# Patient Record
Sex: MALE | Race: BLACK OR AFRICAN AMERICAN | NOT HISPANIC OR LATINO | Employment: OTHER | URBAN - METROPOLITAN AREA
[De-identification: names, ages, dates, MRNs, and addresses within clinical notes are randomized per-mention and may not be internally consistent; named-entity substitution may affect disease eponyms.]

---

## 2017-01-05 RX ORDER — METOPROLOL SUCCINATE 100 MG/1
TABLET, EXTENDED RELEASE ORAL
Qty: 90 TABLET | Refills: 3 | Status: SHIPPED | OUTPATIENT
Start: 2017-01-05 | End: 2017-12-20 | Stop reason: SDUPTHER

## 2017-01-05 RX ORDER — FINASTERIDE 5 MG/1
TABLET, FILM COATED ORAL
Qty: 90 TABLET | Refills: 3 | Status: SHIPPED | OUTPATIENT
Start: 2017-01-05 | End: 2017-12-20 | Stop reason: SDUPTHER

## 2017-01-30 ENCOUNTER — OFFICE VISIT (OUTPATIENT)
Dept: INTERNAL MEDICINE | Facility: CLINIC | Age: 67
End: 2017-01-30
Payer: MEDICARE

## 2017-01-30 VITALS
SYSTOLIC BLOOD PRESSURE: 144 MMHG | RESPIRATION RATE: 20 BRPM | WEIGHT: 239.31 LBS | HEIGHT: 70 IN | TEMPERATURE: 98 F | HEART RATE: 60 BPM | BODY MASS INDEX: 34.26 KG/M2 | DIASTOLIC BLOOD PRESSURE: 86 MMHG

## 2017-01-30 DIAGNOSIS — D50.9 IRON DEFICIENCY ANEMIA, UNSPECIFIED IRON DEFICIENCY ANEMIA TYPE: Primary | ICD-10-CM

## 2017-01-30 DIAGNOSIS — N40.1 BENIGN PROSTATIC HYPERPLASIA WITH LOWER URINARY TRACT SYMPTOMS, UNSPECIFIED MORPHOLOGY: ICD-10-CM

## 2017-01-30 DIAGNOSIS — N18.4 CHRONIC KIDNEY DISEASE, STAGE IV (SEVERE): ICD-10-CM

## 2017-01-30 DIAGNOSIS — E78.2 MIXED HYPERLIPIDEMIA: ICD-10-CM

## 2017-01-30 DIAGNOSIS — Z94.0 RENAL TRANSPLANT RECIPIENT: ICD-10-CM

## 2017-01-30 DIAGNOSIS — D68.59 THROMBOPHILIA: ICD-10-CM

## 2017-01-30 DIAGNOSIS — I10 ESSENTIAL HYPERTENSION, BENIGN: ICD-10-CM

## 2017-01-30 PROCEDURE — 99999 PR PBB SHADOW E&M-EST. PATIENT-LVL III: CPT | Mod: PBBFAC,,, | Performed by: INTERNAL MEDICINE

## 2017-01-30 PROCEDURE — 1160F RVW MEDS BY RX/DR IN RCRD: CPT | Mod: S$GLB,,, | Performed by: INTERNAL MEDICINE

## 2017-01-30 PROCEDURE — 3079F DIAST BP 80-89 MM HG: CPT | Mod: S$GLB,,, | Performed by: INTERNAL MEDICINE

## 2017-01-30 PROCEDURE — 99499 UNLISTED E&M SERVICE: CPT | Mod: S$GLB,,, | Performed by: INTERNAL MEDICINE

## 2017-01-30 PROCEDURE — 1159F MED LIST DOCD IN RCRD: CPT | Mod: S$GLB,,, | Performed by: INTERNAL MEDICINE

## 2017-01-30 PROCEDURE — 1126F AMNT PAIN NOTED NONE PRSNT: CPT | Mod: S$GLB,,, | Performed by: INTERNAL MEDICINE

## 2017-01-30 PROCEDURE — 3077F SYST BP >= 140 MM HG: CPT | Mod: S$GLB,,, | Performed by: INTERNAL MEDICINE

## 2017-01-30 PROCEDURE — 99214 OFFICE O/P EST MOD 30 MIN: CPT | Mod: S$GLB,,, | Performed by: INTERNAL MEDICINE

## 2017-01-30 PROCEDURE — 1157F ADVNC CARE PLAN IN RCRD: CPT | Mod: S$GLB,,, | Performed by: INTERNAL MEDICINE

## 2017-01-30 RX ORDER — TAMSULOSIN HYDROCHLORIDE 0.4 MG/1
0.4 CAPSULE ORAL DAILY
Qty: 90 CAPSULE | Refills: 3 | Status: SHIPPED | OUTPATIENT
Start: 2017-01-30 | End: 2017-12-04

## 2017-01-30 NOTE — MR AVS SNAPSHOT
[facility] - Internal Medicine  [address] [place] [address] [place], [address]  [place] [address]  Phone: [phone]  Fax: [phone]                  Jose Luis Manzo   2017 9:45 AM   Office Visit    Description:  Male : 1950   Provider:  Zelalem Rojas MD   Department:  [facility] - Internal Medicine           Reason for Visit     Follow-up           Diagnoses this Visit        Comments    Iron deficiency anemia, unspecified iron deficiency anemia type    -  Primary     Essential hypertension, benign         Mixed hyperlipidemia         Renal transplant recipient         Thrombophilia         Chronic kidney disease, stage IV (severe)         Benign prostatic hyperplasia with lower urinary tract symptoms, unspecified morphology                To Do List           Future Appointments        Provider Department Dept Phone    2017 1:00 PM HRA, [facility] 8 [facility] [phone]      Goals (5 Years of Data)     None      Follow-Up and Disposition     Return in about 6 months (around 2017).       These Medications        Disp Refills Start End    tamsulosin (FLOMAX) 0.4 mg Cp24 90 capsule 3 2017    Take 1 capsule (0.4 mg total) by mouth once daily. - Oral    Pharmacy: [facility] [address] [place], [place] [address] [facility] Ph #: [phone]         [facility] On Call     [facility] On Call Nurse Care Line -  Assistance  Registered nurses in the [facility] Center provide clinical advisement, health education, appointment booking, and other advisory services.  Call for this free service at [phone].             Medications           START taking these NEW medications        Refills    tamsulosin (FLOMAX) 0.4 mg Cp24 3    Sig: Take 1 capsule (0.4 mg total) by mouth once daily.    Class: Normal    Route: Oral           Verify that the below list of medications is an accurate representation of the medications you are currently taking.  If none reported, the list may be blank.  "If incorrect, please contact your healthcare provider. Carry this list with you in case of emergency.           Current Medications     amlodipine (NORVASC) 10 MG tablet TAKE 1 TABLET EVERY DAY    apixaban (ELIQUIS) 2.5 mg Tab Take 1 tablet (2.5 mg total) by mouth 2 (two) times daily.    atorvastatin (LIPITOR) 20 MG tablet TAKE 1 TABLET EVERY OTHER DAY    ferrous sulfate 325 mg (65 mg iron) Tab tablet Take 325 mg by mouth once daily.    finasteride (PROSCAR) 5 mg tablet TAKE 1 TABLET EVERY DAY    metoprolol succinate (TOPROL-XL) 100 MG 24 hr tablet TAKE 1 TABLET EVERY DAY    predniSONE (DELTASONE) 5 MG tablet Take 1 tablet (5 mg total) by mouth once daily.    sirolimus (RAPAMUNE) 1 MG Tab Take 3 tablets (3 mg total) by mouth once daily.    sodium bicarbonate 650 MG tablet TAKE 3 TABLETS THREE TIMES DAILY    tamsulosin (FLOMAX) 0.4 mg Cp24 Take 1 capsule (0.4 mg total) by mouth once daily.           Clinical Reference Information           Vital Signs - Last Recorded  Most recent update: 1/30/2017 10:11 AM by Carlos Chaney MA    BP Pulse Temp Resp Ht Wt    (!) 144/86 (BP Location: Left arm, Patient Position: Sitting, BP Method: Manual) 60 97.5 °F (36.4 °C) (Oral) 20 5' 10" (1.778 m) 108.6 kg (239 lb 5 oz)    BMI                34.34 kg/m2          Blood Pressure          Most Recent Value    BP  (!)  144/86      Allergies as of 1/30/2017     No Known Allergies      Immunizations Administered on Date of Encounter - 1/30/2017     None      Orders Placed During Today's Visit     Future Labs/Procedures Expected by Expires    CBC auto differential  1/30/2017 3/31/2018    Comprehensive metabolic panel  1/30/2017 3/31/2018    Lipid panel  1/30/2017 3/31/2018      "

## 2017-01-30 NOTE — PROGRESS NOTES
Subjective:       Patient ID: Jose Luis Manzo is a 66 y.o. male.    Chief Complaint: Follow-up    HPI  Checkup.  Yet to have GI w/u due to financial reasons.  demies any bleeding, melena, BRBPR.  C/O nocturia x 3-4 since starting on Prednisone. LE edema at baseline.  Review of Systems   All other systems reviewed and are negative.      Objective:      Physical Exam   Constitutional: He appears well-developed. No distress.   HENT:   Head: Normocephalic.   Eyes: No scleral icterus.   Dysconjugate gaze   Neck: Normal range of motion. No tracheal deviation present.   Cardiovascular: Normal rate, regular rhythm, normal heart sounds and intact distal pulses.    Pulmonary/Chest: Effort normal and breath sounds normal. No respiratory distress.   Abdominal: Soft. Bowel sounds are normal. He exhibits no distension and no mass. There is no tenderness.   Musculoskeletal: Normal range of motion. He exhibits edema (1+ ankles).   Neurological: He is alert.   Skin: Skin is warm and dry. No rash noted. He is not diaphoretic. No erythema.   Psychiatric: He has a normal mood and affect. His behavior is normal.   Vitals reviewed.      Assessment:       1. Iron deficiency anemia, unspecified iron deficiency anemia type    2. Essential hypertension, benign    3. Mixed hyperlipidemia    4. Renal transplant recipient    5. Thrombophilia    6. Chronic kidney disease, stage IV (severe)    7. Benign prostatic hyperplasia with lower urinary tract symptoms, unspecified morphology        Plan:       Jose Luis was seen today for follow-up.    Diagnoses and all orders for this visit:    Iron deficiency anemia, unspecified iron deficiency anemia type   Await GI w/u    Essential hypertension, benign   Observe    Mixed hyperlipidemia  -     Comprehensive metabolic panel; Future  -     Lipid panel; Future    Renal transplant recipient    Thrombophilia   Cont rx    Chronic kidney disease, stage IV (severe)  -     CBC auto differential; Future  -      Comprehensive metabolic panel; Future    Benign prostatic hyperplasia with lower urinary tract symptoms, unspecified morphology  -     tamsulosin (FLOMAX) 0.4 mg Cp24; Take 1 capsule (0.4 mg total) by mouth once daily.   Cont Proscar      Return in about 6 months (around 7/30/2017).

## 2017-03-31 DIAGNOSIS — N18.9 CHRONIC KIDNEY DISEASE, UNSPECIFIED STAGE: ICD-10-CM

## 2017-04-03 RX ORDER — ATORVASTATIN CALCIUM 20 MG/1
TABLET, FILM COATED ORAL
Qty: 45 TABLET | Refills: 1 | Status: SHIPPED | OUTPATIENT
Start: 2017-04-03 | End: 2017-04-05 | Stop reason: SDUPTHER

## 2017-04-05 RX ORDER — ATORVASTATIN CALCIUM 20 MG/1
20 TABLET, FILM COATED ORAL EVERY OTHER DAY
Qty: 45 TABLET | Refills: 1 | Status: SHIPPED | OUTPATIENT
Start: 2017-04-05 | End: 2017-10-16 | Stop reason: SDUPTHER

## 2017-04-05 NOTE — TELEPHONE ENCOUNTER
----- Message from Mounika Dejesus sent at 4/5/2017  2:27 PM CDT -----  Contact: The pt called  Need refill                Please RESEND to [facility].  They didn't get it.    atorvastatin (LIPITOR) 20 MG tablet

## 2017-05-01 ENCOUNTER — INITIAL CONSULT (OUTPATIENT)
Dept: OTOLARYNGOLOGY | Facility: CLINIC | Age: 67
End: 2017-05-01
Payer: MEDICARE

## 2017-05-01 VITALS
HEART RATE: 69 BPM | BODY MASS INDEX: 35.3 KG/M2 | TEMPERATURE: 98 F | WEIGHT: 246.06 LBS | SYSTOLIC BLOOD PRESSURE: 150 MMHG | DIASTOLIC BLOOD PRESSURE: 88 MMHG

## 2017-05-01 DIAGNOSIS — Z85.21 HISTORY OF LARYNGEAL CANCER: ICD-10-CM

## 2017-05-01 DIAGNOSIS — R49.0 HOARSENESS OF VOICE: Primary | ICD-10-CM

## 2017-05-01 PROCEDURE — 1159F MED LIST DOCD IN RCRD: CPT | Mod: S$GLB,,, | Performed by: OTOLARYNGOLOGY

## 2017-05-01 PROCEDURE — 99204 OFFICE O/P NEW MOD 45 MIN: CPT | Mod: 25,S$GLB,, | Performed by: OTOLARYNGOLOGY

## 2017-05-01 PROCEDURE — 31575 DIAGNOSTIC LARYNGOSCOPY: CPT | Mod: S$GLB,,, | Performed by: OTOLARYNGOLOGY

## 2017-05-01 PROCEDURE — 1160F RVW MEDS BY RX/DR IN RCRD: CPT | Mod: S$GLB,,, | Performed by: OTOLARYNGOLOGY

## 2017-05-01 PROCEDURE — 99999 PR PBB SHADOW E&M-EST. PATIENT-LVL III: CPT | Mod: PBBFAC,,, | Performed by: OTOLARYNGOLOGY

## 2017-05-01 PROCEDURE — 1126F AMNT PAIN NOTED NONE PRSNT: CPT | Mod: S$GLB,,, | Performed by: OTOLARYNGOLOGY

## 2017-05-01 PROCEDURE — 3079F DIAST BP 80-89 MM HG: CPT | Mod: S$GLB,,, | Performed by: OTOLARYNGOLOGY

## 2017-05-01 PROCEDURE — 3077F SYST BP >= 140 MM HG: CPT | Mod: S$GLB,,, | Performed by: OTOLARYNGOLOGY

## 2017-05-01 RX ORDER — FUROSEMIDE 40 MG/1
40 TABLET ORAL EVERY OTHER DAY
COMMUNITY
Start: 2017-03-23 | End: 2017-09-28

## 2017-05-01 NOTE — LETTER
May 1, 2017      Zelalem Rojas MD  [address] [facility]  [address]  [place] [address]           [facility] Otorhinolaryngology  [address] [place] [address], [address]  [facility] [address]  Phone: [phone]  Fax: [phone]          Patient: Jose Luis Manzo   MR Number: [id number]   YOB: 1950   Date of Visit: 5/1/2017       Dear Dr. Zelalem Rojas:    Thank you for referring Jose Luis Manzo to me for evaluation. Attached you will find relevant portions of my assessment and plan of care.    If you have questions, please do not hesitate to call me. I look forward to following Jose Luis Manzo along with you.    Sincerely,    Lizet Lam MD    Enclosure  CC:  No Recipients    If you would like to receive this communication electronically, please contact [email or web address] or [phone] to request more information on [facility] access.    For providers and/or their staff who would like to refer a patient to [facility], please contact us through our one-stop-shop provider referral line, [facility], at [phone].    If you feel you have received this communication in error or would no longer like to receive these types of communications, please e-mail [email or web address]

## 2017-05-01 NOTE — PROCEDURES
Laryngoscopy  Date/Time: 5/1/2017 8:54 AM  Performed by: CARMEN KIMBALL  Authorized by: CARMEN KIMBALL     Consent Done?:  Yes (Verbal)    Due to indication in patient's history, presentation or risk factors,  a fiber optic exam was performed.    SEPARATE PROCEDURE NOTE:    ANESTHESIA:  Topical xylocaine with earnestine-synephrine    FINDINGS:  Moderate to severe inaterarytenoid erythema and edema    PROCEDURE:  After verbal consent was obtained, the flexible scope was passed through the patient's nasal cavity without difficulty.  The nasopharynx (adenoid pad) and eustachian tube orifices were first visualized and were found to be normal, without masses or irregularity.  The posterior pharyngeal wall and base of tongue were then examined and no mass or irregular tissue was seen.  The scope was then advanced to the larynx, and the epiglottis, valleculae, and piriform sinuses were normal, without masses or mucosal irregularity.  The false vocal folds and true vocal folds were then examined and were found to have normal mobility (full abduction and adduction) and no masses or mucosal irregularity was seen.  The interartyenoid area , post cricoid region and false vocal cord region had erythema and edema consistent with post radiation changes.

## 2017-05-01 NOTE — PROGRESS NOTES
Chief Complaint   Patient presents with    Hoarse     X 1 month (completed XRT 2 years ago for throat CA)   .     HPI: 67 year old male referred to me by Dr. Rojas for evaluation of hoarseness with history of throat cancer. He has a history of laryngeal cancer treated by Dr. Kyle approximately 2 years. He was treated with XRT at [facility].  He reports that he completed radiation therapy (29 treatments) 2 years ago.  He now reports hoarseness which has been persistent since radiation. He reports dry mouth and phlegm in his throat.  He states that when he awakens in the morning he has no voice.  He feels hoarseness is worsening over the last several months.   He denies throat pain, dysphagia, otalgia.  He denies weight loss.        Past Medical History:   Diagnosis Date    BPH (benign prostatic hypertrophy)     Cancer     vocal cords    Claudication of right lower extremity 3/10/2016    Disorder of kidney and ureter     Hyperlipidemia     Hypertension     Microcytic anemia 9/16/2016    Oropharyngeal cancer     Pterygium     Thromboembolic disorder     Unspecified disorder of kidney and ureter      Social History     Social History    Marital status: Single     Spouse name: N/A    Number of children: N/A    Years of education: N/A     Occupational History    Not on file.     Social History Main Topics    Smoking status: Former Smoker     Packs/day: 1.00     Years: 20.00    Smokeless tobacco: Never Used    Alcohol use 0.0 oz/week     0 Standard drinks or equivalent per week      Comment: occasionally 1/month    Drug use: No    Sexual activity: Yes     Partners: Female     Other Topics Concern    Not on file     Social History Narrative     Past Surgical History:   Procedure Laterality Date    FRACTURE SURGERY Left 1980    Leg    KIDNEY TRANSPLANT  2007    Oropharyngeal Surgery  11/2014    Oropharyngeal Cancer    vocal cord surgery      radiation and then surgery to scrap cancer      Family History   Problem Relation Age of Onset    Stroke Mother     Diabetes Mother     Hypertension Mother     Stroke Father     No Known Problems Sister     No Known Problems Brother     No Known Problems Daughter            Review of Systems  General: negative for chills, fever or weight loss  Psychological: negative for mood changes or depression  Ophthalmic: negative for blurry vision, photophobia or eye pain  ENT: see HPI  Respiratory: no cough, shortness of breath, or wheezing  Cardiovascular: no chest pain or dyspnea on exertion  Gastrointestinal: no abdominal pain, change in bowel habits, or black/ bloody stools  Musculoskeletal: negative for gait disturbance or muscular weakness  Neurological: no syncope or seizures; no ataxia  Dermatological: negative for puritis,  rash and jaundice  Hematologic/lymphatic: no easy bruising, no new lumps or bumps      Physical Exam:    Vitals:    05/01/17 0815   BP: (!) 150/88   Pulse: 69   Temp: 97.8 °F (36.6 °C)       Constitutional: Well appearing / communicating without difficutly.  NAD.  Eyes: EOM I Bilaterally  Head/Face: Normocephalic.  Negative paranasal sinus pressure/tenderness.  Salivary glands WNL.  House Brackmann I Bilaterally.    Right Ear: Auricle normal appearance. External Auditory Canal within normal limits no lesions or masses,TM w/o masses/lesions/perforations. TM mobility noted.   Left Ear: Auricle normal appearance. External Auditory Canal within normal limits no lesions or masses,TM w/o masses/lesions/perforations. TM mobility noted.  Nose: No gross nasal septal deviation. Inferior Turbinates 2+ bilaterally. No septal perforation. No masses/lesions. External nasal skin appears normal without masses/lesions.  Oral Cavity: Gingiva/lips within normal limits.  Maxillary: Edendulous Mandibular partially edentulous. Mucus membranes dry. Floor of mouth soft, no masses palpated. Oral Tongue mobile. Hard Palate appears normal.    Oropharynx: Base  of tongue appears normal. No masses/lesions noted. Tonsillar fossa/pharyngeal wall without lesions. Posterior oropharynx WNL.  Soft palate without masses. Midline uvula.   Neck/Lymphatic: No LAD I-VI bilaterally.  No thyromegaly.  No masses noted on exam.    Mirror laryngoscopy/nasopharyngoscopy: Active gag reflex.  Unable to perform.    Neuro/Psychiatric: AOx3.  Normal mood and affect.   Cardiovascular: Normal carotid pulses bilaterally, no increasing jugular venous distention noted at cervical region bilaterally.    Respiratory: Normal respiratory effort, no stridor, no retractions noted.      FFL see separate procedure note.       Assessment:    ICD-10-CM ICD-9-CM    1. Hoarseness of voice R49.0 784.42    2. History of laryngeal cancer Z85.21 V10.21      The primary encounter diagnosis was Hoarseness of voice. A diagnosis of History of laryngeal cancer was also pertinent to this visit.      Plan:  History of laryngeal cancer with worsening hoarseness- I will obtain records from  Radiation Oncology and his ENT Dr. Kyle.  No charli evidence of recurrence on FFL today. I will obtain a PET-CT scan to evaluate for recurrence.  We will plan for DL with biopsy pending PET-CT scan results. He will follow up 1-2 weeks after PET-Ct scan is completed.        Lizet Lam MD

## 2017-05-01 NOTE — MR AVS SNAPSHOT
[facility] Otorhinolaryngology  [address], [address]  [place] [address]  Phone: [phone]  Fax: [phone]                  Jose Luis Manzo   2017 8:00 AM   Initial consult    Description:  Male : 1950   Provider:  Lizet Lam MD   Department:  [place] - Otorhinolaryngology           Reason for Visit     Hoarse           Diagnoses this Visit        Comments    Hoarseness of voice    -  Primary     History of laryngeal cancer                To Do List           Future Appointments        Provider Department Dept Phone    2017 9:45 AM [facility] PET CT1 LIMIT [address] [facility] [phone]    5/15/2017 8:00 AM Lizet Lam MD [facility] Otorhinolaryngology [phone]    2017 7:00 AM LAB, [place] [facility] [phone]    2017 9:30 AM Zelalem Rojas MD [facility] - Internal Medicine [phone]      Goals (5 Years of Data)     None      Follow-Up and Disposition     Return in about 2 weeks (around 5/15/2017).    Follow-up and Disposition History      [facility] On Call     [facility] On [facility] Nurse Care Line -  Assistance  Unless otherwise directed by your provider, please contact [facility], our nurse care line that is available for  assistance.     Registered nurses in the [facility] provide: appointment scheduling, clinical advisement, health education, and other advisory services.  Call: [phone] (toll free)               Medications                Verify that the below list of medications is an accurate representation of the medications you are currently taking.  If none reported, the list may be blank. If incorrect, please contact your healthcare provider. Carry this list with you in case of emergency.           Current Medications     amlodipine (NORVASC) 10 MG tablet TAKE 1 TABLET EVERY DAY    atorvastatin (LIPITOR) 20 MG tablet Take 1 tablet (20 mg total) by mouth every other  day.    ferrous sulfate 325 mg (65 mg iron) Tab tablet Take 325 mg by mouth once daily.    finasteride (PROSCAR) 5 mg tablet TAKE 1 TABLET EVERY DAY    furosemide (LASIX) 40 MG tablet Take 40 mg by mouth every other day.    metoprolol succinate (TOPROL-XL) 100 MG 24 hr tablet TAKE 1 TABLET EVERY DAY    sirolimus (RAPAMUNE) 1 MG Tab Take 3 tablets (3 mg total) by mouth once daily.    sodium bicarbonate 650 MG tablet TAKE 3 TABLETS THREE TIMES DAILY    tamsulosin (FLOMAX) 0.4 mg Cp24 Take 1 capsule (0.4 mg total) by mouth once daily.    apixaban (ELIQUIS) 2.5 mg Tab Take 1 tablet (2.5 mg total) by mouth 2 (two) times daily.    predniSONE (DELTASONE) 5 MG tablet Take 1 tablet (5 mg total) by mouth once daily.           Clinical Reference Information           Your Vitals Were     BP Pulse Temp Weight BMI    150/88 (BP Location: Left arm, Patient Position: Sitting) 69 97.8 °F (36.6 °C) (Oral) 111.6 kg (246 lb 0.5 oz) 35.3 kg/m2      Blood Pressure          Most Recent Value    BP  (!)  150/88      Allergies as of 5/1/2017     No Known Allergies      Immunizations Administered on Date of Encounter - 5/1/2017     None      Orders Placed During Today's Visit      Normal Orders This Visit    Laryngoscopy     Future Labs/Procedures Expected by Expires    NM Pet CT Routine  5/1/2017 5/1/2018      Language Assistance Services     ATTENTION: Language assistance services are available, free of charge. Please call [phone].      ATENCIÓN: Si habla sue, tiene a verdin disposición servicios gratuitos de asistencia lingüística. Llame al [phone].     CHÚ Ý: N?u b?n nói Ti?ng Vi?t, có các d?ch v? h? tr? ngôn ng? mi?n phí dành cho b?n. G?i s? [phone].         [facility] complies with applicable Federal civil rights laws and does not discriminate on the basis of race, color, national origin, age, disability, or sex.

## 2017-05-12 ENCOUNTER — HOSPITAL ENCOUNTER (OUTPATIENT)
Dept: RADIOLOGY | Facility: HOSPITAL | Age: 67
Discharge: HOME | End: 2017-05-12
Attending: OTOLARYNGOLOGY
Payer: MEDICARE

## 2017-05-12 DIAGNOSIS — R49.0 HOARSENESS OF VOICE: ICD-10-CM

## 2017-05-12 DIAGNOSIS — Z85.21 HISTORY OF LARYNGEAL CANCER: ICD-10-CM

## 2017-05-12 PROCEDURE — 78815 PET IMAGE W/CT SKULL-THIGH: CPT | Mod: 26,,, | Performed by: RADIOLOGY

## 2017-05-12 PROCEDURE — A9552 F18 FDG: HCPCS

## 2017-05-15 ENCOUNTER — TELEPHONE (OUTPATIENT)
Dept: OTOLARYNGOLOGY | Facility: CLINIC | Age: 67
End: 2017-05-15

## 2017-05-15 DIAGNOSIS — R91.1 LESION OF LEFT LUNG: Primary | ICD-10-CM

## 2017-05-15 NOTE — TELEPHONE ENCOUNTER
I spoke with Mr. Manzo and notified him of his PET-CT scan results. I will refer to pulmonary for an urgent appointment.  Please ensure he has follow up with me in 6 weeks.

## 2017-06-14 ENCOUNTER — OFFICE VISIT (OUTPATIENT)
Dept: OTOLARYNGOLOGY | Facility: CLINIC | Age: 67
End: 2017-06-14
Payer: MEDICARE

## 2017-06-14 VITALS
HEIGHT: 70 IN | BODY MASS INDEX: 34.64 KG/M2 | TEMPERATURE: 98 F | WEIGHT: 241.94 LBS | HEART RATE: 79 BPM | DIASTOLIC BLOOD PRESSURE: 95 MMHG | SYSTOLIC BLOOD PRESSURE: 139 MMHG

## 2017-06-14 DIAGNOSIS — R91.1 LESION OF LEFT LUNG: ICD-10-CM

## 2017-06-14 DIAGNOSIS — Z85.21 HISTORY OF LARYNGEAL CANCER: Primary | ICD-10-CM

## 2017-06-14 DIAGNOSIS — R49.0 HOARSENESS OF VOICE: ICD-10-CM

## 2017-06-14 PROCEDURE — 99213 OFFICE O/P EST LOW 20 MIN: CPT | Mod: S$GLB,,, | Performed by: OTOLARYNGOLOGY

## 2017-06-14 PROCEDURE — 1126F AMNT PAIN NOTED NONE PRSNT: CPT | Mod: S$GLB,,, | Performed by: OTOLARYNGOLOGY

## 2017-06-14 PROCEDURE — 99999 PR PBB SHADOW E&M-EST. PATIENT-LVL IV: CPT | Mod: PBBFAC,,, | Performed by: OTOLARYNGOLOGY

## 2017-06-14 PROCEDURE — 1159F MED LIST DOCD IN RCRD: CPT | Mod: S$GLB,,, | Performed by: OTOLARYNGOLOGY

## 2017-06-14 RX ORDER — FLUTICASONE PROPIONATE 50 MCG
2 SPRAY, SUSPENSION (ML) NASAL DAILY
Qty: 1 BOTTLE | Refills: 12 | Status: SHIPPED | OUTPATIENT
Start: 2017-06-14 | End: 2017-12-04

## 2017-06-14 RX ORDER — OMEPRAZOLE 40 MG/1
40 CAPSULE, DELAYED RELEASE ORAL EVERY MORNING
Qty: 30 CAPSULE | Refills: 11 | Status: SHIPPED | OUTPATIENT
Start: 2017-06-14 | End: 2018-05-24

## 2017-06-14 NOTE — PROGRESS NOTES
Chief Complaint   Patient presents with    Follow-up     hoarseness    .     HPI: 67 year old male referred to me by Dr. Rojas for evaluation of hoarseness with history of throat cancer. He has a history of laryngeal cancer treated by Dr. Kyle approximately 2 years. He was treated with XRT at [facility].  He reports that he completed radiation therapy (29 treatments) 2 years ago.  He now reports hoarseness which has been persistent since radiation. He reports dry mouth and phlegm in his throat.  He states that when he awakens in the morning he has no voice.  He feels hoarseness is worsening over the last several months.   He denies throat pain, dysphagia, otalgia.  He denies weight loss.      Interval history: He reports continued hoarseness since last visit.   He denies cough, hemoptysis, shortness of breath.  He has continued dry mouth and the feeling of phlegm in his throat.       Past Medical History:   Diagnosis Date    BPH (benign prostatic hypertrophy)     Cancer     vocal cords    Claudication of right lower extremity 3/10/2016    Disorder of kidney and ureter     Hyperlipidemia     Hypertension     Microcytic anemia 9/16/2016    Oropharyngeal cancer     Pterygium     Thromboembolic disorder     Unspecified disorder of kidney and ureter      Social History     Social History    Marital status: Single     Spouse name: N/A    Number of children: N/A    Years of education: N/A     Occupational History    Not on file.     Social History Main Topics    Smoking status: Former Smoker     Packs/day: 1.00     Years: 20.00    Smokeless tobacco: Never Used    Alcohol use 0.0 oz/week      Comment: occasionally 1/month    Drug use: No    Sexual activity: Yes     Partners: Female     Other Topics Concern    Not on file     Social History Narrative    No narrative on file     Past Surgical History:   Procedure Laterality Date    FRACTURE SURGERY Left 1980    Leg    KIDNEY TRANSPLANT   2007    Oropharyngeal Surgery  11/2014    Oropharyngeal Cancer    vocal cord surgery      radiation and then surgery to scrap cancer     Family History   Problem Relation Age of Onset    Stroke Mother     Diabetes Mother     Hypertension Mother     Stroke Father     No Known Problems Sister     No Known Problems Brother     No Known Problems Daughter            Review of Systems  General: negative for chills, fever or weight loss  Psychological: negative for mood changes or depression  Ophthalmic: negative for blurry vision, photophobia or eye pain  ENT: see HPI  Respiratory: no cough, shortness of breath, or wheezing  Cardiovascular: no chest pain or dyspnea on exertion  Gastrointestinal: no abdominal pain, change in bowel habits, or black/ bloody stools  Musculoskeletal: negative for gait disturbance or muscular weakness  Neurological: no syncope or seizures; no ataxia  Dermatological: negative for puritis,  rash and jaundice  Hematologic/lymphatic: no easy bruising, no new lumps or bumps      Physical Exam:    Vitals:    06/14/17 1111   BP: (!) 139/95   Pulse: 79   Temp:        Constitutional: Well appearing / communicating without difficutly.  NAD.  Eyes: EOM I Bilaterally  Head/Face: Normocephalic.  Negative paranasal sinus pressure/tenderness.  Salivary glands WNL.  House Brackmann I Bilaterally.    Right Ear: Auricle normal appearance. External Auditory Canal within normal limits no lesions or masses,TM w/o masses/lesions/perforations. TM mobility noted.   Left Ear: Auricle normal appearance. External Auditory Canal within normal limits no lesions or masses,TM w/o masses/lesions/perforations. TM mobility noted.  Nose: No gross nasal septal deviation. Inferior Turbinates 2+ bilaterally. No septal perforation. No masses/lesions. External nasal skin appears normal without masses/lesions.  Oral Cavity: Gingiva/lips within normal limits.  Maxillary: Edendulous Mandibular partially edentulous. Mucus  membranes dry. Floor of mouth soft, no masses palpated. Oral Tongue mobile. Hard Palate appears normal.    Oropharynx: Base of tongue appears normal. No masses/lesions noted. Tonsillar fossa/pharyngeal wall without lesions. Posterior oropharynx WNL.  Soft palate without masses. Midline uvula.   Neck/Lymphatic: No LAD I-VI bilaterally.  No thyromegaly.  No masses noted on exam.    Mirror laryngoscopy/nasopharyngoscopy: Active gag reflex.  Unable to perform.    Neuro/Psychiatric: AOx3.  Normal mood and affect.   Cardiovascular: Normal carotid pulses bilaterally, no increasing jugular venous distention noted at cervical region bilaterally.    Respiratory: Normal respiratory effort, no stridor, no retractions noted.      PET-CT scan images reviewed personally by me and reviewed in detail with the patient today. Findings: There is mildly increased FDG uptake within a left anterior pleural-based density that contains hyperdensity on CT. There are no prior studies available for comparison to assess stability of this lesion. The internal hyperdensity suggests either that this represents postsurgical change or possibly old granulomatous disease however metastatic disease cannot be excluded. If prior studies exist, comparison of the stability of this lesion would be of benefit. Otherwise continued surveillance is recommended.      Assessment:    ICD-10-CM ICD-9-CM    1. History of laryngeal cancer Z85.21 V10.21    2. Lesion of left lung R91.1 518.89    3. Hoarseness of voice R49.0 784.42      The primary encounter diagnosis was History of laryngeal cancer. Diagnoses of Lesion of left lung and Hoarseness of voice were also pertinent to this visit.      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  Current PET-Ct scan shows hypermetabolic lesion in the left upper lobe of the lung that was not present in review of his prior PET-CT imaging 10/2014.  I will refer him  to Hematology/Oncology: Dr. Masters for evaluation of the lung lesion.    Follow up for laryngeal cancer surveillance in 4 month.       Lizet Lam MD

## 2017-07-21 ENCOUNTER — TELEPHONE (OUTPATIENT)
Dept: OTOLARYNGOLOGY | Facility: CLINIC | Age: 67
End: 2017-07-21

## 2017-07-21 NOTE — TELEPHONE ENCOUNTER
----- Message from Sarajbit Hackett sent at 7/21/2017  9:46 AM CDT -----  Contact: self/[phone]  Patient is returning your call.  Please advise

## 2017-07-21 NOTE — TELEPHONE ENCOUNTER
Spoke with patient, notified I did not try to call him. Seen her had a referral to oncology. Told patient will check to see if it was someone from that office that tried calling to possibly schedule an appointment. Will return his call

## 2017-07-21 NOTE — TELEPHONE ENCOUNTER
----- Message from Nadia Mccurdy sent at 7/21/2017  2:52 PM CDT -----  Contact: [phone]  Patient called in returning your call. Please advise

## 2017-07-24 ENCOUNTER — LAB VISIT (OUTPATIENT)
Dept: LAB | Facility: HOSPITAL | Age: 67
End: 2017-07-24
Attending: INTERNAL MEDICINE
Payer: MEDICARE

## 2017-07-24 DIAGNOSIS — N18.4 CHRONIC KIDNEY DISEASE, STAGE IV (SEVERE): ICD-10-CM

## 2017-07-24 DIAGNOSIS — I82.409 DEEP VEIN THROMBOSIS (DVT): ICD-10-CM

## 2017-07-24 DIAGNOSIS — E78.2 MIXED HYPERLIPIDEMIA: ICD-10-CM

## 2017-07-24 DIAGNOSIS — D68.59 THROMBOPHILIA: ICD-10-CM

## 2017-07-24 DIAGNOSIS — R60.9 EDEMA: ICD-10-CM

## 2017-07-24 DIAGNOSIS — Z94.0 RENAL TRANSPLANT RECIPIENT: ICD-10-CM

## 2017-07-24 LAB
ALBUMIN SERPL BCP-MCNC: 3.8 G/DL
ALP SERPL-CCNC: 109 U/L
ALT SERPL W/O P-5'-P-CCNC: 44 U/L
ANION GAP SERPL CALC-SCNC: 10 MMOL/L
ANION GAP SERPL CALC-SCNC: 10 MMOL/L
AST SERPL-CCNC: 69 U/L
BASOPHILS # BLD AUTO: 0.05 K/UL
BASOPHILS # BLD AUTO: 0.05 K/UL
BASOPHILS NFR BLD: 1 %
BASOPHILS NFR BLD: 1 %
BILIRUB SERPL-MCNC: 0.5 MG/DL
BUN SERPL-MCNC: 31 MG/DL
BUN SERPL-MCNC: 31 MG/DL
CALCIUM SERPL-MCNC: 11.2 MG/DL
CALCIUM SERPL-MCNC: 11.2 MG/DL
CHLORIDE SERPL-SCNC: 112 MMOL/L
CHLORIDE SERPL-SCNC: 112 MMOL/L
CHOLEST/HDLC SERPL: 3.8 {RATIO}
CO2 SERPL-SCNC: 22 MMOL/L
CO2 SERPL-SCNC: 22 MMOL/L
CREAT SERPL-MCNC: 2.79 MG/DL
CREAT SERPL-MCNC: 2.79 MG/DL
DIFFERENTIAL METHOD: ABNORMAL
DIFFERENTIAL METHOD: ABNORMAL
EOSINOPHIL # BLD AUTO: 0.1 K/UL
EOSINOPHIL # BLD AUTO: 0.1 K/UL
EOSINOPHIL NFR BLD: 2.6 %
EOSINOPHIL NFR BLD: 2.6 %
ERYTHROCYTE [DISTWIDTH] IN BLOOD BY AUTOMATED COUNT: 17 %
ERYTHROCYTE [DISTWIDTH] IN BLOOD BY AUTOMATED COUNT: 17 %
EST. GFR  (AFRICAN AMERICAN): 25.9 ML/MIN/1.73 M^2
EST. GFR  (AFRICAN AMERICAN): 25.9 ML/MIN/1.73 M^2
EST. GFR  (NON AFRICAN AMERICAN): 22.4 ML/MIN/1.73 M^2
EST. GFR  (NON AFRICAN AMERICAN): 22.4 ML/MIN/1.73 M^2
GLUCOSE SERPL-MCNC: 94 MG/DL
GLUCOSE SERPL-MCNC: 94 MG/DL
HCT VFR BLD AUTO: 40.4 %
HCT VFR BLD AUTO: 40.4 %
HDL/CHOLESTEROL RATIO: 26.6 %
HDLC SERPL-MCNC: 169 MG/DL
HDLC SERPL-MCNC: 45 MG/DL
HGB BLD-MCNC: 12.4 G/DL
HGB BLD-MCNC: 12.4 G/DL
INR PPP: 1
LDLC SERPL CALC-MCNC: 101.2 MG/DL
LYMPHOCYTES # BLD AUTO: 1.2 K/UL
LYMPHOCYTES # BLD AUTO: 1.2 K/UL
LYMPHOCYTES NFR BLD: 23.7 %
LYMPHOCYTES NFR BLD: 23.7 %
MCH RBC QN AUTO: 24.4 PG
MCH RBC QN AUTO: 24.4 PG
MCHC RBC AUTO-ENTMCNC: 30.7 G/DL
MCHC RBC AUTO-ENTMCNC: 30.7 G/DL
MCV RBC AUTO: 80 FL
MCV RBC AUTO: 80 FL
MONOCYTES # BLD AUTO: 0.6 K/UL
MONOCYTES # BLD AUTO: 0.6 K/UL
MONOCYTES NFR BLD: 11.3 %
MONOCYTES NFR BLD: 11.3 %
NEUTROPHILS # BLD AUTO: 3 K/UL
NEUTROPHILS # BLD AUTO: 3 K/UL
NEUTROPHILS NFR BLD: 59.2 %
NEUTROPHILS NFR BLD: 59.2 %
NONHDLC SERPL-MCNC: 124 MG/DL
PLATELET # BLD AUTO: 148 K/UL
PLATELET # BLD AUTO: 148 K/UL
PMV BLD AUTO: 11.9 FL
PMV BLD AUTO: 11.9 FL
POTASSIUM SERPL-SCNC: 4.2 MMOL/L
POTASSIUM SERPL-SCNC: 4.2 MMOL/L
PROT SERPL-MCNC: 7.8 G/DL
PROTHROMBIN TIME: 11.1 SEC
RBC # BLD AUTO: 5.08 M/UL
RBC # BLD AUTO: 5.08 M/UL
SODIUM SERPL-SCNC: 144 MMOL/L
SODIUM SERPL-SCNC: 144 MMOL/L
TRIGL SERPL-MCNC: 114 MG/DL
WBC # BLD AUTO: 4.97 K/UL
WBC # BLD AUTO: 4.97 K/UL

## 2017-07-24 PROCEDURE — 85007 BL SMEAR W/DIFF WBC COUNT: CPT | Mod: PO

## 2017-07-24 PROCEDURE — 80061 LIPID PANEL: CPT

## 2017-07-24 PROCEDURE — 36415 COLL VENOUS BLD VENIPUNCTURE: CPT | Mod: PO

## 2017-07-24 PROCEDURE — 85027 COMPLETE CBC AUTOMATED: CPT | Mod: PO

## 2017-07-24 PROCEDURE — 85610 PROTHROMBIN TIME: CPT | Mod: PO

## 2017-07-24 PROCEDURE — 80053 COMPREHEN METABOLIC PANEL: CPT

## 2017-08-04 ENCOUNTER — OFFICE VISIT (OUTPATIENT)
Dept: INTERNAL MEDICINE | Facility: CLINIC | Age: 67
End: 2017-08-04
Payer: MEDICARE

## 2017-08-04 VITALS
SYSTOLIC BLOOD PRESSURE: 118 MMHG | HEIGHT: 70 IN | HEART RATE: 88 BPM | DIASTOLIC BLOOD PRESSURE: 88 MMHG | RESPIRATION RATE: 20 BRPM | TEMPERATURE: 99 F | WEIGHT: 240.63 LBS | BODY MASS INDEX: 34.45 KG/M2

## 2017-08-04 DIAGNOSIS — I10 ESSENTIAL HYPERTENSION, BENIGN: Primary | ICD-10-CM

## 2017-08-04 DIAGNOSIS — N18.4 CHRONIC KIDNEY DISEASE, STAGE IV (SEVERE): ICD-10-CM

## 2017-08-04 DIAGNOSIS — Z94.0 RENAL TRANSPLANT RECIPIENT: ICD-10-CM

## 2017-08-04 DIAGNOSIS — N40.0 BENIGN NON-NODULAR PROSTATIC HYPERPLASIA WITHOUT LOWER URINARY TRACT SYMPTOMS: ICD-10-CM

## 2017-08-04 DIAGNOSIS — E78.2 MIXED HYPERLIPIDEMIA: ICD-10-CM

## 2017-08-04 DIAGNOSIS — D68.59 THROMBOPHILIA: ICD-10-CM

## 2017-08-04 PROCEDURE — 99499 UNLISTED E&M SERVICE: CPT | Mod: S$GLB,,, | Performed by: INTERNAL MEDICINE

## 2017-08-04 PROCEDURE — 99214 OFFICE O/P EST MOD 30 MIN: CPT | Mod: S$GLB,,, | Performed by: INTERNAL MEDICINE

## 2017-08-04 PROCEDURE — 99999 PR PBB SHADOW E&M-EST. PATIENT-LVL III: CPT | Mod: PBBFAC,,, | Performed by: INTERNAL MEDICINE

## 2017-08-04 PROCEDURE — 1159F MED LIST DOCD IN RCRD: CPT | Mod: S$GLB,,, | Performed by: INTERNAL MEDICINE

## 2017-08-04 PROCEDURE — 1125F AMNT PAIN NOTED PAIN PRSNT: CPT | Mod: S$GLB,,, | Performed by: INTERNAL MEDICINE

## 2017-08-04 PROCEDURE — 3008F BODY MASS INDEX DOCD: CPT | Mod: S$GLB,,, | Performed by: INTERNAL MEDICINE

## 2017-08-04 NOTE — PROGRESS NOTES
Subjective:       Patient ID: Jose Luis Manzo is a 67 y.o. male.    Chief Complaint: Results; Follow-up; and Hoarse    HPI  Checkup.  Labs reviewed.  C/O hoarseness x 1 month, no throat pain or dysphagia.  Scheduled for a laryngoscope soon per Dr Desouza.  No CP, SOB.  Had an abnormality in chest on PET/CT scan in May.  Nocturia x 0-4.  No bleeding.  Review of Systems   All other systems reviewed and are negative.      Objective:      Physical Exam   Constitutional: He appears well-developed. No distress.   HENT:   Head: Normocephalic.   Mouth/Throat: Oropharynx is clear and moist.   Eyes: EOM are normal. No scleral icterus.   Neck: Normal range of motion. No JVD present. No tracheal deviation present. No thyromegaly present.   Cardiovascular: Normal rate, regular rhythm, normal heart sounds and intact distal pulses.    Pulmonary/Chest: Effort normal and breath sounds normal. No respiratory distress.   Abdominal: Soft. Bowel sounds are normal. He exhibits no distension. There is no tenderness.   Musculoskeletal: Normal range of motion. He exhibits edema (1+ LEs).   Lymphadenopathy:     He has no cervical adenopathy.   Neurological: He is alert.   Skin: Skin is warm and dry. No rash noted. He is not diaphoretic. No erythema.   Psychiatric: He has a normal mood and affect. His behavior is normal.   Vitals reviewed.      Assessment:       1. Essential hypertension, benign    2. Chronic kidney disease, stage IV (severe)    3. Mixed hyperlipidemia    4. Renal transplant recipient    5. Thrombophilia    6. Benign non-nodular prostatic hyperplasia without lower urinary tract symptoms        Plan:       Jose Luis was seen today for results, follow-up and hoarse.    Diagnoses and all orders for this visit:    Essential hypertension, benign   Well-cont    Chronic kidney disease, stage IV (severe)  -     Renal function panel; Future   Stable    Mixed hyperlipidemia   Well-cont    Renal transplant recipient    Thrombophilia   Cont  Eliquiis    Benign non-nodular prostatic hyperplasia without lower urinary tract symptoms  -     Prostate Specific Antigen, Diagnostic; Future      Return in about 6 months (around 2/4/2018).

## 2017-08-18 ENCOUNTER — INITIAL CONSULT (OUTPATIENT)
Dept: HEMATOLOGY/ONCOLOGY | Facility: CLINIC | Age: 67
End: 2017-08-18
Payer: MEDICARE

## 2017-08-18 VITALS
HEIGHT: 71 IN | HEART RATE: 72 BPM | OXYGEN SATURATION: 97 % | DIASTOLIC BLOOD PRESSURE: 84 MMHG | WEIGHT: 243.63 LBS | BODY MASS INDEX: 34.11 KG/M2 | SYSTOLIC BLOOD PRESSURE: 150 MMHG

## 2017-08-18 DIAGNOSIS — N18.4 CHRONIC KIDNEY DISEASE, STAGE IV (SEVERE): ICD-10-CM

## 2017-08-18 DIAGNOSIS — R91.1 NODULE OF LEFT LUNG: ICD-10-CM

## 2017-08-18 DIAGNOSIS — Z85.21 H/O LARYNGEAL CANCER: ICD-10-CM

## 2017-08-18 DIAGNOSIS — Z94.0 RENAL TRANSPLANT RECIPIENT: Primary | ICD-10-CM

## 2017-08-18 PROCEDURE — 99204 OFFICE O/P NEW MOD 45 MIN: CPT | Mod: S$GLB,,, | Performed by: INTERNAL MEDICINE

## 2017-08-18 PROCEDURE — 3008F BODY MASS INDEX DOCD: CPT | Mod: S$GLB,,, | Performed by: INTERNAL MEDICINE

## 2017-08-18 PROCEDURE — 1126F AMNT PAIN NOTED NONE PRSNT: CPT | Mod: S$GLB,,, | Performed by: INTERNAL MEDICINE

## 2017-08-18 PROCEDURE — 1159F MED LIST DOCD IN RCRD: CPT | Mod: S$GLB,,, | Performed by: INTERNAL MEDICINE

## 2017-08-18 PROCEDURE — 99999 PR PBB SHADOW E&M-EST. PATIENT-LVL III: CPT | Mod: PBBFAC,,, | Performed by: INTERNAL MEDICINE

## 2017-08-18 PROCEDURE — 99499 UNLISTED E&M SERVICE: CPT | Mod: S$GLB,,, | Performed by: INTERNAL MEDICINE

## 2017-08-18 PROCEDURE — 3077F SYST BP >= 140 MM HG: CPT | Mod: S$GLB,,, | Performed by: INTERNAL MEDICINE

## 2017-08-18 PROCEDURE — 3079F DIAST BP 80-89 MM HG: CPT | Mod: S$GLB,,, | Performed by: INTERNAL MEDICINE

## 2017-08-18 NOTE — LETTER
August 18, 2017      Lizet Lam MD  [address]  [address]  [place] [facility] [address]           [facility]ology Oncology  [address] [address]  Phone: [phone]          Patient: Jose Luis Manzo   MR Number: [id number]   YOB: 1950   Date of Visit: 8/18/2017       Dear Dr. Lizet Lam:    Thank you for referring Jose Luis Manzo to me for evaluation. Attached you will find relevant portions of my assessment and plan of care.    If you have questions, please do not hesitate to call me. I look forward to following Jose Luis Manzo along with you.    Sincerely,    Luis Masters MD    Enclosure  CC:  No Recipients    If you would like to receive this communication electronically, please contact [email or web address] or [phone] to request more information on [employer] Link access.    For providers and/or their staff who would like to refer a patient to [facility], please contact us through our one-stop-shop provider referral line, [facility], at [phone].    If you feel you have received this communication in error or would no longer like to receive these types of communications, please e-mail [email or web address]

## 2017-08-18 NOTE — PROGRESS NOTES
Subjective:       Patient ID: Jose Luis Manzo is a 67 y.o. male.    Chief Complaint: Advice Only    HPI He is 67 years old. 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  It was diagnosed in September 2014 by ENT Dr. Chandler Kyle when he was worked up for hoarseness.  The lesion was found in the left true vocal cord.  Biopsy revealed squamous cell carcinoma well differentiated.  He underwent definitive radiation therapy at [facility] under the care of .  He completed 6300 centigray by December 2014.  A PET scan done at the time was negative for metastatic disease.  He has been having some hoarseness since then.    For the past few months the hoarseness has been getting worse.  So he saw  (ENT) for evaluation.  No recurrence of malignancy was noted locally.  However he underwent a PET scan 5/12/17 that revealed a 1.8 centimeter pleural-based nodule in the left upper lobe.  SUV 3.6.  This is a new lesion.    Hence referred here for further evaluation.    Review of Systems   Constitutional: Negative for fever and unexpected weight change.   HENT: Positive for voice change. Negative for mouth sores, nosebleeds, sinus pressure and sneezing.    Respiratory: Negative for cough and shortness of breath.    Cardiovascular: Negative for chest pain and palpitations.   Gastrointestinal: Negative for abdominal pain, blood in stool and nausea.   Musculoskeletal: Positive for arthralgias. Negative for gait problem.   Hematological: Negative for adenopathy. Does not bruise/bleed easily.   Psychiatric/Behavioral: Negative for behavioral problems and confusion. The patient is nervous/anxious.          Objective:      Physical Exam   Constitutional: He is oriented to person, place, and time. He appears well-developed and well-nourished. No distress.   Eyes: Conjunctivae and lids are normal. No scleral icterus.   Neck: Normal range of motion. Neck supple. No thyromegaly present.   Cardiovascular:  Normal rate, regular rhythm and intact distal pulses.  Exam reveals no gallop.    Pulmonary/Chest: Effort normal and breath sounds normal. No respiratory distress. He has no rales.   Abdominal: Soft. Bowel sounds are normal. He exhibits no distension and no mass. There is no hepatosplenomegaly.   Lymphadenopathy:        Head (right side): No submental adenopathy present.        Head (left side): No submental adenopathy present.     He has no cervical adenopathy.        Right: No supraclavicular adenopathy present.        Left: No supraclavicular adenopathy present.   Neurological: He is alert and oriented to person, place, and time.   Skin: Skin is warm. He is not diaphoretic. No cyanosis. Nails show no clubbing.         Assessment:       1. Renal transplant recipient    2. H/O laryngeal cancer    3. Chronic kidney disease, stage IV (severe)    4. 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    Current PET scan shows a 1.8 centimeters pleural-based isolated lesion, SUV max 3.6.     Discussed differential diagnosis of this in detail.  Will schedule a dedicated CT scan of the chest for further evaluation of this pleural-based nodule and discuss his case at the multidisciplinary tumor board - on September 1st.  At the time will make a determination if this lesion can be watched or if a biopsy will need to be performed.  He is agreeable to this plan of care.

## 2017-08-21 ENCOUNTER — HOSPITAL ENCOUNTER (OUTPATIENT)
Dept: RADIOLOGY | Facility: HOSPITAL | Age: 67
Discharge: HOME | End: 2017-08-21
Attending: INTERNAL MEDICINE
Payer: MEDICARE

## 2017-08-21 DIAGNOSIS — R91.1 NODULE OF LEFT LUNG: ICD-10-CM

## 2017-08-21 PROCEDURE — 71250 CT THORAX DX C-: CPT | Mod: TC,PO

## 2017-09-01 ENCOUNTER — DOCUMENTATION ONLY (OUTPATIENT)
Dept: HEMATOLOGY/ONCOLOGY | Facility: CLINIC | Age: 67
End: 2017-09-01

## 2017-09-01 ENCOUNTER — DOCUMENTATION ONLY (OUTPATIENT)
Dept: NEUROLOGY | Facility: HOSPITAL | Age: 67
End: 2017-09-01

## 2017-09-01 DIAGNOSIS — R91.1 PULMONARY NODULE, LEFT: ICD-10-CM

## 2017-09-01 NOTE — PROGRESS NOTES
His case was discussed at the multidisciplinary tumor board earlier today.  The left upper lobe pulmonary nodule is suspicious for malignancy.  CT-guided biopsy is recommended.  I spoke to the patient regarding this.  He is agreeable.  Will schedule.  Hold Eliquis 48 hours prior to biopsy and resume the day after the biopsy.  Follow-up in approximately 2 weeks after the biopsy.

## 2017-09-01 NOTE — PROGRESS NOTES
[facility] General Tumor Board   09/01/2017    Attendees  Surgery - GERONIMO Soriano MD & Carol Schofield MD  Interventional Radiology - Alex Negrete MD  Pathology - Renate Devries MD  Medical Oncology - Luis Masters MD  Gastroenterology - Sonido Sanchez MD     Case Presentation  68 y/o M with h/o T1a SCC of the larynx diagnosed in September 2014 s/p definitive XRT at [facility]. He completed 6300 cGy by December 2014.  A PET scan done at the time was negative for metastatic disease.  He has been having some hoarseness since then, which has worsened over the last few months. He was evaluated by Dr. Desouza (ENT).  No recurrence of malignancy was noted locally.  However, he underwent a PET scan on 5/12/17 that revealed a new 1.8 cm pleural-based nodule in the left upper lobe (SUV 3.6).  Patient presented to discuss the need to biopsy this lesion or if it can be watched. Presenter: Luis Masters MD     Discussion  Reviewed imaging, pathology, and medical history. Per Dr. Negrete, PET scan dated 5/12/17 is concerning for metastatic disease in the left upper lobe. More recent CT Chest on 8/21/17 is even more concerning and shows evidence of emphysema.      Recommendations:  1. CT-guided biopsy of left lung lesion

## 2017-09-11 DIAGNOSIS — I10 ESSENTIAL HYPERTENSION, BENIGN: ICD-10-CM

## 2017-09-11 RX ORDER — AMLODIPINE BESYLATE 10 MG/1
TABLET ORAL
Qty: 90 TABLET | Refills: 3 | Status: SHIPPED | OUTPATIENT
Start: 2017-09-11 | End: 2018-06-26 | Stop reason: SDUPTHER

## 2017-09-12 ENCOUNTER — TELEPHONE (OUTPATIENT)
Dept: HEMATOLOGY/ONCOLOGY | Facility: CLINIC | Age: 67
End: 2017-09-12

## 2017-09-14 ENCOUNTER — HOSPITAL ENCOUNTER (OUTPATIENT)
Facility: HOSPITAL | Age: 67
Discharge: HOME | End: 2017-09-14
Attending: INTERNAL MEDICINE | Admitting: INTERNAL MEDICINE
Payer: MEDICARE

## 2017-09-14 VITALS
RESPIRATION RATE: 16 BRPM | SYSTOLIC BLOOD PRESSURE: 135 MMHG | WEIGHT: 238 LBS | BODY MASS INDEX: 34.07 KG/M2 | HEART RATE: 58 BPM | TEMPERATURE: 98 F | OXYGEN SATURATION: 97 % | HEIGHT: 70 IN | DIASTOLIC BLOOD PRESSURE: 60 MMHG

## 2017-09-14 DIAGNOSIS — R91.1 PULMONARY NODULE, LEFT: ICD-10-CM

## 2017-09-14 LAB
ALBUMIN SERPL BCP-MCNC: 3.5 G/DL
ALP SERPL-CCNC: 113 U/L
ALT SERPL W/O P-5'-P-CCNC: 17 U/L
ANION GAP SERPL CALC-SCNC: 6 MMOL/L
APTT BLDCRRT: 26.3 SEC
AST SERPL-CCNC: 16 U/L
BASOPHILS # BLD AUTO: 0.01 K/UL
BASOPHILS NFR BLD: 0.2 %
BILIRUB SERPL-MCNC: 0.2 MG/DL
BUN SERPL-MCNC: 32 MG/DL
CALCIUM SERPL-MCNC: 10.6 MG/DL
CHLORIDE SERPL-SCNC: 111 MMOL/L
CO2 SERPL-SCNC: 23 MMOL/L
CREAT SERPL-MCNC: 2.8 MG/DL
DIFFERENTIAL METHOD: ABNORMAL
EOSINOPHIL # BLD AUTO: 0.1 K/UL
EOSINOPHIL NFR BLD: 0.8 %
ERYTHROCYTE [DISTWIDTH] IN BLOOD BY AUTOMATED COUNT: 16.1 %
EST. GFR  (AFRICAN AMERICAN): 26 ML/MIN/1.73 M^2
EST. GFR  (NON AFRICAN AMERICAN): 22 ML/MIN/1.73 M^2
GLUCOSE SERPL-MCNC: 98 MG/DL
GRAM STN SPEC: NORMAL
GRAM STN SPEC: NORMAL
HCT VFR BLD AUTO: 40.4 %
HGB BLD-MCNC: 12.5 G/DL
INR PPP: 1
LYMPHOCYTES # BLD AUTO: 0.7 K/UL
LYMPHOCYTES NFR BLD: 11.2 %
MCH RBC QN AUTO: 24.5 PG
MCHC RBC AUTO-ENTMCNC: 30.9 G/DL
MCV RBC AUTO: 79 FL
MONOCYTES # BLD AUTO: 0.4 K/UL
MONOCYTES NFR BLD: 6.4 %
NEUTROPHILS # BLD AUTO: 5.3 K/UL
NEUTROPHILS NFR BLD: 81.4 %
PLATELET # BLD AUTO: 124 K/UL
PLATELET BLD QL SMEAR: ABNORMAL
PMV BLD AUTO: 11.2 FL
POTASSIUM SERPL-SCNC: 4.1 MMOL/L
PROT SERPL-MCNC: 7.8 G/DL
PROTHROMBIN TIME: 10.5 SEC
RBC # BLD AUTO: 5.11 M/UL
SODIUM SERPL-SCNC: 140 MMOL/L
WBC # BLD AUTO: 6.54 K/UL

## 2017-09-14 PROCEDURE — 99152 MOD SED SAME PHYS/QHP 5/>YRS: CPT

## 2017-09-14 PROCEDURE — 88333 PATH CONSLTJ SURG CYTO XM 1: CPT | Mod: 26,,, | Performed by: PATHOLOGY

## 2017-09-14 PROCEDURE — 87205 SMEAR GRAM STAIN: CPT

## 2017-09-14 PROCEDURE — 80053 COMPREHEN METABOLIC PANEL: CPT

## 2017-09-14 PROCEDURE — 85730 THROMBOPLASTIN TIME PARTIAL: CPT

## 2017-09-14 PROCEDURE — 36415 COLL VENOUS BLD VENIPUNCTURE: CPT

## 2017-09-14 PROCEDURE — 99153 MOD SED SAME PHYS/QHP EA: CPT

## 2017-09-14 PROCEDURE — 88305 TISSUE EXAM BY PATHOLOGIST: CPT | Mod: 26,,, | Performed by: PATHOLOGY

## 2017-09-14 PROCEDURE — 88305 TISSUE EXAM BY PATHOLOGIST: CPT | Performed by: PATHOLOGY

## 2017-09-14 PROCEDURE — 63600175 PHARM REV CODE 636 W HCPCS: Performed by: RADIOLOGY

## 2017-09-14 PROCEDURE — 87116 MYCOBACTERIA CULTURE: CPT

## 2017-09-14 PROCEDURE — 87102 FUNGUS ISOLATION CULTURE: CPT

## 2017-09-14 PROCEDURE — 87070 CULTURE OTHR SPECIMN AEROBIC: CPT

## 2017-09-14 PROCEDURE — 87075 CULTR BACTERIA EXCEPT BLOOD: CPT

## 2017-09-14 PROCEDURE — 85610 PROTHROMBIN TIME: CPT

## 2017-09-14 PROCEDURE — 85025 COMPLETE CBC W/AUTO DIFF WBC: CPT

## 2017-09-14 PROCEDURE — 25000003 PHARM REV CODE 250: Performed by: RADIOLOGY

## 2017-09-14 RX ORDER — SODIUM CHLORIDE 9 MG/ML
INJECTION, SOLUTION INTRAVENOUS CONTINUOUS
Status: DISCONTINUED | OUTPATIENT
Start: 2017-09-14 | End: 2017-09-14 | Stop reason: HOSPADM

## 2017-09-14 RX ORDER — MIDAZOLAM HYDROCHLORIDE 1 MG/ML
INJECTION, SOLUTION INTRAMUSCULAR; INTRAVENOUS CODE/TRAUMA/SEDATION MEDICATION
Status: COMPLETED | OUTPATIENT
Start: 2017-09-14 | End: 2017-09-14

## 2017-09-14 RX ORDER — FENTANYL CITRATE 50 UG/ML
INJECTION, SOLUTION INTRAMUSCULAR; INTRAVENOUS CODE/TRAUMA/SEDATION MEDICATION
Status: COMPLETED | OUTPATIENT
Start: 2017-09-14 | End: 2017-09-14

## 2017-09-14 RX ADMIN — FENTANYL CITRATE 50 MCG: 50 INJECTION, SOLUTION INTRAMUSCULAR; INTRAVENOUS at 10:09

## 2017-09-14 RX ADMIN — MIDAZOLAM 1 MG: 1 INJECTION INTRAMUSCULAR; INTRAVENOUS at 10:09

## 2017-09-14 RX ADMIN — SODIUM CHLORIDE: 0.9 INJECTION, SOLUTION INTRAVENOUS at 09:09

## 2017-09-14 NOTE — SEDATION DOCUMENTATION
Patient transported to post procedural area by JESSY Osei RN. No  Resp. distress noted. Patient lying on left side.

## 2017-09-14 NOTE — DISCHARGE INSTRUCTIONS
CT-Guided Lung Biopsy  CT-guided lung biopsy is a procedure to collect small tissue samples from an abnormal area in your lung. During the procedure, an imaging method called CT (computed tomography) is used to show live pictures of your lung. Then a thin needle is used to remove the tissue samples. The samples are tested in a lab for cancer and other problems.     For the biopsy, a thin needle is used to remove samples of tissue from an abnormal area in the lung.   Getting ready for your procedure  Follow any instructions from your healthcare provider.  Tell your provider about any medicines you are taking. It is important for your provider to know if you are taking any blood-thinning medicines or have a bleeding disorder.  You may need to stop taking all or some medicine before the procedure. This includes:  · All prescription medicines  · Blood-thinning medicines (anticoagulants)  · Over-the-counter medicines such as aspirin or ibuprofen  · Street drugs  · Herbs, vitamins, and other supplements  Also tell your provider if you:  · Are pregnant or think you may be pregnant  · Are breastfeeding  · Are allergic to or have intolerances to any medicines  · Have a chronic cough, new cough, or other illness  · Use oxygen therapy at home  · Smoke or drink alcohol on a regular basis  Follow any directions youre given for not eating or drinking before the procedure.  The day of your procedure  The procedure takes about 60 minutes. The entire procedure (including time to prepare and recover) takes several hours. Youll likely go home the same day.  Before the procedure begins:  · An IV (intravenous) line may be put into a vein in your hand or arm. This line supplies fluids and medicines.  · To keep you free of pain during the procedure, you may be given anesthesia. Depending on the type of anesthesia used, you may be awake, drowsy, or in a deep sleep for the procedure.  During the procedure:  · Youll lie on a CT scan  table. You may be on your back, side, or stomach. Pictures of your lung are then taken using the CT scanner. This helps your provider find the best place to position the needle in your lungs.  · A ibrahima is made on your skin where the needle will be inserted (biopsy site). The site is injected with numbing medicine.  · Using the CT pictures as a guide, the needle is passed through the numbed skin between your ribs and into your lung. Samples of tissue are then removed from the abnormal area in your lung. The samples are sent to a lab to be checked for problems.  · When the procedure is complete, the needle is removed. Pressure is applied to the biopsy site to help stop any bleeding. The site is then bandaged.  After the procedure:  · Youll be taken to a room to rest until the anesthesia wears off.  · A chest X-ray may be done. This is to make sure there was no damage to your lungs or the area where the needle was placed.  · When its time for you to go home, have an adult family member or friend ready to drive you.  Recovering at home  You may cough up a small amount of blood shortly after the procedure. Later, you may also have some soreness around the biopsy site. Once at home, follow any instructions youre given. Be sure to:  · Take all medicines as directed.  · Care for the biopsy site as instructed.  · Check for signs of infection at the biopsy site (see below).  · Do not bathe or shower until your provider says it's OK. If you wish, you may wash with a sponge or washcloth.  · Avoid heavy lifting and other strenuous activities as directed.  · If you plan any air travel, ask your provider when you can do so. You may be told not to fly for a few weeks. This is because pressure changes may affect your lungs.  When to call your provider  Call 911 or your local emergency number if you have sudden, severe difficulty breathing. This could be a life-threatening emergency.  Call your healthcare provider for less severe  symptoms that are still concerning. If you are unable to speak with your provider, go to the emergency room if you have any of the following symptoms:  · Fever of 100.4°F (38°C) or higher, or as directed by your provider  · Sudden chest pain, shortness of breath, or fainting  · Coughing up increasing amounts of blood  · Signs of infection at the biopsy site, such as increased redness or swelling, warmth, more pain, bleeding, or bad-smelling drainage   Follow-up  The provider who ordered your test will discuss the biopsy results with you during a follow-up visit. Results are usually ready within 1 to 2 weeks. If more tests or treatments are needed, your provider will discuss these with you.  Risks and possible complications  All procedures have some risk. Possible risks of this procedure include:  · An air leak in your lung (pneumothorax). This may require a stay in the hospital and treatment to re-inflate the lung.  · Bleeding into or around the lung  · Infection in the skin or lung  · Injury to other structures in the chest  · Risks of anesthesia. This will be discussed with you before the procedure.   Date Last Reviewed: 6/11/2015  © 4309-0025 [employer]. [address] [place] [address], [place] [address]. All rights reserved. This information is not intended as a substitute for professional medical care. Always follow your healthcare professional's instructions.

## 2017-09-14 NOTE — PROCEDURES
Radiology Post-Procedure Note    Pre Op Diagnosis: Left lung mass    Post Op Diagnosis: Left lung mass    Procedure: left lung biopsy    Procedure performed by: Jarvis Lacey MD    Written Informed Consent Obtained: Yes    Specimen Removed: YES 5 x 20 gauge    Estimated Blood Loss: Minimal    Findings:   Using CT guidance a 19g sheath needle was placed into a Left lung mass. 20g monopty biopsy gun used to take 5 core biopsy samples. Specimen sent to pathology.     Additional specimen sent to lab: Yes    Patient tolerated procedure well.    Jarvis Lacey MD  Department of Radiology  Pager: [phone]

## 2017-09-14 NOTE — H&P
Radiology History & Physical      SUBJECTIVE:     Chief Complaint: Lung lesion    History of Present Illness:  Jose Luis Manzo is a 67 y.o. male who presents for CT guided lung biopsy  Past Medical History:   Diagnosis Date    BPH (benign prostatic hypertrophy)     Cancer     vocal cords    Claudication of right lower extremity 3/10/2016    Disorder of kidney and ureter     Hyperlipidemia     Hypertension     Microcytic anemia 9/16/2016    Oropharyngeal cancer     Pterygium     Thromboembolic disorder     Unspecified disorder of kidney and ureter      Past Surgical History:   Procedure Laterality Date    FRACTURE SURGERY Left 1980    Leg    KIDNEY TRANSPLANT  2007    Oropharyngeal Surgery  11/2014    Oropharyngeal Cancer    vocal cord surgery      radiation and then surgery to [facility] cancer       [place] Meds:   Prior to Admission medications    Medication Sig Start Date End Date Taking? Authorizing Provider   amlodipine (NORVASC) 10 MG tablet TAKE 1 TABLET EVERY DAY 9/11/17  Yes Zelalem Rojas MD   apixaban (ELIQUIS) 2.5 mg Tab Take 1 tablet (2.5 mg total) by mouth 2 (two) times daily. 4/13/16  Yes Triston Frankel MD   atorvastatin (LIPITOR) 20 MG tablet Take 1 tablet (20 mg total) by mouth every other day. 4/5/17  Yes Zelalem Rojas MD   ferrous sulfate 325 mg (65 mg iron) Tab tablet Take 325 mg by mouth once daily.   Yes Historical Provider, MD   finasteride (PROSCAR) 5 mg tablet TAKE 1 TABLET EVERY DAY 1/5/17  Yes Zelalem Rojas MD   fluticasone (FLONASE) 50 mcg/actuation nasal spray 2 sprays by Each Nare route once daily. 6/14/17  Yes Lizet Lam MD   furosemide (LASIX) 40 MG tablet Take 40 mg by mouth every other day. 3/23/17  Yes Historical Provider, MD   metoprolol succinate (TOPROL-XL) 100 MG 24 hr tablet TAKE 1 TABLET EVERY DAY 1/5/17  Yes Zelalem Rojas MD   predniSONE (DELTASONE) 5 MG tablet Take 1 tablet (5 mg total) by mouth once daily. 7/27/16  Yes Zelalem Rojas MD   sirolimus  (RAPAMUNE) 1 MG Tab Take 3 tablets (3 mg total) by mouth once daily. 7/8/16  Yes Zelalem Rojas MD   sodium bicarbonate 650 MG tablet TAKE 3 TABLETS THREE TIMES DAILY 10/31/16  Yes Zelalem Rojas MD   tamsulosin (FLOMAX) 0.4 mg Cp24 Take 1 capsule (0.4 mg total) by mouth once daily. 1/30/17 1/30/18 Yes Zelalem Rojas MD   omeprazole (PRILOSEC) 40 MG capsule Take 1 capsule (40 mg total) by mouth every morning. 6/14/17 8/4/17  Lizet Lam MD     Anticoagulants/Antiplatelets: eliquis has been held    Allergies: Review of patient's allergies indicates:  No Known Allergies  Sedation History:  no adverse reactions        OBJECTIVE:     Vital Signs (Most Recent)  Temp: 97.7 °F (36.5 °C) (09/14/17 0907)  Pulse: (!) 52 (09/14/17 0907)  Resp: 18 (09/14/17 0907)  BP: (!) 160/89 (09/14/17 0907)  SpO2: 100 % (09/14/17 0907)    Physical Exam:  ASA: 3  Mallampati: 2    General: no acute distress  Mental Status: alert and oriented to person, place and time  HEENT: normocephalic, atraumatic  Chest: unlabored breathing  Heart: regular heart rate  Abdomen: nondistended  Extremity: moves all extremities    Laboratory  Lab Results   Component Value Date    INR 1.0 09/14/2017       Lab Results   Component Value Date    WBC 6.54 09/14/2017    HGB 12.5 (L) 09/14/2017    HCT 40.4 09/14/2017    MCV 79 (L) 09/14/2017     (L) 09/14/2017      Lab Results   Component Value Date    GLU 98 09/14/2017     09/14/2017    K 4.1 09/14/2017     (H) 09/14/2017    CO2 23 09/14/2017    BUN 32 (H) 09/14/2017    CREATININE 2.8 (H) 09/14/2017    CALCIUM 10.6 (H) 09/14/2017    ALT 17 09/14/2017    AST 16 09/14/2017    ALBUMIN 3.5 09/14/2017    BILITOT 0.2 09/14/2017       ASSESSMENT/PLAN:     Sedation Plan: Moderate  Patient will undergo CT guided lung biopsy of MIK lesion. Informed consent obtained.    Jarvis Lacey MD  Department of Radiology  Pager: [phone]

## 2017-09-14 NOTE — DISCHARGE SUMMARY
Radiology Discharge Summary      Hospital Course: No complications    Admit Date: 9/14/2017  Discharge Date: 09/14/2017     Instructions Given to Patient: Yes  Diet: Resume prior diet  Activity: activity as tolerated and no driving for today    Description of Condition on Discharge: Stable  Vital Signs (Most Recent): Temp: 97.8 °F (36.6 °C) (09/14/17 1122)  Pulse: (!) 58 (09/14/17 1447)  Resp: 16 (09/14/17 1447)  BP: 135/60 (09/14/17 1447)  SpO2: 97 % (09/14/17 1447)    Discharge Disposition: Home    Discharge Diagnosis:   Left Lung lesion Biopsy     Follow-up:   Per referring physician    Jarvis Lacey MD  Department of Radiology  Pager: [phone]

## 2017-09-15 ENCOUNTER — OFFICE VISIT (OUTPATIENT)
Dept: OTOLARYNGOLOGY | Facility: CLINIC | Age: 67
End: 2017-09-15
Payer: MEDICARE

## 2017-09-15 VITALS
HEART RATE: 57 BPM | WEIGHT: 238.88 LBS | TEMPERATURE: 97 F | HEIGHT: 70 IN | DIASTOLIC BLOOD PRESSURE: 84 MMHG | BODY MASS INDEX: 34.2 KG/M2 | SYSTOLIC BLOOD PRESSURE: 131 MMHG

## 2017-09-15 DIAGNOSIS — Z85.21 HISTORY OF LARYNGEAL CANCER: Primary | ICD-10-CM

## 2017-09-15 DIAGNOSIS — R49.0 HOARSENESS OF VOICE: ICD-10-CM

## 2017-09-15 DIAGNOSIS — R91.1 LESION OF LEFT LUNG: ICD-10-CM

## 2017-09-15 PROCEDURE — 99214 OFFICE O/P EST MOD 30 MIN: CPT | Mod: 25,S$GLB,, | Performed by: OTOLARYNGOLOGY

## 2017-09-15 PROCEDURE — 3079F DIAST BP 80-89 MM HG: CPT | Mod: S$GLB,,, | Performed by: OTOLARYNGOLOGY

## 2017-09-15 PROCEDURE — 99999 PR PBB SHADOW E&M-EST. PATIENT-LVL IV: CPT | Mod: PBBFAC,,, | Performed by: OTOLARYNGOLOGY

## 2017-09-15 PROCEDURE — 1159F MED LIST DOCD IN RCRD: CPT | Mod: S$GLB,,, | Performed by: OTOLARYNGOLOGY

## 2017-09-15 PROCEDURE — 3008F BODY MASS INDEX DOCD: CPT | Mod: S$GLB,,, | Performed by: OTOLARYNGOLOGY

## 2017-09-15 PROCEDURE — 31575 DIAGNOSTIC LARYNGOSCOPY: CPT | Mod: S$GLB,,, | Performed by: OTOLARYNGOLOGY

## 2017-09-15 PROCEDURE — 3075F SYST BP GE 130 - 139MM HG: CPT | Mod: S$GLB,,, | Performed by: OTOLARYNGOLOGY

## 2017-09-15 PROCEDURE — 1126F AMNT PAIN NOTED NONE PRSNT: CPT | Mod: S$GLB,,, | Performed by: OTOLARYNGOLOGY

## 2017-09-15 NOTE — PROGRESS NOTES
Chief Complaint   Patient presents with    Follow-up     hoarseness, pt reports sometimes he feels like something is stuck in his throat   .     HPI: 67 year old male referred to me by Dr. Rojas for evaluation of hoarseness with history of throat cancer. He has a history of laryngeal cancer treated by Dr. Kyle approximately 2 years. He was treated with XRT at [facility].  He reports that he completed radiation therapy (29 treatments) 2 years ago.  He now reports hoarseness which has been persistent since radiation. He reports dry mouth and phlegm in his throat.  He states that when he awakens in the morning he has no voice.  He feels hoarseness is worsening over the last several months.   He denies throat pain, dysphagia, otalgia.  He denies weight loss.      Interval history: He reports continued hoarseness since last visit.   He denies cough, hemoptysis, shortness of breath.  He has continued dry mouth and the feeling of phlegm in his throat. He has seen Dr. Masters for evaluation of the pulmonary nodule.  He has had a CT guided biopsy of the pulmonary nodule 2 days ago.       Past Medical History:   Diagnosis Date    BPH (benign prostatic hypertrophy)     Cancer     vocal cords    Claudication of right lower extremity 3/10/2016    Disorder of kidney and ureter     Hyperlipidemia     Hypertension     Microcytic anemia 9/16/2016    Oropharyngeal cancer     Pterygium     Thromboembolic disorder     Unspecified disorder of kidney and ureter      Social History     Social History    Marital status: Single     Spouse name: N/A    Number of children: N/A    Years of education: N/A     Occupational History    Not on file.     Social History Main Topics    Smoking status: Former Smoker     Packs/day: 1.00     Years: 20.00    Smokeless tobacco: Never Used    Alcohol use 0.0 oz/week      Comment: occasionally 1/month    Drug use: No    Sexual activity: Yes     Partners: Female     Other Topics  Concern    Not on file     Social History Narrative    No narrative on file     Past Surgical History:   Procedure Laterality Date    FRACTURE SURGERY Left 1980    Leg    KIDNEY TRANSPLANT  2007    Oropharyngeal Surgery  11/2014    Oropharyngeal Cancer    vocal cord surgery      radiation and then surgery to scrap cancer     Family History   Problem Relation Age of Onset    Stroke Mother     Diabetes Mother     Hypertension Mother     Stroke Father     No Known Problems Sister     No Known Problems Brother     No Known Problems Daughter            Review of Systems  General: negative for chills, fever or weight loss  Psychological: negative for mood changes or depression  Ophthalmic: negative for blurry vision, photophobia or eye pain  ENT: see HPI  Respiratory: no cough, shortness of breath, or wheezing  Cardiovascular: no chest pain or dyspnea on exertion  Gastrointestinal: no abdominal pain, change in bowel habits, or black/ bloody stools  Musculoskeletal: negative for gait disturbance or muscular weakness  Neurological: no syncope or seizures; no ataxia  Dermatological: negative for puritis,  rash and jaundice  Hematologic/lymphatic: no easy bruising, no new lumps or bumps      Physical Exam:    Vitals:    09/15/17 1049   BP: 131/84   Pulse: (!) 57   Temp: 97.2 °F (36.2 °C)       Constitutional: Well appearing / communicating without difficutly.  NAD.  Eyes: EOM I Bilaterally  Head/Face: Normocephalic.  Negative paranasal sinus pressure/tenderness.  Salivary glands WNL.  House Brackmann I Bilaterally.    Right Ear: Auricle normal appearance. External Auditory Canal within normal limits no lesions or masses,TM w/o masses/lesions/perforations. TM mobility noted.   Left Ear: Auricle normal appearance. External Auditory Canal within normal limits no lesions or masses,TM w/o masses/lesions/perforations. TM mobility noted.  Nose: No gross nasal septal deviation. Inferior Turbinates 2+ bilaterally. No  septal perforation. No masses/lesions. External nasal skin appears normal without masses/lesions.  Oral Cavity: Gingiva/lips within normal limits.  Maxillary: Edendulous Mandibular partially edentulous. Mucus membranes dry. Floor of mouth soft, no masses palpated. Oral Tongue mobile. Hard Palate appears normal.    Oropharynx: Base of tongue appears normal. No masses/lesions noted. Tonsillar fossa/pharyngeal wall without lesions. Posterior oropharynx WNL.  Soft palate without masses. Midline uvula.   Neck/Lymphatic: No LAD I-VI bilaterally.  No thyromegaly.  No masses noted on exam.    Mirror laryngoscopy/nasopharyngoscopy: Active gag reflex.  Unable to perform.    Neuro/Psychiatric: AOx3.  Normal mood and affect.   Cardiovascular: Normal carotid pulses bilaterally, no increasing jugular venous distention noted at cervical region bilaterally.    Respiratory: Normal respiratory effort, no stridor, no retractions noted.      PET-CT scan images reviewed personally by me and reviewed in detail with the patient today. Findings: There is mildly increased FDG uptake within a left anterior pleural-based density that contains hyperdensity on CT. There are no prior studies available for comparison to assess stability of this lesion. The internal hyperdensity suggests either that this represents postsurgical change or possibly old granulomatous disease however metastatic disease cannot be excluded. If prior studies exist, comparison of the stability of this lesion would be of benefit. Otherwise continued surveillance is recommended.      Assessment:    ICD-10-CM ICD-9-CM    1. History of laryngeal cancer Z85.21 V10.21    2. Lesion of left lung R91.1 518.89    3. Hoarseness of voice R49.0 784.42      The primary encounter diagnosis was History of laryngeal cancer. Diagnoses of Lesion of left lung and Hoarseness of voice were also pertinent to this visit.      Plan:  Review of [facility] from [facility] shows that he had a T1a SCCA of the  larynx treated with radiation therapy.  Current PET-Ct scan shows hypermetabolic lesion in the left upper lobe of the lung that was not present in review of his prior PET-CT imaging 10/2014.  He has has a CT guided biopsy of the lung lesion which is pending.  His laryngoscopy is concerning for lesion on the anterior aspect of the right TVC. Will schedule direct microsuspension laryngoscopy with biopsy. Informed consent was obtained today and he will be scheduled in the near future.     Lizet Lam MD

## 2017-09-15 NOTE — PROCEDURES
Laryngoscopy  Date/Time: 9/15/2017 10:58 AM  Performed by: CARMEN KIMBALL  Authorized by: CARMEN KIMBALL     Consent Done?:  Yes (Verbal)      Due to indication in patient's history, presentation or risk factors,  a fiber optic exam was performed.    SEPARATE PROCEDURE NOTE:    ANESTHESIA:  Topical xylocaine with earnestine-synephrine    FINDINGS: No recurrent lesions; post-radiation change.    PROCEDURE:  After verbal consent was obtained, the flexible scope was passed through the patient's nasal cavity without difficulty.  The nasopharynx (adenoid pad) and eustachian tube orifices were first visualized and were found to be normal, without masses or irregularity.  The posterior pharyngeal wall and base of tongue were then examined and no mass or irregular tissue was seen.  The scope was then advanced to the larynx, and the epiglottis, valleculae, and piriform sinuses were normal, without masses or mucosal irregularity.  The false vocal folds and true vocal folds were then examined and were found to have normal mobility (full abduction and adduction). Right true vocal cord with mucosal irregularity at the anterior 1/3 of the cord. The interartyenoid area , post cricoid region and false vocal cord region had erythema and edema consistent with post radiation changes.

## 2017-09-18 ENCOUNTER — TELEPHONE (OUTPATIENT)
Dept: OTOLARYNGOLOGY | Facility: CLINIC | Age: 67
End: 2017-09-18

## 2017-09-18 DIAGNOSIS — J38.3 LESION OF TRUE VOCAL CORD: Primary | ICD-10-CM

## 2017-09-18 LAB — BACTERIA SPEC AEROBE CULT: NO GROWTH

## 2017-09-20 ENCOUNTER — LAB VISIT (OUTPATIENT)
Dept: LAB | Facility: HOSPITAL | Age: 67
End: 2017-09-20
Attending: INTERNAL MEDICINE
Payer: MEDICARE

## 2017-09-20 DIAGNOSIS — N18.4 CHRONIC KIDNEY DISEASE, STAGE IV (SEVERE): Primary | ICD-10-CM

## 2017-09-20 LAB
ALBUMIN SERPL BCP-MCNC: 4.1 G/DL
ALP SERPL-CCNC: 102 U/L
ALT SERPL W/O P-5'-P-CCNC: 22 U/L
ANION GAP SERPL CALC-SCNC: 11 MMOL/L
AST SERPL-CCNC: 20 U/L
BASOPHILS # BLD AUTO: 0.02 K/UL
BASOPHILS NFR BLD: 0.4 %
BILIRUB SERPL-MCNC: 0.4 MG/DL
BUN SERPL-MCNC: 27 MG/DL
CALCIUM SERPL-MCNC: 10.8 MG/DL
CHLORIDE SERPL-SCNC: 112 MMOL/L
CO2 SERPL-SCNC: 21 MMOL/L
CREAT SERPL-MCNC: 2.73 MG/DL
DIFFERENTIAL METHOD: ABNORMAL
EOSINOPHIL # BLD AUTO: 0.1 K/UL
EOSINOPHIL NFR BLD: 1.3 %
ERYTHROCYTE [DISTWIDTH] IN BLOOD BY AUTOMATED COUNT: 16.6 %
EST. GFR  (AFRICAN AMERICAN): 26.6 ML/MIN/1.73 M^2
EST. GFR  (NON AFRICAN AMERICAN): 23 ML/MIN/1.73 M^2
GLUCOSE SERPL-MCNC: 98 MG/DL
HCT VFR BLD AUTO: 40.6 %
HGB BLD-MCNC: 12.7 G/DL
LYMPHOCYTES # BLD AUTO: 0.8 K/UL
LYMPHOCYTES NFR BLD: 13.9 %
MCH RBC QN AUTO: 24.6 PG
MCHC RBC AUTO-ENTMCNC: 31.3 G/DL
MCV RBC AUTO: 79 FL
MONOCYTES # BLD AUTO: 0.6 K/UL
MONOCYTES NFR BLD: 10.9 %
NEUTROPHILS # BLD AUTO: 3.9 K/UL
NEUTROPHILS NFR BLD: 72.8 %
PLATELET # BLD AUTO: 111 K/UL
PMV BLD AUTO: 10.4 FL
POTASSIUM SERPL-SCNC: 3.9 MMOL/L
PROT SERPL-MCNC: 7.8 G/DL
RBC # BLD AUTO: 5.16 M/UL
SODIUM SERPL-SCNC: 144 MMOL/L
WBC # BLD AUTO: 5.39 K/UL

## 2017-09-20 PROCEDURE — 85025 COMPLETE CBC W/AUTO DIFF WBC: CPT | Mod: PO

## 2017-09-20 PROCEDURE — 36415 COLL VENOUS BLD VENIPUNCTURE: CPT | Mod: PO

## 2017-09-20 PROCEDURE — 80053 COMPREHEN METABOLIC PANEL: CPT | Mod: PO

## 2017-09-21 ENCOUNTER — TELEPHONE (OUTPATIENT)
Dept: INTERVENTIONAL RADIOLOGY/VASCULAR | Facility: HOSPITAL | Age: 67
End: 2017-09-21

## 2017-09-21 LAB — BACTERIA SPEC ANAEROBE CULT: NORMAL

## 2017-09-28 ENCOUNTER — HOSPITAL ENCOUNTER (OUTPATIENT)
Dept: PREADMISSION TESTING | Facility: HOSPITAL | Age: 67
Discharge: HOME | End: 2017-09-28
Attending: OTOLARYNGOLOGY
Payer: MEDICARE

## 2017-09-28 ENCOUNTER — CLINICAL SUPPORT (OUTPATIENT)
Dept: LAB | Facility: HOSPITAL | Age: 67
End: 2017-09-28
Attending: OTOLARYNGOLOGY
Payer: MEDICARE

## 2017-09-28 ENCOUNTER — OFFICE VISIT (OUTPATIENT)
Dept: HEMATOLOGY/ONCOLOGY | Facility: CLINIC | Age: 67
End: 2017-09-28
Payer: MEDICARE

## 2017-09-28 ENCOUNTER — ANESTHESIA EVENT (OUTPATIENT)
Dept: SURGERY | Facility: HOSPITAL | Age: 67
End: 2017-09-28
Payer: MEDICARE

## 2017-09-28 VITALS
SYSTOLIC BLOOD PRESSURE: 132 MMHG | OXYGEN SATURATION: 97 % | WEIGHT: 239.63 LBS | HEIGHT: 70 IN | DIASTOLIC BLOOD PRESSURE: 66 MMHG | HEART RATE: 63 BPM | BODY MASS INDEX: 34.3 KG/M2

## 2017-09-28 VITALS
SYSTOLIC BLOOD PRESSURE: 118 MMHG | HEART RATE: 65 BPM | OXYGEN SATURATION: 98 % | WEIGHT: 240 LBS | RESPIRATION RATE: 16 BRPM | DIASTOLIC BLOOD PRESSURE: 84 MMHG | BODY MASS INDEX: 34.36 KG/M2 | HEIGHT: 70 IN

## 2017-09-28 DIAGNOSIS — Z01.818 PRE-OP TESTING: ICD-10-CM

## 2017-09-28 DIAGNOSIS — R91.1 PULMONARY NODULE, LEFT: Primary | ICD-10-CM

## 2017-09-28 DIAGNOSIS — Z85.21 H/O LARYNGEAL CANCER: ICD-10-CM

## 2017-09-28 DIAGNOSIS — Z94.0 RENAL TRANSPLANT RECIPIENT: ICD-10-CM

## 2017-09-28 DIAGNOSIS — Z01.818 PRE-OP TESTING: Primary | ICD-10-CM

## 2017-09-28 DIAGNOSIS — N18.4 CHRONIC KIDNEY DISEASE, STAGE IV (SEVERE): ICD-10-CM

## 2017-09-28 DIAGNOSIS — I82.403 DEEP VEIN THROMBOSIS (DVT) OF BOTH LOWER EXTREMITIES, UNSPECIFIED CHRONICITY, UNSPECIFIED VEIN: ICD-10-CM

## 2017-09-28 PROCEDURE — 1159F MED LIST DOCD IN RCRD: CPT | Mod: S$GLB,,, | Performed by: INTERNAL MEDICINE

## 2017-09-28 PROCEDURE — 3008F BODY MASS INDEX DOCD: CPT | Mod: S$GLB,,, | Performed by: INTERNAL MEDICINE

## 2017-09-28 PROCEDURE — 99499 UNLISTED E&M SERVICE: CPT | Mod: S$GLB,,, | Performed by: INTERNAL MEDICINE

## 2017-09-28 PROCEDURE — 3075F SYST BP GE 130 - 139MM HG: CPT | Mod: S$GLB,,, | Performed by: INTERNAL MEDICINE

## 2017-09-28 PROCEDURE — 93010 EKG 12-LEAD: ICD-10-PCS | Mod: ,,, | Performed by: INTERNAL MEDICINE

## 2017-09-28 PROCEDURE — 93010 ELECTROCARDIOGRAM REPORT: CPT | Mod: ,,, | Performed by: INTERNAL MEDICINE

## 2017-09-28 PROCEDURE — 99999 PR PBB SHADOW E&M-EST. PATIENT-LVL III: CPT | Mod: PBBFAC,,, | Performed by: INTERNAL MEDICINE

## 2017-09-28 PROCEDURE — 93005 ELECTROCARDIOGRAM TRACING: CPT

## 2017-09-28 PROCEDURE — 1126F AMNT PAIN NOTED NONE PRSNT: CPT | Mod: S$GLB,,, | Performed by: INTERNAL MEDICINE

## 2017-09-28 PROCEDURE — 99213 OFFICE O/P EST LOW 20 MIN: CPT | Mod: S$GLB,,, | Performed by: INTERNAL MEDICINE

## 2017-09-28 PROCEDURE — 3078F DIAST BP <80 MM HG: CPT | Mod: S$GLB,,, | Performed by: INTERNAL MEDICINE

## 2017-09-28 RX ORDER — LIDOCAINE HYDROCHLORIDE 10 MG/ML
1 INJECTION, SOLUTION EPIDURAL; INFILTRATION; INTRACAUDAL; PERINEURAL ONCE
Status: CANCELLED | OUTPATIENT
Start: 2017-09-28 | End: 2017-09-28

## 2017-09-28 RX ORDER — SODIUM CHLORIDE 9 MG/ML
INJECTION, SOLUTION INTRAVENOUS CONTINUOUS
Status: CANCELLED | OUTPATIENT
Start: 2017-09-28

## 2017-09-28 NOTE — ANESTHESIA PREPROCEDURE EVALUATION
09/28/2017  Jose Luis Manzo is a 67 y.o., male s/p KTX, hx of RLE DVT on apixaban and with squamous cell carcinoma of larynx is scheduled for direct laryngoscopy with biopsy under [facility] on 10/02/2017.    Outside (transplant) nephrology H&P with recent testing pending via fax 9/28/2017.       Past Surgical History:   Procedure Laterality Date    KIDNEY TRANSPLANT  2007    followed by [facility]plant    LARYNX SURGERY  11/2014    Oropharyngeal Cancer    TIBIAL STAPLING Left 1980            Anesthesia Evaluation    I have reviewed the Patient Summary Reports.    I have reviewed the Nursing Notes.   I have reviewed the Medications.     Review of Systems  Anesthesia Hx:  No problems with previous Anesthesia  History of prior surgery of interest to airway management or planning:  Denies Personal Hx of Anesthesia complications.   Social:  Former Smoker, No Alcohol Use    Hematology/Oncology:        Hematology Comments: Pt had been on apixaban, but states has not taken in approx 2 months due to inability to afford co-pay.   -- Hypercoagulable state - Immunodeficiency Disorder:  Current/Recent Cancer. (carcinoma of larynx) chemotherapy, radiation and surgery    EENT/Dental:   Throat Symptoms include Hoarseness    Cardiovascular:   Exercise tolerance: good Hypertension, well controlled Denies Dysrhythmias.   Denies Angina. PVD (had been followed by Dr. Frankel, but states has not seen in 1 year.) hyperlipidemia      Deep Venous Thrombosis (DVT) (hx of DVT 2016 RLE and DVT s/p right knee surgery; pt currently on apixaban), Hx of DVT, right lower extremity    Pulmonary:   Denies Shortness of breath.    Renal/:   BPH  Kidney Function/Disease S/P Kidney Transplant (on sirolimus and prednisone; has not had follow up with [facility] transplant in 1 year; will request nephrology note).   Hepatic/GI:  Hepatic/GI Normal     Neurological:  Neurology Normal    Endocrine:  Endocrine Normal        Physical Exam  General:  Obesity    Airway/Jaw/Neck:  Airway Findings: Mouth Opening: Normal Tongue: Normal  General Airway Assessment: Adult  Mallampati: II  TM Distance: Normal, at least 6 cm  Jaw/Neck Findings:     Eyes/Ears/Nose:  EYES/EARS/NOSE FINDINGS: Normal   Dental:  Dental Findings: Upper Dentures, Lower Dentures   Chest/Lungs:  Chest/Lungs Clear    Heart/Vascular:  Heart Findings: Normal Heart murmur: negative    Abdomen:  Abdomen Findings: Normal      Mental Status:  Mental Status Findings: Normal        Anesthesia Plan  Type of Anesthesia, risks & benefits discussed:  Anesthesia Type:  general  Patient's Preference:   Intra-op Monitoring Plan:   Intra-op Monitoring Plan Comments:   Post Op Pain Control Plan:   Post Op Pain Control Plan Comments:   Induction:   IV  Beta Blocker:         Informed Consent: Patient understands risks and agrees with Anesthesia plan.  Questions answered.   ASA Score: 3     Day of Surgery Review of History & Physical:        Anesthesia Plan Notes: Anesthesia consent will be obtained prior to procedure on 10/02/2017.    Outside (transplant) nephrology H&P with recent testing pending via fax 9/28/2017.       Pt on chronic immunosuppression due to KTX 2009; chronic steroid use        Ready For Surgery From Anesthesia Perspective.

## 2017-09-28 NOTE — DISCHARGE INSTRUCTIONS
Your surgery is scheduled for 10/2/17.    Please report to Outpatient Surgery Intake Office on the 2nd FLOOR at 6:00a.m.          INSTRUCTIONS IMPORTANT!!!  ¨ Do not eat or drink after 12 midnight-including water. OK to brush teeth, no   gum, candy or mints!    ¨ Take only these medicines with a small swallow of water-morning of surgery: amlodipine and prednisone          ____  No powder, lotions or creams to surgical area.  ____  Please remove all jewelry, including piercings and leave at home.  ____  No money or valuables needed. Please leave at home.  ____  Please bring any documents given by your doctor.  ____  If going home the same day, arrange for a ride home. You will not be able to             drive if Anesthesia was used.  ____  Wear loose fitting clothing. Allow for dressings, bandages.  ____  Stop Aspirin, Ibuprofen, Motrin and Aleve at least 3-5 days before surgery, unless otherwise instructed by your doctor, or the nurse.   You MAY use Tylenol/acetaminophen until day of surgery.  ____  If you take diabetic medication, do not take am of surgery unless instructed by Doctor.  ____  Call MD for temperature above 101 degrees.  ____ Stop taking any Fish Oil supplement or any Vitamins that contain Vitamin E at least 5 days prior to surgery.  ____ Do Not wear your contact lenses the day of your procedure.  You may wear your glasses.        I have read or had read and explained to me, and understand the above information.  Additional comments or instructions:  For additional questions call [phone]     Gargle with Listerine twice a day for 3 days prior to surgery, including the morning of surgery.              Anesthesia: General Anesthesia     You are watched continuously during your procedure by your anesthesia provider.     Youre due to have surgery. During surgery, youll be given medicine called anesthesia or anesthetic. This will keep you comfortable and pain-free. Your anesthesia provider will use  general anesthesia.  What is general anesthesia?  General anesthesia puts you into a state like deep sleep. It goes into the bloodstream (IV anesthetics), into the lungs (gas anesthetics), or both. You feel nothing during the procedure. You will not remember it. During the procedure, the anesthesia provider monitors you continuously. He or she checks your heart rate and rhythm, blood pressure, breathing, and blood oxygen.  · IV anesthetics. IV anesthetics are given through an IV line in your arm. Theyre often given first. This is so you are asleep before a gas anesthetic is started. Some kinds of IV anesthetics relieve pain. Others relax you. Your doctor will decide which kind is best in your case.  · Gas anesthetics. Gas anesthetics are breathed into the lungs. They are often used to keep you asleep. They can be given through a facemask or a tube placed in your larynx or trachea (breathing tube).  ¨ If you have a facemask, your anesthesia provider will most likely place it over your nose and mouth while youre still awake. Youll breathe oxygen through the mask as your IV anesthetic is started. Gas anesthetic may be added through the mask.  ¨ If you have a tube in the larynx or trachea, it will be inserted into your throat after youre asleep.  Anesthesia tools and medicines  You will likely have:  · IV anesthetics. These are put into an IV line into your bloodstream.  · Gas anesthetics. You breathe these anesthetics into your lungs, where they pass into your bloodstream.  · Pulse oximeter. This is a small clip that is attached to the end of your finger. This measures your blood oxygen level.  · Electrocardiography leads (electrodes). These are small sticky pads that are placed on your chest. They record your heart rate and rhythm.  · Blood pressure cuff. This reads your blood pressure.  Risks and possible complications  General anesthesia has some risks. These include:  · Breathing problems  · Nausea and  vomiting  · Sore throat or hoarseness (usually temporary)  · Allergic reaction to the anesthetic  · Irregular heartbeat (rare)  · Cardiac arrest (rare)   Anesthesia safety  · Follow all instructions you are given for how long not to eat or drink before your procedure.  · Be sure your doctor knows what medicines and drugs you take. This includes over-the-counter medicines, herbs, supplements, alcohol or other drugs. You will be asked when those were last taken.  · Have an adult family member or friend drive you home after the procedure.  · For the first 24 hours after your surgery:  ¨ Do not drive or use heavy equipment.  ¨ Do not make important decisions or sign legal documents. If important decisions or signing legal documents is necessary during the first 24 hours after surgery, have a trusted family member or spouse act on your behalf.  ¨ Avoid alcohol.  ¨ Have a responsible adult stay with you. He or she can watch for problems and help keep you safe.  Date Last Reviewed: 12/1/2016  © 0709-9321 [employer]. [address] [place] [address]. All rights reserved. This information is not intended as a substitute for professional medical care. Always follow your healthcare professional's instructions.      Direct Laryngoscopy  Direct laryngoscopy is a procedure to look at the vocal cords. A laryngoscope is a rigid, hollow tube with a light attached. Using this tool, your healthcare provider can look behind your tongue and down your throat to your vocal cords. A tissue sample (biopsy) can be taken for study in a lab. Or a growth can be removed. Your healthcare provider can tell you more about your procedure depending on why its being done. This sheet gives you general information about what to expect.    Getting ready for the procedure  Prepare for the surgery as you have been instructed. Be sure to tell your healthcare provider about all medicines you take. This includes over-the-counter  medicines. It also includes herbs and other supplements. You may need to stop taking some or all of them before surgery. Your healthcare provider will let you know. Also follow any directions youre given for not eating or drinking before surgery.  The day of the procedure  The procedure takes 30 to 60 minutes. You will likely go home the same day.  Before the procedure  Here is what to expect before the procedure begins:  · An IV line is put into a vein in your arm or hand. This line delivers fluids and medicines.  · You will be given medicine (anesthesia) to keep you pain free during surgery. This may be general anesthesia, which puts you in a state like deep sleep. Or you may have sedation, which makes you relaxed and drowsy. Local anesthesia may also be injected or sprayed into your throat to numb it.  During the procedure  Here is what to expect during the procedure:  · You will lie on your back on a table. The scope is put into your mouth and passed down into the throat.  · Your healthcare provider examines the larynx and surrounding areas with the scope. If needed, a biopsy is done using small tools put through the scope.  · If a growth is found, tools can be put through the scope to remove it.  After the procedure  You will be taken to a room to wake up from the anesthesia. At first, your throat may be sore and scratchy. Your voice may be hoarse, making talking difficult. And it may be hard to swallow. You will receive medicine to control pain. When you are released to go home, have an adult family member or friend ready to drive you.  Recovering at home  Once home, follow any instructions you have been given. Be sure to:  · Take pain medicine as directed.  · Drink plenty of water as soon as you can swallow normally.  · Use throat lozenges as advised by your healthcare provider to help ease throat soreness.  · Rest your voice as instructed by your healthcare provider.  When to call your healthcare  provider  After you get home, call the healthcare provider if you have any of the following:  · Chest pain or trouble breathing (call 911)  · Fever of 100.4°F (38°C) or higher, or as directed by your healthcare provider  · Problems swallowing that prevent you from eating or drinking  · Pain that does not go away even after taking pain medicine  · Severe nausea or vomiting  · Bloody vomit  · Cough that brings up blood   Follow-up  Within a few weeks, you will see your healthcare provider for a follow-up visit. During this visit, your provider will discuss the results of the procedure. Depending on what was found, you may need further evaluation and treatment.  Risks and possible complications   The risks of this procedure include:  · Bleeding  · Infection  · Gagging  · Vomiting  · Cuts in the mouth or throat  · Injury to the teeth  · Vocal cord injury  · Breathing problems  · Risks of anesthesia   Date Last Reviewed: 6/11/2015  © 4793-6093 [employer]. [address] [place], [place] [id number]. All rights reserved. This information is not intended as a substitute for professional medical care. Always follow your healthcare professional's instructions.

## 2017-09-28 NOTE — PROGRESS NOTES
Subjective:       Patient ID: Jose Luis Manzo is a 67 y.o. male.    Chief Complaint: No chief complaint on file.    HPI  He is 67 years old. 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  It was diagnosed in September 2014 by ENT Dr. Chandler Kyle when he was worked up for hoarseness.  The lesion was found in the left true vocal cord.  Biopsy revealed squamous cell carcinoma well differentiated.  He underwent definitive radiation therapy at [facility] under the care of .  He completed 6300 centigray by December 2014.  A PET scan done at the time was negative for metastatic disease.  He has been having some hoarseness since then.    For the past few months the hoarseness has been getting worse.  So he saw  (ENT) for evaluation.  No recurrence of malignancy was noted locally.  However he underwent a PET scan 5/12/17 that revealed a 1.8 centimeter pleural-based nodule in the left upper lobe.  SUV 3.6.  This is a new lesion.    He underwent CT-guided biopsy of this lesion and is here to discuss test results.    Review of Systems   Constitutional: Negative for fever and unexpected weight change.   HENT: Positive for voice change. Negative for mouth sores, nosebleeds, sinus pressure and sneezing.    Respiratory: Negative for cough and shortness of breath.    Cardiovascular: Negative for chest pain and palpitations.   Gastrointestinal: Negative for abdominal pain, blood in stool and nausea.   Musculoskeletal: Positive for arthralgias. Negative for gait problem.   Hematological: Negative for adenopathy. Does not bruise/bleed easily.   Psychiatric/Behavioral: Negative for behavioral problems and confusion. The patient is nervous/anxious.          Objective:      Physical Exam   Constitutional: He is oriented to person, place, and time. He appears well-developed and well-nourished. No distress.   Eyes: Conjunctivae and lids are normal. No scleral icterus.   Neck: Normal range of motion.  Neck supple. No thyromegaly present.   Cardiovascular: Normal rate, regular rhythm and intact distal pulses.  Exam reveals no gallop.    Pulmonary/Chest: Effort normal and breath sounds normal. No respiratory distress. He has no rales.   Abdominal: Soft. Bowel sounds are normal. He exhibits no distension and no mass. There is no hepatosplenomegaly.   Lymphadenopathy:        Head (right side): No submental adenopathy present.        Head (left side): No submental adenopathy present.     He has no cervical adenopathy.        Right: No supraclavicular adenopathy present.        Left: No supraclavicular adenopathy present.   Neurological: He is alert and oriented to person, place, and time.   Skin: Skin is warm. He is not diaphoretic. No cyanosis. Nails show no clubbing.         Assessment:       1. Pulmonary nodule, left    2. Deep vein thrombosis (DVT) of both lower extremities, unspecified chronicity, unspecified vein    3. Renal transplant recipient    4. Chronic kidney disease, stage IV (severe)    5. H/O laryngeal cancer        Plan:   In summary this is a 67-year-old male with early stage T1a squamous cell carcinoma of the larynx status post definitive radiation therapy completed December 2014.    Current PET scan shows a 1.8 centimeters pleural-based isolated lesion, SUV max 3.6.     CT biopsy was negative for malignancy.  Discussed results with him in detail.  Malignancy cannot be completely ruled out based on this negative biopsy and further evaluation is warranted.  I recommended repeating a noncontrast CT scan of the chest in approximately 2 months.

## 2017-09-28 NOTE — PRE-PROCEDURE INSTRUCTIONS
pt is arranging ride home    Allergies, medical, surgical, family and psychosocial histories reviewed with patient. Periop plan of care reviewed. Preop instructions given, including medications to take and to hold. Time allotted for questions to be addressed.  Patient verbalized understanding.

## 2017-10-02 ENCOUNTER — ANESTHESIA (OUTPATIENT)
Dept: SURGERY | Facility: HOSPITAL | Age: 67
End: 2017-10-02
Payer: MEDICARE

## 2017-10-02 ENCOUNTER — SURGERY (OUTPATIENT)
Age: 67
End: 2017-10-02

## 2017-10-02 ENCOUNTER — HOSPITAL ENCOUNTER (OUTPATIENT)
Facility: HOSPITAL | Age: 67
Discharge: HOME | End: 2017-10-02
Attending: OTOLARYNGOLOGY | Admitting: OTOLARYNGOLOGY
Payer: MEDICARE

## 2017-10-02 VITALS
WEIGHT: 242 LBS | TEMPERATURE: 98 F | OXYGEN SATURATION: 96 % | HEART RATE: 57 BPM | SYSTOLIC BLOOD PRESSURE: 145 MMHG | DIASTOLIC BLOOD PRESSURE: 80 MMHG | HEIGHT: 71 IN | BODY MASS INDEX: 33.88 KG/M2 | RESPIRATION RATE: 17 BRPM

## 2017-10-02 DIAGNOSIS — I82.409 DEEP VEIN THROMBOSIS (DVT): ICD-10-CM

## 2017-10-02 DIAGNOSIS — I10 ESSENTIAL HYPERTENSION: Primary | ICD-10-CM

## 2017-10-02 DIAGNOSIS — J38.3 LESION OF TRUE VOCAL CORD: Primary | ICD-10-CM

## 2017-10-02 DIAGNOSIS — D68.59 THROMBOPHILIA: ICD-10-CM

## 2017-10-02 DIAGNOSIS — I82.403 DEEP VEIN THROMBOSIS (DVT) OF BOTH LOWER EXTREMITIES, UNSPECIFIED CHRONICITY, UNSPECIFIED VEIN: ICD-10-CM

## 2017-10-02 PROCEDURE — 88305 TISSUE EXAM BY PATHOLOGIST: CPT | Mod: 26,,, | Performed by: PATHOLOGY

## 2017-10-02 PROCEDURE — 36000709 HC OR TIME LEV III EA ADD 15 MIN: Performed by: OTOLARYNGOLOGY

## 2017-10-02 PROCEDURE — 36000708 HC OR TIME LEV III 1ST 15 MIN: Performed by: OTOLARYNGOLOGY

## 2017-10-02 PROCEDURE — 71000015 HC POSTOP RECOV 1ST HR: Performed by: OTOLARYNGOLOGY

## 2017-10-02 PROCEDURE — 71000033 HC RECOVERY, INTIAL HOUR: Performed by: OTOLARYNGOLOGY

## 2017-10-02 PROCEDURE — 37000008 HC ANESTHESIA 1ST 15 MINUTES: Performed by: OTOLARYNGOLOGY

## 2017-10-02 PROCEDURE — 99214 OFFICE O/P EST MOD 30 MIN: CPT | Mod: ,,, | Performed by: NURSE PRACTITIONER

## 2017-10-02 PROCEDURE — 37000009 HC ANESTHESIA EA ADD 15 MINS: Performed by: OTOLARYNGOLOGY

## 2017-10-02 PROCEDURE — 88331 PATH CONSLTJ SURG 1 BLK 1SPC: CPT | Mod: 26,,, | Performed by: PATHOLOGY

## 2017-10-02 PROCEDURE — 25000003 PHARM REV CODE 250: Performed by: NURSE PRACTITIONER

## 2017-10-02 PROCEDURE — 63600175 PHARM REV CODE 636 W HCPCS: Performed by: OTOLARYNGOLOGY

## 2017-10-02 PROCEDURE — 88305 TISSUE EXAM BY PATHOLOGIST: CPT | Performed by: PATHOLOGY

## 2017-10-02 PROCEDURE — 31535 LARYNGOSCOPY W/BIOPSY: CPT | Mod: ,,, | Performed by: OTOLARYNGOLOGY

## 2017-10-02 PROCEDURE — 71000016 HC POSTOP RECOV ADDL HR: Performed by: OTOLARYNGOLOGY

## 2017-10-02 PROCEDURE — 25000003 PHARM REV CODE 250: Performed by: NURSE ANESTHETIST, CERTIFIED REGISTERED

## 2017-10-02 PROCEDURE — 63600175 PHARM REV CODE 636 W HCPCS: Performed by: NURSE ANESTHETIST, CERTIFIED REGISTERED

## 2017-10-02 RX ORDER — LIDOCAINE HYDROCHLORIDE 10 MG/ML
1 INJECTION, SOLUTION EPIDURAL; INFILTRATION; INTRACAUDAL; PERINEURAL ONCE
Status: DISCONTINUED | OUTPATIENT
Start: 2017-10-02 | End: 2017-10-02 | Stop reason: HOSPADM

## 2017-10-02 RX ORDER — DEXAMETHASONE SODIUM PHOSPHATE 4 MG/ML
INJECTION, SOLUTION INTRA-ARTICULAR; INTRALESIONAL; INTRAMUSCULAR; INTRAVENOUS; SOFT TISSUE
Status: DISCONTINUED | OUTPATIENT
Start: 2017-10-02 | End: 2017-10-02

## 2017-10-02 RX ORDER — PROPOFOL 10 MG/ML
VIAL (ML) INTRAVENOUS
Status: DISCONTINUED | OUTPATIENT
Start: 2017-10-02 | End: 2017-10-02

## 2017-10-02 RX ORDER — HYDROCODONE BITARTRATE AND ACETAMINOPHEN 5; 325 MG/1; MG/1
1 TABLET ORAL EVERY 4 HOURS PRN
Status: DISCONTINUED | OUTPATIENT
Start: 2017-10-02 | End: 2017-10-02 | Stop reason: HOSPADM

## 2017-10-02 RX ORDER — SUCCINYLCHOLINE CHLORIDE 20 MG/ML
INJECTION INTRAMUSCULAR; INTRAVENOUS
Status: DISCONTINUED | OUTPATIENT
Start: 2017-10-02 | End: 2017-10-02

## 2017-10-02 RX ORDER — EPINEPHRINE CONVENIENCE KIT 1 MG/ML(1)
KIT INTRAMUSCULAR; SUBCUTANEOUS
Status: DISCONTINUED | OUTPATIENT
Start: 2017-10-02 | End: 2017-10-02 | Stop reason: HOSPADM

## 2017-10-02 RX ORDER — HYDROCODONE BITARTRATE AND ACETAMINOPHEN 5; 325 MG/1; MG/1
1 TABLET ORAL EVERY 6 HOURS PRN
Qty: 10 TABLET | Refills: 0 | Status: SHIPPED | OUTPATIENT
Start: 2017-10-02 | End: 2017-12-04 | Stop reason: ALTCHOICE

## 2017-10-02 RX ORDER — LIDOCAINE HCL/PF 100 MG/5ML
SYRINGE (ML) INTRAVENOUS
Status: DISCONTINUED | OUTPATIENT
Start: 2017-10-02 | End: 2017-10-02

## 2017-10-02 RX ORDER — CEFAZOLIN SODIUM 2 G/50ML
2 SOLUTION INTRAVENOUS
Status: DISCONTINUED | OUTPATIENT
Start: 2017-10-02 | End: 2017-10-02 | Stop reason: HOSPADM

## 2017-10-02 RX ORDER — ONDANSETRON 8 MG/1
8 TABLET, ORALLY DISINTEGRATING ORAL ONCE
Status: DISCONTINUED | OUTPATIENT
Start: 2017-10-02 | End: 2017-10-02 | Stop reason: HOSPADM

## 2017-10-02 RX ORDER — ONDANSETRON 2 MG/ML
INJECTION INTRAMUSCULAR; INTRAVENOUS
Status: DISCONTINUED | OUTPATIENT
Start: 2017-10-02 | End: 2017-10-02

## 2017-10-02 RX ORDER — SODIUM CHLORIDE 9 MG/ML
INJECTION, SOLUTION INTRAVENOUS CONTINUOUS
Status: DISCONTINUED | OUTPATIENT
Start: 2017-10-02 | End: 2017-10-02 | Stop reason: HOSPADM

## 2017-10-02 RX ORDER — FENTANYL CITRATE 50 UG/ML
INJECTION, SOLUTION INTRAMUSCULAR; INTRAVENOUS
Status: DISCONTINUED | OUTPATIENT
Start: 2017-10-02 | End: 2017-10-02

## 2017-10-02 RX ADMIN — EPHEDRINE SULFATE 10 MG: 50 INJECTION, SOLUTION INTRAMUSCULAR; INTRAVENOUS; SUBCUTANEOUS at 11:10

## 2017-10-02 RX ADMIN — SUCCINYLCHOLINE CHLORIDE 180 MG: 20 INJECTION, SOLUTION INTRAMUSCULAR; INTRAVENOUS at 10:10

## 2017-10-02 RX ADMIN — PROPOFOL 200 MG: 10 INJECTION, EMULSION INTRAVENOUS at 10:10

## 2017-10-02 RX ADMIN — DEXAMETHASONE SODIUM PHOSPHATE 12 MG: 4 INJECTION, SOLUTION INTRAMUSCULAR; INTRAVENOUS at 10:10

## 2017-10-02 RX ADMIN — LIDOCAINE HYDROCHLORIDE 100 MG: 20 INJECTION, SOLUTION INTRAVENOUS at 10:10

## 2017-10-02 RX ADMIN — LIDOCAINE HYDROCHLORIDE 20 MG: 20 INJECTION, SOLUTION INTRAVENOUS at 11:10

## 2017-10-02 RX ADMIN — ONDANSETRON 4 MG: 2 INJECTION, SOLUTION INTRAMUSCULAR; INTRAVENOUS at 11:10

## 2017-10-02 RX ADMIN — SODIUM CHLORIDE: 0.9 INJECTION, SOLUTION INTRAVENOUS at 10:10

## 2017-10-02 RX ADMIN — EPINEPHRINE 1 MG: 1 INJECTION, SOLUTION INTRAMUSCULAR; SUBCUTANEOUS at 11:10

## 2017-10-02 RX ADMIN — FENTANYL CITRATE 100 MCG: 50 INJECTION, SOLUTION INTRAMUSCULAR; INTRAVENOUS at 10:10

## 2017-10-02 RX ADMIN — SUCCINYLCHOLINE CHLORIDE 20 MG: 20 INJECTION, SOLUTION INTRAMUSCULAR; INTRAVENOUS at 10:10

## 2017-10-02 RX ADMIN — LIDOCAINE HYDROCHLORIDE 80 MG: 20 INJECTION, SOLUTION INTRAVENOUS at 10:10

## 2017-10-02 NOTE — OP NOTE
Operative Note       Surgery Date: 10/2/2017     Surgeon: Lizet Desouza MD    Pre-op Diagnosis:  Bilateral vocal cord lesion    Post-op Diagnosis: same    Assistants:  None    Implants:  None    Anesthesia: GETA    Technical Procedures Used: Direct microsuspension laryngoscopy with biopsy      Findings:    Right TVC lesion- exophytic at mid-cord extending toward anterior commissure.  The mucosal irregularity extends to ventricle and onto the right FVC at the mid-cord region.  There was leukoplakia noted at the anterior aspect of the left TVC.     Complications: No    Estimated Blood Loss: 1ml           Specimens     Start     Ordered    10/02/17 1137  Specimen to Pathology - Surgery  Once      10/02/17 1137          Justification for the operation:     Mr. Manzo presented with worsening hoarseness after a history of SCCA of the larynx treated with XRT approximately 2 years ago at [facility].  I discussed option of continued observation versus DMSL with biopsy for further diagnosis with the risks/benifits of each option. He gave informed consent to proceed after all questions were answered to his satisfaction.     Procedure in Detail:        The patient was placed supine after induction of a general anesthetic.  The eyes were protected.  A tooth guard was placed on the upper dentition.  The laryngoscope was placed into the patients mouth.  The oropharynx, supraglottis, glottis and hypopharynx were inspected.  The telescope or microscope was used to assist in visualization.   The patient was placed into suspension.  Biopsy specimens were taken from the bilateral TVC and sent for frozen and permanent specimen.  The frozen pathology returned with squamous dysplasia and negative for charli malignancy.     Neosynephrine soaked neuro patties were placed into the throat until there was no further bleeding.  The area was inspected and found to be hemostatic.  The laryngoscope and tooth guard were removed.                Disposition: PACU - hemodynamically stable.           Condition: Good    Attestation:  I performed the procedure.

## 2017-10-02 NOTE — HPI
"68yo male with history of HTN, HLP, CKD stage IV, BPH, renal transplant, laryngeal cancer and bilateral DVTs who presents for elective ENT surgery for evaluation of hoarseness. Mr. Manzo was last seen by Dr. Frankel in May 2016 for bilateral DVTs treated with Eliquis with original diagnosis a few years ago at [facility]. Repeat LE venous ultrasound without any improvement and planned for venogram with cancellation due to elevated creatinine who was lost to follow up. He reports not taking his Eliquis for the past 4 months due to copays. He reports his swelling to his legs is worse at the end of the day and resolves overnight. He states "I know I need it but my brother lost his job and we could afford it". He reports his brother started working again recently and will be able to afford the copays next month.     Cardiology was consulted by anesthesia for clearance for ENT surgery as well as evaluation and AC recommendations due to bilateral DVTs. He denies any complaints of chest pain or SOB and reports he is fairly active. He is able to do basic household chores (ie sweeping) and walk up a flight of stairs with no complaints of chest pain or SOB.   "

## 2017-10-02 NOTE — H&P (VIEW-ONLY)
Chief Complaint   Patient presents with    Follow-up     hoarseness, pt reports sometimes he feels like something is stuck in his throat   .     HPI: 67 year old male referred to me by Dr. Rojas for evaluation of hoarseness with history of throat cancer. He has a history of laryngeal cancer treated by Dr. Kyle approximately 2 years. He was treated with XRT at [facility].  He reports that he completed radiation therapy (29 treatments) 2 years ago.  He now reports hoarseness which has been persistent since radiation. He reports dry mouth and phlegm in his throat.  He states that when he awakens in the morning he has no voice.  He feels hoarseness is worsening over the last several months.   He denies throat pain, dysphagia, otalgia.  He denies weight loss.      Interval history: He reports continued hoarseness since last visit.   He denies cough, hemoptysis, shortness of breath.  He has continued dry mouth and the feeling of phlegm in his throat. He has seen Dr. Masters for evaluation of the pulmonary nodule.  He has had a CT guided biopsy of the pulmonary nodule 2 days ago.       Past Medical History:   Diagnosis Date    BPH (benign prostatic hypertrophy)     Cancer     vocal cords    Claudication of right lower extremity 3/10/2016    Disorder of kidney and ureter     Hyperlipidemia     Hypertension     Microcytic anemia 9/16/2016    Oropharyngeal cancer     Pterygium     Thromboembolic disorder     Unspecified disorder of kidney and ureter      Social History     Social History    Marital status: Single     Spouse name: N/A    Number of children: N/A    Years of education: N/A     Occupational History    Not on file.     Social History Main Topics    Smoking status: Former Smoker     Packs/day: 1.00     Years: 20.00    Smokeless tobacco: Never Used    Alcohol use 0.0 oz/week      Comment: occasionally 1/month    Drug use: No    Sexual activity: Yes     Partners: Female     Other Topics  Concern    Not on file     Social History Narrative    No narrative on file     Past Surgical History:   Procedure Laterality Date    FRACTURE SURGERY Left 1980    Leg    KIDNEY TRANSPLANT  2007    Oropharyngeal Surgery  11/2014    Oropharyngeal Cancer    vocal cord surgery      radiation and then surgery to scrap cancer     Family History   Problem Relation Age of Onset    Stroke Mother     Diabetes Mother     Hypertension Mother     Stroke Father     No Known Problems Sister     No Known Problems Brother     No Known Problems Daughter            Review of Systems  General: negative for chills, fever or weight loss  Psychological: negative for mood changes or depression  Ophthalmic: negative for blurry vision, photophobia or eye pain  ENT: see HPI  Respiratory: no cough, shortness of breath, or wheezing  Cardiovascular: no chest pain or dyspnea on exertion  Gastrointestinal: no abdominal pain, change in bowel habits, or black/ bloody stools  Musculoskeletal: negative for gait disturbance or muscular weakness  Neurological: no syncope or seizures; no ataxia  Dermatological: negative for puritis,  rash and jaundice  Hematologic/lymphatic: no easy bruising, no new lumps or bumps      Physical Exam:    Vitals:    09/15/17 1049   BP: 131/84   Pulse: (!) 57   Temp: 97.2 °F (36.2 °C)       Constitutional: Well appearing / communicating without difficutly.  NAD.  Eyes: EOM I Bilaterally  Head/Face: Normocephalic.  Negative paranasal sinus pressure/tenderness.  Salivary glands WNL.  House Brackmann I Bilaterally.    Right Ear: Auricle normal appearance. External Auditory Canal within normal limits no lesions or masses,TM w/o masses/lesions/perforations. TM mobility noted.   Left Ear: Auricle normal appearance. External Auditory Canal within normal limits no lesions or masses,TM w/o masses/lesions/perforations. TM mobility noted.  Nose: No gross nasal septal deviation. Inferior Turbinates 2+ bilaterally. No  septal perforation. No masses/lesions. External nasal skin appears normal without masses/lesions.  Oral Cavity: Gingiva/lips within normal limits.  Maxillary: Edendulous Mandibular partially edentulous. Mucus membranes dry. Floor of mouth soft, no masses palpated. Oral Tongue mobile. Hard Palate appears normal.    Oropharynx: Base of tongue appears normal. No masses/lesions noted. Tonsillar fossa/pharyngeal wall without lesions. Posterior oropharynx WNL.  Soft palate without masses. Midline uvula.   Neck/Lymphatic: No LAD I-VI bilaterally.  No thyromegaly.  No masses noted on exam.    Mirror laryngoscopy/nasopharyngoscopy: Active gag reflex.  Unable to perform.    Neuro/Psychiatric: AOx3.  Normal mood and affect.   Cardiovascular: Normal carotid pulses bilaterally, no increasing jugular venous distention noted at cervical region bilaterally.    Respiratory: Normal respiratory effort, no stridor, no retractions noted.      PET-CT scan images reviewed personally by me and reviewed in detail with the patient today. Findings: There is mildly increased FDG uptake within a left anterior pleural-based density that contains hyperdensity on CT. There are no prior studies available for comparison to assess stability of this lesion. The internal hyperdensity suggests either that this represents postsurgical change or possibly old granulomatous disease however metastatic disease cannot be excluded. If prior studies exist, comparison of the stability of this lesion would be of benefit. Otherwise continued surveillance is recommended.      Assessment:    ICD-10-CM ICD-9-CM    1. History of laryngeal cancer Z85.21 V10.21    2. Lesion of left lung R91.1 518.89    3. Hoarseness of voice R49.0 784.42      The primary encounter diagnosis was History of laryngeal cancer. Diagnoses of Lesion of left lung and Hoarseness of voice were also pertinent to this visit.      Plan:  Review of records from [facility] shows that he had a T1a SCCA of the  larynx treated with radiation therapy.  Current PET-Ct scan shows hypermetabolic lesion in the left upper lobe of the lung that was not present in review of his prior PET-CT imaging 10/2014.  He has has a CT guided biopsy of the lung lesion which is pending.  His laryngoscopy is concerning for lesion on the anterior aspect of the right TVC. Will schedule direct microsuspension laryngoscopy with biopsy. Informed consent was obtained today and he will be scheduled in the near future.     Lizet Lam MD

## 2017-10-02 NOTE — ANESTHESIA POSTPROCEDURE EVALUATION
"Anesthesia Post Evaluation    Patient: Jose Luis Manzo    Procedure(s) Performed: Procedure(s) (LRB):  DIRECT MICROSUSPENSION LARYNGOSCOPY WITH BIOPSY (N/A)    Final Anesthesia Type: general  Patient location during evaluation: PACU  Patient participation: Yes- Able to Participate  Level of consciousness: awake and alert  Post-procedure vital signs: reviewed and stable  Pain management: adequate  Airway patency: patent  PONV status at discharge: No PONV  Anesthetic complications: no      Cardiovascular status: blood pressure returned to baseline  Respiratory status: unassisted  Hydration status: euvolemic  Follow-up not needed.        Visit Vitals  /74   Pulse (!) 54   Temp 36.5 °C (97.7 °F) (Skin)   Resp 12   Ht 5' 10.5" (1.791 m)   Wt 109.8 kg (242 lb)   SpO2 (!) 92%   BMI 34.23 kg/m²       Pain/James Score: Pain Assessment Performed: Yes (10/2/2017 12:26 PM)  Presence of Pain: denies (10/2/2017 12:26 PM)  James Score: 10 (10/2/2017 12:26 PM)      "

## 2017-10-02 NOTE — PLAN OF CARE
Report given to ANSLEY Keenan, with time allotted for questions. Notified of issues with blood thinner/blood clot history.

## 2017-10-02 NOTE — ASSESSMENT & PLAN NOTE
-history of bilateral DVTs dating back prior to 2016  -DVT involve CFV, SFV, popliteal and peroneal ranging from nonocclusive to occlusive  -last LE venous ultrasound with no significant change  -planned for venogram in 6/2016 with cancellation due to renal function and patient lost to follow up  -on Eliquis 2.5mg po BID until 4months with independent discontinuation due to financial reasons  -stressed importance and discussed risks of PE without appropriate AC treatment  -clearance for AC immediate post ENT surgery per ENT staff  -provided with one month free via Rx savings card and outpatient pharmacy with written Rx provided as well  -will follow up with Dr. Frankel in one month to discuss proceeding with venogram depending on renal function; will arrange for BMP and CBC prior to appt.

## 2017-10-02 NOTE — DISCHARGE INSTRUCTIONS
Direct Laryngoscopy  Direct laryngoscopy is a procedure to look at the vocal cords. A laryngoscope is a rigid, hollow tube with a light attached. Using this tool, your healthcare provider can look behind your tongue and down your throat to your vocal cords. A tissue sample (biopsy) can be taken for study in a lab. Or a growth can be removed. Your healthcare provider can tell you more about your procedure depending on why its being done. This sheet gives you general information about what to expect.    Getting ready for the procedure  Prepare for the surgery as you have been instructed. Be sure to tell your healthcare provider about all medicines you take. This includes over-the-counter medicines. It also includes herbs and other supplements. You may need to stop taking some or all of them before surgery. Your healthcare provider will let you know. Also follow any directions youre given for not eating or drinking before surgery.  The day of the procedure  The procedure takes 30 to 60 minutes. You will likely go home the same day.  Before the procedure  Here is what to expect before the procedure begins:  · An IV line is put into a vein in your arm or hand. This line delivers fluids and medicines.  · You will be given medicine (anesthesia) to keep you pain free during surgery. This may be general anesthesia, which puts you in a state like deep sleep. Or you may have sedation, which makes you relaxed and drowsy. Local anesthesia may also be injected or sprayed into your throat to numb it.  During the procedure  Here is what to expect during the procedure:  · You will lie on your back on a table. The scope is put into your mouth and passed down into the throat.  · Your healthcare provider examines the larynx and surrounding areas with the scope. If needed, a biopsy is done using small tools put through the scope.  · If a growth is found, tools can be put through the scope to remove it.  After the procedure  You will  be taken to a room to wake up from the anesthesia. At first, your throat may be sore and scratchy. Your voice may be hoarse, making talking difficult. And it may be hard to swallow. You will receive medicine to control pain. When you are released to go home, have an adult family member or friend ready to drive you.  Recovering at home  Once home, follow any instructions you have been given. Be sure to:  · Take pain medicine as directed.  · Drink plenty of water as soon as you can swallow normally.  · Use throat lozenges as advised by your healthcare provider to help ease throat soreness.  · Rest your voice as instructed by your healthcare provider.  When to call your healthcare provider  After you get home, call the healthcare provider if you have any of the following:  · Chest pain or trouble breathing (call 911)  · Fever of 100.4°F (38°C) or higher, or as directed by your healthcare provider  · Problems swallowing that prevent you from eating or drinking  · Pain that does not go away even after taking pain medicine  · Severe nausea or vomiting  · Bloody vomit  · Cough that brings up blood   Follow-up  Within a few weeks, you will see your healthcare provider for a follow-up visit. During this visit, your provider will discuss the results of the procedure. Depending on what was found, you may need further evaluation and treatment.  Risks and possible complications   The risks of this procedure include:  · Bleeding  · Infection  · Gagging  · Vomiting  · Cuts in the mouth or throat  · Injury to the teeth  · Vocal cord injury  · Breathing problems  · Risks of anesthesia   Date Last Reviewed: 6/11/2015  © 0555-9022 [employer]. [address] [place] [address], [place] [address]. All rights reserved. This information is not intended as a substitute for professional medical care. Always follow your healthcare professional's instructions.      Discharge Instructions: After Your Surgery  Youve just had surgery.  During surgery, you were given medicine called anesthesia to keep you relaxed and free of pain. After surgery, you may have some pain or nausea. This is common. Here are some tips for feeling better and getting well after surgery.     Stay on schedule with your medicine.   Going home  Your healthcare provider will show you how to take care of yourself when you go home. He or she will also answer your questions. Have an adult family member or friend drive you home. For the first 24 hours after your surgery:  · Do not drive or use heavy equipment.  · Do not make important decisions or sign legal papers.  · Do not drink alcohol.  · Have someone stay with you, if needed. He or she can watch for problems and help keep you safe.  Be sure to go to all follow-up visits with your healthcare provider. And rest after your surgery for as long as your healthcare provider tells you to.  Coping with pain  If you have pain after surgery, pain medicine will help you feel better. Take it as told, before pain becomes severe. Also, ask your healthcare provider or pharmacist about other ways to control pain. This might be with heat, ice, or relaxation. And follow any other instructions your surgeon or nurse gives you.  Tips for taking pain medicine  To get the best relief possible, remember these points:  · Pain medicines can upset your stomach. Taking them with a little food may help.  · Most pain relievers taken by mouth need at least 20 to 30 minutes to start to work.  · Taking medicine on a schedule can help you remember to take it. Try to time your medicine so that you can take it before starting an activity. This might be before you get dressed, go for a walk, or sit down for dinner.  · Constipation is a common side effect of pain medicines. Call your healthcare provider before taking any medicines such as laxatives or stool softeners to help ease constipation. Also ask if you should skip any foods. Drinking lots of fluids  and eating foods such as fruits and vegetables that are high in fiber can also help. Remember, do not take laxatives unless your surgeon has prescribed them.  · Drinking alcohol and taking pain medicine can cause dizziness and slow your breathing. It can even be deadly. Do not drink alcohol while taking pain medicine.  · Pain medicine can make you react more slowly to things. Do not drive or run machinery while taking pain medicine.  Your healthcare provider may tell you to take acetaminophen to help ease your pain. Ask him or her how much you are supposed to take each day. Acetaminophen or other pain relievers may interact with your prescription medicines or other over-the-counter (OTC) medicines. Some prescription medicines have acetaminophen and other ingredients. Using both prescription and OTC acetaminophen for pain can cause you to overdose. Read the labels on your OTC medicines with care. This will help you to clearly know the list of ingredients, how much to take, and any warnings. It may also help you not take too much acetaminophen. If you have questions or do not understand the information, ask your pharmacist or healthcare provider to explain it to you before you take the OTC medicine.  Managing nausea  Some people have an upset stomach after surgery. This is often because of anesthesia, pain, or pain medicine, or the stress of surgery. These tips will help you handle nausea and eat healthy foods as you get better. If you were on a special food plan before surgery, ask your healthcare provider if you should follow it while you get better. These tips may help:  · Do not push yourself to eat. Your body will tell you when to eat and how much.  · Start off with clear liquids and soup. They are easier to digest.  · Next try semi-solid foods, such as mashed potatoes, applesauce, and gelatin, as you feel ready.  · Slowly move to solid foods. Dont eat fatty, rich, or spicy foods at first.  · Do not force  yourself to have 3 large meals a day. Instead eat smaller amounts more often.  · Take pain medicines with a small amount of solid food, such as crackers or toast, to avoid nausea.     Call your surgeon if  · You still have pain an hour after taking medicine. The medicine may not be strong enough.  · You feel too sleepy, dizzy, or groggy. The medicine may be too strong.  · You have side effects like nausea, vomiting, or skin changes, such as rash, itching, or hives.       If you have obstructive sleep apnea  You were given anesthesia medicine during surgery to keep you comfortable and free of pain. After surgery, you may have more apnea spells because of this medicine and other medicines you were given. The spells may last longer than usual.   At home:  · Keep using the continuous positive airway pressure (CPAP) device when you sleep. Unless your healthcare provider tells you not to, use it when you sleep, day or night. CPAP is a common device used to treat obstructive sleep apnea.  · Talk with your provider before taking any pain medicine, muscle relaxants, or sedatives. Your provider will tell you about the possible dangers of taking these medicines.  Date Last Reviewed: 12/1/2016  © 9357-9286 [employer]. [address] [place] [address], [place] [address]. All rights reserved. This information is not intended as a substitute for professional medical care. Always follow your healthcare professional's instructions.            Acetaminophen; Hydrocodone tablets or capsules  What is this medicine?  ACETAMINOPHEN; HYDROCODONE (a set a REY shelbi fen; sterling droe KOE done) is a pain reliever. It is used to treat moderate to severe pain.  How should I use this medicine?  Take this medicine by mouth with a glass of water. Follow the directions on the prescription label. You can take it with or without food. If it upsets your stomach, take it with food. Do not take your medicine more often than directed.  A special  MedGuide will be given to you by the pharmacist with each prescription and refill. Be sure to read this information carefully each time.  Talk to your pediatrician regarding the use of this medicine in children. Special care may be needed.  What side effects may I notice from receiving this medicine?  Side effects that you should report to your doctor or health care professional as soon as possible:  · allergic reactions like skin rash, itching or hives, swelling of the face, lips, or tongue  · breathing problems  · confusion  · redness, blistering, peeling or loosening of the skin, including inside the mouth  · signs and symptoms of low blood pressure like dizziness; feeling faint or lightheaded, falls; unusually weak or tired  · trouble passing urine or change in the amount of urine  · yellowing of the eyes or skin  Side effects that usually do not require medical attention (report to your doctor or health care professional if they continue or are bothersome):  · constipation  · dry mouth  · nausea, vomiting  · tiredness  What may interact with this medicine?  This medicine may interact with the following medications:  · alcohol  · antiviral medicines for HIV or AIDS  · atropine  · antihistamines for allergy, cough and cold  · certain antibiotics like erythromycin, clarithromycin  · certain medicines for anxiety or sleep  · certain medicines for bladder problems like oxybutynin, tolterodine  · certain medicines for depression like amitriptyline, fluoxetine, sertraline  · certain medicines for fungal infections like ketoconazole and itraconazole  · certain medicines for Parkinson's disease like benztropine, trihexyphenidyl  · certain medicines for seizures like carbamazepine, phenobarbital, phenytoin, primidone  · certain medicines for stomach problems like dicyclomine, hyoscyamine  · certain medicines for travel sickness like scopolamine  · general anesthetics like halothane, isoflurane, methoxyflurane,  propofol  · ipratropium  · local anesthetics like lidocaine, pramoxine, tetracaine  · MAOIs like Carbex, Eldepryl, Marplan, Nardil, and Parnate  · medicines that relax muscles for surgery  · other medicines with acetaminophen  · other narcotic medicines for pain or cough  · phenothiazines like chlorpromazine, mesoridazine, prochlorperazine, thioridazine  · rifampin  What if I miss a dose?  If you miss a dose, take it as soon as you can. If it is almost time for your next dose, take only that dose. Do not take double or extra doses.  Where should I keep my medicine?  Keep out of the reach of children. This medicine can be abused. Keep your medicine in a safe place to protect it from theft. Do not share this medicine with anyone. Selling or giving away this medicine is dangerous and against the law.  This medicine may cause accidental overdose and death if it taken by other adults, children, or pets. Mix any unused medicine with a substance like cat litter or coffee grounds. Then throw the medicine away in a sealed container like a sealed bag or a coffee can with a lid. Do not use the medicine after the expiration date.  Store at room temperature between 15 and 30 degrees C (59 and 86 degrees F).  What should I tell my health care provider before I take this medicine?  They need to know if you have any of these conditions:  · brain tumor  · Crohn's disease, inflammatory bowel disease, or ulcerative colitis  · drug abuse or addiction  · head injury  · heart or circulation problems  · if you often drink alcohol  · kidney disease or problems going to the bathroom  · liver disease  · lung disease, asthma, or breathing problems  · an unusual or allergic reaction to acetaminophen, hydrocodone, other opioid analgesics, other medicines, foods, dyes, or preservatives  · pregnant or trying to get pregnant  · breast-feeding  What should I watch for while using this medicine?  Tell your doctor or health care professional if your  pain does not go away, if it gets worse, or if you have new or a different type of pain. You may develop tolerance to the medicine. Tolerance means that you will need a higher dose of the medicine for pain relief. Tolerance is normal and is expected if you take the medicine for a long time.  Do not suddenly stop taking your medicine because you may develop a severe reaction. Your body becomes used to the medicine. This does NOT mean you are addicted. Addiction is a behavior related to getting and using a drug for a non-medical reason. If you have pain, you have a medical reason to take pain medicine. Your doctor will tell you how much medicine to take. If your doctor wants you to stop the medicine, the dose will be slowly lowered over time to avoid any side effects.  There are different types of narcotic medicines (opiates). If you take more than one type at the same time or if you are taking another medicine that also causes drowsiness, you may have more side effects. Give your health care provider a list of all medicines you use. Your doctor will tell you how much medicine to take. Do not take more medicine than directed. Call emergency for help if you have problems breathing or unusual sleepiness.  Do not take other medicines that contain acetaminophen with this medicine. Always read labels carefully. If you have questions, ask your doctor or pharmacist.  If you take too much acetaminophen get medical help right away. Too much acetaminophen can be very dangerous and cause liver damage. Even if you do not have symptoms, it is important to get help right away.  You may get drowsy or dizzy. Do not drive, use machinery, or do anything that needs mental alertness until you know how this medicine affects you. Do not stand or sit up quickly, especially if you are an older patient. This reduces the risk of dizzy or fainting spells. Alcohol may interfere with the effect of this medicine. Avoid alcoholic drinks.  The  medicine will cause constipation. Try to have a bowel movement at least every 2 to 3 days. If you do not have a bowel movement for 3 days, call your doctor or health care professional.  Your mouth may get dry. Chewing sugarless gum or sucking hard candy, and drinking plenty of water may help. Contact your doctor if the problem does not go away or is severe.  NOTE:This sheet is a summary. It may not cover all possible information. If you have questions about this medicine, talk to your doctor, pharmacist, or health care provider. Copyright© 2017 [employer]

## 2017-10-02 NOTE — DISCHARGE SUMMARY
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 Desouza 10 days.     Medications:  Reconciled Home Medications:   Current Discharge Medication List      CONTINUE these medications which have CHANGED    Details   apixaban (ELIQUIS) 2.5 mg Tab Take 1 tablet (2.5 mg total) by mouth 2 (two) times daily.  Qty: 60 tablet, Refills: 11    Associated Diagnoses: Deep vein thrombosis (DVT)         CONTINUE these medications which have NOT CHANGED    Details   amlodipine (NORVASC) 10 MG tablet TAKE 1 TABLET EVERY DAY  Qty: 90 tablet, Refills: 3    Associated Diagnoses: Essential hypertension, benign      atorvastatin (LIPITOR) 20 MG tablet Take 1 tablet (20 mg total) by mouth every other day.  Qty: 45 tablet, Refills: 1      ferrous sulfate 325 mg (65 mg iron) Tab tablet Take 325 mg by mouth once daily.      finasteride (PROSCAR) 5 mg tablet TAKE 1 TABLET EVERY DAY  Qty: 90 tablet, Refills: 3      metoprolol succinate (TOPROL-XL) 100 MG 24 hr tablet TAKE 1 TABLET EVERY DAY  Qty: 90 tablet, Refills: 3      predniSONE (DELTASONE) 5 MG tablet Take 1 tablet (5 mg total) by mouth once daily.  Qty: 90 tablet, Refills: 3    Associated Diagnoses: Renal transplant recipient      sirolimus (RAPAMUNE) 1 MG Tab Take 3 tablets (3 mg total) by mouth once daily.  Qty: 90 tablet, Refills: 3    Associated Diagnoses: Kidney transplant recipient      sodium bicarbonate 650 MG tablet TAKE 3 TABLETS THREE TIMES DAILY  Qty: 810 tablet, Refills: 3      tamsulosin (FLOMAX) 0.4 mg Cp24 Take 1 capsule (0.4 mg total) by mouth once daily.  Qty: 90 capsule, Refills: 3    Associated Diagnoses: Benign prostatic hyperplasia with lower urinary tract symptoms,  unspecified morphology      fluticasone (FLONASE) 50 mcg/actuation nasal spray 2 sprays by Each Nare route once daily.  Qty: 1 Bottle, Refills: 12      omeprazole (PRILOSEC) 40 MG capsule Take 1 capsule (40 mg total) by mouth every morning.  Qty: 30 capsule, Refills: 11           No discharge procedures on file.

## 2017-10-02 NOTE — PLAN OF CARE
Problem: Patient Care Overview  Goal: Discharge Needs Assessment  Outcome: Outcome(s) achieved Date Met: 10/02/17  Patient and family states they will help patient pay for RX of eliquis to get filled

## 2017-10-02 NOTE — PLAN OF CARE
Patient aaox3. Vss. Denies any pain. Report to Rangle Ness RN with time for questions verbalized understanding. Dr. Eric friedman to release patient from PACU.

## 2017-10-02 NOTE — CONSULTS
"  [facility]  Cardiology  Consult Note    Patient Name: Jose Luis Manzo  MRN: [id number]  Admission Date: 10/2/2017  Hospital Length of Stay: 0 days  Code Status: No Order   Attending Provider: No att. providers found   Consulting Provider: BRANDIN Quarles, ANP  Primary Care Physician: Zelalem Rojas MD  Principal Problem:<principal problem not specified>    Patient information was obtained from patient and past medical records.     Consults  Subjective:     Chief Complaint:  Hoarseness; elective ENT surgery      HPI:   66yo male with history of HTN, HLP, CKD stage IV, BPH, renal transplant, laryngeal cancer and bilateral DVTs who presents for elective ENT surgery for evaluation of hoarseness. Mr. Manzo was last seen by Dr. Frankel in May 2016 for bilateral DVTs treated with Eliquis with original diagnosis a few years ago at [facility]. Repeat LE venous ultrasound without any improvement and planned for venogram with cancellation due to elevated creatinine who was lost to follow up. He reports not taking his Eliquis for the past 4 months due to copays. He reports his swelling to his legs is worse at the end of the day and resolves overnight. He states "I know I need it but my brother lost his job and we could afford it". He reports his brother started working again recently and will be able to afford the copays next month.     Cardiology was consulted by anesthesia for clearance for ENT surgery as well as evaluation and AC recommendations due to bilateral DVTs. He denies any complaints of chest pain or SOB and reports he is fairly active. He is able to do basic household chores (ie sweeping) and walk up a flight of stairs with no complaints of chest pain or SOB.     Past Medical History:   Diagnosis Date    BPH (benign prostatic hypertrophy)     Cancer     vocal cords    Claudication of right lower extremity 3/10/2016    Disorder of kidney and ureter     Hyperlipidemia     Hypertension     " Microcytic anemia 9/16/2016    Oropharyngeal cancer     Pterygium     Thromboembolic disorder     Unspecified disorder of kidney and ureter        Past Surgical History:   Procedure Laterality Date    KIDNEY TRANSPLANT  2007    followed by [facility]    LARYNX SURGERY  11/2014    Oropharyngeal Cancer    TIBIAL STAPLING Left 1980            Review of patient's allergies indicates:  No Known Allergies    No current facility-administered medications on file prior to encounter.      Current Outpatient Prescriptions on File Prior to Encounter   Medication Sig    amlodipine (NORVASC) 10 MG tablet TAKE 1 TABLET EVERY DAY    atorvastatin (LIPITOR) 20 MG tablet Take 1 tablet (20 mg total) by mouth every other day.    ferrous sulfate 325 mg (65 mg iron) Tab tablet Take 325 mg by mouth once daily.    finasteride (PROSCAR) 5 mg tablet TAKE 1 TABLET EVERY DAY    metoprolol succinate (TOPROL-XL) 100 MG 24 hr tablet TAKE 1 TABLET EVERY DAY    predniSONE (DELTASONE) 5 MG tablet Take 1 tablet (5 mg total) by mouth once daily.    sirolimus (RAPAMUNE) 1 MG Tab Take 3 tablets (3 mg total) by mouth once daily.    sodium bicarbonate 650 MG tablet TAKE 3 TABLETS THREE TIMES DAILY    tamsulosin (FLOMAX) 0.4 mg Cp24 Take 1 capsule (0.4 mg total) by mouth once daily.    fluticasone (FLONASE) 50 mcg/actuation nasal spray 2 sprays by Each Nare route once daily.    omeprazole (PRILOSEC) 40 MG capsule Take 1 capsule (40 mg total) by mouth every morning.    [DISCONTINUED] apixaban (ELIQUIS) 2.5 mg Tab Take 1 tablet (2.5 mg total) by mouth 2 (two) times daily.     Family History     Problem Relation (Age of Onset)    Diabetes Mother    Hypertension Mother    No Known Problems Sister, Brother, Daughter    Stroke Mother, Father        Social History Main Topics    Smoking status: Former Smoker     Packs/day: 1.00     Years: 20.00    Smokeless tobacco: Never Used    Alcohol use 0.0 oz/week      Comment: occasionally  1/month    Drug use: No    Sexual activity: Yes     Partners: Female     Review of Systems   Constitution: Negative for chills, decreased appetite, diaphoresis, fever and weakness.   HENT: Positive for hoarse voice.    Cardiovascular: Positive for leg swelling. Negative for chest pain, claudication, cyanosis, dyspnea on exertion, irregular heartbeat, near-syncope, orthopnea, palpitations, paroxysmal nocturnal dyspnea and syncope.   Respiratory: Negative for cough, hemoptysis, shortness of breath and wheezing.    Gastrointestinal: Negative for bloating, abdominal pain, constipation, diarrhea, melena, nausea and vomiting.   Neurological: Negative for dizziness.     Objective:     Vital Signs (Most Recent):  Temp: 98 °F (36.7 °C) (10/02/17 1400)  Pulse: (!) 57 (10/02/17 1400)  Resp: 17 (10/02/17 1400)  BP: (!) 145/80 (10/02/17 1400)  SpO2: 96 % (10/02/17 1400) Vital Signs (24h Range):  Temp:  [97.7 °F (36.5 °C)-98 °F (36.7 °C)] 98 °F (36.7 °C)  Pulse:  [53-68] 57  Resp:  [12-18] 17  SpO2:  [91 %-97 %] 96 %  BP: (129-175)/() 145/80     Weight: 109.8 kg (242 lb)  Body mass index is 34.23 kg/m².    SpO2: 96 %  O2 Device (Oxygen Therapy): room air      Intake/Output Summary (Last 24 hours) at 10/02/17 1619  Last data filed at 10/02/17 1400   Gross per 24 hour   Intake             1450 ml   Output                0 ml   Net             1450 ml       Lines/Drains/Airways          No matching active lines, drains, or airways          Physical Exam   Constitutional: He is oriented to person, place, and time. He appears well-developed and well-nourished. No distress.   Cardiovascular: Normal rate and regular rhythm.  Exam reveals no gallop.    No murmur heard.  Pulmonary/Chest: Effort normal and breath sounds normal. No respiratory distress. He has no wheezes.   Abdominal: Soft. Bowel sounds are normal. He exhibits no distension. There is no tenderness.   Musculoskeletal: He exhibits edema (1-2 +BLE edema present ).    Neurological: He is alert and oriented to person, place, and time.   Skin: Skin is warm and dry.       Significant Labs:     CBC and BMP reviewed from 9/20/2017 with H&H 12.7&40.6; BMP with K+ 3.9 BUN 27 creatinine 2.73 (baseline 2.7-3.0)        Assessment and Plan:     Lesion of true vocal cord    -with hoarseness  -admitted for elective ENT surgery by Dr. Desouza        DVT (deep venous thrombosis)    -history of bilateral DVTs dating back prior to 2016  -DVT involve CFV, SFV, popliteal and peroneal ranging from nonocclusive to occlusive  -last LE venous ultrasound with no significant change  -planned for venogram in 6/2016 with cancellation due to renal function and patient lost to follow up  -on Eliquis 2.5mg po BID until 4months with independent discontinuation due to financial reasons  -stressed importance and discussed risks of PE without appropriate AC treatment  -clearance for AC immediate post ENT surgery per ENT staff  -provided with one month free via Rx savings card and outpatient pharmacy with written Rx provided as well  -will follow up with Dr. Frankel in one month to discuss proceeding with venogram depending on renal function; will arrange for BMP and CBC prior to appt.               VTE Risk Mitigation         Ordered     Medium Risk of VTE  Once      10/02/17 0644     Place sequential compression device  Until discontinued      10/02/17 0644          66yo male with above stated history prescribed long term AC for bilateral DVTs with recent noncompliance. Denies any complaints of chest pain or SOB currently and able to perform equivalent of 4 mets of activity with no complaints of chest pain or SOB. May proceed with low risk surgery with minor clinical predictors with no cardiac workup needed prior. Will arrange for re initiation of oral AC upon discharge (clearance by ENT to start postoperatively) and will arrange for follow up with Dr. Frankel in one month for discussion on indication of  venogram     Laura Esteban, APRN, ANP  Cardiology   [facility]

## 2017-10-02 NOTE — PLAN OF CARE
POC board updated and reviewed with pt and pt's family. Pt and pt's family verbalize understanding. Safety precautions maintained. Call bell in reach.  Bed locked and in lowest position. Instructed pt to call for assistance. Pt verbalizes understanding.

## 2017-10-02 NOTE — PLAN OF CARE
Problem: Fall Risk (Adult)  Goal: Identify Related Risk Factors and Signs and Symptoms  Related risk factors and signs and symptoms are identified upon initiation of Human Response Clinical Practice Guideline (CPG)   Outcome: Outcome(s) achieved Date Met: 10/02/17  No falls or near falls

## 2017-10-02 NOTE — PLAN OF CARE
Problem: Patient Care Overview  Goal: Plan of Care Review  Outcome: Outcome(s) achieved Date Met: 10/02/17  No post op issues, vss, patient instructed on post op care and reinforced the importance of getting eliquis rx filled

## 2017-10-02 NOTE — TRANSFER OF CARE
"Anesthesia Transfer of Care Note    Patient: Jose Luis Manzo    Procedure(s) Performed: Procedure(s) (LRB):  DIRECT MICROSUSPENSION LARYNGOSCOPY WITH BIOPSY (N/A)    Patient location: PACU    Anesthesia Type: general    Transport from OR: Transported from OR on 6-10 L/min O2 by face mask with adequate spontaneous ventilation    Post pain: adequate analgesia    Post assessment: no apparent anesthetic complications and tolerated procedure well    Post vital signs: stable    Level of consciousness: awake    Nausea/Vomiting: no nausea/vomiting    Complications: none    Transfer of care protocol was followed      Last vitals:   Visit Vitals  /85   Pulse (!) 58   Temp 36.6 °C (97.9 °F) (Oral)   Resp 16   Ht 5' 10.5" (1.791 m)   Wt 109.8 kg (242 lb)   SpO2 97%   BMI 34.23 kg/m²     "

## 2017-10-02 NOTE — SUBJECTIVE & OBJECTIVE
Past Medical History:   Diagnosis Date    BPH (benign prostatic hypertrophy)     Cancer     vocal cords    Claudication of right lower extremity 3/10/2016    Disorder of kidney and ureter     Hyperlipidemia     Hypertension     Microcytic anemia 9/16/2016    Oropharyngeal cancer     Pterygium     Thromboembolic disorder     Unspecified disorder of kidney and ureter        Past Surgical History:   Procedure Laterality Date    KIDNEY TRANSPLANT  2007    followed by [facility]    LARYNX SURGERY  11/2014    Oropharyngeal Cancer    TIBIAL STAPLING Left 1980            Review of patient's allergies indicates:  No Known Allergies    No current facility-administered medications on file prior to encounter.      Current Outpatient Prescriptions on File Prior to Encounter   Medication Sig    amlodipine (NORVASC) 10 MG tablet TAKE 1 TABLET EVERY DAY    atorvastatin (LIPITOR) 20 MG tablet Take 1 tablet (20 mg total) by mouth every other day.    ferrous sulfate 325 mg (65 mg iron) Tab tablet Take 325 mg by mouth once daily.    finasteride (PROSCAR) 5 mg tablet TAKE 1 TABLET EVERY DAY    metoprolol succinate (TOPROL-XL) 100 MG 24 hr tablet TAKE 1 TABLET EVERY DAY    predniSONE (DELTASONE) 5 MG tablet Take 1 tablet (5 mg total) by mouth once daily.    sirolimus (RAPAMUNE) 1 MG Tab Take 3 tablets (3 mg total) by mouth once daily.    sodium bicarbonate 650 MG tablet TAKE 3 TABLETS THREE TIMES DAILY    tamsulosin (FLOMAX) 0.4 mg Cp24 Take 1 capsule (0.4 mg total) by mouth once daily.    fluticasone (FLONASE) 50 mcg/actuation nasal spray 2 sprays by Each Nare route once daily.    omeprazole (PRILOSEC) 40 MG capsule Take 1 capsule (40 mg total) by mouth every morning.    [DISCONTINUED] apixaban (ELIQUIS) 2.5 mg Tab Take 1 tablet (2.5 mg total) by mouth 2 (two) times daily.     Family History     Problem Relation (Age of Onset)    Diabetes Mother    Hypertension Mother    No Known Problems Sister,  Brother, Daughter    Stroke Mother, Father        Social History Main Topics    Smoking status: Former Smoker     Packs/day: 1.00     Years: 20.00    Smokeless tobacco: Never Used    Alcohol use 0.0 oz/week      Comment: occasionally 1/month    Drug use: No    Sexual activity: Yes     Partners: Female     Review of Systems   Constitution: Negative for chills, decreased appetite, diaphoresis, fever and weakness.   HENT: Positive for hoarse voice.    Cardiovascular: Positive for leg swelling. Negative for chest pain, claudication, cyanosis, dyspnea on exertion, irregular heartbeat, near-syncope, orthopnea, palpitations, paroxysmal nocturnal dyspnea and syncope.   Respiratory: Negative for cough, hemoptysis, shortness of breath and wheezing.    Gastrointestinal: Negative for bloating, abdominal pain, constipation, diarrhea, melena, nausea and vomiting.   Neurological: Negative for dizziness.     Objective:     Vital Signs (Most Recent):  Temp: 98 °F (36.7 °C) (10/02/17 1400)  Pulse: (!) 57 (10/02/17 1400)  Resp: 17 (10/02/17 1400)  BP: (!) 145/80 (10/02/17 1400)  SpO2: 96 % (10/02/17 1400) Vital Signs (24h Range):  Temp:  [97.7 °F (36.5 °C)-98 °F (36.7 °C)] 98 °F (36.7 °C)  Pulse:  [53-68] 57  Resp:  [12-18] 17  SpO2:  [91 %-97 %] 96 %  BP: (129-175)/() 145/80     Weight: 109.8 kg (242 lb)  Body mass index is 34.23 kg/m².    SpO2: 96 %  O2 Device (Oxygen Therapy): room air      Intake/Output Summary (Last 24 hours) at 10/02/17 1619  Last data filed at 10/02/17 1400   Gross per 24 hour   Intake             1450 ml   Output                0 ml   Net             1450 ml       Lines/Drains/Airways          No matching active lines, drains, or airways          Physical Exam   Constitutional: He is oriented to person, place, and time. He appears well-developed and well-nourished. No distress.   Cardiovascular: Normal rate and regular rhythm.  Exam reveals no gallop.    No murmur heard.  Pulmonary/Chest: Effort  normal and breath sounds normal. No respiratory distress. He has no wheezes.   Abdominal: Soft. Bowel sounds are normal. He exhibits no distension. There is no tenderness.   Musculoskeletal: He exhibits edema (1-2 +BLE edema present ).   Neurological: He is alert and oriented to person, place, and time.   Skin: Skin is warm and dry.       Significant Labs:     CBC and BMP reviewed from 9/20/2017 with H&H 12.7&40.6; BMP with K+ 3.9 BUN 27 creatinine 2.73 (baseline 2.7-3.0)

## 2017-10-02 NOTE — PLAN OF CARE
"Dr Desouza and Dr Reyes notified pt is supposed to be on elliquis for DVT in his leg that started 5 months ago. Pt states he has not been on it for at least 2 months, because he could not afford to get it refilled. I asked Dr Desouza if she still wanted me to put the SCD's on him with his history and not being on the blood thinner, She stated "yes". Dr Reyes states she will cont to talk to the patient for the anesthesia portion.   "

## 2017-10-02 NOTE — PLAN OF CARE
@ [facility]. States Dr Desouza wants Dr Frankel  to see patient. Consult placed in EPIC and I notified Dr Frankel.

## 2017-10-03 DIAGNOSIS — I82.409 ACUTE DEEP VEIN THROMBOSIS (DVT) OF LOWER EXTREMITY, UNSPECIFIED LATERALITY, UNSPECIFIED VEIN: ICD-10-CM

## 2017-10-03 NOTE — TELEPHONE ENCOUNTER
----- Message from Sarabjit Hackett sent at 10/3/2017  3:07 PM CDT -----  Contact: Self/ [phone]  Patient is requesting a refill on his medication.      Please call and advise once sent.    apixaban (ELIQUIS) 2.5 mg Tab    [facility] [address] [place] [address] [facility]

## 2017-10-05 ENCOUNTER — TELEPHONE (OUTPATIENT)
Dept: OTOLARYNGOLOGY | Facility: CLINIC | Age: 67
End: 2017-10-05

## 2017-10-05 DIAGNOSIS — I82.409 ACUTE DEEP VEIN THROMBOSIS (DVT) OF LOWER EXTREMITY, UNSPECIFIED LATERALITY, UNSPECIFIED VEIN: ICD-10-CM

## 2017-10-05 NOTE — TELEPHONE ENCOUNTER
Spoke with patient he needed to reschedule his post op to a later time in the afternoon. Told patient Dr Desouza has a surgery that afternoon if we could reschedule for another day. Appointment rescheduled for 10/12/17 at 2:00. Patient was notified

## 2017-10-05 NOTE — TELEPHONE ENCOUNTER
----- Message from Liset Domínguez sent at 10/4/2017  5:47 PM CDT -----  Contact: [phone]/self  Patient requesting to speak with you regarding medication refill. Please advise.

## 2017-10-05 NOTE — TELEPHONE ENCOUNTER
----- Message from Emily Reyes sent at 10/5/2017  8:39 AM CDT -----  No. [phone]   Patient has a post op appointment on 10/10/17 at 9:20.   He would like to come in after 1:30 on the 10th.    Please call.

## 2017-10-09 DIAGNOSIS — I82.409 ACUTE DEEP VEIN THROMBOSIS (DVT) OF LOWER EXTREMITY, UNSPECIFIED LATERALITY, UNSPECIFIED VEIN: ICD-10-CM

## 2017-10-09 NOTE — TELEPHONE ENCOUNTER
----- Message from Mounika Dejesus sent at 10/9/2017  3:46 PM CDT -----  Contact: the pt called  Need a refill on  apixaban (ELIQUIS) 2.5 mg Tab     The pt states it is hard to get in touch with Dr Patricio Fragoso

## 2017-10-12 ENCOUNTER — OFFICE VISIT (OUTPATIENT)
Dept: OTOLARYNGOLOGY | Facility: CLINIC | Age: 67
End: 2017-10-12
Payer: MEDICARE

## 2017-10-12 VITALS
DIASTOLIC BLOOD PRESSURE: 86 MMHG | HEART RATE: 64 BPM | BODY MASS INDEX: 34.05 KG/M2 | SYSTOLIC BLOOD PRESSURE: 130 MMHG | TEMPERATURE: 98 F | WEIGHT: 243.19 LBS | HEIGHT: 71 IN

## 2017-10-12 DIAGNOSIS — J38.3 LESION OF TRUE VOCAL CORD: ICD-10-CM

## 2017-10-12 DIAGNOSIS — R49.0 HOARSENESS OF VOICE: ICD-10-CM

## 2017-10-12 DIAGNOSIS — Z85.21 HISTORY OF LARYNGEAL CANCER: Primary | ICD-10-CM

## 2017-10-12 PROCEDURE — 99999 PR PBB SHADOW E&M-EST. PATIENT-LVL III: CPT | Mod: PBBFAC,,, | Performed by: OTOLARYNGOLOGY

## 2017-10-12 PROCEDURE — 99213 OFFICE O/P EST LOW 20 MIN: CPT | Mod: 25,S$GLB,, | Performed by: OTOLARYNGOLOGY

## 2017-10-12 PROCEDURE — 31575 DIAGNOSTIC LARYNGOSCOPY: CPT | Mod: S$GLB,,, | Performed by: OTOLARYNGOLOGY

## 2017-10-13 NOTE — PROGRESS NOTES
Chief Complaint   Patient presents with    Post-op Evaluation   .     HPI: 67 year old male referred to me by Dr. Rojas for evaluation of hoarseness with history of throat cancer. He has a history of laryngeal cancer treated by Dr. Kyle approximately 2 years. He was treated with XRT at [facility].  He reports that he completed radiation therapy (29 treatments) 2 years ago.  He now reports hoarseness which has been persistent since radiation. He reports dry mouth and phlegm in his throat.  He states that when he awakens in the morning he has no voice.  He feels hoarseness is worsening over the last several months.   He denies throat pain, dysphagia, otalgia.  He denies weight loss.      Interval history: Mr. Manzo is s/p DL with biopsy 10 days ago.  He states that he feels his voice is more hoarse since biopsy.  He denies hemoptysis. He denies dysphagia. He denies throat pain.     Review of Systems  General: negative for chills, fever or weight loss  Psychological: negative for mood changes or depression  Ophthalmic: negative for blurry vision, photophobia or eye pain  ENT: see HPI  Respiratory: no cough, shortness of breath, or wheezing  Cardiovascular: no chest pain or dyspnea on exertion  Gastrointestinal: no abdominal pain, change in bowel habits, or black/ bloody stools  Musculoskeletal: negative for gait disturbance or muscular weakness  Neurological: no syncope or seizures; no ataxia  Dermatological: negative for puritis,  rash and jaundice  Hematologic/lymphatic: no easy bruising, no new lumps or bumps      Physical Exam:    Vitals:    10/12/17 1325   BP: 130/86   Pulse: 64   Temp: 97.9 °F (36.6 °C)       Constitutional: Well appearing / communicating without difficutly.  NAD. Voice quality: rough, with decreased projection.  Eyes: EOM I Bilaterally  Head/Face: Normocephalic.  Negative paranasal sinus pressure/tenderness.  Salivary glands WNL.  House Brackmann I Bilaterally.    Right Ear: Auricle  normal appearance. External Auditory Canal within normal limits no lesions or masses,TM w/o masses/lesions/perforations. TM mobility noted.   Left Ear: Auricle normal appearance. External Auditory Canal within normal limits no lesions or masses,TM w/o masses/lesions/perforations. TM mobility noted.  Nose: No gross nasal septal deviation. Inferior Turbinates 2+ bilaterally. No septal perforation. No masses/lesions. External nasal skin appears normal without masses/lesions.  Oral Cavity: Gingiva/lips within normal limits.  Maxillary: Edendulous Mandibular partially edentulous. Mucus membranes dry. Floor of mouth soft, no masses palpated. Oral Tongue mobile. Hard Palate appears normal.    Oropharynx: Base of tongue appears normal. No masses/lesions noted. Tonsillar fossa/pharyngeal wall without lesions. Posterior oropharynx WNL.  Soft palate without masses. Midline uvula.   Neck/Lymphatic: No LAD I-VI bilaterally.  No thyromegaly.  No masses noted on exam.    Mirror laryngoscopy/nasopharyngoscopy: Active gag reflex.  Unable to perform.    Neuro/Psychiatric: AOx3.  Normal mood and affect.   Cardiovascular: Normal carotid pulses bilaterally, no increasing jugular venous distention noted at cervical region bilaterally.    Respiratory: Normal respiratory effort, no stridor, no retractions noted.    See separate procedure note for FFL.     PET-CT scan images reviewed personally by me and reviewed in detail with the patient today. Findings: There is mildly increased FDG uptake within a left anterior pleural-based density that contains hyperdensity on CT. There are no prior studies available for comparison to assess stability of this lesion. The internal hyperdensity suggests either that this represents postsurgical change or possibly old granulomatous disease however metastatic disease cannot be excluded. If prior studies exist, comparison of the stability of this lesion would be of benefit. Otherwise continued surveillance  is recommended.    Pathology report:   FINAL PATHOLOGIC DIAGNOSIS  Frozen Section: 1. Right true vocal cord lesion:  -Severe squamous dysplasia/CIS  Frozen section: 2. Left true vocal cord lesion:  -Severe squamous dysplasia/CIS  3. Right true vocal cord lesion:  -Microscopic focus of superficially invasive squamous cell carcinoma in background of severe dysplasia/CIS  4. Left true vocal CORD lesion:  -Severe squamous dysplasia/CIS    Assessment:    ICD-10-CM ICD-9-CM    1. History of laryngeal cancer Z85.21 V10.21    2. Lesion of true vocal cord J38.3 478.5    3. Hoarseness of voice R49.0 784.42      The primary encounter diagnosis was History of laryngeal cancer. Diagnoses of Lesion of true vocal cord and Hoarseness of voice were also pertinent to this visit.      Plan:  Exam and biopsies concerning for recurrent SCCA in setting of history of Stage 1A SCCA of the larynx treated with XRT in 10/2014. I will refer to [facility] head and neck cancer team for further evaluation of treatment recommendations.     Lizet Lam MD

## 2017-10-13 NOTE — PROCEDURES
Laryngoscopy  Date/Time: 10/13/2017 1:12 PM  Performed by: CARMEN KIMBALL  Authorized by: CARMEN KIMBALL     Consent Done?:  Yes (Verbal)      Due to indication in patient's history, presentation or risk factors,  a fiber optic exam was performed.    SEPARATE PROCEDURE NOTE:    ANESTHESIA:  Topical xylocaine with earnestine-synephrine    PROCEDURE:  After verbal consent was obtained, the flexible scope was passed through the patient's nasal cavity without difficulty.  The nasopharynx (adenoid pad) and eustachian tube orifices were first visualized and were found to be normal, without masses or irregularity.  The posterior pharyngeal wall and base of tongue were then examined and no mass or irregular tissue was seen.  The scope was then advanced to the larynx, and the epiglottis, valleculae, and piriform sinuses were normal, without masses or mucosal irregularity.  The false vocal folds and true vocal folds were then examined and were found to have normal mobility (full abduction and adduction). Right true vocal cord with mucosal irregularity at the anterior 1/3 of the cord; mucosal irregularity of bilateral cords s/p biopsy. The interartyenoid area , post cricoid region and false vocal cord region had erythema and edema consistent with post radiation changes.

## 2017-10-16 LAB — FUNGUS SPEC CULT: NORMAL

## 2017-10-16 RX ORDER — ATORVASTATIN CALCIUM 20 MG/1
20 TABLET, FILM COATED ORAL EVERY OTHER DAY
Qty: 45 TABLET | Refills: 1 | Status: SHIPPED | OUTPATIENT
Start: 2017-10-16 | End: 2018-03-16 | Stop reason: SDUPTHER

## 2017-10-19 ENCOUNTER — OFFICE VISIT (OUTPATIENT)
Dept: OTOLARYNGOLOGY | Facility: CLINIC | Age: 67
End: 2017-10-19
Payer: MEDICARE

## 2017-10-19 VITALS
TEMPERATURE: 97 F | BODY MASS INDEX: 34.71 KG/M2 | HEART RATE: 63 BPM | WEIGHT: 245.38 LBS | SYSTOLIC BLOOD PRESSURE: 149 MMHG | DIASTOLIC BLOOD PRESSURE: 91 MMHG

## 2017-10-19 DIAGNOSIS — C32.9 LARYNX CANCER: Primary | ICD-10-CM

## 2017-10-19 PROCEDURE — 31575 DIAGNOSTIC LARYNGOSCOPY: CPT | Mod: S$GLB,,, | Performed by: OTOLARYNGOLOGY

## 2017-10-19 PROCEDURE — 99499 UNLISTED E&M SERVICE: CPT | Mod: HCWC,S$GLB,, | Performed by: OTOLARYNGOLOGY

## 2017-10-19 PROCEDURE — 99999 PR PBB SHADOW E&M-EST. PATIENT-LVL III: CPT | Mod: PBBFAC,,, | Performed by: OTOLARYNGOLOGY

## 2017-10-19 PROCEDURE — 99214 OFFICE O/P EST MOD 30 MIN: CPT | Mod: 25,S$GLB,, | Performed by: OTOLARYNGOLOGY

## 2017-10-19 NOTE — ASSESSMENT & PLAN NOTE
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  His disease volume is minimal.  He could be a candidate for a supracricoid laryngectomy or, more likely, endoscopic resection. I ordered a PET/CT and a noncontrasted CT of the neck (due to his renal disease) to complete his staging workup.  I will see him after his scans.

## 2017-10-19 NOTE — LETTER
October 19, 2017      Lizet Lam MD  [address]  [address]  [place] [address]           [facility]  [address]  [facility] [address]  Phone: [phone]  Fax: [phone]          Patient: Jose Luis Manzo   MR Number: [id number]   YOB: 1950   Date of Visit: 10/19/2017       Dear Dr. Lizet Lam:    Thank you for referring Jose Luis Manzo to me for evaluation. Attached you will find relevant portions of my assessment and plan of care.    If you have questions, please do not hesitate to call me. I look forward to following Jose Luis Manzo along with you.    Sincerely,    Duncan Mcadams MD    Enclosure  CC:  No Recipients    If you would like to receive this communication electronically, please contact [email or web address] or [phone] to request more information on [employer] Link access.    For providers and/or their staff who would like to refer a patient to [facility], please contact us through our one-stop-shop provider referral line, [facility], at [phone].    If you feel you have received this communication in error or would no longer like to receive these types of communications, please e-mail [email or web address]

## 2017-10-19 NOTE — PROGRESS NOTES
Chief Complaint   Patient presents with    consult/ dx of thyroid ca     Treatment History  ~2015: XRT for SCCA larynx ([facility]).    HPI   67 y.o. male presents from Dr. Lizet Marin with the above treatment history. He was recently taken to the OR by Dr. Desouza for DL and biopsy of bilateral TVC lesions.  Path revealed a microscopic focus of invasive carcinoma of the R TVC and severe dysplasia/CIS of the B TVC.  He has no complaints today aside from hoarseness. He denies dysphagia, throat pain or SOB.  He is a former smoker but quit ~10 years ago.    Review of Systems   Constitutional: Negative for fatigue and unexpected weight change.   HENT: Per HPI.  Eyes: Negative for visual disturbance.   Respiratory: Negative for shortness of breath, hemoptysis   Cardiovascular: Negative for chest pain and palpitations.   Musculoskeletal: Negative for decreased ROM, back pain.   Skin: Negative for rash, sunburn, itching.   Neurological: Negative for dizziness and seizures.   Hematological: Negative for adenopathy. Does not bruise/bleed easily.   Endocrine: Negative for rapid weight loss/weight gain, heat/cold intolerance.     Past Medical History   Patient Active Problem List   Diagnosis    Chronic kidney disease, stage IV (severe)    Essential hypertension    Mixed hyperlipidemia    BPH (benign prostatic hyperplasia)    Claudication of right lower extremity    Eye swelling, bilateral    Diminished night vision    Edema of right lower extremity    Renal transplant recipient    Dermolipoma    Edema    DVT (deep venous thrombosis)    Iron deficiency anemia    Thrombophilia    H/O laryngeal cancer    Pulmonary nodule, left    Lesion of true vocal cord           Past Surgical History   Past Surgical History:   Procedure Laterality Date    KIDNEY TRANSPLANT  2007    follow[facility] by [facility]    LARYNX SURGERY  11/2014    Oropharyngeal Cancer    TIBIAL STAPLING Left 1980              Family History   Family  History   Problem Relation Age of Onset    Stroke Mother     Diabetes Mother     Hypertension Mother     Stroke Father     No Known Problems Sister     No Known Problems Brother     No Known Problems Daughter            Social History   .  Social History     Social History    Marital status: Single     Spouse name: N/A    Number of children: N/A    Years of education: N/A     Occupational History    Not on file.     Social History Main Topics    Smoking status: Former Smoker     Packs/day: 1.00     Years: 20.00    Smokeless tobacco: Never Used    Alcohol use 0.0 oz/week      Comment: occasionally 1/month    Drug use: No    Sexual activity: Yes     Partners: Female     Other Topics Concern    Not on file     Social History Narrative    No narrative on file         Allergies   Review of patient's allergies indicates:  No Known Allergies        Physical Exam     Vitals:    10/19/17 0908   BP: (!) 149/91   Pulse: 63   Temp: 97.3 °F (36.3 °C)         Body mass index is 34.71 kg/m².      General: AOx3, NAD   Respiratory:  Symmetric chest rise, normal effort  Right Ear: External Auditory Canal WNL,TM w/o masses/lesions/perforations.  Middle ear without evidence of effusion.   Left Ear: External Auditory Canal WNL,TM w/o masses/lesions/perforations.  Middle ear without evidence of effusion.   Nose: No gross nasal septal deviation. Inferior Turbinates WNL bilaterally. No septal perforation. No masses/lesions.   Oral Cavity:  Oral Tongue mobile, no lesions noted. Hard Palate WNL. No buccal or FOM lesions.  Oropharynx:  No masses/lesions of the posterior pharyngeal wall. Tonsillar fossa without lesions. Soft palate without masses. Midline uvula.   Neck: No scars.  No cervical lymphadenopathy, thyromegaly or thyroid nodules.  Normal range of motion.    Face: House Brackmann I bilaterally.     Flex Naso Heike Hypo Procedures #2    Procedure:  Diagnostic flexible nasopharyngoscopy, laryngoscopy and  hypopharyngoscopy:    Routine preparation with local atomizer with 1% neosynephrine/pontocaine with customary flexible endoscope.    Nasopharynx:  No lesions.   Mucosa:  No lesions.   Adenoids:  Present.  Posterior Choanae:  Patent.  Eustachian Tubes:  Patent.  Posterior pharynx:  No lesions.  Larynx/hypopharynx:   Epiglottis:  No lesions, without edema.   AE Folds:  No lesions.   Vocal cords:  Diffuse leukoplakia bilaterally.     Mobility:  Difficult to assess due to marked gag.  Based on the somewhat limited exam, both TVC appear to have full ROM.    Hypopharynx:  No lesions.   Piriform sinus:  No pooling, no lesions.   Post Cricoid:  No erythema, no edema.      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  His disease volume is minimal.  He could be a candidate for a supracricoid laryngectomy or, more likely, endoscopic resection. I ordered a PET/CT and a noncontrasted CT of the neck (due to his renal disease) to complete his staging workup.  I will see him after his scans.          Relevant Orders    NM PET CT Routine Skull to Mid Thigh    CT Soft Tissue Neck WO Contrast      Other Visit Diagnoses    None.

## 2017-11-03 ENCOUNTER — HOSPITAL ENCOUNTER (OUTPATIENT)
Dept: RADIOLOGY | Facility: HOSPITAL | Age: 67
Discharge: HOME | End: 2017-11-03
Attending: OTOLARYNGOLOGY
Payer: MEDICARE

## 2017-11-03 DIAGNOSIS — C32.9 LARYNX CANCER: ICD-10-CM

## 2017-11-03 PROCEDURE — A9552 F18 FDG: HCPCS

## 2017-11-03 PROCEDURE — 70490 CT SOFT TISSUE NECK W/O DYE: CPT | Mod: 26,,, | Performed by: RADIOLOGY

## 2017-11-03 PROCEDURE — 78815 PET IMAGE W/CT SKULL-THIGH: CPT | Mod: 26,PS,, | Performed by: NUCLEAR MEDICINE

## 2017-11-03 PROCEDURE — 70490 CT SOFT TISSUE NECK W/O DYE: CPT | Mod: TC

## 2017-11-06 ENCOUNTER — OFFICE VISIT (OUTPATIENT)
Dept: OTOLARYNGOLOGY | Facility: CLINIC | Age: 67
End: 2017-11-06
Payer: MEDICARE

## 2017-11-06 VITALS
WEIGHT: 243.38 LBS | HEART RATE: 65 BPM | SYSTOLIC BLOOD PRESSURE: 141 MMHG | BODY MASS INDEX: 34.43 KG/M2 | TEMPERATURE: 99 F | DIASTOLIC BLOOD PRESSURE: 85 MMHG

## 2017-11-06 DIAGNOSIS — C32.9 LARYNX CANCER: ICD-10-CM

## 2017-11-06 PROCEDURE — 99499 UNLISTED E&M SERVICE: CPT | Mod: S$GLB,,, | Performed by: OTOLARYNGOLOGY

## 2017-11-06 PROCEDURE — 99213 OFFICE O/P EST LOW 20 MIN: CPT | Mod: S$GLB,,, | Performed by: OTOLARYNGOLOGY

## 2017-11-06 PROCEDURE — 99999 PR PBB SHADOW E&M-EST. PATIENT-LVL III: CPT | Mod: PBBFAC,,, | Performed by: OTOLARYNGOLOGY

## 2017-11-06 NOTE — PROGRESS NOTES
Chief Complaint   Patient presents with    Post PET AND CT     Treatment History  ~2015: XRT for SCCA larynx ([facility]).    HPI   67 y.o. male presents from Dr. Lizet Marin with the above treatment history. He was recently taken to the OR by Dr. Desouza for DL and biopsy of bilateral TVC lesions.  Path revealed a microscopic focus of invasive carcinoma of the R TVC and severe dysplasia/CIS of the B TVC.  He has no complaints today aside from hoarseness. He denies dysphagia, throat pain or SOB.  He is a former smoker but quit ~10 years ago.    PET and CT with minimal disease.     Review of Systems   Constitutional: Negative for fatigue and unexpected weight change.   HENT: Per HPI.  Eyes: Negative for visual disturbance.   Respiratory: Negative for shortness of breath, hemoptysis   Cardiovascular: Negative for chest pain and palpitations.   Musculoskeletal: Negative for decreased ROM, back pain.   Skin: Negative for rash, sunburn, itching.   Neurological: Negative for dizziness and seizures.   Hematological: Negative for adenopathy. Does not bruise/bleed easily.   Endocrine: Negative for rapid weight loss/weight gain, heat/cold intolerance.     Past Medical History   Patient Active Problem List   Diagnosis    Chronic kidney disease, stage IV (severe)    Essential hypertension    Mixed hyperlipidemia    BPH (benign prostatic hyperplasia)    Claudication of right lower extremity    Eye swelling, bilateral    Diminished night vision    Edema of right lower extremity    Renal transplant recipient    Dermolipoma    Edema    DVT (deep venous thrombosis)    Iron deficiency anemia    Thrombophilia    H/O laryngeal cancer    Pulmonary nodule, left    Lesion of true vocal cord    Larynx cancer           Past Surgical History   Past Surgical History:   Procedure Laterality Date    KIDNEY TRANSPLANT  2007    followed by [facility]    LARYNX SURGERY  11/2014    Oropharyngeal Cancer    TIBIAL STAPLING  Left 1980              Family History   Family History   Problem Relation Age of Onset    Stroke Mother     Diabetes Mother     Hypertension Mother     Stroke Father     No Known Problems Sister     No Known Problems Brother     No Known Problems Daughter            Social History   .  Social History     Social History    Marital status: Single     Spouse name: N/A    Number of children: N/A    Years of education: N/A     Occupational History    Not on file.     Social History Main Topics    Smoking status: Former Smoker     Packs/day: 1.00     Years: 20.00    Smokeless tobacco: Never Used    Alcohol use 0.0 oz/week      Comment: occasionally 1/month    Drug use: No    Sexual activity: Yes     Partners: Female     Other Topics Concern    Not on file     Social History Narrative    No narrative on file         Allergies   Review of patient's allergies indicates:  No Known Allergies        Physical Exam     Vitals:    11/06/17 1342   BP: (!) 141/85   Pulse: 65   Temp: 98.5 °F (36.9 °C)         Body mass index is 34.43 kg/m².    No exam performed.  Entire visit devoted to counseling.         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  His disease volume is minimal.  Refer to Dr. Smith for eval for endoscopic resection.              This was a 20 minute follow-up encounter, with 15 minutes devoted to education and a discussion of the diagnosis and management options

## 2017-11-06 NOTE — ASSESSMENT & PLAN NOTE
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  His disease volume is minimal.  Refer to Dr. Smith for eval for endoscopic resection.

## 2017-11-15 ENCOUNTER — OFFICE VISIT (OUTPATIENT)
Dept: CARDIOLOGY | Facility: CLINIC | Age: 67
End: 2017-11-15
Payer: MEDICARE

## 2017-11-15 VITALS
HEART RATE: 64 BPM | WEIGHT: 243 LBS | DIASTOLIC BLOOD PRESSURE: 78 MMHG | HEIGHT: 71 IN | BODY MASS INDEX: 34.02 KG/M2 | SYSTOLIC BLOOD PRESSURE: 130 MMHG

## 2017-11-15 DIAGNOSIS — N18.4 CHRONIC KIDNEY DISEASE, STAGE IV (SEVERE): ICD-10-CM

## 2017-11-15 DIAGNOSIS — I82.403 DEEP VEIN THROMBOSIS (DVT) OF BOTH LOWER EXTREMITIES, UNSPECIFIED CHRONICITY, UNSPECIFIED VEIN: Primary | ICD-10-CM

## 2017-11-15 DIAGNOSIS — E78.2 MIXED HYPERLIPIDEMIA: ICD-10-CM

## 2017-11-15 DIAGNOSIS — C32.9 LARYNX CANCER: ICD-10-CM

## 2017-11-15 DIAGNOSIS — I10 ESSENTIAL HYPERTENSION: ICD-10-CM

## 2017-11-15 PROCEDURE — 99999 PR PBB SHADOW E&M-EST. PATIENT-LVL III: CPT | Mod: PBBFAC,,, | Performed by: INTERNAL MEDICINE

## 2017-11-15 PROCEDURE — 99214 OFFICE O/P EST MOD 30 MIN: CPT | Mod: S$GLB,,, | Performed by: INTERNAL MEDICINE

## 2017-11-15 PROCEDURE — 99499 UNLISTED E&M SERVICE: CPT | Mod: S$GLB,,, | Performed by: INTERNAL MEDICINE

## 2017-11-15 NOTE — PATIENT INSTRUCTIONS
Complications of Deep Vein Thrombosis  Deep vein thrombosis (DVT) is a condition involving the formation of a blood clot or thrombus in a deep vein. One may develop in a large vein deep inside the leg, arm, or other part of the body. Complications from deep vein thrombosis can be very serious. They can include pulmonary embolism (PE), chronic venous insufficiency, and post-thrombotic syndrome.   You may hear healthcare providers use the term venous thromboembolism (VTE) to describe DVT and PE. They use the term VTE because the two conditions are very closely related. And, because their prevention and treatment are closely related.         A pulmonary embolism can block a blood vessel in the lungs and must be treated right away.      Pulmonary embolism  Pulmonary embolism (PE) happens when part of the clot, called an embolus, separates from the vein. It travels to the lungs and cuts off the flow of blood. A PE may develop quickly. It is a medical emergency and may cause death.      When to seek medical advice  Call 911 or emergency help if you have symptoms of a blood clot in the lungs. Symptoms may include:  · Chest pain  · Trouble breathing or sudden shortness of breath  · Coughing (may cough up blood)  · Fainting  · Fast heartbeat  · Sweating  You may have bleeding if you take medicine to help prevent blood clots. Call 911 if you have heavy or uncontrolled bleeding.  When to call your healthcare provider  Call your healthcare provider if you have symptoms of a blood clot. The symptoms include:  · Swelling  · Pain  · Redness in your leg, arm, or other area  Call your healthcare provider if you have signs or symptoms of bleeding, like blood in the urine, bleeding with bowel movements, or bleeding from the nose, gums, a cut, or vagina.   Chronic venous insufficiency and post-thrombotic syndrome  Two other complications of deep vein thrombosis are chronic venous insufficiency and post-thrombotic syndrome.  Chronic  venous insufficiency may happen following deep vein thrombosis of a leg vein. It means that a vein no longer works as well. It is a long-term condition where blood stays in the vein instead of flowing back to the heart. Pain and swelling in the leg are common symptoms.  Post-thrombotic syndrome may also happen following deep vein thrombosis of a leg vein. It is a long-term problem with pain, swelling, and redness. Ulcers and sores can also happen if the condition is not treated early. These complications and associated symptoms may make it difficult to walk and take part in daily activities.  Date Last Reviewed: 5/1/2016  © 4641-6788 [employer]. [address] [place] [address], [place] [address]. All rights reserved. This information is not intended as a substitute for professional medical care. Always follow your healthcare professional's instructions.

## 2017-11-16 LAB
ACID FAST MOD KINY STN SPEC: NORMAL
MYCOBACTERIUM SPEC QL CULT: NORMAL

## 2017-11-16 NOTE — PROGRESS NOTES
[facility]  Department of Otorhinolaryngology and Communication Sciences    Jose Luis Manzo is a 67 y.o. male who [facility]  for consultation at the kind request of  Dr. Mcadams for further management of recurrent microinvasive glottic cancer with dysplasia. He is s/p renal transplant in 2007, on chronic steroids/immunosuppression.    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 Chandler Kyle. Definitive radiation therapy at [facility] under the care of .  He completed 6300 centigray by December 2014.  A PET scan done at the time was negative for metastatic disease.    He complains of progressive dysphonia. Onset was gradual. Duration is about 3 months. Time course is constant. Symptoms are worsening. He denies any exacerbating factors. He denies any alleviating factors. He denies any associated symptoms. He denies dysphagia, throat pain, odynophagia, otalgia, hemoptysis, hematemesis, neck mass.    He recently presented to Dr. Desouza, who brought him for DL/biopsy of bilateral TVC lesions.  Path revealed a microscopic focus of invasive carcinoma of the R TVF and severe dysplasia/CIS of the B TVF. Due to pulmonary findings on PET/CT 5/12/2017 (MIK pleural lesion), Dr. Desouza sent the patient to Dr. Masters, who arranged a CT biopsy. This was negative for malignancy. He recommended repeating chest imaging in 2 months.     Dr. Mcadams obtained an updated PET/CT 11/3/2017. Relevant findings are as below:   Asymmetric increased metabolic activity involving the left vocal cord which could represent physiologic left vocal cord activity in the setting of postradiation changes to the right vocal cord. However direct visualization is suggested if recurrence is of clinical concern.  Left upper lobe nodule abutting the pleural margin demonstrates decreased intensity in comparison to prior  examination. This could be related to granulomatous disease and inflammatory changes. Followup in 6 months is recommended.  He also obtained a non-contrast CT of the neck. I independently reviewed the imaging. I see no evidence of cartilaginous involvement or trangression.    He spoke with the patient about the possibility of supracricoid laryngectomy. However, following review of his imaging, he referred Mr. Manzo to me to discuss his candidacy for endoscopic intervention. He quit smoking about 10 years ago. He denies any difficulty swallowing, but does report some occasional difficulty with thin liquids. 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 He lives with his brother in [facility]. She has PVD/claudication. He denies any heart history of CVA.      Past Medical History  He has a past medical history of BPH (benign prostatic hypertrophy); Cancer; Claudication of right lower extremity; Disorder of kidney and ureter; Hyperlipidemia; Hypertension; Microcytic anemia; Oropharyngeal cancer; Pterygium; Thromboembolic disorder; and Unspecified disorder of kidney and ureter.    Past Surgical History  He has a past surgical history that includes Kidney transplant (2007); Tibial stapling (Left, 1980); and Larynx surgery (11/2014).    Family History  His family history includes Diabetes in his mother; Hypertension in his mother; No Known Problems in his brother, daughter, and sister; Stroke in his father and mother.    Social History  He reports that he has quit smoking. He has a 20.00 pack-year smoking history. He has never used smokeless tobacco. He reports that he drinks alcohol. He reports that he does not use drugs.    Allergies  He has No Known Allergies.    Medications  He has a current medication list which includes the following prescription(s): amlodipine, apixaban, atorvastatin, ferrous sulfate, finasteride, fluticasone, hydrocodone-acetaminophen 5-325mg, metoprolol succinate, omeprazole, prednisone,  sirolimus, sodium bicarbonate, and tamsulosin.    Review of Systems   Constitutional: Negative for fever.   HENT: Negative for sore throat.    Eyes: Negative for visual disturbance.   Respiratory: Negative for wheezing.    Cardiovascular: Negative for chest pain.   Gastrointestinal: Negative for nausea.   Musculoskeletal: Negative for arthralgias.   Skin: Negative for rash.   Neurological: Negative for tremors.   Hematological: Does not bruise/bleed easily.   Psychiatric/Behavioral: The patient is not nervous/anxious.           Objective:     VS reviewed  Physical Exam    Constitutional: comfortable, well dressed  Psychiatric: appropriate affect  Respiratory: comfortably breathing, symmetric chest rise, no stridor  Voice: severe roughness/strain; variable ventricular phonation  Cardiovascular: upper extremities non-edematous  Lymphatic: no cervical lymphadenopathy  Neurologic: alert and oriented to time, place, person, and situation; cranial nerves 3-12 grossly intact  Head: normocephalic  Eyes: conjunctivae and sclerae clear  Ears: normal pinnae, normal external auditory canals, tympanic membranes intact  Nose: mucosa pink and noncongested, no masses, no mucopurulence, no polyps  Oral cavity / oropharynx: no mucosal lesions  Neck: soft, full range of motion, laryngotracheal complex palpable with appropriate landmarks, larynx elevates on swallowing  Indirect laryngoscopy: limited due to gag    Procedure  Flexible Laryngeal Videostroboscopy (53768): Laryngeal videostroboscopy is indicated to assess laryngeal vibratory biomechanics and vocal fold oscillation, which cannot be assessed with a plain light examination. This was carried out transnasally with a distal chip videoendoscope. After verbal consent was obtained, the patient was positioned and the nose was topically decongested with 1% phenylephrine and topically anesthetized with 4% lidocaine. The endoscope was passed through the most patent nasal cavity and  positioned to image the nasopharynx, larynx, and hypopharynx in detail. The following features were examined: nasopharyngeal, laryngeal, hypopharyngeal masses; velopharyngeal strength, closure, and symmetry of motion; vocal fold range and symmetry of motion; laryngeal mucosal edema, erythema, inflammation, and hydration; salivary pooling; and gross laryngeal sensation. During phonation, the vocal folds were assessed for glottal closure; mucosal wave; vocal fold lesions; vibratory periodicity, amplitude, and phase symmetry; and vertical height match. The equipment was removed. The patient tolerated the procedure well without complication. All findings were normal except:  - diffuse post-radiation edema of the supraglottis and hypopharynx/pyriform sinuses  - bilateral true vocal fold leukoplakia along free edges, right with a higher burden of disease  - impaired pliability bilaterally  - concentric supraglottic hyperfunction      Assessment:     Jose Luis Manzo is a 67 y.o. 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 His condition is complicated by his renal transplant status and chronic immunosuppression.       Plan:        I had a discussion with the patient regarding his condition and the further workup and management options.      I made it clear to him that he has recurrent cancer and that I recommend he pursue treatment expeditiously. I spoke with him at length about endoscopic transoral laser surgery, which might entail as little as KTP laser photoablation, or as much as an extended cordectomy. The extent of surgery would be dictated by intraoperative findings. I anticipate that a series of staged surgical treatments may be necessary due to bilateral epithelial irregularities. Depending on the extent of surgery, he may experience deterioration in his voice and/or swallowing function. As such, I recommended obtaining a baseline  swallowing assessment via MBSS.    He expressed significant concern about the potential for deterioration of his voice/swallowing function and demonstrated some reluctance to proceed with surgery.  I also offered to help arrange for him to meet with Hitesh Sadler if he desires another opinion. However, he defers on this option at present. I gave him the opportunity to ask questions and I answered all of them to his satisfaction. He expressed understanding my recommendations. He wishes to think the matter over. I recommended he come back in for follow up in 1-2 weeks so that we can continue our discussions. I suggested he bring a family member with him.    I recommended continued surveillance with Dr. Masters regarding the pulmonary nodule.    All questions were answered, and the patient is in agreement with the above.     This visit was 25 minutes in duration, with over 50% of the time spent in direct face-to-face counseling and coordination of care regarding the issues outlined above.    Gamal Smith M.D.  [facility]  Department of Otorhinolaryngology and Communication Sciences

## 2017-11-17 ENCOUNTER — OFFICE VISIT (OUTPATIENT)
Dept: OTOLARYNGOLOGY | Facility: CLINIC | Age: 67
End: 2017-11-17
Payer: MEDICARE

## 2017-11-17 VITALS
BODY MASS INDEX: 34.38 KG/M2 | HEART RATE: 57 BPM | TEMPERATURE: 96 F | DIASTOLIC BLOOD PRESSURE: 93 MMHG | WEIGHT: 246.5 LBS | SYSTOLIC BLOOD PRESSURE: 153 MMHG

## 2017-11-17 DIAGNOSIS — Z85.21 H/O LARYNGEAL CANCER: ICD-10-CM

## 2017-11-17 DIAGNOSIS — R13.14 DYSPHAGIA, PHARYNGOESOPHAGEAL: ICD-10-CM

## 2017-11-17 DIAGNOSIS — R49.9 VOICE DISTURBANCE: ICD-10-CM

## 2017-11-17 DIAGNOSIS — C32.9 LARYNX CANCER: Primary | ICD-10-CM

## 2017-11-17 PROCEDURE — 99999 PR PBB SHADOW E&M-EST. PATIENT-LVL III: CPT | Mod: PBBFAC,,, | Performed by: OTOLARYNGOLOGY

## 2017-11-17 PROCEDURE — 99499 UNLISTED E&M SERVICE: CPT | Mod: S$GLB,,, | Performed by: OTOLARYNGOLOGY

## 2017-11-17 PROCEDURE — 31579 LARYNGOSCOPY TELESCOPIC: CPT | Mod: S$GLB,,, | Performed by: OTOLARYNGOLOGY

## 2017-11-17 PROCEDURE — 99214 OFFICE O/P EST MOD 30 MIN: CPT | Mod: 25,S$GLB,, | Performed by: OTOLARYNGOLOGY

## 2017-11-17 NOTE — PATIENT INSTRUCTIONS
Consider your options  Follow up within the next 1-2 weeks to discuss in more detail together with your family member

## 2017-11-20 RX ORDER — SODIUM BICARBONATE 650 MG/1
TABLET ORAL
Qty: 270 TABLET | Refills: 11 | Status: SHIPPED | OUTPATIENT
Start: 2017-11-20 | End: 2018-12-20 | Stop reason: SDUPTHER

## 2017-11-21 ENCOUNTER — TELEPHONE (OUTPATIENT)
Dept: SPEECH THERAPY | Facility: HOSPITAL | Age: 67
End: 2017-11-21

## 2017-11-30 ENCOUNTER — TELEPHONE (OUTPATIENT)
Dept: RADIOLOGY | Facility: HOSPITAL | Age: 67
End: 2017-11-30

## 2017-11-30 PROBLEM — R49.9 VOICE DISTURBANCE: Status: ACTIVE | Noted: 2017-11-30

## 2017-11-30 NOTE — PROGRESS NOTES
[facility]  Department of Otorhinolaryngology and Communication Sciences    Subjective:      Jose Luis Manzo is a 67 y.o. male who presents for follow-up. 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 His condition is complicated by his renal transplant status (performed in 2007) and chronic immunosuppression.    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 Chandler Kyle. Definitive radiation therapy at [facility] under the care of .  He completed 6300 centigray by December 2014.  A PET scan done at the time was negative for metastatic disease.     He recently presented to Dr. Desouza, who brought him for DL/biopsy of bilateral TVC lesions.  Path revealed a microscopic focus of invasive carcinoma of the R TVF and severe dysplasia/CIS of the B TVF. Due to pulmonary findings on PET/CT 5/12/2017 (IMK pleural lesion), Dr. Desouza sent the patient to Dr. Masters, who arranged a CT biopsy. This was negative for malignancy. He recommended repeating chest imaging in 2 months. [place], and he has seen the [facility] team from time to time for surveillance. However it has been a couple years since he has seen them.     He is undergoing MBSS later today. He returns to discuss laryngeal surgery in more detail.    Voice Handicap Index - 10 (VHI-10) score is 28.  Reflux symptom Index (RSI) score is 16.    The patient's medications, allergies, past medical, surgical, social and family histories were reviewed and updated as appropriate.    A detailed review of systems was obtained with pertinent positives as per the above HPI, and otherwise negative.      Objective:     VS reviewed    Constitutional: comfortable, well dressed  Psychiatric: appropriate affect  Respiratory: comfortably breathing, symmetric chest  rise, no stridor  Voice: severe roughness/strain; variable ventricular phonation  Head: normocephalic  Eyes: conjunctivae and sclerae clear      Data Reviewed    See HPI      Assessment:     Jose Luis Manzo is a 67 y.o. 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 His condition is complicated by his renal transplant status (2007) and chronic immunosuppression.     Plan:     Again, I made it clear to him that he has recurrent cancer and that I recommend he pursue treatment expeditiously.     I spoke with him at length about endoscopic transoral laser surgery, which might entail as little as KTP laser photoablation, or as much as an extended cordectomy. The extent of surgery would be dictated by intraoperative findings. I anticipate that a series of staged surgical treatments may be necessary due to bilateral epithelial irregularities. Depending on the extent of surgery, he may experience deterioration in his voice and/or swallowing function. I will F/U on the results of today's MBSS. I again offered to help arrange for him to meet with Dr. Sadler if he desires another opinion. However, he continues to defer on this option.     I discussed the risks, benefits and alternatives to surgery with the patient, as well as the expected postoperative course. Risks included but were not limited to pain; bleeding; infection; scarring; worsening of voice; recurrence; need for additional and/or more extensive procedures; oral/dental problems (pain, laceration, broken or missing teeth); jaw joint problems (pain, dislocation); and tongue problems (pain, laceration, numbness, weakness, taste disturbance). Any surgery on the larynx also carries with it the risks of airway obstruction necessitating tracheotomy. Also inherent in the procedure are the risks of general anesthesia, including but not limited to cardiovascular complications (heart attack,  arrhythmia); pulmonary (respiratory failure); neurologic (stroke); and death. I also explained that, if the laser is used, it presents the risks of vision loss and fire.    I gave him the opportunity to ask questions and I answered all of them to his satisfaction. He wishes to proceed. We will arrange this in the coming weeks. He will have preop testing triage by the anesthesiology team. I anticipate overnight hospitalization for at least 1 night post-operatively.     He knows to continue to follow up with Dr. Masters regarding the lung nodule. They plan to repeat the CT chest later this month.    All questions were answered, and the patient is in agreement with the plan.    This visit was 40 minutes in duration, with over 50% of the time spent in direct face-to-face counseling and coordination of care regarding the issues outlined above.    Gamal Smith M.D.  [facility]  Department of Otorhinolaryngology and Communication Sciences

## 2017-12-01 ENCOUNTER — OFFICE VISIT (OUTPATIENT)
Dept: OTOLARYNGOLOGY | Facility: CLINIC | Age: 67
End: 2017-12-01
Payer: MEDICARE

## 2017-12-01 ENCOUNTER — CLINICAL SUPPORT (OUTPATIENT)
Dept: SPEECH THERAPY | Facility: HOSPITAL | Age: 67
End: 2017-12-01
Attending: OTOLARYNGOLOGY
Payer: MEDICARE

## 2017-12-01 ENCOUNTER — HOSPITAL ENCOUNTER (OUTPATIENT)
Dept: RADIOLOGY | Facility: HOSPITAL | Age: 67
Discharge: HOME | End: 2017-12-01
Attending: OTOLARYNGOLOGY
Payer: MEDICARE

## 2017-12-01 VITALS
DIASTOLIC BLOOD PRESSURE: 82 MMHG | HEART RATE: 61 BPM | SYSTOLIC BLOOD PRESSURE: 133 MMHG | BODY MASS INDEX: 34.25 KG/M2 | TEMPERATURE: 97 F | WEIGHT: 245.56 LBS

## 2017-12-01 DIAGNOSIS — R13.14 DYSPHAGIA, PHARYNGOESOPHAGEAL: ICD-10-CM

## 2017-12-01 DIAGNOSIS — R49.9 VOICE DISTURBANCE: ICD-10-CM

## 2017-12-01 DIAGNOSIS — Z85.21 H/O LARYNGEAL CANCER: ICD-10-CM

## 2017-12-01 DIAGNOSIS — C32.9 LARYNX CANCER: ICD-10-CM

## 2017-12-01 DIAGNOSIS — C32.9 LARYNX CANCER: Primary | ICD-10-CM

## 2017-12-01 PROCEDURE — 74230 X-RAY XM SWLNG FUNCJ C+: CPT | Mod: TC

## 2017-12-01 PROCEDURE — G8998 SWALLOW D/C STATUS: HCPCS | Mod: GN,CH

## 2017-12-01 PROCEDURE — 74230 X-RAY XM SWLNG FUNCJ C+: CPT | Mod: 26,,, | Performed by: RADIOLOGY

## 2017-12-01 PROCEDURE — 99215 OFFICE O/P EST HI 40 MIN: CPT | Mod: S$GLB,,, | Performed by: OTOLARYNGOLOGY

## 2017-12-01 PROCEDURE — 99999 PR PBB SHADOW E&M-EST. PATIENT-LVL IV: CPT | Mod: PBBFAC,,, | Performed by: OTOLARYNGOLOGY

## 2017-12-01 PROCEDURE — G8996 SWALLOW CURRENT STATUS: HCPCS | Mod: GN,CH

## 2017-12-01 PROCEDURE — 99499 UNLISTED E&M SERVICE: CPT | Mod: S$GLB,,, | Performed by: OTOLARYNGOLOGY

## 2017-12-01 PROCEDURE — G8997 SWALLOW GOAL STATUS: HCPCS | Mod: GN,CH

## 2017-12-01 PROCEDURE — 92611 MOTION FLUOROSCOPY/SWALLOW: CPT | Mod: GN

## 2017-12-01 RX ORDER — DEXAMETHASONE SODIUM PHOSPHATE 4 MG/ML
12 INJECTION, SOLUTION INTRA-ARTICULAR; INTRALESIONAL; INTRAMUSCULAR; INTRAVENOUS; SOFT TISSUE
Status: CANCELLED | OUTPATIENT
Start: 2017-12-01

## 2017-12-01 RX ORDER — CLINDAMYCIN PHOSPHATE 150 MG/ML
900 INJECTION, SOLUTION INTRAVENOUS
Status: CANCELLED | OUTPATIENT
Start: 2017-12-01

## 2017-12-01 RX ORDER — LIDOCAINE HYDROCHLORIDE 10 MG/ML
1 INJECTION, SOLUTION EPIDURAL; INFILTRATION; INTRACAUDAL; PERINEURAL ONCE
Status: CANCELLED | OUTPATIENT
Start: 2017-12-01 | End: 2017-12-01

## 2017-12-01 NOTE — PATIENT INSTRUCTIONS
IMPRESSIONS:   Pt appears to present with  1. Mild oral dysphagia characterized by premature spilling of thin liquids into the pharynx prior to the swallow.     2. Mild pharyngeal dysphagia characterized by flash penetration of thin liquids only.     3. No cervical esophageal swallowing abnormalities were noted.           RECOMMENDATIONS/PLAN OF CARE:   1. Continue regular diet consistency with thin liquids.   2. Take medications by mouth with water.  3. Review of the swallow study results by the referring physician for further management of the patients concerns.  4. Contact [facility] at [phone] with any further questions or concerns.

## 2017-12-01 NOTE — PATIENT INSTRUCTIONS
MICROLARYNGOSCOPY    Description  Laryngoscopy is a procedure in which the surgeon closely examines the larynx (voice box). The key instrument for laryngoscopy is the laryngoscope, which is a hollow metal tube inserted into the mouth. Microlaryngoscopy entails--at minimum--a magnified examination of the larynx using a microscope and/or telescopes. This is performed in the operating room. This allows the surgeon to achieve a diagnosis and also to perform precise treatment for problems on the vocal folds (vocal cords) or other parts of the larynx. Additional interventions may be performed in conjunction with microlaryngoscopy. Common interventions include but are not limited to   Biopsy   Removal of abnormal tissue (polyps, cysts, nodules, leukoplakia)   Resection of cancer   Laser treatment of abnormal tissue (papilloma, leukoplakia)   Vocal fold injection augmentation   Injection of medication (steroid, Avastin)    Instructions: before the procedure  1. NPO after midnight: This is a medical expression for nothing to eat or drink after midnight. It is important to refrain from eating or drinking anything after midnight the night before your surgery.   2. Please refrain from taking any anti-platelet medications such as aspirin; ibuprofen (Advil, Motrin); Aleve; or Plavix (clopidogrel) for 7 days prior to the procedure.  3. If you usually take Coumadin (warfarin), you may need to stop using this a few days prior to the injection.   4. If you are any type of blood thinning (anti-platelet or anti-coagulant) medication and it is not clear what you should do, please clarify this with your surgeon ahead of time. In some cases we rely on other physicians such as cardiologists or hematologists to help with these decisions.  5. Diabetes precautions: If you are usually take oral diabetes medications (such as metformin), refrain from taking your morning dose the day of the procedure and use sliding-scale  insulin for blood sugar control.  6. On the morning of your procedure, it is OK to take your other regular morning medications with a small sip of water. It is particularly important to take any medications that treat heart problems (such as blood pressure, heart rate, heart rhythm) and lung problems  (asthma, COPD). Otherwise, please remember: nothing to eat or drink after midnight.      What to expect during the procedure  Microlaryngoscopy is performed in the operating room while you are asleep under general anesthesia. In most instances, you can expect to be under general anesthesia for approximately 1 to 1 ½ hours.  Nevertheless, the duration of the procedure varies depending on the indication for surgery, intraoperative findings, and other patient-specific/anatomic issues. Our primary concerns are your safety and comfort. Our secondary goal is to provide you with the best possible surgical treatment for your problem. Your surgery will take as long as necessary to accomplish these goals.    What to expect afterwards  The laryngoscope is inserted through the mouth and presses on the tongue. The most common postoperative issues patients encounter are related to the laryngoscope being positioned in the mouth. The extent of these issues is related to patient-specific anatomic issues, the indications for surgery, and the duration of the procedure.    Pain. We will do everything we can to make sure you are as comfortable as possible. Most patients experience very little discomfort after this surgery. Nevertheless, you should expect some soreness in the mouth and throat. Because the ear and the throat share the same sensory nerve, you may also experience some discomfort in the ear. The discomfort is usually worst for the first 48-72 hours and usually resolves within a week. You will be prescribed pain medication, but most patients are sufficiently comfortable with plain Tylenol as needed.    Laceration. Some patients  may notice a small cut in the tongue or in another part of the mouth or throat. This may result in a minor about of blood-tinged saliva for the first 24 hours. This usually heals on its own over the course of a week.    Other tongue problems. As the scope presses down on the tongue, the taste buds get compressed. In addition, sometimes the nerves to the tongue also get compressed. As a result, some patients notice a disturbance in taste, numbness of the tongue, or (even more rarely) weakness of the tongue after surgery. Although sometimes it may take several weeks to months for the problem to completely go away, the tongue problems are temporary in almost every instance.    Tooth problems. Reinforced mouth guards are placed on the teeth to protect them during the surgery and we give a great deal of care and attention to minimizing any pressure on the teeth. However, a chipped or cracked tooth, loss of a tooth, and/or other tooth irregularities are rare but well-recognized complications of laryngoscopy.    Jaw problems. You may experience some pain in the jaw joints (in front of the ears). Even less frequent would be dislocation of the joint. This usually occurs in patients who already have jaw problems.    Instructions: after the procedure  1. Please take the prescribed pain medication as directed. If you are still having discomfort after the prescription runs out, or if you would rather not take a prescription narcotic pain medicine, you may instead take plain acetaminophen (Tylenol) as directed on the packaging.  2. Voice rest. In many instances, we will recommend COMPLETE VOICE REST until your follow-up visit with your surgeon. This means COMPLETE SILENCE - NO TALKING, NO COUGHING, NO WHISPERING. Please see additional information on how to manage complete voice rest. Even if voice rest is not prescribed, for the first week, you should avoid speaking over heavy background noise or in a very loud voice.   3. Please  call our office at [phone] if  - You have a temperature above 101°F  - You develop Increasing pain not relieved by medications  - You have any other questions or concerns  4. Please go immediately to the nearest emergency room if you are experiencing  - Shortness of breath  - Difficulty breathing  - Severe bleeding

## 2017-12-01 NOTE — PROGRESS NOTES
MODIFIED BARIUM SWALLOW STUDY SPEECH PATHOLOGY REPORT    REASON FOR REFERRAL:  Jose Luis Manzo, age 67 y.o., was referred by Gamal Muhammad MD  for a Modified Barium Swallow Study to rule out aspiration, assess overall swallowing function, and establish a pre-surgery baseline of swallowing mechanism.  He is scheduled for surgery on 12/19/17.    SWALLOWING HISTORY  Patient states he does not feel he is having difficulty swallowing at this time.  Diet consistency at present is regular diet with thin liquids.    Medications are taken by mouth  4-5 at a time with water without difficulty.    MEDICAL HISTORY:  Past Medical History:   Diagnosis Date    BPH (benign prostatic hypertrophy)     Cancer     vocal cords    Claudication of right lower extremity 3/10/2016    Disorder of kidney and ureter     Hyperlipidemia     Hypertension     Microcytic anemia 9/16/2016    Oropharyngeal cancer     Pterygium     Thromboembolic disorder     Unspecified disorder of kidney and ureter         SURGICAL HISTORY:  Past Surgical History:   Procedure Laterality Date    KIDNEY TRANSPLANT  2007    follow[facility] by [facility]    LARYNX SURGERY  11/2014    Oropharyngeal Cancer    TIBIAL STAPLING Left 1980              FAMILY HISTORY:  Family History   Problem Relation Age of Onset    Stroke Mother     Diabetes Mother     Hypertension Mother     Stroke Father     No Known Problems Sister     No Known Problems Brother     No Known Problems Daughter         SOCIAL HISTORY:  Patient lives in [place], [place].     BEHAVIOR:  Jose Luis Manzo had normal affect and social interaction.  HE was able to fully cooperate during the study.  Results of today's assessment were considered indicative of his current levels of swallowing functioning.      HEARING:  Subjectively, within normal limits. Pt able to understand and follow all instructions.    ORAL PERIPHERAL:   Informal examination of the oral mechanism revealed pt is missing some  teeth, yet other structures and functioning within normal limits for swallowing and speech purposes.     Voice quality was good. No wet or gurgled quality was appreciated before, during or after the study.    TEST FINDINGS:   Patient was seen in Radiology with the Radiologist for a Modified Barium Swallow Study and was seated on a chair for a left lateral videofluoroscopic view      Consistencies assessed using radiopaque barium contrast:  thin (1/2 tsp, 1 tsp boluses via spoon and single and continuous swallows via open cup), thin puree (1/2 tsp., 1 tsp) via spoon, thick puree (1/2 tsp., 1 tsp) via spoon, solid (1/4 cracker boluses) by patient's's hand and barium tablet/capsule with water.    Phases:  Oral:  Patient was able to obtain liquid and strip utensils well with no anterior loss of material from the oral cavity.  He moved boluses through the oral cavity with appropriate transit time.  There was no pooling of liquids in the mouth.  Swallow reflex was triggered in a timely manner for all consistencies with the exception of thin liquids. On thin liquids he had premature spilling to the valleculae and occasionally to the hyphopharynx on larger boluses of thin liquids..    Pharyngeal:  Boluses moved through the pharyngeal phase with flash laryngeal penetration on thin liquids only and no aspiration and no nasal regurgitation.  Within normal limits for  prompt initiation,   appropriate soft palate elevation,   adequate laryngeal elevation,   adequate anterior hyoid excursion,  adequate epiglottic movement,   adequate laryngeal vestibular closure; decreased on thin liquids,  adequate pharyngeal stripping wave,   adequate PE segment opening,   adequate tongue base retraction,   no pharyngeal residue.    Cervical Esophageal:  Boluses entered the upper esophagus within normal limits.  No obstruction was noted.    Rosenbeck 8-point Penetration-Aspiration Scale:     Thin liquids: 2 - Material enters the airway, remains  above the vocal folds, and is ejected from the airway.   All other consistencies:  1 Material does not enter the airway.    IMPRESSIONS:   Pt appears to present with  1. Mild oral dysphagia characterized by premature spilling of thin liquids into the pharynx prior to the swallow.    2. Mild pharyngeal dysphagia characterized by flash penetration of thin liquids only.    3. No cervical esophageal swallowing abnormalities were noted.    4. As required for Medicare G-codes/severity indicators, the patient's swallowing was rated as follows:     Swallowing  Current status:  FCM:  LEVEL 7: The individual's ability to eat independently is not limited by swallow function.  Swallowing would be safe and efficient for all consistencies. Compensatory strategies are effectively used when needed.  [facility]  Projected status:  FCM:   LEVEL 7: The individual's ability to eat independently is not limited by swallow function.  Swallowing would be safe and efficient for all consistencies. Compensatory strategies are effectively used when needed.  8997 -   Discharge status:  FCM:   LEVEL 7: The individual's ability to eat independently is not limited by swallow function.  Swallowing would be safe and efficient for all consistencies. Compensatory strategies are effectively used when needed.  8998 -           RECOMMENDATIONS/PLAN OF CARE:   1. Continue regular diet consistency with thin liquids.   2. Take medications by mouth with water.  3. Review of the swallow study results by the referring physician for further management of the patients concerns.  4. Contact [facility] at [phone] with any further questions or concerns.

## 2017-12-04 ENCOUNTER — TELEPHONE (OUTPATIENT)
Dept: OTOLARYNGOLOGY | Facility: CLINIC | Age: 67
End: 2017-12-04

## 2017-12-04 ENCOUNTER — OFFICE VISIT (OUTPATIENT)
Dept: INTERNAL MEDICINE | Facility: CLINIC | Age: 67
End: 2017-12-04
Payer: MEDICARE

## 2017-12-04 VITALS
RESPIRATION RATE: 20 BRPM | BODY MASS INDEX: 34.4 KG/M2 | HEIGHT: 71 IN | SYSTOLIC BLOOD PRESSURE: 144 MMHG | WEIGHT: 245.69 LBS | DIASTOLIC BLOOD PRESSURE: 96 MMHG | HEART RATE: 60 BPM

## 2017-12-04 DIAGNOSIS — D68.59 THROMBOPHILIA: ICD-10-CM

## 2017-12-04 DIAGNOSIS — N18.4 CHRONIC KIDNEY DISEASE, STAGE IV (SEVERE): ICD-10-CM

## 2017-12-04 DIAGNOSIS — E78.2 MIXED HYPERLIPIDEMIA: ICD-10-CM

## 2017-12-04 DIAGNOSIS — I70.0 ATHEROSCLEROSIS OF AORTA: ICD-10-CM

## 2017-12-04 DIAGNOSIS — Z23 IMMUNIZATION DUE: ICD-10-CM

## 2017-12-04 DIAGNOSIS — I73.9 CLAUDICATION OF RIGHT LOWER EXTREMITY: ICD-10-CM

## 2017-12-04 DIAGNOSIS — R91.1 PULMONARY NODULE, LEFT: ICD-10-CM

## 2017-12-04 DIAGNOSIS — Z00.00 ENCOUNTER FOR PREVENTIVE HEALTH EXAMINATION: Primary | ICD-10-CM

## 2017-12-04 DIAGNOSIS — I10 ESSENTIAL HYPERTENSION: ICD-10-CM

## 2017-12-04 DIAGNOSIS — C32.9 LARYNX CANCER: ICD-10-CM

## 2017-12-04 DIAGNOSIS — I82.403 DEEP VEIN THROMBOSIS (DVT) OF BOTH LOWER EXTREMITIES, UNSPECIFIED CHRONICITY, UNSPECIFIED VEIN: ICD-10-CM

## 2017-12-04 DIAGNOSIS — D69.6 THROMBOCYTOPENIA: ICD-10-CM

## 2017-12-04 DIAGNOSIS — Z94.0 RENAL TRANSPLANT RECIPIENT: ICD-10-CM

## 2017-12-04 DIAGNOSIS — N40.1 BENIGN PROSTATIC HYPERPLASIA WITH LOWER URINARY TRACT SYMPTOMS, SYMPTOM DETAILS UNSPECIFIED: ICD-10-CM

## 2017-12-04 PROCEDURE — 90662 IIV NO PRSV INCREASED AG IM: CPT | Mod: S$GLB,,, | Performed by: FAMILY MEDICINE

## 2017-12-04 PROCEDURE — G0439 PPPS, SUBSEQ VISIT: HCPCS | Mod: S$GLB,,, | Performed by: NURSE PRACTITIONER

## 2017-12-04 PROCEDURE — 99999 PR PBB SHADOW E&M-EST. PATIENT-LVL IV: CPT | Mod: PBBFAC,,, | Performed by: NURSE PRACTITIONER

## 2017-12-04 PROCEDURE — G0008 ADMIN INFLUENZA VIRUS VAC: HCPCS | Mod: S$GLB,,, | Performed by: FAMILY MEDICINE

## 2017-12-04 PROCEDURE — 99499 UNLISTED E&M SERVICE: CPT | Mod: S$GLB,,, | Performed by: NURSE PRACTITIONER

## 2017-12-04 NOTE — PROGRESS NOTES
"Jose Luis Manzo presented for a  Medicare AWV and comprehensive Health Risk Assessment today. The following components were reviewed and updated:    · Medical history  · Family History  · Social history  · Allergies and Current Medications  · Health Risk Assessment  · Health Maintenance  · Care Team     ** See Completed Assessments for Annual Wellness Visit within the encounter summary.**       The following assessments were completed:  · Living Situation  · CAGE  · Depression Screening  · Timed Get Up and Go  · Whisper Test  · Cognitive Function Screening  · Nutrition Screening  · ADL Screening  · PAQ Screening    Vitals:    12/04/17 1103   BP: (!) 144/96   Pulse: 60   Resp: 20   Weight: 111.4 kg (245 lb 11.2 oz)   Height: 5' 11" (1.803 m)     Body mass index is 34.27 kg/m².  Physical Exam   Constitutional: He is oriented to person, place, and time. He appears well-developed and well-nourished. No distress.   HENT:   Head: Normocephalic and atraumatic.   Eyes: EOM are normal. Pupils are equal, round, and reactive to light.   Neck: Normal range of motion.   Cardiovascular: Normal rate, regular rhythm, normal heart sounds and intact distal pulses.    Pulses:       Dorsalis pedis pulses are 1+ on the right side, and 1+ on the left side.        Posterior tibial pulses are 1+ on the right side, and 1+ on the left side.   Pulmonary/Chest: Effort normal and breath sounds normal. No respiratory distress.   Musculoskeletal: Normal range of motion. He exhibits edema.        Right ankle: He exhibits swelling.        Left ankle: He exhibits swelling.   BLE has +2 pitting edema.    Neurological: He is alert and oriented to person, place, and time. Coordination normal.   Skin: Skin is warm and dry.   Psychiatric: He has a normal mood and affect. His behavior is normal. Judgment and thought content normal.   Nursing note and vitals reviewed.        Diagnoses and health risks identified today and associated " recommendations/orders:    1. Encounter for preventive health examination    2. Larynx cancer  Patient reports he was re-diagnosed approximately 2 months ago. Continue current treatment plan as previously prescribed by Otolaryngology.    3. Thrombocytopenia  Platelets 109 (L) as noted on CBC dated 11/3/2017. Patient followed by Hem/Onc (Dr. aMsters).    4. Thrombophilia  Chronic. Patient followed by Hem/ Onc.     5. Deep vein thrombosis (DVT) of both lower extremities, unspecified chronicity, unspecified vein  Chronic; stable. Continue current treatment plan as previously prescribed by Cardiology (Dr. Frankel).     6. Claudication of right lower extremity  Chronic; stable. Continue current treatment plan as previously prescribed by Cardiology.     7. Atherosclerosis of aorta  Noted on chest xray dated 9/14/2017. Patient followed by Cardiology.     8. Chronic kidney disease, stage IV (severe)  GFR 25 (L) as noted on most recent BMP dated 11/3/2017. Patient followed by Nephrology (Dr. Zarate).    9. Renal transplant recipient  Chronic; stable. Continue current treatment plan as previously prescribed by Nephrology.    10. Benign prostatic hyperplasia with lower urinary tract symptoms, symptom details unspecified  Chronic; stable. Continue current treatment plan as previously prescribed by PCP.    11. Essential hypertension  Chronic; stable. Continue current treatment plan as previously prescribed by PCP.    12. Mixed hyperlipidemia  Chronic; stable. Continue current treatment plan as previously prescribed by PCP.    13. Pulmonary nodule, left  Noted on CT chest dated 8/21/2017. Patient followed by PCP.     14. Immunization due  - Influenza - High Dose (65+) (PF) (IM)      Provided Jose Luis with a 5-10 year written screening schedule and personal prevention plan. Recommendations were developed using the USPSTF age appropriate recommendations. Education, counseling, and referrals were provided as needed. After Visit Summary  printed and given to patient which includes a list of additional screenings\tests needed.    Return for HRA visit in 1 year.    Johnathon Santoyo NP

## 2017-12-04 NOTE — TELEPHONE ENCOUNTER
----- Message from Negrita Hackett sent at 12/4/2017  9:11 AM CST -----  Contact: patient  Please call above patient needs to change surgery date waiting on your call

## 2017-12-04 NOTE — PATIENT INSTRUCTIONS
Counseling and Referral of Other Preventative  (Italic type indicates deductible and co-insurance are waived)    Patient Name: Jose Luis Manzo  Today's Date: 12/4/2017      SERVICE LIMITATIONS RECOMMENDATION    Vaccines    · Pneumococcal (once after 65)    · Influenza (annually)    · Hepatitis B (if medium/high risk)    · Prevnar 13      Hepatitis B medium/high risk factors:       - End-stage renal disease       - Hemophiliacs who received Factor VII or         IX concentrates       - Clients of institutions for the mentally             retarded       - Persons who live in the same house as          a HepB carrier       - Homosexual men       - Illicit injectable drug abusers     Pneumococcal: Done, no repeat necessary     Influenza: Scheduled - see appointments     Hepatitis B: N/A     Prevnar 13: Done, no repeat necessary    Prostate cancer screening (annually to age 75)     Prostate specific antigen (PSA) Shared decision making with Provider. Sometimes a co-pay may be required if the patient decides to have this test. The USPSTF no longer recommends prostate cancer screening routinely in medicine: every 1 year    Colorectal cancer screening (to age 75)    · Fecal occult blood test (annual)  · Flexible sigmoidoscopy (5y)  · Screening colonoscopy (10y)  · Barium enema   Last done 7/15/2010, recommend to repeat every 10  years    Diabetes self-management training (no USPSTF recommendations)  Requires referral by treating physician for patient with diabetes or renal disease. 10 hours of initial DSMT sessions of no less than 30 minutes each in a continuous 12-month period. 2 hours of follow-up DSMT in subsequent years.  N/A    Glaucoma screening (no USPSTF recommendation)  Diabetes mellitus, family history   , age 50 or over    American, age 65 or over  N/A    Medical nutrition therapy for diabetes or renal disease (no recommended schedule)  Requires referral by treating physician for patient  with diabetes or renal disease or kidney transplant within the past 3 years.  Can be provided in same year as diabetes self-management training (DSMT), and CMS recommends medical nutrition therapy take place after DSMT. Up to 3 hours for initial year and 2 hours in subsequent years.  N/A    Cardiovascular screening blood tests (every 5 years)  · Fasting lipid panel  Order as a panel if possible  Last done 7/24/2017, recommend to repeat every 1  years    Diabetes screening tests (at least every 3 years, Medicare covers annually or at 6-month intervals for prediabetic patients)  · Fasting blood sugar (FBS) or glucose tolerance test (GTT)  Patient must be diagnosed with one of the following:       - Hypertension       - Dyslipidemia       - Obesity (BMI 30kg/m2)       - Previous elevated impaired FBS or GTT       ... or any two of the following:       - Overweight (BMI 25 but <30)       - Family history of diabetes       - Age 65 or older       - History of gestational diabetes or birth of baby weighing more than 9 pounds  N/A    Abdominal aortic aneurysm screening (once)  · Sonogram   Limited to patients who meet one of the following criteria:       - Men who are 65-75 years old and have smoked more than 100 cigarette in their lifetime       - Anyone with a family history of abdominal aortic aneurysm       - Anyone recommended for screening by the USPSTF  Done, no repeat necessary    HIV screening (annually for increased risk patients)  · HIV-1 and HIV-2 by EIA, or PATRICIA, rapid antibody test or oral mucosa transudate  Patients must be at increased risk for HIV infection per USPSTF guidelines or pregnant. Tests covered annually for patient at increased risk or as requested by the patient. Pregnant patients may receive up to 3 tests during pregnancy.  Risks discussed, screening is not recommended    Smoking cessation counseling (up to 8 sessions per year)  Patients must be asymptomatic of tobacco-related conditions to  receive as a preventative service.  Non-smoker    Subsequent annual wellness visit  At least 12 months since last AWV  Return in one year     The following information is provided to all patients.  This information is to help you find resources for any of the problems found today that may be affecting your health:                Living healthy guide: www.[facility].[place]      Understanding Diabetes: www.diabetes.org      Eating healthy: www.cdc.gov/healthyweight      CDC home safety checklist: www.cdc.gov/steadi/patient.html      Agency on Aging: www.goea.[place].[facility]      Alcoholics anonymous (AA): www.aa.org      Physical Activity: www.l[place]to.nih.gov/mz9kapk      Tobacco use: www.quitwithusla.org

## 2017-12-07 ENCOUNTER — TELEPHONE (OUTPATIENT)
Dept: CARDIOLOGY | Facility: CLINIC | Age: 67
End: 2017-12-07

## 2017-12-07 ENCOUNTER — ANESTHESIA EVENT (OUTPATIENT)
Dept: SURGERY | Facility: HOSPITAL | Age: 67
End: 2017-12-07
Payer: MEDICARE

## 2017-12-07 ENCOUNTER — TELEPHONE (OUTPATIENT)
Dept: INTERNAL MEDICINE | Facility: CLINIC | Age: 67
End: 2017-12-07

## 2017-12-07 NOTE — PRE ADMISSION SCREENING
Anesthesia Assessment: Preoperative EQUATION    Planned Procedure: Procedure(s) (LRB):  Suspension microlaryngoscopy with KTP laser excision of lesion (N/A)  Requested Anesthesia Type:General  Surgeon: Gamal Smith MD  Service: ENT  Known or anticipated Date of Surgery:12/21/2017    Surgeon notes: reviewed    Electronic QUestionnaire Assessment completed via nurse interview with patient.        No aq        Triage considerations:     The patient has no apparent active cardiac condition (No unstable coronary Syndrome such as severe unstable angina or recent [<1 month] myocardial infarction, decompensated CHF, severe valvular   disease or significant arrhythmia)    Previous anesthesia records:GETA  Airway (Primary) Present Prior to Hospital Arrival?: Yes; Placement Date: 10/02/17; Placement Time: 1055; Method of Intubation: Glidescope; Inserted by: CRNA; Airway Device: Endotracheal Tube; Mask Ventilation: Easy - oral; Intubated: Postinduction; Blade: Other (see comments); Airway Device Size: 7.5; Style: Cuffed; Cuff Inflation: Minimal occlusive pressure; Inflation Amount: 8; Placement Verified By: Auscultation, Capnometry, Colorimetric EtCO2 device, ETT Condensation; Grade: Grade III; Complicating Factors: Large/Floppy epiglottis, Obesity, Short neck; Intubation Findings: Positive EtCO2, Bilateral breath sounds, Atraumatic/Condition of teeth unchanged;  Depth of Insertion: 23; Securment: Lips; Complications: None; Breath Sounds: Equal Bilateral; Insertion Attempts: 1; Removal Date: 10/02/17;  Removal Time: 1154 10/02/17 1055 by Lindsey Vegas CRNA       Last PCP note: 6-12 months ago , within 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    Pulmonary nodule, left 09/01/2017    H/O laryngeal cancer 08/18/2017    Iron  deficiency anemia 10/25/2016    Thrombophilia 10/25/2016    Edema 04/13/2016    DVT (deep venous thrombosis) 04/13/2016       [facility] ([facility])     R leg iliofemoral DVT with edema      Ultrasound 6/21/2016:              R CFV DVT              L CFV/femoral/POP non-occlusive chronic DVT        Findings are concerning bilateral iliac and caval stenosis    Dermolipoma 04/08/2016    Claudication of right lower extremity 03/10/2016    Eye swelling, bilateral 03/10/2016    Diminished night vision 03/10/2016    Edema of right lower extremity 03/10/2016    Renal transplant recipient 03/10/2016       [facility]    Chronic kidney disease, stage IV (severe) 03/13/2015    Essential hypertension 03/13/2015    Mixed hyperlipidemia 03/13/2015    BPH (benign prostatic hyperplasia) 03/13/2015           Tests already available:  Available tests,  within 3 months , within [facility] .            Instructions given. (See in Nurse's note)    Optimization:   Medical Opinion Indicated       Sub-specialist consult indicated:   eloquis instructions          Plan:     Pre-anesthesia  visit       Visit focus: previous surgery in oct.     Consultation:Patient's PCP for a statement of optimization       Navigation:  Consults scheduled.             Results will be tracked by Preop Clinic.

## 2017-12-07 NOTE — TELEPHONE ENCOUNTER
He can proceed with non cardiac surgery with minimal risk      Hold Eliquis 3 days before the case and restart post procedure      Thanks      ZN

## 2017-12-07 NOTE — TELEPHONE ENCOUNTER
----- Message from Negrita Cardona RN sent at 12/7/2017  1:54 PM CST -----  PT IS SCHEDULED FOR SUSPENSION MICROLARYNGOSCOPY WITH ktp LASER EXCISION OF LESION BY DR. FOWLER  12/21/17.  Requesting medical clearance and Eliquis instructions please.      MANN Cardona RN BC  Pre-op anesthesia

## 2017-12-07 NOTE — PRE-PROCEDURE INSTRUCTIONS
MD Negrita Hahn RN             Given his comorbidities I'd like to see him back prior to clearing him for anaesthesia.  However, when he does go to surgery, he should stop his Eliquiis 2 days prior.  Dr Rojas'    Previous Messages      ----- Message -----   From: Negrita Cardona RN   Sent: 12/7/2017   1:54 PM   To: Triston Frankel MD, Zelalem Rojas MD, *     PT IS SCHEDULED FOR SUSPENSION MICROLARYNGOSCOPY WITH ktp LASER EXCISION OF LESION BY DR. FOWLER  12/21/17.   Requesting medical clearance and Eliquis instructions please.       MANN Cardona RN BC   Pre-op anesthesia

## 2017-12-07 NOTE — ANESTHESIA PREPROCEDURE EVALUATION
Anesthesia Assessment: Preoperative EQUATION     Planned Procedure: Procedure(s) (LRB):  Suspension microlaryngoscopy with KTP laser excision of lesion (N/A)  Requested Anesthesia Type:General  Surgeon: Gamal Smith MD  Service: ENT  Known or anticipated Date of Surgery:12/21/2017     Surgeon notes: reviewed     Electronic QUestionnaire Assessment completed via nurse interview with patient.         No aq           Triage considerations:      The patient has no apparent active cardiac condition (No unstable coronary Syndrome such as severe unstable angina or recent [<1 month] myocardial infarction, decompensated CHF, severe valvular   disease or significant arrhythmia)     Previous anesthesia records:GETA  Airway (Primary) Present Prior to Hospital Arrival?: Yes; Placement Date: 10/02/17; Placement Time: 1055; Method of Intubation: Glidescope; Inserted by: CRNA; Airway Device: Endotracheal Tube; Mask Ventilation: Easy - oral; Intubated: Postinduction; Blade: Other (see comments); Airway Device Size: 7.5; Style: Cuffed; Cuff Inflation: Minimal occlusive pressure; Inflation Amount: 8; Placement Verified By: Auscultation, Capnometry, Colorimetric EtCO2 device, ETT Condensation; Grade: Grade III; Complicating Factors: Large/Floppy epiglottis, Obesity, Short neck; Intubation Findings: Positive EtCO2, Bilateral breath sounds, Atraumatic/Condition of teeth unchanged;  Depth of Insertion: 23; Securment: Lips; Complications: None; Breath Sounds: Equal Bilateral; Insertion Attempts: 1; Removal Date: 10/02/17;  Removal Time: 1154 10/02/17 1055 by Lindsey Vegas CRNA         Last PCP note: 6-12 months ago , within 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    Pulmonary nodule, left 09/01/2017    H/O laryngeal cancer  08/18/2017    Iron deficiency anemia 10/25/2016    Thrombophilia 10/25/2016    Edema 04/13/2016    DVT (deep venous thrombosis) 04/13/2016       Previous DVT treated at [facility] (unknown leg)     R leg iliofemoral DVT with edema      Ultrasound 6/21/2016:              R CFV DVT              L CFV/femoral/POP non-occlusive chronic DVT        Findings are concerning bilateral iliac and caval stenosis    Dermolipoma 04/08/2016    Claudication of right lower extremity 03/10/2016    Eye swelling, bilateral 03/10/2016    Diminished night vision 03/10/2016    Edema of right lower extremity 03/10/2016    Renal transplant recipient 03/10/2016       2007 [facility]    Chronic kidney disease, stage IV (severe) 03/13/2015    Essential hypertension 03/13/2015    Mixed hyperlipidemia 03/13/2015    BPH (benign prostatic hyperplasia) 03/13/2015            Tests already available:  Available tests,  within 3 months , within [facility] .                            Instructions given. (See in Nurse's note)     Optimization:   Medical Opinion Indicated                            Sub-specialist consult indicated:   eloquis instructions                                         Plan:     Pre-anesthesia  visit                                        Visit focus: previous surgery in oct.                           Consultation:Patient's PCP for a statement of optimization                             Navigation:  Consults scheduled.                        Results will be tracked by Preop Clinic.                                                                                                               12/07/2017  Jose Luis Manzo is a 67 y.o., male.  Pre-operative evaluation for Suspension microlaryngoscopy with KTP laser excision of lesion (N/A)    Chief Complaint: recurrent laryngeal CA    PMH:  ESRD s/p renal tx  s/p XRT for SCCA larynx ([facility]) in [id number]. .  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    CMP: No results for input(s): NA, K, CL, CO2, BUN, CREATININE, GLU, MG, PHOS, CALCIUM, ALBUMIN, PROT, ALKPHOS, ALT, AST, BILITOT in the last 720 hours.    INR:  No results for input(s): PT, INR, PROTIME, APTT in the last 720 hours.      Diagnostic Studies:      EKG:  Vent. Rate : 056 BPM     Atrial Rate : 056 BPM     P-R Int : 168 ms          QRS Dur : 110 ms      QT Int : 422 ms       P-R-T Axes : 075 -08 074 degrees     QTc Int : 407 ms    Sinus bradycardia with occasional Premature ventricular complexes  Incomplete left bundle branch block  Abnormal ECG  When compared with ECG of 21-JUN-2016 08:19,  Premature ventricular complexes are now Present      Confirmed by KAILEY PHILLIPS, MI ([id number]) on 9/28/2017 11:57:50 PM    2D Echo:  Anesthesia Evaluation    I have reviewed the Patient Summary Reports.     I have reviewed the Nursing Notes.   I have reviewed the Medications.     Review of Systems  Anesthesia Hx:  No problems with previous Anesthesia History of prior surgery of interest to airway management or planning: Previous anesthesia: General 10/2/17 micro laryngoscopy with general anesthesia.    Social:  Former Smoker, No Alcohol Use    Hematology/Oncology:        Hematology Comments: Iron deficiency anemia  Hx thrombophilia  Hx dvt  Pt on eloquis Current/Recent Cancer. Oncology Comments: Laryngeal cancer    EENT/Dental:   Hx atul eye swelling  Voice disturbance   Cardiovascular:  Cardiovascular Normal Hypertension, well controlled  hyperlipidemia Hx athersclerosis of aorta   Pulmonary:  Pulmonary Normal Left pulmonary nodule   Renal/:   " Chronic Renal Disease BPH Hx kidney transplant   ckd 4   Hepatic/GI:   Denies GERD.    Musculoskeletal:  Musculoskeletal Normal    Neurological:  Neurology Normal    Endocrine:  Endocrine Normal    Dermatological:  Skin Normal    Psych:  Psychiatric Normal           Physical Exam  General:  Obesity    Airway/Jaw/Neck:  Airway Findings: Mouth Opening: Normal Tongue: Normal  General Airway Assessment: Good  Mallampati: II  Improves to II with phonation.  TM Distance: Normal, at least 6 cm  Jaw/Neck Findings:  Neck ROM: Extension Decreased, Mild      Dental:  Dental Findings: Upper Dentures, Periodontal disease, Severe    Chest/Lungs:  Chest/Lungs Findings: Clear to auscultation, Normal Respiratory Rate     Heart/Vascular:  Heart Findings: Rate: Normal  Rhythm: Regular Rhythm  Sounds: Normal        Mental Status:  Mental Status Findings:  Cooperative, Alert and Oriented         Anesthesia Plan  Type of Anesthesia, risks & benefits discussed:  Anesthesia Type:  general  Patient's Preference:   Intra-op Monitoring Plan:   Intra-op Monitoring Plan Comments:   Post Op Pain Control Plan:   Post Op Pain Control Plan Comments:   Induction:   IV  Beta Blocker:  Patient is not currently on a Beta-Blocker (No further documentation required).       Informed Consent: Patient understands risks and agrees with Anesthesia plan.  Questions answered. Anesthesia consent signed with patient.  ASA Score: 3     Day of Surgery Review of History & Physical:    H&P update referred to the surgeon.     Anesthesia Plan Notes: Laser tube        Ready For Surgery From Anesthesia Perspective.

## 2017-12-20 ENCOUNTER — TELEPHONE (OUTPATIENT)
Dept: OTOLARYNGOLOGY | Facility: CLINIC | Age: 67
End: 2017-12-20

## 2017-12-20 RX ORDER — FINASTERIDE 5 MG/1
TABLET, FILM COATED ORAL
Qty: 90 TABLET | Refills: 3 | Status: SHIPPED | OUTPATIENT
Start: 2017-12-20 | End: 2018-10-08 | Stop reason: SDUPTHER

## 2017-12-20 RX ORDER — METOPROLOL SUCCINATE 100 MG/1
TABLET, EXTENDED RELEASE ORAL
Qty: 90 TABLET | Refills: 3 | Status: SHIPPED | OUTPATIENT
Start: 2017-12-20 | End: 2018-10-08 | Stop reason: SDUPTHER

## 2017-12-21 ENCOUNTER — ANESTHESIA (OUTPATIENT)
Dept: SURGERY | Facility: HOSPITAL | Age: 67
End: 2017-12-21
Payer: MEDICARE

## 2017-12-21 ENCOUNTER — HOSPITAL ENCOUNTER (OUTPATIENT)
Facility: HOSPITAL | Age: 67
Discharge: HOME | End: 2017-12-22
Attending: OTOLARYNGOLOGY | Admitting: OTOLARYNGOLOGY
Payer: MEDICARE

## 2017-12-21 DIAGNOSIS — Z85.21 H/O LARYNGEAL CANCER: ICD-10-CM

## 2017-12-21 DIAGNOSIS — R49.9 VOICE DISTURBANCE: ICD-10-CM

## 2017-12-21 DIAGNOSIS — C32.9 LARYNX CANCER: Primary | ICD-10-CM

## 2017-12-21 DIAGNOSIS — R13.14 DYSPHAGIA, PHARYNGOESOPHAGEAL: ICD-10-CM

## 2017-12-21 LAB
ANION GAP SERPL CALC-SCNC: 8 MMOL/L
BUN SERPL-MCNC: 26 MG/DL
CALCIUM SERPL-MCNC: 10.3 MG/DL
CHLORIDE SERPL-SCNC: 111 MMOL/L
CO2 SERPL-SCNC: 23 MMOL/L
CREAT SERPL-MCNC: 3.2 MG/DL
EST. GFR  (AFRICAN AMERICAN): 22 ML/MIN/1.73 M^2
EST. GFR  (NON AFRICAN AMERICAN): 19 ML/MIN/1.73 M^2
GLUCOSE SERPL-MCNC: 130 MG/DL
POTASSIUM SERPL-SCNC: 4.2 MMOL/L
SODIUM SERPL-SCNC: 142 MMOL/L

## 2017-12-21 PROCEDURE — S0077 INJECTION, CLINDAMYCIN PHOSP: HCPCS | Performed by: OTOLARYNGOLOGY

## 2017-12-21 PROCEDURE — 25000003 PHARM REV CODE 250: Performed by: NURSE ANESTHETIST, CERTIFIED REGISTERED

## 2017-12-21 PROCEDURE — D9220A PRA ANESTHESIA: Mod: CRNA,,, | Performed by: NURSE ANESTHETIST, CERTIFIED REGISTERED

## 2017-12-21 PROCEDURE — D9220A PRA ANESTHESIA: Mod: ANES,,, | Performed by: ANESTHESIOLOGY

## 2017-12-21 PROCEDURE — 25000003 PHARM REV CODE 250: Performed by: OTOLARYNGOLOGY

## 2017-12-21 PROCEDURE — 88305 TISSUE EXAM BY PATHOLOGIST: CPT

## 2017-12-21 PROCEDURE — 36000708 HC OR TIME LEV III 1ST 15 MIN: Performed by: OTOLARYNGOLOGY

## 2017-12-21 PROCEDURE — 71000033 HC RECOVERY, INTIAL HOUR: Performed by: OTOLARYNGOLOGY

## 2017-12-21 PROCEDURE — 63600175 PHARM REV CODE 636 W HCPCS: Performed by: NURSE ANESTHETIST, CERTIFIED REGISTERED

## 2017-12-21 PROCEDURE — 88331 PATH CONSLTJ SURG 1 BLK 1SPC: CPT

## 2017-12-21 PROCEDURE — 37000009 HC ANESTHESIA EA ADD 15 MINS: Performed by: OTOLARYNGOLOGY

## 2017-12-21 PROCEDURE — 88331 PATH CONSLTJ SURG 1 BLK 1SPC: CPT | Mod: 26,,,

## 2017-12-21 PROCEDURE — S0077 INJECTION, CLINDAMYCIN PHOSP: HCPCS | Performed by: NURSE ANESTHETIST, CERTIFIED REGISTERED

## 2017-12-21 PROCEDURE — 80048 BASIC METABOLIC PNL TOTAL CA: CPT

## 2017-12-21 PROCEDURE — 36415 COLL VENOUS BLD VENIPUNCTURE: CPT

## 2017-12-21 PROCEDURE — 27201423 OPTIME MED/SURG SUP & DEVICES STERILE SUPPLY: Performed by: OTOLARYNGOLOGY

## 2017-12-21 PROCEDURE — 71000039 HC RECOVERY, EACH ADD'L HOUR: Performed by: OTOLARYNGOLOGY

## 2017-12-21 PROCEDURE — 31536 LARYNGOSCOPY W/BX & OP SCOPE: CPT | Mod: 59,,, | Performed by: OTOLARYNGOLOGY

## 2017-12-21 PROCEDURE — 63600175 PHARM REV CODE 636 W HCPCS: Performed by: OTOLARYNGOLOGY

## 2017-12-21 PROCEDURE — 88305 TISSUE EXAM BY PATHOLOGIST: CPT | Mod: 26,,,

## 2017-12-21 PROCEDURE — 37000008 HC ANESTHESIA 1ST 15 MINUTES: Performed by: OTOLARYNGOLOGY

## 2017-12-21 PROCEDURE — 31541 LARYNSCOP W/TUMR EXC + SCOPE: CPT | Mod: ,,, | Performed by: OTOLARYNGOLOGY

## 2017-12-21 PROCEDURE — 36000709 HC OR TIME LEV III EA ADD 15 MIN: Performed by: OTOLARYNGOLOGY

## 2017-12-21 RX ORDER — FINASTERIDE 5 MG/1
5 TABLET, FILM COATED ORAL DAILY
Status: DISCONTINUED | OUTPATIENT
Start: 2017-12-22 | End: 2017-12-22 | Stop reason: HOSPADM

## 2017-12-21 RX ORDER — FENTANYL CITRATE 50 UG/ML
INJECTION, SOLUTION INTRAMUSCULAR; INTRAVENOUS
Status: DISCONTINUED | OUTPATIENT
Start: 2017-12-21 | End: 2017-12-21

## 2017-12-21 RX ORDER — AMLODIPINE BESYLATE 10 MG/1
10 TABLET ORAL DAILY
Status: DISCONTINUED | OUTPATIENT
Start: 2017-12-22 | End: 2017-12-22 | Stop reason: HOSPADM

## 2017-12-21 RX ORDER — PANTOPRAZOLE SODIUM 40 MG/1
40 TABLET, DELAYED RELEASE ORAL DAILY
Status: DISCONTINUED | OUTPATIENT
Start: 2017-12-22 | End: 2017-12-22 | Stop reason: HOSPADM

## 2017-12-21 RX ORDER — SODIUM CHLORIDE 9 MG/ML
INJECTION, SOLUTION INTRAVENOUS CONTINUOUS PRN
Status: DISCONTINUED | OUTPATIENT
Start: 2017-12-21 | End: 2017-12-21

## 2017-12-21 RX ORDER — CLINDAMYCIN PHOSPHATE 900 MG/50ML
INJECTION, SOLUTION INTRAVENOUS
Status: DISCONTINUED | OUTPATIENT
Start: 2017-12-21 | End: 2017-12-21

## 2017-12-21 RX ORDER — SIROLIMUS 1 MG/1
3 TABLET, FILM COATED ORAL DAILY
Status: DISCONTINUED | OUTPATIENT
Start: 2017-12-22 | End: 2017-12-22 | Stop reason: HOSPADM

## 2017-12-21 RX ORDER — CLINDAMYCIN PHOSPHATE 900 MG/50ML
900 INJECTION, SOLUTION INTRAVENOUS
Status: COMPLETED | OUTPATIENT
Start: 2017-12-21 | End: 2017-12-22

## 2017-12-21 RX ORDER — DEXAMETHASONE SODIUM PHOSPHATE 4 MG/ML
INJECTION, SOLUTION INTRA-ARTICULAR; INTRALESIONAL; INTRAMUSCULAR; INTRAVENOUS; SOFT TISSUE
Status: DISCONTINUED | OUTPATIENT
Start: 2017-12-21 | End: 2017-12-21

## 2017-12-21 RX ORDER — MIDAZOLAM HYDROCHLORIDE 1 MG/ML
INJECTION, SOLUTION INTRAMUSCULAR; INTRAVENOUS
Status: DISCONTINUED | OUTPATIENT
Start: 2017-12-21 | End: 2017-12-21

## 2017-12-21 RX ORDER — LIDOCAINE HCL/PF 100 MG/5ML
SYRINGE (ML) INTRAVENOUS
Status: DISCONTINUED | OUTPATIENT
Start: 2017-12-21 | End: 2017-12-21

## 2017-12-21 RX ORDER — EPINEPHRINE 1 MG/ML
INJECTION, SOLUTION INTRACARDIAC; INTRAMUSCULAR; INTRAVENOUS; SUBCUTANEOUS
Status: DISCONTINUED | OUTPATIENT
Start: 2017-12-21 | End: 2017-12-21 | Stop reason: HOSPADM

## 2017-12-21 RX ORDER — ONDANSETRON 2 MG/ML
4 INJECTION INTRAMUSCULAR; INTRAVENOUS EVERY 8 HOURS PRN
Status: DISCONTINUED | OUTPATIENT
Start: 2017-12-21 | End: 2017-12-22 | Stop reason: HOSPADM

## 2017-12-21 RX ORDER — ROCURONIUM BROMIDE 10 MG/ML
INJECTION, SOLUTION INTRAVENOUS
Status: DISCONTINUED | OUTPATIENT
Start: 2017-12-21 | End: 2017-12-21

## 2017-12-21 RX ORDER — PROPOFOL 10 MG/ML
VIAL (ML) INTRAVENOUS
Status: DISCONTINUED | OUTPATIENT
Start: 2017-12-21 | End: 2017-12-21

## 2017-12-21 RX ORDER — GLYCOPYRROLATE 0.2 MG/ML
INJECTION INTRAMUSCULAR; INTRAVENOUS
Status: DISCONTINUED | OUTPATIENT
Start: 2017-12-21 | End: 2017-12-21

## 2017-12-21 RX ORDER — KETAMINE HYDROCHLORIDE 100 MG/ML
INJECTION, SOLUTION INTRAMUSCULAR; INTRAVENOUS
Status: DISCONTINUED | OUTPATIENT
Start: 2017-12-21 | End: 2017-12-21

## 2017-12-21 RX ORDER — NEOSTIGMINE METHYLSULFATE 1 MG/ML
INJECTION, SOLUTION INTRAVENOUS
Status: DISCONTINUED | OUTPATIENT
Start: 2017-12-21 | End: 2017-12-21

## 2017-12-21 RX ORDER — DEXAMETHASONE SODIUM PHOSPHATE 4 MG/ML
8 INJECTION, SOLUTION INTRA-ARTICULAR; INTRALESIONAL; INTRAMUSCULAR; INTRAVENOUS; SOFT TISSUE EVERY 8 HOURS
Status: DISCONTINUED | OUTPATIENT
Start: 2017-12-21 | End: 2017-12-22 | Stop reason: HOSPADM

## 2017-12-21 RX ORDER — CLINDAMYCIN HYDROCHLORIDE 300 MG/1
300 CAPSULE ORAL 3 TIMES DAILY
Qty: 21 CAPSULE | Refills: 0 | Status: SHIPPED | OUTPATIENT
Start: 2017-12-21 | End: 2017-12-28

## 2017-12-21 RX ORDER — LIDOCAINE HYDROCHLORIDE 10 MG/ML
1 INJECTION, SOLUTION EPIDURAL; INFILTRATION; INTRACAUDAL; PERINEURAL ONCE
Status: COMPLETED | OUTPATIENT
Start: 2017-12-21 | End: 2017-12-21

## 2017-12-21 RX ORDER — MORPHINE SULFATE 2 MG/ML
2 INJECTION, SOLUTION INTRAMUSCULAR; INTRAVENOUS
Status: DISCONTINUED | OUTPATIENT
Start: 2017-12-21 | End: 2017-12-22 | Stop reason: HOSPADM

## 2017-12-21 RX ORDER — ATORVASTATIN CALCIUM 20 MG/1
20 TABLET, FILM COATED ORAL EVERY OTHER DAY
Status: DISCONTINUED | OUTPATIENT
Start: 2017-12-22 | End: 2017-12-22 | Stop reason: HOSPADM

## 2017-12-21 RX ORDER — FERROUS SULFATE 325(65) MG
325 TABLET, DELAYED RELEASE (ENTERIC COATED) ORAL DAILY
Status: DISCONTINUED | OUTPATIENT
Start: 2017-12-22 | End: 2017-12-22 | Stop reason: HOSPADM

## 2017-12-21 RX ORDER — METOPROLOL SUCCINATE 50 MG/1
100 TABLET, EXTENDED RELEASE ORAL DAILY
Status: DISCONTINUED | OUTPATIENT
Start: 2017-12-22 | End: 2017-12-22 | Stop reason: HOSPADM

## 2017-12-21 RX ORDER — HYDROCODONE BITARTRATE AND ACETAMINOPHEN 5; 325 MG/1; MG/1
1 TABLET ORAL EVERY 6 HOURS PRN
Qty: 30 TABLET | Refills: 0 | Status: SHIPPED | OUTPATIENT
Start: 2017-12-21 | End: 2018-04-23

## 2017-12-21 RX ORDER — LIDOCAINE HYDROCHLORIDE 40 MG/ML
INJECTION, SOLUTION RETROBULBAR
Status: DISCONTINUED | OUTPATIENT
Start: 2017-12-21 | End: 2017-12-21 | Stop reason: HOSPADM

## 2017-12-21 RX ORDER — CLINDAMYCIN PHOSPHATE 900 MG/50ML
900 INJECTION, SOLUTION INTRAVENOUS
Status: DISCONTINUED | OUTPATIENT
Start: 2017-12-21 | End: 2017-12-21 | Stop reason: HOSPADM

## 2017-12-21 RX ORDER — SUCCINYLCHOLINE CHLORIDE 20 MG/ML
INJECTION INTRAMUSCULAR; INTRAVENOUS
Status: DISCONTINUED | OUTPATIENT
Start: 2017-12-21 | End: 2017-12-21

## 2017-12-21 RX ORDER — ONDANSETRON 2 MG/ML
INJECTION INTRAMUSCULAR; INTRAVENOUS
Status: DISCONTINUED | OUTPATIENT
Start: 2017-12-21 | End: 2017-12-21

## 2017-12-21 RX ORDER — SODIUM CHLORIDE 9 MG/ML
INJECTION, SOLUTION INTRAVENOUS CONTINUOUS
Status: DISCONTINUED | OUTPATIENT
Start: 2017-12-21 | End: 2017-12-22 | Stop reason: HOSPADM

## 2017-12-21 RX ORDER — HYDROCODONE BITARTRATE AND ACETAMINOPHEN 5; 325 MG/1; MG/1
1 TABLET ORAL EVERY 4 HOURS PRN
Status: DISCONTINUED | OUTPATIENT
Start: 2017-12-21 | End: 2017-12-22 | Stop reason: HOSPADM

## 2017-12-21 RX ADMIN — EPHEDRINE SULFATE 10 MG: 50 INJECTION, SOLUTION INTRAMUSCULAR; INTRAVENOUS; SUBCUTANEOUS at 02:12

## 2017-12-21 RX ADMIN — LIDOCAINE HYDROCHLORIDE 0.1 MG: 10 INJECTION, SOLUTION EPIDURAL; INFILTRATION; INTRACAUDAL; PERINEURAL at 01:12

## 2017-12-21 RX ADMIN — ROCURONIUM BROMIDE 15 MG: 10 INJECTION, SOLUTION INTRAVENOUS at 01:12

## 2017-12-21 RX ADMIN — ONDANSETRON 4 MG: 2 INJECTION INTRAMUSCULAR; INTRAVENOUS at 03:12

## 2017-12-21 RX ADMIN — KETAMINE HYDROCHLORIDE 30 MG: 100 INJECTION, SOLUTION, CONCENTRATE INTRAMUSCULAR; INTRAVENOUS at 02:12

## 2017-12-21 RX ADMIN — CLINDAMYCIN PHOSPHATE 900 MG: 18 INJECTION, SOLUTION INTRAVENOUS at 01:12

## 2017-12-21 RX ADMIN — DEXAMETHASONE SODIUM PHOSPHATE 8 MG: 4 INJECTION, SOLUTION INTRAMUSCULAR; INTRAVENOUS at 08:12

## 2017-12-21 RX ADMIN — MIDAZOLAM HYDROCHLORIDE 2 MG: 1 INJECTION, SOLUTION INTRAMUSCULAR; INTRAVENOUS at 01:12

## 2017-12-21 RX ADMIN — HYDROCODONE BITARTRATE AND ACETAMINOPHEN 1 TABLET: 5; 325 TABLET ORAL at 10:12

## 2017-12-21 RX ADMIN — DEXAMETHASONE SODIUM PHOSPHATE 12 MG: 4 INJECTION, SOLUTION INTRAMUSCULAR; INTRAVENOUS at 02:12

## 2017-12-21 RX ADMIN — PROPOFOL 150 MG: 10 INJECTION, EMULSION INTRAVENOUS at 01:12

## 2017-12-21 RX ADMIN — NEOSTIGMINE METHYLSULFATE 5 MG: 1 INJECTION INTRAVENOUS at 03:12

## 2017-12-21 RX ADMIN — APIXABAN 2.5 MG: 2.5 TABLET, FILM COATED ORAL at 09:12

## 2017-12-21 RX ADMIN — SODIUM CHLORIDE: 0.9 INJECTION, SOLUTION INTRAVENOUS at 01:12

## 2017-12-21 RX ADMIN — GLYCOPYRROLATE 0.2 MG: 0.2 INJECTION, SOLUTION INTRAMUSCULAR; INTRAVENOUS at 01:12

## 2017-12-21 RX ADMIN — SUCCINYLCHOLINE CHLORIDE 140 MG: 20 INJECTION, SOLUTION INTRAMUSCULAR; INTRAVENOUS at 01:12

## 2017-12-21 RX ADMIN — FENTANYL CITRATE 50 MCG: 50 INJECTION, SOLUTION INTRAMUSCULAR; INTRAVENOUS at 01:12

## 2017-12-21 RX ADMIN — PROPOFOL 50 MG: 10 INJECTION, EMULSION INTRAVENOUS at 01:12

## 2017-12-21 RX ADMIN — SODIUM CHLORIDE, SODIUM GLUCONATE, SODIUM ACETATE, POTASSIUM CHLORIDE, MAGNESIUM CHLORIDE, SODIUM PHOSPHATE, DIBASIC, AND POTASSIUM PHOSPHATE: .53; .5; .37; .037; .03; .012; .00082 INJECTION, SOLUTION INTRAVENOUS at 02:12

## 2017-12-21 RX ADMIN — ROCURONIUM BROMIDE 5 MG: 10 INJECTION, SOLUTION INTRAVENOUS at 01:12

## 2017-12-21 RX ADMIN — FENTANYL CITRATE 50 MCG: 50 INJECTION, SOLUTION INTRAMUSCULAR; INTRAVENOUS at 03:12

## 2017-12-21 RX ADMIN — CLINDAMYCIN IN 5 PERCENT DEXTROSE 900 MG: 18 INJECTION, SOLUTION INTRAVENOUS at 06:12

## 2017-12-21 RX ADMIN — GLYCOPYRROLATE 0.8 MG: 0.2 INJECTION, SOLUTION INTRAMUSCULAR; INTRAVENOUS at 03:12

## 2017-12-21 RX ADMIN — ROCURONIUM BROMIDE 10 MG: 10 INJECTION, SOLUTION INTRAVENOUS at 02:12

## 2017-12-21 RX ADMIN — LIDOCAINE HYDROCHLORIDE 50 MG: 20 INJECTION, SOLUTION INTRAVENOUS at 01:12

## 2017-12-21 NOTE — PLAN OF CARE
Problem: Patient Care Overview  Goal: Plan of Care Review  Outcome: Ongoing (interventions implemented as appropriate)  Vital signs stable. Afebrile. Alert, oriented and following commands. Denies pain/nausea. Labs drawn and sent.

## 2017-12-21 NOTE — ANESTHESIA POSTPROCEDURE EVALUATION
"Anesthesia Post Evaluation    Patient: Jose Luis Manzo    Procedure(s) Performed: Procedure(s) (LRB):  Suspension microlaryngoscopy with KTP laser excision of lesion (N/A)    Final Anesthesia Type: general  Patient location during evaluation: PACU  Patient participation: Yes- Able to Participate  Level of consciousness: awake and alert and responds to stimulation  Post-procedure vital signs: reviewed and stable  Pain management: adequate  Airway patency: patent  PONV status at discharge: No PONV  Anesthetic complications: no      Cardiovascular status: blood pressure returned to baseline  Respiratory status: unassisted, spontaneous ventilation and room air  Hydration status: euvolemic  Follow-up not needed.        Visit Vitals  BP (!) 142/72   Pulse 63   Temp 35.8 °C (96.4 °F) (Axillary)   Resp 13   Ht 5' 10" (1.778 m)   Wt 108.9 kg (240 lb)   SpO2 100%   BMI 34.44 kg/m²       Pain/James Score: Pain Assessment Performed: Yes (12/21/2017  4:20 PM)  Presence of Pain: denies (12/21/2017  4:20 PM)  Pain Rating Prior to Med Admin: 0 (12/21/2017  1:05 PM)  Pain Rating Post Med Admin: 0 (12/21/2017  1:05 PM)  James Score: 9 (12/21/2017  4:20 PM)      "

## 2017-12-21 NOTE — NURSING TRANSFER
Nursing Transfer Note      12/21/2017     Transfer to OBS 2    Transfer via Stretcher    Transfer with Portable tele, pulse Ox, shoes and belonging bag    Transported by PCT    Medicines sent: n/a    Chart send with patient: yes    Notified: brother    Patient reassessed at: 12/21/17 @ 2470

## 2017-12-21 NOTE — TRANSFER OF CARE
"Anesthesia Transfer of Care Note    Patient: Jose Luis Manzo    Procedure(s) Performed: Procedure(s) (LRB):  Suspension microlaryngoscopy with KTP laser excision of lesion (N/A)    Patient location: PACU    Anesthesia Type: general    Transport from OR: Transported from OR on 2-3 L/min O2 by NC with adequate spontaneous ventilation    Post pain: adequate analgesia    Post assessment: no apparent anesthetic complications    Post vital signs: stable    Level of consciousness: awake    Nausea/Vomiting: no nausea/vomiting    Complications: none    Transfer of care protocol was followed      Last vitals:   Visit Vitals  BP (!) 161/88 (BP Location: Right arm, Patient Position: Lying)   Pulse 63   Temp 35.8 °C (96.4 °F) (Axillary)   Resp 11   Ht 5' 10" (1.778 m)   Wt 108.9 kg (240 lb)   SpO2 95%   BMI 34.44 kg/m²     "

## 2017-12-21 NOTE — H&P (VIEW-ONLY)
[facility]  Department of Otorhinolaryngology and Communication Sciences    Subjective:      Jose Luis Manzo is a 67 y.o. male who presents for follow-up. 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 His condition is complicated by his renal transplant status (performed in 2007) and chronic immunosuppression.    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 Chandler Kyle. Definitive radiation therapy at [facility] under the care of .  He completed 6300 centigray by December 2014.  A PET scan done at the time was negative for metastatic disease.     He recently presented to Dr. Desouza, who brought him for DL/biopsy of bilateral TVC lesions.  Path revealed a microscopic focus of invasive carcinoma of the R TVF and severe dysplasia/CIS of the B TVF. Due to pulmonary findings on PET/CT 5/12/2017 (MIK pleural lesion), Dr. Desouza sent the patient to Dr. Masters, who arranged a CT biopsy. This was negative for malignancy. He recommended repeating chest imaging in 2 months. [place], and he has seen the [facility] team from time to time for surveillance. However it has been a couple years since he has seen them.     He is undergoing MBSS later today. He returns to discuss laryngeal surgery in more detail.    Voice Handicap Index - 10 (VHI-10) score is 28.  Reflux symptom Index (RSI) score is 16.    The patient's medications, allergies, past medical, surgical, social and family histories were reviewed and updated as appropriate.    A detailed review of systems was obtained with pertinent positives as per the above HPI, and otherwise negative.      Objective:     VS reviewed    Constitutional: comfortable, well dressed  Psychiatric: appropriate affect  Respiratory: comfortably breathing, symmetric chest  rise, no stridor  Voice: severe roughness/strain; variable ventricular phonation  Head: normocephalic  Eyes: conjunctivae and sclerae clear      Data Reviewed    See HPI      Assessment:     Jose Luis Manzo is a 67 y.o. 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 His condition is complicated by his renal transplant status (2007) and chronic immunosuppression.     Plan:     Again, I made it clear to him that he has recurrent cancer and that I recommend he pursue treatment expeditiously.     I spoke with him at length about endoscopic transoral laser surgery, which might entail as little as KTP laser photoablation, or as much as an extended cordectomy. The extent of surgery would be dictated by intraoperative findings. I anticipate that a series of staged surgical treatments may be necessary due to bilateral epithelial irregularities. Depending on the extent of surgery, he may experience deterioration in his voice and/or swallowing function. I will F/U on the results of today's MBSS. I again offered to help arrange for him to meet with Dr. Sadler if he desires another opinion. However, he continues to defer on this option.     I discussed the risks, benefits and alternatives to surgery with the patient, as well as the expected postoperative course. Risks included but were not limited to pain; bleeding; infection; scarring; worsening of voice; recurrence; need for additional and/or more extensive procedures; oral/dental problems (pain, laceration, broken or missing teeth); jaw joint problems (pain, dislocation); and tongue problems (pain, laceration, numbness, weakness, taste disturbance). Any surgery on the larynx also carries with it the risks of airway obstruction necessitating tracheotomy. Also inherent in the procedure are the risks of general anesthesia, including but not limited to cardiovascular complications (heart attack,  arrhythmia); pulmonary (respiratory failure); neurologic (stroke); and death. I also explained that, if the laser is used, it presents the risks of vision loss and fire.    I gave him the opportunity to ask questions and I answered all of them to his satisfaction. He wishes to proceed. We will arrange this in the coming weeks. He will have preop testing triage by the anesthesiology team. I anticipate overnight hospitalization for at least 1 night post-operatively.     He knows to continue to follow up with Dr. Masters regarding the lung nodule. They plan to repeat the CT chest later this month.    All questions were answered, and the patient is in agreement with the plan.    This visit was 40 minutes in duration, with over 50% of the time spent in direct face-to-face counseling and coordination of care regarding the issues outlined above.    Gamal Smith M.D.  [facility]  Department of Otorhinolaryngology and Communication Sciences

## 2017-12-21 NOTE — PLAN OF CARE
Problem: Patient Care Overview  Goal: Plan of Care Review  Outcome: Ongoing (interventions implemented as appropriate)  Pt's VSS, NAD noted. Pt on cardiac monitor and continous pulse ox monitor with 97% O2 on room air.   Bed locked in lowest position, side rails upx2, call bell within reach. Pt denies pain, n/v at this time.

## 2017-12-21 NOTE — INTERVAL H&P NOTE
The patient has been examined and the H&P has been reviewed:    I concur with the findings and no changes have occurred since H&P was written.    Anesthesia/Surgery risks, benefits and alternative options discussed and understood by patient/family.          Active Hospital Problems    Diagnosis  POA    Larynx cancer [C32.9]  Yes      Resolved Hospital Problems    Diagnosis Date Resolved POA   No resolved problems to display.

## 2017-12-21 NOTE — ANESTHESIA RELEASE NOTE
"Anesthesia Release from PACU Note    Patient: Jose Luis Manzo    Procedure(s) Performed: Procedure(s) (LRB):  Suspension microlaryngoscopy with KTP laser excision of lesion (N/A)    Anesthesia type: general    Post pain: Adequate analgesia    Post assessment: no apparent anesthetic complications    Last Vitals:   Visit Vitals  BP (!) 142/72   Pulse 63   Temp 35.8 °C (96.4 °F) (Axillary)   Resp 13   Ht 5' 10" (1.778 m)   Wt 108.9 kg (240 lb)   SpO2 100%   BMI 34.44 kg/m²       Post vital signs: stable    Level of consciousness: awake and alert     Nausea/Vomiting: no nausea/no vomiting    Complications: none    Airway Patency: patent    Respiratory: unassisted, spontaneous ventilation, room air    Cardiovascular: stable and blood pressure at baseline    Hydration: euvolemic  "

## 2017-12-22 VITALS
TEMPERATURE: 97 F | OXYGEN SATURATION: 96 % | BODY MASS INDEX: 34.5 KG/M2 | SYSTOLIC BLOOD PRESSURE: 140 MMHG | HEIGHT: 70 IN | HEART RATE: 76 BPM | WEIGHT: 241 LBS | RESPIRATION RATE: 16 BRPM | DIASTOLIC BLOOD PRESSURE: 87 MMHG

## 2017-12-22 PROCEDURE — 25000003 PHARM REV CODE 250: Performed by: OTOLARYNGOLOGY

## 2017-12-22 PROCEDURE — 63600175 PHARM REV CODE 636 W HCPCS: Performed by: OTOLARYNGOLOGY

## 2017-12-22 PROCEDURE — S0077 INJECTION, CLINDAMYCIN PHOSP: HCPCS | Performed by: OTOLARYNGOLOGY

## 2017-12-22 RX ADMIN — CLINDAMYCIN IN 5 PERCENT DEXTROSE 900 MG: 18 INJECTION, SOLUTION INTRAVENOUS at 01:12

## 2017-12-22 RX ADMIN — AMLODIPINE BESYLATE 10 MG: 10 TABLET ORAL at 09:12

## 2017-12-22 RX ADMIN — FERROUS SULFATE TAB EC 325 MG (65 MG FE EQUIVALENT) 325 MG: 325 (65 FE) TABLET DELAYED RESPONSE at 09:12

## 2017-12-22 RX ADMIN — PANTOPRAZOLE SODIUM 40 MG: 40 TABLET, DELAYED RELEASE ORAL at 09:12

## 2017-12-22 RX ADMIN — DEXAMETHASONE SODIUM PHOSPHATE 8 MG: 4 INJECTION, SOLUTION INTRAMUSCULAR; INTRAVENOUS at 05:12

## 2017-12-22 RX ADMIN — APIXABAN 2.5 MG: 2.5 TABLET, FILM COATED ORAL at 09:12

## 2017-12-22 NOTE — PROGRESS NOTES
[facility]  Otorhinolaryngology-Head & Neck Surgery  Progress Note    Subjective:     Post-Op Info:  Procedure(s) (LRB):  Suspension microlaryngoscopy with KTP laser excision of lesion (N/A)   1 Day Post-Op  Hospital Day: 2     Interval History: DORAEON. Complains of dry throat this am. Pain well controlled    Medications:  Continuous Infusions:   sodium chloride 0.9% 10 mL/hr at 12/21/17 1320     Scheduled Meds:   amLODIPine  10 mg Oral Daily    apixaban  2.5 mg Oral BID    atorvastatin  20 mg Oral Every other day    dexamethasone  8 mg Intravenous Q8H    ferrous sulfate  325 mg Oral Daily    finasteride  5 mg Oral Daily    metoprolol succinate  100 mg Oral Daily    pantoprazole  40 mg Oral Daily    sirolimus  3 mg Oral Daily     PRN Meds:hydrocodone-acetaminophen 5-325mg, morphine, ondansetron     Review of patient's allergies indicates:  No Known Allergies  Objective:     Vital Signs (24h Range):  Temp:  [96.4 °F (35.8 °C)-97.8 °F (36.6 °C)] 96.4 °F (35.8 °C)  Pulse:  [61-85] 76  Resp:  [10-19] 18  SpO2:  [90 %-100 %] 97 %  BP: (128-164)/(61-95) 128/61        Lines/Drains/Airways     Peripheral Intravenous Line                 Peripheral IV - Single Lumen 12/21/17 1319 Right Hand less than 1 day                Physical Exam  AAOx3, NAD  Voice raspy  Talking in full sentences  No stridor    Significant Labs:  None    Significant Diagnostics:  None    Assessment/Plan:     * Larynx cancer    POD 1 from suspension microlaryngoscopy with KTP laser resection of right true vocal fold tumor, and biopsy of left true vocal fold tumor.  - Plan to d/c home today  - PO pain meds  - Follow up with Dr Smith in 1 week            Ke Venegas MD  Otorhinolaryngology-Head & Neck Surgery  [facility]

## 2017-12-22 NOTE — OP NOTE
DATE OF PROCEDURE:  12/21/2017    PREOPERATIVE DIAGNOSES:  1.  Recurrent glottic squamous cell carcinoma.  2.  Bilateral true vocal fold carcinoma in situ.  3.  History of glottic cancer, status post chemoradiation.    POSTOPERATIVE DIAGNOSES:  1.  Recurrent glottic squamous cell carcinoma.  2.  Bilateral true vocal fold carcinoma in situ.  3.  History of glottic cancer, status post chemoradiation.    PROCEDURES PERFORMED:  1.  Suspension microlaryngoscopy with KTP laser resection of right true vocal   fold tumor, with margin control.  2.  Biopsy of left true vocal fold tumor.    SURGEON:  Gamal Smith M.D.    ASSISTANT:  Peace Ayala M.D. (RES).    ANESTHESIA:  General.    ESTIMATED BLOOD LOSS:  Less than 5 mL.    COMPLICATIONS:  None.    SPECIMENS:  1.  Right true vocal fold tumor.  2.  Left true vocal fold tumor.  3.  Left anterior true vocal fold.  4.  Deep anterior right true vocal fold.  5.  Infrapetiole epiglottis.  6.  Right false vocal fold.  7.  Deep margin.  8.  Lateral margin.  9.  Posterior margin.  10.  Inferior margin.    FINDINGS:  1.  Diffuse, bilateral, thickened epithelial change with stippled vascular   ectasia, particularly in the vicinity of the anterior commissure where a   bridging fibrotic scar band was appreciated.  2.  The epithelial irregularities did appear to involve the infrapetiole   epiglottis.  3.  The epithelial irregularities extended below the level of the true vocal   fold for approximately 5 mm.  4.  Sessile leukoplakia in the infrapetiole epiglottis.  5.  A subepithelial cordectomy involving the right true vocal fold was performed.  6.  The right false vocal fold was excised for surgical exposure and postop surveillance.  7.  The infrapetiole epiglottis was excised for surgical exposure and postop surveillance.    INDICATIONS FOR PROCEDURES:  The patient is a 67-year-old gentleman who is   immunosuppressed, status post renal transplant.  He has a history of a    left-sided glottic squamous cell carcinoma T1 N0 M0, which was treated with   primary radiation therapy, but has developed recurrence versus persistent   disease with epithelial changes involving his bilateral true vocal folds.  The   patient was brought to the Operating Room by Dr. Desouza.  Specimens from   microlaryngoscopy with biopsy were positive for microinvasive cancer on the   right true vocal fold, but diffusely positive for carcinoma in situ of the   bilateral true vocal folds.  Options offered to the patient included no   treatment versus surgical intervention including open supracricoid   laryngectomy or endoscopic transoral laser-assisted resection.  I discussed with   the patient his treatment options at length as well as the risks and benefits   of each.  The patient desires endoscopic laser-assisted airway surgery in hopes   of achieving cure.  He understands that the extent of surgery would be dictated   by intraoperative findings and that an additional surgery approximately 4 to 6   weeks down the line may still be necessary.  He understands that there is a   possibility that additional, surgical or non-surgical, treatment will be necessary.  I discussed all the   risks of surgery at length with the patient.  Risks included, but were not   limited to pain, bleeding, infection, scar, worsening of voice, worsening of   swallowing, aspiration, damage to the lips, teeth, gums or tongue, tongue   numbness, taste disturbance, laser injury including vision loss and airway fire,   airway obstruction, necessitating intubation and tracheotomy and risks of   general anesthesia.  In spite of the risks of surgery, the patient desired to   move forward with the procedure.  Informed consent was obtained.    PROCEDURE IN DETAIL:  The patient was positively identified in the Preoperative   Holding Area.  He was brought to the Operating Room and was placed in a flat   supine position.  General endotracheal  anesthesia was obtained with a size 6   laser-safe endotracheal tube, which was secured to the left lower lip.  The   table was rotated 90 degrees.  The eyes were protected with moist sponges.  A   moist sponge was placed to protect the upper alveolar ridge.  A final timeout   was performed for verification purposes.  First, the 5 Zeitels glottiscope was   inserted into the oral cavity via the right lingual gutter and was utilized to   expose the larynx at the level of the glottis.  The patient was suspended from   the Henderson  the usual fashion.  Findings were as noted above.    Attention was then turned to obtaining a biopsy of the left true vocal fold.    The operating microscope was brought into the field.  The remainder of the case   was performed under maximum magnification of the operating microscope.  An   upbiting micro cup forceps was used to obtain a representative specimen of the   epithelial change along the left true vocal fold.  The specimen was passed off   the field for frozen section analysis.  There was no evidence of malignancy in   the left true vocal fold.  Next, attention was turned to excising the disease   from the right true vocal fold.  All Operating Room personnel were equipped with   laser-safe eyewear.  All exposed regions of the patient's skin and face were   protected with moist sponges.  The fraction of inspired oxygen was maintained at   less than 30%.  The 0.6 mm KTP laser fiber was loaded onto a straight   handpiece.  Initial settings were 4 milligan continuous.  Under the maximum   magnification of the operating microscope, the KTP laser fiber was used to first   create an epithelial cordotomy directly at the level of the anterior   commissure.  This was then carried out laterally along the right true vocal fold   along the junction of the heaped up epithelial irregularities and the adjacent,   more native appearing vocal fold epithelium.  The laser epithelial cordotomy was    carried around the lateral aspect of the tumor along the full length of the   right true vocal fold to the vicinity of the vocal process where the incision   was carried medially.  At this time, the tumor was reflected anteriorly and   rolled forward while the laser was used to resect the tumor off the underlying   native vocal fold lamina propria.  A clean plane of dissection was achieved.    The tumor was rolled in a posterior to anterior direction and was liberated   cleanly from the adjacent vocal fold lamina propria.  It was ultimately   liberated at its anterior most infraglottic aspect and was passed off the field   for permanent pathologic analysis.  After resection of the tumor in this   fashion, there was a nidus of heaped up fibrofatty tissue at the infraglottic   anterior right true vocal fold.  Multiple biopsies were taken with curved micro   alligator forceps.  Intraoperative frozen section analysis on this tissue was   not indicative of malignancy.  Curved microlaryngeal scissors were used to   obtain a margin from the right false vocal fold.  This was sent for permanent   pathologic analysis.  The laryngoscope was thereafter withdrawn in order to   achieve improved exposure of the infrapetiole epiglottis, which demonstrated   sessile postradiation mucositis changes as well as some small foci of   leukoplakia.  Out of concern for possible residual malignancy at this level,   resection of the infrapetiole epiglottis was performed with the KTP laser fiber   using an angled handpiece.  It was excised completely down to the level of the   glottic anterior commissure and out into the preepiglottic space.  It was passed   off the field as a separate specimen for permanent pathologic analysis.  With   the infrapetiole epiglottis having been removed in this fashion, the right true   vocal fold, which was the nidus of known invasive cancer, was closely inspected.    There was no intraoperative evidence of  residual disease.  Nevertheless,   surgical margins were obtained using curved and angled microlaryngeal scissors.    A deep, lateral, posterior and inferior margin was obtained in this fashion.    Finally, the KTP laser fiber was loaded back onto a straight handpiece.  The   laser settings were 30 milligan, 15 millisecond pulses and 2 pulses per second and   with the laser on settings listed above, pulsed KTP laser photoablation was   performed of the entire resection defect.  The larynx was topically anesthetized   with 3 mL of 4% lidocaine.  Magnified laryngeal endoscopy was carried out with   the 0-degree Harris igor telescope.  Postoperative photodocumentation was   obtained.  With this, the procedure was brought to completion.  All equipment   was removed.  The patient was turned back over to the Anesthesiology team for   awakening and extubation, which were uneventful.  The patient was escorted to   the Recovery Room in good condition.  The patient tolerated the procedure well.    There were no complications.  All needle, sponge and instrument counts were   correct at the end of the case.    ATTESTATION:  I, as the attending of record, was present and participated in all   portions of this procedure.      GRAYSON/IN  dd: 12/21/2017 16:16:36 (CST)  td: 12/21/2017 19:49:43 (CST)  Doc ID   #[id number]  Job ID #[id number]    CC:

## 2017-12-22 NOTE — PLAN OF CARE
Problem: Patient Care Overview  Goal: Plan of Care Review  Outcome: Ongoing (interventions implemented as appropriate)  Pt's VSS, NAD noted. Pt denies pain at this time. Pt aware of impending discharge. Bed locked in lowest position, side rails up X2, call bell within reach. Pt ambulates to bathroom independently with no signs of distress. Pt remains free of falls.

## 2017-12-22 NOTE — SUBJECTIVE & OBJECTIVE
Interval History: GLENON. Complains of dry throat this am. Pain well controlled    Medications:  Continuous Infusions:   sodium chloride 0.9% 10 mL/hr at 12/21/17 1320     Scheduled Meds:   amLODIPine  10 mg Oral Daily    apixaban  2.5 mg Oral BID    atorvastatin  20 mg Oral Every other day    dexamethasone  8 mg Intravenous Q8H    ferrous sulfate  325 mg Oral Daily    finasteride  5 mg Oral Daily    metoprolol succinate  100 mg Oral Daily    pantoprazole  40 mg Oral Daily    sirolimus  3 mg Oral Daily     PRN Meds:hydrocodone-acetaminophen 5-325mg, morphine, ondansetron     Review of patient's allergies indicates:  No Known Allergies  Objective:     Vital Signs (24h Range):  Temp:  [96.4 °F (35.8 °C)-97.8 °F (36.6 °C)] 96.4 °F (35.8 °C)  Pulse:  [61-85] 76  Resp:  [10-19] 18  SpO2:  [90 %-100 %] 97 %  BP: (128-164)/(61-95) 128/61        Lines/Drains/Airways     Peripheral Intravenous Line                 Peripheral IV - Single Lumen 12/21/17 1319 Right Hand less than 1 day                Physical Exam  AAOx3, NAD  Voice raspy  Talking in full sentences  No stridor    Significant Labs:  None    Significant Diagnostics:  None

## 2017-12-22 NOTE — ASSESSMENT & PLAN NOTE
POD 1 from suspension microlaryngoscopy with KTP laser resection of right true vocal fold tumor, and biopsy of left true vocal fold tumor.  - Plan to d/c home today  - PO pain meds  - Follow up with Dr Smith in 1 week

## 2017-12-22 NOTE — PROGRESS NOTES
Pt discharged and ambulated off unit. VSS, NAD noted. IV removed, catheter intact. Discharge instructions reviewed, pt verbalized understanding.

## 2017-12-22 NOTE — PLAN OF CARE
Problem: Patient Care Overview  Goal: Plan of Care Review  Outcome: Ongoing (interventions implemented as appropriate)  Fall precaution maintained thru out shift; pain managed with medication; skin dry and intact; no sign of distress noted;

## 2018-01-02 ENCOUNTER — TELEPHONE (OUTPATIENT)
Dept: OTOLARYNGOLOGY | Facility: CLINIC | Age: 68
End: 2018-01-02

## 2018-01-02 NOTE — TELEPHONE ENCOUNTER
----- Message from Negrita Hackett sent at 12/29/2017  1:38 PM CST -----  Contact: patient  Please call above patient said someone was suppose to call him to see the doctor from having surgery on throat was to have an appointment this week need to speak the nurse waiting on a call back

## 2018-01-10 ENCOUNTER — HOSPITAL ENCOUNTER (OUTPATIENT)
Dept: RADIOLOGY | Facility: HOSPITAL | Age: 68
Discharge: HOME | End: 2018-01-10
Attending: INTERNAL MEDICINE
Payer: MEDICARE

## 2018-01-10 ENCOUNTER — OFFICE VISIT (OUTPATIENT)
Dept: HEMATOLOGY/ONCOLOGY | Facility: CLINIC | Age: 68
End: 2018-01-10
Payer: MEDICARE

## 2018-01-10 VITALS
WEIGHT: 227.94 LBS | HEIGHT: 71 IN | OXYGEN SATURATION: 98 % | BODY MASS INDEX: 31.91 KG/M2 | HEART RATE: 68 BPM | SYSTOLIC BLOOD PRESSURE: 119 MMHG | DIASTOLIC BLOOD PRESSURE: 73 MMHG

## 2018-01-10 DIAGNOSIS — R91.1 PULMONARY NODULE, LEFT: ICD-10-CM

## 2018-01-10 DIAGNOSIS — N18.4 CHRONIC KIDNEY DISEASE, STAGE IV (SEVERE): ICD-10-CM

## 2018-01-10 DIAGNOSIS — Z94.0 RENAL TRANSPLANT RECIPIENT: ICD-10-CM

## 2018-01-10 DIAGNOSIS — I82.403 DEEP VEIN THROMBOSIS (DVT) OF BOTH LOWER EXTREMITIES, UNSPECIFIED CHRONICITY, UNSPECIFIED VEIN: ICD-10-CM

## 2018-01-10 DIAGNOSIS — J38.3 LESION OF TRUE VOCAL CORD: ICD-10-CM

## 2018-01-10 DIAGNOSIS — C32.9 LARYNX CANCER: Primary | ICD-10-CM

## 2018-01-10 PROCEDURE — 99999 PR PBB SHADOW E&M-EST. PATIENT-LVL III: CPT | Mod: PBBFAC,,, | Performed by: INTERNAL MEDICINE

## 2018-01-10 PROCEDURE — 71250 CT THORAX DX C-: CPT | Mod: TC

## 2018-01-10 PROCEDURE — 71250 CT THORAX DX C-: CPT | Mod: 26,,, | Performed by: RADIOLOGY

## 2018-01-10 PROCEDURE — 99214 OFFICE O/P EST MOD 30 MIN: CPT | Mod: S$GLB,,, | Performed by: INTERNAL MEDICINE

## 2018-01-10 PROCEDURE — 99499 UNLISTED E&M SERVICE: CPT | Mod: S$GLB,,, | Performed by: INTERNAL MEDICINE

## 2018-01-10 NOTE — PROGRESS NOTES
Patient, Jose Luis Manzo (MRN #[id number]), presented with a recent Platelet count less than 150 K/uL consistent with the definition of thrombocytopenia (ICD10 - D69.6).    Platelets   Date Value Ref Range Status   11/03/2017 109 (L) 150 - 350 K/uL Final     The patient's thrombocytopenia was monitored, evaluated, addressed and/or treated. This addendum to the medical record is made on 01/10/2018.

## 2018-01-10 NOTE — PROGRESS NOTES
Subjective:       Patient ID: Jose Luis Mnazo is a 67 y.o. male.    Chief Complaint: No chief complaint on file.    HPI  He is 67 years old. 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  It was diagnosed in September 2014 by ENT Dr. Chandler Kyle when he was worked up for hoarseness.  The lesion was found in the left true vocal cord.  Biopsy revealed squamous cell carcinoma well differentiated.  He underwent definitive radiation therapy at [facility] under the care of .  He completed 6300 centigray by December 2014.  A PET scan done at the time was negative for metastatic disease.  He has been having some hoarseness since then.    For the past few months the hoarseness has been getting worse.  So he saw  (ENT) for evaluation.  No recurrence of malignancy was noted locally.  However he underwent a PET scan 5/12/17 that revealed a 1.8 centimeter pleural-based nodule in the left upper lobe.  SUV 3.6.  This is a new lesion.    He underwent CT-guided biopsy of this lesion and it was negative for malignancy.    Underwent Suspension microlaryngoscopy with KTP laser resection of right true vocal fold tumor and Biopsy of left true vocal fold tumor in December 2017.  That biopsy was negative for malignancy.      Review of Systems   Constitutional: Negative for fever and unexpected weight change.   HENT: Positive for voice change. Negative for mouth sores, nosebleeds, sinus pressure and sneezing.    Respiratory: Negative for cough and shortness of breath.    Cardiovascular: Negative for chest pain and palpitations.   Gastrointestinal: Negative for abdominal pain, blood in stool and nausea.   Musculoskeletal: Positive for arthralgias. Negative for gait problem.   Hematological: Negative for adenopathy. Does not bruise/bleed easily.   Psychiatric/Behavioral: Negative for behavioral problems and confusion. The patient is nervous/anxious.          Objective:      Physical Exam    Constitutional: He is oriented to person, place, and time. He appears well-developed and well-nourished. No distress.   Eyes: Conjunctivae and lids are normal. No scleral icterus.   Neck: Normal range of motion. Neck supple. No thyromegaly present.   Cardiovascular: Normal rate, regular rhythm and intact distal pulses.  Exam reveals no gallop.    Pulmonary/Chest: Effort normal and breath sounds normal. No respiratory distress. He has no rales.   Abdominal: Soft. Bowel sounds are normal. He exhibits no distension and no mass. There is no hepatosplenomegaly.   Lymphadenopathy:        Head (right side): No submental adenopathy present.        Head (left side): No submental adenopathy present.     He has no cervical adenopathy.        Right: No supraclavicular adenopathy present.        Left: No supraclavicular adenopathy present.   Neurological: He is alert and oriented to person, place, and time.   Skin: Skin is warm. He is not diaphoretic. No cyanosis. Nails show no clubbing.         Assessment:       1. Larynx cancer    2. Deep vein thrombosis (DVT) of both lower extremities, unspecified chronicity, unspecified vein    3. Chronic kidney disease, stage IV (severe)    4. Renal transplant recipient    5. Pulmonary nodule, left    6. 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    PET scan - Nov 2017 - shows a 1.8 centimeters pleural-based isolated lesion, SUV max 3.6.     CT chest without contrast done today shows stability of the pulmonary nodule in the left upper lobe and right lower lobe.  He was delighted to hear these results.    Will continue to monitor.  Plan to repeat CT chest without contrast in 4 months.

## 2018-01-11 PROBLEM — Q31.9 DYSPLASIA OF LARYNX: Status: ACTIVE | Noted: 2018-01-11

## 2018-01-26 ENCOUNTER — DOCUMENTATION ONLY (OUTPATIENT)
Dept: OTOLARYNGOLOGY | Facility: CLINIC | Age: 68
End: 2018-01-26

## 2018-01-26 ENCOUNTER — OFFICE VISIT (OUTPATIENT)
Dept: OTOLARYNGOLOGY | Facility: CLINIC | Age: 68
End: 2018-01-26
Payer: MEDICARE

## 2018-01-26 VITALS
HEART RATE: 73 BPM | DIASTOLIC BLOOD PRESSURE: 85 MMHG | BODY MASS INDEX: 32.03 KG/M2 | WEIGHT: 226.44 LBS | TEMPERATURE: 98 F | SYSTOLIC BLOOD PRESSURE: 134 MMHG

## 2018-01-26 DIAGNOSIS — D02.0 CARCINOMA IN SITU OF VOCAL CORD: Primary | ICD-10-CM

## 2018-01-26 DIAGNOSIS — C32.9 LARYNX CANCER: ICD-10-CM

## 2018-01-26 DIAGNOSIS — Z85.21 H/O LARYNGEAL CANCER: ICD-10-CM

## 2018-01-26 DIAGNOSIS — R49.9 VOICE DISTURBANCE: ICD-10-CM

## 2018-01-26 PROBLEM — J38.3 LESION OF TRUE VOCAL CORD: Status: RESOLVED | Noted: 2017-10-02 | Resolved: 2018-01-26

## 2018-01-26 PROCEDURE — 99999 PR PBB SHADOW E&M-EST. PATIENT-LVL IV: CPT | Mod: PBBFAC,,, | Performed by: OTOLARYNGOLOGY

## 2018-01-26 PROCEDURE — 99214 OFFICE O/P EST MOD 30 MIN: CPT | Mod: S$GLB,,, | Performed by: OTOLARYNGOLOGY

## 2018-01-26 PROCEDURE — 99499 UNLISTED E&M SERVICE: CPT | Mod: S$GLB,,, | Performed by: OTOLARYNGOLOGY

## 2018-01-26 RX ORDER — LIDOCAINE HYDROCHLORIDE 10 MG/ML
1 INJECTION, SOLUTION EPIDURAL; INFILTRATION; INTRACAUDAL; PERINEURAL ONCE
Status: CANCELLED | OUTPATIENT
Start: 2018-01-26 | End: 2018-01-26

## 2018-01-26 RX ORDER — DEXAMETHASONE SODIUM PHOSPHATE 4 MG/ML
12 INJECTION, SOLUTION INTRA-ARTICULAR; INTRALESIONAL; INTRAMUSCULAR; INTRAVENOUS; SOFT TISSUE
Status: CANCELLED | OUTPATIENT
Start: 2018-01-26

## 2018-01-26 NOTE — PROGRESS NOTES
Jose Luis Manzo is a 67 y.o. 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    His case was discussed at the Multidisciplinary Head and Neck Team Planning Meeting on 1/25/18.   The following studies were reviewed: images.  Representatives from Medical Oncology, Radiation Oncology, Head and Neck Surgical Oncology, Psychosocial Oncology, and Speech and Language Pathology discussed the case with the following recommendations:    1) surgery

## 2018-01-26 NOTE — PATIENT INSTRUCTIONS
Call with questions          MICROLARYNGOSCOPY    Description  Laryngoscopy is a procedure in which the surgeon closely examines the larynx (voice box). The key instrument for laryngoscopy is the laryngoscope, which is a hollow metal tube inserted into the mouth. Microlaryngoscopy entails--at minimum--a magnified examination of the larynx using a microscope and/or telescopes. This is performed in the operating room. This allows the surgeon to achieve a diagnosis and also to perform precise treatment for problems on the vocal folds (vocal cords) or other parts of the larynx. Additional interventions may be performed in conjunction with microlaryngoscopy. Common interventions include but are not limited to   Biopsy   Removal of abnormal tissue (polyps, cysts, nodules, leukoplakia)   Resection of cancer   Laser treatment of abnormal tissue (papilloma, leukoplakia)   Vocal fold injection augmentation   Injection of medication (steroid, Avastin)    Instructions: before the procedure  1. NPO after midnight: This is a medical expression for nothing to eat or drink after midnight. It is important to refrain from eating or drinking anything after midnight the night before your surgery.   2. Please refrain from taking any anti-platelet medications such as aspirin; ibuprofen (Advil, Motrin); Aleve; or Plavix (clopidogrel) for 7 days prior to the procedure.  3. If you usually take Coumadin (warfarin), you may need to stop using this a few days prior to the injection.   4. If you are any type of blood thinning (anti-platelet or anti-coagulant) medication and it is not clear what you should do, please clarify this with your surgeon ahead of time. In some cases we rely on other physicians such as cardiologists or hematologists to help with these decisions.  5. Diabetes precautions: If you are usually take oral diabetes medications (such as metformin), refrain from taking your morning dose the day of the procedure and  use sliding-scale insulin for blood sugar control.  6. On the morning of your procedure, it is OK to take your other regular morning medications with a small sip of water. It is particularly important to take any medications that treat heart problems (such as blood pressure, heart rate, heart rhythm) and lung problems  (asthma, COPD). Otherwise, please remember: nothing to eat or drink after midnight.      What to expect during the procedure  Microlaryngoscopy is performed in the operating room while you are asleep under general anesthesia. In most instances, you can expect to be under general anesthesia for approximately 1 to 1 ½ hours.  Nevertheless, the duration of the procedure varies depending on the indication for surgery, intraoperative findings, and other patient-specific/anatomic issues. Our primary concerns are your safety and comfort. Our secondary goal is to provide you with the best possible surgical treatment for your problem. Your surgery will take as long as necessary to accomplish these goals.    What to expect afterwards  The laryngoscope is inserted through the mouth and presses on the tongue. The most common postoperative issues patients encounter are related to the laryngoscope being positioned in the mouth. The extent of these issues is related to patient-specific anatomic issues, the indications for surgery, and the duration of the procedure.    Pain. We will do everything we can to make sure you are as comfortable as possible. Most patients experience very little discomfort after this surgery. Nevertheless, you should expect some soreness in the mouth and throat. Because the ear and the throat share the same sensory nerve, you may also experience some discomfort in the ear. The discomfort is usually worst for the first 48-72 hours and usually resolves within a week. You will be prescribed pain medication, but most patients are sufficiently comfortable with plain Tylenol as  needed.    Laceration. Some patients may notice a small cut in the tongue or in another part of the mouth or throat. This may result in a minor about of blood-tinged saliva for the first 24 hours. This usually heals on its own over the course of a week.    Other tongue problems. As the scope presses down on the tongue, the taste buds get compressed. In addition, sometimes the nerves to the tongue also get compressed. As a result, some patients notice a disturbance in taste, numbness of the tongue, or (even more rarely) weakness of the tongue after surgery. Although sometimes it may take several weeks to months for the problem to completely go away, the tongue problems are temporary in almost every instance.    Tooth problems. Reinforced mouth guards are placed on the teeth to protect them during the surgery and we give a great deal of care and attention to minimizing any pressure on the teeth. However, a chipped or cracked tooth, loss of a tooth, and/or other tooth irregularities are rare but well-recognized complications of laryngoscopy.    Jaw problems. You may experience some pain in the jaw joints (in front of the ears). Even less frequent would be dislocation of the joint. This usually occurs in patients who already have jaw problems.    Instructions: after the procedure  1. Please take the prescribed pain medication as directed. If you are still having discomfort after the prescription runs out, or if you would rather not take a prescription narcotic pain medicine, you may instead take plain acetaminophen (Tylenol) as directed on the packaging.  2. Voice rest. In many instances, we will recommend COMPLETE VOICE REST until your follow-up visit with your surgeon. This means COMPLETE SILENCE - NO TALKING, NO COUGHING, NO WHISPERING. Please see additional information on how to manage complete voice rest. Even if voice rest is not prescribed, for the first week, you should avoid speaking over heavy background noise  or in a very loud voice.   3. Please call our office at [phone] if  - You have a temperature above 101°F  - You develop Increasing pain not relieved by medications  - You have any other questions or concerns  4. Please go immediately to the nearest emergency room if you are experiencing  - Shortness of breath  - Difficulty breathing  - Severe bleeding

## 2018-01-26 NOTE — PROGRESS NOTES
[facility]  Department of Otorhinolaryngology and Communication Sciences    Subjective:      Jose Luis Manzo is a 67 y.o. male who presents for follow-up. 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 His condition is complicated by his renal transplant status (performed in 2007) and chronic immunosuppression.    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 Chandler Kyle. Definitive radiation therapy at [facility] under the care of .  He completed 6300 centigray by December 2014.  A PET scan done at the time was negative for metastatic disease.     He had presented to Dr. Desouza, who brought him for DL/biopsy of bilateral TVC lesions.  Path revealed a microscopic focus of invasive carcinoma of the R TVF and severe dysplasia/CIS of the B TVF. Due to pulmonary findings on PET/CT 5/12/2017 (MIK pleural lesion), Dr. Desouza sent the patient to Dr. Masters, who arranged a CT biopsy. This was negative for malignancy. He recommended repeating chest imaging in 2 months. [place] and he has seen the [facility] transplant team from time to time for surveillance. However it has been a couple years since he has seen them.     MY TREATMENT HISTORY:  12/21/2017 - SML/KTP laser. Findings as below:  1.  Diffuse, bilateral, thickened epithelial change with stippled vascular   ectasia, particularly in the vicinity of the anterior commissure where a   bridging fibrotic scar band was appreciated.  2.  The epithelial irregularities did appear to involve the infrapetiole   epiglottis.  3.  The epithelial irregularities extended below the level of the true vocal   fold for approximately 5 mm.  4.  Sessile leukoplakia in the infrapetiole epiglottis.  5.  A subepithelial cordectomy involving the right true vocal fold was  performed.  6.  The right false vocal fold was excised for surgical exposure and postop surveillance.  7.  The infrapetiole epiglottis was excised for surgical exposure and postop surveillance.  Pathology as follows:  1 LEFT TRUE VOCAL FOLD:  MODERATE TO SEVERE SQUAMOUS DYSPLASIA.  2 LEFT ANTEIROR TRUE VOCAL FOLD:  MODERATE SQUAMOUS DYSPLASIA.  3 RIGHT TRUE VOCAL FOLD TUMOR:  SEVERE SQUAMOUS DYSPLASIA, PRESENT AT CAUTERIZED MARGIN.  FOCAL AREA CANNOT RULE OUT INVASION.  4 DEEP ANTERIOR RIGHT TRUE VOCAL FOLD:  NO DYSPLASIA IDENTIFIED.  5 INFRAPETIOLE EPIGLOTTIS:  MODERATE TO SEVERE SQUAMOUS DYSPLASIA.  6 RIGHT FALSE VOCAL FOLD:  MODERATE SQUAMOUS DYSPLASIA.  7 DEEP MARGIN:  NO TISSUE IDENTIFIED ON SLIDE.  8 LATERAL MARGIN:  NEGATIVE FOR DYSPLASIA.  9 POSTERIOR MARGIN:  NEGATIVE FOR DYSPLASIA. MULTIPLE MULTIPLE  10 INFERIOR MARGIN:  NEGATIVE FOR DYSPLASIA.    In summary, based on intraoperative findings and path report,  - The Right TVF mass was consistent with at least CIS, possible microinvasive cancer. Margins were clear.  - The Right FVF excision had moderate dysplasia.  - Infrapetiole epiglottis excision has moderate to severe squamous dysplasia.  - Biopsy of the Left TVF had severe squamous dysplasia    Since surgery, he saw Dr. Masters, who notes CT chest without contrast done today shows stability of the pulmonary nodule in the left upper lobe and right lower lobe. He plans to repeat the chest CT in 4 months.    His voice has deteriorated since surgery. He denies dysphagia, odynophagia, throat pain, odynophagia, otalgia, hemoptysis, hematemesis, neck mass.    The patient's medications, allergies, past medical, surgical, social and family histories were reviewed and updated as appropriate.    A detailed review of systems was obtained with pertinent positives as per the above HPI, and otherwise negative.      Objective:     VS reviewed    Constitutional: comfortable, well dressed  Psychiatric: appropriate  affect  Respiratory: comfortably breathing, symmetric chest rise, no stridor  Voice: moderate-severe breathiness; severe roughness/strain; variable ventricular phonation  Head: normocephalic  Eyes: conjunctivae and sclerae clear  Lymph: no cervical lymphadenopathy  Neck: soft, full range of motion, laryngotracheal complex palpable with appropriate landmarks, larynx elevates on swallowing  Indirect laryngoscopy: limited due to gag       Data Reviewed    See HPI      Assessment:     Jose Luis Manzo is a 67 y.o. 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 His condition is complicated by his renal transplant status (2007) and chronic immunosuppression.    He has made progress following recent surgical intervention.       Plan:     We discussed this case at our multidisciplinary tumor board yesterday. I had a discussion with the patient regarding his condition and our tumor board's consensus recommendations, which entails proceeding with (as planned) additional endoscopic KTP laser surgical intervention.    I reviewed with him the risks, benefits and alternatives to surgery, as well as the expected postoperative course. Risks included but were not limited to pain; bleeding; infection; scarring; worsening of voice; recurrence; need for additional and/or more extensive procedures; oral/dental problems (pain, laceration, broken or missing teeth); jaw joint problems (pain, dislocation); and tongue problems (pain, laceration, numbness, weakness, taste disturbance). Any surgery on the larynx also carries with it the risks of airway obstruction necessitating tracheotomy. Also inherent in the procedure are the risks of general anesthesia, including but not limited to cardiovascular complications (heart attack, arrhythmia); pulmonary (respiratory failure); neurologic (stroke); and death. I also explained that, if the laser is used, it  presents the risks of vision loss and fire.    I gave him the opportunity to ask questions and I answered all of them to his satisfaction. He wishes to proceed. We will arrange this in the coming weeks. He will have preop testing triage by the anesthesiology team. Same-day discharge is anticipated.    In short, with the diffuse mucosal irregularities including microinvasive cancer and CIS, in addition to his ongoing immunosuppressed status, he will be at high risk for recurrence even despite additional surgery.    He knows to continue to follow up with Dr. Masters regarding the lung nodule.    All questions were answered, and the patient is in agreement with the plan.    This visit was 25 minutes in duration, with over 50% of the time spent in direct face-to-face counseling and coordination of care regarding the issues outlined above.    Gamal Smith M.D.  [facility]  Department of Otorhinolaryngology and Communication Sciences

## 2018-01-31 ENCOUNTER — TELEPHONE (OUTPATIENT)
Dept: OTOLARYNGOLOGY | Facility: CLINIC | Age: 68
End: 2018-01-31

## 2018-02-14 ENCOUNTER — TELEPHONE (OUTPATIENT)
Dept: CARDIOLOGY | Facility: CLINIC | Age: 68
End: 2018-02-14

## 2018-02-14 ENCOUNTER — ANESTHESIA EVENT (OUTPATIENT)
Dept: SURGERY | Facility: HOSPITAL | Age: 68
End: 2018-02-14
Payer: MEDICARE

## 2018-02-14 NOTE — ANESTHESIA PREPROCEDURE EVALUATION
Anesthesia Assessment: Preoperative EQUATION     Planned Procedure: Procedure(s) (LRB):  Suspension microlaryngoscopy with KTP laser excision of lesion (N/A)  Requested Anesthesia Type:General  Surgeon: Gamal Smith MD  Service: ENT  Known or anticipated Date of Surgery:12/21/2017     Surgeon notes: reviewed     Electronic QUestionnaire Assessment completed via nurse interview with patient.         No aq           Triage considerations:      The patient has no apparent active cardiac condition (No unstable coronary Syndrome such as severe unstable angina or recent [<1 month] myocardial infarction, decompensated CHF, severe valvular   disease or significant arrhythmia)     Previous anesthesia records:GETA  Airway (Primary) Present Prior to Hospital Arrival?: Yes; Placement Date: 10/02/17; Placement Time: 1055; Method of Intubation: Glidescope; Inserted by: CRNA; Airway Device: Endotracheal Tube; Mask Ventilation: Easy - oral; Intubated: Postinduction; Blade: Other (see comments); Airway Device Size: 7.5; Style: Cuffed; Cuff Inflation: Minimal occlusive pressure; Inflation Amount: 8; Placement Verified By: Auscultation, Capnometry, Colorimetric EtCO2 device, ETT Condensation; Grade: Grade III; Complicating Factors: Large/Floppy epiglottis, Obesity, Short neck; Intubation Findings: Positive EtCO2, Bilateral breath sounds, Atraumatic/Condition of teeth unchanged;  Depth of Insertion: 23; Securment: Lips; Complications: None; Breath Sounds: Equal Bilateral; Insertion Attempts: 1; Removal Date: 10/02/17;  Removal Time: 1154 10/02/17 1055 by Lindsey Vegas CRNA         Last PCP note: 6-12 months ago , within 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    Pulmonary nodule, left 09/01/2017    H/O laryngeal cancer  08/18/2017    Iron deficiency anemia 10/25/2016    Thrombophilia 10/25/2016    Edema 04/13/2016    DVT (deep venous thrombosis) 04/13/2016       Previous DVT [facility] at [facility] (unknown leg)     R leg iliofemoral DVT with edema      Ultrasound 6/21/2016:              R CFV DVT              L CFV/femoral/POP non-occlusive chronic DVT        Findings are concerning bilateral iliac and caval stenosis    Dermolipoma 04/08/2016    Claudication of right lower extremity 03/10/2016    Eye swelling, bilateral 03/10/2016    Diminished night vision 03/10/2016    Edema of right lower extremity 03/10/2016    Renal transplant recipient 03/10/2016       [facility]    Chronic kidney disease, stage IV (severe) 03/13/2015    Essential hypertension 03/13/2015    Mixed hyperlipidemia 03/13/2015    BPH (benign prostatic hyperplasia) 03/13/2015            Tests already available:  Available tests,  within 3 months , within [facility] .                            Instructions given. (See in Nurse's note)     Optimization:   Medical Opinion Indicated                            Sub-specialist consult indicated:   elorafyis instructions                                         Plan:     Pre-anesthesia  visit                                        Visit focus: previous surgery in oct.                           Consultation:Patient's PCP for a statement of optimization                             Navigation:  Consults scheduled.                        Results will be tracked by Preop Clinic.                                                                                                               03/01/2018  Jose Luis Manzo is a 68 y.o., male.  Pre-operative evaluation for Suspension microlaryngoscopy with KTP laser excision of tumor (N/A)    Chief Complaint: recurrent laryngeal CA    PMH:  ESRD s/p renal tx  s/p XRT for SCCA larynx ([facility]) in [id number]. .       Anesthesia Assessment: Preoperative  EQUATION     Planned Procedure: Procedure(s) (LRB):  Suspension microlaryngoscopy with KTP laser excision of tumor (N/A)  Requested Anesthesia Type:General  Surgeon: Gamal Smith MD  Service: ENT  Known or anticipated Date of Surgery:3/1/2018     Surgeon notes: reviewed     Electronic QUestionnaire Assessment completed via nurse interview with patient.         No Aq           Triage considerations:         Previous anesthesia records:GETA  Airway (Primary) Present Prior to Hospital Arrival?: No; Placement Date: 12/21/17; Placement Time: 1343; Method of Intubation: Direct laryngoscopy; Inserted by: CRNA; Airway Device: Other (Comment) (laser tube); Mask Ventilation: Easy; Intubated: Postinduction; Airway Device Size: 6.0; Style: Cuffed; Inflation Amount:  (saline); Placement Verified By: Auscultation, Capnometry, Colorimetric EtCO2 device, ETT Condensation; Grade: Grade I; Complicating Factors: None; Intubation Findings: Positive EtCO2, Bilateral breath sounds, Atraumatic/Condition of teeth unchanged; Securment: Lips; Complications: None; Breath Sounds: Equal Bilateral; Insertion Attempts: 1; Removal Date: 12/21/17;  Removal Time: 1559 12/21/17 1343 by Veronica Painter CRNA         Last PCP note: within 3 months , within [facility]   Subspecialty notes: Cardiology: General, Hematology/Oncology, Kidney Transplant     Other important co-morbidities:           Diagnosis Date Noted    Larynx cancer 10/19/2017    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    Pulmonary nodule, left 09/01/2017    H/O laryngeal cancer 08/18/2017    Iron deficiency anemia 10/25/2016    Thrombophilia 10/25/2016    Edema 04/13/2016    DVT (deep venous thrombosis) 04/13/2016       Previous DVT treated at [facility] (unknown leg)     R leg iliofemoral DVT with edema      Ultrasound 6/21/2016:              R CFV DVT              L CFV/femoral/POP non-occlusive chronic DVT        Findings are concerning bilateral iliac and caval stenosis     Dermolipoma 04/08/2016    Claudication of right lower extremity 03/10/2016    Eye swelling, bilateral 03/10/2016    Diminished night vision 03/10/2016    Edema of right lower extremity 03/10/2016    Renal transplant recipient 03/10/2016       2007 at [facility]    Chronic kidney disease, stage IV (severe) 03/13/2015    Essential hypertension 03/13/2015    Mixed hyperlipidemia 03/13/2015    BPH (benign prostatic hyperplasia) 03/13/2015            Tests already available:  Available tests,  within 3 months , [facility] .                            Instructions given. (See in Nurse's note)     Optimization:  sub-specialist consult indicated:   Cardiology -- eloquis instructions/ clearance                                        Plan:       Pre-anesthesia  visit                                        Visit focus: recent anesthesia in december                           Consultation:cardiology                             Navigation: 67 yr old male// denies any shortness of breath/ chest pain// On Eloquis, have requested instructions from cardiology.  Addendum:  he should hold his Eliquiis for 2 days prior to surgery.  Dr Christy Mosley RN BC  2/14/18 02/14/2018  Jose Luis Manzo is a 67 y.o., male.    Anesthesia Evaluation         Review of Systems  Anesthesia Hx:  No problems with previous Anesthesia History of prior surgery of interest to airway management or planning: Previous anesthesia: General december 2017 suspension microlaryngoscopy with general anesthesia.    Social:  Former Smoker, No Alcohol Use    Hematology/Oncology:        Hematology Comments: thrombocytopenia Current/Recent Cancer. Oncology Comments: Laryngeal cancer    EENT/Dental:   Larynx cancer/ carcinoma in situ of vocal cord  Hx atul eye swelling  Diminished night vision   Cardiovascular:   Hypertension hyperlipidemia Hx  DVT  On Eliquis Hypertension    Pulmonary:   Hx of pulmonary nodule   Renal/:   Chronic Renal Disease BPH ckd 4  Kidney transplant recipient-- griselda   Hepatic/GI:  Hepatic/GI Normal    Neurological:  Neurology Normal    Endocrine:  Endocrine Normal    Dermatological:  Skin Normal    Psych:  Psychiatric Normal              Anesthesia Plan  Type of Anesthesia, risks & benefits discussed:  Anesthesia Type:  general  Patient's Preference:   Intra-op Monitoring Plan: standard ASA monitors  Intra-op Monitoring Plan Comments:   Post Op Pain Control Plan: per primary service following discharge from PACU  Post Op Pain Control Plan Comments:   Induction:   IV  Beta Blocker:  Patient is on a Beta-Blocker and has received one dose within the past 24 hours (No further documentation required).       Informed Consent: Patient understands risks and agrees with Anesthesia plan.  Questions answered. Anesthesia consent signed with patient.  ASA Score: 3     Day of Surgery Review of History & Physical:    H&P update referred to the surgeon.         Ready For Surgery From Anesthesia Perspective.

## 2018-02-14 NOTE — TELEPHONE ENCOUNTER
He can proceed with procedure        Stop Eliquis 3 days before the procedure         Resume post procedure when safe from a surgical standpoint        Thanks        ZN

## 2018-02-14 NOTE — PRE ADMISSION SCREENING
Anesthesia Assessment: Preoperative EQUATION    Planned Procedure: Procedure(s) (LRB):  Suspension microlaryngoscopy with KTP laser excision of tumor (N/A)  Requested Anesthesia Type:General  Surgeon: Gamal Smith MD  Service: ENT  Known or anticipated Date of Surgery:3/1/2018    Surgeon notes: reviewed    Electronic QUestionnaire Assessment completed via nurse interview with patient.        No Aq        Triage considerations:       Previous anesthesia records:GETA  Airway (Primary) Present Prior to Hospital Arrival?: No; Placement Date: 12/21/17; Placement Time: 1343; Method of Intubation: Direct laryngoscopy; Inserted by: CRNA; Airway Device: Other (Comment) (laser tube); Mask Ventilation: Easy; Intubated: Postinduction; Airway Device Size: 6.0; Style: Cuffed; Inflation Amount:  (saline); Placement Verified By: Auscultation, Capnometry, Colorimetric EtCO2 device, ETT Condensation; Grade: Grade I; Complicating Factors: None; Intubation Findings: Positive EtCO2, Bilateral breath sounds, Atraumatic/Condition of teeth unchanged; Securment: Lips; Complications: None; Breath Sounds: Equal Bilateral; Insertion Attempts: 1; Removal Date: 12/21/17;  Removal Time: 1559 12/21/17 1343 by Veronica Painter CRNA       Last PCP note: within 3 months , within [facility]   Subspecialty notes: Cardiology: General, Hematology/Oncology, Kidney Transplant    Other important co-morbidities:     Diagnosis Date Noted    Larynx cancer 10/19/2017    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    Pulmonary nodule, left 09/01/2017    H/O laryngeal cancer 08/18/2017    Iron deficiency anemia 10/25/2016    Thrombophilia 10/25/2016    Edema 04/13/2016    DVT (deep venous thrombosis) 04/13/2016       Previous DVT [facility] at [facility] ([facility] leg)     R leg iliofemoral DVT with edema      Ultrasound 6/21/2016:              R CFV DVT              L CFV/femoral/POP non-occlusive chronic DVT        Findings are concerning bilateral iliac and caval  stenosis    Dermolipoma 04/08/2016    Claudication of right lower extremity 03/10/2016    Eye swelling, bilateral 03/10/2016    Diminished night vision 03/10/2016    Edema of right lower extremity 03/10/2016    Renal transplant recipient 03/10/2016       2007 at [facility]    Chronic kidney disease, stage IV (severe) 03/13/2015    Essential hypertension 03/13/2015    Mixed hyperlipidemia 03/13/2015    BPH (benign prostatic hyperplasia) 03/13/2015          Tests already available:  Available tests,  within 3 months , within [facility] .            Instructions given. (See in Nurse's note)    Optimization:  sub-specialist consult indicated:   Cardiology -- eloquis instructions/ clearance          Plan:       Pre-anesthesia  visit       Visit focus: recent anesthesia in december     Consultation:cardiology       Navigation: 67 yr old male// denies any shortness of breath/ chest pain// On Eloquis, have requested instructions from cardiology.

## 2018-02-14 NOTE — TELEPHONE ENCOUNTER
----- Message from Negrita Cardona RN sent at 2/14/2018 10:33 AM CST -----  Pt is scheduled 3/1/18 for suspension microlaryngoscopy with KTP laser excision of tumor by Dr. Smith . Again requesting clearance and eloquis instructions please.    MANN Cardona RN BC  Pre-op anesthesia

## 2018-02-28 ENCOUNTER — TELEPHONE (OUTPATIENT)
Dept: OTOLARYNGOLOGY | Facility: CLINIC | Age: 68
End: 2018-02-28

## 2018-02-28 NOTE — TELEPHONE ENCOUNTER
----- Message from Rolando Echavarria sent at 2/28/2018 12:12 PM CST -----  Contact: [phone]  Please contact the pt in regard to his upcoming surgery arrival time, please call [phone]

## 2018-03-01 ENCOUNTER — HOSPITAL ENCOUNTER (OUTPATIENT)
Facility: HOSPITAL | Age: 68
Discharge: HOME | End: 2018-03-01
Attending: OTOLARYNGOLOGY | Admitting: OTOLARYNGOLOGY
Payer: MEDICARE

## 2018-03-01 ENCOUNTER — ANESTHESIA (OUTPATIENT)
Dept: SURGERY | Facility: HOSPITAL | Age: 68
End: 2018-03-01
Payer: MEDICARE

## 2018-03-01 VITALS
BODY MASS INDEX: 34.3 KG/M2 | WEIGHT: 245 LBS | SYSTOLIC BLOOD PRESSURE: 140 MMHG | HEIGHT: 71 IN | DIASTOLIC BLOOD PRESSURE: 80 MMHG | OXYGEN SATURATION: 97 % | TEMPERATURE: 98 F | RESPIRATION RATE: 16 BRPM | HEART RATE: 59 BPM

## 2018-03-01 DIAGNOSIS — C32.9 LARYNX CANCER: ICD-10-CM

## 2018-03-01 DIAGNOSIS — D02.0 CARCINOMA IN SITU OF VOCAL CORD: ICD-10-CM

## 2018-03-01 PROCEDURE — 25000003 PHARM REV CODE 250: Performed by: NURSE ANESTHETIST, CERTIFIED REGISTERED

## 2018-03-01 PROCEDURE — 36000708 HC OR TIME LEV III 1ST 15 MIN: Performed by: OTOLARYNGOLOGY

## 2018-03-01 PROCEDURE — 63600175 PHARM REV CODE 636 W HCPCS: Performed by: OTOLARYNGOLOGY

## 2018-03-01 PROCEDURE — 71000033 HC RECOVERY, INTIAL HOUR: Performed by: OTOLARYNGOLOGY

## 2018-03-01 PROCEDURE — D9220A PRA ANESTHESIA: Mod: ANES,,, | Performed by: ANESTHESIOLOGY

## 2018-03-01 PROCEDURE — 88305 TISSUE EXAM BY PATHOLOGIST: CPT | Mod: 59 | Performed by: PATHOLOGY

## 2018-03-01 PROCEDURE — 25000242 PHARM REV CODE 250 ALT 637 W/ HCPCS: Performed by: ANESTHESIOLOGY

## 2018-03-01 PROCEDURE — 27201423 OPTIME MED/SURG SUP & DEVICES STERILE SUPPLY: Performed by: OTOLARYNGOLOGY

## 2018-03-01 PROCEDURE — 88312 SPECIAL STAINS GROUP 1: CPT | Mod: 26,,, | Performed by: PATHOLOGY

## 2018-03-01 PROCEDURE — 71000015 HC POSTOP RECOV 1ST HR: Performed by: OTOLARYNGOLOGY

## 2018-03-01 PROCEDURE — 31541 LARYNSCOP W/TUMR EXC + SCOPE: CPT | Mod: ,,, | Performed by: OTOLARYNGOLOGY

## 2018-03-01 PROCEDURE — 36000709 HC OR TIME LEV III EA ADD 15 MIN: Performed by: OTOLARYNGOLOGY

## 2018-03-01 PROCEDURE — 37000008 HC ANESTHESIA 1ST 15 MINUTES: Performed by: OTOLARYNGOLOGY

## 2018-03-01 PROCEDURE — 71000039 HC RECOVERY, EACH ADD'L HOUR: Performed by: OTOLARYNGOLOGY

## 2018-03-01 PROCEDURE — 25000003 PHARM REV CODE 250: Performed by: OTOLARYNGOLOGY

## 2018-03-01 PROCEDURE — 63600175 PHARM REV CODE 636 W HCPCS: Performed by: NURSE ANESTHETIST, CERTIFIED REGISTERED

## 2018-03-01 PROCEDURE — 37000009 HC ANESTHESIA EA ADD 15 MINS: Performed by: OTOLARYNGOLOGY

## 2018-03-01 PROCEDURE — D9220A PRA ANESTHESIA: Mod: CRNA,,, | Performed by: NURSE ANESTHETIST, CERTIFIED REGISTERED

## 2018-03-01 RX ORDER — ROCURONIUM BROMIDE 10 MG/ML
INJECTION, SOLUTION INTRAVENOUS
Status: DISCONTINUED | OUTPATIENT
Start: 2018-03-01 | End: 2018-03-01

## 2018-03-01 RX ORDER — PROPOFOL 10 MG/ML
VIAL (ML) INTRAVENOUS
Status: DISCONTINUED | OUTPATIENT
Start: 2018-03-01 | End: 2018-03-01

## 2018-03-01 RX ORDER — FENTANYL CITRATE 50 UG/ML
25 INJECTION, SOLUTION INTRAMUSCULAR; INTRAVENOUS EVERY 5 MIN PRN
Status: DISCONTINUED | OUTPATIENT
Start: 2018-03-01 | End: 2018-03-01

## 2018-03-01 RX ORDER — ONDANSETRON 2 MG/ML
INJECTION INTRAMUSCULAR; INTRAVENOUS
Status: DISCONTINUED | OUTPATIENT
Start: 2018-03-01 | End: 2018-03-01

## 2018-03-01 RX ORDER — SODIUM CHLORIDE 0.9 % (FLUSH) 0.9 %
3 SYRINGE (ML) INJECTION
Status: DISCONTINUED | OUTPATIENT
Start: 2018-03-01 | End: 2018-03-01 | Stop reason: HOSPADM

## 2018-03-01 RX ORDER — FENTANYL CITRATE 50 UG/ML
25 INJECTION, SOLUTION INTRAMUSCULAR; INTRAVENOUS EVERY 5 MIN PRN
Status: DISCONTINUED | OUTPATIENT
Start: 2018-03-01 | End: 2018-03-01 | Stop reason: HOSPADM

## 2018-03-01 RX ORDER — GLYCOPYRROLATE 0.2 MG/ML
INJECTION INTRAMUSCULAR; INTRAVENOUS
Status: DISCONTINUED | OUTPATIENT
Start: 2018-03-01 | End: 2018-03-01

## 2018-03-01 RX ORDER — SODIUM CHLORIDE 9 MG/ML
INJECTION, SOLUTION INTRAVENOUS CONTINUOUS
Status: DISCONTINUED | OUTPATIENT
Start: 2018-03-01 | End: 2018-03-01 | Stop reason: HOSPADM

## 2018-03-01 RX ORDER — LIDOCAINE HYDROCHLORIDE 10 MG/ML
1 INJECTION, SOLUTION EPIDURAL; INFILTRATION; INTRACAUDAL; PERINEURAL ONCE
Status: COMPLETED | OUTPATIENT
Start: 2018-03-01 | End: 2018-03-01

## 2018-03-01 RX ORDER — NEOSTIGMINE METHYLSULFATE 1 MG/ML
INJECTION, SOLUTION INTRAVENOUS
Status: DISCONTINUED | OUTPATIENT
Start: 2018-03-01 | End: 2018-03-01

## 2018-03-01 RX ORDER — LIDOCAINE HYDROCHLORIDE 40 MG/ML
INJECTION, SOLUTION RETROBULBAR
Status: DISCONTINUED | OUTPATIENT
Start: 2018-03-01 | End: 2018-03-01 | Stop reason: HOSPADM

## 2018-03-01 RX ORDER — ALBUTEROL SULFATE 0.83 MG/ML
SOLUTION RESPIRATORY (INHALATION)
Status: DISCONTINUED
Start: 2018-03-01 | End: 2018-03-01 | Stop reason: HOSPADM

## 2018-03-01 RX ORDER — EPINEPHRINE 1 MG/ML
INJECTION, SOLUTION INTRACARDIAC; INTRAMUSCULAR; INTRAVENOUS; SUBCUTANEOUS
Status: DISCONTINUED | OUTPATIENT
Start: 2018-03-01 | End: 2018-03-01 | Stop reason: HOSPADM

## 2018-03-01 RX ORDER — MIDAZOLAM HYDROCHLORIDE 1 MG/ML
INJECTION, SOLUTION INTRAMUSCULAR; INTRAVENOUS
Status: DISCONTINUED | OUTPATIENT
Start: 2018-03-01 | End: 2018-03-01

## 2018-03-01 RX ORDER — ALBUTEROL SULFATE 0.83 MG/ML
2.5 SOLUTION RESPIRATORY (INHALATION) EVERY 4 HOURS PRN
Status: DISCONTINUED | OUTPATIENT
Start: 2018-03-01 | End: 2018-03-01 | Stop reason: HOSPADM

## 2018-03-01 RX ORDER — CISATRACURIUM BESYLATE 10 MG/ML
INJECTION, SOLUTION INTRAVENOUS
Status: DISCONTINUED | OUTPATIENT
Start: 2018-03-01 | End: 2018-03-01

## 2018-03-01 RX ORDER — DEXAMETHASONE SODIUM PHOSPHATE 4 MG/ML
12 INJECTION, SOLUTION INTRA-ARTICULAR; INTRALESIONAL; INTRAMUSCULAR; INTRAVENOUS; SOFT TISSUE
Status: DISCONTINUED | OUTPATIENT
Start: 2018-03-01 | End: 2018-03-01 | Stop reason: HOSPADM

## 2018-03-01 RX ORDER — FENTANYL CITRATE 50 UG/ML
INJECTION, SOLUTION INTRAMUSCULAR; INTRAVENOUS
Status: DISCONTINUED | OUTPATIENT
Start: 2018-03-01 | End: 2018-03-01

## 2018-03-01 RX ORDER — LIDOCAINE HCL/PF 100 MG/5ML
SYRINGE (ML) INTRAVENOUS
Status: DISCONTINUED | OUTPATIENT
Start: 2018-03-01 | End: 2018-03-01

## 2018-03-01 RX ADMIN — LIDOCAINE HYDROCHLORIDE 0.1 MG: 10 INJECTION, SOLUTION EPIDURAL; INFILTRATION; INTRACAUDAL; PERINEURAL at 11:03

## 2018-03-01 RX ADMIN — FENTANYL CITRATE 25 MCG: 50 INJECTION, SOLUTION INTRAMUSCULAR; INTRAVENOUS at 03:03

## 2018-03-01 RX ADMIN — CISATRACURIUM BESYLATE 4 MG: 10 INJECTION INTRAVENOUS at 01:03

## 2018-03-01 RX ADMIN — ALBUTEROL SULFATE 2.5 MG: 2.5 SOLUTION RESPIRATORY (INHALATION) at 03:03

## 2018-03-01 RX ADMIN — FENTANYL CITRATE 25 MCG: 50 INJECTION, SOLUTION INTRAMUSCULAR; INTRAVENOUS at 02:03

## 2018-03-01 RX ADMIN — ONDANSETRON 4 MG: 2 INJECTION INTRAMUSCULAR; INTRAVENOUS at 02:03

## 2018-03-01 RX ADMIN — LIDOCAINE HYDROCHLORIDE 100 MG: 20 INJECTION, SOLUTION INTRAVENOUS at 12:03

## 2018-03-01 RX ADMIN — MIDAZOLAM HYDROCHLORIDE 2 MG: 1 INJECTION, SOLUTION INTRAMUSCULAR; INTRAVENOUS at 12:03

## 2018-03-01 RX ADMIN — GLYCOPYRROLATE 0.2 MG: 0.2 INJECTION, SOLUTION INTRAMUSCULAR; INTRAVENOUS at 01:03

## 2018-03-01 RX ADMIN — SODIUM CHLORIDE: 0.9 INJECTION, SOLUTION INTRAVENOUS at 02:03

## 2018-03-01 RX ADMIN — ROCURONIUM BROMIDE 30 MG: 10 INJECTION, SOLUTION INTRAVENOUS at 12:03

## 2018-03-01 RX ADMIN — NEOSTIGMINE METHYLSULFATE 5 MG: 1 INJECTION INTRAVENOUS at 02:03

## 2018-03-01 RX ADMIN — FENTANYL CITRATE 50 MCG: 50 INJECTION, SOLUTION INTRAMUSCULAR; INTRAVENOUS at 12:03

## 2018-03-01 RX ADMIN — PROPOFOL 180 MG: 10 INJECTION, EMULSION INTRAVENOUS at 12:03

## 2018-03-01 RX ADMIN — ROCURONIUM BROMIDE 20 MG: 10 INJECTION, SOLUTION INTRAVENOUS at 01:03

## 2018-03-01 RX ADMIN — GLYCOPYRROLATE 0.6 MG: 0.2 INJECTION, SOLUTION INTRAMUSCULAR; INTRAVENOUS at 02:03

## 2018-03-01 RX ADMIN — SODIUM CHLORIDE: 0.9 INJECTION, SOLUTION INTRAVENOUS at 11:03

## 2018-03-01 NOTE — BRIEF OP NOTE
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    Estimated Blood Loss: * No values recorded between 3/1/2018  1:08 PM and 3/1/2018  3:01 PM *         Specimens:   Specimen (12h ago through future)    Start     Ordered    03/01/18 1437  Specimen to Pathology - Surgery  Once     Comments:  1. Infrapetiole epiglottis-perm2. Left false vocal fold-perm3.Anterior commissure-perm4. Left true vocal fold-perm5. Right true vocal fold-perm      03/01/18 1441    Pending  Specimen to Pathology - Surgery  Once     Comments:  1. infra petiole epiglottis-perm      Pending          Discharge Note    SUMMARY     Admit Date: 3/1/2018    Discharge Date and Time:  03/01/2018 3:15 PM    Hospital Course (synopsis of major diagnoses, care, treatment, and services provided during the course of the hospital stay): Following completion of an electively scheduled procedure, the patient was transferred to the PACU for postoperative monitoring. The post operative course was uneventful and noted for adequate pain control and PO intake following surgery. The patient is discharged home in good condition and will follow-up with Dr. Gibran Smith MD     Final Diagnosis: Post-Op Diagnosis Codes:     * Larynx cancer [C32.9]     * Carcinoma in situ of vocal cord  [D02.0]    Disposition: Home or Self Care    Follow Up/Patient Instructions:     Medications:  Reconciled Home Medications:   Current Discharge Medication List      CONTINUE these medications which have NOT CHANGED    Details   amlodipine (NORVASC) 10 MG tablet TAKE 1 TABLET EVERY DAY  Qty: 90 tablet, Refills: 3    Associated Diagnoses: Essential hypertension, benign      apixaban (ELIQUIS) 2.5 mg Tab Take 1 tablet (2.5 mg total) by mouth 2 (two) times daily.  Qty: 60 tablet, Refills: 11    Associated Diagnoses: Acute deep vein thrombosis (DVT) of lower extremity, unspecified laterality, unspecified vein      atorvastatin (LIPITOR) 20 MG tablet TAKE 1 TABLET (20 MG TOTAL) BY MOUTH EVERY OTHER DAY.  Qty: 45 tablet, Refills: 1      ferrous sulfate 325 mg (65 mg iron) Tab tablet Take 325 mg by mouth once daily.      finasteride (PROSCAR) 5 mg tablet TAKE 1 TABLET EVERY DAY  Qty: 90 tablet, Refills: 3      metoprolol succinate (TOPROL-XL) 100 MG 24 hr tablet TAKE 1 TABLET EVERY DAY  Qty: 90 tablet, Refills: 3      predniSONE (DELTASONE) 5 MG tablet Take 1 tablet (5 mg total) by mouth once daily.  Qty: 90 tablet, Refills: 3    Associated Diagnoses: Renal transplant recipient      sirolimus (RAPAMUNE) 1 MG Tab Take 3 tablets (3 mg total) by mouth once daily.  Qty: 90 tablet, Refills: 3    Associated Diagnoses: Kidney transplant recipient      sodium bicarbonate 650 MG tablet TAKE THREE TABLETS BY MOUTH THREE TIMES DAILY  Qty: 270 tablet, Refills: 11    Comments: Please consider 90 day supplies to promote better adherence      hydrocodone-acetaminophen 5-325mg (NORCO) 5-325 mg per tablet Take 1 tablet by mouth every 6 (six) hours as needed for Pain.  Qty: 30 tablet, Refills: 0      omeprazole (PRILOSEC) 40 MG capsule Take 1 capsule (40 mg total) by mouth every morning.  Qty: 30 capsule, Refills: 11             Discharge Procedure Orders  Diet renal     Activity as tolerated     Notify your health care provider if you  experience any of the following:  persistent nausea and vomiting or diarrhea     Notify your health care provider if you experience any of the following:  severe uncontrolled pain     Notify your health care provider if you experience any of the following:  difficulty breathing or increased cough       Follow-up Information     Gamal Smith MD. Schedule an appointment as soon as possible for a visit in 2 weeks.    Specialty:  Otolaryngology  Why:  For post-op visit  Contact information:  [address] [facility] [address]  [phone]

## 2018-03-01 NOTE — TRANSFER OF CARE
"Anesthesia Transfer of Care Note    Patient: Jose Luis Manzo    Procedure(s) Performed: Procedure(s) (LRB):  Suspension microlaryngoscopy with KTP laser excision of tumor (N/A)    Patient location: PACU    Anesthesia Type: general    Transport from OR: Transported from OR on 6-10 L/min O2 by face mask with adequate spontaneous ventilation    Post pain: adequate analgesia    Post assessment: no apparent anesthetic complications and tolerated procedure well    Post vital signs: stable    Level of consciousness: lethargic, awake and alert    Nausea/Vomiting: no nausea/vomiting    Complications: none    Transfer of care protocol was followed      Last vitals:   Visit Vitals  Ht 5' 11" (1.803 m)   Wt 111.1 kg (245 lb)   BMI 34.17 kg/m²     "

## 2018-03-01 NOTE — H&P
[facility]  Department of Otorhinolaryngology and Communication Sciences     Subjective:      Jose Luis Manzo is a 67 y.o. male who presents for follow-up. 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 His condition is complicated by his renal transplant status (performed in 2007) and chronic immunosuppression.     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 Chandler Kyle. Definitive radiation therapy at [facility] under the care of .  He completed 6300 centigray by December 2014.  A PET scan done at the time was negative for metastatic disease.     He had presented to Dr. Desouza, who brought him for DL/biopsy of bilateral TVC lesions.  Path revealed a microscopic focus of invasive carcinoma of the R TVF and severe dysplasia/CIS of the B TVF. Due to pulmonary findings on PET/CT 5/12/2017 (MIK pleural lesion), Dr. Desouza sent the patient to Dr. Masters, who arranged a CT biopsy. This was negative for malignancy. He recommended repeating chest imaging in 2 months. [place] and he has seen the [facility] transplant team from time to time for surveillance. However it has been a couple years since he has seen them.      MY TREATMENT HISTORY:  12/21/2017 - SML/KTP laser. Findings as below:  1.  Diffuse, bilateral, thickened epithelial change with stippled vascular   ectasia, particularly in the vicinity of the anterior commissure where a   bridging fibrotic scar band was appreciated.  2.  The epithelial irregularities did appear to involve the infrapetiole   epiglottis.  3.  The epithelial irregularities extended below the level of the true vocal   fold for approximately 5 mm.  4.  Sessile leukoplakia in the infrapetiole epiglottis.  5.  A subepithelial cordectomy involving the right true vocal fold  was performed.  6.  The right false vocal fold was excised for surgical exposure and postop surveillance.  7.  The infrapetiole epiglottis was excised for surgical exposure and postop surveillance.  Pathology as follows:  1 LEFT TRUE VOCAL FOLD:  MODERATE TO SEVERE SQUAMOUS DYSPLASIA.  2 LEFT ANTEIROR TRUE VOCAL FOLD:  MODERATE SQUAMOUS DYSPLASIA.  3 RIGHT TRUE VOCAL FOLD TUMOR:  SEVERE SQUAMOUS DYSPLASIA, PRESENT AT CAUTERIZED MARGIN.  FOCAL AREA CANNOT RULE OUT INVASION.  4 DEEP ANTERIOR RIGHT TRUE VOCAL FOLD:  NO DYSPLASIA IDENTIFIED.  5 INFRAPETIOLE EPIGLOTTIS:  MODERATE TO SEVERE SQUAMOUS DYSPLASIA.  6 RIGHT FALSE VOCAL FOLD:  MODERATE SQUAMOUS DYSPLASIA.  7 DEEP MARGIN:  NO TISSUE IDENTIFIED ON SLIDE.  8 LATERAL MARGIN:  NEGATIVE FOR DYSPLASIA.  9 POSTERIOR MARGIN:  NEGATIVE FOR DYSPLASIA. MULTIPLE MULTIPLE  10 INFERIOR MARGIN:  NEGATIVE FOR DYSPLASIA.     In summary, based on intraoperative findings and path report,  - The Right TVF mass was consistent with at least CIS, possible microinvasive cancer. Margins were clear.  - The Right FVF excision had moderate dysplasia.  - Infrapetiole epiglottis excision has moderate to severe squamous dysplasia.  - Biopsy of the Left TVF had severe squamous dysplasia     Since surgery, he saw Dr. Masters, who notes CT chest without contrast done today shows stability of the pulmonary nodule in the left upper lobe and right lower lobe. He plans to repeat the chest CT in 4 months.     His voice has deteriorated since surgery. He denies dysphagia, odynophagia, throat pain, odynophagia, otalgia, hemoptysis, hematemesis, neck mass.     The patient's medications, allergies, past medical, surgical, social and family histories were reviewed and updated as appropriate.     A detailed review of systems was obtained with pertinent positives as per the above HPI, and otherwise negative.      Objective:      VS reviewed     Constitutional: comfortable, well dressed  Psychiatric:  appropriate affect  Respiratory: comfortably breathing, symmetric chest rise, no stridor  Voice: moderate-severe breathiness; severe roughness/strain; variable ventricular phonation  Head: normocephalic  Eyes: conjunctivae and sclerae clear  Lymph: no cervical lymphadenopathy  Neck: soft, full range of motion, laryngotracheal complex palpable with appropriate landmarks, larynx elevates on swallowing  Indirect laryngoscopy: limited due to gag        Data Reviewed     See HPI        Assessment:      Jose Luis Manzo is a 67 y.o. 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 His condition is complicated by his renal transplant status (2007) and chronic immunosuppression.     He has made progress following recent surgical intervention.        Plan:      We discussed this case at our multidisciplinary tumor board yesterday. I had a discussion with the patient regarding his condition and our tumor board's consensus recommendations, which entails proceeding with (as planned) additional endoscopic KTP laser surgical intervention.     I reviewed with him the risks, benefits and alternatives to surgery, as well as the expected postoperative course. Risks included but were not limited to pain; bleeding; infection; scarring; worsening of voice; recurrence; need for additional and/or more extensive procedures; oral/dental problems (pain, laceration, broken or missing teeth); jaw joint problems (pain, dislocation); and tongue problems (pain, laceration, numbness, weakness, taste disturbance). Any surgery on the larynx also carries with it the risks of airway obstruction necessitating tracheotomy. Also inherent in the procedure are the risks of general anesthesia, including but not limited to cardiovascular complications (heart attack, arrhythmia); pulmonary (respiratory failure); neurologic (stroke); and death. I also explained that, if  the laser is used, it presents the risks of vision loss and fire.     I gave him the opportunity to ask questions and I answered all of them to his satisfaction. He wishes to proceed. We will arrange this in the coming weeks. He will have preop testing triage by the anesthesiology team. Same-day discharge is anticipated.     In short, with the diffuse mucosal irregularities including microinvasive cancer and CIS, in addition to his ongoing immunosuppressed status, he will be at high risk for recurrence even despite additional surgery.     He knows to continue to follow up with Dr. Masters regarding the lung nodule.     All questions were answered, and the patient is in agreement with the plan.     This visit was 25 minutes in duration, with over 50% of the time spent in direct face-to-face counseling and coordination of care regarding the issues outlined above.     Gamal Smith M.D.  [facility]  Department of Otorhinolaryngology and Communication Sciences    H&P completed on 1/28 has been reviewed, the patient has been examined and:  I concur with the findings and no changes have occurred since H&P was written.    Active Hospital Problems    Diagnosis  POA    Carcinoma in situ of vocal cord [D02.0]  Yes      Resolved Hospital Problems    Diagnosis Date Resolved POA   No resolved problems to display.

## 2018-03-02 NOTE — ANESTHESIA POSTPROCEDURE EVALUATION
"Anesthesia Post Evaluation    Patient: Jose Luis Manzo    Procedure(s) Performed: Procedure(s) (LRB):  Suspension microlaryngoscopy with KTP laser excision of tumor (N/A)    Final Anesthesia Type: general  Patient location during evaluation: PACU  Patient participation: Yes- Able to Participate  Level of consciousness: awake and alert and awake  Post-procedure vital signs: reviewed and stable  Pain management: adequate  Airway patency: patent  PONV status at discharge: No PONV  Anesthetic complications: no      Cardiovascular status: blood pressure returned to baseline  Respiratory status: unassisted and spontaneous ventilation (required nebulizer treatment in recovery. Resolved. )  Hydration status: euvolemic  Follow-up not needed.        Visit Vitals  BP (!) 140/80   Pulse (!) 59   Temp 36.4 °C (97.5 °F) (Temporal)   Resp 16   Ht 5' 11" (1.803 m)   Wt 111.1 kg (245 lb)   SpO2 97%   BMI 34.17 kg/m²       Pain/James Score: Pain Assessment Performed: Yes (3/1/2018  5:45 PM)  Presence of Pain: denies (3/1/2018  5:45 PM)  James Score: 10 (3/1/2018  4:55 PM)      "

## 2018-03-02 NOTE — OP NOTE
DATE OF PROCEDURE:  03/01/2018    PREOPERATIVE DIAGNOSES:  1.  Recurrent glottic squamous cell carcinoma.  2.  Bilateral true vocal fold carcinoma in situ.  3.  Diffuse endolaryngeal dysplasia.  4.  History of glottic cancer, status post chemoradiation.    POSTOPERATIVE DIAGNOSES:  1.  Recurrent glottic squamous cell carcinoma.  2.  Bilateral true vocal fold carcinoma in situ.  3.  Diffuse endolaryngeal dysplasia.  4.  History of glottic cancer, status post chemoradiation.    PROCEDURE:  Suspension microlaryngoscopy with KTP laser resection of left false   vocal fold, in addition to multiple endolaryngeal biopsies.    SURGEON:  Gamal Smith M.D.    ASSISTANT:  Brandt Oswald M.D. (RES)    ANESTHESIA:  General.    BLOOD LOSS:  Less than 5 mL.    COMPLICATIONS:  None.    SPECIMENS:  1.  Left false vocal fold.  2.  Anterior commissure of larynx.  3.  Left true vocal fold.  4.  Right true vocal fold.    FINDINGS:  The entire endolarynx was marked by diffuse heaped up epithelial   change with stippled vascular ectasia, all of which was very concerning for   malignancy.  Of note, in the anterior commissure region, a significant extension   out into the inner lamina of the thyroid cartilage was appreciated, highly   concerning for cancer.  In this region, there was significant necrosis of   tissue.    INDICATIONS FOR PROCEDURE:  The patient is a 68-year-old gentleman who is   chronically immunosuppressed, status post renal transplant, with a history of a   left-sided glottic squamous cell carcinoma, staged at T1 N0 M0, which was   treated with primary radiation therapy.  Nevertheless, he had developed   recurrence versus persistent disease with epithelial changes involving his   entire endolarynx.  The patient was brought to the Operating Room for   microlaryngoscopy with biopsy in October 2017.  Specimens at that time were   positive for microinvasive cancer in the right vocal fold, but diffusely   positive for  carcinoma in situ of the bilateral true vocal folds.  Options were   discussed with the patient included an attempt at endoscopic transoral   laser-assisted control or open laryngeal surgery, which likely would require   total laryngectomy.  He met with me and one of my [facility]   Oncology partners to discuss his options.  He desired to proceed with endoscopic   laser-assisted control.  He underwent initial endoscopic surgery in December 2017. Despite my clinical impression, none of the biopsies from that time yielded   an invasive cancer diagnosis. and presents today for additional endoscopic surgery.  I reviewed with him   the risks and benefits of surgery with risks included but not limited to pain,   bleeding, infection, scarring, worsening of voice, worsening of swallowing,   aspiration; damage to the lips, teeth, gums or tongue, tongue numbness, taste   disturbance, laser injury including vision loss and airway fire, airway   obstruction, necessitating intubation and tracheotomy, and risks of general   anesthesia.  In spite of the risks of surgery, the patient desired to move   forward with the procedure.  Informed consent was obtained.    PROCEDURE IN DETAIL:  The patient was positively identified in the preoperative   holding area.  He was brought to the Operating Room and was placed in a flat   supine position.  General endotracheal anesthesia was obtained with a size 6   laser-safe endotracheal tube, which was secured to the left lower lip.  The   table was rotated to 90 degrees.  The eyes were protected with moist sponges.    Final time-out was performed for verification purposes.  Moist sponges were   placed to protect the upper alveolar ridge.  The size 5 Zeitels glottiscope was   inserted into the oral cavity via the right lingual gutter and was utilized to   expose the larynx at the level of the glottis.  The patient was suspended from   the [place]  the usual fashion.  Findings  were as noted above.  Attention   was then turned to performing an excision of the left ventricular fold.  The   laryngoscope was repositioned so as to optimally expose the left ventricular   fold.  The patient was suspended from the [place]  the usual fashion.  All   Operating Room personnel were equipped with laser-safe eyewear and all exposed   regions of the patient's skin and face were protected with moist sponges and   towels.  The fraction of inspired oxygen was maintained at less than 30%.  The   0.6 mm KTP laser fiber was loaded onto a straight handpiece.  The initial   settings were 4 milligan continuous, but this was eventually increased to 6 milligan   continuous.  The operating microscope was brought into the field and the   remainder of the case was performed under the maximum magnification.  The KTP   laser fiber was used to perform a complete excision of the left ventricular   fold, the medial most aspect of which was marked by diffuse, heaped up epithelial   change, highly suspicious for cancer.  This was passed off the field for   permanent pathologic analysis.  The laryngoscope was thereafter repositioned to   expose the level of the glottis.  The subglottis itself appeared free of any   disease.  However, diffuse heaped up leukoplakic change with vascular ectasia   was present throughout the entire endolarynx at the anterior commissure.    Significant necrosis of tissue out to the inner lamina of the thyroid cartilage   was noted, all of which raised high concern for malignancy.  Multiple specimens   were taken from the glottic level in the vicinity of the anterior commissure.    In addition, two specimens were obtained under microlaryngeal magnification of   both the right and the left true vocal fold.  Of note, the prior site of the KTP   laser resection was noted to be re-epithelialized at the epithelium and this   region also appeared to be irregular in nature.  The larynx was topically    anesthetized with 3 mL of 4% lidocaine.  The region was noted to be hemostatic   after temporary placement of an epinephrine soaked cottonoid.  All equipment was   removed.  The patient was turned back over to the Anesthesiology Team for   awakening and extubation, which were uneventful.  The patient was escorted to   the Recovery Room in good condition.  The patient tolerated the procedure well.    There were no complications.  All needle, sponge, and instrument counts were   correct at the end of the case.    ATTESTATION:  I, as the attending of record, was present and participated in all   portions of this procedure.      CASSIDY  dd: 03/01/2018 15:14:23 (CST)  td: 03/01/2018 21:57:04 (CST)  Doc ID   #[id number]  Job ID #[id number]    CC:

## 2018-03-02 NOTE — PLAN OF CARE
Problem: Patient Care Overview  Goal: Plan of Care Review  Outcome: Outcome(s) achieved Date Met: 03/01/18  Awake and alert. VSS. Denies pain or nausea. Tolerating liquids well. DC instructions given to patient and he verbalizes understanding.

## 2018-03-16 RX ORDER — ATORVASTATIN CALCIUM 20 MG/1
20 TABLET, FILM COATED ORAL EVERY OTHER DAY
Qty: 45 TABLET | Refills: 1 | Status: SHIPPED | OUTPATIENT
Start: 2018-03-16 | End: 2020-11-18 | Stop reason: SDUPTHER

## 2018-03-27 NOTE — PROGRESS NOTES
[facility]  Department of Otorhinolaryngology and Communication Sciences    Subjective:      Jose Luis Manzo is a 68 y.o. male who presents for follow-up. 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 His condition is complicated by his renal transplant status (performed in 2007) and chronic immunosuppression. [place], and he has seen the [facility] transplant team from time to time for surveillance. However it has been a couple years since he has seen them.     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 Chandler Kyle. Definitive radiation therapy at [facility] under the care of .  He completed 6300 centigray by December 2014.  A PET scan done at the time was negative for metastatic disease.     He had presented to Dr. Desouza in 5/2017. She brought him for DL/biopsy of bilateral TVF lesions.  Path revealed a microscopic focus of invasive carcinoma of the R TVF and severe dysplasia/CIS of the B TVF. Due to pulmonary findings on PET/CT 5/12/2017 (MIK pleural lesion), Dr. Desouza sent the patient to Dr. Masters, who arranged a CT biopsy. This was negative for malignancy.       Dr. Desouza had referred him to Dr. Mcadams, who felt that, due to a functional larynx and ostensibly low burden of disease, an attempt at endoscopic control was reasonable. As such, I have brought him to the OR twice for microlaryngoscopy with KTP laser assisted treatment (12/21/2017 and 3/1/2018). Each time I have noted diffuse post-radiation changes and have had significant concern for locally advanced disease, particularly at the anterior commissure and infrapetiole epiglottis. At his last procedure, I was biopsying bulky disease extending into the thyroid cartilage at the glottic level.    12/21/2017 - SML/KTP laser  excision of right vocal fold tumor. Pathology as follows:  1 LEFT TRUE VOCAL FOLD:  MODERATE TO SEVERE SQUAMOUS DYSPLASIA.  2 LEFT ANTEIROR TRUE VOCAL FOLD:  MODERATE SQUAMOUS DYSPLASIA.  3 RIGHT TRUE VOCAL FOLD TUMOR:  SEVERE SQUAMOUS DYSPLASIA, PRESENT AT CAUTERIZED MARGIN.  FOCAL AREA CANNOT RULE OUT INVASION.  4 DEEP ANTERIOR RIGHT TRUE VOCAL FOLD:  NO DYSPLASIA IDENTIFIED.  5 INFRAPETIOLE EPIGLOTTIS:  MODERATE TO SEVERE SQUAMOUS DYSPLASIA.  6 RIGHT FALSE VOCAL FOLD:  MODERATE SQUAMOUS DYSPLASIA.  7 DEEP MARGIN:  NO TISSUE IDENTIFIED ON SLIDE.  8 LATERAL MARGIN:  NEGATIVE FOR DYSPLASIA.  9 POSTERIOR MARGIN:  NEGATIVE FOR DYSPLASIA. MULTIPLE MULTIPLE  10 INFERIOR MARGIN:  NEGATIVE FOR DYSPLASIA.    3/1/2018: SML/KTP laser resection of left false vocal fold, in addition to multiple endolaryngeal biopsies.  1. INFRAPETIOLE EPIGLOTTIS, BIOPSY:  Extremely cauterized benign squamous mucosa with chronic inflammation  No evidence of dysplasia or malignancy  (deeper levels have also been evaluated)  2. LEFT FALSE VOCAL CORD, BIOPSY:  Moderate squamous dysplasia  No evidence of malignancy  (deeper levels have also been evaluated)  3. ANTERIOR COMMISSURE, BIOPSY:   Cartilage, granulation tissue and necrosis  No evidence of dysplasia or malignancy  Special stain GMS for fungal organisms is negative  4. LEFT TRUE VOCAL CORD, BIOPSY:  Moderate squamous dysplasia  No evidence of malignancy  (deeper levels have also been evaluated)  5. RIGHT TRUE VOCAL CORD, BIOPSY:  Acutely inflamed granulation tissue  No evidence of malignancy    In short, despite pathology reports, based on intraoperative findings and my clinical impression, I remain concerned for a locally advanced laryngeal cancer.    Dr. Masters had planned to repeat the chest CT in about 4 months (pulmonary nodule in the left upper lobe and right lower lobe); this has not yet occurred.    The patient's voice has deteriorated. He complains of dysphonia and excess saliva in  the throat. He says that certain things he eats or drinks bothers his throat. He denies pain in the throat. He complains of intermittent right-sided otalgia. He says sometimes he coughs when swallowing large sips of water    The patient's medications, allergies, past medical, surgical, social and family histories were reviewed and updated as appropriate.    A detailed review of systems was obtained with pertinent positives as per the above HPI, and otherwise negative.      Objective:     VS reviewed    Constitutional: comfortable, well dressed  Psychiatric: appropriate affect  Respiratory: comfortably breathing, symmetric chest rise, no stridor  Voice: severe breathiness; severe roughness/strain; variable ventricular phonation  Head: normocephalic  Eyes: conjunctivae and sclerae clear  Lymph: no cervical lymphadenopathy  Neck: soft, full range of motion, laryngotracheal complex palpable with appropriate landmarks, larynx elevates on swallowing  Indirect laryngoscopy: limited due to gag      Data Reviewed  Flexible Laryngoscopy (12303): Laryngoscopy is indicated for assessment of upper aerodigestive structure and function. This was carried out transnasally with a distal chip videoendoscope. After verbal consent was obtained, the patient was positioned and the nose was topically decongested with 1% phenylephrine and topically anesthetized with 4% lidocaine. The endoscope was passed through the most patent nasal cavity and positioned to image the nasopharynx, larynx, and hypopharynx in detail. The following features were examined: nasopharyngeal, laryngeal, hypopharyngeal masses; velopharyngeal strength, closure, and symmetry of motion; vocal fold range and symmetry of motion; laryngeal mucosal edema, erythema, inflammation, and hydration; salivary pooling; and gross laryngeal sensation. The equipment was removed. The patient tolerated the procedure well without complication. All findings were normal except:  - left  vocal fold with dense immobility  - right ventricular fold with bulky exophytic fullness hooding over the glottis  - diffuse granular epithelium involving the entire endolarynx  - exudative debris along anterior commissure, particularly on the left  - right vocal fold with mild motion impairment; intact abduction; slightly limited adduction    Data Reviewed    See HPI      Assessment:     Jose Luis Manzo is a 68 y.o. 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 His condition is complicated by his renal transplant status (2007) and chronic immunosuppression.    Despite attempts at endoscopic surgical clearance of disease, he now demonstrates an densely hypomobile left vocal fold, accompanied by progressive dysphonia and dysphagia. Based on intraoperative findings and his clinical progress, I remain concerned about locally advanced disease, although chondronecrosis is also a possible diagnosis.       Plan:     Modified barium swallow study is necessary for evaluation of oral, pharyngeal, and proximal esophageal swallowing function.     I recommended obtaining updated CT neck/chest. These will be noncontrasted scans due to his renal insufficiency. I will have him follow up with Dr. Mcadams afterwards for discussion of further options, which may include total laryngectomy and reconstruction.    He knows to continue to follow up with Dr. Masters.    All questions were answered, and the patient is in agreement with the plan.    This visit was 25 minutes in duration, with over 50% of the time spent in direct face-to-face counseling and coordination of care regarding the issues outlined above.    Gamal Smith M.D.  [facility]  Department of Otorhinolaryngology and Communication Sciences

## 2018-03-28 ENCOUNTER — TELEPHONE (OUTPATIENT)
Dept: SPEECH THERAPY | Facility: HOSPITAL | Age: 68
End: 2018-03-28

## 2018-03-28 ENCOUNTER — HOSPITAL ENCOUNTER (OUTPATIENT)
Dept: RADIOLOGY | Facility: HOSPITAL | Age: 68
Discharge: HOME | End: 2018-03-28
Attending: OTOLARYNGOLOGY
Payer: MEDICARE

## 2018-03-28 ENCOUNTER — CLINICAL SUPPORT (OUTPATIENT)
Dept: SPEECH THERAPY | Facility: HOSPITAL | Age: 68
End: 2018-03-28
Attending: OTOLARYNGOLOGY
Payer: MEDICARE

## 2018-03-28 ENCOUNTER — HOSPITAL ENCOUNTER (OUTPATIENT)
Dept: RADIOLOGY | Facility: HOSPITAL | Age: 68
Discharge: HOME | End: 2018-03-28
Attending: INTERNAL MEDICINE
Payer: MEDICARE

## 2018-03-28 ENCOUNTER — OFFICE VISIT (OUTPATIENT)
Dept: OTOLARYNGOLOGY | Facility: CLINIC | Age: 68
End: 2018-03-28
Payer: MEDICARE

## 2018-03-28 VITALS
SYSTOLIC BLOOD PRESSURE: 131 MMHG | HEART RATE: 75 BPM | TEMPERATURE: 98 F | WEIGHT: 213.63 LBS | DIASTOLIC BLOOD PRESSURE: 75 MMHG | BODY MASS INDEX: 29.79 KG/M2

## 2018-03-28 DIAGNOSIS — D02.0 CARCINOMA IN SITU OF VOCAL CORD: ICD-10-CM

## 2018-03-28 DIAGNOSIS — Z85.21 H/O LARYNGEAL CANCER: ICD-10-CM

## 2018-03-28 DIAGNOSIS — R13.14 DYSPHAGIA, PHARYNGOESOPHAGEAL: Primary | ICD-10-CM

## 2018-03-28 DIAGNOSIS — C32.9 LARYNX CANCER: ICD-10-CM

## 2018-03-28 DIAGNOSIS — R91.1 PULMONARY NODULE, LEFT: ICD-10-CM

## 2018-03-28 DIAGNOSIS — R13.14 DYSPHAGIA, PHARYNGOESOPHAGEAL: ICD-10-CM

## 2018-03-28 DIAGNOSIS — Z92.3 HISTORY OF HEAD AND NECK RADIATION: ICD-10-CM

## 2018-03-28 DIAGNOSIS — C32.9 LARYNX CANCER: Primary | ICD-10-CM

## 2018-03-28 PROCEDURE — 99499 UNLISTED E&M SERVICE: CPT | Mod: S$GLB,,, | Performed by: OTOLARYNGOLOGY

## 2018-03-28 PROCEDURE — 99999 PR PBB SHADOW E&M-EST. PATIENT-LVL III: CPT | Mod: PBBFAC,,, | Performed by: OTOLARYNGOLOGY

## 2018-03-28 PROCEDURE — G8998 SWALLOW D/C STATUS: HCPCS | Mod: GN,CJ | Performed by: SPEECH-LANGUAGE PATHOLOGIST

## 2018-03-28 PROCEDURE — 74230 X-RAY XM SWLNG FUNCJ C+: CPT | Mod: 26,,, | Performed by: RADIOLOGY

## 2018-03-28 PROCEDURE — 92611 MOTION FLUOROSCOPY/SWALLOW: CPT | Mod: GN | Performed by: SPEECH-LANGUAGE PATHOLOGIST

## 2018-03-28 PROCEDURE — 3075F SYST BP GE 130 - 139MM HG: CPT | Mod: CPTII,S$GLB,, | Performed by: OTOLARYNGOLOGY

## 2018-03-28 PROCEDURE — 71250 CT THORAX DX C-: CPT | Mod: TC

## 2018-03-28 PROCEDURE — 70490 CT SOFT TISSUE NECK W/O DYE: CPT | Mod: 26,,, | Performed by: RADIOLOGY

## 2018-03-28 PROCEDURE — 70490 CT SOFT TISSUE NECK W/O DYE: CPT | Mod: TC

## 2018-03-28 PROCEDURE — G8996 SWALLOW CURRENT STATUS: HCPCS | Mod: GN,CJ | Performed by: SPEECH-LANGUAGE PATHOLOGIST

## 2018-03-28 PROCEDURE — G8997 SWALLOW GOAL STATUS: HCPCS | Mod: GN,CL | Performed by: SPEECH-LANGUAGE PATHOLOGIST

## 2018-03-28 PROCEDURE — 99214 OFFICE O/P EST MOD 30 MIN: CPT | Mod: 25,S$GLB,, | Performed by: OTOLARYNGOLOGY

## 2018-03-28 PROCEDURE — 74230 X-RAY XM SWLNG FUNCJ C+: CPT | Mod: TC

## 2018-03-28 PROCEDURE — 31575 DIAGNOSTIC LARYNGOSCOPY: CPT | Mod: S$GLB,,, | Performed by: OTOLARYNGOLOGY

## 2018-03-28 PROCEDURE — 71250 CT THORAX DX C-: CPT | Mod: 26,,, | Performed by: RADIOLOGY

## 2018-03-28 PROCEDURE — 3078F DIAST BP <80 MM HG: CPT | Mod: CPTII,S$GLB,, | Performed by: OTOLARYNGOLOGY

## 2018-03-28 NOTE — PROGRESS NOTES
"MODIFIED BARIUM SWALLOW STUDY    REASON FOR REFERRAL:  Jose Luis Manzo, age 68, was referred by Dr. Gamal Smith, laryngologist, for a Modified Barium Swallow Study to rule out aspiration, assess his overall swallowing function, and determine safest consistencies for oral intake.  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    MEDICAL HISTORY:  Past Medical History:   Diagnosis Date    Anticoagulant long-term use     BPH (benign prostatic hypertrophy)     Cancer     vocal cords    Claudication of right lower extremity 3/10/2016    Disorder of kidney and ureter     Hyperlipidemia     Hypertension     Microcytic anemia 9/16/2016    Oropharyngeal cancer     Pterygium     Thromboembolic disorder     Unspecified disorder of kidney and ureter         SURGICAL HISTORY:  Past Surgical History:   Procedure Laterality Date    FRACTURE SURGERY Left     "lower leg" 40 years ago    KIDNEY TRANSPLANT  2007    followed by [facility]    LARYNX SURGERY  11/2014    Oropharyngeal Cancer    TIBIAL STAPLING Left 1980          TREATMENT HISTORY:  1. 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  12/2014:  Completed definitive XRT (63 Gy) at  (Dr. Cote)  3.  5/2017:  DL and biopsy revealing microscopic focus of invasive carcinoma of the R TVF and severe dysplasia/CIS of the B TVF (Dr. Desouza)  4.  12/21/17:  microlaryngoscopy with KTP laser assisted treatment (Dr. Smith)  5.  3/1/18:  microlaryngoscopy with KTP laser assisted treatment (Dr. Smith)  NOTE:  Despite pathology, based on intraoperative findings and clinical impression, Dr. Smith remained concerned for locally advanced laryngeal cancer.    SWALLOWING HISTORY:  Taking a regular diet with thin liquids.  Coughs/chokes with thin liquids; denied problems with foods.  Takes pill medications with liquids, up to 5 pills at a time; denied problems with swallowing them.  Denied history of pneumonia.    FAMILY HISTORY:  Family " History   Problem Relation Age of Onset    Stroke Mother     Diabetes Mother     Hypertension Mother     Stroke Father     No Known Problems Sister     No Known Problems Brother     No Known Problems Daughter         SOCIAL HISTORY:  Per the EMR, Mr. Manzo is single and lives with in [facility].     Tobacco:  Former smoker per EMR.  Quit 2009; 1 ppd x20 years.  ETOH:  No per EMR.    BEHAVIOR:  Mr. Manzo was a very pleasant man who had normal affect and social interaction.  He was able to fully cooperate during the study.  Results of today's assessment were considered indicative of his current levels of swallowing functioning.      HEARING:  Subjectively, within normal limits.     ORAL PERIPHERAL:   Informal examination of the oral mechanism revealed structures and functioning within normal limits for swallowing and speech purposes.    Voice quality was extremely rough with reduced loudness and intermittent aphonia.  The listener typically needed to be looking at him to readily understand his speech due to this.    TEST FINDINGS:   Mr. Manzo was seen in Radiology with the Radiologist for a Modified Barium Swallow Study.  He was seated on a stool for a left lateral videofluoroscopic view.      Consistencies assessed using radiopaque barium contrast:  thin (3- and 5-ml boluses boluses via spoon and single and continuous swallows via open cup),   thin puree (1/2 to 1 teaspoon boluses) via spoon,   thick puree (1/2 teaspoon bolus) via spoon,   solid (whole cracker boluses) by Mr. Manzo's hand, and   12.5 barium tablet with water.    Strategies:  Additional dry swallow -- effective in clearing mild vallecular residue of thin puree and solid.  Small, single liquid swallows -- best protection to limit penetration and avoid aspiration during or after swallow.    Phases:  Oral:  Mr. Manzo was able to obtain liquid and strip utensils well with no loss of material from the oral cavity.  He moved boluses through the oral  cavity with appropriate transit time.  There was no pooling of liquids in the mouth.  Swallow reflex was triggered within normal limits.    Pharyngeal:  Thin liquid boluses moved through the pharyngeal phase with flash to deep laryngeal penetration in 5-ml and single cup sips and intermittent aspiration during continuous swallowing. This occurred during the swallow and with material that refluxed from the cervical esophagus.  There was no aspiration.    No solid nor the barium tablet was penetrated or aspirated.    - Timely initiation; rare premature spillage to valleculae before initiation of swallow.  - Adequate soft palate elevation  - Adequate laryngeal elevation and anterior hyoid excursion  - Reduced tongue base retraction  - Incomplete epiglottic inversion  - Incomplete laryngeal vestibular closure  - Adequate pharyngeal stripping wave  - Adequate PE segment opening.  -  Minimal pharyngeal residue in valleculae      Cervical Esophageal:  Boluses entered the upper esophagus within normal limits.  No obstruction was noted.  After observing thin liquid enter the cervical esophagus during continuous swallows and immediately reflux into the airway, turned Mr. Manzo for an a-p view which revealed esophageal dysmotility per the radiologist, but no obstruction.  This pattern was not observed with small, single boluses of thin liquid.    Rosenbeck 8-point Penetration-Aspiration Scale:   Best: 1 - Material does not enter airway. -- solids and barium tablet  Worst:    7 - Material enters the airway, passes below the vocal folds, and is not ejected from the trachea despite effort. - aspirated refluxed thin liquids which traveled deeper into the trachea than the same material that was aspirated during the swallow and was incompletely cleared.    IMPRESSIONS:   This 68 y.o. man appears to present with  1.  Pharyngoesophageal dysphagia characterized by incomplete epiglottic inversion and laryngeal closure as well as  esophageal dysmotility resulting in laryngeal penetration during single and continuous swallowing of thin liquids, inconsistent aspiration of thin liquids during continuous swallowing of thin liquids, and aspiration of refluxed thin liquid boluses.  There was mild dysphagia in this phase for thin puree and solid boluses characterized by reduced BOT contraction and mild vallecular residue.  2.  Oral phase within normal limits.  3.  Severe dysphonia.  4.  Concerns for recurrent SCC of the TVFs per intraoperative findings and clinical impression of Dr. Smith.  5.  History definitive XRT 2014 (63 Gy).      Swallowing  Current status:  FCM:  LEVEL 5 (20-39% impaired)   - CJ  Projected status:  FCM:   LEVEL 3 (60-79% impaired)  - CL  NOTE:  Potential deterioration.  Discharge status:  FCM:   LEVEL 5 (20-39% impaired)   -  CJ       RECOMMENDATIONS/PLAN OF CARE:   It is felt that Mr. Manzo would benefit from  1.  Continuation of his current regular consistency diet with thin liquids using the following strategies and common aspiration precautions, including, but not limited to   A.  Appropriate seating for all eating and drinking.   B.  Single, small cup sips of liquid.   C.  Small bites of foods (1/2-1 teaspoon), per his usual practice.   D.  Pill medications with single, small cup sips of liquid.   E.  Monitoring for any signs/symptoms of aspiration (such as wet/gurgly voice that does not clear with coughing, inability to make any voice sounds, any persistent coughing with oral intake, otherwise unexplained fever, unexplained increased or new difficulty or discomfort breathing, unexplained increase in sleepiness/lethargy/significant fatigue, unexplained increase or new onset confusion or change in cognitive functioning, or any other unexplained change in health or well-being that could be related to swallowing).  2.  Follow up with Dr. Mcadams per Dr. Smith to discuss total laryngectomy.

## 2018-03-28 NOTE — PLAN OF CARE
IMPRESSIONS:   This 68 y.o. man appears to present with  1.  Pharyngoesophageal dysphagia characterized by incomplete epiglottic inversion and laryngeal closure as well as esophageal dysmotility resulting in laryngeal penetration during single and continuous swallowing of thin liquids, inconsistent aspiration of thin liquids during continuous swallowing of thin liquids, and aspiration of refluxed thin liquid boluses.  There was mild dysphagia in this phase for thin puree and solid boluses characterized by reduced BOT contraction and mild vallecular residue.  2.  Oral phase within normal limits.  3.  Severe dysphonia.  4.  Concerns for recurrent SCC of the TVFs per intraoperative findings and clinical impression of Dr. Smith.  5.  History definitive XRT 2014 (63 Gy).      Swallowing  Current status:  FCM:  LEVEL 5 (20-39% impaired)   - CJ  Projected status:  FCM:   LEVEL 3 (60-79% impaired)  - CL  NOTE:  Potential deterioration.  Discharge status:  FCM:   LEVEL 5 (20-39% impaired)   -  CJ       RECOMMENDATIONS/PLAN OF CARE:   It is felt that Mr. Manzo would benefit from  1.  Continuation of his current regular consistency diet with thin liquids using the following strategies and common aspiration precautions, including, but not limited to   A.  Appropriate seating for all eating and drinking.   B.  Single, small cup sips of liquid.   C.  Small bites of foods (1/2-1 teaspoon), per his usual practice.   D.  Pill medications with single, small cup sips of liquid.   E.  Monitoring for any signs/symptoms of aspiration (such as wet/gurgly voice that does not clear with coughing, inability to make any voice sounds, any persistent coughing with oral intake, otherwise unexplained fever, unexplained increased or new difficulty or discomfort breathing, unexplained increase in sleepiness/lethargy/significant fatigue, unexplained increase or new onset confusion or change in cognitive functioning, or any other  unexplained change in health or well-being that could be related to swallowing).  2.  Follow up with Dr. Mcadams per Dr. Smith to discuss total laryngectomy.

## 2018-04-09 ENCOUNTER — OFFICE VISIT (OUTPATIENT)
Dept: OTOLARYNGOLOGY | Facility: CLINIC | Age: 68
End: 2018-04-09
Payer: MEDICARE

## 2018-04-09 VITALS
TEMPERATURE: 98 F | SYSTOLIC BLOOD PRESSURE: 135 MMHG | BODY MASS INDEX: 29.7 KG/M2 | DIASTOLIC BLOOD PRESSURE: 85 MMHG | WEIGHT: 212.94 LBS | HEART RATE: 70 BPM

## 2018-04-09 DIAGNOSIS — C32.9 LARYNX CANCER: Primary | ICD-10-CM

## 2018-04-09 DIAGNOSIS — J38.7 CHONDRONECROSIS OF LARYNX: ICD-10-CM

## 2018-04-09 PROCEDURE — 99999 PR PBB SHADOW E&M-EST. PATIENT-LVL III: CPT | Mod: PBBFAC,,, | Performed by: OTOLARYNGOLOGY

## 2018-04-09 PROCEDURE — 99213 OFFICE O/P EST LOW 20 MIN: CPT | Mod: 25,S$GLB,, | Performed by: OTOLARYNGOLOGY

## 2018-04-09 PROCEDURE — 31575 DIAGNOSTIC LARYNGOSCOPY: CPT | Mod: S$GLB,,, | Performed by: OTOLARYNGOLOGY

## 2018-04-09 NOTE — Clinical Note
I planned to schedule him for surgery but did not place the case request.  Could you call him and find out what date we discussed so I can book it?  Thanks.

## 2018-04-13 ENCOUNTER — LAB VISIT (OUTPATIENT)
Dept: LAB | Facility: HOSPITAL | Age: 68
End: 2018-04-13
Attending: INTERNAL MEDICINE
Payer: MEDICARE

## 2018-04-13 DIAGNOSIS — N40.0 BENIGN NON-NODULAR PROSTATIC HYPERPLASIA WITHOUT LOWER URINARY TRACT SYMPTOMS: ICD-10-CM

## 2018-04-13 DIAGNOSIS — N18.4 CHRONIC KIDNEY DISEASE, STAGE IV (SEVERE): ICD-10-CM

## 2018-04-13 LAB
ALBUMIN SERPL BCP-MCNC: 4 G/DL
ANION GAP SERPL CALC-SCNC: 13 MMOL/L
BUN SERPL-MCNC: 19 MG/DL
CALCIUM SERPL-MCNC: 11.2 MG/DL
CHLORIDE SERPL-SCNC: 105 MMOL/L
CO2 SERPL-SCNC: 26 MMOL/L
COMPLEXED PSA SERPL-MCNC: 0.16 NG/ML
CREAT SERPL-MCNC: 2.49 MG/DL
EST. GFR  (AFRICAN AMERICAN): 29.5 ML/MIN/1.73 M^2
EST. GFR  (NON AFRICAN AMERICAN): 25.6 ML/MIN/1.73 M^2
GLUCOSE SERPL-MCNC: 135 MG/DL
PHOSPHATE SERPL-MCNC: 2.5 MG/DL
POTASSIUM SERPL-SCNC: 3.7 MMOL/L
SODIUM SERPL-SCNC: 144 MMOL/L

## 2018-04-13 PROCEDURE — 80069 RENAL FUNCTION PANEL: CPT | Mod: PO

## 2018-04-13 PROCEDURE — 36415 COLL VENOUS BLD VENIPUNCTURE: CPT | Mod: PO

## 2018-04-13 PROCEDURE — 84153 ASSAY OF PSA TOTAL: CPT

## 2018-04-14 PROBLEM — J38.7: Status: ACTIVE | Noted: 2018-04-14

## 2018-04-14 NOTE — ASSESSMENT & PLAN NOTE
SCCA of the larynx status post XRT and laser resection of recurrent disease.  His clinical picture is worrisome for persistent, progressive malignancy though we do not have a tissue diagnosis to support this concern.  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  I explained my concerns to him.  Given the progression of his symptoms, his most reasonable treatment option is a total laryngectomy. It is difficult, though, to make a case for this life-changing operation without a cancer diagnosis or flagrant laryngeal incompetence.      I recommended that we return to the OR for DL and biopsy of any worrisome lesions of the larynx in order to rule out occult malignancy.  I also explained to him that if I have any concern that he will experience post-op airway obstruction, I will perform a tracheostomy at that time.    He understands that the risks of DL and biopsy include pain, bleeding, infection, dental injury, tongue numbness, hoarseness, dysphagia, recurrence of the lesion in question, perforation of the upper aerodigestive tract, need for additional treatment and airway fire. He wishes to proceed with surgery.    I explained that the risks of tracheotomy include, but are not limited to, infection, bleeding, scarring, delayed healing, permanent tracheocutaneous fistula, airway scarring, inability to remove the trach, erosion into great vessels with significant life-threatening bleeding, and the need for additional procedures. Time was allowed for questions, and all questions were answered to the patient's apparent satisfaction.

## 2018-04-14 NOTE — PROGRESS NOTES
Chief Complaint   Patient presents with    Follow-up     Treatment History  1. 2015: XRT for SCCA larynx ([facility]).  2. 10/2/17: DL and biopsy TVC.  Path revealed severe dysplasia/CIS/superficially invasive SCCA (Dr. Lizet Desouza).  3. 12/21/17: MSL with resection R TVC lesion, biopsy L TVC lesion.  Path revealed severe dysplasia. (Dr. Gibran Smith).  4. 3/1/17: MSL with laser resection L TVC.  Path benign. (Dr. Smith).    HPI   68 y.o. male presents with the above treatment history.  He has done fairly well since his most recent surgery with Dr. Smith.  He has been followed by Dr. Smith for the last ~5 mos.  He underwent KTP laser resection of microinvasive SCCA of the R TVC in 12/17. He was subsequently taken back to the OR on 3/17 and additional biopsies were taken.  Dr. Smith reported that there was grossly abnormal thyroid cartilage noted intraoperatively.  His concern was, obviously, for malignancy but pathology returned consistent with necrosis. Also per Dr. Smith's report, Mr. Manzo's laryngeal exam has worsened over the last several mos. He has developed L TVC immobility and mild motion impairment of the R TVC.  Additionally, there is a necrotic lesion of the AC/petiole region.     Mr. Manzo has few complaints. He is breathing well.  He recently underwent MBSS and has been cleared for a regular diet and thin liquids.  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  He presents today to discuss his treatment options.     Review of Systems   Constitutional: Negative for fatigue and unexpected weight change.   HENT: Per HPI.  Eyes: Negative for visual disturbance.   Respiratory: Negative for shortness of breath, hemoptysis   Cardiovascular: Negative for chest pain and palpitations.   Musculoskeletal: Negative for decreased ROM, back pain.   Skin: Negative for rash, sunburn, itching.   Neurological: Negative for dizziness and seizures.   Hematological: Negative for adenopathy. Does not  "bruise/bleed easily.   Endocrine: Negative for rapid weight loss/weight gain, heat/cold intolerance.     Past Medical History   Patient Active Problem List   Diagnosis    Chronic kidney disease, stage IV (severe)    Essential hypertension    Mixed hyperlipidemia    BPH (benign prostatic hyperplasia)    Claudication of right lower extremity    Eye swelling, bilateral    Diminished night vision    Edema of right lower extremity    Renal transplant recipient    Thrombocytopenia    Dermolipoma    Edema    DVT (deep venous thrombosis)    Iron deficiency anemia    Thrombophilia    H/O laryngeal cancer    Pulmonary nodule, left    Larynx cancer    Voice disturbance    Atherosclerosis of aorta    Carcinoma in situ of vocal cord    Dysphagia, pharyngoesophageal           Past Surgical History   Past Surgical History:   Procedure Laterality Date    FRACTURE SURGERY Left     "lower leg" 40 years ago    KIDNEY TRANSPLANT  2007    followed by [facility]    LARYNX SURGERY  11/2014    Oropharyngeal Cancer    TIBIAL STAPLING Left 1980              Family History   Family History   Problem Relation Age of Onset    Stroke Mother     Diabetes Mother     Hypertension Mother     Stroke Father     No Known Problems Sister     No Known Problems Brother     No Known Problems Daughter            Social History   .  Social History     Social History    Marital status: Single     Spouse name: N/A    Number of children: N/A    Years of education: N/A     Occupational History    Not on file.     Social History Main Topics    Smoking status: Former Smoker     Packs/day: 1.00     Years: 20.00     Types: Cigarettes     Quit date: 2009    Smokeless tobacco: Never Used    Alcohol use No    Drug use: No      Comment: Patient quick 2011    Sexual activity: Not Currently     Partners: Female     Other Topics Concern    Not on file     Social History Narrative    No narrative on file         Allergies "   Review of patient's allergies indicates:  No Known Allergies        Physical Exam     Vitals:    04/09/18 1508   BP: 135/85   Pulse: 70   Temp: 98.3 °F (36.8 °C)         Body mass index is 29.7 kg/m².      General: AOx3, NAD   Respiratory:  Symmetric chest rise, normal effort  Right Ear: External Auditory Canal WNL,TM w/o masses/lesions/perforations.  Middle ear without evidence of effusion.   Left Ear: External Auditory Canal WNL,TM w/o masses/lesions/perforations.  Middle ear without evidence of effusion.   Nose: No gross nasal septal deviation. Inferior Turbinates WNL bilaterally. No septal perforation. No masses/lesions.   Oral Cavity:  Oral Tongue mobile, no lesions noted. Hard Palate WNL. No buccal or FOM lesions.  Oropharynx:  No masses/lesions of the posterior pharyngeal wall. Tonsillar fossa without lesions. Soft palate without masses. Midline uvula.   Neck: No scars. Mild fibrosis status post XRT.  No cervical lymphadenopathy, thyromegaly or thyroid nodules.  Normal range of motion.    Face: House Brackmann I bilaterally.     Flex Naso Heike Hypo Procedures #2    Procedure:  Diagnostic flexible nasopharyngoscopy, laryngoscopy and hypopharyngoscopy:    Routine preparation with local atomizer with 1% neosynephrine/pontocaine with customary flexible endoscope.    Nasopharynx:  No lesions.   Mucosa:  No lesions.   Adenoids:  Present.  Posterior Choanae:  Patent.  Eustachian Tubes:  Patent.  Posterior pharynx:  No lesions.  Larynx/hypopharynx:   Epiglottis:  Ulcerated lesion with fibrinous base at the epiglottic petiole.    AE Folds:  No lesions.   Vocal cords:  No polyps, nodules, ulcers or lesions.   Mobility:  R TVC slightly limited, L TVC immobile.    Hypopharynx:  No lesions.   Piriform sinus:  No pooling, no lesions.   Post Cricoid:  No erythema, no edema.      Assessment/Plan  Problem List Items Addressed This Visit        ENT    Chondronecrosis of larynx     SCCA of the larynx status post XRT and laser  resection of recurrent disease.  His clinical picture is worrisome for persistent, progressive malignancy though we do not have a tissue diagnosis to support this concern.  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  I explained my concerns to him.  Given the progression of his symptoms, his most reasonable treatment option is a total laryngectomy. It is difficult, though, to make a case for this life-changing operation without a cancer diagnosis or flagrant laryngeal incompetence.      I recommended that we return to the OR for DL and biopsy of any worrisome lesions of the larynx in order to rule out occult malignancy.  I also explained to him that if I have any concern that he will experience post-op airway obstruction, I will perform a tracheostomy at that time.    He understands that the risks of DL and biopsy include pain, bleeding, infection, dental injury, tongue numbness, hoarseness, dysphagia, recurrence of the lesion in question, perforation of the upper aerodigestive tract, need for additional treatment and airway fire. He wishes to proceed with surgery.    I explained that the risks of tracheotomy include, but are not limited to, infection, bleeding, scarring, delayed healing, permanent tracheocutaneous fistula, airway scarring, inability to remove the trach, erosion into great vessels with significant life-threatening bleeding, and the need for additional procedures. Time was allowed for questions, and all questions were answered to the patient's apparent satisfaction.              Oncology    Larynx cancer - Primary

## 2018-04-16 RX ORDER — LIDOCAINE HYDROCHLORIDE 10 MG/ML
1 INJECTION, SOLUTION EPIDURAL; INFILTRATION; INTRACAUDAL; PERINEURAL ONCE
Status: CANCELLED | OUTPATIENT
Start: 2018-04-16 | End: 2018-04-16

## 2018-04-17 ENCOUNTER — ANESTHESIA EVENT (OUTPATIENT)
Dept: SURGERY | Facility: HOSPITAL | Age: 68
End: 2018-04-17
Payer: MEDICARE

## 2018-04-17 NOTE — PRE ADMISSION SCREENING
Anesthesia Assessment: Preoperative EQUATION    Planned Procedure: Procedure(s) (LRB):  LARYNGOSCOPY (N/A)  Requested Anesthesia Type:General  Surgeon: Duncan Mcadams MD  Service: ENT  Known or anticipated Date of Surgery:4/25/2018    Surgeon notes: reviewed    Electronic QUestionnaire Assessment completed via nurse interview with patient.        No AQ        Triage considerations:     The patient has no apparent active cardiac condition (No unstable coronary Syndrome such as severe unstable angina or recent [<1 month] myocardial infarction, decompensated CHF, severe valvular   disease or significant arrhythmia)    Previous anesthesia records:GETA 3/1/18  Airway (Primary) Present Prior to Hospital Arrival?: No; Placement Date: 03/01/18; Placement Time: 1300; Method of Intubation: Direct laryngoscopy; Inserted by: CRNA; Airway Device: Laser Tube; Mask Ventilation: Easy - oral; Intubated: Postinduction; Blade: Llamas #2; Airway Device Size: 6.0; Style: Cuffed; Cuff Inflation: Minimal occlusive pressure; Inflation Amount: 8 (filled with saline); Placement Verified By: Capnometry, Auscultation, ETT Condensation; Grade: Grade I; Complicating Factors: None; Intubation Findings: Positive EtCO2, Bilateral breath sounds, Atraumatic/Condition of teeth unchanged;  Depth of Insertion: 21; Securment: Teeth; Complications: None; Breath Sounds: Equal Bilateral; Insertion Attempts: 1; Removal Date: 03/01/18;  Removal Time: 1453 03/01/18 1300 by Laura Mabry CRNA       Last PCP note: within [facility] Dr[facility]-- appt. 4/23/18,     Subspecialty notes: Cardiology: General, ENT, Gastroenterology, Hematology/Oncology, Kidney Transplant    Other important co-morbidities:   Diagnosis    Chronic kidney disease, stage IV (severe)    Essential hypertension    Mixed hyperlipidemia    BPH (benign prostatic hyperplasia)    Claudication of right lower extremity    Eye swelling, bilateral    Diminished night vision    Edema of  right lower extremity    Renal transplant recipient    Thrombocytopenia    Dermolipoma    Edema    DVT (deep venous thrombosis)    Iron deficiency anemia    Thrombophilia    Pulmonary nodule, left    Larynx cancer    Voice disturbance    Atherosclerosis of aorta    Carcinoma in situ of vocal cord    Dysphagia, pharyngoesophageal            Tests already available:  Available tests,  within 1 month , within [email or web address]            Instructions given. (See in Nurse's note)    Optimization:      Plan:         Consultation:pcp for eloquis instruction     Patient  has previously scheduled Medical Appointment:    Navigation:  Straight Line to surgery.               No tests, anesthesia preop clinic visit, or consult required.                  Plans per surgeon and Follow-up per Surgeon

## 2018-04-17 NOTE — ANESTHESIA PREPROCEDURE EVALUATION
Anesthesia Assessment: Preoperative EQUATION     Planned Procedure: Procedure(s) (LRB):  LARYNGOSCOPY (N/A)  Requested Anesthesia Type:General  Surgeon: Duncan Mcadams MD  Service: ENT  Known or anticipated Date of Surgery:4/25/2018     Surgeon notes: reviewed     Electronic QUestionnaire Assessment completed via nurse interview with patient.         No AQ           Triage considerations:      The patient has no apparent active cardiac condition (No unstable coronary Syndrome such as severe unstable angina or recent [<1 month] myocardial infarction, decompensated CHF, severe valvular   disease or significant arrhythmia)     Previous anesthesia records:GETA 3/1/18  Airway (Primary) Present Prior to Hospital Arrival?: No; Placement Date: 03/01/18; Placement Time: 1300; Method of Intubation: Direct laryngoscopy; Inserted by: CRNA; Airway Device: Laser Tube; Mask Ventilation: Easy - oral; Intubated: Postinduction; Blade: Llamas #2; Airway Device Size: 6.0; Style: Cuffed; Cuff Inflation: Minimal occlusive pressure; Inflation Amount: 8 (filled with saline); Placement Verified By: Capnometry, Auscultation, ETT Condensation; Grade: Grade I; Complicating Factors: None; Intubation Findings: Positive EtCO2, Bilateral breath sounds, Atraumatic/Condition of teeth unchanged;  Depth of Insertion: 21; Securment: Teeth; Complications: None; Breath Sounds: Equal Bilateral; Insertion Attempts: 1; Removal Date: 03/01/18;  Removal Time: 1453 03/01/18 1300 by Laura Mabry CRNA         Last PCP note: within [facility]-- appt. 4/23/18,      Subspecialty notes: Cardiology: General, ENT, Gastroenterology, Hematology/Oncology, Kidney Transplant     Other important co-morbidities:       Diagnosis    Chronic kidney disease, stage IV (severe)    Essential hypertension    Mixed hyperlipidemia    BPH (benign prostatic hyperplasia)    Claudication of right lower extremity    Eye swelling, bilateral    Diminished night  vision    Edema of right lower extremity    Renal transplant recipient    Thrombocytopenia    Dermolipoma    Edema    DVT (deep venous thrombosis)    Iron deficiency anemia    Thrombophilia    Pulmonary nodule, left    Larynx cancer    Voice disturbance    Atherosclerosis of aorta    Carcinoma in situ of vocal cord    Dysphagia, pharyngoesophageal               Tests already available:  Available tests,  within 1 month , within [facility]                            Instructions given. (See in Nurse's note)     Optimization:               Plan:                               Consultation:pcp for eloquis instruction                           Patient  has previously scheduled Medical Appointment:     Navigation:  Straight Line to surgery.                          No tests, anesthesia preop clinic visit, or consult required.                                                   Plans per surgeon and Follow-up per Surgeon                                                                                                          04/17/2018  [facility]  Anesthesia Pre-Operative Evaluation         Patient Name: Jose Luis Manzo  YOB: 1950  MRN: [id number]    SUBJECTIVE:     Pre-operative evaluation for Procedure(s) (LRB):  LARYNGOSCOPY (N/A)     04/24/2018    Jose Luis Manzo is a 68 y.o. male w/ a significant PMHx of CKD stage 4, HTN, DVT, and squamous cell carcinoma.    Patient now presents for the above procedure(s).      LDA: None documented.        Prev airway:     Placement Date: 03/01/18; Placement Time: 1300; Method of Intubation: Direct laryngoscopy; Inserted by: CRNA; Airway Device: Laser Tube; Mask Ventilation: Easy - oral; Intubated: Postinduction; Blade: Llamas #2; Airway Device Size: 6.0; Style: Cuffed; Cuff Inflation: Minimal occlusive pressure; Inflation Amount: 8 (filled with saline); Placement Verified By: Capnometry, Auscultation, ETT Condensation; Grade: Grade I;  "    Drips: None documented.      Patient Active Problem List   Diagnosis    Chronic kidney disease, stage IV (severe)    Essential hypertension    Mixed hyperlipidemia    BPH (benign prostatic hyperplasia)    Claudication of right lower extremity    Eye swelling, bilateral    Diminished night vision    Edema of right lower extremity    Renal transplant recipient    Thrombocytopenia    Dermolipoma    Edema    DVT (deep venous thrombosis)    Iron deficiency anemia    Thrombophilia    H/O laryngeal cancer    Pulmonary nodule, left    Larynx cancer    Voice disturbance    Atherosclerosis of aorta    Carcinoma in situ of vocal cord    Dysphagia, pharyngoesophageal    Chondronecrosis of larynx       Review of patient's allergies indicates:  No Known Allergies    Current Inpatient Medications:      No current facility-administered medications on file prior to encounter.      Current Outpatient Prescriptions on File Prior to Encounter   Medication Sig Dispense Refill    amlodipine (NORVASC) 10 MG tablet TAKE 1 TABLET EVERY DAY 90 tablet 3    apixaban (ELIQUIS) 2.5 mg Tab Take 1 tablet (2.5 mg total) by mouth 2 (two) times daily. 60 tablet 11    atorvastatin (LIPITOR) 20 MG tablet TAKE 1 TABLET (20 MG TOTAL) BY MOUTH EVERY OTHER DAY 45 tablet 1    ferrous sulfate 325 mg (65 mg iron) Tab tablet Take 325 mg by mouth once daily.      finasteride (PROSCAR) 5 mg tablet TAKE 1 TABLET EVERY DAY 90 tablet 3    metoprolol succinate (TOPROL-XL) 100 MG 24 hr tablet TAKE 1 TABLET EVERY DAY 90 tablet 3    omeprazole (PRILOSEC) 40 MG capsule Take 1 capsule (40 mg total) by mouth every morning. 30 capsule 11    sirolimus (RAPAMUNE) 1 MG Tab Take 3 tablets (3 mg total) by mouth once daily. 90 tablet 3    sodium bicarbonate 650 MG tablet TAKE THREE TABLETS BY MOUTH THREE TIMES DAILY 270 tablet 11       Past Surgical History:   Procedure Laterality Date    FRACTURE SURGERY Left     "lower leg" 40 years ago    " KIDNEY TRANSPLANT  2007    followed by [facility]    LARYNX SURGERY  11/2014    Oropharyngeal Cancer    microlaryngoscopy      TIBIAL STAPLING Left 1980            Social History     Social History    Marital status: Single     Spouse name: N/A    Number of children: N/A    Years of education: N/A     Occupational History    Not on file.     Social History Main Topics    Smoking status: Former Smoker     Packs/day: 1.00     Years: 20.00     Types: Cigarettes     Quit date: 2009    Smokeless tobacco: Never Used    Alcohol use No    Drug use: No      Comment: Patient quick 2011    Sexual activity: Not Currently     Partners: Female     Other Topics Concern    Not on file     Social History Narrative    No narrative on file       OBJECTIVE:     Vital Signs Range (Last 24H):         CBC:   No results for input(s): WBC, RBC, HGB, HCT, PLT, MCV, MCH, MCHC in the last 72 hours.    CMP: No results for input(s): NA, K, CL, CO2, BUN, CREATININE, GLU, MG, PHOS, CALCIUM, ALBUMIN, PROT, ALKPHOS, ALT, AST, BILITOT in the last 72 hours.    INR:  No results for input(s): PT, INR, PROTIME, APTT in the last 72 hours.    Diagnostic Studies:     03/28/18 CT soft tissue of he neck   Soft tissue thickening and lobulation of the false and true vocal cords with mild subglottic extension.  New erosive process of the anterior commissure of the thyroid cartilage with few scattered foci of air deep to the mucosa.  Findings may be postsurgical, related to progressive disease or represent sinus tracts.    No significant cervical adenopathy.    EKG:   Sinus bradycardia with occasional Premature ventricular complexes  Incomplete left bundle branch block  Abnormal ECG  When compared with ECG of 21-JUN-2016 08:19,  Premature ventricular complexes are now Present    2D ECHO:  No results found for this or any previous visit.      ASSESSMENT/PLAN:       Anesthesia Evaluation    I have reviewed the Patient Summary Reports.    I have  reviewed the Nursing Notes.   I have reviewed the Medications.     Review of Systems  Anesthesia Hx:  No problems with previous Anesthesia  History of prior surgery of interest to airway management or planning: Previous anesthesia: General laryngoscopy 3/1/18 with general anesthesia.  Denies Family Hx of Anesthesia complications.   Denies Personal Hx of Anesthesia complications.   Social:  Former Smoker, No Alcohol Use    Hematology/Oncology:         -- Anemia: Hematology Comments: Hx thrombocytopenia  Iron deficiency anemia   Oncology Comments: H/o laryngeal cancer  Carcinoma in situ of vocal cord       EENT/Dental:   Chondronecrosis of larynx  Dysphagia  Voice disturbance  Larynx cancer  Diminished night vision   Cardiovascular:   Exercise tolerance: poor Hypertension, well controlled hyperlipidemia Pt on eloquis Deep Venous Thrombosis (DVT), Hx of DVT, right lower extremity    Pulmonary:   COPD Hx Pulmonary nodule   Renal/:   Chronic Renal Disease BPH ckd stage 4  Renal transplant recipient-----[place]   Hepatic/GI:   GERD    Musculoskeletal:  Musculoskeletal Normal    Neurological:  Neurology Normal    Endocrine:  Endocrine Normal    Dermatological:  Skin Normal    Psych:  Psychiatric Normal           Physical Exam  General:  Obesity    Airway/Jaw/Neck:  Airway Findings: Mouth Opening: Normal Tongue: Normal  General Airway Assessment: Good  Mallampati: II  Improves to II with phonation.  TM Distance: Normal, at least 6 cm  Jaw/Neck Findings:  Neck ROM: Extension Decreased, Mild      Dental:  Dental Findings: Upper Dentures, Periodontal disease, Severe    Chest/Lungs:  Chest/Lungs Findings: Clear to auscultation, Normal Respiratory Rate, Decreased Breath Sounds Bilateral, Rhonchi  In right lung, encouraged to cough and clear.     Heart/Vascular:  Heart Findings: Rate: Normal  Rhythm: Regular Rhythm  Sounds: Normal        Mental Status:  Mental Status Findings:  Cooperative, Alert and Oriented         Anesthesia  Plan  Type of Anesthesia, risks & benefits discussed:  Anesthesia Type:  general  Patient's Preference: as indicated  Intra-op Monitoring Plan: standard ASA monitors  Intra-op Monitoring Plan Comments:   Post Op Pain Control Plan: multimodal analgesia, IV/PO Opioids PRN and per primary service following discharge from PACU  Post Op Pain Control Plan Comments:   Induction:   IV  Beta Blocker:  Patient is on a Beta-Blocker and has received one dose within the past 24 hours (No further documentation required).       Informed Consent: Patient understands risks and agrees with Anesthesia plan.  Questions answered. Anesthesia consent signed with patient.  ASA Score: 3     Day of Surgery Review of History & Physical: I have interviewed and examined the patient. I have reviewed the patient's H&P dated:  There are no significant changes.  H&P update referred to the provider.     Anesthesia Plan Notes: Risks of dental and eye injury reviewed with patient, agrees to proceed. Reassurance given.        Ready For Surgery From Anesthesia Perspective.

## 2018-04-23 ENCOUNTER — TELEPHONE (OUTPATIENT)
Dept: FAMILY MEDICINE | Facility: CLINIC | Age: 68
End: 2018-04-23

## 2018-04-23 ENCOUNTER — OFFICE VISIT (OUTPATIENT)
Dept: INTERNAL MEDICINE | Facility: CLINIC | Age: 68
End: 2018-04-23
Payer: MEDICARE

## 2018-04-23 VITALS
TEMPERATURE: 97 F | BODY MASS INDEX: 29.85 KG/M2 | DIASTOLIC BLOOD PRESSURE: 70 MMHG | RESPIRATION RATE: 20 BRPM | HEART RATE: 84 BPM | HEIGHT: 71 IN | SYSTOLIC BLOOD PRESSURE: 108 MMHG | WEIGHT: 213.19 LBS

## 2018-04-23 DIAGNOSIS — E78.2 MIXED HYPERLIPIDEMIA: ICD-10-CM

## 2018-04-23 DIAGNOSIS — Z79.899 HIGH RISK MEDICATION USE: Primary | ICD-10-CM

## 2018-04-23 DIAGNOSIS — C32.9 LARYNX CANCER: ICD-10-CM

## 2018-04-23 DIAGNOSIS — N40.1 BENIGN PROSTATIC HYPERPLASIA WITH LOWER URINARY TRACT SYMPTOMS, SYMPTOM DETAILS UNSPECIFIED: ICD-10-CM

## 2018-04-23 DIAGNOSIS — D68.59 THROMBOPHILIA: ICD-10-CM

## 2018-04-23 DIAGNOSIS — N18.4 CHRONIC KIDNEY DISEASE, STAGE IV (SEVERE): ICD-10-CM

## 2018-04-23 DIAGNOSIS — D69.6 THROMBOCYTOPENIA: ICD-10-CM

## 2018-04-23 DIAGNOSIS — Z94.0 RENAL TRANSPLANT RECIPIENT: ICD-10-CM

## 2018-04-23 DIAGNOSIS — I10 ESSENTIAL HYPERTENSION: ICD-10-CM

## 2018-04-23 DIAGNOSIS — M89.9 DISORDER OF BONE: ICD-10-CM

## 2018-04-23 PROCEDURE — 3074F SYST BP LT 130 MM HG: CPT | Mod: CPTII,S$GLB,, | Performed by: INTERNAL MEDICINE

## 2018-04-23 PROCEDURE — 3078F DIAST BP <80 MM HG: CPT | Mod: CPTII,S$GLB,, | Performed by: INTERNAL MEDICINE

## 2018-04-23 PROCEDURE — 99999 PR PBB SHADOW E&M-EST. PATIENT-LVL III: CPT | Mod: PBBFAC,,, | Performed by: INTERNAL MEDICINE

## 2018-04-23 PROCEDURE — 99214 OFFICE O/P EST MOD 30 MIN: CPT | Mod: S$GLB,,, | Performed by: INTERNAL MEDICINE

## 2018-04-23 RX ORDER — PREDNISONE 5 MG/1
5 TABLET ORAL DAILY
Qty: 90 TABLET | Refills: 3 | Status: SHIPPED | OUTPATIENT
Start: 2018-04-23 | End: 2020-07-13 | Stop reason: SDUPTHER

## 2018-04-23 NOTE — PROGRESS NOTES
Subjective:       Patient ID: Jose Luis Manzo is a 68 y.o. male.    Chief Complaint: Follow-up (c/o having problems with his throat, having lots of phlegm) and Medication Refill    HPI  Checkup.  Labs reviewed.  C/O having problems handling his secretions, havin dysphagia with thin liquids.  Sched for laryngoscopy in 2 days.  Stopped his Eliquiis yesterday.  Coughing some, no SOB.  Weight down 27 lbs from last Aug.  Review of Systems   All other systems reviewed and are negative.      Objective:      Physical Exam   Constitutional: He appears well-developed. No distress.   HENT:   Head: Normocephalic.   Mouth/Throat: Oropharynx is clear and moist.   Eyes: EOM are normal. No scleral icterus.   Neck: Normal range of motion. No tracheal deviation present. No thyromegaly present.   Cardiovascular: Normal rate, regular rhythm, normal heart sounds and intact distal pulses.    Pulmonary/Chest: Effort normal and breath sounds normal. No respiratory distress.   Abdominal: Soft. Bowel sounds are normal. He exhibits no distension. There is no tenderness.   Musculoskeletal: Normal range of motion. He exhibits no edema.   Lymphadenopathy:     He has no cervical adenopathy.   Neurological: He is alert.   Skin: Skin is warm and dry. No rash noted. He is not diaphoretic. No erythema.   Psychiatric: He has a normal mood and affect. His behavior is normal.   Vitals reviewed.      Assessment:       1. High risk medication use    2. Renal transplant recipient    3. Larynx cancer    4. Thrombocytopenia    5. Thrombophilia    6. Chronic kidney disease, stage IV (severe)    7. Benign prostatic hyperplasia with lower urinary tract symptoms, symptom details unspecified    8. Essential hypertension    9. Mixed hyperlipidemia    10. Disorder of bone         Plan:       Jose Luis was seen today for follow-up and medication refill.    Diagnoses and all orders for this visit:    High risk medication use   DEXA scan    Renal transplant recipient  -      predniSONE (DELTASONE) 5 MG tablet; Take 1 tablet (5 mg total) by mouth once daily.  -     CBC auto differential; Future  -     Comprehensive metabolic panel; Future    Larynx cancer   Per ENT    Thrombocytopenia  -     CBC    Thrombophilia   Cont rx    Chronic kidney disease, stage IV (severe)  -     CBC auto differential; Future  -     Comprehensive metabolic panel; Future    Benign prostatic hyperplasia with lower urinary tract symptoms, symptom details unspecified   Cont rx    Essential hypertension   Well-cont    Mixed hyperlipidemia  -     Lipid panel; Future    Disorder of bone   -     DXA Bone Density Spine And Hip; Future      Follow-up in about 3 months (around 7/23/2018).

## 2018-04-23 NOTE — TELEPHONE ENCOUNTER
----- Message from Josie Dobbins sent at 4/23/2018 12:10 PM CDT -----  Contact: [phone]/self  Patient requesting to speak with you regarding rescheduling his bone density appt for 4/25/18. Please advise.

## 2018-04-24 ENCOUNTER — TELEPHONE (OUTPATIENT)
Dept: OTOLARYNGOLOGY | Facility: CLINIC | Age: 68
End: 2018-04-24

## 2018-04-25 ENCOUNTER — ANESTHESIA (OUTPATIENT)
Dept: SURGERY | Facility: HOSPITAL | Age: 68
End: 2018-04-25
Payer: MEDICARE

## 2018-04-25 ENCOUNTER — HOSPITAL ENCOUNTER (OUTPATIENT)
Facility: HOSPITAL | Age: 68
Discharge: HOME | End: 2018-04-25
Attending: OTOLARYNGOLOGY | Admitting: OTOLARYNGOLOGY
Payer: MEDICARE

## 2018-04-25 VITALS
WEIGHT: 214 LBS | SYSTOLIC BLOOD PRESSURE: 142 MMHG | TEMPERATURE: 98 F | RESPIRATION RATE: 18 BRPM | OXYGEN SATURATION: 98 % | HEIGHT: 70 IN | HEART RATE: 83 BPM | DIASTOLIC BLOOD PRESSURE: 85 MMHG | BODY MASS INDEX: 30.64 KG/M2

## 2018-04-25 PROCEDURE — 25000003 PHARM REV CODE 250: Performed by: ANESTHESIOLOGY

## 2018-04-25 PROCEDURE — 37000009 HC ANESTHESIA EA ADD 15 MINS: Performed by: OTOLARYNGOLOGY

## 2018-04-25 PROCEDURE — 94640 AIRWAY INHALATION TREATMENT: CPT

## 2018-04-25 PROCEDURE — 63600175 PHARM REV CODE 636 W HCPCS: Performed by: NURSE ANESTHETIST, CERTIFIED REGISTERED

## 2018-04-25 PROCEDURE — 71000015 HC POSTOP RECOV 1ST HR: Performed by: OTOLARYNGOLOGY

## 2018-04-25 PROCEDURE — S0028 INJECTION, FAMOTIDINE, 20 MG: HCPCS | Performed by: ANESTHESIOLOGY

## 2018-04-25 PROCEDURE — 25000003 PHARM REV CODE 250: Performed by: NURSE ANESTHETIST, CERTIFIED REGISTERED

## 2018-04-25 PROCEDURE — 71000033 HC RECOVERY, INTIAL HOUR: Performed by: OTOLARYNGOLOGY

## 2018-04-25 PROCEDURE — 63600175 PHARM REV CODE 636 W HCPCS: Performed by: OTOLARYNGOLOGY

## 2018-04-25 PROCEDURE — 71000039 HC RECOVERY, EACH ADD'L HOUR: Performed by: OTOLARYNGOLOGY

## 2018-04-25 PROCEDURE — 25000003 PHARM REV CODE 250: Performed by: OTOLARYNGOLOGY

## 2018-04-25 PROCEDURE — 88305 TISSUE EXAM BY PATHOLOGIST: CPT | Performed by: PATHOLOGY

## 2018-04-25 PROCEDURE — 36000708 HC OR TIME LEV III 1ST 15 MIN: Performed by: OTOLARYNGOLOGY

## 2018-04-25 PROCEDURE — 88305 TISSUE EXAM BY PATHOLOGIST: CPT | Mod: 26,,, | Performed by: PATHOLOGY

## 2018-04-25 PROCEDURE — 25000242 PHARM REV CODE 250 ALT 637 W/ HCPCS

## 2018-04-25 PROCEDURE — 27000221 HC OXYGEN, UP TO 24 HOURS

## 2018-04-25 PROCEDURE — 31536 LARYNGOSCOPY W/BX & OP SCOPE: CPT | Mod: ,,, | Performed by: OTOLARYNGOLOGY

## 2018-04-25 PROCEDURE — 36000709 HC OR TIME LEV III EA ADD 15 MIN: Performed by: OTOLARYNGOLOGY

## 2018-04-25 PROCEDURE — D9220A PRA ANESTHESIA: Mod: CRNA,,, | Performed by: NURSE ANESTHETIST, CERTIFIED REGISTERED

## 2018-04-25 PROCEDURE — D9220A PRA ANESTHESIA: Mod: ANES,,, | Performed by: ANESTHESIOLOGY

## 2018-04-25 PROCEDURE — 37000008 HC ANESTHESIA 1ST 15 MINUTES: Performed by: OTOLARYNGOLOGY

## 2018-04-25 RX ORDER — FAMOTIDINE 10 MG/ML
20 INJECTION INTRAVENOUS ONCE
Status: COMPLETED | OUTPATIENT
Start: 2018-04-25 | End: 2018-04-25

## 2018-04-25 RX ORDER — OXYMETAZOLINE HCL 0.05 %
SPRAY, NON-AEROSOL (ML) NASAL
Status: DISCONTINUED | OUTPATIENT
Start: 2018-04-25 | End: 2018-04-25 | Stop reason: HOSPADM

## 2018-04-25 RX ORDER — LIDOCAINE HYDROCHLORIDE 10 MG/ML
1 INJECTION, SOLUTION EPIDURAL; INFILTRATION; INTRACAUDAL; PERINEURAL ONCE
Status: DISCONTINUED | OUTPATIENT
Start: 2018-04-25 | End: 2018-04-25 | Stop reason: HOSPADM

## 2018-04-25 RX ORDER — DEXAMETHASONE SODIUM PHOSPHATE 4 MG/ML
INJECTION, SOLUTION INTRA-ARTICULAR; INTRALESIONAL; INTRAMUSCULAR; INTRAVENOUS; SOFT TISSUE
Status: DISCONTINUED | OUTPATIENT
Start: 2018-04-25 | End: 2018-04-25

## 2018-04-25 RX ORDER — IPRATROPIUM BROMIDE AND ALBUTEROL SULFATE 2.5; .5 MG/3ML; MG/3ML
3 SOLUTION RESPIRATORY (INHALATION) ONCE
Status: COMPLETED | OUTPATIENT
Start: 2018-04-25 | End: 2018-04-25

## 2018-04-25 RX ORDER — HYDRALAZINE HYDROCHLORIDE 20 MG/ML
INJECTION INTRAMUSCULAR; INTRAVENOUS
Status: DISCONTINUED | OUTPATIENT
Start: 2018-04-25 | End: 2018-04-25

## 2018-04-25 RX ORDER — SUCCINYLCHOLINE CHLORIDE 20 MG/ML
INJECTION INTRAMUSCULAR; INTRAVENOUS
Status: DISCONTINUED | OUTPATIENT
Start: 2018-04-25 | End: 2018-04-25

## 2018-04-25 RX ORDER — ONDANSETRON 2 MG/ML
INJECTION INTRAMUSCULAR; INTRAVENOUS
Status: DISCONTINUED | OUTPATIENT
Start: 2018-04-25 | End: 2018-04-25

## 2018-04-25 RX ORDER — ESMOLOL HYDROCHLORIDE 10 MG/ML
INJECTION INTRAVENOUS
Status: DISCONTINUED | OUTPATIENT
Start: 2018-04-25 | End: 2018-04-25

## 2018-04-25 RX ORDER — MIDAZOLAM HYDROCHLORIDE 1 MG/ML
INJECTION, SOLUTION INTRAMUSCULAR; INTRAVENOUS
Status: DISCONTINUED | OUTPATIENT
Start: 2018-04-25 | End: 2018-04-25

## 2018-04-25 RX ORDER — PROPOFOL 10 MG/ML
VIAL (ML) INTRAVENOUS
Status: DISCONTINUED | OUTPATIENT
Start: 2018-04-25 | End: 2018-04-25

## 2018-04-25 RX ORDER — METOCLOPRAMIDE HYDROCHLORIDE 5 MG/ML
10 INJECTION INTRAMUSCULAR; INTRAVENOUS EVERY 10 MIN PRN
Status: DISCONTINUED | OUTPATIENT
Start: 2018-04-25 | End: 2018-04-25 | Stop reason: HOSPADM

## 2018-04-25 RX ORDER — CEFAZOLIN SODIUM 1 G/3ML
2 INJECTION, POWDER, FOR SOLUTION INTRAMUSCULAR; INTRAVENOUS
Status: COMPLETED | OUTPATIENT
Start: 2018-04-25 | End: 2018-04-25

## 2018-04-25 RX ORDER — METOPROLOL TARTRATE 1 MG/ML
INJECTION, SOLUTION INTRAVENOUS
Status: DISCONTINUED | OUTPATIENT
Start: 2018-04-25 | End: 2018-04-25

## 2018-04-25 RX ORDER — CISATRACURIUM BESYLATE 2 MG/ML
INJECTION, SOLUTION INTRAVENOUS
Status: DISCONTINUED | OUTPATIENT
Start: 2018-04-25 | End: 2018-04-25

## 2018-04-25 RX ORDER — SODIUM CHLORIDE 9 MG/ML
INJECTION, SOLUTION INTRAVENOUS CONTINUOUS PRN
Status: DISCONTINUED | OUTPATIENT
Start: 2018-04-25 | End: 2018-04-25

## 2018-04-25 RX ORDER — LIDOCAINE HCL/PF 100 MG/5ML
SYRINGE (ML) INTRAVENOUS
Status: DISCONTINUED | OUTPATIENT
Start: 2018-04-25 | End: 2018-04-25

## 2018-04-25 RX ORDER — LIDOCAINE HYDROCHLORIDE 10 MG/ML
1 INJECTION, SOLUTION EPIDURAL; INFILTRATION; INTRACAUDAL; PERINEURAL ONCE
Status: COMPLETED | OUTPATIENT
Start: 2018-04-25 | End: 2018-04-25

## 2018-04-25 RX ORDER — SODIUM CHLORIDE 9 MG/ML
INJECTION, SOLUTION INTRAVENOUS CONTINUOUS
Status: DISCONTINUED | OUTPATIENT
Start: 2018-04-25 | End: 2018-04-25 | Stop reason: HOSPADM

## 2018-04-25 RX ORDER — FENTANYL CITRATE 50 UG/ML
INJECTION, SOLUTION INTRAMUSCULAR; INTRAVENOUS
Status: DISCONTINUED | OUTPATIENT
Start: 2018-04-25 | End: 2018-04-25

## 2018-04-25 RX ORDER — IPRATROPIUM BROMIDE AND ALBUTEROL SULFATE 2.5; .5 MG/3ML; MG/3ML
SOLUTION RESPIRATORY (INHALATION)
Status: COMPLETED
Start: 2018-04-25 | End: 2018-04-25

## 2018-04-25 RX ADMIN — HYDRALAZINE HYDROCHLORIDE 5 MG: 20 INJECTION INTRAMUSCULAR; INTRAVENOUS at 08:04

## 2018-04-25 RX ADMIN — IPRATROPIUM BROMIDE AND ALBUTEROL SULFATE 3 ML: .5; 3 SOLUTION RESPIRATORY (INHALATION) at 09:04

## 2018-04-25 RX ADMIN — ESMOLOL HYDROCHLORIDE 20 MG: 10 INJECTION INTRAVENOUS at 08:04

## 2018-04-25 RX ADMIN — IPRATROPIUM BROMIDE AND ALBUTEROL SULFATE 3 ML: 2.5; .5 SOLUTION RESPIRATORY (INHALATION) at 09:04

## 2018-04-25 RX ADMIN — CEFAZOLIN 2 G: 330 INJECTION, POWDER, FOR SOLUTION INTRAMUSCULAR; INTRAVENOUS at 08:04

## 2018-04-25 RX ADMIN — DEXAMETHASONE SODIUM PHOSPHATE 4 MG: 4 INJECTION, SOLUTION INTRAMUSCULAR; INTRAVENOUS at 08:04

## 2018-04-25 RX ADMIN — SUCCINYLCHOLINE CHLORIDE 160 MG: 20 INJECTION, SOLUTION INTRAMUSCULAR; INTRAVENOUS at 08:04

## 2018-04-25 RX ADMIN — FAMOTIDINE 20 MG: 10 INJECTION INTRAVENOUS at 06:04

## 2018-04-25 RX ADMIN — MIDAZOLAM HYDROCHLORIDE 2 MG: 1 INJECTION, SOLUTION INTRAMUSCULAR; INTRAVENOUS at 07:04

## 2018-04-25 RX ADMIN — METOPROLOL TARTRATE 1 MG: 5 INJECTION, SOLUTION INTRAVENOUS at 08:04

## 2018-04-25 RX ADMIN — CISATRACURIUM BESYLATE 2 MG: 2 INJECTION INTRAVENOUS at 08:04

## 2018-04-25 RX ADMIN — ONDANSETRON 4 MG: 2 INJECTION INTRAMUSCULAR; INTRAVENOUS at 08:04

## 2018-04-25 RX ADMIN — SODIUM CHLORIDE: 0.9 INJECTION, SOLUTION INTRAVENOUS at 07:04

## 2018-04-25 RX ADMIN — PROPOFOL 200 MG: 10 INJECTION, EMULSION INTRAVENOUS at 08:04

## 2018-04-25 RX ADMIN — SODIUM CHLORIDE 500 ML: 0.9 INJECTION, SOLUTION INTRAVENOUS at 06:04

## 2018-04-25 RX ADMIN — LIDOCAINE HYDROCHLORIDE 75 MG: 20 INJECTION, SOLUTION INTRAVENOUS at 08:04

## 2018-04-25 RX ADMIN — LIDOCAINE HYDROCHLORIDE 10 MG: 10 INJECTION, SOLUTION EPIDURAL; INFILTRATION; INTRACAUDAL; PERINEURAL at 06:04

## 2018-04-25 RX ADMIN — FENTANYL CITRATE 100 MCG: 50 INJECTION, SOLUTION INTRAMUSCULAR; INTRAVENOUS at 08:04

## 2018-04-25 NOTE — BRIEF OP NOTE
[facility]  Brief Operative Note     SUMMARY     Surgery Date: 4/25/2018     Surgeon(s) and Role:     * Duncan Mcadams MD - Primary     * Brandt Oswald MD - Resident - Assisting    Pre-op Diagnosis:  Squamous cell carcinoma [C44.92]    Post-op Diagnosis:  Post-Op Diagnosis Codes:     * Squamous cell carcinoma [C44.92]    Procedure(s) (LRB):  LARYNGOSCOPY (N/A)    Anesthesia: General    Description of the findings of the procedure: Direct laryngoscopy with biopsy of epiglottic petiole and exposed left thyroid cartilage.    Estimated Blood Loss: * No values recorded between 4/25/2018  8:14 AM and 4/25/2018  8:53 AM *         Specimens:   Specimen (12h ago through future)    Start     Ordered    04/25/18 0821  Specimen to Pathology - Surgery  Once     Comments:  1. Left Laryngeal Mass - permanent      04/25/18 0821          Discharge Note    SUMMARY     Admit Date: 4/25/2018    Discharge Date and Time:  04/25/2018 11:01 AM    Hospital Course Patient tolerated above procedure without difficulty or complication and stable for discharge post-operatively.    Final Diagnosis: Post-Op Diagnosis Codes:     * Squamous cell carcinoma [C44.92]    Disposition: Home or Self Care    Follow Up/Patient Instructions:     Medications:  Reconciled Home Medications:      Medication List      CONTINUE taking these medications    amLODIPine 10 MG tablet  Commonly known as:  NORVASC  TAKE 1 TABLET EVERY DAY     apixaban 2.5 mg Tab  Commonly known as:  ELIQUIS  Take 1 tablet (2.5 mg total) by mouth 2 (two) times daily.     atorvastatin 20 MG tablet  Commonly known as:  LIPITOR  TAKE 1 TABLET (20 MG TOTAL) BY MOUTH EVERY OTHER DAY     ferrous sulfate 325 mg (65 mg iron) Tab tablet  Take 325 mg by mouth once daily.     finasteride 5 mg tablet  Commonly known as:  PROSCAR  TAKE 1 TABLET EVERY DAY     metoprolol succinate 100 MG 24 hr tablet  Commonly known as:  TOPROL-XL  TAKE 1 TABLET EVERY DAY     omeprazole 40 MG  capsule  Commonly known as:  PRILOSEC  Take 1 capsule (40 mg total) by mouth every morning.     predniSONE 5 MG tablet  Commonly known as:  DELTASONE  Take 1 tablet (5 mg total) by mouth once daily.     sirolimus 1 MG Tab  Commonly known as:  RAPAMUNE  Take 3 tablets (3 mg total) by mouth once daily.     sodium bicarbonate 650 MG tablet  TAKE THREE TABLETS BY MOUTH THREE TIMES DAILY            Discharge Procedure Orders  Diet Adult Regular     Activity as tolerated     Notify your health care provider if you experience any of the following:  severe uncontrolled pain     Notify your health care provider if you experience any of the following:  difficulty breathing or increased cough     No dressing needed       Follow-up Information     Duncan Mcadams MD. Schedule an appointment as soon as possible for a visit in 1 week.    Specialty:  Otolaryngology  Why:  For post-op visit  Contact information:  [address]  [facility] [address]  [phone]

## 2018-04-25 NOTE — PLAN OF CARE
POC reviewed with pt, acknowledged understanding. Pt remains free of falls/injuries. Pt VSS. Pt on RA denies SOB. Pt on telemetry remains SR. Pt tolerating npo diet. Pt pain controlled with prescribed meds. ENT doctor at the bedside disscusing POC w/ pt. No acute events throughout shift. No distress noted, will continue to monitor until pt is transferred to [facility].

## 2018-04-25 NOTE — INTERVAL H&P NOTE
The patient has been examined and the H&P has been reviewed:    I concur with the findings and no changes have occurred since H&P was written.    Anesthesia/Surgery risks, benefits and alternative options discussed and understood by patient/family.          Active Hospital Problems    Diagnosis  POA    Squamous cell carcinoma [C44.92]  Yes      Resolved Hospital Problems    Diagnosis Date Resolved POA   No resolved problems to display.

## 2018-04-25 NOTE — ANESTHESIA RELEASE NOTE
"Anesthesia Release from PACU Note    Patient: Jose Luis Manzo    Procedure(s) Performed: Procedure(s) (LRB):  LARYNGOSCOPY (N/A)    Anesthesia type: GEN    Post pain: Adequate analgesia reported    Post assessment: no apparent anesthetic complications, tolerated procedure well and no evidence of recall    Post vital signs: /71   Pulse 85   Temp 36.5 °C (97.7 °F) (Temporal)   Resp (!) 33   Ht 5' 10" (1.778 m)   Wt 97.1 kg (214 lb)   SpO2 99%   BMI 30.71 kg/m²     Level of consciousness: awake, alert and oriented    Nausea/Vomiting: no nausea/no vomiting    Complications: none    Airway Patency: patent    Respiratory: unassisted, spontaneous ventilation, room air    Cardiovascular: stable and blood pressure at baseline    Hydration: euvolemic    "

## 2018-04-25 NOTE — TRANSFER OF CARE
"Anesthesia Transfer of Care Note    Patient: Jose Luis Manzo    Procedure(s) Performed: Procedure(s) (LRB):  LARYNGOSCOPY (N/A)    Patient location: PACU    Anesthesia Type: general    Transport from OR: Transported from OR on 6-10 L/min O2 by face mask with adequate spontaneous ventilation    Post pain: adequate analgesia    Post assessment: no apparent anesthetic complications and tolerated procedure well    Post vital signs: stable    Level of consciousness: awake, alert and oriented    Nausea/Vomiting: no nausea/vomiting    Complications: none    Transfer of care protocol was followed      Last vitals:   Visit Vitals  BP (!) 165/90   Pulse 85   Temp 36.4 °C (97.5 °F) (Temporal)   Resp 19   Ht 5' 10" (1.778 m)   Wt 97.1 kg (214 lb)   SpO2 98%   BMI 30.71 kg/m²     "

## 2018-04-25 NOTE — DISCHARGE INSTRUCTIONS
Direct Laryngoscopy  Direct laryngoscopy is a procedure to look at the vocal cords. A laryngoscope is a rigid, hollow tube with a light attached. Using this tool, your healthcare provider can look behind your tongue and down your throat to your vocal cords. A tissue sample (biopsy) can be taken for study in a lab. Or a growth can be removed. Your healthcare provider can tell you more about your procedure depending on why its being done. This sheet gives you general information about what to expect.    Getting ready for the procedure  Prepare for the surgery as you have been instructed. Be sure to tell your healthcare provider about all medicines you take. This includes over-the-counter medicines. It also includes herbs and other supplements. You may need to stop taking some or all of them before surgery. Your healthcare provider will let you know. Also follow any directions youre given for not eating or drinking before surgery.  The day of the procedure  The procedure takes 30 to 60 minutes. You will likely go home the same day.  Before the procedure  Here is what to expect before the procedure begins:  · An IV line is put into a vein in your arm or hand. This line delivers fluids and medicines.  · You will be given medicine (anesthesia) to keep you pain free during surgery. This may be general anesthesia, which puts you in a state like deep sleep. Or you may have sedation, which makes you relaxed and drowsy. Local anesthesia may also be injected or sprayed into your throat to numb it.  During the procedure  Here is what to expect during the procedure:  · You will lie on your back on a table. The scope is put into your mouth and passed down into the throat.  · Your healthcare provider examines the larynx and surrounding areas with the scope. If needed, a biopsy is done using small tools put through the scope.  · If a growth is found, tools can be put through the scope to remove it.  After the procedure  You will  be taken to a room to wake up from the anesthesia. At first, your throat may be sore and scratchy. Your voice may be hoarse, making talking difficult. And it may be hard to swallow. You will receive medicine to control pain. When you are released to go home, have an adult family member or friend ready to drive you.  Recovering at home  Once home, follow any instructions you have been given. Be sure to:  · Take pain medicine as directed.  · Drink plenty of water as soon as you can swallow normally.  · Use throat lozenges as advised by your healthcare provider to help ease throat soreness.  · Rest your voice as instructed by your healthcare provider.  When to call your healthcare provider  After you get home, call the healthcare provider if you have any of the following:  · Chest pain or trouble breathing (call 911)  · Fever of 100.4°F (38°C) or higher, or as directed by your healthcare provider  · Problems swallowing that prevent you from eating or drinking  · Pain that does not go away even after taking pain medicine  · Severe nausea or vomiting  · Bloody vomit  · Cough that brings up blood   Follow-up  Within a few weeks, you will see your healthcare provider for a follow-up visit. During this visit, your provider will discuss the results of the procedure. Depending on what was found, you may need further evaluation and treatment.  Risks and possible complications   The risks of this procedure include:  · Bleeding  · Infection  · Gagging  · Vomiting  · Cuts in the mouth or throat  · Injury to the teeth  · Vocal cord injury  · Breathing problems  · Risks of anesthesia   Date Last Reviewed: 6/11/2015  © 0387-3639 [employer], [employer]. [address] [place] [address], [place] [address]. All rights reserved. This information is not intended as a substitute for professional medical care. Always follow your healthcare professional's instructions.

## 2018-04-25 NOTE — H&P (VIEW-ONLY)
Chief Complaint   Patient presents with    Follow-up     Treatment History  1. 2015: XRT for SCCA larynx ([facility]).  2. 10/2/17: DL and biopsy TVC.  Path revealed severe dysplasia/CIS/superficially invasive SCCA (Dr. Lizet Desouza).  3. 12/21/17: MSL with resection R TVC lesion, biopsy L TVC lesion.  Path revealed severe dysplasia. (Dr. Gibran Smith).  4. 3/1/17: MSL with laser resection L TVC.  Path benign. (Dr. Smith).    HPI   68 y.o. male presents with the above treatment history.  He has done fairly well since his most recent surgery with Dr. Smith.  He has been followed by Dr. Smith for the last ~5 mos.  He underwent KTP laser resection of microinvasive SCCA of the R TVC in 12/17. He was subsequently taken back to the OR on 3/17 and additional biopsies were taken.  Dr. Smith reported that there was grossly abnormal thyroid cartilage noted intraoperatively.  His concern was, obviously, for malignancy but pathology returned consistent with necrosis. Also per Dr. Smith's report, Mr. Manzo's laryngeal exam has worsened over the last several mos. He has developed L TVC immobility and mild motion impairment of the R TVC.  Additionally, there is a necrotic lesion of the AC/petiole region.     Mr. Manzo has few complaints. He is breathing well.  He recently underwent MBSS and has been cleared for a regular diet and thin liquids.  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  He presents today to discuss his treatment options.     Review of Systems   Constitutional: Negative for fatigue and unexpected weight change.   HENT: Per HPI.  Eyes: Negative for visual disturbance.   Respiratory: Negative for shortness of breath, hemoptysis   Cardiovascular: Negative for chest pain and palpitations.   Musculoskeletal: Negative for decreased ROM, back pain.   Skin: Negative for rash, sunburn, itching.   Neurological: Negative for dizziness and seizures.   Hematological: Negative for adenopathy. Does not  "bruise/bleed easily.   Endocrine: Negative for rapid weight loss/weight gain, heat/cold intolerance.     Past Medical History   Patient Active Problem List   Diagnosis    Chronic kidney disease, stage IV (severe)    Essential hypertension    Mixed hyperlipidemia    BPH (benign prostatic hyperplasia)    Claudication of right lower extremity    Eye swelling, bilateral    Diminished night vision    Edema of right lower extremity    Renal transplant recipient    Thrombocytopenia    Dermolipoma    Edema    DVT (deep venous thrombosis)    Iron deficiency anemia    Thrombophilia    H/O laryngeal cancer    Pulmonary nodule, left    Larynx cancer    Voice disturbance    Atherosclerosis of aorta    Carcinoma in situ of vocal cord    Dysphagia, pharyngoesophageal           Past Surgical History   Past Surgical History:   Procedure Laterality Date    FRACTURE SURGERY Left     "lower leg" 40 years ago    KIDNEY TRANSPLANT  2007    follow[facility]    LARYNX SURGERY  11/2014    Oropharyngeal Cancer    TIBIAL STAPLING Left 1980              Family History   Family History   Problem Relation Age of Onset    Stroke Mother     Diabetes Mother     Hypertension Mother     Stroke Father     No Known Problems Sister     No Known Problems Brother     No Known Problems Daughter            Social History   .  Social History     Social History    Marital status: Single     Spouse name: N/A    Number of children: N/A    Years of education: N/A     Occupational History    Not on file.     Social History Main Topics    Smoking status: Former Smoker     Packs/day: 1.00     Years: 20.00     Types: Cigarettes     Quit date: 2009    Smokeless tobacco: Never Used    Alcohol use No    Drug use: No      Comment: Patient quick 2011    Sexual activity: Not Currently     Partners: Female     Other Topics Concern    Not on file     Social History Narrative    No narrative on file         Allergies "   Review of patient's allergies indicates:  No Known Allergies        Physical Exam     Vitals:    04/09/18 1508   BP: 135/85   Pulse: 70   Temp: 98.3 °F (36.8 °C)         Body mass index is 29.7 kg/m².      General: AOx3, NAD   Respiratory:  Symmetric chest rise, normal effort  Right Ear: External Auditory Canal WNL,TM w/o masses/lesions/perforations.  Middle ear without evidence of effusion.   Left Ear: External Auditory Canal WNL,TM w/o masses/lesions/perforations.  Middle ear without evidence of effusion.   Nose: No gross nasal septal deviation. Inferior Turbinates WNL bilaterally. No septal perforation. No masses/lesions.   Oral Cavity:  Oral Tongue mobile, no lesions noted. Hard Palate WNL. No buccal or FOM lesions.  Oropharynx:  No masses/lesions of the posterior pharyngeal wall. Tonsillar fossa without lesions. Soft palate without masses. Midline uvula.   Neck: No scars. Mild fibrosis status post XRT.  No cervical lymphadenopathy, thyromegaly or thyroid nodules.  Normal range of motion.    Face: House Brackmann I bilaterally.     Flex Naso Heike Hypo Procedures #2    Procedure:  Diagnostic flexible nasopharyngoscopy, laryngoscopy and hypopharyngoscopy:    Routine preparation with local atomizer with 1% neosynephrine/pontocaine with customary flexible endoscope.    Nasopharynx:  No lesions.   Mucosa:  No lesions.   Adenoids:  Present.  Posterior Choanae:  Patent.  Eustachian Tubes:  Patent.  Posterior pharynx:  No lesions.  Larynx/hypopharynx:   Epiglottis:  Ulcerated lesion with fibrinous base at the epiglottic petiole.    AE Folds:  No lesions.   Vocal cords:  No polyps, nodules, ulcers or lesions.   Mobility:  R TVC slightly limited, L TVC immobile.    Hypopharynx:  No lesions.   Piriform sinus:  No pooling, no lesions.   Post Cricoid:  No erythema, no edema.      Assessment/Plan  Problem List Items Addressed This Visit        ENT    Chondronecrosis of larynx     SCCA of the larynx status post XRT and laser  resection of recurrent disease.  His clinical picture is worrisome for persistent, progressive malignancy though we do not have a tissue diagnosis to support this concern.  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  I explained my concerns to him.  Given the progression of his symptoms, his most reasonable treatment option is a total laryngectomy. It is difficult, though, to make a case for this life-changing operation without a cancer diagnosis or flagrant laryngeal incompetence.      I recommended that we return to the OR for DL and biopsy of any worrisome lesions of the larynx in order to rule out occult malignancy.  I also explained to him that if I have any concern that he will experience post-op airway obstruction, I will perform a tracheostomy at that time.    He understands that the risks of DL and biopsy include pain, bleeding, infection, dental injury, tongue numbness, hoarseness, dysphagia, recurrence of the lesion in question, perforation of the upper aerodigestive tract, need for additional treatment and airway fire. He wishes to proceed with surgery.    I explained that the risks of tracheotomy include, but are not limited to, infection, bleeding, scarring, delayed healing, permanent tracheocutaneous fistula, airway scarring, inability to remove the trach, erosion into great vessels with significant life-threatening bleeding, and the need for additional procedures. Time was allowed for questions, and all questions were answered to the patient's apparent satisfaction.              Oncology    Larynx cancer - Primary

## 2018-04-25 NOTE — ANESTHESIA POSTPROCEDURE EVALUATION
"Anesthesia Post Evaluation    Patient: Jose Luis Manzo    Procedure(s) Performed: Procedure(s) (LRB):  LARYNGOSCOPY (N/A)    Final Anesthesia Type: general  Patient location during evaluation: [facility]  Patient participation: Yes- Able to Participate  Level of consciousness: awake and alert and oriented  Post-procedure vital signs: reviewed and stable  Pain management: adequate  Airway patency: patent  PONV status at discharge: No PONV  Anesthetic complications: no      Cardiovascular status: stable  Respiratory status: unassisted, spontaneous ventilation and room air  Hydration status: euvolemic  Follow-up not needed.        Visit Vitals  BP (!) 142/85   Pulse 83   Temp 36.4 °C (97.5 °F) (Temporal)   Resp 18   Ht 5' 10" (1.778 m)   Wt 97.1 kg (214 lb)   SpO2 98%   BMI 30.71 kg/m²       Pain/James Score: Pain Assessment Performed: Yes (4/25/2018 11:21 AM)  Presence of Pain: denies (4/25/2018 11:21 AM)  James Score: 10 (4/25/2018 11:21 AM)      "

## 2018-04-26 NOTE — OP NOTE
DATE OF PROCEDURE:  04/25/2018    PREOPERATIVE DIAGNOSES:  1.  History of squamous cell carcinoma of the larynx, status post radiation and   endoscopic resection.  2.  Chondral necrosis of the larynx.    POSTOPERATIVE DIAGNOSES:  1.  History of squamous cell carcinoma of the larynx, status post radiation and   endoscopic resection.  2.  Chondral necrosis of the larynx.    PROCEDURE PERFORMED:  Direct laryngoscopy with use of operating telescope with   biopsy of left laryngeal lesion.    SURGEON:  Duncan Mcadams M.D.    ASSISTANT:  Brandt Oswald M.D. (RES)    ANESTHESIA:  General endotracheal.    ESTIMATED BLOOD LOSS:  Minimal.    SPECIMENS:  Left larynx for permanent.    INDICATIONS FOR PROCEDURE:  Mr. Manzo is a 68-year-old gentleman with a history   of squamous cell carcinoma of the larynx status post external beam radiation   therapy and subsequently endoscopic resection by Dr. Smith.  He was noted to   have a worsening clinical exam and endoscopic and imaging findings consistent   with chondral necrosis of the larynx.  His previous biopsies subsequent to his   resection by Dr. Smith were negative for carcinoma.  He was sent to me for   evaluation for total laryngectomy.  He was noted to be breathing somewhat   comfortably and to be swallowing well.  In light of these findings, it was   difficult to make a case stand for total laryngectomy.  I did recommend that we   return to the Operating Room for biopsy of his larynx in order to ultimately   rule out a malignancy.  He was apprised of the risks, benefits and alternatives   to surgery.  In spite of the risks inherent to surgery he provided informed   consent.    PROCEDURE IN DETAIL:  The patient was taken to the Operating Room and placed on   the operating table in supine position.  General endotracheal anesthesia was   induced by the Anesthesia Team.  The operating table was rotated 90 degrees to   the right.  The upper teeth were protected with a  mouth guard and the Dedo   laryngoscope was inserted through the mouth down to the level of the larynx.    Within the left hemilarynx, there was noted to be fullness of the left false   vocal cord and true vocal cord.  There was noted to be charli exposure of the   thyroid cartilage of which several representative biopsies were taken.  Of note,   the larynx was examined under telescopic visualization.  There were no other   abnormalities noted.  The specimen was then sent to Pathology for permanent   analysis.  Hemostasis was then achieved with the application of topical Afrin   and the patient was handed back to Anesthesia, awakened, extubated and   transported to Recovery in satisfactory condition.  There were no intraoperative   complications.  I was present and participated in the entire procedure.      CPH/IN  dd: 04/25/2018 18:43:17 (CDT)  td: 04/25/2018 19:16:59 (CDT)  Doc ID   #[id number]  Job ID #[id number]    CC:

## 2018-05-01 ENCOUNTER — HOSPITAL ENCOUNTER (OUTPATIENT)
Dept: RADIOLOGY | Facility: HOSPITAL | Age: 68
Discharge: HOME | End: 2018-05-01
Attending: INTERNAL MEDICINE
Payer: MEDICARE

## 2018-05-01 DIAGNOSIS — D69.6 THROMBOCYTOPENIA: ICD-10-CM

## 2018-05-01 DIAGNOSIS — M89.9 DISORDER OF BONE: ICD-10-CM

## 2018-05-01 PROCEDURE — 77080 DXA BONE DENSITY AXIAL: CPT | Mod: TC,PO

## 2018-05-03 ENCOUNTER — OFFICE VISIT (OUTPATIENT)
Dept: OTOLARYNGOLOGY | Facility: CLINIC | Age: 68
End: 2018-05-03
Payer: MEDICARE

## 2018-05-03 VITALS
TEMPERATURE: 99 F | SYSTOLIC BLOOD PRESSURE: 144 MMHG | HEART RATE: 69 BPM | WEIGHT: 211.88 LBS | DIASTOLIC BLOOD PRESSURE: 92 MMHG | BODY MASS INDEX: 30.4 KG/M2

## 2018-05-03 DIAGNOSIS — J38.7 CHONDRONECROSIS OF LARYNX: ICD-10-CM

## 2018-05-03 PROCEDURE — 99999 PR PBB SHADOW E&M-EST. PATIENT-LVL III: CPT | Mod: PBBFAC,,, | Performed by: OTOLARYNGOLOGY

## 2018-05-03 PROCEDURE — 99024 POSTOP FOLLOW-UP VISIT: CPT | Mod: S$GLB,,, | Performed by: OTOLARYNGOLOGY

## 2018-05-03 NOTE — PROGRESS NOTES
Chief Complaint   Patient presents with    Post-op Evaluation     Treatment History  1. 2015: XRT for SCCA larynx ([facility]).  2. 10/2/17: DL and biopsy TVC.  Path revealed severe dysplasia/CIS/superficially invasive SCCA (Dr. Lizet Desouza).  3. 12/21/17: MSL with resection R TVC lesion, biopsy L TVC lesion.  Path revealed severe dysplasia. (Dr. Gibran Smith).  4. 3/1/17: MSL with laser resection L TVC.  Path benign. (Dr. Smith).    HPI   68 y.o. male presents with the above treatment history.  He has done fairly well since his most recent surgery with Dr. Smith.  He has been followed by Dr. Smith for the last ~5 mos.  He underwent KTP laser resection of microinvasive SCCA of the R TVC in 12/17. He was subsequently taken back to the OR on 3/17 and additional biopsies were taken.  Dr. Smith reported that there was grossly abnormal thyroid cartilage noted intraoperatively.  His concern was, obviously, for malignancy but pathology returned consistent with necrosis. Also per Dr. Smith's report, Mr. Manzo's laryngeal exam has worsened over the last several mos. He has developed L TVC immobility and mild motion impairment of the R TVC.  Additionally, there is a necrotic lesion of the AC/petiole region.     Mr. Manzo has few complaints. He is breathing well.  He recently underwent MBSS and has been cleared for a regular diet and thin liquids.  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  Most recent DL and biopsy revealed only evidence of chondronecrosis.  No evidence of malignancy.    Review of Systems   Constitutional: Negative for fatigue and unexpected weight change.   HENT: Per HPI.  Eyes: Negative for visual disturbance.   Respiratory: Negative for shortness of breath, hemoptysis   Cardiovascular: Negative for chest pain and palpitations.   Musculoskeletal: Negative for decreased ROM, back pain.   Skin: Negative for rash, sunburn, itching.   Neurological: Negative for dizziness and seizures.  "  Hematological: Negative for adenopathy. Does not bruise/bleed easily.   Endocrine: Negative for rapid weight loss/weight gain, heat/cold intolerance.     Past Medical History   Patient Active Problem List   Diagnosis    Chronic kidney disease, stage IV (severe)    Essential hypertension    Mixed hyperlipidemia    BPH (benign prostatic hyperplasia)    Claudication of right lower extremity    Eye swelling, bilateral    Diminished night vision    Edema of right lower extremity    Renal transplant recipient    Thrombocytopenia    Dermolipoma    Edema    DVT (deep venous thrombosis)    Iron deficiency anemia    Thrombophilia    H/O laryngeal cancer    Pulmonary nodule, left    Larynx cancer    Voice disturbance    Atherosclerosis of aorta    Carcinoma in situ of vocal cord    Dysphagia, pharyngoesophageal    Chondronecrosis of larynx    Squamous cell carcinoma           Past Surgical History   Past Surgical History:   Procedure Laterality Date    FRACTURE SURGERY Left     "lower leg" 40 years ago    KIDNEY TRANSPLANT  2007    [facility]    LARYNX SURGERY  11/2014    Oropharyngeal Cancer    microlaryngoscopy      TIBIAL STAPLING Left 1980              Family History   Family History   Problem Relation Age of Onset    Stroke Mother     Diabetes Mother     Hypertension Mother     Stroke Father     No Known Problems Sister     No Known Problems Brother     No Known Problems Daughter            Social History   .  Social History     Social History    Marital status: Single     Spouse name: N/A    Number of children: N/A    Years of education: N/A     Occupational History    Not on file.     Social History Main Topics    Smoking status: Former Smoker     Packs/day: 1.00     Years: 20.00     Types: Cigarettes     Quit date: 2009    Smokeless tobacco: Never Used    Alcohol use No    Drug use: No      Comment: Patient quick 2011    Sexual activity: Not Currently    "  Partners: Female     Other Topics Concern    Not on file     Social History Narrative    No narrative on file         Allergies   Review of patient's allergies indicates:  No Known Allergies        Physical Exam     Vitals:    05/03/18 1514   BP: (!) 144/92   Pulse: 69   Temp: 99.4 °F (37.4 °C)         Body mass index is 30.4 kg/m².      No exam performed.  Entire visit devoted to counseling.         Assessment/Plan  Problem List Items Addressed This Visit        ENT    Chondronecrosis of larynx     TONY but with chondronecrosis of the larynx.  I explained that his only definitive treatment option is laryngectomy.  He is breathing well and swallowing, for the most part, safely.  I explained that since he is doing well from a functional perspective and since he has no evidence of cancer, that we could safely observe him for now.  If his breathing or swallowing worsens, he is to contact me.  Otherwise, he is to RTC in 1 month.             Oncology    Squamous cell carcinoma - Primary

## 2018-05-03 NOTE — ASSESSMENT & PLAN NOTE
TONY but with chondronecrosis of the larynx.  I explained that his only definitive treatment option is laryngectomy.  He is breathing well and swallowing, for the most part, safely.  I explained that since he is doing well from a functional perspective and since he has no evidence of cancer, that we could safely observe him for now.  If his breathing or swallowing worsens, he is to contact me.  Otherwise, he is to RTC in 1 month.

## 2018-05-07 ENCOUNTER — TELEPHONE (OUTPATIENT)
Dept: HEMATOLOGY/ONCOLOGY | Facility: CLINIC | Age: 68
End: 2018-05-07

## 2018-05-24 ENCOUNTER — HOSPITAL ENCOUNTER (OUTPATIENT)
Dept: RADIOLOGY | Facility: HOSPITAL | Age: 68
Discharge: HOME | End: 2018-05-24
Attending: INTERNAL MEDICINE
Payer: MEDICARE

## 2018-05-24 ENCOUNTER — OFFICE VISIT (OUTPATIENT)
Dept: HEMATOLOGY/ONCOLOGY | Facility: CLINIC | Age: 68
End: 2018-05-24
Payer: MEDICARE

## 2018-05-24 VITALS
DIASTOLIC BLOOD PRESSURE: 85 MMHG | TEMPERATURE: 98 F | WEIGHT: 214.94 LBS | SYSTOLIC BLOOD PRESSURE: 129 MMHG | HEIGHT: 70 IN | BODY MASS INDEX: 30.77 KG/M2 | HEART RATE: 78 BPM | OXYGEN SATURATION: 98 %

## 2018-05-24 DIAGNOSIS — R91.1 PULMONARY NODULE, LEFT: ICD-10-CM

## 2018-05-24 DIAGNOSIS — N18.4 CHRONIC KIDNEY DISEASE, STAGE IV (SEVERE): ICD-10-CM

## 2018-05-24 DIAGNOSIS — Z85.21 H/O LARYNGEAL CANCER: Primary | ICD-10-CM

## 2018-05-24 DIAGNOSIS — R13.14 DYSPHAGIA, PHARYNGOESOPHAGEAL: ICD-10-CM

## 2018-05-24 PROCEDURE — 3074F SYST BP LT 130 MM HG: CPT | Mod: CPTII,S$GLB,, | Performed by: INTERNAL MEDICINE

## 2018-05-24 PROCEDURE — 3079F DIAST BP 80-89 MM HG: CPT | Mod: CPTII,S$GLB,, | Performed by: INTERNAL MEDICINE

## 2018-05-24 PROCEDURE — 71250 CT THORAX DX C-: CPT | Mod: 26,,, | Performed by: RADIOLOGY

## 2018-05-24 PROCEDURE — 99213 OFFICE O/P EST LOW 20 MIN: CPT | Mod: S$GLB,,, | Performed by: INTERNAL MEDICINE

## 2018-05-24 PROCEDURE — 99999 PR PBB SHADOW E&M-EST. PATIENT-LVL III: CPT | Mod: PBBFAC,,, | Performed by: INTERNAL MEDICINE

## 2018-05-24 PROCEDURE — 99499 UNLISTED E&M SERVICE: CPT | Mod: S$GLB,,, | Performed by: INTERNAL MEDICINE

## 2018-05-24 PROCEDURE — 71250 CT THORAX DX C-: CPT | Mod: TC

## 2018-05-24 NOTE — PROGRESS NOTES
Subjective:       Patient ID: Jose Luis Manzo is a 68 y.o. male.    Chief Complaint: Follow-up and Results (on CT of chest)    HPI  He has history of T1a squamous cell carcinoma of the larynx.  It was diagnosed in September 2014 by ENT Dr. Chandler Kyle when he was worked up for hoarseness.  The lesion was found in the left true vocal cord.  Biopsy revealed squamous cell carcinoma well differentiated.  He underwent definitive radiation therapy at [facility] under the care of .  He completed 6300 centigray by December 2014.  A PET scan done at the time was negative for metastatic disease.  He has been having some hoarseness since then.    PET scan 5/12/17 that revealed a 1.8 centimeter pleural-based nodule in the left upper lobe.  SUV 3.6.  This is a new lesion.    He underwent CT-guided biopsy of this lesion and it was negative for malignancy.    Underwent Suspension microlaryngoscopy with KTP laser resection of right true vocal fold tumor and Biopsy of left true vocal fold tumor in December 2017.  That biopsy was negative for malignancy.      Review of Systems   Constitutional: Negative for fever and unexpected weight change.   HENT: Positive for voice change. Negative for mouth sores, nosebleeds, sinus pressure and sneezing.    Respiratory: Negative for cough and shortness of breath.    Cardiovascular: Negative for chest pain and palpitations.   Gastrointestinal: Negative for abdominal pain, blood in stool and nausea.   Musculoskeletal: Positive for arthralgias. Negative for gait problem.   Hematological: Negative for adenopathy. Does not bruise/bleed easily.   Psychiatric/Behavioral: Negative for behavioral problems and confusion. The patient is nervous/anxious.          Objective:      Physical Exam   Constitutional: He is oriented to person, place, and time. He appears well-developed and well-nourished. No distress.   Eyes: Conjunctivae and lids are normal. No scleral icterus.   Neck: Normal  range of motion. Neck supple. No thyromegaly present.   Cardiovascular: Normal rate, regular rhythm and intact distal pulses.  Exam reveals no gallop.    Pulmonary/Chest: Effort normal and breath sounds normal. No respiratory distress. He has no rales.   Abdominal: Soft. Bowel sounds are normal. He exhibits no distension and no mass. There is no hepatosplenomegaly.   Lymphadenopathy:        Head (right side): No submental adenopathy present.        Head (left side): No submental adenopathy present.     He has no cervical adenopathy.        Right: No supraclavicular adenopathy present.        Left: No supraclavicular adenopathy present.   Neurological: He is alert and oriented to person, place, and time.   Skin: Skin is warm. He is not diaphoretic. No cyanosis. Nails show no clubbing.         Assessment:       1. H/O laryngeal cancer    2. Chronic kidney disease, stage IV (severe)    3. Pulmonary nodule, left    4. Dysphagia, pharyngoesophageal        Plan:   In summary this is a 68-year-old male with early stage T1a squamous cell carcinoma of the larynx status post definitive radiation therapy completed December 2014.    PET scan - Nov 2017 - shows a 1.8 centimeters pleural-based isolated lesion, SUV max 3.6.     CT chest without contrast done today - Stable left upper lobe and right lower lobe nodule; there is a new 6 mm nodule seen in the lingula which may represent a focus of atelectasis however metastatic disease cannot be excluded; short-term interval follow-up in 3 months is recommended.    Will continue to monitor.  Plan to repeat CT chest without contrast in 3 months.

## 2018-06-18 ENCOUNTER — OFFICE VISIT (OUTPATIENT)
Dept: OTOLARYNGOLOGY | Facility: CLINIC | Age: 68
End: 2018-06-18
Payer: MEDICARE

## 2018-06-18 VITALS
SYSTOLIC BLOOD PRESSURE: 147 MMHG | WEIGHT: 218.94 LBS | DIASTOLIC BLOOD PRESSURE: 87 MMHG | HEART RATE: 77 BPM | BODY MASS INDEX: 31.41 KG/M2

## 2018-06-18 DIAGNOSIS — C32.9 LARYNX CANCER: Primary | ICD-10-CM

## 2018-06-18 DIAGNOSIS — J38.7 CHONDRONECROSIS OF LARYNX: ICD-10-CM

## 2018-06-18 PROCEDURE — 99999 PR PBB SHADOW E&M-EST. PATIENT-LVL III: CPT | Mod: PBBFAC,,, | Performed by: OTOLARYNGOLOGY

## 2018-06-18 PROCEDURE — 31575 DIAGNOSTIC LARYNGOSCOPY: CPT | Mod: S$GLB,,, | Performed by: OTOLARYNGOLOGY

## 2018-06-18 PROCEDURE — 99213 OFFICE O/P EST LOW 20 MIN: CPT | Mod: 25,S$GLB,, | Performed by: OTOLARYNGOLOGY

## 2018-06-20 ENCOUNTER — HOSPITAL ENCOUNTER (OUTPATIENT)
Dept: RADIOLOGY | Facility: HOSPITAL | Age: 68
Discharge: HOME | End: 2018-06-20
Attending: OTOLARYNGOLOGY
Payer: MEDICARE

## 2018-06-20 DIAGNOSIS — C32.9 LARYNX CANCER: ICD-10-CM

## 2018-06-20 PROCEDURE — 70490 CT SOFT TISSUE NECK W/O DYE: CPT | Mod: TC,PO

## 2018-06-20 NOTE — PROGRESS NOTES
Chief Complaint   Patient presents with    Post-op Evaluation     Treatment History  1. 2015: XRT for SCCA larynx ([facility]).  2. 10/2/17: DL and biopsy TVC.  Path revealed severe dysplasia/CIS/superficially invasive SCCA (Dr. Lizet Desouza).  3. 12/21/17: MSL with resection R TVC lesion, biopsy L TVC lesion.  Path revealed severe dysplasia. (Dr. Gibran Smith).  4. 3/1/17: MSL with laser resection L TVC.  Path benign. (Dr. Smith).    HPI   68 y.o. male presents with the above treatment history.  Most recent DL and biopsy revealed only evidence of chondronecrosis.  No evidence of malignancy. No new complaints.     Review of Systems   Constitutional: Negative for fatigue and unexpected weight change.   HENT: Per HPI.  Eyes: Negative for visual disturbance.   Respiratory: Negative for shortness of breath, hemoptysis   Cardiovascular: Negative for chest pain and palpitations.   Musculoskeletal: Negative for decreased ROM, back pain.   Skin: Negative for rash, sunburn, itching.   Neurological: Negative for dizziness and seizures.   Hematological: Negative for adenopathy. Does not bruise/bleed easily.   Endocrine: Negative for rapid weight loss/weight gain, heat/cold intolerance.     Past Medical History   Patient Active Problem List   Diagnosis    Chronic kidney disease, stage IV (severe)    Essential hypertension    Mixed hyperlipidemia    BPH (benign prostatic hyperplasia)    Claudication of right lower extremity    Eye swelling, bilateral    Diminished night vision    Edema of right lower extremity    Renal transplant recipient    Thrombocytopenia    Dermolipoma    Edema    DVT (deep venous thrombosis)    Iron deficiency anemia    Thrombophilia    H/O laryngeal cancer    Pulmonary nodule, left    Larynx cancer    Voice disturbance    Atherosclerosis of aorta    Carcinoma in situ of vocal cord    Dysphagia, pharyngoesophageal    Chondronecrosis of larynx    Squamous cell carcinoma           Past  "Surgical History   Past Surgical History:   Procedure Laterality Date    FRACTURE SURGERY Left     "lower leg" 40 years ago    KIDNEY TRANSPLANT  2007    followed by [facility]    LARYNX SURGERY  11/2014    Oropharyngeal Cancer    microlaryngoscopy      TIBIAL STAPLING Left 1980              Family History   Family History   Problem Relation Age of Onset    Stroke Mother     Diabetes Mother     Hypertension Mother     Stroke Father     No Known Problems Sister     No Known Problems Brother     No Known Problems Daughter            Social History   .  Social History     Social History    Marital status: Single     Spouse name: N/A    Number of children: N/A    Years of education: N/A     Occupational History    Not on file.     Social History Main Topics    Smoking status: Former Smoker     Packs/day: 1.00     Years: 20.00     Types: Cigarettes     Quit date: 2009    Smokeless tobacco: Never Used    Alcohol use No    Drug use: No      Comment: Patient quick 2011    Sexual activity: Not Currently     Partners: Female     Other Topics Concern    Not on file     Social History Narrative    No narrative on file         Allergies   Review of patient's allergies indicates:  No Known Allergies        Physical Exam     Vitals:    06/18/18 1352   BP: (!) 147/87   Pulse: 77         Body mass index is 31.41 kg/m².    General: AOx3, NAD   Respiratory:  Symmetric chest rise, normal effort  Right Ear: External Auditory Canal WNL,TM w/o masses/lesions/perforations.  Middle ear without evidence of effusion.   Left Ear: External Auditory Canal WNL,TM w/o masses/lesions/perforations.  Middle ear without evidence of effusion.   Nose: No gross nasal septal deviation. Inferior Turbinates WNL bilaterally. No septal perforation. No masses/lesions.   Oral Cavity:  Oral Tongue mobile, no lesions noted. Hard Palate WNL. No buccal or FOM lesions.  Oropharynx:  No masses/lesions of the posterior pharyngeal wall. " Tonsillar fossa without lesions. Soft palate without masses. Midline uvula.   Neck: No scars. Mild fibrosis status post XRT.  No cervical lymphadenopathy, thyromegaly or thyroid nodules.  Normal range of motion.    Face: House Brackmann I bilaterally.     Flex Naso Heike Hypo Procedures #2    Procedure:  Diagnostic flexible nasopharyngoscopy, laryngoscopy and hypopharyngoscopy:    Routine preparation with local atomizer with 1% neosynephrine/pontocaine with customary flexible endoscope.    Nasopharynx:  No lesions.   Mucosa:  No lesions.   Adenoids:  Present.  Posterior Choanae:  Patent.  Eustachian Tubes:  Patent.  Posterior pharynx:  No lesions.  Larynx/hypopharynx:   Epiglottis:  No lesions.    AE Folds:  No lesions.   Vocal cords:  No polyps, nodules, ulcers or lesions.   Mobility:  R TVC slightly limited, L TVC immobile.    Hypopharynx:  No lesions.   Piriform sinus:  No pooling, no lesions.   Post Cricoid:  No erythema, no edema.          Assessment/Plan  Problem List Items Addressed This Visit        ENT    Chondronecrosis of larynx     No evidence of malignancy.  Exam, symptoms stable.  Repeat neck CT. I will contact him with CT results.  RTC 6 weeks.            Oncology    Larynx cancer - Primary    Relevant Orders    CT Soft Tissue Neck WO Contrast (Completed)

## 2018-06-20 NOTE — ASSESSMENT & PLAN NOTE
No evidence of malignancy.  Exam, symptoms stable.  Repeat neck CT. I will contact him with CT results.  RTC 6 weeks.

## 2018-06-22 ENCOUNTER — TELEPHONE (OUTPATIENT)
Dept: OTOLARYNGOLOGY | Facility: CLINIC | Age: 68
End: 2018-06-22

## 2018-06-26 DIAGNOSIS — I10 ESSENTIAL HYPERTENSION, BENIGN: ICD-10-CM

## 2018-06-26 RX ORDER — AMLODIPINE BESYLATE 10 MG/1
TABLET ORAL
Qty: 90 TABLET | Refills: 3 | Status: SHIPPED | OUTPATIENT
Start: 2018-06-26 | End: 2019-11-25 | Stop reason: SDUPTHER

## 2018-07-02 ENCOUNTER — OFFICE VISIT (OUTPATIENT)
Dept: OTOLARYNGOLOGY | Facility: CLINIC | Age: 68
End: 2018-07-02
Payer: MEDICARE

## 2018-07-02 VITALS
WEIGHT: 218.25 LBS | TEMPERATURE: 99 F | BODY MASS INDEX: 31.32 KG/M2 | HEART RATE: 76 BPM | SYSTOLIC BLOOD PRESSURE: 122 MMHG | DIASTOLIC BLOOD PRESSURE: 83 MMHG

## 2018-07-02 DIAGNOSIS — C32.9 LARYNX CANCER: Primary | ICD-10-CM

## 2018-07-02 DIAGNOSIS — J38.7 CHONDRONECROSIS OF LARYNX: ICD-10-CM

## 2018-07-02 PROCEDURE — 99213 OFFICE O/P EST LOW 20 MIN: CPT | Mod: S$GLB,,, | Performed by: OTOLARYNGOLOGY

## 2018-07-02 PROCEDURE — 3074F SYST BP LT 130 MM HG: CPT | Mod: CPTII,S$GLB,, | Performed by: OTOLARYNGOLOGY

## 2018-07-02 PROCEDURE — 99999 PR PBB SHADOW E&M-EST. PATIENT-LVL III: CPT | Mod: PBBFAC,,, | Performed by: OTOLARYNGOLOGY

## 2018-07-02 PROCEDURE — 3079F DIAST BP 80-89 MM HG: CPT | Mod: CPTII,S$GLB,, | Performed by: OTOLARYNGOLOGY

## 2018-07-02 NOTE — PROGRESS NOTES
Chief Complaint   Patient presents with    Follow-up     f/u neck swelling     Treatment History  1. 2015: XRT for SCCA larynx ([facility]).  2. 10/2/17: DL and biopsy TVC.  Path revealed severe dysplasia/CIS/superficially invasive SCCA (Dr. Lizet Desouza).  3. 12/21/17: MSL with resection R TVC lesion, biopsy L TVC lesion.  Path revealed severe dysplasia. (Dr. Gibran Smith).  4. 3/1/17: MSL with laser resection L TVC.  Path benign. (Dr. Smith).    HPI   68 y.o. male presents with the above treatment history.  He reports some swelling that has recently resolved.  No other complaints.     Review of Systems   Constitutional: Negative for fatigue and unexpected weight change.   HENT: Per HPI.  Eyes: Negative for visual disturbance.   Respiratory: Negative for shortness of breath, hemoptysis   Cardiovascular: Negative for chest pain and palpitations.   Musculoskeletal: Negative for decreased ROM, back pain.   Skin: Negative for rash, sunburn, itching.   Neurological: Negative for dizziness and seizures.   Hematological: Negative for adenopathy. Does not bruise/bleed easily.   Endocrine: Negative for rapid weight loss/weight gain, heat/cold intolerance.     Past Medical History   Patient Active Problem List   Diagnosis    Chronic kidney disease, stage IV (severe)    Essential hypertension    Mixed hyperlipidemia    BPH (benign prostatic hyperplasia)    Claudication of right lower extremity    Eye swelling, bilateral    Diminished night vision    Edema of right lower extremity    Renal transplant recipient    Thrombocytopenia    Dermolipoma    Edema    DVT (deep venous thrombosis)    Iron deficiency anemia    Thrombophilia    H/O laryngeal cancer    Pulmonary nodule, left    Larynx cancer    Voice disturbance    Atherosclerosis of aorta    Carcinoma in situ of vocal cord    Dysphagia, pharyngoesophageal    Chondronecrosis of larynx    Squamous cell carcinoma           Past Surgical History   Past  "Surgical History:   Procedure Laterality Date    FRACTURE SURGERY Left     "lower leg" 40 years ago    KIDNEY TRANSPLANT  2007    [facility]    LARYNX SURGERY  11/2014    Oropharyngeal Cancer    microlaryngoscopy      TIBIAL STAPLING Left 1980              Family History   Family History   Problem Relation Age of Onset    Stroke Mother     Diabetes Mother     Hypertension Mother     Stroke Father     No Known Problems Sister     No Known Problems Brother     No Known Problems Daughter            Social History   .  Social History     Social History    Marital status: Single     Spouse name: N/A    Number of children: N/A    Years of education: N/A     Occupational History    Not on file.     Social History Main Topics    Smoking status: Former Smoker     Packs/day: 1.00     Years: 20.00     Types: Cigarettes     Quit date: 2009    Smokeless tobacco: Never Used    Alcohol use No    Drug use: No      Comment: Patient quick 2011    Sexual activity: Not Currently     Partners: Female     Other Topics Concern    Not on file     Social History Narrative    No narrative on file         Allergies   Review of patient's allergies indicates:  No Known Allergies        Physical Exam     Vitals:    07/02/18 1432   BP: 122/83   Pulse: 76   Temp: 98.7 °F (37.1 °C)         Body mass index is 31.32 kg/m².    General: AOx3, NAD   Respiratory:  Symmetric chest rise, normal effort  Nose: No gross nasal septal deviation. Inferior Turbinates WNL bilaterally. No septal perforation. No masses/lesions.   Oral Cavity:  Oral Tongue mobile, no lesions noted. Hard Palate WNL. No buccal or FOM lesions.  Oropharynx:  No masses/lesions of the posterior pharyngeal wall. Tonsillar fossa without lesions. Soft palate without masses. Midline uvula.   Neck: No scars. Mild fibrosis status post XRT.  No cervical lymphadenopathy, thyromegaly or thyroid nodules.  Normal range of motion.    Face: House Brackmann I " bilaterally.       Assessment/Plan  Problem List Items Addressed This Visit        ENT    Chondronecrosis of larynx       Oncology    Larynx cancer - Primary     No neck swelling appreciated today.  Reassured.  RTC 1 month for surveillance.

## 2018-07-06 ENCOUNTER — PES CALL (OUTPATIENT)
Dept: ADMINISTRATIVE | Facility: CLINIC | Age: 68
End: 2018-07-06

## 2018-07-16 ENCOUNTER — LAB VISIT (OUTPATIENT)
Dept: LAB | Facility: HOSPITAL | Age: 68
End: 2018-07-16
Attending: INTERNAL MEDICINE
Payer: MEDICARE

## 2018-07-16 DIAGNOSIS — N18.4 CHRONIC KIDNEY DISEASE, STAGE IV (SEVERE): ICD-10-CM

## 2018-07-16 DIAGNOSIS — Z94.0 RENAL TRANSPLANT RECIPIENT: ICD-10-CM

## 2018-07-16 DIAGNOSIS — E78.2 MIXED HYPERLIPIDEMIA: ICD-10-CM

## 2018-07-16 LAB
ALBUMIN SERPL BCP-MCNC: 3.8 G/DL
ALP SERPL-CCNC: 113 U/L
ALT SERPL W/O P-5'-P-CCNC: 15 U/L
ANION GAP SERPL CALC-SCNC: 8 MMOL/L
AST SERPL-CCNC: 21 U/L
BASOPHILS # BLD AUTO: 0.01 K/UL
BASOPHILS NFR BLD: 0.2 %
BILIRUB SERPL-MCNC: 0.3 MG/DL
BUN SERPL-MCNC: 29 MG/DL
CALCIUM SERPL-MCNC: 10.5 MG/DL
CHLORIDE SERPL-SCNC: 109 MMOL/L
CHOLEST SERPL-MCNC: 170 MG/DL
CHOLEST/HDLC SERPL: 3.7 {RATIO}
CO2 SERPL-SCNC: 25 MMOL/L
CREAT SERPL-MCNC: 2.54 MG/DL
DIFFERENTIAL METHOD: ABNORMAL
EOSINOPHIL # BLD AUTO: 0.1 K/UL
EOSINOPHIL NFR BLD: 1.3 %
ERYTHROCYTE [DISTWIDTH] IN BLOOD BY AUTOMATED COUNT: 18.4 %
EST. GFR  (AFRICAN AMERICAN): 28.8 ML/MIN/1.73 M^2
EST. GFR  (NON AFRICAN AMERICAN): 24.9 ML/MIN/1.73 M^2
GLUCOSE SERPL-MCNC: 98 MG/DL
HCT VFR BLD AUTO: 36.7 %
HDLC SERPL-MCNC: 46 MG/DL
HDLC SERPL: 27.1 %
HGB BLD-MCNC: 11.2 G/DL
LDLC SERPL CALC-MCNC: 106.8 MG/DL
LYMPHOCYTES # BLD AUTO: 1 K/UL
LYMPHOCYTES NFR BLD: 18.2 %
MCH RBC QN AUTO: 23.5 PG
MCHC RBC AUTO-ENTMCNC: 30.5 G/DL
MCV RBC AUTO: 77 FL
MONOCYTES # BLD AUTO: 0.7 K/UL
MONOCYTES NFR BLD: 12.6 %
NEUTROPHILS # BLD AUTO: 3.7 K/UL
NEUTROPHILS NFR BLD: 67.2 %
NONHDLC SERPL-MCNC: 124 MG/DL
PLATELET # BLD AUTO: 172 K/UL
PMV BLD AUTO: 11.5 FL
POTASSIUM SERPL-SCNC: 3.8 MMOL/L
PROT SERPL-MCNC: 7.6 G/DL
RBC # BLD AUTO: 4.76 M/UL
SODIUM SERPL-SCNC: 142 MMOL/L
TRIGL SERPL-MCNC: 86 MG/DL
WBC # BLD AUTO: 5.56 K/UL

## 2018-07-16 PROCEDURE — 80053 COMPREHEN METABOLIC PANEL: CPT | Mod: PO

## 2018-07-16 PROCEDURE — 36415 COLL VENOUS BLD VENIPUNCTURE: CPT | Mod: PO

## 2018-07-16 PROCEDURE — 85025 COMPLETE CBC W/AUTO DIFF WBC: CPT | Mod: PO

## 2018-07-16 PROCEDURE — 80061 LIPID PANEL: CPT

## 2018-07-17 ENCOUNTER — TELEPHONE (OUTPATIENT)
Dept: HEMATOLOGY/ONCOLOGY | Facility: CLINIC | Age: 68
End: 2018-07-17

## 2018-07-17 NOTE — TELEPHONE ENCOUNTER
Made pt aware of change in appt date and time. Pt confirmed new appt. Reminder slips placed in the mail

## 2018-07-23 ENCOUNTER — OFFICE VISIT (OUTPATIENT)
Dept: INTERNAL MEDICINE | Facility: CLINIC | Age: 68
End: 2018-07-23
Payer: MEDICARE

## 2018-07-23 VITALS
SYSTOLIC BLOOD PRESSURE: 125 MMHG | HEART RATE: 52 BPM | HEART RATE: 52 BPM | WEIGHT: 225.31 LBS | RESPIRATION RATE: 16 BRPM | BODY MASS INDEX: 32.21 KG/M2 | BODY MASS INDEX: 32.26 KG/M2 | WEIGHT: 225 LBS | DIASTOLIC BLOOD PRESSURE: 82 MMHG | TEMPERATURE: 97 F | HEIGHT: 70 IN | DIASTOLIC BLOOD PRESSURE: 82 MMHG | SYSTOLIC BLOOD PRESSURE: 125 MMHG | RESPIRATION RATE: 16 BRPM | HEIGHT: 70 IN

## 2018-07-23 DIAGNOSIS — N18.4 CHRONIC KIDNEY DISEASE, STAGE IV (SEVERE): ICD-10-CM

## 2018-07-23 DIAGNOSIS — Z94.0 RENAL TRANSPLANT RECIPIENT: ICD-10-CM

## 2018-07-23 DIAGNOSIS — N40.1 BENIGN PROSTATIC HYPERPLASIA WITH NOCTURIA: ICD-10-CM

## 2018-07-23 DIAGNOSIS — H57.89 EYE SWELLING, BILATERAL: ICD-10-CM

## 2018-07-23 DIAGNOSIS — D68.59 THROMBOPHILIA: ICD-10-CM

## 2018-07-23 DIAGNOSIS — I70.0 ATHEROSCLEROSIS OF AORTA: ICD-10-CM

## 2018-07-23 DIAGNOSIS — I10 ESSENTIAL HYPERTENSION: ICD-10-CM

## 2018-07-23 DIAGNOSIS — R13.14 DYSPHAGIA, PHARYNGOESOPHAGEAL: ICD-10-CM

## 2018-07-23 DIAGNOSIS — R91.8 PULMONARY NODULES: ICD-10-CM

## 2018-07-23 DIAGNOSIS — N18.4 CHRONIC KIDNEY DISEASE, STAGE IV (SEVERE): Primary | ICD-10-CM

## 2018-07-23 DIAGNOSIS — I73.9 CLAUDICATION OF RIGHT LOWER EXTREMITY: ICD-10-CM

## 2018-07-23 DIAGNOSIS — E78.2 MIXED HYPERLIPIDEMIA: ICD-10-CM

## 2018-07-23 DIAGNOSIS — Z86.718 HISTORY OF DVT (DEEP VEIN THROMBOSIS): ICD-10-CM

## 2018-07-23 DIAGNOSIS — D02.0 CARCINOMA IN SITU OF VOCAL CORD: ICD-10-CM

## 2018-07-23 DIAGNOSIS — C32.9 LARYNX CANCER: ICD-10-CM

## 2018-07-23 DIAGNOSIS — R35.1 BENIGN PROSTATIC HYPERPLASIA WITH NOCTURIA: ICD-10-CM

## 2018-07-23 DIAGNOSIS — Z00.00 ENCOUNTER FOR PREVENTIVE HEALTH EXAMINATION: Primary | ICD-10-CM

## 2018-07-23 DIAGNOSIS — R49.9 VOICE DISTURBANCE: ICD-10-CM

## 2018-07-23 PROCEDURE — 99499 UNLISTED E&M SERVICE: CPT | Mod: HCNC,S$GLB,, | Performed by: NURSE PRACTITIONER

## 2018-07-23 PROCEDURE — 3074F SYST BP LT 130 MM HG: CPT | Mod: CPTII,S$GLB,, | Performed by: INTERNAL MEDICINE

## 2018-07-23 PROCEDURE — 3079F DIAST BP 80-89 MM HG: CPT | Mod: CPTII,S$GLB,, | Performed by: INTERNAL MEDICINE

## 2018-07-23 PROCEDURE — 99214 OFFICE O/P EST MOD 30 MIN: CPT | Mod: S$GLB,,, | Performed by: INTERNAL MEDICINE

## 2018-07-23 PROCEDURE — 3079F DIAST BP 80-89 MM HG: CPT | Mod: CPTII,S$GLB,, | Performed by: NURSE PRACTITIONER

## 2018-07-23 PROCEDURE — 99999 PR PBB SHADOW E&M-EST. PATIENT-LVL IV: CPT | Mod: PBBFAC,,, | Performed by: NURSE PRACTITIONER

## 2018-07-23 PROCEDURE — G0439 PPPS, SUBSEQ VISIT: HCPCS | Mod: S$GLB,,, | Performed by: NURSE PRACTITIONER

## 2018-07-23 PROCEDURE — 3074F SYST BP LT 130 MM HG: CPT | Mod: CPTII,S$GLB,, | Performed by: NURSE PRACTITIONER

## 2018-07-23 PROCEDURE — 99999 PR PBB SHADOW E&M-EST. PATIENT-LVL III: CPT | Mod: PBBFAC,,, | Performed by: INTERNAL MEDICINE

## 2018-07-23 NOTE — PROGRESS NOTES
Subjective:       Patient ID: Jose Luis Manzo is a 68 y.o. male.    Chief Complaint: Results and Follow-up    HPI  Checkup.  Labs reviewed.  Pt with no neck pain, less hoarseness.  Still chokes some on thin liquids.  No CP, SOB.  Gaining weight back.  C/O LE edema.  Nocturia 0-2.  Review of Systems   All other systems reviewed and are negative.      Objective:      Physical Exam   Constitutional: He appears well-developed. No distress.   obese   HENT:   Head: Normocephalic.   Eyes: EOM are normal. No scleral icterus.   Neck: Normal range of motion. No tracheal deviation present.   Cardiovascular: Normal rate, regular rhythm, normal heart sounds and intact distal pulses.    Pulmonary/Chest: Effort normal and breath sounds normal. No respiratory distress.   Abdominal: Soft. Bowel sounds are normal. He exhibits no distension. There is no tenderness.   Musculoskeletal: Normal range of motion. He exhibits edema (1+ LEs).   Neurological: He is alert.   Skin: Skin is warm and dry. No rash noted. He is not diaphoretic. No erythema.   Psychiatric: He has a normal mood and affect. His behavior is normal.   Vitals reviewed.      Assessment:       1. Chronic kidney disease, stage IV (severe)    2. Essential hypertension    3. Mixed hyperlipidemia    4. Thrombophilia    5. Benign prostatic hyperplasia with nocturia        Plan:       Jose Luis was seen today for results and follow-up.    Diagnoses and all orders for this visit:    Chronic kidney disease, stage IV (severe)  -     Renal function panel; Future    Essential hypertension   Well-cont    Mixed hyperlipidemia   Well-cont    Thrombophilia   Cont rx    Benign prostatic hyperplasia with nocturia   stable    Follow-up in about 6 months (around 1/23/2019).

## 2018-07-23 NOTE — PATIENT INSTRUCTIONS
Counseling and Referral of Other Preventative  (Italic type indicates deductible and co-insurance are waived)    Patient Name: Jose Luis Manzo  Today's Date: 7/23/2018    Health Maintenance       Date Due Completion Date    Influenza Vaccine 08/01/2018 12/4/2017    High Dose Statin 05/24/2019 5/24/2018    Lipid Panel 07/16/2019 7/16/2018    Colonoscopy 07/15/2020 7/15/2010 (Done)    Override on 7/15/2010: Done    TETANUS VACCINE 09/12/2026 9/12/2016        No orders of the defined types were placed in this encounter.    The following information is provided to all patients.  This information is to help you find resources for any of the problems found today that may be affecting your health:                Living healthy guide: www.[facility].[place].gov      Understanding Diabetes: www.diabetes.org      Eating healthy: www.cdc.gov/healthyweight      [place] home safety checklist: www.cdc.gov/steadi/patient.html      Agency on Aging: www.goea.[place].gov      Alcoholics anonymous (AA): www.aa.org      Physical Activity: www.[place].nih.gov/on5fayc      Tobacco use: www.quitwithusla.org

## 2018-07-23 NOTE — PROGRESS NOTES
"Jose Luis Manzo presented for a  Medicare AWV and comprehensive Health Risk Assessment today. The following components were reviewed and updated:    · Medical history  · Family History  · Social history  · Allergies and Current Medications  · Health Risk Assessment  · Health Maintenance  · Care Team     ** See Completed Assessments for Annual Wellness Visit within the encounter summary.**       The following assessments were completed:  · Living Situation  · CAGE  · Depression Screening  · Timed Get Up and Go  · Whisper Test  · Cognitive Function Screening  · Nutrition Screening  · ADL Screening  · PAQ Screening    Vitals:    07/23/18 1109   BP: 125/82   Pulse: (!) 52   Resp: 16   Temp: 97.4 °F (36.3 °C)   Weight: 102.2 kg (225 lb 5 oz)   Height: 5' 10" (1.778 m)     Body mass index is 32.33 kg/m².  Physical Exam   Constitutional: He is oriented to person, place, and time. He appears well-developed and well-nourished. No distress.   HENT:   Head: Normocephalic and atraumatic.   Eyes: EOM are normal. Pupils are equal, round, and reactive to light.   Neck: Normal range of motion.   Cardiovascular: Regular rhythm and normal heart sounds.  Bradycardia present.    Pulmonary/Chest: Effort normal. No respiratory distress. He has rales.   Musculoskeletal: Normal range of motion. He exhibits edema.        Right lower leg: He exhibits edema.        Left lower leg: He exhibits edema.   Neurological: He is alert and oriented to person, place, and time. Coordination normal.   Skin: Skin is warm and dry.   Psychiatric: He has a normal mood and affect. His behavior is normal. Judgment and thought content normal.   Nursing note and vitals reviewed.        Diagnoses and health risks identified today and associated recommendations/orders:    1. Encounter for preventive health examination    2. Larynx cancer  Chronic. Continue current treatment plan as previously prescribed by Otolaryngology (Dr. Smith) and Hem/Onc (Dr. Masters).     3. " Carcinoma in situ of vocal cord  Chronic. Continue current treatment plan as previously prescribed by Otolaryngology (Dr. Smith) and Hem/Onc (Dr. Masters).     4. Chronic kidney disease, stage IV (severe)  GFR 28.8 as noted on most recent CMP dated 7/16/2018. Continue current treatment plan as previously prescribed by Nephrology (Dr. Zarate).    5. Renal transplant recipient  Chronic. Continue current treatment plan as previously prescribed by Nephrology.    6. Atherosclerosis of aorta  Noted on CT chest dated 5/24/2018. Patient followed by Cardiology (Dr. Frankel).     7. Claudication of right lower extremity  Chronic; stable. Continue current treatment plan as previously prescribed by Cardiology.    8. Thrombophilia  Chronic. Patient followed by PCP.     9. Essential hypertension  Chronic; stable. Continue current treatment plan as previously prescribed by PCP.    10. Mixed hyperlipidemia  Chronic; stable. Continue current treatment plan as previously prescribed by PCP.    11. History of DVT (deep vein thrombosis)  Chronic; stable. Continue current treatment plan as previously prescribed by PCP.    12. Pulmonary nodules  Noted on CT chest dated 5/24/2018. Patient followed by PCP.     13. Dysphagia, pharyngoesophageal  Chronic; stable. Patient followed by Otolaryngology.    14. Voice disturbance  Chronic; stable. Patient followed by Otolaryngology.    15. Eye swelling, bilateral  Chronic; stable. Patient followed by Optometry (Dr. Florian).       Provided Jose Luis with a 5-10 year written screening schedule and personal prevention plan. Recommendations were developed using the USPSTF age appropriate recommendations. Education, counseling, and referrals were provided as needed. After Visit Summary printed and given to patient which includes a list of additional screenings\tests needed.    Follow-up for HRA visit in 1 year.    Johnathon Santoyo NP

## 2018-08-09 ENCOUNTER — OFFICE VISIT (OUTPATIENT)
Dept: OTOLARYNGOLOGY | Facility: CLINIC | Age: 68
End: 2018-08-09
Payer: MEDICARE

## 2018-08-09 VITALS
HEART RATE: 76 BPM | BODY MASS INDEX: 32.46 KG/M2 | TEMPERATURE: 98 F | DIASTOLIC BLOOD PRESSURE: 87 MMHG | SYSTOLIC BLOOD PRESSURE: 136 MMHG | WEIGHT: 226.19 LBS

## 2018-08-09 DIAGNOSIS — C32.9 LARYNX CANCER: Primary | ICD-10-CM

## 2018-08-09 DIAGNOSIS — J38.7 CHONDRONECROSIS OF LARYNX: ICD-10-CM

## 2018-08-09 PROCEDURE — 3079F DIAST BP 80-89 MM HG: CPT | Mod: CPTII,S$GLB,, | Performed by: OTOLARYNGOLOGY

## 2018-08-09 PROCEDURE — 3075F SYST BP GE 130 - 139MM HG: CPT | Mod: CPTII,S$GLB,, | Performed by: OTOLARYNGOLOGY

## 2018-08-09 PROCEDURE — 99999 PR PBB SHADOW E&M-EST. PATIENT-LVL III: CPT | Mod: PBBFAC,,, | Performed by: OTOLARYNGOLOGY

## 2018-08-09 PROCEDURE — 31575 DIAGNOSTIC LARYNGOSCOPY: CPT | Mod: S$GLB,,, | Performed by: OTOLARYNGOLOGY

## 2018-08-09 PROCEDURE — 99213 OFFICE O/P EST LOW 20 MIN: CPT | Mod: 25,S$GLB,, | Performed by: OTOLARYNGOLOGY

## 2018-08-09 NOTE — PROGRESS NOTES
Chief Complaint   Patient presents with    Follow-up     Treatment History  1. 2015: XRT for SCCA larynx ([facility]).  2. 10/2/17: DL and biopsy TVC.  Path revealed severe dysplasia/CIS/superficially invasive SCCA (Dr. Lizet Desouza).  3. 12/21/17: MSL with resection R TVC lesion, biopsy L TVC lesion.  Path revealed severe dysplasia. (Dr. Gibran Smith).  4. 3/1/17: MSL with laser resection L TVC.  Path benign. (Dr. Smith).    HPI   68 y.o. male presents with the above treatment history.  He reports some intermittent hemoptysis over the last several days.  No other complaints.      Review of Systems   Constitutional: Negative for fatigue and unexpected weight change.   HENT: Per HPI.  Eyes: Negative for visual disturbance.   Respiratory: Negative for shortness of breath, hemoptysis   Cardiovascular: Negative for chest pain and palpitations.   Musculoskeletal: Negative for decreased ROM, back pain.   Skin: Negative for rash, sunburn, itching.   Neurological: Negative for dizziness and seizures.   Hematological: Negative for adenopathy. Does not bruise/bleed easily.   Endocrine: Negative for rapid weight loss/weight gain, heat/cold intolerance.     Past Medical History   Patient Active Problem List   Diagnosis    Chronic kidney disease, stage IV (severe)    Essential hypertension    Mixed hyperlipidemia    Benign prostatic hyperplasia with nocturia    Claudication of right lower extremity    Eye swelling, bilateral    Diminished night vision    Edema of right lower extremity    Renal transplant recipient    Dermolipoma    Edema    History of DVT (deep vein thrombosis)    Iron deficiency anemia    Thrombophilia    H/O laryngeal cancer    Pulmonary nodules    Larynx cancer    Voice disturbance    Atherosclerosis of aorta    Carcinoma in situ of vocal cord    Dysphagia, pharyngoesophageal    Chondronecrosis of larynx    Squamous cell carcinoma           Past Surgical History   Past Surgical History:  "  Procedure Laterality Date    FRACTURE SURGERY Left     "lower leg" 40 years ago    KIDNEY TRANSPLANT  2007    followed [facility]    LARYNX SURGERY  11/2014    Oropharyngeal Cancer x3    microlaryngoscopy      TIBIAL STAPLING Left 1980              Family History   Family History   Problem Relation Age of Onset    Stroke Mother     Diabetes Mother     Hypertension Mother     Stroke Father     No Known Problems Sister     No Known Problems Brother     No Known Problems Daughter            Social History   .  Social History     Social History    Marital status: Single     Spouse name: N/A    Number of children: N/A    Years of education: N/A     Occupational History    Not on file.     Social History Main Topics    Smoking status: Former Smoker     Packs/day: 1.00     Years: 20.00     Types: Cigarettes     Quit date: 2009    Smokeless tobacco: Never Used    Alcohol use No    Drug use: No      Comment: Patient quick 2011    Sexual activity: Not Currently     Partners: Female     Other Topics Concern    Not on file     Social History Narrative    No narrative on file         Allergies   Review of patient's allergies indicates:  No Known Allergies        Physical Exam     Vitals:    08/09/18 1437   BP: 136/87   Pulse: 76   Temp: 98.1 °F (36.7 °C)         Body mass index is 32.46 kg/m².    General: AOx3, NAD   Respiratory:  Symmetric chest rise, normal effort  Nose: No gross nasal septal deviation. Inferior Turbinates WNL bilaterally. No septal perforation. No masses/lesions.   Oral Cavity:  Oral Tongue mobile, no lesions noted. Hard Palate WNL. No buccal or FOM lesions.  Oropharynx:  No masses/lesions of the posterior pharyngeal wall. Tonsillar fossa without lesions. Soft palate without masses. Midline uvula.   Neck: No scars. Mild fibrosis status post XRT.  No cervical lymphadenopathy, thyromegaly or thyroid nodules.  Normal range of motion.    Face: House Brackmann I bilaterally. "     Flex Naso Heike Hypo Procedures #2     Procedure:  Diagnostic flexible nasopharyngoscopy, laryngoscopy and hypopharyngoscopy:     Routine preparation with local atomizer with 1% neosynephrine/pontocaine with customary flexible endoscope.     Nasopharynx:  No lesions.        Mucosa:  No lesions.        Adenoids:  Present.  Posterior Choanae:  Patent.  Eustachian Tubes:  Patent.  Posterior pharynx:  No lesions.  Larynx/hypopharynx:        Epiglottis:  No lesions.         AE Folds:  No lesions.        Vocal cords:  Stable nodularity L TVC        Mobility:  R TVC slightly limited, L TVC immobile.         Hypopharynx:  No lesions.        Piriform sinus:  No pooling, no lesions.        Post Cricoid:  No erythema, no edema.      Assessment/Plan  Problem List Items Addressed This Visit        ENT    Chondronecrosis of larynx       Oncology    Larynx cancer - Primary     TONY.  No worrisome lesions.  His hemoptysis has only occurred over the last several days and has been intermittent.  He is to RTC if his hemoptysis persists for 2 weeks or in 1 month for surveillance.

## 2018-08-09 NOTE — ASSESSMENT & PLAN NOTE
TONY.  No worrisome lesions.  His hemoptysis has only occurred over the last several days and has been intermittent.  He is to RTC if his hemoptysis persists for 2 weeks or in 1 month for surveillance.

## 2018-08-29 ENCOUNTER — HOSPITAL ENCOUNTER (OUTPATIENT)
Dept: RADIOLOGY | Facility: HOSPITAL | Age: 68
Discharge: HOME | End: 2018-08-29
Attending: INTERNAL MEDICINE
Payer: MEDICARE

## 2018-08-29 ENCOUNTER — OFFICE VISIT (OUTPATIENT)
Dept: HEMATOLOGY/ONCOLOGY | Facility: CLINIC | Age: 68
End: 2018-08-29
Payer: MEDICARE

## 2018-08-29 VITALS
DIASTOLIC BLOOD PRESSURE: 86 MMHG | OXYGEN SATURATION: 98 % | RESPIRATION RATE: 16 BRPM | HEART RATE: 61 BPM | BODY MASS INDEX: 32.14 KG/M2 | WEIGHT: 224 LBS | TEMPERATURE: 97 F | SYSTOLIC BLOOD PRESSURE: 150 MMHG

## 2018-08-29 DIAGNOSIS — R91.8 PULMONARY NODULES: ICD-10-CM

## 2018-08-29 DIAGNOSIS — J38.7 CHONDRONECROSIS OF LARYNX: ICD-10-CM

## 2018-08-29 DIAGNOSIS — R91.1 PULMONARY NODULE, LEFT: ICD-10-CM

## 2018-08-29 DIAGNOSIS — Z85.21 H/O LARYNGEAL CANCER: ICD-10-CM

## 2018-08-29 DIAGNOSIS — N18.4 CHRONIC KIDNEY DISEASE, STAGE IV (SEVERE): ICD-10-CM

## 2018-08-29 DIAGNOSIS — Z85.21 H/O LARYNGEAL CANCER: Primary | ICD-10-CM

## 2018-08-29 PROCEDURE — 71250 CT THORAX DX C-: CPT | Mod: TC

## 2018-08-29 PROCEDURE — 99999 PR PBB SHADOW E&M-EST. PATIENT-LVL III: CPT | Mod: PBBFAC,,, | Performed by: INTERNAL MEDICINE

## 2018-08-29 PROCEDURE — 99213 OFFICE O/P EST LOW 20 MIN: CPT | Mod: S$GLB,,, | Performed by: INTERNAL MEDICINE

## 2018-08-29 PROCEDURE — 3077F SYST BP >= 140 MM HG: CPT | Mod: CPTII,S$GLB,, | Performed by: INTERNAL MEDICINE

## 2018-08-29 PROCEDURE — 3079F DIAST BP 80-89 MM HG: CPT | Mod: CPTII,S$GLB,, | Performed by: INTERNAL MEDICINE

## 2018-08-29 PROCEDURE — 71250 CT THORAX DX C-: CPT | Mod: 26,,, | Performed by: RADIOLOGY

## 2018-08-29 NOTE — PROGRESS NOTES
Subjective:       Patient ID: Jose Luis Manzo is a 68 y.o. male.    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  It was diagnosed in September 2014 by ENT Dr. Chandler Kyle when he was worked up for hoarseness.  The lesion was found in the left true vocal cord.  Biopsy revealed squamous cell carcinoma well differentiated.  He underwent definitive radiation therapy at [facility] under the care of .  He completed 6300 centigray by December 2014.  A PET scan done at the time was negative for metastatic disease.  He has been having some hoarseness since then.    PET scan 5/12/17 that revealed a 1.8 centimeter pleural-based nodule in the left upper lobe.  SUV 3.6.  This is a new lesion.    He underwent CT-guided biopsy of this lesion and it was negative for malignancy.    Underwent Suspension microlaryngoscopy with KTP laser resection of right true vocal fold tumor and Biopsy of left true vocal fold tumor in December 2017.  That biopsy was negative for malignancy.      Review of Systems   Constitutional: Negative for fever and unexpected weight change.   HENT: Positive for voice change. Negative for mouth sores, nosebleeds, sinus pressure and sneezing.    Respiratory: Negative for cough and shortness of breath.    Cardiovascular: Negative for chest pain and palpitations.   Gastrointestinal: Negative for abdominal pain, blood in stool and nausea.   Musculoskeletal: Positive for arthralgias. Negative for gait problem.   Hematological: Negative for adenopathy. Does not bruise/bleed easily.   Psychiatric/Behavioral: Negative for behavioral problems and confusion. The patient is nervous/anxious.          Objective:      Physical Exam   Constitutional: He is oriented to person, place, and time. He appears well-developed and well-nourished. No distress.   Eyes: Conjunctivae and lids are normal. No scleral icterus.   Neck: Normal range of motion. Neck  supple. No thyromegaly present.   Cardiovascular: Normal rate, regular rhythm and intact distal pulses. Exam reveals no gallop.   Pulmonary/Chest: Effort normal and breath sounds normal. No respiratory distress. He has no rales.   Abdominal: Soft. Bowel sounds are normal. He exhibits no distension and no mass. There is no hepatosplenomegaly.   Lymphadenopathy:        Head (right side): No submental adenopathy present.        Head (left side): No submental adenopathy present.     He has no cervical adenopathy.        Right: No supraclavicular adenopathy present.        Left: No supraclavicular adenopathy present.   Neurological: He is alert and oriented to person, place, and time.   Skin: Skin is warm. He is not diaphoretic. No cyanosis. Nails show no clubbing.         Assessment:       1. H/O laryngeal cancer    2. Squamous cell carcinoma    3. Chronic kidney disease, stage IV (severe)    4. Pulmonary nodules    5. 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    PET scan - Nov 2017 - shows a 1.8 centimeters pleural-based isolated lesion, SUV max 3.6.     CT chest without contrast done today - Stable left upper lobe and right lower lobe pulmonary nodules.  The left upper lobe pulmonary nodule has decreased in size compared to 08/21/2017, unchanged from the most recent study of 05/24/2018. Previously seen pleural base nodularity lingular region is no longer seen. Tiny micro nodules in a subsegmental region of the right middle lobe, in a configuration suggestive of an underlying infectious or inflammatory process.  Follow-up advised to ensure resolution or stability.    Will continue to monitor.    Repeat CT Chest in 6 mo - without contrast

## 2018-09-04 DIAGNOSIS — C32.9 LARYNX CANCER: Primary | ICD-10-CM

## 2018-09-12 ENCOUNTER — OFFICE VISIT (OUTPATIENT)
Dept: INTERNAL MEDICINE | Facility: CLINIC | Age: 68
End: 2018-09-12
Payer: MEDICARE

## 2018-09-12 VITALS
BODY MASS INDEX: 30.73 KG/M2 | WEIGHT: 214.63 LBS | RESPIRATION RATE: 16 BRPM | HEIGHT: 70 IN | HEART RATE: 68 BPM | DIASTOLIC BLOOD PRESSURE: 78 MMHG | TEMPERATURE: 97 F | SYSTOLIC BLOOD PRESSURE: 118 MMHG

## 2018-09-12 DIAGNOSIS — I10 ESSENTIAL HYPERTENSION: ICD-10-CM

## 2018-09-12 DIAGNOSIS — N52.9 IMPOTENCE: ICD-10-CM

## 2018-09-12 DIAGNOSIS — Z23 NEED FOR PROPHYLACTIC VACCINATION AND INOCULATION AGAINST INFLUENZA: ICD-10-CM

## 2018-09-12 DIAGNOSIS — N41.9 PROSTATITIS, UNSPECIFIED PROSTATITIS TYPE: Primary | ICD-10-CM

## 2018-09-12 LAB
BILIRUB SERPL-MCNC: NEGATIVE MG/DL
BLOOD URINE, POC: ABNORMAL
COLOR, POC UA: YELLOW
GLUCOSE UR QL STRIP: NEGATIVE
KETONES UR QL STRIP: NEGATIVE
LEUKOCYTE ESTERASE URINE, POC: ABNORMAL
NITRITE, POC UA: NEGATIVE
PH, POC UA: 6.5
PROTEIN, POC: 100
SPECIFIC GRAVITY, POC UA: 1.01
UROBILINOGEN, POC UA: 0.2

## 2018-09-12 PROCEDURE — 99213 OFFICE O/P EST LOW 20 MIN: CPT | Mod: S$PBB,,, | Performed by: INTERNAL MEDICINE

## 2018-09-12 PROCEDURE — 99213 OFFICE O/P EST LOW 20 MIN: CPT | Mod: PBBFAC,PN,25 | Performed by: INTERNAL MEDICINE

## 2018-09-12 PROCEDURE — 90662 IIV NO PRSV INCREASED AG IM: CPT | Mod: PBBFAC,PN

## 2018-09-12 PROCEDURE — 3078F DIAST BP <80 MM HG: CPT | Mod: CPTII,,, | Performed by: INTERNAL MEDICINE

## 2018-09-12 PROCEDURE — 3074F SYST BP LT 130 MM HG: CPT | Mod: CPTII,,, | Performed by: INTERNAL MEDICINE

## 2018-09-12 PROCEDURE — 1101F PT FALLS ASSESS-DOCD LE1/YR: CPT | Mod: CPTII,,, | Performed by: INTERNAL MEDICINE

## 2018-09-12 PROCEDURE — 99999 PR PBB SHADOW E&M-EST. PATIENT-LVL III: CPT | Mod: PBBFAC,,, | Performed by: INTERNAL MEDICINE

## 2018-09-12 PROCEDURE — 81002 URINALYSIS NONAUTO W/O SCOPE: CPT | Mod: PBBFAC,PN | Performed by: INTERNAL MEDICINE

## 2018-09-12 RX ORDER — SILDENAFIL 100 MG/1
100 TABLET, FILM COATED ORAL DAILY PRN
Qty: 12 TABLET | Refills: 6 | Status: SHIPPED | OUTPATIENT
Start: 2018-09-12 | End: 2019-01-23

## 2018-09-12 RX ORDER — SULFAMETHOXAZOLE AND TRIMETHOPRIM 800; 160 MG/1; MG/1
1 TABLET ORAL 2 TIMES DAILY
Qty: 28 TABLET | Refills: 0 | Status: SHIPPED | OUTPATIENT
Start: 2018-09-12 | End: 2018-09-26

## 2018-09-12 NOTE — PROGRESS NOTES
Subjective:       Patient ID: Jose Luis Manzo is a 68 y.o. male.    Chief Complaint: Urinary Tract Infection (burning with urination x 4 days)    HPI  Pt c/o dysuria x 1 wek.  No frequency.  No abd pain, nausea, LBP, F/C.  Also c/o impotence.  Review of Systems    Objective:      Physical Exam   Constitutional: He appears well-developed. No distress.   obese   HENT:   Head: Normocephalic.   Eyes: EOM are normal. No scleral icterus.   Neck: Normal range of motion. No tracheal deviation present.   Cardiovascular: Normal rate, regular rhythm, normal heart sounds and intact distal pulses.   Pulmonary/Chest: Effort normal. No respiratory distress.   Abdominal: He exhibits no distension.   Musculoskeletal: Normal range of motion. He exhibits no edema.   Neurological: He is alert.   Skin: Skin is warm and dry. No rash noted. He is not diaphoretic. No erythema.   Psychiatric: He has a normal mood and affect. His behavior is normal.   Vitals reviewed.      Assessment:       1. Prostatitis, unspecified prostatitis type    2. Impotence    3. Essential hypertension    4. Need for prophylactic vaccination and inoculation against influenza        Plan:       Jose Luis was seen today for urinary tract infection.    Diagnoses and all orders for this visit:    Prostatitis, unspecified prostatitis type  -     POCT urine dipstick without microscope  -     sulfamethoxazole-trimethoprim 800-160mg (BACTRIM DS) 800-160 mg Tab; Take 1 tablet by mouth 2 (two) times daily. for 14 days    Impotence  -     sildenafil (VIAGRA) 100 MG tablet; Take 1 tablet (100 mg total) by mouth daily as needed for Erectile Dysfunction.    Essential hypertension   Well-cont    Need for prophylactic vaccination and inoculation against influenza  -     Influenza - High Dose (65+) (PF) (IM)      Follow-up if symptoms worsen or fail to improve.

## 2018-09-20 ENCOUNTER — OFFICE VISIT (OUTPATIENT)
Dept: OTOLARYNGOLOGY | Facility: CLINIC | Age: 68
End: 2018-09-20
Payer: MEDICARE

## 2018-09-20 VITALS
DIASTOLIC BLOOD PRESSURE: 82 MMHG | HEART RATE: 66 BPM | SYSTOLIC BLOOD PRESSURE: 134 MMHG | WEIGHT: 217.13 LBS | BODY MASS INDEX: 31.16 KG/M2

## 2018-09-20 DIAGNOSIS — J38.7 CHONDRONECROSIS OF LARYNX: ICD-10-CM

## 2018-09-20 DIAGNOSIS — C32.9 LARYNX CANCER: Primary | ICD-10-CM

## 2018-09-20 PROCEDURE — 3075F SYST BP GE 130 - 139MM HG: CPT | Mod: CPTII,,, | Performed by: OTOLARYNGOLOGY

## 2018-09-20 PROCEDURE — 99213 OFFICE O/P EST LOW 20 MIN: CPT | Mod: 25,S$PBB,, | Performed by: OTOLARYNGOLOGY

## 2018-09-20 PROCEDURE — 31575 DIAGNOSTIC LARYNGOSCOPY: CPT | Mod: PBBFAC | Performed by: OTOLARYNGOLOGY

## 2018-09-20 PROCEDURE — 99999 PR PBB SHADOW E&M-EST. PATIENT-LVL III: CPT | Mod: PBBFAC,,, | Performed by: OTOLARYNGOLOGY

## 2018-09-20 PROCEDURE — 99213 OFFICE O/P EST LOW 20 MIN: CPT | Mod: PBBFAC | Performed by: OTOLARYNGOLOGY

## 2018-09-20 PROCEDURE — 31575 DIAGNOSTIC LARYNGOSCOPY: CPT | Mod: S$PBB,,, | Performed by: OTOLARYNGOLOGY

## 2018-09-20 PROCEDURE — 1101F PT FALLS ASSESS-DOCD LE1/YR: CPT | Mod: CPTII,,, | Performed by: OTOLARYNGOLOGY

## 2018-09-20 PROCEDURE — 3079F DIAST BP 80-89 MM HG: CPT | Mod: CPTII,,, | Performed by: OTOLARYNGOLOGY

## 2018-10-03 ENCOUNTER — OFFICE VISIT (OUTPATIENT)
Dept: INTERNAL MEDICINE | Facility: CLINIC | Age: 68
End: 2018-10-03
Payer: MEDICARE

## 2018-10-03 VITALS
HEIGHT: 70 IN | HEART RATE: 69 BPM | WEIGHT: 215.94 LBS | SYSTOLIC BLOOD PRESSURE: 120 MMHG | OXYGEN SATURATION: 98 % | DIASTOLIC BLOOD PRESSURE: 72 MMHG | TEMPERATURE: 97 F | BODY MASS INDEX: 30.91 KG/M2

## 2018-10-03 DIAGNOSIS — S76.111A QUADRICEPS STRAIN, RIGHT, INITIAL ENCOUNTER: Primary | ICD-10-CM

## 2018-10-03 DIAGNOSIS — I10 ESSENTIAL HYPERTENSION: ICD-10-CM

## 2018-10-03 PROCEDURE — 99213 OFFICE O/P EST LOW 20 MIN: CPT | Mod: PBBFAC,PN | Performed by: INTERNAL MEDICINE

## 2018-10-03 PROCEDURE — 3078F DIAST BP <80 MM HG: CPT | Mod: CPTII,,, | Performed by: INTERNAL MEDICINE

## 2018-10-03 PROCEDURE — 1101F PT FALLS ASSESS-DOCD LE1/YR: CPT | Mod: CPTII,,, | Performed by: INTERNAL MEDICINE

## 2018-10-03 PROCEDURE — 99999 PR PBB SHADOW E&M-EST. PATIENT-LVL III: CPT | Mod: PBBFAC,,, | Performed by: INTERNAL MEDICINE

## 2018-10-03 PROCEDURE — 3074F SYST BP LT 130 MM HG: CPT | Mod: CPTII,,, | Performed by: INTERNAL MEDICINE

## 2018-10-03 PROCEDURE — 99213 OFFICE O/P EST LOW 20 MIN: CPT | Mod: S$PBB,,, | Performed by: INTERNAL MEDICINE

## 2018-10-03 RX ORDER — TIZANIDINE 4 MG/1
4 TABLET ORAL EVERY 6 HOURS PRN
Qty: 60 TABLET | Refills: 3 | Status: SHIPPED | OUTPATIENT
Start: 2018-10-03 | End: 2018-10-13

## 2018-10-03 NOTE — PROGRESS NOTES
Subjective:       Patient ID: Jose Luis Manzo is a 68 y.o. male.    Chief Complaint: Fall; leg pain    HPI  Pt slipped and fell 3 days ago, catching his R leg beneath him.  C/O R quad pain.  Walking with a limp.  Review of Systems    Objective:      Physical Exam   Constitutional: He appears well-developed. No distress.   obese   HENT:   Head: Normocephalic.   Eyes: EOM are normal. No scleral icterus.   Neck: Normal range of motion. No tracheal deviation present.   Cardiovascular: Normal rate, regular rhythm and intact distal pulses.   Pulmonary/Chest: Effort normal. No respiratory distress.   Abdominal: He exhibits no distension.   Musculoskeletal: Normal range of motion. He exhibits tenderness (R quad). He exhibits no edema.   Neurological: He is alert.   Skin: Skin is warm and dry. No rash noted. He is not diaphoretic. No erythema.   Psychiatric: He has a normal mood and affect. His behavior is normal.   Vitals reviewed.      Assessment:       1. Quadriceps strain, right, initial encounter    2. Essential hypertension        Plan:       Jose Luis was seen today for fall; leg pain.    Diagnoses and all orders for this visit:    Quadriceps strain, right, initial encounter  -     tiZANidine (ZANAFLEX) 4 MG tablet; Take 1 tablet (4 mg total) by mouth every 6 (six) hours as needed.    Essential hypertension   Well-cont    Follow-up if symptoms worsen or fail to improve.

## 2018-10-09 RX ORDER — METOPROLOL SUCCINATE 100 MG/1
TABLET, EXTENDED RELEASE ORAL
Qty: 90 TABLET | Refills: 3 | Status: SHIPPED | OUTPATIENT
Start: 2018-10-09 | End: 2019-11-25 | Stop reason: SDUPTHER

## 2018-10-09 RX ORDER — FINASTERIDE 5 MG/1
TABLET, FILM COATED ORAL
Qty: 90 TABLET | Refills: 3 | Status: SHIPPED | OUTPATIENT
Start: 2018-10-09 | End: 2019-11-25 | Stop reason: SDUPTHER

## 2018-11-01 ENCOUNTER — OFFICE VISIT (OUTPATIENT)
Dept: OTOLARYNGOLOGY | Facility: CLINIC | Age: 68
End: 2018-11-01
Payer: MEDICARE

## 2018-11-01 VITALS
DIASTOLIC BLOOD PRESSURE: 89 MMHG | BODY MASS INDEX: 32.58 KG/M2 | WEIGHT: 227.06 LBS | SYSTOLIC BLOOD PRESSURE: 173 MMHG | HEART RATE: 64 BPM | TEMPERATURE: 98 F

## 2018-11-01 DIAGNOSIS — J38.7 CHONDRONECROSIS OF LARYNX: ICD-10-CM

## 2018-11-01 DIAGNOSIS — C32.9 LARYNX CANCER: Primary | ICD-10-CM

## 2018-11-01 PROCEDURE — 3077F SYST BP >= 140 MM HG: CPT | Mod: CPTII,S$GLB,, | Performed by: OTOLARYNGOLOGY

## 2018-11-01 PROCEDURE — 99213 OFFICE O/P EST LOW 20 MIN: CPT | Mod: PBBFAC | Performed by: OTOLARYNGOLOGY

## 2018-11-01 PROCEDURE — 3079F DIAST BP 80-89 MM HG: CPT | Mod: CPTII,S$GLB,, | Performed by: OTOLARYNGOLOGY

## 2018-11-01 PROCEDURE — 99999 PR PBB SHADOW E&M-EST. PATIENT-LVL III: CPT | Mod: PBBFAC,,, | Performed by: OTOLARYNGOLOGY

## 2018-11-01 PROCEDURE — 31575 DIAGNOSTIC LARYNGOSCOPY: CPT | Mod: S$GLB,,, | Performed by: OTOLARYNGOLOGY

## 2018-11-01 PROCEDURE — 99213 OFFICE O/P EST LOW 20 MIN: CPT | Mod: 25,S$GLB,, | Performed by: OTOLARYNGOLOGY

## 2018-11-01 PROCEDURE — 1101F PT FALLS ASSESS-DOCD LE1/YR: CPT | Mod: CPTII,S$GLB,, | Performed by: OTOLARYNGOLOGY

## 2018-11-01 NOTE — PROGRESS NOTES
Chief Complaint   Patient presents with    6 weeks     Treatment History  1. 2015: XRT for SCCA larynx ([facility]).  2. 10/2/17: DL and biopsy TVC.  Path revealed severe dysplasia/CIS/superficially invasive SCCA (Dr. Lizet Desouza).  3. 12/21/17: MSL with resection R TVC lesion, biopsy L TVC lesion.  Path revealed severe dysplasia. (Dr. Gibran Smith).  4. 3/1/17: MSL with laser resection L TVC.  Path benign. (Dr. Smith).    HPI   68 y.o. male presents with the above treatment history. No new complaints.       Review of Systems   Constitutional: Negative for fatigue and unexpected weight change.   HENT: Per HPI.  Eyes: Negative for visual disturbance.   Respiratory: Negative for shortness of breath, hemoptysis   Cardiovascular: Negative for chest pain and palpitations.   Musculoskeletal: Negative for decreased ROM, back pain.   Skin: Negative for rash, sunburn, itching.   Neurological: Negative for dizziness and seizures.   Hematological: Negative for adenopathy. Does not bruise/bleed easily.   Endocrine: Negative for rapid weight loss/weight gain, heat/cold intolerance.     Past Medical History   Patient Active Problem List   Diagnosis    Chronic kidney disease, stage IV (severe)    Essential hypertension    Mixed hyperlipidemia    Benign prostatic hyperplasia with nocturia    Claudication of right lower extremity    Eye swelling, bilateral    Diminished night vision    Edema of right lower extremity    Renal transplant recipient    Dermolipoma    Edema    History of DVT (deep vein thrombosis)    Iron deficiency anemia    Thrombophilia    H/O laryngeal cancer    Pulmonary nodules    Larynx cancer    Voice disturbance    Atherosclerosis of aorta    Carcinoma in situ of vocal cord    Dysphagia, pharyngoesophageal    Chondronecrosis of larynx    Squamous cell carcinoma           Past Surgical History   Past Surgical History:   Procedure Laterality Date    BIOPSY-LARYNGEAL  3/1/2018    Performed by  "[facility]al [facility] at [facility] OR 2ND FLR    DIRECT MICROSUSPENSION LARYNGOSCOPY WITH BIOPSY N/A 10/2/2017    [facility]    FRACTURE SURGERY Left     "lower leg" 40 years ago    KIDNEY TRANSPLANT  2007    follow[facility]    LARYNGOSCOPY N/A 4/25/2018    Performed by Duncan Mcadams MD at [facility] OR 2ND FLR    LARYNX SURGERY  11/2014    Oropharyngeal Cancer x3    microlaryngoscopy      MICROLASERLARYNGOSCOPY Left 3/1/2018    Performed by [facility]al [facility] at [facility] OR 2ND FLR    MICROLASERLARYNGOSCOPY Right 12/21/2017    Performed by [facility] at [facility] OR 2ND FLR    REMOVAL LESION-VOCAL CORD Left 12/21/2017    Performed by [facility] at [facility] OR 2ND FLR    Suspension microlaryngoscopy with KTP laser excision of lesion N/A 12/21/2017    Performed by [facility] at [facility] OR 2ND FLR    Suspension microlaryngoscopy with KTP laser excision of tumor N/A 3/1/2018    Performed by [facility] at [facility] OR 2ND FLR    TIBIAL STAPLING Left 1980              Family History   Family History   Problem Relation Age of Onset    Stroke Mother     Diabetes Mother     Hypertension Mother     Stroke Father     No Known Problems Sister     No Known Problems Brother     No Known Problems Daughter            Social History   .  Social History     Socioeconomic History    Marital status: Single     Spouse name: Not on file    Number of children: Not on file    Years of education: Not on file    Highest education level: Not on file   Social Needs    Financial resource strain: Not on file    Food insecurity - worry: Not on file    Food insecurity - inability: Not on file    Transportation needs - medical: Not on file    Transportation needs - non-medical: Not on file   Occupational History    Not on file   Tobacco Use    Smoking status: Former Smoker     Packs/day: 1.00     Years: 20.00     Pack years: 20.00     Types: " Cigarettes     Last attempt to quit: 2009     Years since quittin.8    Smokeless tobacco: Never Used   Substance and Sexual Activity    Alcohol use: No     Alcohol/week: 0.0 oz    Drug use: No     Comment: Patient quick     Sexual activity: Not Currently     Partners: Female   Other Topics Concern    Not on file   Social History Narrative    Not on file         Allergies   Review of patient's allergies indicates:  No Known Allergies        Physical Exam     Vitals:    18 1431   BP: (!) 173/89   Pulse: 64   Temp: 97.7 °F (36.5 °C)         Body mass index is 32.58 kg/m².    General: AOx3, NAD   Respiratory:  Symmetric chest rise, normal effort  Nose: No gross nasal septal deviation. Inferior Turbinates WNL bilaterally. No septal perforation. No masses/lesions.   Oral Cavity:  Oral Tongue mobile, no lesions noted. Hard Palate WNL. No buccal or FOM lesions.  Oropharynx:  No masses/lesions of the posterior pharyngeal wall. Tonsillar fossa without lesions. Soft palate without masses. Midline uvula.   Neck: No scars. Mild fibrosis status post XRT.  No cervical lymphadenopathy, thyromegaly or thyroid nodules.  Normal range of motion.    Face: House Brackmann I bilaterally.     Flex Naso Heike Hypo Procedures #2     Procedure:  Diagnostic flexible nasopharyngoscopy, laryngoscopy and hypopharyngoscopy:     Routine preparation with local atomizer with 1% neosynephrine/pontocaine with customary flexible endoscope.     Nasopharynx:  No lesions.        Mucosa:  No lesions.        Adenoids:  Present.  Posterior Choanae:  Patent.  Eustachian Tubes:  Patent.  Posterior pharynx:  No lesions.  Larynx/hypopharynx:        Epiglottis:  No lesions.         AE Folds:  No lesions.        Vocal cords:  Stable nodularity L TVC        Mobility:  R TVC slightly limited, L TVC immobile.         Hypopharynx:  No lesions.        Piriform sinus:  No pooling, no lesions.        Post Cricoid:  No erythema, no  edema.      Assessment/Plan  Problem List Items Addressed This Visit        ENT    Chondronecrosis of larynx       Oncology    Larynx cancer - Primary     TONY.  RTC 6 weeks.

## 2018-12-21 RX ORDER — SODIUM BICARBONATE 650 MG/1
TABLET ORAL
Qty: 270 TABLET | Refills: 11 | Status: SHIPPED | OUTPATIENT
Start: 2018-12-21 | End: 2020-01-20

## 2018-12-24 ENCOUNTER — OFFICE VISIT (OUTPATIENT)
Dept: OTOLARYNGOLOGY | Facility: CLINIC | Age: 68
End: 2018-12-24
Payer: MEDICARE

## 2018-12-24 VITALS
WEIGHT: 225.5 LBS | BODY MASS INDEX: 32.36 KG/M2 | DIASTOLIC BLOOD PRESSURE: 92 MMHG | TEMPERATURE: 97 F | SYSTOLIC BLOOD PRESSURE: 154 MMHG | HEART RATE: 77 BPM

## 2018-12-24 DIAGNOSIS — C32.9 LARYNX CANCER: Primary | ICD-10-CM

## 2018-12-24 DIAGNOSIS — J38.7 CHONDRONECROSIS OF LARYNX: ICD-10-CM

## 2018-12-24 PROCEDURE — 3077F SYST BP >= 140 MM HG: CPT | Mod: CPTII,S$GLB,, | Performed by: OTOLARYNGOLOGY

## 2018-12-24 PROCEDURE — 31575 DIAGNOSTIC LARYNGOSCOPY: CPT | Mod: S$GLB,,, | Performed by: OTOLARYNGOLOGY

## 2018-12-24 PROCEDURE — 1101F PT FALLS ASSESS-DOCD LE1/YR: CPT | Mod: CPTII,S$GLB,, | Performed by: OTOLARYNGOLOGY

## 2018-12-24 PROCEDURE — 99999 PR PBB SHADOW E&M-EST. PATIENT-LVL III: CPT | Mod: PBBFAC,,, | Performed by: OTOLARYNGOLOGY

## 2018-12-24 PROCEDURE — 3080F DIAST BP >= 90 MM HG: CPT | Mod: CPTII,S$GLB,, | Performed by: OTOLARYNGOLOGY

## 2018-12-24 PROCEDURE — 99213 OFFICE O/P EST LOW 20 MIN: CPT | Mod: 25,S$GLB,, | Performed by: OTOLARYNGOLOGY

## 2018-12-24 NOTE — PROGRESS NOTES
Chief Complaint   Patient presents with    Follow-up     Treatment History  1. 2015: XRT for SCCA larynx ([facility]).  2. 10/2/17: DL and biopsy TVC.  Path revealed severe dysplasia/CIS/superficially invasive SCCA (Dr. Lizet Desouza).  3. 12/21/17: MSL with resection R TVC lesion, biopsy L TVC lesion.  Path revealed severe dysplasia. (Dr. Gibran Smith).  4. 3/1/17: MSL with laser resection L TVC.  Path benign. (Dr. Smith).    HPI   68 y.o. male presents with the above treatment history. No new complaints.       Review of Systems   Constitutional: Negative for fatigue and unexpected weight change.   HENT: Per HPI.  Eyes: Negative for visual disturbance.   Respiratory: Negative for shortness of breath, hemoptysis   Cardiovascular: Negative for chest pain and palpitations.   Musculoskeletal: Negative for decreased ROM, back pain.   Skin: Negative for rash, sunburn, itching.   Neurological: Negative for dizziness and seizures.   Hematological: Negative for adenopathy. Does not bruise/bleed easily.   Endocrine: Negative for rapid weight loss/weight gain, heat/cold intolerance.     Past Medical History   Patient Active Problem List   Diagnosis    Chronic kidney disease, stage IV (severe)    Essential hypertension    Mixed hyperlipidemia    Benign prostatic hyperplasia with nocturia    Claudication of right lower extremity    Eye swelling, bilateral    Diminished night vision    Edema of right lower extremity    Renal transplant recipient    Dermolipoma    Edema    History of DVT (deep vein thrombosis)    Iron deficiency anemia    Thrombophilia    H/O laryngeal cancer    Pulmonary nodules    Larynx cancer    Voice disturbance    Atherosclerosis of aorta    Carcinoma in situ of vocal cord    Dysphagia, pharyngoesophageal    Chondronecrosis of larynx    Squamous cell carcinoma           Past Surgical History   Past Surgical History:   Procedure Laterality Date    BIOPSY-LARYNGEAL  3/1/2018    Performed by  "[facility]ht [facility] at [facility] OR 2ND FLR    DIRECT MICROSUSPENSION LARYNGOSCOPY WITH BIOPSY N/A 10/2/2017    Per[facility] OR    FRACTURE SURGERY Left     "lower leg" 40 years ago    KIDNEY TRANSPLANT  2007    follow[facility] by [facility]    LARYNGOSCOPY N/A 4/25/2018    Performed by Duncan [facility]ams MD at [facility] OR 2ND FLR    LARYNX SURGERY  11/2014    Oropharyngeal Cancer x3    microlaryngoscopy      MICROLASERLARYNGOSCOPY Left 3/1/2018    Performed by [facility] at [facility] OR 2ND FLR    MICROLASERLARYNGOSCOPY Right 12/21/2017    Performed by [facility] at [facility] OR 2ND FLR    REMOVAL LESION-VOCAL CORD Left 12/21/2017    Performed by [facility]al [facility] at [facility] OR 2ND FLR    Suspension microlaryngoscopy with KTP laser excision of lesion N/A 12/21/2017    Performed by [facility] at [facility] OR 2ND FLR    Suspension microlaryngoscopy with KTP laser excision of tumor N/A 3/1/2018    Performed by [facility]al [facility] at [facility] OR 2ND FLR    TIBIAL STAPLING Left 1980              Family History   Family History   Problem Relation Age of Onset    Stroke Mother     Diabetes Mother     Hypertension Mother     Stroke Father     No Known Problems Sister     No Known Problems Brother     No Known Problems Daughter            Social History   .  Social History     Socioeconomic History    Marital status: Single     Spouse name: Not on file    Number of children: Not on file    Years of education: Not on file    Highest education level: Not on file   Social Needs    Financial resource strain: Not on file    Food insecurity - worry: Not on file    Food insecurity - inability: Not on file    Transportation needs - medical: Not on file    Transportation needs - non-medical: Not on file   Occupational History    Not on file   Tobacco Use    Smoking status: Former Smoker     Packs/day: 1.00     Years: 20.00     Pack years: 20.00     Types: " Cigarettes     Last attempt to quit: 2009     Years since quittin.9    Smokeless tobacco: Never Used   Substance and Sexual Activity    Alcohol use: No     Alcohol/week: 0.0 oz    Drug use: No     Comment: Patient quick     Sexual activity: Not Currently     Partners: Female   Other Topics Concern    Not on file   Social History Narrative    Not on file         Allergies   Review of patient's allergies indicates:  No Known Allergies        Physical Exam     Vitals:    18 1132   BP: (!) 154/92   Pulse: 77   Temp: 96.8 °F (36 °C)         Body mass index is 32.36 kg/m².    General: AOx3, NAD   Respiratory:  Symmetric chest rise, normal effort  Nose: No gross nasal septal deviation. Inferior Turbinates WNL bilaterally. No septal perforation. No masses/lesions.   Oral Cavity:  Oral Tongue mobile, no lesions noted. Hard Palate WNL. No buccal or FOM lesions.  Oropharynx:  No masses/lesions of the posterior pharyngeal wall. Tonsillar fossa without lesions. Soft palate without masses. Midline uvula.   Neck: No scars. Mild fibrosis status post XRT.  No cervical lymphadenopathy, thyromegaly or thyroid nodules.  Normal range of motion.    Face: House Brackmann I bilaterally.     Flex Naso Heike Hypo Procedures #2     Procedure:  Diagnostic flexible nasopharyngoscopy, laryngoscopy and hypopharyngoscopy:     Routine preparation with local atomizer with 1% neosynephrine/pontocaine with customary flexible endoscope.     Nasopharynx:  No lesions.        Mucosa:  No lesions.        Adenoids:  Present.  Posterior Choanae:  Patent.  Eustachian Tubes:  Patent.  Posterior pharynx:  No lesions.  Larynx/hypopharynx:        Epiglottis:  No lesions.         AE Folds:  No lesions.        Vocal cords:  Stable nodularity L TVC        Mobility:  R TVC slightly limited, L TVC immobile.         Hypopharynx:  No lesions.        Piriform sinus:  No pooling, no lesions.        Post Cricoid:  No erythema, no  edema.      Assessment/Plan  Problem List Items Addressed This Visit        ENT    Chondronecrosis of larynx       Oncology    Larynx cancer - Primary     TONY.  RTC 6 weeks.

## 2019-01-23 ENCOUNTER — OFFICE VISIT (OUTPATIENT)
Dept: INTERNAL MEDICINE | Facility: CLINIC | Age: 69
End: 2019-01-23
Payer: MEDICARE

## 2019-01-23 VITALS
OXYGEN SATURATION: 97 % | DIASTOLIC BLOOD PRESSURE: 80 MMHG | HEIGHT: 70 IN | SYSTOLIC BLOOD PRESSURE: 124 MMHG | BODY MASS INDEX: 32.55 KG/M2 | WEIGHT: 227.38 LBS | HEART RATE: 55 BPM

## 2019-01-23 DIAGNOSIS — N40.1 BENIGN PROSTATIC HYPERPLASIA WITH NOCTURIA: ICD-10-CM

## 2019-01-23 DIAGNOSIS — C32.9 LARYNX CANCER: ICD-10-CM

## 2019-01-23 DIAGNOSIS — D68.59 THROMBOPHILIA: ICD-10-CM

## 2019-01-23 DIAGNOSIS — N18.4 CHRONIC KIDNEY DISEASE, STAGE IV (SEVERE): ICD-10-CM

## 2019-01-23 DIAGNOSIS — E78.2 MIXED HYPERLIPIDEMIA: ICD-10-CM

## 2019-01-23 DIAGNOSIS — I70.0 ATHEROSCLEROSIS OF AORTA: ICD-10-CM

## 2019-01-23 DIAGNOSIS — E66.9 OBESITY (BMI 30-39.9): ICD-10-CM

## 2019-01-23 DIAGNOSIS — I10 ESSENTIAL HYPERTENSION: Primary | ICD-10-CM

## 2019-01-23 DIAGNOSIS — R35.1 BENIGN PROSTATIC HYPERPLASIA WITH NOCTURIA: ICD-10-CM

## 2019-01-23 PROCEDURE — 99999 PR PBB SHADOW E&M-EST. PATIENT-LVL III: CPT | Mod: PBBFAC,,, | Performed by: INTERNAL MEDICINE

## 2019-01-23 PROCEDURE — 3079F PR MOST RECENT DIASTOLIC BLOOD PRESSURE 80-89 MM HG: ICD-10-PCS | Mod: CPTII,S$GLB,, | Performed by: INTERNAL MEDICINE

## 2019-01-23 PROCEDURE — 3074F SYST BP LT 130 MM HG: CPT | Mod: CPTII,S$GLB,, | Performed by: INTERNAL MEDICINE

## 2019-01-23 PROCEDURE — 99397 PR PREVENTIVE VISIT,EST,65 & OVER: ICD-10-PCS | Mod: S$GLB,,, | Performed by: INTERNAL MEDICINE

## 2019-01-23 PROCEDURE — 3074F PR MOST RECENT SYSTOLIC BLOOD PRESSURE < 130 MM HG: ICD-10-PCS | Mod: CPTII,S$GLB,, | Performed by: INTERNAL MEDICINE

## 2019-01-23 PROCEDURE — 99499 RISK ADDL DX/OHS AUDIT: ICD-10-PCS | Mod: S$GLB,,, | Performed by: INTERNAL MEDICINE

## 2019-01-23 PROCEDURE — 99499 UNLISTED E&M SERVICE: CPT | Mod: S$GLB,,, | Performed by: INTERNAL MEDICINE

## 2019-01-23 PROCEDURE — 99999 PR PBB SHADOW E&M-EST. PATIENT-LVL III: ICD-10-PCS | Mod: PBBFAC,,, | Performed by: INTERNAL MEDICINE

## 2019-01-23 PROCEDURE — 3079F DIAST BP 80-89 MM HG: CPT | Mod: CPTII,S$GLB,, | Performed by: INTERNAL MEDICINE

## 2019-01-23 PROCEDURE — 99397 PER PM REEVAL EST PAT 65+ YR: CPT | Mod: S$GLB,,, | Performed by: INTERNAL MEDICINE

## 2019-01-23 NOTE — PROGRESS NOTES
Subjective:       Patient ID: Jose Luis Manzo is a 68 y.o. male.    Chief Complaint: Follow-up    [facility]  Wellness check.  Chart reviewed.  No throat pain, no CP, SOB.  No claudication.  No bleeding.  Nocturia 0-1.  Review of Systems   All other systems reviewed and are negative.      Objective:      Physical Exam   Constitutional: He appears well-developed. No distress.   obese   HENT:   Head: Normocephalic.   Eyes: EOM are normal. No scleral icterus.   Neck: Normal range of motion. No tracheal deviation present.   Cardiovascular: Normal rate, regular rhythm and normal heart sounds.   Pulmonary/Chest: Effort normal and breath sounds normal. No respiratory distress.   Abdominal: Soft. Bowel sounds are normal. He exhibits no distension.   Musculoskeletal: Normal range of motion. He exhibits no edema.   Neurological: He is alert.   Skin: Skin is warm and dry. No rash noted. He is not diaphoretic. No erythema.   Psychiatric: He has a normal mood and affect. His behavior is normal.   Vitals reviewed.      Assessment:       1. Essential hypertension    2. Larynx cancer    3. Chronic kidney disease, stage IV (severe)    4. Atherosclerosis of aorta    5. Thrombophilia    6. Mixed hyperlipidemia    7. Benign prostatic hyperplasia with nocturia    8. Obesity (BMI 30-39.9)        Plan:       Jose Luis was seen today for follow-up.    Diagnoses and all orders for this visit:    Essential hypertension   wel-cont    Larynx cancer   Per Oncology    Chronic kidney disease, stage IV (severe)  -     CBC auto differential; Future  -     Comprehensive metabolic panel; Future    Atherosclerosis of aorta   Monitor    Thrombophilia   Cont rx    Mixed hyperlipidemia  -     Lipid panel; Future    Benign prostatic hyperplasia with nocturia  -     Prostate Specific Antigen, Diagnostic; Future    Obesity (BMI 30-39.9)   monitor    Follow-up in about 6 months (around 7/23/2019).

## 2019-02-28 ENCOUNTER — OFFICE VISIT (OUTPATIENT)
Dept: OTOLARYNGOLOGY | Facility: CLINIC | Age: 69
End: 2019-02-28
Payer: MEDICARE

## 2019-02-28 VITALS
TEMPERATURE: 98 F | WEIGHT: 220 LBS | DIASTOLIC BLOOD PRESSURE: 97 MMHG | HEART RATE: 59 BPM | SYSTOLIC BLOOD PRESSURE: 148 MMHG | BODY MASS INDEX: 31.57 KG/M2

## 2019-02-28 DIAGNOSIS — J38.7 CHONDRONECROSIS OF LARYNX: ICD-10-CM

## 2019-02-28 DIAGNOSIS — C32.9 LARYNX CANCER: Primary | ICD-10-CM

## 2019-02-28 PROCEDURE — 3080F PR MOST RECENT DIASTOLIC BLOOD PRESSURE >= 90 MM HG: ICD-10-PCS | Mod: HCNC,CPTII,S$GLB, | Performed by: OTOLARYNGOLOGY

## 2019-02-28 PROCEDURE — 31575 PR LARYNGOSCOPY, FLEXIBLE; DIAGNOSTIC: ICD-10-PCS | Mod: HCNC,S$GLB,, | Performed by: OTOLARYNGOLOGY

## 2019-02-28 PROCEDURE — 31575 DIAGNOSTIC LARYNGOSCOPY: CPT | Mod: HCNC,S$GLB,, | Performed by: OTOLARYNGOLOGY

## 2019-02-28 PROCEDURE — 1101F PT FALLS ASSESS-DOCD LE1/YR: CPT | Mod: HCNC,CPTII,S$GLB, | Performed by: OTOLARYNGOLOGY

## 2019-02-28 PROCEDURE — 99999 PR PBB SHADOW E&M-EST. PATIENT-LVL III: CPT | Mod: PBBFAC,HCNC,, | Performed by: OTOLARYNGOLOGY

## 2019-02-28 PROCEDURE — 3077F PR MOST RECENT SYSTOLIC BLOOD PRESSURE >= 140 MM HG: ICD-10-PCS | Mod: HCNC,CPTII,S$GLB, | Performed by: OTOLARYNGOLOGY

## 2019-02-28 PROCEDURE — 3077F SYST BP >= 140 MM HG: CPT | Mod: HCNC,CPTII,S$GLB, | Performed by: OTOLARYNGOLOGY

## 2019-02-28 PROCEDURE — 99213 OFFICE O/P EST LOW 20 MIN: CPT | Mod: 25,HCNC,S$GLB, | Performed by: OTOLARYNGOLOGY

## 2019-02-28 PROCEDURE — 99999 PR PBB SHADOW E&M-EST. PATIENT-LVL III: ICD-10-PCS | Mod: PBBFAC,HCNC,, | Performed by: OTOLARYNGOLOGY

## 2019-02-28 PROCEDURE — 3080F DIAST BP >= 90 MM HG: CPT | Mod: HCNC,CPTII,S$GLB, | Performed by: OTOLARYNGOLOGY

## 2019-02-28 PROCEDURE — 99213 PR OFFICE/OUTPT VISIT, EST, LEVL III, 20-29 MIN: ICD-10-PCS | Mod: 25,HCNC,S$GLB, | Performed by: OTOLARYNGOLOGY

## 2019-02-28 PROCEDURE — 1101F PR PT FALLS ASSESS DOC 0-1 FALLS W/OUT INJ PAST YR: ICD-10-PCS | Mod: HCNC,CPTII,S$GLB, | Performed by: OTOLARYNGOLOGY

## 2019-02-28 NOTE — PROGRESS NOTES
Chief Complaint   Patient presents with    Post-op Evaluation     Treatment History  1. [facility]  2. 10/2/17: DL and biopsy TVC.  Path revealed severe dysplasia/CIS/superficially invasive SCCA (Dr. Lizet Desouza).  3. 12/21/17: MSL with resection R TVC lesion, biopsy L TVC lesion.  Path revealed severe dysplasia. (Dr. Gibran Smith).  4. 3/1/17: MSL with laser resection L TVC.  Path benign. (Dr. Smith).    HPI   69 y.o. male presents with the above treatment history. No new complaints.       Review of Systems   Constitutional: Negative for fatigue and unexpected weight change.   HENT: Per HPI.  Eyes: Negative for visual disturbance.   Respiratory: Negative for shortness of breath, hemoptysis   Cardiovascular: Negative for chest pain and palpitations.   Musculoskeletal: Negative for decreased ROM, back pain.   Skin: Negative for rash, sunburn, itching.   Neurological: Negative for dizziness and seizures.   Hematological: Negative for adenopathy. Does not bruise/bleed easily.   Endocrine: Negative for rapid weight loss/weight gain, heat/cold intolerance.     Past Medical History   Patient Active Problem List   Diagnosis    Chronic kidney disease, stage IV (severe)    Essential hypertension    Mixed hyperlipidemia    Benign prostatic hyperplasia with nocturia    Claudication of right lower extremity    Eye swelling, bilateral    Diminished night vision    Edema of right lower extremity    Renal transplant recipient    Dermolipoma    Edema    History of DVT (deep vein thrombosis)    Iron deficiency anemia    Thrombophilia    H/O laryngeal cancer    Pulmonary nodules    Larynx cancer    Voice disturbance    Atherosclerosis of aorta    Carcinoma in situ of vocal cord    Dysphagia, pharyngoesophageal    Chondronecrosis of larynx    Squamous cell carcinoma    Obesity (BMI 30-39.9)           Past Surgical History   Past Surgical History:   Procedure Laterality Date     "BIOPSY-LARYNGEAL  3/1/2018    [facility] 2ND FLR    DIRECT MICROSUSPENSION LARYNGOSCOPY WITH BIOPSY N/A 10/2/2017    [facility]    FRACTURE SURGERY Left     "lower leg" 40 years ago    KIDNEY TRANSPLANT  2007    followed by [facility]    LARYNGOSCOPY N/A 4/25/2018    [facility] Duncan [facility] at [facility] 2ND FLR    LARYNX SURGERY  11/2014    Oropharyngeal Cancer x3    microlaryngoscopy      MICROLASERLARYNGOSCOPY Left 3/1/2018    [facility] at [facility] 2ND FLR    MICROLASERLARYNGOSCOPY Right 12/21/2017    [facility] 2ND FLR    REMOVAL LESION-VOCAL CORD Left 12/21/2017    [facility] 2ND FLR    Suspension microlaryngoscopy with KTP laser excision of lesion N/A 12/21/2017    [facility] 2ND FLR    Suspension microlaryngoscopy with KTP laser excision of tumor N/A 3/1/2018    [facility] at [facility] 2ND FLR    TIBIAL STAPLING Left 1980              Family History   Family History   Problem Relation Age of Onset    Stroke Mother     Diabetes Mother     Hypertension Mother     Stroke Father     No Known Problems Sister     No Known Problems Brother     No Known Problems Daughter            Social History   .  Social History     Socioeconomic History    Marital status: Single     Spouse name: Not on file    Number of children: Not on file    Years of education: Not on file    Highest education level: Not on file   Social Needs    Financial resource strain: Not on file    Food insecurity - worry: Not on file    Food insecurity - inability: Not on file    Transportation needs - medical: Not on file    Transportation needs - non-medical: Not on file   Occupational History    Not on file   Tobacco Use    Smoking status: Former Smoker     Packs/day: 1.00     " Years: 20.00     Pack years: 20.00     Types: Cigarettes     Last attempt to quit: 2009     Years since quitting: 10.1    Smokeless tobacco: Never Used   Substance and Sexual Activity    Alcohol use: No     Alcohol/week: 0.0 oz    Drug use: No     Comment: Patient quick 2011    Sexual activity: Not Currently     Partners: Female   Other Topics Concern    Not on file   Social History Narrative    Not on file         Allergies   Review of patient's allergies indicates:  No Known Allergies        Physical Exam     Vitals:    02/28/19 1454   BP: (!) 148/97   Pulse: (!) 59   Temp: 98 °F (36.7 °C)         Body mass index is 31.57 kg/m².    General: AOx3, NAD   Respiratory:  Symmetric chest rise, normal effort  Nose: No gross nasal septal deviation. Inferior Turbinates WNL bilaterally. No septal perforation. No masses/lesions.   Oral Cavity:  Oral Tongue mobile, no lesions noted. Hard Palate WNL. No buccal or FOM lesions.  Oropharynx:  No masses/lesions of the posterior pharyngeal wall. Tonsillar fossa without lesions. Soft palate without masses. Midline uvula.   Neck: No scars. Mild fibrosis status post XRT.  No cervical lymphadenopathy, thyromegaly or thyroid nodules.  Normal range of motion.    Face: House Brackmann I bilaterally.     Flex Naso Heike Hypo Procedures #2     Procedure:  Diagnostic flexible nasopharyngoscopy, laryngoscopy and hypopharyngoscopy:     Routine preparation with local atomizer with 1% neosynephrine/pontocaine with customary flexible endoscope.     Nasopharynx:  No lesions.        Mucosa:  No lesions.        Adenoids:  Present.  Posterior Choanae:  Patent.  Eustachian Tubes:  Patent.  Posterior pharynx:  No lesions.  Larynx/hypopharynx:        Epiglottis:  No lesions.         AE Folds:  No lesions.        Vocal cords:  Stable nodularity L TVC        Mobility:  R TVC slightly limited, L TVC immobile.         Hypopharynx:  No lesions.        Piriform sinus:  No pooling, no lesions.        Post  Cricoid:  No erythema, no edema.      Assessment/Plan  Problem List Items Addressed This Visit        ENT    Chondronecrosis of larynx       Oncology    Larynx cancer - Primary     TONY.  RTC 3 mos.

## 2019-04-04 ENCOUNTER — OFFICE VISIT (OUTPATIENT)
Dept: HEMATOLOGY/ONCOLOGY | Facility: CLINIC | Age: 69
End: 2019-04-04
Payer: MEDICARE

## 2019-04-04 ENCOUNTER — HOSPITAL ENCOUNTER (OUTPATIENT)
Dept: RADIOLOGY | Facility: HOSPITAL | Age: 69
Discharge: HOME | End: 2019-04-04
Attending: INTERNAL MEDICINE
Payer: MEDICARE

## 2019-04-04 VITALS
BODY MASS INDEX: 32.33 KG/M2 | HEART RATE: 68 BPM | TEMPERATURE: 97 F | OXYGEN SATURATION: 99 % | RESPIRATION RATE: 16 BRPM | WEIGHT: 225.31 LBS

## 2019-04-04 DIAGNOSIS — N18.4 CHRONIC KIDNEY DISEASE, STAGE IV (SEVERE): ICD-10-CM

## 2019-04-04 DIAGNOSIS — R91.8 PULMONARY NODULES: ICD-10-CM

## 2019-04-04 DIAGNOSIS — Z85.21 H/O LARYNGEAL CANCER: ICD-10-CM

## 2019-04-04 DIAGNOSIS — R91.1 NODULE OF LEFT LUNG: ICD-10-CM

## 2019-04-04 DIAGNOSIS — Z85.21 H/O LARYNGEAL CANCER: Primary | ICD-10-CM

## 2019-04-04 PROCEDURE — 71250 CT THORAX DX C-: CPT | Mod: TC,HCNC

## 2019-04-04 PROCEDURE — 1101F PT FALLS ASSESS-DOCD LE1/YR: CPT | Mod: HCNC,CPTII,S$GLB, | Performed by: INTERNAL MEDICINE

## 2019-04-04 PROCEDURE — 99999 PR PBB SHADOW E&M-EST. PATIENT-LVL III: ICD-10-PCS | Mod: PBBFAC,HCNC,, | Performed by: INTERNAL MEDICINE

## 2019-04-04 PROCEDURE — 71250 CT THORAX DX C-: CPT | Mod: 26,HCNC,, | Performed by: RADIOLOGY

## 2019-04-04 PROCEDURE — 71250 CT CHEST WITHOUT CONTRAST: ICD-10-PCS | Mod: 26,HCNC,, | Performed by: RADIOLOGY

## 2019-04-04 PROCEDURE — 99213 PR OFFICE/OUTPT VISIT, EST, LEVL III, 20-29 MIN: ICD-10-PCS | Mod: HCNC,S$GLB,, | Performed by: INTERNAL MEDICINE

## 2019-04-04 PROCEDURE — 99213 OFFICE O/P EST LOW 20 MIN: CPT | Mod: HCNC,S$GLB,, | Performed by: INTERNAL MEDICINE

## 2019-04-04 PROCEDURE — 99999 PR PBB SHADOW E&M-EST. PATIENT-LVL III: CPT | Mod: PBBFAC,HCNC,, | Performed by: INTERNAL MEDICINE

## 2019-04-04 PROCEDURE — 1101F PR PT FALLS ASSESS DOC 0-1 FALLS W/OUT INJ PAST YR: ICD-10-PCS | Mod: HCNC,CPTII,S$GLB, | Performed by: INTERNAL MEDICINE

## 2019-04-04 NOTE — PROGRESS NOTES
Subjective:       Patient ID: Jose Luis Manzo is a 69 y.o. male.    Chief Complaint: hx laryngeal cancer    HPI  He has history of T1a squamous cell carcinoma of the larynx.  It was diagnosed in September 2014 by ENT Dr. Chandler Kyle when he was worked up for hoarseness.  The lesion was found in the left true vocal cord.  Biopsy revealed squamous cell carcinoma well differentiated.  He underwent definitive radiation therapy at [facility] under the care of .  He completed 6300 centigray by December 2014.  A PET scan done at the time was negative for metastatic disease.  He has been having some hoarseness since then.    PET scan 5/12/17 that revealed a 1.8 centimeter pleural-based nodule in the left upper lobe.  SUV 3.6.  This is a new lesion.    He underwent CT-guided biopsy of this lesion and it was negative for malignancy.    Underwent Suspension microlaryngoscopy with KTP laser resection of right true vocal fold tumor and Biopsy of left true vocal fold tumor in December 2017.  That biopsy was negative for malignancy.      Review of Systems   Constitutional: Negative for fever and unexpected weight change.   HENT: Positive for voice change. Negative for mouth sores, nosebleeds, sinus pressure and sneezing.    Respiratory: Negative for cough and shortness of breath.    Cardiovascular: Negative for chest pain and palpitations.   Gastrointestinal: Negative for abdominal pain, blood in stool and nausea.   Musculoskeletal: Positive for arthralgias. Negative for gait problem.   Hematological: Negative for adenopathy. Does not bruise/bleed easily.   Psychiatric/Behavioral: Negative for behavioral problems and confusion. The patient is nervous/anxious.          Objective:      Physical Exam   Constitutional: He is oriented to person, place, and time. He appears well-developed and well-nourished. No distress.   Eyes: Conjunctivae and lids are normal. No scleral icterus.   Neck: Normal range of motion. Neck  supple. No thyromegaly present.   Cardiovascular: Normal rate, regular rhythm and intact distal pulses. Exam reveals no gallop.   Pulmonary/Chest: Effort normal and breath sounds normal. No respiratory distress. He has no rales.   Abdominal: Soft. Bowel sounds are normal. He exhibits no distension and no mass. There is no hepatosplenomegaly.   Lymphadenopathy:        Head (right side): No submental adenopathy present.        Head (left side): No submental adenopathy present.     He has no cervical adenopathy.        Right: No supraclavicular adenopathy present.        Left: No supraclavicular adenopathy present.   Neurological: He is alert and oriented to person, place, and time.   Skin: Skin is warm. He is not diaphoretic. No cyanosis. Nails show no clubbing.         Assessment:       1. H/O laryngeal cancer    2. Nodule of left lung    3. Squamous cell carcinoma    4. Chronic kidney disease, stage IV (severe)    5. Pulmonary nodules        Plan:   In summary he has early stage T1a squamous cell carcinoma of the larynx status post definitive radiation therapy completed December 2014.    PET scan - Nov 2017 - shows a 1.8 centimeters pleural-based isolated lesion, SUV max 3.6.     CT scan from today shows stability of his pulmonary nodules    Will continue to monitor.    Repeat CT Chest in 6 mo - without contrast

## 2019-06-06 ENCOUNTER — OFFICE VISIT (OUTPATIENT)
Dept: OTOLARYNGOLOGY | Facility: CLINIC | Age: 69
End: 2019-06-06
Payer: MEDICARE

## 2019-06-06 VITALS
HEART RATE: 66 BPM | BODY MASS INDEX: 32.58 KG/M2 | WEIGHT: 227.06 LBS | SYSTOLIC BLOOD PRESSURE: 132 MMHG | DIASTOLIC BLOOD PRESSURE: 79 MMHG

## 2019-06-06 DIAGNOSIS — J38.7 CHONDRONECROSIS OF LARYNX: ICD-10-CM

## 2019-06-06 DIAGNOSIS — C32.9 LARYNX CANCER: Primary | ICD-10-CM

## 2019-06-06 PROCEDURE — 3075F SYST BP GE 130 - 139MM HG: CPT | Mod: HCNC,CPTII,S$GLB, | Performed by: OTOLARYNGOLOGY

## 2019-06-06 PROCEDURE — 99213 OFFICE O/P EST LOW 20 MIN: CPT | Mod: 25,HCNC,S$GLB, | Performed by: OTOLARYNGOLOGY

## 2019-06-06 PROCEDURE — 31575 PR LARYNGOSCOPY, FLEXIBLE; DIAGNOSTIC: ICD-10-PCS | Mod: HCNC,S$GLB,, | Performed by: OTOLARYNGOLOGY

## 2019-06-06 PROCEDURE — 1101F PR PT FALLS ASSESS DOC 0-1 FALLS W/OUT INJ PAST YR: ICD-10-PCS | Mod: HCNC,CPTII,S$GLB, | Performed by: OTOLARYNGOLOGY

## 2019-06-06 PROCEDURE — 1101F PT FALLS ASSESS-DOCD LE1/YR: CPT | Mod: HCNC,CPTII,S$GLB, | Performed by: OTOLARYNGOLOGY

## 2019-06-06 PROCEDURE — 31575 DIAGNOSTIC LARYNGOSCOPY: CPT | Mod: HCNC,S$GLB,, | Performed by: OTOLARYNGOLOGY

## 2019-06-06 PROCEDURE — 99999 PR PBB SHADOW E&M-EST. PATIENT-LVL III: ICD-10-PCS | Mod: PBBFAC,HCNC,, | Performed by: OTOLARYNGOLOGY

## 2019-06-06 PROCEDURE — 99213 PR OFFICE/OUTPT VISIT, EST, LEVL III, 20-29 MIN: ICD-10-PCS | Mod: 25,HCNC,S$GLB, | Performed by: OTOLARYNGOLOGY

## 2019-06-06 PROCEDURE — 3075F PR MOST RECENT SYSTOLIC BLOOD PRESS GE 130-139MM HG: ICD-10-PCS | Mod: HCNC,CPTII,S$GLB, | Performed by: OTOLARYNGOLOGY

## 2019-06-06 PROCEDURE — 3078F PR MOST RECENT DIASTOLIC BLOOD PRESSURE < 80 MM HG: ICD-10-PCS | Mod: HCNC,CPTII,S$GLB, | Performed by: OTOLARYNGOLOGY

## 2019-06-06 PROCEDURE — 3078F DIAST BP <80 MM HG: CPT | Mod: HCNC,CPTII,S$GLB, | Performed by: OTOLARYNGOLOGY

## 2019-06-06 PROCEDURE — 99999 PR PBB SHADOW E&M-EST. PATIENT-LVL III: CPT | Mod: PBBFAC,HCNC,, | Performed by: OTOLARYNGOLOGY

## 2019-06-10 NOTE — PROGRESS NOTES
Chief Complaint   Patient presents with    Follow-up     Treatment History  1. 2015: XRT for SCCA larynx ([facility]).  2. 10/2/17: DL and biopsy TVC.  Path revealed severe dysplasia/CIS/superficially invasive SCCA (Dr. Lizet Desouza).  3. 12/21/17: MSL with resection R TVC lesion, biopsy L TVC lesion.  Path revealed severe dysplasia. (Dr. Gibran Smith).  4. 3/1/17: MSL with laser resection L TVC.  Path benign. (Dr. Smith).    HPI   69 y.o. male presents with the above treatment history. No new complaints.       Review of Systems   Constitutional: Negative for fatigue and unexpected weight change.   HENT: Per HPI.  Eyes: Negative for visual disturbance.   Respiratory: Negative for shortness of breath, hemoptysis   Cardiovascular: Negative for chest pain and palpitations.   Musculoskeletal: Negative for decreased ROM, back pain.   Skin: Negative for rash, sunburn, itching.   Neurological: Negative for dizziness and seizures.   Hematological: Negative for adenopathy. Does not bruise/bleed easily.   Endocrine: Negative for rapid weight loss/weight gain, heat/cold intolerance.     Past Medical History   Patient Active Problem List   Diagnosis    Chronic kidney disease, stage IV (severe)    Essential hypertension    Mixed hyperlipidemia    Benign prostatic hyperplasia with nocturia    Claudication of right lower extremity    Eye swelling, bilateral    Diminished night vision    Edema of right lower extremity    Renal transplant recipient    Dermolipoma    Edema    History of DVT (deep vein thrombosis)    Iron deficiency anemia    Thrombophilia    H/O laryngeal cancer    Pulmonary nodules    Larynx cancer    Voice disturbance    Atherosclerosis of aorta    Carcinoma in situ of vocal cord    Dysphagia, pharyngoesophageal    Chondronecrosis of larynx    Squamous cell carcinoma    Obesity (BMI 30-39.9)           Past Surgical History   Past Surgical History:   Procedure Laterality Date    BIOPSY-LARYNGEAL  " 3/1/2018    [facility]formed [facility] at [facility] 2ND FLR    DIRECT MICROSUSPENSION LARYNGOSCOPY WITH BIOPSY N/A 10/2/2017    [facility]    FRACTURE SURGERY Left     "lower leg" 40 years ago    KIDNEY TRANSPLANT  2007    [facility]    LARYNGOSCOPY N/A 4/25/2018    Performed by [facility] at [facility] OR 2ND FLR    LARYNX SURGERY  11/2014    Oropharyngeal Cancer x3    microlaryngoscopy      MICROLASERLARYNGOSCOPY Left 3/1/2018    Performed by [facility] at [facility] 2ND FLR    MICROLASERLARYNGOSCOPY Right 12/21/2017    Performed [facility] at [facility] 2ND FLR    REMOVAL LESION-VOCAL CORD Left 12/21/2017    Performed [facility] at [facility] 2ND FLR    Suspension microlaryngoscopy with KTP laser excision of lesion N/A 12/21/2017    Performed by [facility] at [facility] OR 2ND FLR    Suspension microlaryngoscopy with KTP laser excision of tumor N/A 3/1/2018    Performed by [facility] at [facility] OR 2ND FLR    TIBIAL STAPLING Left 1980              Family History   Family History   Problem Relation Age of Onset    Stroke Mother     Diabetes Mother     Hypertension Mother     Stroke Father     No Known Problems Sister     No Known Problems Brother     No Known Problems Daughter            Social History   .  Social History     Socioeconomic History    Marital status: Single     Spouse name: Not on file    Number of children: Not on file    Years of education: Not on file    Highest education level: Not on file   Occupational History    Not on file   Social Needs    Financial resource strain: Not on file    Food insecurity:     Worry: Not on file     Inability: Not on file    Transportation needs:     Medical: Not on file     Non-medical: Not on file   Tobacco Use    Smoking status: Former Smoker     Packs/day: 1.00     Years: 20.00     Pack years: 20.00     Types: " Cigarettes     Last attempt to quit: 2009     Years since quitting: 10.4    Smokeless tobacco: Never Used   Substance and Sexual Activity    Alcohol use: No     Alcohol/week: 0.0 oz    Drug use: No     Types: Marijuana     Comment: Patient quick 2011    Sexual activity: Not Currently     Partners: Female   Lifestyle    Physical activity:     Days per week: Not on file     Minutes per session: Not on file    Stress: Not on file   Relationships    Social connections:     Talks on phone: Not on file     Gets together: Not on file     Attends [employer] service: Not on file     Active member of club or organization: Not on file     Attends meetings of clubs or organizations: Not on file     Relationship status: Not on file   Other Topics Concern    Not on file   Social History Narrative    Not on file         Allergies   Review of patient's allergies indicates:  No Known Allergies        Physical Exam     Vitals:    06/06/19 1429   BP: 132/79   Pulse: 66         Body mass index is 32.58 kg/m².    General: AOx3, NAD   Respiratory:  Symmetric chest rise, normal effort  Nose: No gross nasal septal deviation. Inferior Turbinates WNL bilaterally. No septal perforation. No masses/lesions.   Oral Cavity:  Oral Tongue mobile, no lesions noted. Hard Palate WNL. No buccal or FOM lesions.  Oropharynx:  No masses/lesions of the posterior pharyngeal wall. Tonsillar fossa without lesions. Soft palate without masses. Midline uvula.   Neck: No scars. Mild fibrosis status post XRT.  No cervical lymphadenopathy, thyromegaly or thyroid nodules.  Normal range of motion.    Face: House Brackmann I bilaterally.     Flex Naso Heike Hypo Procedures #2     Procedure:  Diagnostic flexible nasopharyngoscopy, laryngoscopy and hypopharyngoscopy:     Routine preparation with local atomizer with 1% neosynephrine/pontocaine with customary flexible endoscope.     Nasopharynx:  No lesions.        Mucosa:  No lesions.        Adenoids:   Present.  Posterior Choanae:  Patent.  Eustachian Tubes:  Patent.  Posterior pharynx:  No lesions.  Larynx/hypopharynx:        Epiglottis:  No lesions.         AE Folds:  No lesions.        Vocal cords:  Stable nodularity L TVC        Mobility:  R TVC slightly limited, L TVC immobile.         Hypopharynx:  No lesions.        Piriform sinus:  No pooling, no lesions.        Post Cricoid:  No erythema, no edema.      Assessment/Plan  Problem List Items Addressed This Visit        ENT    Chondronecrosis of larynx       Oncology    Larynx cancer - Primary     No evidence of disease.  Return to clinic in 4 months for surveillance.

## 2019-07-29 ENCOUNTER — PES CALL (OUTPATIENT)
Dept: ADMINISTRATIVE | Facility: CLINIC | Age: 69
End: 2019-07-29

## 2019-08-16 ENCOUNTER — LAB VISIT (OUTPATIENT)
Dept: LAB | Facility: HOSPITAL | Age: 69
End: 2019-08-16
Attending: INTERNAL MEDICINE
Payer: MEDICARE

## 2019-08-16 DIAGNOSIS — N40.1 BENIGN PROSTATIC HYPERPLASIA WITH NOCTURIA: ICD-10-CM

## 2019-08-16 DIAGNOSIS — R35.1 BENIGN PROSTATIC HYPERPLASIA WITH NOCTURIA: ICD-10-CM

## 2019-08-16 DIAGNOSIS — N18.4 CHRONIC KIDNEY DISEASE, STAGE IV (SEVERE): ICD-10-CM

## 2019-08-16 DIAGNOSIS — E78.2 MIXED HYPERLIPIDEMIA: ICD-10-CM

## 2019-08-16 LAB
ALBUMIN SERPL BCP-MCNC: 3.9 G/DL (ref 3.5–5.2)
ALP SERPL-CCNC: 121 U/L (ref 38–126)
ALT SERPL W/O P-5'-P-CCNC: 17 U/L (ref 10–44)
ANION GAP SERPL CALC-SCNC: 5 MMOL/L (ref 8–16)
AST SERPL-CCNC: 20 U/L (ref 15–46)
BASOPHILS # BLD AUTO: 0.01 K/UL (ref 0–0.2)
BASOPHILS NFR BLD: 0.2 % (ref 0–1.9)
BILIRUB SERPL-MCNC: 0.3 MG/DL (ref 0.1–1)
BUN SERPL-MCNC: 26 MG/DL (ref 2–20)
CALCIUM SERPL-MCNC: 11 MG/DL (ref 8.7–10.5)
CHLORIDE SERPL-SCNC: 113 MMOL/L (ref 95–110)
CHOLEST SERPL-MCNC: 204 MG/DL (ref 120–199)
CHOLEST/HDLC SERPL: 3.8 {RATIO} (ref 2–5)
CO2 SERPL-SCNC: 28 MMOL/L (ref 23–29)
COMPLEXED PSA SERPL-MCNC: 0.21 NG/ML (ref 0–4)
CREAT SERPL-MCNC: 2.61 MG/DL (ref 0.5–1.4)
DIFFERENTIAL METHOD: ABNORMAL
EOSINOPHIL # BLD AUTO: 0.1 K/UL (ref 0–0.5)
EOSINOPHIL NFR BLD: 1.9 % (ref 0–8)
ERYTHROCYTE [DISTWIDTH] IN BLOOD BY AUTOMATED COUNT: 17.4 % (ref 11.5–14.5)
EST. GFR  (AFRICAN AMERICAN): 27.7 ML/MIN/1.73 M^2
EST. GFR  (NON AFRICAN AMERICAN): 24 ML/MIN/1.73 M^2
GLUCOSE SERPL-MCNC: 99 MG/DL (ref 70–110)
HCT VFR BLD AUTO: 42.7 % (ref 40–54)
HDLC SERPL-MCNC: 54 MG/DL (ref 40–75)
HDLC SERPL: 26.5 % (ref 20–50)
HGB BLD-MCNC: 12.8 G/DL (ref 14–18)
LDLC SERPL CALC-MCNC: 129.8 MG/DL (ref 63–159)
LYMPHOCYTES # BLD AUTO: 1.1 K/UL (ref 1–4.8)
LYMPHOCYTES NFR BLD: 18.7 % (ref 18–48)
MCH RBC QN AUTO: 24.7 PG (ref 27–31)
MCHC RBC AUTO-ENTMCNC: 30 G/DL (ref 32–36)
MCV RBC AUTO: 82 FL (ref 82–98)
MONOCYTES # BLD AUTO: 0.5 K/UL (ref 0.3–1)
MONOCYTES NFR BLD: 9.3 % (ref 4–15)
NEUTROPHILS # BLD AUTO: 4 K/UL (ref 1.8–7.7)
NEUTROPHILS NFR BLD: 69.9 % (ref 38–73)
NONHDLC SERPL-MCNC: 150 MG/DL
PLATELET # BLD AUTO: 139 K/UL (ref 150–350)
PMV BLD AUTO: 11.7 FL (ref 9.2–12.9)
POTASSIUM SERPL-SCNC: 4.1 MMOL/L (ref 3.5–5.1)
PROT SERPL-MCNC: 7.7 G/DL (ref 6–8.4)
RBC # BLD AUTO: 5.19 M/UL (ref 4.6–6.2)
SODIUM SERPL-SCNC: 146 MMOL/L (ref 136–145)
TRIGL SERPL-MCNC: 101 MG/DL (ref 30–150)
WBC # BLD AUTO: 5.78 K/UL (ref 3.9–12.7)

## 2019-08-16 PROCEDURE — 80061 LIPID PANEL: CPT | Mod: HCNC

## 2019-08-16 PROCEDURE — 85025 COMPLETE CBC W/AUTO DIFF WBC: CPT | Mod: HCNC,PO

## 2019-08-16 PROCEDURE — 36415 COLL VENOUS BLD VENIPUNCTURE: CPT | Mod: HCNC,PO

## 2019-08-16 PROCEDURE — 84153 ASSAY OF PSA TOTAL: CPT | Mod: HCNC

## 2019-08-16 PROCEDURE — 80053 COMPREHEN METABOLIC PANEL: CPT | Mod: HCNC,PO

## 2019-08-22 ENCOUNTER — OFFICE VISIT (OUTPATIENT)
Dept: INTERNAL MEDICINE | Facility: CLINIC | Age: 69
End: 2019-08-22
Payer: MEDICARE

## 2019-08-22 VITALS
WEIGHT: 229.19 LBS | HEIGHT: 70 IN | HEART RATE: 61 BPM | OXYGEN SATURATION: 98 % | BODY MASS INDEX: 32.81 KG/M2 | SYSTOLIC BLOOD PRESSURE: 138 MMHG | DIASTOLIC BLOOD PRESSURE: 80 MMHG

## 2019-08-22 DIAGNOSIS — H11.003 PTERYGIUM OF BOTH EYES: ICD-10-CM

## 2019-08-22 DIAGNOSIS — N40.1 BENIGN PROSTATIC HYPERPLASIA WITH NOCTURIA: ICD-10-CM

## 2019-08-22 DIAGNOSIS — D68.59 THROMBOPHILIA: ICD-10-CM

## 2019-08-22 DIAGNOSIS — E66.9 OBESITY (BMI 30-39.9): ICD-10-CM

## 2019-08-22 DIAGNOSIS — N18.4 CHRONIC KIDNEY DISEASE, STAGE IV (SEVERE): ICD-10-CM

## 2019-08-22 DIAGNOSIS — I70.0 ATHEROSCLEROSIS OF AORTA: ICD-10-CM

## 2019-08-22 DIAGNOSIS — E78.2 MIXED HYPERLIPIDEMIA: ICD-10-CM

## 2019-08-22 DIAGNOSIS — I10 ESSENTIAL HYPERTENSION: ICD-10-CM

## 2019-08-22 DIAGNOSIS — Z00.00 ROUTINE GENERAL MEDICAL EXAMINATION AT A HEALTH CARE FACILITY: Primary | ICD-10-CM

## 2019-08-22 DIAGNOSIS — C32.9 LARYNX CANCER: ICD-10-CM

## 2019-08-22 DIAGNOSIS — R35.1 BENIGN PROSTATIC HYPERPLASIA WITH NOCTURIA: ICD-10-CM

## 2019-08-22 PROBLEM — Z85.21 H/O LARYNGEAL CANCER: Status: RESOLVED | Noted: 2017-08-18 | Resolved: 2019-08-22

## 2019-08-22 PROCEDURE — 3079F PR MOST RECENT DIASTOLIC BLOOD PRESSURE 80-89 MM HG: ICD-10-PCS | Mod: HCNC,CPTII,S$GLB, | Performed by: INTERNAL MEDICINE

## 2019-08-22 PROCEDURE — 3075F PR MOST RECENT SYSTOLIC BLOOD PRESS GE 130-139MM HG: ICD-10-PCS | Mod: HCNC,CPTII,S$GLB, | Performed by: INTERNAL MEDICINE

## 2019-08-22 PROCEDURE — 99397 PER PM REEVAL EST PAT 65+ YR: CPT | Mod: HCNC,S$GLB,, | Performed by: INTERNAL MEDICINE

## 2019-08-22 PROCEDURE — 99397 PR PREVENTIVE VISIT,EST,65 & OVER: ICD-10-PCS | Mod: HCNC,S$GLB,, | Performed by: INTERNAL MEDICINE

## 2019-08-22 PROCEDURE — 99999 PR PBB SHADOW E&M-EST. PATIENT-LVL IV: CPT | Mod: PBBFAC,HCNC,, | Performed by: INTERNAL MEDICINE

## 2019-08-22 PROCEDURE — 3075F SYST BP GE 130 - 139MM HG: CPT | Mod: HCNC,CPTII,S$GLB, | Performed by: INTERNAL MEDICINE

## 2019-08-22 PROCEDURE — 3079F DIAST BP 80-89 MM HG: CPT | Mod: HCNC,CPTII,S$GLB, | Performed by: INTERNAL MEDICINE

## 2019-08-22 PROCEDURE — 99999 PR PBB SHADOW E&M-EST. PATIENT-LVL IV: ICD-10-PCS | Mod: PBBFAC,HCNC,, | Performed by: INTERNAL MEDICINE

## 2019-08-22 NOTE — PROGRESS NOTES
Subjective:       Patient ID: Jose Luis Manzo is a 69 y.o. male.    Chief Complaint: Follow-up (6 month)    [facility]  Wellness check.  Chart reviewed.  Weight stable.  No dysphagia, throat pain.  No CP, sOB.  No bleeding.  C/O pterygia bilat.  No visual problems.  Nocturia 0-2.  Review of Systems   All other systems reviewed and are negative.      Objective:      Physical Exam   Constitutional: He appears well-developed.   obese   HENT:   Head: Normocephalic.   Mouth/Throat: Oropharynx is clear and moist.   Eyes: EOM are normal.   Pterygia bilat   Neck: Normal range of motion.   Cardiovascular: Normal rate, regular rhythm, normal heart sounds and intact distal pulses.   Pulmonary/Chest: Effort normal and breath sounds normal.   Abdominal: Bowel sounds are normal.   Musculoskeletal: Normal range of motion.   Lymphadenopathy:     He has no cervical adenopathy.   Neurological: He is alert.   Skin: Skin is warm and dry.   Psychiatric: He has a normal mood and affect.   Vitals reviewed.      Assessment:       1. Routine general medical examination at a health care facility    2. Essential hypertension    3. Larynx cancer    4. Chronic kidney disease, stage IV (severe)    5. Atherosclerosis of aorta    6. Thrombophilia    7. Mixed hyperlipidemia    8. Benign prostatic hyperplasia with nocturia    9. Obesity (BMI 30-39.9)    10. Pterygium of both eyes        Plan:       Jose Luis was seen today for follow-up.    Diagnoses and all orders for this visit:    Routine general medical examination at a health care facility    Essential hypertension   Well-cont    Larynx cancer   Per Oncology    Chronic kidney disease, stage IV (severe)  -     Renal function panel; Future    Atherosclerosis of aorta   Monitor    Thrombophilia   Cont rx    Mixed hyperlipidemia   Well-cont    Benign prostatic hyperplasia with nocturia   Cont rx    Obesity (BMI 30-39.9)   Monitor    Pterygium of both eyes  -     Ambulatory referral to  Ophthalmology      Follow up in about 6 months (around 2/22/2020).

## 2019-08-28 ENCOUNTER — TELEPHONE (OUTPATIENT)
Dept: INTERNAL MEDICINE | Facility: CLINIC | Age: 69
End: 2019-08-28

## 2019-08-28 NOTE — TELEPHONE ENCOUNTER
----- Message from Tia Larson sent at 8/28/2019  9:34 AM CDT -----  Contact: [phone]/self  Patient requesting to speak with you regarding scheduling an appointment with an ophthalmologist. Patient states he has not heard from your office since last week. Please advise.

## 2019-08-28 NOTE — TELEPHONE ENCOUNTER
Called and spoke with pt, stated that he have not received a call regarding his ophthalmology apt, informed him that someone will be contacting him soon.

## 2019-08-29 ENCOUNTER — TELEPHONE (OUTPATIENT)
Dept: INTERNAL MEDICINE | Facility: CLINIC | Age: 69
End: 2019-08-29

## 2019-08-29 DIAGNOSIS — H11.003 PTERYGIUM OF BOTH EYES: Primary | ICD-10-CM

## 2019-08-29 NOTE — TELEPHONE ENCOUNTER
----- Message from Willie Tran MA sent at 8/28/2019  4:31 PM CDT -----  Contact: [phone]/self   Dr Rojas Please change the order for ophthalmology to internal.   ----- Message -----  From: Tia Larson  Sent: 8/28/2019   9:34 AM  To: [facility] Staff    Patient requesting to speak with you regarding scheduling an appointment with an ophthalmologist. Patient states he has not heard from your office since last week. Please advise.

## 2019-09-19 DIAGNOSIS — I82.409 ACUTE DEEP VEIN THROMBOSIS (DVT) OF LOWER EXTREMITY, UNSPECIFIED LATERALITY, UNSPECIFIED VEIN: ICD-10-CM

## 2019-09-19 NOTE — TELEPHONE ENCOUNTER
----- Message from Glendy Vigil sent at 9/19/2019 12:08 PM CDT -----  Contact: Self  Pt is calling to speak with Staff regarding a medication refill of apixaban (ELIQUIS) 2.5 mg Tab.  He is requesting a returned call & can be reached at [phone].    Thank you.

## 2019-10-02 ENCOUNTER — TELEPHONE (OUTPATIENT)
Dept: HEMATOLOGY/ONCOLOGY | Facility: CLINIC | Age: 69
End: 2019-10-02

## 2019-10-02 NOTE — TELEPHONE ENCOUNTER
Pt called with concerns on paying hs copay for his ct scans.  Informed him that there is a 0 dollar for copay. He stated he called financial services and they haven't returned his call

## 2019-10-03 ENCOUNTER — OFFICE VISIT (OUTPATIENT)
Dept: HEMATOLOGY/ONCOLOGY | Facility: CLINIC | Age: 69
End: 2019-10-03
Payer: MEDICARE

## 2019-10-03 VITALS
RESPIRATION RATE: 16 BRPM | HEART RATE: 76 BPM | WEIGHT: 239.63 LBS | TEMPERATURE: 97 F | DIASTOLIC BLOOD PRESSURE: 91 MMHG | BODY MASS INDEX: 34.38 KG/M2 | SYSTOLIC BLOOD PRESSURE: 180 MMHG | OXYGEN SATURATION: 97 %

## 2019-10-03 DIAGNOSIS — Z85.21 H/O LARYNGEAL CANCER: Primary | ICD-10-CM

## 2019-10-03 DIAGNOSIS — Z94.0 RENAL TRANSPLANT RECIPIENT: ICD-10-CM

## 2019-10-03 DIAGNOSIS — R91.1 NODULE OF LEFT LUNG: ICD-10-CM

## 2019-10-03 DIAGNOSIS — I70.0 ATHEROSCLEROSIS OF AORTA: ICD-10-CM

## 2019-10-03 DIAGNOSIS — Z86.718 HISTORY OF DVT (DEEP VEIN THROMBOSIS): ICD-10-CM

## 2019-10-03 DIAGNOSIS — N18.4 CHRONIC KIDNEY DISEASE, STAGE IV (SEVERE): ICD-10-CM

## 2019-10-03 PROCEDURE — 99999 PR PBB SHADOW E&M-EST. PATIENT-LVL III: CPT | Mod: PBBFAC,HCNC,, | Performed by: INTERNAL MEDICINE

## 2019-10-03 PROCEDURE — 1101F PT FALLS ASSESS-DOCD LE1/YR: CPT | Mod: HCNC,CPTII,S$GLB, | Performed by: INTERNAL MEDICINE

## 2019-10-03 PROCEDURE — 99499 RISK ADDL DX/OHS AUDIT: ICD-10-PCS | Mod: HCNC,S$GLB,, | Performed by: INTERNAL MEDICINE

## 2019-10-03 PROCEDURE — 99215 OFFICE O/P EST HI 40 MIN: CPT | Mod: HCNC,S$GLB,, | Performed by: INTERNAL MEDICINE

## 2019-10-03 PROCEDURE — 99999 PR PBB SHADOW E&M-EST. PATIENT-LVL III: ICD-10-PCS | Mod: PBBFAC,HCNC,, | Performed by: INTERNAL MEDICINE

## 2019-10-03 PROCEDURE — 3077F SYST BP >= 140 MM HG: CPT | Mod: HCNC,CPTII,S$GLB, | Performed by: INTERNAL MEDICINE

## 2019-10-03 PROCEDURE — 99499 UNLISTED E&M SERVICE: CPT | Mod: HCNC,S$GLB,, | Performed by: INTERNAL MEDICINE

## 2019-10-03 PROCEDURE — 99215 PR OFFICE/OUTPT VISIT, EST, LEVL V, 40-54 MIN: ICD-10-PCS | Mod: HCNC,S$GLB,, | Performed by: INTERNAL MEDICINE

## 2019-10-03 PROCEDURE — 3080F DIAST BP >= 90 MM HG: CPT | Mod: HCNC,CPTII,S$GLB, | Performed by: INTERNAL MEDICINE

## 2019-10-03 PROCEDURE — 3080F PR MOST RECENT DIASTOLIC BLOOD PRESSURE >= 90 MM HG: ICD-10-PCS | Mod: HCNC,CPTII,S$GLB, | Performed by: INTERNAL MEDICINE

## 2019-10-03 PROCEDURE — 3077F PR MOST RECENT SYSTOLIC BLOOD PRESSURE >= 140 MM HG: ICD-10-PCS | Mod: HCNC,CPTII,S$GLB, | Performed by: INTERNAL MEDICINE

## 2019-10-03 PROCEDURE — 1101F PR PT FALLS ASSESS DOC 0-1 FALLS W/OUT INJ PAST YR: ICD-10-PCS | Mod: HCNC,CPTII,S$GLB, | Performed by: INTERNAL MEDICINE

## 2019-10-03 NOTE — PROGRESS NOTES
Subjective:       Patient ID: Jose Luis Manzo is a 69 y.o. male.    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  It was diagnosed in September 2014 by ENT Dr. Chandler Kyle when he was worked up for hoarseness.  The lesion was found in the left true vocal cord.  Biopsy revealed squamous cell carcinoma well differentiated.  He underwent definitive radiation therapy at [facility] under the care of .  He completed 6300 centigray by December 2014.  A PET scan done at the time was negative for metastatic disease.  He has been having some hoarseness since then.    PET scan 5/12/17 that revealed a 1.8 centimeter pleural-based nodule in the left upper lobe.  SUV 3.6.  This is a new lesion.    He underwent CT-guided biopsy of this lesion and it was negative for malignancy.    Underwent Suspension microlaryngoscopy with KTP laser resection of right true vocal fold tumor and Biopsy of left true vocal fold tumor in December 2017.  That biopsy was negative for malignancy.    INTERVAL HISTORY:  -he is here for follow-up of squamous cell carcinoma of the larynx  -he also has a pulmonary nodule in the left upper lobe  -he has a renal transplant recipient and is on chronic immunosuppressive therapy  -he also takes Eliquis for chronic bilateral lower extremity DVTs  -he also has peripheral vascular disease    4/4/19 - CT Chest - There is a spiculated opacity in the left upper lobe measuring 1.9 cm with several stippled calcifications, overall similar to multiple prior studies (1.8 cm on 01/10/2018).  Additional stable pulmonary micro nodules are noted bilaterally, including a 0.5 cm nodule in the right lower lobe (series 4, image 385).  Moderate emphysematous changes are noted.  Dependent secretions are seen within the nikki.  There is a new small ground-glass opacity at the right lung base, probably infectious/inflammatory.    -complains of chronic  tiredness  -no dyspnea on exertion or rest  -weight is stable and appetite is good  -bowels are moving well      Review of Systems   Constitutional: Negative for fever and unexpected weight change.   HENT: Positive for voice change. Negative for mouth sores, nosebleeds, sinus pressure and sneezing.    Respiratory: Negative for cough and shortness of breath.    Cardiovascular: Negative for chest pain and palpitations.   Gastrointestinal: Negative for abdominal pain, blood in stool and nausea.   Musculoskeletal: Positive for arthralgias. Negative for gait problem.   Hematological: Negative for adenopathy. Does not bruise/bleed easily.   Psychiatric/Behavioral: Negative for behavioral problems and confusion. The patient is nervous/anxious.          Objective:      Physical Exam   Constitutional: He is oriented to person, place, and time. He appears well-developed and well-nourished. No distress.   Eyes: Conjunctivae and lids are normal. No scleral icterus.   Neck: Normal range of motion. Neck supple. No thyromegaly present.   Cardiovascular: Normal rate, regular rhythm and intact distal pulses. Exam reveals no gallop.   Pulmonary/Chest: Effort normal and breath sounds normal. No respiratory distress. He has no rales.   Abdominal: Soft. Bowel sounds are normal. He exhibits no distension and no mass. There is no hepatosplenomegaly.   Lymphadenopathy:        Head (right side): No submental adenopathy present.        Head (left side): No submental adenopathy present.     He has no cervical adenopathy.        Right: No supraclavicular adenopathy present.        Left: No supraclavicular adenopathy present.   Neurological: He is alert and oriented to person, place, and time.   Skin: Skin is warm. He is not diaphoretic. No cyanosis. Nails show no clubbing.         Assessment:       1. H/O laryngeal cancer    2. Nodule of left lung    3. Chronic kidney disease, stage IV (severe)    4. Renal transplant recipient    5.  Atherosclerosis of aorta    6. History of DVT (deep vein thrombosis)        Plan:   History of T1a squamous cell carcinoma of the larynx status post definitive RT 2014  -doing well  -continue to follow up with Dr. 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

## 2019-10-07 ENCOUNTER — HOSPITAL ENCOUNTER (OUTPATIENT)
Dept: RADIOLOGY | Facility: HOSPITAL | Age: 69
Discharge: HOME | End: 2019-10-07
Attending: INTERNAL MEDICINE
Payer: MEDICARE

## 2019-10-07 ENCOUNTER — PATIENT OUTREACH (OUTPATIENT)
Dept: ADMINISTRATIVE | Facility: OTHER | Age: 69
End: 2019-10-07

## 2019-10-07 ENCOUNTER — TELEPHONE (OUTPATIENT)
Dept: HEMATOLOGY/ONCOLOGY | Facility: CLINIC | Age: 69
End: 2019-10-07

## 2019-10-07 DIAGNOSIS — R91.1 NODULE OF LEFT LUNG: ICD-10-CM

## 2019-10-07 DIAGNOSIS — Z85.21 H/O LARYNGEAL CANCER: ICD-10-CM

## 2019-10-07 PROCEDURE — 71250 CT THORAX DX C-: CPT | Mod: TC,HCNC,PO

## 2019-10-07 NOTE — TELEPHONE ENCOUNTER
LM informing pt of MD findings. Encouraged call back with any questions.     ----- Message from Luis Masters MD sent at 10/7/2019  5:05 PM CDT -----  The spot on the left lung looks good. Its not growing (good news). Pls inform pt.

## 2019-10-08 ENCOUNTER — TELEPHONE (OUTPATIENT)
Dept: HEMATOLOGY/ONCOLOGY | Facility: CLINIC | Age: 69
End: 2019-10-08

## 2019-10-08 ENCOUNTER — OFFICE VISIT (OUTPATIENT)
Dept: OPTOMETRY | Facility: CLINIC | Age: 69
End: 2019-10-08
Payer: COMMERCIAL

## 2019-10-08 DIAGNOSIS — D17.39 DERMOLIPOMA OF ORBIT: ICD-10-CM

## 2019-10-08 DIAGNOSIS — H52.4 PRESBYOPIA: Primary | ICD-10-CM

## 2019-10-08 DIAGNOSIS — H25.13 NS (NUCLEAR SCLEROSIS), BILATERAL: ICD-10-CM

## 2019-10-08 DIAGNOSIS — H47.393 OPTIC NERVE CUPPING OF BOTH EYES: ICD-10-CM

## 2019-10-08 DIAGNOSIS — H52.223 REGULAR ASTIGMATISM OF BOTH EYES: ICD-10-CM

## 2019-10-08 PROCEDURE — 92015 DETERMINE REFRACTIVE STATE: CPT | Mod: S$GLB,,, | Performed by: OPTOMETRIST

## 2019-10-08 PROCEDURE — 92015 PR REFRACTION: ICD-10-PCS | Mod: S$GLB,,, | Performed by: OPTOMETRIST

## 2019-10-08 PROCEDURE — 92133 POSTERIOR SEGMENT OCT OPTIC NERVE(OCULAR COHERENCE TOMOGRAPHY) - OU - BOTH EYES: ICD-10-PCS | Mod: S$GLB,,, | Performed by: OPTOMETRIST

## 2019-10-08 PROCEDURE — 99999 PR PBB SHADOW E&M-EST. PATIENT-LVL II: ICD-10-PCS | Mod: PBBFAC,,, | Performed by: OPTOMETRIST

## 2019-10-08 PROCEDURE — 92133 CPTRZD OPH DX IMG PST SGM ON: CPT | Mod: S$GLB,,, | Performed by: OPTOMETRIST

## 2019-10-08 PROCEDURE — 99999 PR PBB SHADOW E&M-EST. PATIENT-LVL II: CPT | Mod: PBBFAC,,, | Performed by: OPTOMETRIST

## 2019-10-08 PROCEDURE — 92014 COMPRE OPH EXAM EST PT 1/>: CPT | Mod: S$GLB,,, | Performed by: OPTOMETRIST

## 2019-10-08 PROCEDURE — 92014 PR EYE EXAM, EST PATIENT,COMPREHESV: ICD-10-PCS | Mod: S$GLB,,, | Performed by: OPTOMETRIST

## 2019-10-08 NOTE — TELEPHONE ENCOUNTER
Called to inform pt of results. No answer----- Message from Luis Masters MD sent at 10/7/2019  5:05 PM CDT -----  The spot on the left lung looks good. Its not growing (good news). Pls inform pt.

## 2019-10-08 NOTE — PROGRESS NOTES
HPI     Last eye exam was 4/8/16 with Dr Florian.    Pt here for routine  Pt is concerned about the extra deposits on OU. Would like to see what   options are for removal.  Pt states it does not bother him but he does not like the way it looks  Patient denies diplopia, headaches, flashes/floaters, pain, and   itching/burning/tearing.    Uses reading glasses unsure what strength  Use Clear Eyes whenever he goes out for redness      Last edited by Anastasia Alicea on 10/8/2019  2:08 PM. (History)            Assessment /Plan     For exam results, see Encounter Report.    Presbyopia  Regular astigmatism of both eyes   Pt happy using OTC readers PRN    Optic nerve cupping of both eyes  -     Posterior Segment OCT Optic Nerve- OD thin ST, OS borderline ST   Return for HVF/ PACHY/ Gonio/ IOP    NS (nuclear sclerosis), bilateral   Mild, monitor    Dermolipoma of orbit  Consult Dr. Tee for removal

## 2019-10-24 ENCOUNTER — OFFICE VISIT (OUTPATIENT)
Dept: OTOLARYNGOLOGY | Facility: CLINIC | Age: 69
End: 2019-10-24
Payer: MEDICARE

## 2019-10-24 VITALS
DIASTOLIC BLOOD PRESSURE: 85 MMHG | SYSTOLIC BLOOD PRESSURE: 140 MMHG | WEIGHT: 235.69 LBS | TEMPERATURE: 98 F | BODY MASS INDEX: 33.82 KG/M2 | HEART RATE: 58 BPM

## 2019-10-24 DIAGNOSIS — C32.9 LARYNX CANCER: Primary | ICD-10-CM

## 2019-10-24 DIAGNOSIS — J38.7 CHONDRONECROSIS OF LARYNX: ICD-10-CM

## 2019-10-24 PROCEDURE — 99999 PR PBB SHADOW E&M-EST. PATIENT-LVL III: ICD-10-PCS | Mod: PBBFAC,HCNC,, | Performed by: OTOLARYNGOLOGY

## 2019-10-24 PROCEDURE — 3077F PR MOST RECENT SYSTOLIC BLOOD PRESSURE >= 140 MM HG: ICD-10-PCS | Mod: HCNC,CPTII,S$GLB, | Performed by: OTOLARYNGOLOGY

## 2019-10-24 PROCEDURE — 99213 OFFICE O/P EST LOW 20 MIN: CPT | Mod: 25,HCNC,S$GLB, | Performed by: OTOLARYNGOLOGY

## 2019-10-24 PROCEDURE — 1101F PR PT FALLS ASSESS DOC 0-1 FALLS W/OUT INJ PAST YR: ICD-10-PCS | Mod: HCNC,CPTII,S$GLB, | Performed by: OTOLARYNGOLOGY

## 2019-10-24 PROCEDURE — 1101F PT FALLS ASSESS-DOCD LE1/YR: CPT | Mod: HCNC,CPTII,S$GLB, | Performed by: OTOLARYNGOLOGY

## 2019-10-24 PROCEDURE — 99213 PR OFFICE/OUTPT VISIT, EST, LEVL III, 20-29 MIN: ICD-10-PCS | Mod: 25,HCNC,S$GLB, | Performed by: OTOLARYNGOLOGY

## 2019-10-24 PROCEDURE — 3079F PR MOST RECENT DIASTOLIC BLOOD PRESSURE 80-89 MM HG: ICD-10-PCS | Mod: HCNC,CPTII,S$GLB, | Performed by: OTOLARYNGOLOGY

## 2019-10-24 PROCEDURE — 99999 PR PBB SHADOW E&M-EST. PATIENT-LVL III: CPT | Mod: PBBFAC,HCNC,, | Performed by: OTOLARYNGOLOGY

## 2019-10-24 PROCEDURE — 31575 PR LARYNGOSCOPY, FLEXIBLE; DIAGNOSTIC: ICD-10-PCS | Mod: HCNC,S$GLB,, | Performed by: OTOLARYNGOLOGY

## 2019-10-24 PROCEDURE — 3077F SYST BP >= 140 MM HG: CPT | Mod: HCNC,CPTII,S$GLB, | Performed by: OTOLARYNGOLOGY

## 2019-10-24 PROCEDURE — 31575 DIAGNOSTIC LARYNGOSCOPY: CPT | Mod: HCNC,S$GLB,, | Performed by: OTOLARYNGOLOGY

## 2019-10-24 PROCEDURE — 3079F DIAST BP 80-89 MM HG: CPT | Mod: HCNC,CPTII,S$GLB, | Performed by: OTOLARYNGOLOGY

## 2019-10-27 NOTE — PROGRESS NOTES
Chief Complaint   Patient presents with    4months     Treatment History  1. 2015: XRT for SCCA larynx ([facility]).  2. 10/2/17: DL and biopsy TVC.  Path revealed severe dysplasia/CIS/superficially invasive SCCA (Dr. Lizet Desouza).  3. 12/21/17: MSL with resection R TVC lesion, biopsy L TVC lesion.  Path revealed severe dysplasia. (Dr. Gibran Smith).  4. 3/1/17: MSL with laser resection L TVC.  Path benign. (Dr. Smith).    HPI   69 y.o. male presents with the above treatment history. No new complaints.       Review of Systems   Constitutional: Negative for fatigue and unexpected weight change.   HENT: Per HPI.  Eyes: Negative for visual disturbance.   Respiratory: Negative for shortness of breath, hemoptysis   Cardiovascular: Negative for chest pain and palpitations.   Musculoskeletal: Negative for decreased ROM, back pain.   Skin: Negative for rash, sunburn, itching.   Neurological: Negative for dizziness and seizures.   Hematological: Negative for adenopathy. Does not bruise/bleed easily.   Endocrine: Negative for rapid weight loss/weight gain, heat/cold intolerance.     Past Medical History   Patient Active Problem List   Diagnosis    Chronic kidney disease, stage IV (severe)    Essential hypertension    Mixed hyperlipidemia    Benign prostatic hyperplasia with nocturia    Claudication of right lower extremity    Eye swelling, bilateral    Diminished night vision    Edema of right lower extremity    Renal transplant recipient    Dermolipoma    Edema    History of DVT (deep vein thrombosis)    Iron deficiency anemia    Thrombophilia    Pulmonary nodules    Larynx cancer    Voice disturbance    Atherosclerosis of aorta    Carcinoma in situ of vocal cord    Dysphagia, pharyngoesophageal    Chondronecrosis of larynx    Squamous cell carcinoma    Obesity (BMI 30-39.9)    Pterygium of both eyes    H/O laryngeal cancer    Nodule of left lung           Past Surgical History   Past Surgical History:  "  Procedure Laterality Date    FRACTURE SURGERY Left     "lower leg" 40 years ago    KIDNEY TRANSPLANT  2007    followed by [facility]    LARYNX SURGERY  11/2014    Oropharyngeal Cancer x3    microlaryngoscopy      TIBIAL STAPLING Left 1980              Family History   Family History   Problem Relation Age of Onset    Stroke Mother     Diabetes Mother     Hypertension Mother     Stroke Father     No Known Problems Sister     No Known Problems Brother     No Known Problems Daughter            Social History   .  Social History     Socioeconomic History    Marital status: Single     Spouse name: Not on file    Number of children: Not on file    Years of education: Not on file    Highest education level: Not on file   Occupational History    Not on file   Social Needs    Financial resource strain: Not on file    Food insecurity:     Worry: Not on file     Inability: Not on file    Transportation needs:     Medical: Not on file     Non-medical: Not on file   Tobacco Use    Smoking status: Former Smoker     Packs/day: 1.00     Years: 20.00     Pack years: 20.00     Types: Cigarettes     Last attempt to quit: 2009     Years since quitting: 10.8    Smokeless tobacco: Never Used   Substance and Sexual Activity    Alcohol use: No     Alcohol/week: 0.0 standard drinks    Drug use: No     Types: Marijuana     Comment: Patient quick 2011    Sexual activity: Not Currently     Partners: Female   Lifestyle    Physical activity:     Days per week: Not on file     Minutes per session: Not on file    Stress: Not on file   Relationships    Social connections:     Talks on phone: Not on file     Gets together: Not on file     Attends [employer] service: Not on file     Active member of club or organization: Not on file     Attends meetings of clubs or organizations: Not on file     Relationship status: Not on file   Other Topics Concern    Not on file   Social History Narrative    Not on file "         Allergies   Review of patient's allergies indicates:  No Known Allergies        Physical Exam     Vitals:    10/24/19 1452   BP: (!) 140/85   Pulse: (!) 58   Temp: 97.8 °F (36.6 °C)         Body mass index is 33.82 kg/m².    General: AOx3, NAD   Respiratory:  Symmetric chest rise, normal effort  Nose: No gross nasal septal deviation. Inferior Turbinates WNL bilaterally. No septal perforation. No masses/lesions.   Oral Cavity:  Oral Tongue mobile, no lesions noted. Hard Palate WNL. No buccal or FOM lesions.  Oropharynx:  No masses/lesions of the posterior pharyngeal wall. Tonsillar fossa without lesions. Soft palate without masses. Midline uvula.   Neck: No scars. Mild fibrosis status post XRT.  No cervical lymphadenopathy, thyromegaly or thyroid nodules.  Normal range of motion.    Face: House Brackmann I bilaterally.     Flex Naso Heike Hypo Procedures #2     Procedure:  Diagnostic flexible nasopharyngoscopy, laryngoscopy and hypopharyngoscopy:     Routine preparation with local atomizer with 1% neosynephrine/pontocaine with customary flexible endoscope.     Nasopharynx:  No lesions.        Mucosa:  No lesions.        Adenoids:  Present.  Posterior Choanae:  Patent.  Eustachian Tubes:  Patent.  Posterior pharynx:  No lesions.  Larynx/hypopharynx:        Epiglottis:  No lesions.         AE Folds:  No lesions.        Vocal cords:  Stable nodularity L TVC        Mobility:  R TVC slightly limited, L TVC immobile.         Hypopharynx:  No lesions.        Piriform sinus:  No pooling, no lesions.        Post Cricoid:  No erythema, no edema.      Assessment/Plan  Problem List Items Addressed This Visit        ENT    Chondronecrosis of larynx       Oncology    Larynx cancer - Primary     No evidence of disease.  Return to clinic in 4 months.

## 2019-10-28 ENCOUNTER — PATIENT OUTREACH (OUTPATIENT)
Dept: ADMINISTRATIVE | Facility: OTHER | Age: 69
End: 2019-10-28

## 2019-10-30 ENCOUNTER — OFFICE VISIT (OUTPATIENT)
Dept: OPTOMETRY | Facility: CLINIC | Age: 69
End: 2019-10-30
Payer: MEDICARE

## 2019-10-30 ENCOUNTER — CLINICAL SUPPORT (OUTPATIENT)
Dept: OPHTHALMOLOGY | Facility: CLINIC | Age: 69
End: 2019-10-30
Payer: MEDICARE

## 2019-10-30 DIAGNOSIS — H47.393 OPTIC NERVE CUPPING OF BOTH EYES: ICD-10-CM

## 2019-10-30 PROCEDURE — 76514 ECHO EXAM OF EYE THICKNESS: CPT | Mod: HCNC,S$GLB,, | Performed by: OPTOMETRIST

## 2019-10-30 PROCEDURE — 99999 PR PBB SHADOW E&M-EST. PATIENT-LVL II: CPT | Mod: PBBFAC,HCNC,, | Performed by: OPTOMETRIST

## 2019-10-30 PROCEDURE — 92020 GONIOSCOPY: CPT | Mod: HCNC,S$GLB,, | Performed by: OPTOMETRIST

## 2019-10-30 PROCEDURE — 99999 PR PBB SHADOW E&M-EST. PATIENT-LVL II: ICD-10-PCS | Mod: PBBFAC,HCNC,, | Performed by: OPTOMETRIST

## 2019-10-30 PROCEDURE — 92020 PR SPECIAL EYE EVAL,GONIOSCOPY: ICD-10-PCS | Mod: HCNC,S$GLB,, | Performed by: OPTOMETRIST

## 2019-10-30 PROCEDURE — 92012 PR EYE EXAM, EST PATIENT,INTERMED: ICD-10-PCS | Mod: HCNC,S$GLB,, | Performed by: OPTOMETRIST

## 2019-10-30 PROCEDURE — 92083 HUMPHREY VISUAL FIELD - OU - BOTH EYES: ICD-10-PCS | Mod: HCNC,S$GLB,, | Performed by: OPTOMETRIST

## 2019-10-30 PROCEDURE — 76514 PR  US, EYE, FOR CORNEAL THICKNESS: ICD-10-PCS | Mod: HCNC,S$GLB,, | Performed by: OPTOMETRIST

## 2019-10-30 PROCEDURE — 92083 EXTENDED VISUAL FIELD XM: CPT | Mod: HCNC,S$GLB,, | Performed by: OPTOMETRIST

## 2019-10-30 PROCEDURE — 92012 INTRM OPH EXAM EST PATIENT: CPT | Mod: HCNC,S$GLB,, | Performed by: OPTOMETRIST

## 2019-10-30 NOTE — PROGRESS NOTES
HPI     Pt here for iop chk, pachy, gonio and hvf/oct review    Last edited by Anastasia Alicea on 10/30/2019  1:51 PM. (History)            Assessment /Plan     For exam results, see Encounter Report.    Optic nerve cupping of both eyes  -     [place] Visual Field - OU - Extended - Both Eyes      Optic nerve cupping of both eyes  -     Posterior Segment OCT Optic Nerve- OD thin ST, OS borderline ST      HVF: WNL OU      PACHY: 583/564     Gonio: open CBB x 4 OU      IOP 18 OU stable       RTC 6 mo repeat OCT optic nerve/ HVF/ IOP

## 2019-11-08 ENCOUNTER — OFFICE VISIT (OUTPATIENT)
Dept: INTERNAL MEDICINE | Facility: CLINIC | Age: 69
End: 2019-11-08
Payer: MEDICARE

## 2019-11-08 VITALS
HEIGHT: 70 IN | WEIGHT: 242 LBS | BODY MASS INDEX: 34.65 KG/M2 | HEART RATE: 90 BPM | OXYGEN SATURATION: 98 % | SYSTOLIC BLOOD PRESSURE: 110 MMHG | DIASTOLIC BLOOD PRESSURE: 74 MMHG

## 2019-11-08 DIAGNOSIS — Z23 IMMUNIZATION DUE: ICD-10-CM

## 2019-11-08 DIAGNOSIS — Z94.0 RENAL TRANSPLANT RECIPIENT: ICD-10-CM

## 2019-11-08 DIAGNOSIS — E78.2 MIXED HYPERLIPIDEMIA: ICD-10-CM

## 2019-11-08 DIAGNOSIS — D68.59 THROMBOPHILIA: ICD-10-CM

## 2019-11-08 DIAGNOSIS — E66.9 OBESITY (BMI 30-39.9): ICD-10-CM

## 2019-11-08 DIAGNOSIS — Z00.00 ENCOUNTER FOR PREVENTIVE HEALTH EXAMINATION: Primary | ICD-10-CM

## 2019-11-08 DIAGNOSIS — I10 ESSENTIAL HYPERTENSION: ICD-10-CM

## 2019-11-08 DIAGNOSIS — Z86.718 HISTORY OF DVT (DEEP VEIN THROMBOSIS): ICD-10-CM

## 2019-11-08 DIAGNOSIS — D69.6 THROMBOCYTOPENIA: ICD-10-CM

## 2019-11-08 DIAGNOSIS — N18.4 CHRONIC KIDNEY DISEASE, STAGE IV (SEVERE): ICD-10-CM

## 2019-11-08 DIAGNOSIS — H11.003 PTERYGIUM OF BOTH EYES: ICD-10-CM

## 2019-11-08 DIAGNOSIS — I73.9 CLAUDICATION OF RIGHT LOWER EXTREMITY: ICD-10-CM

## 2019-11-08 DIAGNOSIS — R35.1 BENIGN PROSTATIC HYPERPLASIA WITH NOCTURIA: ICD-10-CM

## 2019-11-08 DIAGNOSIS — C32.9 LARYNX CANCER: ICD-10-CM

## 2019-11-08 DIAGNOSIS — H57.89 EYE SWELLING, BILATERAL: ICD-10-CM

## 2019-11-08 DIAGNOSIS — R49.9 VOICE DISTURBANCE: ICD-10-CM

## 2019-11-08 DIAGNOSIS — N40.1 BENIGN PROSTATIC HYPERPLASIA WITH NOCTURIA: ICD-10-CM

## 2019-11-08 DIAGNOSIS — Z85.21 H/O LARYNGEAL CANCER: ICD-10-CM

## 2019-11-08 DIAGNOSIS — I70.0 ATHEROSCLEROSIS OF AORTA: ICD-10-CM

## 2019-11-08 DIAGNOSIS — R91.1 NODULE OF LEFT LUNG: ICD-10-CM

## 2019-11-08 DIAGNOSIS — R60.9 EDEMA, UNSPECIFIED TYPE: ICD-10-CM

## 2019-11-08 PROCEDURE — 99499 UNLISTED E&M SERVICE: CPT | Mod: HCNC,S$GLB,, | Performed by: NURSE PRACTITIONER

## 2019-11-08 PROCEDURE — 90662 FLU VACCINE - HIGH DOSE (65+) PRESERVATIVE FREE IM: ICD-10-PCS | Mod: HCNC,S$GLB,, | Performed by: NURSE PRACTITIONER

## 2019-11-08 PROCEDURE — 3074F PR MOST RECENT SYSTOLIC BLOOD PRESSURE < 130 MM HG: ICD-10-PCS | Mod: HCNC,CPTII,S$GLB, | Performed by: NURSE PRACTITIONER

## 2019-11-08 PROCEDURE — 3074F SYST BP LT 130 MM HG: CPT | Mod: HCNC,CPTII,S$GLB, | Performed by: NURSE PRACTITIONER

## 2019-11-08 PROCEDURE — G0439 PR MEDICARE ANNUAL WELLNESS SUBSEQUENT VISIT: ICD-10-PCS | Mod: HCNC,S$GLB,, | Performed by: NURSE PRACTITIONER

## 2019-11-08 PROCEDURE — 99999 PR PBB SHADOW E&M-EST. PATIENT-LVL IV: CPT | Mod: PBBFAC,HCNC,, | Performed by: NURSE PRACTITIONER

## 2019-11-08 PROCEDURE — 90662 IIV NO PRSV INCREASED AG IM: CPT | Mod: HCNC,S$GLB,, | Performed by: NURSE PRACTITIONER

## 2019-11-08 PROCEDURE — 99499 RISK ADDL DX/OHS AUDIT: ICD-10-PCS | Mod: HCNC,S$GLB,, | Performed by: NURSE PRACTITIONER

## 2019-11-08 PROCEDURE — 99999 PR PBB SHADOW E&M-EST. PATIENT-LVL IV: ICD-10-PCS | Mod: PBBFAC,HCNC,, | Performed by: NURSE PRACTITIONER

## 2019-11-08 PROCEDURE — 3078F DIAST BP <80 MM HG: CPT | Mod: HCNC,CPTII,S$GLB, | Performed by: NURSE PRACTITIONER

## 2019-11-08 PROCEDURE — 3078F PR MOST RECENT DIASTOLIC BLOOD PRESSURE < 80 MM HG: ICD-10-PCS | Mod: HCNC,CPTII,S$GLB, | Performed by: NURSE PRACTITIONER

## 2019-11-08 PROCEDURE — G0008 FLU VACCINE - HIGH DOSE (65+) PRESERVATIVE FREE IM: ICD-10-PCS | Mod: HCNC,S$GLB,, | Performed by: NURSE PRACTITIONER

## 2019-11-08 PROCEDURE — G0439 PPPS, SUBSEQ VISIT: HCPCS | Mod: HCNC,S$GLB,, | Performed by: NURSE PRACTITIONER

## 2019-11-08 PROCEDURE — G0008 ADMIN INFLUENZA VIRUS VAC: HCPCS | Mod: HCNC,S$GLB,, | Performed by: NURSE PRACTITIONER

## 2019-11-08 NOTE — Clinical Note
Primary Care Providers:Zelalem Rojas MD, MD (General)Your patient was seen today for a HRA visit. Gap(s) in care (HEDIS gaps) have been identified during this visit that require additional testing and possible follow up.Orders Placed This Encounter    Influenza - High Dose (65+) (PF) (IM)These orders were placed using [facility] approved protocol and any results will be forwarded to your office for appropriate follow up. I have included a copy of my visit note; please review the note and feel free to contact me with any questions. Thank you for allowing me to participate in the care of your patients.Johnathon Santoyo NP

## 2019-11-08 NOTE — PATIENT INSTRUCTIONS
Counseling and Referral of Other Preventative  (Italic type indicates deductible and co-insurance are waived)    Patient Name: Jose Luis Manzo  Today's Date: 11/8/2019    Health Maintenance       Date Due Completion Date    Influenza Vaccine (1) 09/01/2019 9/12/2018    Shingles Vaccine (2 of 2) 10/17/2019 8/22/2019 (Done)    Override on 8/22/2019: Done    Colonoscopy 07/15/2020 7/15/2010 (Done)    Override on 7/15/2010: Done    Lipid Panel 08/16/2020 8/16/2019    High Dose Statin 10/27/2020 10/27/2019    TETANUS VACCINE 09/12/2026 9/12/2016        Orders Placed This Encounter   Procedures    Influenza - High Dose (65+) (PF) (IM)     The following information is provided to all patients.  This information is to help you find resources for any of the problems found today that may be affecting your health:                Living healthy guide: www.[facility].gov      Understanding Diabetes: www.diabetes.org      Eating healthy: www.cdc.gov/healthyweight      CDC home safety checklist: www.cdc.gov/steadi/patient.html      Agency on Aging: www.goea.[place].[facility]      Alcoholics anonymous (AA): www.aa.org      Physical Activity: www.loreto.nih.gov/ec0qolk      Tobacco use: www.quitwithusla.org

## 2019-11-08 NOTE — PROGRESS NOTES
"Jose Luis Manzo presented for a  Medicare AWV and comprehensive Health Risk Assessment today. The following components were reviewed and updated:    · Medical history  · Family History  · Social history  · Allergies and Current Medications  · Health Risk Assessment  · Health Maintenance  · Care Team     ** See Completed Assessments for Annual Wellness Visit within the encounter summary.**       The following assessments were completed:  · Living Situation  · CAGE  · Depression Screening  · Timed Get Up and Go  · Whisper Test  · Cognitive Function Screening  · Nutrition Screening  · ADL Screening  · PAQ Screening    Vitals:    11/08/19 1402   BP: 110/74   BP Location: Left arm   Patient Position: Sitting   Pulse: 90   SpO2: 98%   Weight: 109.8 kg (242 lb)   Height: 5' 10" (1.778 m)     Body mass index is 34.72 kg/m².  Physical Exam   Constitutional: He is oriented to person, place, and time. No distress.   Obese   HENT:   Head: Normocephalic and atraumatic.   Eyes: Pupils are equal, round, and reactive to light. EOM are normal.   Neck: Normal range of motion.   Cardiovascular: Normal rate, regular rhythm and normal heart sounds.   Pulmonary/Chest: Effort normal and breath sounds normal. No respiratory distress.   Musculoskeletal: Normal range of motion. He exhibits edema (BLE).   Neurological: He is alert and oriented to person, place, and time. Coordination normal.   Skin: Skin is warm and dry.   Psychiatric: He has a normal mood and affect. His behavior is normal. Judgment and thought content normal.   Nursing note and vitals reviewed.        Diagnoses and health risks identified today and associated recommendations/orders:    1. Encounter for preventive health examination    2. Thrombophilia  Chronic; stable. Continue current treatment plan as previously prescribed by Hem/Onc (Dr. Masters).     3. Thrombocytopenia  Platelets 145 (L) as noted on most recent CBC dated 10/7/19. Patient followed by Hem/Onc.    4. Chronic " kidney disease, stage IV (severe)  GFR 23.3 as noted on most recent CMP dated 10/7/19. Patient followed by Nephrology (Dr. Zarate).     5. Atherosclerosis of aorta  Noted on CT chest dated 10/7/19. Patient followed by Cardiology (Dr. Frankel).     6. Claudication of right lower extremity  Chronic; stable. Continue current treatment plan as previously prescribed by Cardiology.     7. History of DVT (deep vein thrombosis)  Chronic; stable. Continue current treatment plan as previously prescribed by Cardiology.     8. Larynx cancer  Chronic; stable. Continue current treatment plan as previously prescribed by Otolaryngology (Dr. Mcadams) and Hem/Onc.    9. H/O laryngeal cancer  Chronic; stable. Continue current treatment plan as previously prescribed by Otolaryngology (Dr. Mcadams) and Hem/Onc.    10. Voice disturbance  Chronic; stable. Continue current treatment plan as previously prescribed by Otolaryngology (Dr. Mcadams).    11. Mixed hyperlipidemia  Chronic; stable. Continue current treatment plan as previously prescribed by PCP.     12. Essential hypertension  Chronic; stable. Continue current treatment plan as previously prescribed by PCP.     13. Renal transplant recipient  Chronic; stable. Continue current treatment plan as previously prescribed by Nephrology.    14. Nodule of left lung  Chronic; stable. Continue current treatment plan as previously prescribed by PCP.     15. Benign prostatic hyperplasia with nocturia  Chronic; stable. Continue current treatment plan as previously prescribed by PCP.     16. Edema, unspecified type  Chronic; stable. Continue current treatment plan as previously prescribed by PCP.     17. Pterygium of both eyes  Chronic; stable. Continue current treatment plan as previously prescribed by Optometry (Dr. Florian).      18. Eye swelling, bilateral  Chronic; stable. Continue current treatment plan as previously prescribed by Optometry.    19. Obesity (BMI 30-39.9)  Current BMI 34.72. Lifestyle  modifications discussed with patient.     20. Immunization due  Administered today in clinic.   - Influenza - High Dose (65+) (PF) (IM)      Provided Jose Luis with a 5-10 year written screening schedule and personal prevention plan. Recommendations were developed using the USPSTF age appropriate recommendations. Education, counseling, and referrals were provided as needed. After Visit Summary printed and given to patient which includes a list of additional screenings\tests needed.    Follow up for HRA visit in 1 year.    Johnathon Santoyo NP  I offered to discuss end of life issues, including information on how to make advance directives that the patient could use to name someone who would make medical decisions on their behalf if they became too ill to make themselves.    ___Patient declined  _X_Patient is interested, I provided paper work and offered to discuss.

## 2019-11-25 DIAGNOSIS — I10 ESSENTIAL HYPERTENSION, BENIGN: ICD-10-CM

## 2019-11-25 RX ORDER — FINASTERIDE 5 MG/1
TABLET, FILM COATED ORAL
Qty: 90 TABLET | Refills: 4 | Status: SHIPPED | OUTPATIENT
Start: 2019-11-25 | End: 2021-05-14 | Stop reason: SDUPTHER

## 2019-11-25 RX ORDER — METOPROLOL SUCCINATE 100 MG/1
TABLET, EXTENDED RELEASE ORAL
Qty: 90 TABLET | Refills: 4 | Status: SHIPPED | OUTPATIENT
Start: 2019-11-25 | End: 2021-07-07

## 2019-11-25 RX ORDER — AMLODIPINE BESYLATE 10 MG/1
TABLET ORAL
Qty: 90 TABLET | Refills: 4 | Status: SHIPPED | OUTPATIENT
Start: 2019-11-25 | End: 2019-12-12

## 2019-12-12 ENCOUNTER — TELEPHONE (OUTPATIENT)
Dept: INTERNAL MEDICINE | Facility: CLINIC | Age: 69
End: 2019-12-12

## 2019-12-12 ENCOUNTER — OFFICE VISIT (OUTPATIENT)
Dept: INTERNAL MEDICINE | Facility: CLINIC | Age: 69
End: 2019-12-12
Payer: MEDICARE

## 2019-12-12 VITALS
BODY MASS INDEX: 34.64 KG/M2 | HEART RATE: 62 BPM | OXYGEN SATURATION: 96 % | WEIGHT: 241.94 LBS | SYSTOLIC BLOOD PRESSURE: 130 MMHG | HEIGHT: 70 IN | DIASTOLIC BLOOD PRESSURE: 84 MMHG

## 2019-12-12 DIAGNOSIS — N18.4 CHRONIC KIDNEY DISEASE, STAGE IV (SEVERE): Primary | ICD-10-CM

## 2019-12-12 DIAGNOSIS — I10 ESSENTIAL HYPERTENSION: ICD-10-CM

## 2019-12-12 DIAGNOSIS — C32.9 LARYNX CANCER: ICD-10-CM

## 2019-12-12 DIAGNOSIS — R91.8 PULMONARY NODULES: ICD-10-CM

## 2019-12-12 LAB
BILIRUB SERPL-MCNC: NORMAL MG/DL
BLOOD URINE, POC: NORMAL
COLOR, POC UA: YELLOW
GLUCOSE UR QL STRIP: NORMAL
KETONES UR QL STRIP: NORMAL
LEUKOCYTE ESTERASE URINE, POC: NORMAL
NITRITE, POC UA: NORMAL
PH, POC UA: 7
PROTEIN, POC: 2
SPECIFIC GRAVITY, POC UA: 1.01
UROBILINOGEN, POC UA: 0.2

## 2019-12-12 PROCEDURE — 3075F PR MOST RECENT SYSTOLIC BLOOD PRESS GE 130-139MM HG: ICD-10-PCS | Mod: HCNC,CPTII,S$GLB, | Performed by: INTERNAL MEDICINE

## 2019-12-12 PROCEDURE — 1159F PR MEDICATION LIST DOCUMENTED IN MEDICAL RECORD: ICD-10-PCS | Mod: HCNC,S$GLB,, | Performed by: INTERNAL MEDICINE

## 2019-12-12 PROCEDURE — 81002 POCT URINE DIPSTICK WITHOUT MICROSCOPE: ICD-10-PCS | Mod: HCNC,S$GLB,, | Performed by: INTERNAL MEDICINE

## 2019-12-12 PROCEDURE — 81002 URINALYSIS NONAUTO W/O SCOPE: CPT | Mod: HCNC,S$GLB,, | Performed by: INTERNAL MEDICINE

## 2019-12-12 PROCEDURE — 1159F MED LIST DOCD IN RCRD: CPT | Mod: HCNC,S$GLB,, | Performed by: INTERNAL MEDICINE

## 2019-12-12 PROCEDURE — 1101F PR PT FALLS ASSESS DOC 0-1 FALLS W/OUT INJ PAST YR: ICD-10-PCS | Mod: HCNC,CPTII,S$GLB, | Performed by: INTERNAL MEDICINE

## 2019-12-12 PROCEDURE — 1101F PT FALLS ASSESS-DOCD LE1/YR: CPT | Mod: HCNC,CPTII,S$GLB, | Performed by: INTERNAL MEDICINE

## 2019-12-12 PROCEDURE — 1126F AMNT PAIN NOTED NONE PRSNT: CPT | Mod: HCNC,S$GLB,, | Performed by: INTERNAL MEDICINE

## 2019-12-12 PROCEDURE — 99499 RISK ADDL DX/OHS AUDIT: ICD-10-PCS | Mod: HCNC,S$GLB,, | Performed by: INTERNAL MEDICINE

## 2019-12-12 PROCEDURE — 99215 OFFICE O/P EST HI 40 MIN: CPT | Mod: 25,HCNC,S$GLB, | Performed by: INTERNAL MEDICINE

## 2019-12-12 PROCEDURE — 99215 PR OFFICE/OUTPT VISIT, EST, LEVL V, 40-54 MIN: ICD-10-PCS | Mod: 25,HCNC,S$GLB, | Performed by: INTERNAL MEDICINE

## 2019-12-12 PROCEDURE — 99499 UNLISTED E&M SERVICE: CPT | Mod: HCNC,S$GLB,, | Performed by: INTERNAL MEDICINE

## 2019-12-12 PROCEDURE — 3079F DIAST BP 80-89 MM HG: CPT | Mod: HCNC,CPTII,S$GLB, | Performed by: INTERNAL MEDICINE

## 2019-12-12 PROCEDURE — 3079F PR MOST RECENT DIASTOLIC BLOOD PRESSURE 80-89 MM HG: ICD-10-PCS | Mod: HCNC,CPTII,S$GLB, | Performed by: INTERNAL MEDICINE

## 2019-12-12 PROCEDURE — 99999 PR PBB SHADOW E&M-EST. PATIENT-LVL IV: CPT | Mod: PBBFAC,HCNC,, | Performed by: INTERNAL MEDICINE

## 2019-12-12 PROCEDURE — 1126F PR PAIN SEVERITY QUANTIFIED, NO PAIN PRESENT: ICD-10-PCS | Mod: HCNC,S$GLB,, | Performed by: INTERNAL MEDICINE

## 2019-12-12 PROCEDURE — 3075F SYST BP GE 130 - 139MM HG: CPT | Mod: HCNC,CPTII,S$GLB, | Performed by: INTERNAL MEDICINE

## 2019-12-12 PROCEDURE — 99999 PR PBB SHADOW E&M-EST. PATIENT-LVL IV: ICD-10-PCS | Mod: PBBFAC,HCNC,, | Performed by: INTERNAL MEDICINE

## 2019-12-12 RX ORDER — CLONIDINE 0.3 MG/24H
1 PATCH, EXTENDED RELEASE TRANSDERMAL
Qty: 12 PATCH | Refills: 3 | Status: SHIPPED | OUTPATIENT
Start: 2019-12-12 | End: 2020-12-21

## 2019-12-12 RX ORDER — FUROSEMIDE 40 MG/1
40 TABLET ORAL DAILY
Qty: 90 TABLET | Refills: 3 | Status: SHIPPED | OUTPATIENT
Start: 2019-12-12 | End: 2021-03-25 | Stop reason: SDUPTHER

## 2019-12-12 NOTE — PROGRESS NOTES
Subjective:       Patient ID: Jose Luis Manzo is a 69 y.o. male.    Chief Complaint: Torticollis and Edema (both legs and hands)    HPI  Pt c/o 2 weeks of increased swelling of his LEs up to his thighs by afternoon.  Has also noticed swelling of his arms and face, as well as abd distention.  Urine volume unchanged.  No foam in urine.  No SOB, CP.  CT of chest 2 months ago showed stability of pulmonary nodules.  No change in voice.  Weight stable over 2 months.  C/O some R sided neck pain.  No bleeding.  Review of Systems   All other systems reviewed and are negative.      Objective:      Physical Exam   Constitutional: He appears well-developed.   HENT:   Head: Normocephalic.   Mouth/Throat: Oropharynx is clear and moist.   Eyes: EOM are normal.   Neck: Normal range of motion.   Cardiovascular: Normal rate, regular rhythm, normal heart sounds and intact distal pulses.   Pulmonary/Chest: Effort normal and breath sounds normal. No respiratory distress.   Abdominal: Soft. Bowel sounds are normal. He exhibits distension (slight). There is no tenderness.   Musculoskeletal: Normal range of motion. He exhibits edema (3+ LEs).   Neurological: He is alert.   Skin: Skin is warm and dry.   Psychiatric: He has a normal mood and affect.   Vitals reviewed.      Assessment:       1. Chronic kidney disease, stage IV (severe)    2. Essential hypertension    3. Larynx cancer    4. Pulmonary nodules        Plan:       Jose Luis was seen today for torticollis and edema.    Diagnoses and all orders for this visit:    Chronic kidney disease, stage IV (severe)  -     POCT urine dipstick without microscope  -     US Kidney; Future  -     CBC auto differential; Future  -     Comprehensive metabolic panel; Future    Essential hypertension   D/C Norvasc   Clonidine 0.3 mg patch q week   Lasix 40 mg po qd    Larynx cancer   Per Oncology    Pulmonary nodules   Stable      Follow up in about 1 week (around 12/19/2019).

## 2019-12-13 ENCOUNTER — HOSPITAL ENCOUNTER (OUTPATIENT)
Dept: RADIOLOGY | Facility: HOSPITAL | Age: 69
Discharge: HOME | End: 2019-12-13
Attending: INTERNAL MEDICINE
Payer: MEDICARE

## 2019-12-13 DIAGNOSIS — N18.4 CHRONIC KIDNEY DISEASE, STAGE IV (SEVERE): ICD-10-CM

## 2019-12-13 PROCEDURE — 76770 US EXAM ABDO BACK WALL COMP: CPT | Mod: TC,HCNC,PO

## 2019-12-26 ENCOUNTER — TELEPHONE (OUTPATIENT)
Dept: OPHTHALMOLOGY | Facility: CLINIC | Age: 69
End: 2019-12-26

## 2019-12-26 NOTE — TELEPHONE ENCOUNTER
Patient had another doctor's appt, so he couldn't make the appt with Dr. Tee. Patient refused 4/28/20 due to work and he will come on 4/30/20.

## 2019-12-27 ENCOUNTER — OFFICE VISIT (OUTPATIENT)
Dept: INTERNAL MEDICINE | Facility: CLINIC | Age: 69
End: 2019-12-27
Payer: MEDICARE

## 2019-12-27 VITALS
DIASTOLIC BLOOD PRESSURE: 80 MMHG | HEIGHT: 70 IN | SYSTOLIC BLOOD PRESSURE: 122 MMHG | HEART RATE: 59 BPM | WEIGHT: 243.38 LBS | OXYGEN SATURATION: 96 % | BODY MASS INDEX: 34.84 KG/M2

## 2019-12-27 DIAGNOSIS — N18.4 CHRONIC KIDNEY DISEASE, STAGE IV (SEVERE): Primary | ICD-10-CM

## 2019-12-27 DIAGNOSIS — I10 ESSENTIAL HYPERTENSION: ICD-10-CM

## 2019-12-27 PROCEDURE — 3079F PR MOST RECENT DIASTOLIC BLOOD PRESSURE 80-89 MM HG: ICD-10-PCS | Mod: HCNC,CPTII,S$GLB, | Performed by: INTERNAL MEDICINE

## 2019-12-27 PROCEDURE — 99999 PR PBB SHADOW E&M-EST. PATIENT-LVL III: ICD-10-PCS | Mod: PBBFAC,HCNC,, | Performed by: INTERNAL MEDICINE

## 2019-12-27 PROCEDURE — 1101F PR PT FALLS ASSESS DOC 0-1 FALLS W/OUT INJ PAST YR: ICD-10-PCS | Mod: HCNC,CPTII,S$GLB, | Performed by: INTERNAL MEDICINE

## 2019-12-27 PROCEDURE — 1159F MED LIST DOCD IN RCRD: CPT | Mod: HCNC,S$GLB,, | Performed by: INTERNAL MEDICINE

## 2019-12-27 PROCEDURE — 1159F PR MEDICATION LIST DOCUMENTED IN MEDICAL RECORD: ICD-10-PCS | Mod: HCNC,S$GLB,, | Performed by: INTERNAL MEDICINE

## 2019-12-27 PROCEDURE — 99499 RISK ADDL DX/OHS AUDIT: ICD-10-PCS | Mod: HCNC,S$GLB,, | Performed by: INTERNAL MEDICINE

## 2019-12-27 PROCEDURE — 1126F PR PAIN SEVERITY QUANTIFIED, NO PAIN PRESENT: ICD-10-PCS | Mod: HCNC,S$GLB,, | Performed by: INTERNAL MEDICINE

## 2019-12-27 PROCEDURE — 99214 OFFICE O/P EST MOD 30 MIN: CPT | Mod: HCNC,S$GLB,, | Performed by: INTERNAL MEDICINE

## 2019-12-27 PROCEDURE — 3074F PR MOST RECENT SYSTOLIC BLOOD PRESSURE < 130 MM HG: ICD-10-PCS | Mod: HCNC,CPTII,S$GLB, | Performed by: INTERNAL MEDICINE

## 2019-12-27 PROCEDURE — 1126F AMNT PAIN NOTED NONE PRSNT: CPT | Mod: HCNC,S$GLB,, | Performed by: INTERNAL MEDICINE

## 2019-12-27 PROCEDURE — 3079F DIAST BP 80-89 MM HG: CPT | Mod: HCNC,CPTII,S$GLB, | Performed by: INTERNAL MEDICINE

## 2019-12-27 PROCEDURE — 3074F SYST BP LT 130 MM HG: CPT | Mod: HCNC,CPTII,S$GLB, | Performed by: INTERNAL MEDICINE

## 2019-12-27 PROCEDURE — 1101F PT FALLS ASSESS-DOCD LE1/YR: CPT | Mod: HCNC,CPTII,S$GLB, | Performed by: INTERNAL MEDICINE

## 2019-12-27 PROCEDURE — 99214 PR OFFICE/OUTPT VISIT, EST, LEVL IV, 30-39 MIN: ICD-10-PCS | Mod: HCNC,S$GLB,, | Performed by: INTERNAL MEDICINE

## 2019-12-27 PROCEDURE — 99499 UNLISTED E&M SERVICE: CPT | Mod: HCNC,S$GLB,, | Performed by: INTERNAL MEDICINE

## 2019-12-27 PROCEDURE — 99999 PR PBB SHADOW E&M-EST. PATIENT-LVL III: CPT | Mod: PBBFAC,HCNC,, | Performed by: INTERNAL MEDICINE

## 2019-12-27 RX ORDER — FUROSEMIDE 80 MG/1
80 TABLET ORAL DAILY
Qty: 90 TABLET | Refills: 3 | Status: SHIPPED | OUTPATIENT
Start: 2019-12-27 | End: 2021-03-17

## 2019-12-27 NOTE — PROGRESS NOTES
Subjective:       Patient ID: Jose Luis Manzo is a 69 y.o. male.    Chief Complaint: Follow-up (with labs)    HPI  Recheck.  Chart reviewed.  Weight stable.  Still c/o LE edema.  No c/o abd distention, no SOB.  Edema worse at end of day.  Hoarseness unchanged.  Review of Systems   All other systems reviewed and are negative.      Objective:      Physical Exam   Constitutional: He appears well-developed.   HENT:   Head: Normocephalic.   Eyes: EOM are normal.   Neck: Normal range of motion.   Cardiovascular: Normal rate, regular rhythm and intact distal pulses. Exam reveals gallop (S3).   Pulmonary/Chest: Effort normal and breath sounds normal.   Abdominal: Soft. Bowel sounds are normal. He exhibits no distension. There is no tenderness.   Musculoskeletal: Normal range of motion. He exhibits edema (2+ LEs).   Neurological: He is alert.   Skin: Skin is warm and dry.   Psychiatric: He has a normal mood and affect.   Vitals reviewed.      Assessment:       1. Chronic kidney disease, stage IV (severe)    2. Essential hypertension        Plan:       Jose Luis was seen today for follow-up.    Diagnoses and all orders for this visit:    Chronic kidney disease, stage IV (severe)  -     Renal function panel; Future  -     furosemide (LASIX) 80 MG tablet; Take 1 tablet (80 mg total) by mouth once daily.    Essential hypertension   Well-cont    Follow up in about 1 month (around 1/27/2020).

## 2020-01-14 ENCOUNTER — PATIENT OUTREACH (OUTPATIENT)
Dept: ADMINISTRATIVE | Facility: HOSPITAL | Age: 70
End: 2020-01-14

## 2020-01-17 ENCOUNTER — PES CALL (OUTPATIENT)
Dept: ADMINISTRATIVE | Facility: CLINIC | Age: 70
End: 2020-01-17

## 2020-01-20 ENCOUNTER — LAB VISIT (OUTPATIENT)
Dept: LAB | Facility: HOSPITAL | Age: 70
End: 2020-01-20
Attending: INTERNAL MEDICINE
Payer: MEDICARE

## 2020-01-20 DIAGNOSIS — N18.4 CHRONIC KIDNEY DISEASE, STAGE IV (SEVERE): ICD-10-CM

## 2020-01-20 LAB
ALBUMIN SERPL BCP-MCNC: 3.8 G/DL (ref 3.5–5.2)
ANION GAP SERPL CALC-SCNC: 6 MMOL/L (ref 8–16)
BUN SERPL-MCNC: 34 MG/DL (ref 2–20)
CALCIUM SERPL-MCNC: 10.6 MG/DL (ref 8.7–10.5)
CHLORIDE SERPL-SCNC: 109 MMOL/L (ref 95–110)
CO2 SERPL-SCNC: 26 MMOL/L (ref 23–29)
CREAT SERPL-MCNC: 3.21 MG/DL (ref 0.5–1.4)
EST. GFR  (AFRICAN AMERICAN): 21.6 ML/MIN/1.73 M^2
EST. GFR  (NON AFRICAN AMERICAN): 18.7 ML/MIN/1.73 M^2
GLUCOSE SERPL-MCNC: 121 MG/DL (ref 70–110)
PHOSPHATE SERPL-MCNC: 2.7 MG/DL (ref 2.7–4.5)
POTASSIUM SERPL-SCNC: 4.1 MMOL/L (ref 3.5–5.1)
SODIUM SERPL-SCNC: 141 MMOL/L (ref 136–145)

## 2020-01-20 PROCEDURE — 80069 RENAL FUNCTION PANEL: CPT | Mod: HCNC,PO

## 2020-01-20 PROCEDURE — 36415 COLL VENOUS BLD VENIPUNCTURE: CPT | Mod: HCNC,PO

## 2020-01-20 RX ORDER — SODIUM BICARBONATE 650 MG/1
TABLET ORAL
Qty: 180 TABLET | Refills: 4 | Status: SHIPPED | OUTPATIENT
Start: 2020-01-20 | End: 2020-06-19

## 2020-01-24 ENCOUNTER — OFFICE VISIT (OUTPATIENT)
Dept: FAMILY MEDICINE | Facility: CLINIC | Age: 70
End: 2020-01-24
Payer: MEDICARE

## 2020-01-24 VITALS
WEIGHT: 234.38 LBS | DIASTOLIC BLOOD PRESSURE: 76 MMHG | OXYGEN SATURATION: 98 % | SYSTOLIC BLOOD PRESSURE: 112 MMHG | HEIGHT: 70 IN | HEART RATE: 55 BPM | BODY MASS INDEX: 33.55 KG/M2

## 2020-01-24 DIAGNOSIS — N40.1 BENIGN PROSTATIC HYPERPLASIA WITH NOCTURIA: ICD-10-CM

## 2020-01-24 DIAGNOSIS — D69.6 THROMBOCYTOPENIA: ICD-10-CM

## 2020-01-24 DIAGNOSIS — I70.0 ATHEROSCLEROSIS OF AORTA: ICD-10-CM

## 2020-01-24 DIAGNOSIS — E66.9 OBESITY (BMI 30-39.9): ICD-10-CM

## 2020-01-24 DIAGNOSIS — I73.9 CLAUDICATION OF RIGHT LOWER EXTREMITY: ICD-10-CM

## 2020-01-24 DIAGNOSIS — C32.9 LARYNX CANCER: ICD-10-CM

## 2020-01-24 DIAGNOSIS — I10 ESSENTIAL HYPERTENSION: ICD-10-CM

## 2020-01-24 DIAGNOSIS — N18.4 CKD (CHRONIC KIDNEY DISEASE) STAGE 4, GFR 15-29 ML/MIN: ICD-10-CM

## 2020-01-24 DIAGNOSIS — E78.2 MIXED HYPERLIPIDEMIA: ICD-10-CM

## 2020-01-24 DIAGNOSIS — D68.59 THROMBOPHILIA: ICD-10-CM

## 2020-01-24 DIAGNOSIS — R35.1 BENIGN PROSTATIC HYPERPLASIA WITH NOCTURIA: ICD-10-CM

## 2020-01-24 DIAGNOSIS — Z00.00 ENCOUNTER FOR PREVENTIVE HEALTH EXAMINATION: Primary | ICD-10-CM

## 2020-01-24 DIAGNOSIS — D50.9 IRON DEFICIENCY ANEMIA, UNSPECIFIED IRON DEFICIENCY ANEMIA TYPE: ICD-10-CM

## 2020-01-24 PROBLEM — J84.10 PULMONARY FIBROSIS, UNSPECIFIED: Status: ACTIVE | Noted: 2020-01-24

## 2020-01-24 PROBLEM — J84.10 PULMONARY FIBROSIS, UNSPECIFIED: Status: RESOLVED | Noted: 2020-01-24 | Resolved: 2020-01-24

## 2020-01-24 PROCEDURE — 99999 PR PBB SHADOW E&M-EST. PATIENT-LVL IV: CPT | Mod: PBBFAC,HCNC,, | Performed by: NURSE PRACTITIONER

## 2020-01-24 PROCEDURE — G0439 PPPS, SUBSEQ VISIT: HCPCS | Mod: HCNC,S$GLB,, | Performed by: NURSE PRACTITIONER

## 2020-01-24 PROCEDURE — 99999 PR PBB SHADOW E&M-EST. PATIENT-LVL IV: ICD-10-PCS | Mod: PBBFAC,HCNC,, | Performed by: NURSE PRACTITIONER

## 2020-01-24 PROCEDURE — G0439 PR MEDICARE ANNUAL WELLNESS SUBSEQUENT VISIT: ICD-10-PCS | Mod: HCNC,S$GLB,, | Performed by: NURSE PRACTITIONER

## 2020-01-24 PROCEDURE — 3078F PR MOST RECENT DIASTOLIC BLOOD PRESSURE < 80 MM HG: ICD-10-PCS | Mod: HCNC,CPTII,S$GLB, | Performed by: NURSE PRACTITIONER

## 2020-01-24 PROCEDURE — 3078F DIAST BP <80 MM HG: CPT | Mod: HCNC,CPTII,S$GLB, | Performed by: NURSE PRACTITIONER

## 2020-01-24 PROCEDURE — 3074F SYST BP LT 130 MM HG: CPT | Mod: HCNC,CPTII,S$GLB, | Performed by: NURSE PRACTITIONER

## 2020-01-24 PROCEDURE — 3074F PR MOST RECENT SYSTOLIC BLOOD PRESSURE < 130 MM HG: ICD-10-PCS | Mod: HCNC,CPTII,S$GLB, | Performed by: NURSE PRACTITIONER

## 2020-01-24 NOTE — PATIENT INSTRUCTIONS
- Be sure to receive your shingles vaccine series (Shingrix) at your local pharmacy.    Counseling and Referral of Other Preventative  (Italic type indicates deductible and co-insurance are waived)    Patient Name: Jose Luis Manzo  Today's Date: 1/24/2020    Health Maintenance       Date Due Completion Date    Shingles Vaccine (2 of 2) 10/17/2019 8/22/2019 (Done)    Override on 8/22/2019: Done    Colonoscopy 07/15/2020 7/15/2010 (Done)    Override on 7/15/2010: Done    Lipid Panel 08/16/2020 8/16/2019    High Dose Statin 01/24/2021 1/24/2020    TETANUS VACCINE 09/12/2026 9/12/2016        No orders of the defined types were placed in this encounter.    The following information is provided to all patients.  This information is to help you find resources for any of the problems found today that may be affecting your health:                Living healthy guide: www.[facility].gov      Understanding Diabetes: www.diabetes.org      Eating healthy: www.cdc.gov/healthyweight      [facility] home safety checklist: www.cdc.gov/steadi/patient.html      Agency on Aging: www.goea.[place].[facility]      Alcoholics anonymous (AA): www.aa.org      Physical Activity: www.[place].nih.gov/ag6pnah      Tobacco use: www.quitwithusla.org

## 2020-01-24 NOTE — PROGRESS NOTES
"Jose Luis Manzo presented for a  Medicare AWV and comprehensive Health Risk Assessment today. The following components were reviewed and updated:    · Medical history  · Family History  · Social history  · Allergies and Current Medications  · Health Risk Assessment  · Health Maintenance  · Care Team     ** See Completed Assessments for Annual Wellness Visit within the encounter summary.**       The following assessments were completed:  · Living Situation  · CAGE  · Depression Screening  · Timed Get Up and Go  · Whisper Test  · Cognitive Function Screening            · Nutrition Screening  · ADL Screening  · PAQ Screening    Vitals:    01/24/20 1410   BP: 112/76   BP Location: Left arm   Patient Position: Sitting   BP Method: Medium (Manual)   Pulse: (!) 55   SpO2: 98%   Weight: 106.3 kg (234 lb 5.6 oz)   Height: 5' 10" (1.778 m)     Body mass index is 33.63 kg/m².     Physical Exam   Constitutional: He is oriented to person, place, and time. Vital signs are normal. He appears well-developed. He is cooperative.   Obese   HENT:   Head: Normocephalic and atraumatic.   Right Ear: Hearing, tympanic membrane, external ear and ear canal normal.   Left Ear: Hearing, tympanic membrane, external ear and ear canal normal.   Nose: Nose normal.   Mouth/Throat: Uvula is midline, oropharynx is clear and moist and mucous membranes are normal.   Eyes: Pupils are equal, round, and reactive to light. Conjunctivae, EOM and lids are normal.   Pterygium noted to both eyes  Wears reading glasses   Neck: Trachea normal, normal range of motion and full passive range of motion without pain. Neck supple. No JVD present. Carotid bruit is not present. No thyromegaly present.   Cardiovascular: Normal rate, regular rhythm, S1 normal, S2 normal, normal heart sounds and intact distal pulses.   No murmur heard.  Pulses:       Carotid pulses are 2+ on the right side, and 2+ on the left side.       Radial pulses are 2+ on the right side, and 2+ on the " left side.        Dorsalis pedis pulses are 2+ on the right side, and 2+ on the left side.        Posterior tibial pulses are 1+ on the right side, and 1+ on the left side.   Pulmonary/Chest: Effort normal and breath sounds normal. No respiratory distress. He has no wheezes. He has no rales.   Abdominal: Soft. Normal appearance and bowel sounds are normal. He exhibits no distension. There is no tenderness. There is no guarding.   Musculoskeletal: Normal range of motion.   Neurological: He is alert and oriented to person, place, and time. He has normal strength and normal reflexes. Coordination and gait normal.   Skin: Skin is warm, dry and intact. Capillary refill takes less than 2 seconds. No pallor.   Psychiatric: He has a normal mood and affect. His speech is normal and behavior is normal. Judgment and thought content normal. Cognition and memory are normal.   Nursing note and vitals reviewed.        Diagnoses and health risks identified today and associated recommendations/orders:    1. Encounter for preventive health examination  Patient seen for AWV. Encouraged patient to complete shingles vaccine series at local pharmacy.    2. Essential hypertension  Chronic; stable on medication. Follow up with PCP.    3. CKD (chronic kidney disease) stage 4, GFR 15-29 ml/min  Chronic; stable. Followed by nephrology.    4. Mixed hyperlipidemia  Chronic; stable on medication. Follow up with PCP.    5. Atherosclerosis of aorta  Chronic; stable. Followed by cardiology.    6. Thrombocytopenia  Chronic; stable. Followed by hematology/oncology.    7. Thrombophilia  Chronic; stable. Followed by hematology/oncology.    8. Claudication of right lower extremity  Chronic; stable. Follow up with PCP.    9. Iron deficiency anemia, unspecified iron deficiency anemia type  Chronic; stable on medication. Follow up with PCP.    10. Larynx cancer  Chronic; stable. Followed by ENT and hematology/oncology.    11. Benign prostatic hyperplasia  with nocturia  Chronic; stable on medication. Follow up with PCP.    12. Obesity (BMI 30-39.9)  Encouraged patient to continue to eat a low salt/low fat diet and discussed importance of engaging in physical activity at least 5x/week for a minimum of 30 min/day.      Provided Jose Luis with a 5-10 year written screening schedule and personal prevention plan. Recommendations were developed using the USPSTF age appropriate recommendations. Education, counseling, and referrals were provided as needed. After Visit Summary printed and given to patient which includes a list of additional screenings\tests needed.    I offered to discuss end of life issues, including information on how to make advance directives that the patient could use to name someone who would make medical decisions on their behalf if they became too ill to make themselves.    ___Patient declined  _X_Patient is interested, I provided paper work and offered to discuss.      Follow up for your next annual wellness visit.       Bess Kyle NP

## 2020-01-28 ENCOUNTER — PATIENT OUTREACH (OUTPATIENT)
Dept: ADMINISTRATIVE | Facility: HOSPITAL | Age: 70
End: 2020-01-28

## 2020-01-28 NOTE — PROGRESS NOTES
[facility] sent back EVELYN stating they did not have any information on this patient.

## 2020-01-30 ENCOUNTER — OFFICE VISIT (OUTPATIENT)
Dept: NEPHROLOGY | Facility: CLINIC | Age: 70
End: 2020-01-30
Payer: MEDICARE

## 2020-01-30 ENCOUNTER — OFFICE VISIT (OUTPATIENT)
Dept: INTERNAL MEDICINE | Facility: CLINIC | Age: 70
End: 2020-01-30
Payer: MEDICARE

## 2020-01-30 VITALS
HEIGHT: 70 IN | SYSTOLIC BLOOD PRESSURE: 132 MMHG | WEIGHT: 235.44 LBS | BODY MASS INDEX: 33.7 KG/M2 | DIASTOLIC BLOOD PRESSURE: 88 MMHG | HEART RATE: 53 BPM | OXYGEN SATURATION: 98 %

## 2020-01-30 VITALS
HEIGHT: 70 IN | SYSTOLIC BLOOD PRESSURE: 126 MMHG | WEIGHT: 235 LBS | HEART RATE: 45 BPM | BODY MASS INDEX: 33.64 KG/M2 | OXYGEN SATURATION: 100 % | DIASTOLIC BLOOD PRESSURE: 82 MMHG

## 2020-01-30 DIAGNOSIS — R06.00 DYSPNEA, UNSPECIFIED TYPE: ICD-10-CM

## 2020-01-30 DIAGNOSIS — Z79.52 IMMUNOSUPPRESSION DUE TO CHRONIC STEROID USE: ICD-10-CM

## 2020-01-30 DIAGNOSIS — I10 ESSENTIAL HYPERTENSION: ICD-10-CM

## 2020-01-30 DIAGNOSIS — C32.9 LARYNX CANCER: ICD-10-CM

## 2020-01-30 DIAGNOSIS — R07.9 CHEST PAIN, UNSPECIFIED TYPE: Primary | ICD-10-CM

## 2020-01-30 DIAGNOSIS — E83.52 HYPERCALCEMIA: ICD-10-CM

## 2020-01-30 DIAGNOSIS — E87.20 METABOLIC ACIDOSIS: ICD-10-CM

## 2020-01-30 DIAGNOSIS — T82.868S THROMBOSIS OF KIDNEY DIALYSIS ARTERIOVENOUS GRAFT, SEQUELA: ICD-10-CM

## 2020-01-30 DIAGNOSIS — Z94.0 TRANSPLANTED KIDNEY: ICD-10-CM

## 2020-01-30 DIAGNOSIS — N18.4 CHRONIC KIDNEY DISEASE, STAGE IV (SEVERE): ICD-10-CM

## 2020-01-30 DIAGNOSIS — N18.4 HYPERTENSIVE KIDNEY DISEASE WITH STAGE 4 CHRONIC KIDNEY DISEASE: ICD-10-CM

## 2020-01-30 DIAGNOSIS — I12.9 HYPERTENSIVE KIDNEY DISEASE WITH STAGE 4 CHRONIC KIDNEY DISEASE: ICD-10-CM

## 2020-01-30 DIAGNOSIS — T38.0X5A IMMUNOSUPPRESSION DUE TO CHRONIC STEROID USE: ICD-10-CM

## 2020-01-30 DIAGNOSIS — D84.821 IMMUNOSUPPRESSION DUE TO CHRONIC STEROID USE: ICD-10-CM

## 2020-01-30 PROBLEM — Z85.21 H/O LARYNGEAL CANCER: Status: RESOLVED | Noted: 2019-10-03 | Resolved: 2020-01-30

## 2020-01-30 PROBLEM — T82.868A CLOTTED RENAL DIALYSIS ARTERIOVENOUS GRAFT: Status: ACTIVE | Noted: 2020-01-30

## 2020-01-30 PROCEDURE — 99215 PR OFFICE/OUTPT VISIT, EST, LEVL V, 40-54 MIN: ICD-10-PCS | Mod: HCNC,S$GLB,, | Performed by: INTERNAL MEDICINE

## 2020-01-30 PROCEDURE — 1159F PR MEDICATION LIST DOCUMENTED IN MEDICAL RECORD: ICD-10-PCS | Mod: HCNC,S$GLB,, | Performed by: INTERNAL MEDICINE

## 2020-01-30 PROCEDURE — 99499 UNLISTED E&M SERVICE: CPT | Mod: HCNC,S$GLB,, | Performed by: INTERNAL MEDICINE

## 2020-01-30 PROCEDURE — 1101F PT FALLS ASSESS-DOCD LE1/YR: CPT | Mod: HCNC,CPTII,S$GLB, | Performed by: INTERNAL MEDICINE

## 2020-01-30 PROCEDURE — 99999 PR PBB SHADOW E&M-EST. PATIENT-LVL III: CPT | Mod: PBBFAC,HCNC,, | Performed by: INTERNAL MEDICINE

## 2020-01-30 PROCEDURE — 3075F SYST BP GE 130 - 139MM HG: CPT | Mod: HCNC,CPTII,S$GLB, | Performed by: INTERNAL MEDICINE

## 2020-01-30 PROCEDURE — 3074F SYST BP LT 130 MM HG: CPT | Mod: HCNC,CPTII,S$GLB, | Performed by: INTERNAL MEDICINE

## 2020-01-30 PROCEDURE — 1101F PR PT FALLS ASSESS DOC 0-1 FALLS W/OUT INJ PAST YR: ICD-10-PCS | Mod: HCNC,CPTII,S$GLB, | Performed by: INTERNAL MEDICINE

## 2020-01-30 PROCEDURE — 93005 EKG 12-LEAD: ICD-10-PCS | Mod: HCNC,S$GLB,, | Performed by: INTERNAL MEDICINE

## 2020-01-30 PROCEDURE — 1159F MED LIST DOCD IN RCRD: CPT | Mod: HCNC,S$GLB,, | Performed by: INTERNAL MEDICINE

## 2020-01-30 PROCEDURE — 3079F DIAST BP 80-89 MM HG: CPT | Mod: HCNC,CPTII,S$GLB, | Performed by: INTERNAL MEDICINE

## 2020-01-30 PROCEDURE — 99205 OFFICE O/P NEW HI 60 MIN: CPT | Mod: HCNC,S$GLB,, | Performed by: INTERNAL MEDICINE

## 2020-01-30 PROCEDURE — 93010 EKG 12-LEAD: ICD-10-PCS | Mod: HCNC,S$GLB,, | Performed by: INTERNAL MEDICINE

## 2020-01-30 PROCEDURE — 1126F PR PAIN SEVERITY QUANTIFIED, NO PAIN PRESENT: ICD-10-PCS | Mod: HCNC,S$GLB,, | Performed by: INTERNAL MEDICINE

## 2020-01-30 PROCEDURE — 99499 RISK ADDL DX/OHS AUDIT: ICD-10-PCS | Mod: HCNC,S$GLB,, | Performed by: INTERNAL MEDICINE

## 2020-01-30 PROCEDURE — 99999 PR PBB SHADOW E&M-EST. PATIENT-LVL III: ICD-10-PCS | Mod: PBBFAC,HCNC,, | Performed by: INTERNAL MEDICINE

## 2020-01-30 PROCEDURE — 1126F AMNT PAIN NOTED NONE PRSNT: CPT | Mod: HCNC,S$GLB,, | Performed by: INTERNAL MEDICINE

## 2020-01-30 PROCEDURE — 3075F PR MOST RECENT SYSTOLIC BLOOD PRESS GE 130-139MM HG: ICD-10-PCS | Mod: HCNC,CPTII,S$GLB, | Performed by: INTERNAL MEDICINE

## 2020-01-30 PROCEDURE — 93010 ELECTROCARDIOGRAM REPORT: CPT | Mod: HCNC,S$GLB,, | Performed by: INTERNAL MEDICINE

## 2020-01-30 PROCEDURE — 99205 PR OFFICE/OUTPT VISIT, NEW, LEVL V, 60-74 MIN: ICD-10-PCS | Mod: HCNC,S$GLB,, | Performed by: INTERNAL MEDICINE

## 2020-01-30 PROCEDURE — 99215 OFFICE O/P EST HI 40 MIN: CPT | Mod: HCNC,S$GLB,, | Performed by: INTERNAL MEDICINE

## 2020-01-30 PROCEDURE — 3079F PR MOST RECENT DIASTOLIC BLOOD PRESSURE 80-89 MM HG: ICD-10-PCS | Mod: HCNC,CPTII,S$GLB, | Performed by: INTERNAL MEDICINE

## 2020-01-30 PROCEDURE — 3074F PR MOST RECENT SYSTOLIC BLOOD PRESSURE < 130 MM HG: ICD-10-PCS | Mod: HCNC,CPTII,S$GLB, | Performed by: INTERNAL MEDICINE

## 2020-01-30 PROCEDURE — 93005 ELECTROCARDIOGRAM TRACING: CPT | Mod: HCNC,S$GLB,, | Performed by: INTERNAL MEDICINE

## 2020-01-30 RX ORDER — NITROGLYCERIN 0.4 MG/1
0.4 TABLET SUBLINGUAL EVERY 5 MIN PRN
Qty: 30 TABLET | Refills: 2 | Status: SHIPPED | OUTPATIENT
Start: 2020-01-30

## 2020-01-30 NOTE — PROGRESS NOTES
Subjective:   Patient ID: Jose Luis Manzo is a 69 y.o. Black or  male who presents for new evaluation of Chronic Kidney Disease    HPI   was seen in clinic today for new patient evaluation of CKD. He was referred by his PCP, Dr. Rojas. Since this was his first visit with me I reviewed his prior pertinent chart. Pt stated his PCP sent him here for a second opinion.    Pt received kidney transplant in 2007. He seems a poor candidate and does not have much recollection with his transplant. He was on hemodialysis for almost 9 years prior tor receiving transplant. He received it in [place]. He has stopped following there a while ago. He had an AV graft in left arm for dialysis, now does not have any thrill and appears to be clotted and non functional. He reports he had failed access in right arm and also in leg.    He has longstanding hypertension which seems to be the reason for kidney failure. He reports his parents had kidney problem and mother was on dialysis for sometime. He has been a non smoker, currently retired.    He denies any NSAID use. He does not have a recent IV iodine contrast use.     Serial labs noted for creatinine between 2.7-3.2 since 2015, some progression over last few months. He reports he always had high creatinine since his transplant, exact details unclear. He also appears to have mild to moderate hypercalcemia per serial labs. When questioned about it he admitted he was advised to take sensipar by local kidney doctor but due to cost issue he never took it.    He does not have any symptoms to suggest uremia. He denies any decreased urine output/ flank pain/ pain over allograft/ nausea/ vomiting/ diarrhea/ fever/ chills. He reports he had an episode of chest pain recently for which he saw PCP, Dr. Rojas today. He has made a note that due to pt's CKD pt will be a poor candidate for cardiac cath. He has ordered stress test and other work up. Currently pt does not have  chest pain and SOB. Pt admits to have leg swelling, not changed recently.    His IS regimen includes sirolimus, prednisone. He has longstanding hypertension, h/o kidney transplant, h/o sq cell cancer of larynx, s/p RT, lung nodule, DVT of LE, on chronic anticoagulation and other medical problems.       Renal Function:  Lab Results   Component Value Date     (H) 01/20/2020    GLU 93 12/13/2019     01/20/2020     12/13/2019    K 4.1 01/20/2020    K 3.8 12/13/2019     01/20/2020     (H) 12/13/2019    CO2 26 01/20/2020    CO2 22 (L) 12/13/2019    BUN 34 (H) 01/20/2020    BUN 33 (H) 12/13/2019    CALCIUM 10.6 (H) 01/20/2020    CALCIUM 10.5 12/13/2019    CREATININE 3.21 (H) 01/20/2020    CREATININE 3.15 (H) 12/13/2019    ALBUMIN 3.8 01/20/2020    ALBUMIN 3.9 12/13/2019    PHOS 2.7 01/20/2020    PHOS 2.5 (L) 04/13/2018    ESTGFRAFRICA 21.6 (A) 01/20/2020    ESTGFRAFRICA 22.1 (A) 12/13/2019    EGFRNONAA 18.7 (A) 01/20/2020    EGFRNONAA 19.1 (A) 12/13/2019       Urinalysis:  Lab Results   Component Value Date    PHUR 7 12/12/2019    SPECGRAV 1.010 12/12/2019    NITRITE - 12/12/2019       Protein/Creatinine Ratio:  No results found for: PROTEINURINE, CREATRANDUR, UTPCR    CBC:  Lab Results   Component Value Date    WBC 4.18 12/13/2019    HGB 13.0 (L) 12/13/2019    HCT 43.6 12/13/2019       PTH:  No results found for: PTH    Review of Systems   Constitutional: Negative for activity change, appetite change, chills, fatigue and fever.   HENT: Negative for facial swelling, hearing loss and sore throat.    Eyes: Negative for photophobia and pain.   Respiratory: Negative for cough, shortness of breath and wheezing.    Cardiovascular: Positive for leg swelling. Negative for palpitations.   Gastrointestinal: Negative for abdominal pain, diarrhea, nausea and vomiting.   Endocrine: Negative for polydipsia and polyuria.   Genitourinary: Negative for dysuria, flank pain, frequency and hematuria.    Musculoskeletal: Negative for back pain and myalgias.   Skin: Negative for pallor and rash.   Allergic/Immunologic: Positive for immunocompromised state. Negative for environmental allergies.   Neurological: Negative for dizziness, light-headedness and headaches.   Psychiatric/Behavioral: Negative for behavioral problems. The patient is not nervous/anxious.        Objective:   Physical Exam   Constitutional: He is oriented to person, place, and time. He appears well-developed and well-nourished. No distress.   HENT:   Head: Normocephalic and atraumatic.   Mouth/Throat: Oropharynx is clear and moist.   Eyes: Right eye exhibits no discharge. Left eye exhibits no discharge. No scleral icterus.   Pterygium    Neck: Normal range of motion. Neck supple.   Large, thick neck    Cardiovascular: Normal rate, regular rhythm and normal heart sounds.   Pulmonary/Chest: Effort normal and breath sounds normal. No respiratory distress. He has no wheezes. He has no rales.   Abdominal: Soft. There is no tenderness.   Abdominal obesity  No tenderness over allograft site    Genitourinary:   Genitourinary Comments: Deferred    Musculoskeletal: He exhibits edema.   Lymphadenopathy:     He has no cervical adenopathy.   Neurological: He is alert and oriented to person, place, and time.   Speech, cognition normal, no facial asymmetry    Skin: Skin is warm and dry. No rash noted. He is not diaphoretic. No erythema.   Psychiatric: He has a normal mood and affect. His behavior is normal.   Vitals reviewed.      Assessment:     1. Transplanted kidney    2. Immunosuppression due to chronic steroid use    3. Hypertensive kidney disease with stage 4 chronic kidney disease    4. Hypercalcemia    5. Metabolic acidosis    6. Thrombosis of kidney dialysis arteriovenous graft, sequela        Plan:       Problem List Items Addressed This Visit        Renal/    Transplanted kidney    Relevant Orders    CBC auto differential    Hepatitis C antibody     Hepatitis B surface antigen    Immunofixation electrophoresis    Immunoglobulin free LT chains blood    Protein electrophoresis, serum    PTH, intact    Renal function panel    Vitamin D    Urinalysis    Protein / creatinine ratio, urine    Ambulatory referral to Transplant, Kidney    Hypertensive kidney disease with stage 4 chronic kidney disease    Relevant Orders    CBC auto differential    Hepatitis C antibody    Hepatitis B surface antigen    Immunofixation electrophoresis    Immunoglobulin free LT chains blood    Protein electrophoresis, serum    PTH, intact    Renal function panel    Vitamin D    Urinalysis    Protein / creatinine ratio, urine    Hypercalcemia    Relevant Orders    CBC auto differential    Hepatitis C antibody    Hepatitis B surface antigen    Immunofixation electrophoresis    Immunoglobulin free LT chains blood    Protein electrophoresis, serum    PTH, intact    Renal function panel    Vitamin D    Urinalysis    Protein / creatinine ratio, urine    Metabolic acidosis    Relevant Orders    CBC auto differential    Hepatitis C antibody    Hepatitis B surface antigen    Immunofixation electrophoresis    Immunoglobulin free LT chains blood    Protein electrophoresis, serum    PTH, intact    Renal function panel    Vitamin D    Urinalysis    Protein / creatinine ratio, urine       Hematology    Clotted renal dialysis arteriovenous graft    Relevant Orders    CBC auto differential    Hepatitis C antibody    Hepatitis B surface antigen    Immunofixation electrophoresis    Immunoglobulin free LT chains blood    Protein electrophoresis, serum    PTH, intact    Renal function panel    Vitamin D    Urinalysis    Protein / creatinine ratio, urine       Other    Immunosuppression due to chronic steroid use    Relevant Orders    CBC auto differential    Hepatitis C antibody    Hepatitis B surface antigen    Immunofixation electrophoresis    Immunoglobulin free LT chains blood    Protein electrophoresis,  serum    PTH, intact    Renal function panel    Vitamin D    Urinalysis    Protein / creatinine ratio, urine        Mr. 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 He has been a recipient of kidney transplant in 2007. Per his report he appears to have elevated creatinine almost since he received it, no details available as Tx took place in [place]. Pt does not have any follow up at present with his transplant center. He has h/o malignancy.    Overall he appears to have very advanced CKD with eGFR close to 20. He also appears to have longstanding hypercalcemia. Unclear as to other components of bone mineral disorder problem.    Other issue appears that he has had prior multiple failed accesses for dialysis, suspect severe vasculopathy.     Given his worsened labs and these issues will refer to [facility] to establish care, for management of immunosuppression in light of his h/o malignancy and possible consideration for kidney transplant candidate as anticipate further worsening of kidney function.     Evaluation of chest pain being conducted per his PCP. He remains at higher risk for contrast induced CHANG. Consider cardiology evaluation including echocardiogram, rule out cardiomyopathy, elevated PA pressures. Continue current diuretic regimen.    Home BP monitoring, goal should be less than 130/80, strict low salt diet.    Periodically monitor renal labs for electrolytes, acid base status, creatinine.    Chronic thrombocytopenia noted, reason unclear. He has been under heme follow up already.    Obtain PTH, vit D, phos levels, corrected Ca.    Obtain urine studies, rule out proteinuria/ microhematuria       Plan, labs, recommendations were discussed with patient, his questions were answered to his satisfaction.   Total time spent was 60 minutes with >50% time spent in face to face evaluation, counseling about possible etiology,  work up, monitoring, natural course of illness, recommendations.   RTC 3 months

## 2020-01-30 NOTE — PROGRESS NOTES
Subjective:       Patient ID: Jose Luis Manzo is a 69 y.o. male.    Chief Complaint: Follow-up (with labs)    HPI  Recheck.  Chart reviewed.  Pt c/o SSCP at rest yesterday 5/10 in intensity rad to back, lasting 5-10 minutes at a time assoc w belching.  No nausea, no SOB.  No diaphoresis.  No rel to meals.  EKG today shows a new septal infarct pattern.  No LE edema.  No pruritus.  No dysphagia, neck pain.  No bleeding.  Poor candidate for cardiac cath given CKD.  Had trouble walking o a treadmill 4-5 years ago.  Review of Systems   All other systems reviewed and are negative.      Objective:      Physical Exam   Constitutional: He appears well-developed.   obese   HENT:   Head: Normocephalic.   Eyes: EOM are normal.   Neck: Normal range of motion.   Cardiovascular: Normal rate, regular rhythm, normal heart sounds and intact distal pulses.   Pulmonary/Chest: Effort normal and breath sounds normal.   Abdominal: Soft. Bowel sounds are normal. He exhibits no distension. There is no tenderness. There is no guarding.   Musculoskeletal: Normal range of motion. He exhibits edema (tr ankles).   Neurological: He is alert.   Skin: Skin is warm and dry.   Psychiatric: He has a normal mood and affect.   Vitals reviewed.      Assessment:       1. Chest pain, unspecified type    2. Chronic kidney disease, stage IV (severe)    3. Essential hypertension    4. Larynx cancer    5. Dyspnea, unspecified type        Plan:       Jose Luis was seen today for follow-up.    Diagnoses and all orders for this visit:    Chest pain, unspecified type  -     EKG 12-lead   SONIA scan   SL NTP prn CP    Chronic kidney disease, stage IV (severe)   To see nephrology today    Essential hypertension   Well-cont    Larynx cancer   Per ENT      Follow up if symptoms worsen or fail to improve.

## 2020-01-30 NOTE — LETTER
January 30, 2020      Zelalem Rojas MD  [address]  [address]  [place] [address]           [facility] - Nephrology  [address]  [place] [address]  Phone: [phone]  Fax: [phone]          Patient: Jose Luis Manzo   MR Number: [id number]   YOB: 1950   Date of Visit: 1/30/2020       Dear Dr. Zelalem Rojas:    Thank you for referring Jose Luis Manzo to me for evaluation. Attached you will find relevant portions of my assessment and plan of care.    If you have questions, please do not hesitate to call me. I look forward to following Jose Luis Manzo along with you.    Sincerely,    Boris Gillette MD    Enclosure  CC:  No Recipients    If you would like to receive this communication electronically, please contact [email or web address] or [phone] to request more information on [employer] access.    For providers and/or their staff who would like to refer a patient to [facility], please contact us through our one-stop-shop provider referral line, [facility], at [phone].    If you feel you have received this communication in error or would no longer like to receive these types of communications, please e-mail [email or web address]

## 2020-02-13 ENCOUNTER — LAB VISIT (OUTPATIENT)
Dept: LAB | Facility: HOSPITAL | Age: 70
End: 2020-02-13
Attending: INTERNAL MEDICINE
Payer: MEDICARE

## 2020-02-13 DIAGNOSIS — E87.20 METABOLIC ACIDOSIS: ICD-10-CM

## 2020-02-13 DIAGNOSIS — N18.4 HYPERTENSIVE KIDNEY DISEASE WITH STAGE 4 CHRONIC KIDNEY DISEASE: ICD-10-CM

## 2020-02-13 DIAGNOSIS — T82.868S THROMBOSIS OF KIDNEY DIALYSIS ARTERIOVENOUS GRAFT, SEQUELA: ICD-10-CM

## 2020-02-13 DIAGNOSIS — Z94.0 TRANSPLANTED KIDNEY: ICD-10-CM

## 2020-02-13 DIAGNOSIS — I12.9 HYPERTENSIVE KIDNEY DISEASE WITH STAGE 4 CHRONIC KIDNEY DISEASE: ICD-10-CM

## 2020-02-13 DIAGNOSIS — Z79.52 IMMUNOSUPPRESSION DUE TO CHRONIC STEROID USE: ICD-10-CM

## 2020-02-13 DIAGNOSIS — T38.0X5A IMMUNOSUPPRESSION DUE TO CHRONIC STEROID USE: ICD-10-CM

## 2020-02-13 DIAGNOSIS — D84.821 IMMUNOSUPPRESSION DUE TO CHRONIC STEROID USE: ICD-10-CM

## 2020-02-13 DIAGNOSIS — E83.52 HYPERCALCEMIA: ICD-10-CM

## 2020-02-13 LAB
25(OH)D3+25(OH)D2 SERPL-MCNC: 24 NG/ML (ref 30–96)
ALBUMIN SERPL BCP-MCNC: 3.9 G/DL (ref 3.5–5.2)
ANION GAP SERPL CALC-SCNC: 7 MMOL/L (ref 8–16)
BASOPHILS # BLD AUTO: 0.04 K/UL (ref 0–0.2)
BASOPHILS NFR BLD: 0.8 % (ref 0–1.9)
BUN SERPL-MCNC: 40 MG/DL (ref 2–20)
CALCIUM SERPL-MCNC: 10.7 MG/DL (ref 8.7–10.5)
CHLORIDE SERPL-SCNC: 109 MMOL/L (ref 95–110)
CO2 SERPL-SCNC: 27 MMOL/L (ref 23–29)
CREAT SERPL-MCNC: 3.35 MG/DL (ref 0.5–1.4)
DIFFERENTIAL METHOD: ABNORMAL
EOSINOPHIL # BLD AUTO: 0.1 K/UL (ref 0–0.5)
EOSINOPHIL NFR BLD: 1.2 % (ref 0–8)
ERYTHROCYTE [DISTWIDTH] IN BLOOD BY AUTOMATED COUNT: 15.9 % (ref 11.5–14.5)
EST. GFR  (AFRICAN AMERICAN): 20.5 ML/MIN/1.73 M^2
EST. GFR  (NON AFRICAN AMERICAN): 17.7 ML/MIN/1.73 M^2
GLUCOSE SERPL-MCNC: 98 MG/DL (ref 70–110)
HCT VFR BLD AUTO: 41.6 % (ref 40–54)
HGB BLD-MCNC: 12.4 G/DL (ref 14–18)
IMM GRANULOCYTES # BLD AUTO: 0.05 K/UL (ref 0–0.04)
IMM GRANULOCYTES NFR BLD AUTO: 1 % (ref 0–0.5)
LYMPHOCYTES # BLD AUTO: 1.1 K/UL (ref 1–4.8)
LYMPHOCYTES NFR BLD: 22 % (ref 18–48)
MCH RBC QN AUTO: 24.6 PG (ref 27–31)
MCHC RBC AUTO-ENTMCNC: 29.8 G/DL (ref 32–36)
MCV RBC AUTO: 83 FL (ref 82–98)
MONOCYTES # BLD AUTO: 0.6 K/UL (ref 0.3–1)
MONOCYTES NFR BLD: 11.9 % (ref 4–15)
NEUTROPHILS # BLD AUTO: 3.1 K/UL (ref 1.8–7.7)
NEUTROPHILS NFR BLD: 63.1 % (ref 38–73)
NRBC BLD-RTO: 0 /100 WBC
PHOSPHATE SERPL-MCNC: 2.9 MG/DL (ref 2.7–4.5)
PLATELET # BLD AUTO: 136 K/UL (ref 150–350)
PMV BLD AUTO: 12.5 FL (ref 9.2–12.9)
POTASSIUM SERPL-SCNC: 3.8 MMOL/L (ref 3.5–5.1)
PTH-INTACT SERPL-MCNC: 329 PG/ML (ref 9–77)
RBC # BLD AUTO: 5.04 M/UL (ref 4.6–6.2)
SODIUM SERPL-SCNC: 143 MMOL/L (ref 136–145)
WBC # BLD AUTO: 4.96 K/UL (ref 3.9–12.7)

## 2020-02-13 PROCEDURE — 86334 IMMUNOFIX E-PHORESIS SERUM: CPT | Mod: 26,,, | Performed by: PATHOLOGY

## 2020-02-13 PROCEDURE — 85025 COMPLETE CBC W/AUTO DIFF WBC: CPT | Mod: HCNC,PO

## 2020-02-13 PROCEDURE — 83520 IMMUNOASSAY QUANT NOS NONAB: CPT | Mod: 59,HCNC,PO

## 2020-02-13 PROCEDURE — 87340 HEPATITIS B SURFACE AG IA: CPT | Mod: HCNC,PO

## 2020-02-13 PROCEDURE — 80069 RENAL FUNCTION PANEL: CPT | Mod: HCNC,PO

## 2020-02-13 PROCEDURE — 84165 PROTEIN E-PHORESIS SERUM: CPT | Mod: 26,HCNC,, | Performed by: PATHOLOGY

## 2020-02-13 PROCEDURE — 86803 HEPATITIS C AB TEST: CPT | Mod: HCNC,PO

## 2020-02-13 PROCEDURE — 84165 PATHOLOGIST INTERPRETATION SPE: ICD-10-PCS | Mod: 26,HCNC,, | Performed by: PATHOLOGY

## 2020-02-13 PROCEDURE — 36415 COLL VENOUS BLD VENIPUNCTURE: CPT | Mod: HCNC,PO

## 2020-02-13 PROCEDURE — 86334 IMMUNOFIX E-PHORESIS SERUM: CPT | Mod: HCNC,PO

## 2020-02-13 PROCEDURE — 84165 PROTEIN E-PHORESIS SERUM: CPT | Mod: HCNC,PO

## 2020-02-13 PROCEDURE — 83970 ASSAY OF PARATHORMONE: CPT | Mod: HCNC,PO

## 2020-02-13 PROCEDURE — 86334 PATHOLOGIST INTERPRETATION IFE: ICD-10-PCS | Mod: 26,,, | Performed by: PATHOLOGY

## 2020-02-13 PROCEDURE — 82306 VITAMIN D 25 HYDROXY: CPT | Mod: HCNC,PO

## 2020-02-14 ENCOUNTER — TELEPHONE (OUTPATIENT)
Dept: NEPHROLOGY | Facility: CLINIC | Age: 70
End: 2020-02-14

## 2020-02-14 DIAGNOSIS — N18.4 CKD (CHRONIC KIDNEY DISEASE), STAGE IV: Primary | ICD-10-CM

## 2020-02-14 LAB
ALBUMIN SERPL ELPH-MCNC: 3.83 G/DL (ref 3.35–5.55)
ALPHA1 GLOB SERPL ELPH-MCNC: 0.29 G/DL (ref 0.17–0.41)
ALPHA2 GLOB SERPL ELPH-MCNC: 0.72 G/DL (ref 0.43–0.99)
B-GLOBULIN SERPL ELPH-MCNC: 0.81 G/DL (ref 0.5–1.1)
GAMMA GLOB SERPL ELPH-MCNC: 1.56 G/DL (ref 0.67–1.58)
HBV SURFACE AG SERPL QL IA: NEGATIVE
HCV AB SERPL QL IA: NEGATIVE
INTERPRETATION SERPL IFE-IMP: NORMAL
KAPPA LC SER QL IA: 7.74 MG/DL (ref 0.33–1.94)
KAPPA LC/LAMBDA SER IA: 1.71 (ref 0.26–1.65)
LAMBDA LC SER QL IA: 4.52 MG/DL (ref 0.57–2.63)
PATHOLOGIST INTERPRETATION IFE: NORMAL
PROT SERPL-MCNC: 7.2 G/DL (ref 6–8.4)

## 2020-02-14 RX ORDER — CINACALCET 30 MG/1
30 TABLET, FILM COATED ORAL
Qty: 30 TABLET | Refills: 11 | Status: SHIPPED | OUTPATIENT
Start: 2020-02-14 | End: 2021-05-14 | Stop reason: ALTCHOICE

## 2020-02-14 NOTE — TELEPHONE ENCOUNTER
PER DR AGEE   Please inform patient   Kidney filtration is around 20-21 which is less than 6 months ago. Potassium level normal. Calcium is elevated along with PTH. Advise   - start sensipar 30 mg orally daily   - he will need monthly renal panel to monitor calcium levels   - clinic has sensipar discount cards available if he would like to try   - if willing for monthly labs then would send Rx. So confirm with him please.

## 2020-02-14 NOTE — TELEPHONE ENCOUNTER
I just placed order for sensipar 30 mg po daily and standing renal panel order for every 4 weeks. We can start doing renal panel in 2 weeks of him starting the medicine. So he will need to inform us when he actually starts taking it.     Please set up follow up in April/ may if possible with me.

## 2020-02-17 LAB — PATHOLOGIST INTERPRETATION SPE: NORMAL

## 2020-02-20 ENCOUNTER — TELEPHONE (OUTPATIENT)
Dept: NEPHROLOGY | Facility: CLINIC | Age: 70
End: 2020-02-20

## 2020-02-20 NOTE — TELEPHONE ENCOUNTER
----- Message from Tia Larson sent at 2/20/2020 11:01 AM CST -----  Contact: [phone]/self   Patient is requesting to speak with Sonali regarding instructions. Please call and advise.

## 2020-02-20 NOTE — TELEPHONE ENCOUNTER
Received fax form pharmacy - Called [phone]. For PA in regards to pt SENSIPAR. Spoke to VAN to initiate PA.        Van stated it takes 24-72 hours before we would know the result of the PA.    Ref # [id number]

## 2020-02-26 ENCOUNTER — TELEPHONE (OUTPATIENT)
Dept: NEPHROLOGY | Facility: CLINIC | Age: 70
End: 2020-02-26

## 2020-02-27 ENCOUNTER — HOSPITAL ENCOUNTER (OUTPATIENT)
Dept: RADIOLOGY | Facility: HOSPITAL | Age: 70
Discharge: HOME | End: 2020-02-27
Attending: INTERNAL MEDICINE
Payer: MEDICARE

## 2020-02-27 ENCOUNTER — HOSPITAL ENCOUNTER (OUTPATIENT)
Dept: CARDIOLOGY | Facility: HOSPITAL | Age: 70
Discharge: HOME | End: 2020-02-27
Attending: INTERNAL MEDICINE
Payer: MEDICARE

## 2020-02-27 DIAGNOSIS — R07.9 CHEST PAIN, UNSPECIFIED TYPE: ICD-10-CM

## 2020-02-27 DIAGNOSIS — R06.00 DYSPNEA, UNSPECIFIED TYPE: ICD-10-CM

## 2020-02-27 LAB
CV STRESS BASE HR: 48 BPM
DIASTOLIC BLOOD PRESSURE: 99 MMHG
OHS CV CPX 1 MINUTE RECOVERY HEART RATE: 57 BPM
OHS CV CPX 85 PERCENT MAX PREDICTED HEART RATE MALE: 128
OHS CV CPX MAX PREDICTED HEART RATE: 150
OHS CV CPX PATIENT IS FEMALE: 0
OHS CV CPX PATIENT IS MALE: 1
OHS CV CPX PEAK DIASTOLIC BLOOD PRESSURE: 93 MMHG
OHS CV CPX PEAK HEAR RATE: 68 BPM
OHS CV CPX PEAK RATE PRESSURE PRODUCT: NORMAL
OHS CV CPX PEAK SYSTOLIC BLOOD PRESSURE: 157 MMHG
OHS CV CPX PERCENT MAX PREDICTED HEART RATE ACHIEVED: 45
OHS CV CPX RATE PRESSURE PRODUCT PRESENTING: 6288
STRESS ECHO TARGET HR: 128 BPM
SYSTOLIC BLOOD PRESSURE: 131 MMHG

## 2020-02-27 PROCEDURE — A9502 TC99M TETROFOSMIN: HCPCS | Mod: HCNC,PO

## 2020-02-27 PROCEDURE — 93016 TREADMILL STRESS TEST (CUPID ONLY): ICD-10-PCS | Mod: HCNC,,, | Performed by: INTERNAL MEDICINE

## 2020-02-27 PROCEDURE — 93016 CV STRESS TEST SUPVJ ONLY: CPT | Mod: HCNC,,, | Performed by: INTERNAL MEDICINE

## 2020-02-27 PROCEDURE — 93018 TREADMILL STRESS TEST (CUPID ONLY): ICD-10-PCS | Mod: HCNC,,, | Performed by: INTERNAL MEDICINE

## 2020-02-27 PROCEDURE — 93018 CV STRESS TEST I&R ONLY: CPT | Mod: HCNC,,, | Performed by: INTERNAL MEDICINE

## 2020-02-27 PROCEDURE — 93017 CV STRESS TEST TRACING ONLY: CPT | Mod: HCNC,PO

## 2020-02-27 PROCEDURE — 63600175 PHARM REV CODE 636 W HCPCS: Mod: HCNC,PO | Performed by: INTERNAL MEDICINE

## 2020-02-27 RX ORDER — REGADENOSON 0.08 MG/ML
0.4 INJECTION, SOLUTION INTRAVENOUS ONCE
Status: COMPLETED | OUTPATIENT
Start: 2020-02-27 | End: 2020-02-27

## 2020-02-27 RX ADMIN — REGADENOSON 0.4 MG: 0.08 INJECTION, SOLUTION INTRAVENOUS at 12:02

## 2020-02-28 ENCOUNTER — TELEPHONE (OUTPATIENT)
Dept: NEPHROLOGY | Facility: CLINIC | Age: 70
End: 2020-02-28

## 2020-03-02 DIAGNOSIS — N18.4 CHRONIC KIDNEY DISEASE, STAGE IV (SEVERE): Primary | ICD-10-CM

## 2020-03-10 ENCOUNTER — OFFICE VISIT (OUTPATIENT)
Dept: OTOLARYNGOLOGY | Facility: CLINIC | Age: 70
End: 2020-03-10
Payer: MEDICARE

## 2020-03-10 ENCOUNTER — LAB VISIT (OUTPATIENT)
Dept: LAB | Facility: HOSPITAL | Age: 70
End: 2020-03-10
Payer: MEDICARE

## 2020-03-10 VITALS
DIASTOLIC BLOOD PRESSURE: 74 MMHG | WEIGHT: 234.38 LBS | BODY MASS INDEX: 33.63 KG/M2 | HEART RATE: 66 BPM | SYSTOLIC BLOOD PRESSURE: 132 MMHG | TEMPERATURE: 99 F

## 2020-03-10 DIAGNOSIS — J38.7 CHONDRONECROSIS OF LARYNX: ICD-10-CM

## 2020-03-10 DIAGNOSIS — N18.4 CHRONIC KIDNEY DISEASE, STAGE IV (SEVERE): ICD-10-CM

## 2020-03-10 DIAGNOSIS — C32.9 LARYNX CANCER: Primary | ICD-10-CM

## 2020-03-10 LAB
ALBUMIN SERPL BCP-MCNC: 3.6 G/DL (ref 3.5–5.2)
ANION GAP SERPL CALC-SCNC: 10 MMOL/L (ref 8–16)
BUN SERPL-MCNC: 39 MG/DL (ref 8–23)
CALCIUM SERPL-MCNC: 10.5 MG/DL (ref 8.7–10.5)
CHLORIDE SERPL-SCNC: 105 MMOL/L (ref 95–110)
CO2 SERPL-SCNC: 28 MMOL/L (ref 23–29)
CREAT SERPL-MCNC: 3.6 MG/DL (ref 0.5–1.4)
EST. GFR  (AFRICAN AMERICAN): 18.7 ML/MIN/1.73 M^2
EST. GFR  (NON AFRICAN AMERICAN): 16.1 ML/MIN/1.73 M^2
GLUCOSE SERPL-MCNC: 79 MG/DL (ref 70–110)
PHOSPHATE SERPL-MCNC: 2.6 MG/DL (ref 2.7–4.5)
POTASSIUM SERPL-SCNC: 3.3 MMOL/L (ref 3.5–5.1)
SODIUM SERPL-SCNC: 143 MMOL/L (ref 136–145)

## 2020-03-10 PROCEDURE — 80069 RENAL FUNCTION PANEL: CPT | Mod: HCNC

## 2020-03-10 PROCEDURE — 99213 OFFICE O/P EST LOW 20 MIN: CPT | Mod: 25,HCNC,S$GLB, | Performed by: OTOLARYNGOLOGY

## 2020-03-10 PROCEDURE — 99999 PR PBB SHADOW E&M-EST. PATIENT-LVL III: ICD-10-PCS | Mod: PBBFAC,HCNC,, | Performed by: OTOLARYNGOLOGY

## 2020-03-10 PROCEDURE — 31575 PR LARYNGOSCOPY, FLEXIBLE; DIAGNOSTIC: ICD-10-PCS | Mod: HCNC,S$GLB,, | Performed by: OTOLARYNGOLOGY

## 2020-03-10 PROCEDURE — 1101F PT FALLS ASSESS-DOCD LE1/YR: CPT | Mod: HCNC,CPTII,S$GLB, | Performed by: OTOLARYNGOLOGY

## 2020-03-10 PROCEDURE — 1126F AMNT PAIN NOTED NONE PRSNT: CPT | Mod: HCNC,S$GLB,, | Performed by: OTOLARYNGOLOGY

## 2020-03-10 PROCEDURE — 99213 PR OFFICE/OUTPT VISIT, EST, LEVL III, 20-29 MIN: ICD-10-PCS | Mod: 25,HCNC,S$GLB, | Performed by: OTOLARYNGOLOGY

## 2020-03-10 PROCEDURE — 1126F PR PAIN SEVERITY QUANTIFIED, NO PAIN PRESENT: ICD-10-PCS | Mod: HCNC,S$GLB,, | Performed by: OTOLARYNGOLOGY

## 2020-03-10 PROCEDURE — 3078F PR MOST RECENT DIASTOLIC BLOOD PRESSURE < 80 MM HG: ICD-10-PCS | Mod: HCNC,CPTII,S$GLB, | Performed by: OTOLARYNGOLOGY

## 2020-03-10 PROCEDURE — 99999 PR PBB SHADOW E&M-EST. PATIENT-LVL III: CPT | Mod: PBBFAC,HCNC,, | Performed by: OTOLARYNGOLOGY

## 2020-03-10 PROCEDURE — 1159F MED LIST DOCD IN RCRD: CPT | Mod: HCNC,S$GLB,, | Performed by: OTOLARYNGOLOGY

## 2020-03-10 PROCEDURE — 31575 DIAGNOSTIC LARYNGOSCOPY: CPT | Mod: HCNC,S$GLB,, | Performed by: OTOLARYNGOLOGY

## 2020-03-10 PROCEDURE — 36415 COLL VENOUS BLD VENIPUNCTURE: CPT | Mod: HCNC

## 2020-03-10 PROCEDURE — 3075F PR MOST RECENT SYSTOLIC BLOOD PRESS GE 130-139MM HG: ICD-10-PCS | Mod: HCNC,CPTII,S$GLB, | Performed by: OTOLARYNGOLOGY

## 2020-03-10 PROCEDURE — 1159F PR MEDICATION LIST DOCUMENTED IN MEDICAL RECORD: ICD-10-PCS | Mod: HCNC,S$GLB,, | Performed by: OTOLARYNGOLOGY

## 2020-03-10 PROCEDURE — 3078F DIAST BP <80 MM HG: CPT | Mod: HCNC,CPTII,S$GLB, | Performed by: OTOLARYNGOLOGY

## 2020-03-10 PROCEDURE — 1101F PR PT FALLS ASSESS DOC 0-1 FALLS W/OUT INJ PAST YR: ICD-10-PCS | Mod: HCNC,CPTII,S$GLB, | Performed by: OTOLARYNGOLOGY

## 2020-03-10 PROCEDURE — 3075F SYST BP GE 130 - 139MM HG: CPT | Mod: HCNC,CPTII,S$GLB, | Performed by: OTOLARYNGOLOGY

## 2020-03-12 ENCOUNTER — TELEPHONE (OUTPATIENT)
Dept: NEPHROLOGY | Facility: CLINIC | Age: 70
End: 2020-03-12

## 2020-03-12 NOTE — TELEPHONE ENCOUNTER
----- Message from Kristin Prather sent at 3/12/2020  8:26 AM CDT -----  Contact: Patient  Patient called stating he is applying for assistance and needs Diagnosis code in order to qualify for care    Please Call [phone]

## 2020-03-13 NOTE — PROGRESS NOTES
Chief Complaint   Patient presents with    Follow-up     Treatment History  1. 2015: XRT for SCCA larynx ([facility]).  2. 10/2/17: DL and biopsy TVC.  Path revealed severe dysplasia/CIS/superficially invasive SCCA (Dr. Lizet Desouza).  3. 12/21/17: MSL with resection R TVC lesion, biopsy L TVC lesion.  Path revealed severe dysplasia. (Dr. Gibran Smith).  4. 3/1/17: MSL with laser resection L TVC.  Path benign. (Dr. Smith).    HPI   70 y.o. male presents with the above treatment history. No new complaints.       Review of Systems   Constitutional: Negative for fatigue and unexpected weight change.   HENT: Per HPI.  Eyes: Negative for visual disturbance.   Respiratory: Negative for shortness of breath, hemoptysis   Cardiovascular: Negative for chest pain and palpitations.   Musculoskeletal: Negative for decreased ROM, back pain.   Skin: Negative for rash, sunburn, itching.   Neurological: Negative for dizziness and seizures.   Hematological: Negative for adenopathy. Does not bruise/bleed easily.   Endocrine: Negative for rapid weight loss/weight gain, heat/cold intolerance.     Past Medical History   Patient Active Problem List   Diagnosis    CKD (chronic kidney disease) stage 4, GFR 15-29 ml/min    Essential hypertension    Mixed hyperlipidemia    Benign prostatic hyperplasia with nocturia    Claudication of right lower extremity    Eye swelling, bilateral    Diminished night vision    Renal transplant recipient    Thrombocytopenia    Dermolipoma    History of DVT (deep vein thrombosis)    Iron deficiency anemia    Thrombophilia    Pulmonary nodules    Larynx cancer    Voice disturbance    Atherosclerosis of aorta    Carcinoma in situ of vocal cord    Dysphagia, pharyngoesophageal    Chondronecrosis of larynx    Squamous cell carcinoma    Obesity (BMI 30-39.9)    Pterygium of both eyes    Nodule of left lung    Transplanted kidney    Immunosuppression due to chronic steroid use    Hypertensive  "kidney disease with stage 4 chronic kidney disease    Hypercalcemia    Metabolic acidosis    Clotted renal dialysis arteriovenous graft           Past Surgical History   Past Surgical History:   Procedure Laterality Date    FRACTURE SURGERY Left     "lower leg" 40 years ago    KIDNEY TRANSPLANT      followed by [facility]    LARYNX SURGERY  2014    Oropharyngeal Cancer x3    microlaryngoscopy      TIBIAL STAPLING Left               Family History   Family History   Problem Relation Age of Onset    Stroke Mother     Diabetes Mother     Hypertension Mother     Stroke Father     No Known Problems Sister     No Known Problems Brother     No Known Problems Daughter     No Known Problems Brother            Social History   .  Social History     Socioeconomic History    Marital status: Single     Spouse name: Not on file    Number of children: Not on file    Years of education: Not on file    Highest education level: Not on file   Occupational History    Not on file   Social Needs    Financial resource strain: Not on file    Food insecurity:     Worry: Not on file     Inability: Not on file    Transportation needs:     Medical: Not on file     Non-medical: Not on file   Tobacco Use    Smoking status: Former Smoker     Packs/day: 1.00     Years: 20.00     Pack years: 20.00     Types: Cigarettes     Last attempt to quit:      Years since quittin.2    Smokeless tobacco: Never Used   Substance and Sexual Activity    Alcohol use: No     Alcohol/week: 0.0 standard drinks    Drug use: No     Types: Marijuana     Comment: Patient quit     Sexual activity: Not Currently     Partners: Female   Lifestyle    Physical activity:     Days per week: Not on file     Minutes per session: Not on file    Stress: Not on file   Relationships    Social connections:     Talks on phone: Not on file     Gets together: Not on file     Attends [employer] service: Not on file     Active " member of club or organization: Not on file     Attends meetings of clubs or organizations: Not on file     Relationship status: Not on file   Other Topics Concern    Not on file   Social History Narrative    Not on file         Allergies   Review of patient's allergies indicates:  No Known Allergies        Physical Exam     Vitals:    03/10/20 1620   BP: 132/74   Pulse: 66   Temp: 98.5 °F (36.9 °C)         Body mass index is 33.63 kg/m².    General: AOx3, NAD   Respiratory:  Symmetric chest rise, normal effort  Nose: No gross nasal septal deviation. Inferior Turbinates WNL bilaterally. No septal perforation. No masses/lesions.   Oral Cavity:  Oral Tongue mobile, no lesions noted. Hard Palate WNL. No buccal or FOM lesions.  Oropharynx:  No masses/lesions of the posterior pharyngeal wall. Tonsillar fossa without lesions. Soft palate without masses. Midline uvula.   Neck: No scars. Mild fibrosis status post XRT.  No cervical lymphadenopathy, thyromegaly or thyroid nodules.  Normal range of motion.    Face: House Brackmann I bilaterally.     Flex Naso Heike Hypo Procedures #2     Procedure:  Diagnostic flexible nasopharyngoscopy, laryngoscopy and hypopharyngoscopy:     Routine preparation with local atomizer with 1% neosynephrine/pontocaine with customary flexible endoscope.     Nasopharynx:  No lesions.        Mucosa:  No lesions.        Adenoids:  Present.  Posterior Choanae:  Patent.  Eustachian Tubes:  Patent.  Posterior pharynx:  No lesions.  Larynx/hypopharynx:        Epiglottis:  No lesions.         AE Folds:  No lesions.        Vocal cords:  Stable nodularity L TVC        Mobility:  R TVC slightly limited, L TVC immobile.         Hypopharynx:  No lesions.        Piriform sinus:  No pooling, no lesions.        Post Cricoid:  No erythema, no edema.      Assessment/Plan  Problem List Items Addressed This Visit        ENT    Chondronecrosis of larynx       Oncology    Larynx cancer - Primary     No evidence of  disease. Return to clinic in 4 months.

## 2020-03-26 ENCOUNTER — TELEPHONE (OUTPATIENT)
Dept: INFECTIOUS DISEASES | Facility: CLINIC | Age: 70
End: 2020-03-26

## 2020-03-26 NOTE — TELEPHONE ENCOUNTER
Called patient back and he stated was calling to reschedule appointment from 03/16. Informed patient we are only doing virtual visits right now and inquired if can set up [facility] for him to do a virtual visit. Patient stated would rather wait to come in person. Informed we can reschedule for May but the book is currently closed. Informed will put a recall in for then and to give us a call back in April to schedule for then. Patient agreed.

## 2020-03-26 NOTE — TELEPHONE ENCOUNTER
----- Message from Rose Carbajal sent at 3/26/2020 10:40 AM CDT -----  Contact: Pt [phone]  Pt is calling to bhavesh apt please call back

## 2020-05-20 ENCOUNTER — TELEPHONE (OUTPATIENT)
Dept: NEPHROLOGY | Facility: CLINIC | Age: 70
End: 2020-05-20

## 2020-05-20 DIAGNOSIS — I82.409 ACUTE DEEP VEIN THROMBOSIS (DVT) OF LOWER EXTREMITY, UNSPECIFIED LATERALITY, UNSPECIFIED VEIN: ICD-10-CM

## 2020-05-20 DIAGNOSIS — N18.4 CHRONIC KIDNEY DISEASE, STAGE IV (SEVERE): Primary | ICD-10-CM

## 2020-05-20 RX ORDER — APIXABAN 2.5 MG/1
TABLET, FILM COATED ORAL
Qty: 60 TABLET | Refills: 0 | Status: SHIPPED | OUTPATIENT
Start: 2020-05-20 | End: 2020-05-21

## 2020-05-20 NOTE — TELEPHONE ENCOUNTER
----- Message from Boris Gillette MD sent at 5/20/2020  2:51 PM CDT -----  He needs repeat labs  Renal panel to review electrolytes given he is cramping    Tentatively appointment in July.    His labs from march showed kidney was filtering at around 18, not too different than baseline.      ----- Message -----  From: Sonali Coello MA  Sent: 5/20/2020  10:11 AM CDT  To: Boris Gillette MD    Good morning.    Spoke to pt , pt stated that usually swelling goes down over night , lately he notice swelling that has been persistent along c cramping of legs. Pt stated he takes medication as prescribed. Pt denied any other symptoms, no abdominal,flank pain, appetite normal urinates often.    Pt also stated he would like result from labs he completed in march.    F/u for July or aug is ok ?        Thanks

## 2020-05-20 NOTE — TELEPHONE ENCOUNTER
Spoke to pt , pt stated that usually swelling goes down over night , lately he notice swelling that has been persistent along c cramping of legs. Pt stated he takes medication as prescribed. Pt denied any other symptoms, no abdominal,flank pain, appetite normal urinates often.    Pt also stated he would like result from labs he completed in march.      Fwson msg to ..             ----- Message from Maria Luisa Tripp sent at 5/20/2020  9:37 AM CDT -----  Contact: PT  PT had appt in march  Was canceled   Due to virus      He did labs on  March 10            Pt needs a work in    GFR 18.58     Legs swelling    Lots of cramps in legs   Please call pt with an in clinic appt   Phone [phone]    Thanks

## 2020-05-21 DIAGNOSIS — I82.409 ACUTE DEEP VEIN THROMBOSIS (DVT) OF LOWER EXTREMITY, UNSPECIFIED LATERALITY, UNSPECIFIED VEIN: ICD-10-CM

## 2020-05-21 RX ORDER — APIXABAN 2.5 MG/1
TABLET, FILM COATED ORAL
Qty: 180 TABLET | Refills: 4 | Status: SHIPPED | OUTPATIENT
Start: 2020-05-21 | End: 2020-07-13 | Stop reason: SDUPTHER

## 2020-05-25 ENCOUNTER — LAB VISIT (OUTPATIENT)
Dept: LAB | Facility: HOSPITAL | Age: 70
End: 2020-05-25
Attending: INTERNAL MEDICINE
Payer: MEDICARE

## 2020-05-25 DIAGNOSIS — N18.4 CHRONIC KIDNEY DISEASE, STAGE IV (SEVERE): ICD-10-CM

## 2020-05-25 LAB
ALBUMIN SERPL BCP-MCNC: 4 G/DL (ref 3.5–5.2)
ANION GAP SERPL CALC-SCNC: 9 MMOL/L (ref 8–16)
BUN SERPL-MCNC: 58 MG/DL (ref 2–20)
CALCIUM SERPL-MCNC: 10.5 MG/DL (ref 8.7–10.5)
CHLORIDE SERPL-SCNC: 108 MMOL/L (ref 95–110)
CO2 SERPL-SCNC: 25 MMOL/L (ref 23–29)
CREAT SERPL-MCNC: 3.27 MG/DL (ref 0.5–1.4)
EST. GFR  (AFRICAN AMERICAN): 21 ML/MIN/1.73 M^2
EST. GFR  (NON AFRICAN AMERICAN): 18.1 ML/MIN/1.73 M^2
GLUCOSE SERPL-MCNC: 92 MG/DL (ref 70–110)
PHOSPHATE SERPL-MCNC: 3.9 MG/DL (ref 2.7–4.5)
POTASSIUM SERPL-SCNC: 3.9 MMOL/L (ref 3.5–5.1)
SODIUM SERPL-SCNC: 142 MMOL/L (ref 136–145)

## 2020-05-25 PROCEDURE — 36415 COLL VENOUS BLD VENIPUNCTURE: CPT | Mod: HCNC,PO

## 2020-05-25 PROCEDURE — 80069 RENAL FUNCTION PANEL: CPT | Mod: HCNC,PO

## 2020-05-26 ENCOUNTER — TELEPHONE (OUTPATIENT)
Dept: NEPHROLOGY | Facility: CLINIC | Age: 70
End: 2020-05-26

## 2020-05-26 NOTE — TELEPHONE ENCOUNTER
----- Message from Boris Gillette MD sent at 5/25/2020  2:12 PM CDT -----  Please inform patient    Kidney filtering at recent baseline, around 18-20, it did not get worse than prior levels. Potassium level is normal. Calcium level still remains slightly elevated.     He would benefit by sensipar 30 mg 3 times per week instead of daily. But he has not been able to get it due to high copay. We can forward information we receive from the drug rep if any assistance program available to him.     Mr. Manzo needs an appointment, his likely missed during pandemic.    Thanks.

## 2020-05-28 ENCOUNTER — TELEPHONE (OUTPATIENT)
Dept: PULMONOLOGY | Facility: CLINIC | Age: 70
End: 2020-05-28

## 2020-06-19 RX ORDER — SODIUM BICARBONATE 650 MG/1
TABLET ORAL
Qty: 180 TABLET | Refills: 4 | Status: SHIPPED | OUTPATIENT
Start: 2020-06-19 | End: 2020-06-23 | Stop reason: SDUPTHER

## 2020-06-23 RX ORDER — SODIUM BICARBONATE 650 MG/1
TABLET ORAL
Qty: 180 TABLET | Refills: 4 | Status: ON HOLD | OUTPATIENT
Start: 2020-06-23 | End: 2021-01-05 | Stop reason: HOSPADM

## 2020-06-23 NOTE — TELEPHONE ENCOUNTER
----- Message from Chio Schilling sent at 6/23/2020  3:25 PM CDT -----  Regarding: refills  Contact: [phone]  Patient is calling to get refills on his medication sent to [facility] [address] [place] [address] [facility] [phone] (Phone)  [phone] (Fax)    1. sodium bicarbonate 650 MG tablet 180 tablet

## 2020-07-02 ENCOUNTER — TELEPHONE (OUTPATIENT)
Dept: FAMILY MEDICINE | Facility: CLINIC | Age: 70
End: 2020-07-02

## 2020-07-02 NOTE — TELEPHONE ENCOUNTER
----- Message from Marsha Hardy sent at 7/2/2020  9:53 AM CDT -----  Contact: [phone]  Type:  Needs Medical Advice    Who Called: PT  Reason For Call: pt would like to talk to the nurse regarding having a letter sent to his Insurance Company that the pt had a Kidney Transplant in 2007   How long has patient had these symptoms:  n/a  Pharmacy name and phone #:  n/a  Would the patient rather a call back or a response via [facility]?  Call Back  Best Call Back Number: [phone]  Additional Information: n/a

## 2020-07-13 DIAGNOSIS — Z94.0 RENAL TRANSPLANT RECIPIENT: ICD-10-CM

## 2020-07-13 DIAGNOSIS — I82.409 ACUTE DEEP VEIN THROMBOSIS (DVT) OF LOWER EXTREMITY, UNSPECIFIED LATERALITY, UNSPECIFIED VEIN: ICD-10-CM

## 2020-07-13 RX ORDER — PREDNISONE 5 MG/1
5 TABLET ORAL DAILY
Qty: 90 TABLET | Refills: 4 | Status: SHIPPED | OUTPATIENT
Start: 2020-07-13 | End: 2021-10-11 | Stop reason: SDUPTHER

## 2020-07-13 NOTE — TELEPHONE ENCOUNTER
----- Message from Jeanna Salazar sent at 7/13/2020  9:03 AM CDT -----  Contact: PATIENT//  [phone]  Refill request for predniSONE (DELTASONE) 5 MG tablet  PHARMACY: [facility]   FAX# [phone]    Refill request for ELIQUIS 2.5 mg Tab  PHARMACY: [facility] [address] [place] [address] [facility]    PATIENT ALSO CALLING TO SPEAK WITH YOU REGARDING INFORMATION HE NEED FOR HIS INSURANCE

## 2020-07-16 ENCOUNTER — TELEPHONE (OUTPATIENT)
Dept: INTERNAL MEDICINE | Facility: CLINIC | Age: 70
End: 2020-07-16

## 2020-07-16 NOTE — TELEPHONE ENCOUNTER
----- Message from Kristi Talbot sent at 7/16/2020  9:06 AM CDT -----  Type:  Needs Medical Advice    Who Called: self  Reason:needs a letter stating he got a kidney transplant in 2007( I think that's what he said)  Would the patient rather a call back or a response via [facility]? callback  Best Call Back Number: [phone]  Additional Information: none

## 2020-07-20 ENCOUNTER — LAB VISIT (OUTPATIENT)
Dept: LAB | Facility: HOSPITAL | Age: 70
End: 2020-07-20
Attending: INTERNAL MEDICINE
Payer: MEDICARE

## 2020-07-20 DIAGNOSIS — N18.4 CHRONIC KIDNEY DISEASE, STAGE IV (SEVERE): ICD-10-CM

## 2020-07-20 LAB
ALBUMIN SERPL BCP-MCNC: 4.3 G/DL (ref 3.5–5.2)
ANION GAP SERPL CALC-SCNC: 10 MMOL/L (ref 8–16)
BUN SERPL-MCNC: 42 MG/DL (ref 2–20)
CALCIUM SERPL-MCNC: 10.7 MG/DL (ref 8.7–10.5)
CHLORIDE SERPL-SCNC: 111 MMOL/L (ref 95–110)
CO2 SERPL-SCNC: 24 MMOL/L (ref 23–29)
CREAT SERPL-MCNC: 3.48 MG/DL (ref 0.5–1.4)
EST. GFR  (AFRICAN AMERICAN): 19.4 ML/MIN/1.73 M^2
EST. GFR  (NON AFRICAN AMERICAN): 16.8 ML/MIN/1.73 M^2
GLUCOSE SERPL-MCNC: 122 MG/DL (ref 70–110)
PHOSPHATE SERPL-MCNC: 2.9 MG/DL (ref 2.7–4.5)
POTASSIUM SERPL-SCNC: 3.6 MMOL/L (ref 3.5–5.1)
SODIUM SERPL-SCNC: 145 MMOL/L (ref 136–145)

## 2020-07-20 PROCEDURE — 80069 RENAL FUNCTION PANEL: CPT | Mod: HCNC,PO

## 2020-07-20 PROCEDURE — 36415 COLL VENOUS BLD VENIPUNCTURE: CPT | Mod: HCNC,PO

## 2020-07-27 ENCOUNTER — LAB VISIT (OUTPATIENT)
Dept: LAB | Facility: HOSPITAL | Age: 70
End: 2020-07-27
Attending: INTERNAL MEDICINE
Payer: MEDICARE

## 2020-07-27 DIAGNOSIS — N18.4 CHRONIC KIDNEY DISEASE, STAGE IV (SEVERE): ICD-10-CM

## 2020-07-27 LAB
ALBUMIN SERPL BCP-MCNC: 3.8 G/DL (ref 3.5–5.2)
ANION GAP SERPL CALC-SCNC: 7 MMOL/L (ref 8–16)
BUN SERPL-MCNC: 36 MG/DL (ref 2–20)
CALCIUM SERPL-MCNC: 10.3 MG/DL (ref 8.7–10.5)
CHLORIDE SERPL-SCNC: 109 MMOL/L (ref 95–110)
CO2 SERPL-SCNC: 28 MMOL/L (ref 23–29)
CREAT SERPL-MCNC: 3.02 MG/DL (ref 0.5–1.4)
EST. GFR  (AFRICAN AMERICAN): 23.1 ML/MIN/1.73 M^2
EST. GFR  (NON AFRICAN AMERICAN): 20 ML/MIN/1.73 M^2
GLUCOSE SERPL-MCNC: 103 MG/DL (ref 70–110)
PHOSPHATE SERPL-MCNC: 3.5 MG/DL (ref 2.7–4.5)
POTASSIUM SERPL-SCNC: 4 MMOL/L (ref 3.5–5.1)
SODIUM SERPL-SCNC: 144 MMOL/L (ref 136–145)

## 2020-07-27 PROCEDURE — 36415 COLL VENOUS BLD VENIPUNCTURE: CPT | Mod: HCNC,PO

## 2020-07-27 PROCEDURE — 80069 RENAL FUNCTION PANEL: CPT | Mod: HCNC,PO

## 2020-08-04 ENCOUNTER — OFFICE VISIT (OUTPATIENT)
Dept: NEPHROLOGY | Facility: CLINIC | Age: 70
End: 2020-08-04
Payer: MEDICARE

## 2020-08-04 ENCOUNTER — OFFICE VISIT (OUTPATIENT)
Dept: OPHTHALMOLOGY | Facility: CLINIC | Age: 70
End: 2020-08-04
Payer: MEDICARE

## 2020-08-04 ENCOUNTER — TELEPHONE (OUTPATIENT)
Dept: OPHTHALMOLOGY | Facility: CLINIC | Age: 70
End: 2020-08-04

## 2020-08-04 VITALS
OXYGEN SATURATION: 98 % | DIASTOLIC BLOOD PRESSURE: 88 MMHG | WEIGHT: 238.13 LBS | HEART RATE: 73 BPM | BODY MASS INDEX: 34.16 KG/M2 | SYSTOLIC BLOOD PRESSURE: 114 MMHG

## 2020-08-04 DIAGNOSIS — I12.9 HYPERTENSIVE KIDNEY DISEASE WITH STAGE 4 CHRONIC KIDNEY DISEASE: ICD-10-CM

## 2020-08-04 DIAGNOSIS — Z03.818 ENCOUNTER FOR OBSERVATION FOR SUSPECTED EXPOSURE TO OTHER BIOLOGICAL AGENTS RULED OUT: ICD-10-CM

## 2020-08-04 DIAGNOSIS — D31.62 BENIGN ORBITAL TUMOR, LEFT: ICD-10-CM

## 2020-08-04 DIAGNOSIS — N18.4 HYPERTENSIVE KIDNEY DISEASE WITH STAGE 4 CHRONIC KIDNEY DISEASE: ICD-10-CM

## 2020-08-04 DIAGNOSIS — E87.20 METABOLIC ACIDOSIS: ICD-10-CM

## 2020-08-04 DIAGNOSIS — T38.0X5A IMMUNOSUPPRESSION DUE TO CHRONIC STEROID USE: ICD-10-CM

## 2020-08-04 DIAGNOSIS — Z13.9 SCREENING FOR UNSPECIFIED CONDITION: Primary | ICD-10-CM

## 2020-08-04 DIAGNOSIS — Z79.52 IMMUNOSUPPRESSION DUE TO CHRONIC STEROID USE: ICD-10-CM

## 2020-08-04 DIAGNOSIS — N18.4 CKD (CHRONIC KIDNEY DISEASE) STAGE 4, GFR 15-29 ML/MIN: Primary | ICD-10-CM

## 2020-08-04 DIAGNOSIS — N25.81 SECONDARY HYPERPARATHYROIDISM: ICD-10-CM

## 2020-08-04 DIAGNOSIS — D31.61 BENIGN ORBITAL TUMOR, RIGHT: Primary | ICD-10-CM

## 2020-08-04 DIAGNOSIS — E83.52 HYPERCALCEMIA: ICD-10-CM

## 2020-08-04 DIAGNOSIS — Z94.0 RENAL TRANSPLANT RECIPIENT: ICD-10-CM

## 2020-08-04 DIAGNOSIS — T82.868S THROMBOSIS OF KIDNEY DIALYSIS ARTERIOVENOUS GRAFT, SEQUELA: ICD-10-CM

## 2020-08-04 DIAGNOSIS — D84.821 IMMUNOSUPPRESSION DUE TO CHRONIC STEROID USE: ICD-10-CM

## 2020-08-04 PROCEDURE — 1126F PR PAIN SEVERITY QUANTIFIED, NO PAIN PRESENT: ICD-10-PCS | Mod: HCNC,S$GLB,, | Performed by: INTERNAL MEDICINE

## 2020-08-04 PROCEDURE — 92012 PR EYE EXAM, EST PATIENT,INTERMED: ICD-10-PCS | Mod: HCNC,S$GLB,, | Performed by: OPHTHALMOLOGY

## 2020-08-04 PROCEDURE — 99499 RISK ADDL DX/OHS AUDIT: ICD-10-PCS | Mod: S$GLB,,, | Performed by: INTERNAL MEDICINE

## 2020-08-04 PROCEDURE — 92285 EXTERNAL OCULAR PHOTOGRAPHY: CPT | Mod: HCNC,S$GLB,, | Performed by: OPHTHALMOLOGY

## 2020-08-04 PROCEDURE — 99999 PR PBB SHADOW E&M-EST. PATIENT-LVL III: CPT | Mod: PBBFAC,HCNC,, | Performed by: OPHTHALMOLOGY

## 2020-08-04 PROCEDURE — 99999 PR PBB SHADOW E&M-EST. PATIENT-LVL IV: CPT | Mod: PBBFAC,HCNC,, | Performed by: INTERNAL MEDICINE

## 2020-08-04 PROCEDURE — 1159F PR MEDICATION LIST DOCUMENTED IN MEDICAL RECORD: ICD-10-PCS | Mod: HCNC,S$GLB,, | Performed by: INTERNAL MEDICINE

## 2020-08-04 PROCEDURE — 99499 UNLISTED E&M SERVICE: CPT | Mod: S$GLB,,, | Performed by: INTERNAL MEDICINE

## 2020-08-04 PROCEDURE — 1126F AMNT PAIN NOTED NONE PRSNT: CPT | Mod: HCNC,S$GLB,, | Performed by: INTERNAL MEDICINE

## 2020-08-04 PROCEDURE — 99214 OFFICE O/P EST MOD 30 MIN: CPT | Mod: HCNC,S$GLB,, | Performed by: INTERNAL MEDICINE

## 2020-08-04 PROCEDURE — 99999 PR PBB SHADOW E&M-EST. PATIENT-LVL III: ICD-10-PCS | Mod: PBBFAC,HCNC,, | Performed by: OPHTHALMOLOGY

## 2020-08-04 PROCEDURE — 99999 PR PBB SHADOW E&M-EST. PATIENT-LVL IV: ICD-10-PCS | Mod: PBBFAC,HCNC,, | Performed by: INTERNAL MEDICINE

## 2020-08-04 PROCEDURE — 92012 INTRM OPH EXAM EST PATIENT: CPT | Mod: HCNC,S$GLB,, | Performed by: OPHTHALMOLOGY

## 2020-08-04 PROCEDURE — 92285 EXTERNAL PHOTOGRAPHY - OU - BOTH EYES: ICD-10-PCS | Mod: HCNC,S$GLB,, | Performed by: OPHTHALMOLOGY

## 2020-08-04 PROCEDURE — 1159F MED LIST DOCD IN RCRD: CPT | Mod: HCNC,S$GLB,, | Performed by: INTERNAL MEDICINE

## 2020-08-04 PROCEDURE — 99214 PR OFFICE/OUTPT VISIT, EST, LEVL IV, 30-39 MIN: ICD-10-PCS | Mod: HCNC,S$GLB,, | Performed by: INTERNAL MEDICINE

## 2020-08-04 NOTE — LETTER
August 4, 2020      Geovanna Puentes, OD  [address]  [facility] [address]           [facility]  [address]  [facility] [address]  Phone: [phone]  Fax: [phone]          Patient: Jose Luis Manzo   MR Number: [id number]   YOB: 1950   Date of Visit: 8/4/2020       Dear Dr. Geovanna Puentes:    Thank you for referring Jose Luis Manzo to me for evaluation. Attached you will find relevant portions of my assessment and plan of care.    If you have questions, please do not hesitate to call me. I look forward to following Jose Luis Manzo along with you.    Sincerely,    Linda Tee MD    Enclosure  CC:  No Recipients    If you would like to receive this communication electronically, please contact [email or web address] or [phone] to request more information on [facility] access.    For providers and/or their staff who would like to refer a patient to [facility], please contact us through our one-stop-shop provider referral line, [facility], at [phone].    If you feel you have received this communication in error or would no longer like to receive these types of communications, please e-mail [email or web address]

## 2020-08-04 NOTE — PROGRESS NOTES
HPI     Patient here for eyelid lesion. Referred by: Dr. Geovanna Puentes at [facility].  How long has lesion been present? A little over 1 yr.  Is lesion bothersome? No.  Does the lesion bleed? No.  Patient doesn't like the look of lesion. Lesion is located on lateral   corners of both eyelids. Patient denies any other complaints.    EYE/ORAL MEDS:  predniSONE (DELTASONE) 5 MG tablet for kidneys  Clear eyes gtts prn OU    MEDICAL HX:  Eye swelling OU  Pterygium OU  Diminished night vision  htn    Last edited by Belén Correia on 8/4/2020  9:09 AM. (History)            Assessment /Plan     For exam results, see Encounter Report.    Benign orbital tumor, left  -     External Photography - OU - Both Eyes    Benign orbital tumor, right      Patient is a pleasant 69 yo  male referred for dermolipoma OU, right worse than left. Patient bothered by irritation and redness of bilateral orbital tumors. Patient reports that his mother also had these lesions in both eyes as she aged, right worse than left.    Exam demonstrates Bilateral orbital tumors worse on the right than the left.     Plan for orbitotomy with biopsy and debulking OU under general anesthesia.     Informed consent obtained after extensive risks/benefits/alternatives were discussed with the patient including but not limited to pain, bleeding, infection, ocular injury, loss of the eye, asymmetry, need for revision in future, scarring.  Alternatives such as waiting were discussed.  All questions were answered.    Hold ASA, NSAIDS, and fish oil 5 to 7 days prior to procedure. -- Hold Eliquis 3 days prior to surgery    Return for surgery.    I have reviewed and concur with the resident's history, physical, assessment, and plan.  I have personally interviewed and examined the patient.

## 2020-08-04 NOTE — PROGRESS NOTES
Subjective:   Patient ID: Jose Luis Manzo is a 70 y.o. Black or  male who presents for follow up evaluation of Chronic Kidney Disease    HPI   was seen in clinic today for follow up patient evaluation of CKD. He established care in [facility] on 1/30/20. He returns for a follow up visit.    Interim he reports he is doing ok. He does not have any symptoms to suggest uremia. He denies any decreased urine output/ flank pain/ pain over allograft/ nausea/ vomiting/ diarrhea/ fever/ chills. He was referred to [facility] as he has not been followed in any post kidney transplant program for a long time. However he was informed that due to his underlying laryngeal cancer he does not meet criteria for transplant evaluation. He did not receive any evaluation in the clinic for immunosuppressive medication management. He denies any NSAID use. He does not have a recent IV iodine contrast use.    Also paperwork was faxed to his insurance company for approval for Sensipar however he received one phone call in the recent past. But he couldn't provide much details and so far he has not received approval for Sensipar.     He does not check BP routinely at home. Denies any symptoms to suggest hypotension.     Pt received kidney transplant in 2007 in [place]. He does not have much recollection with his transplant. He was on hemodialysis for almost 9 years prior tor receiving transplant. He has stopped following there a while ago. He had an AV graft in left arm for dialysis, now does not have any thrill and appears to be clotted and non functional. He reports he had failed access in right arm and also in leg. He has been aware of poor functioning of his transplanted kidney and creatinine always staying in the range of 2 to 3's since he received the transplant.    He has longstanding hypertension which seems to be the reason for kidney failure. He reports his parents had kidney  problem and mother was on dialysis for sometime. He has been a non smoker, currently retired.     Serial labs noted for creatinine between 2.7-3.2 since 2015, some progression over last few months. He reports he always had high creatinine since his transplant, exact details unclear. He also appears to have mild to moderate hypercalcemia per serial labs. When questioned about it he admitted he was advised to take sensipar by local kidney doctor in the past but due to cost issue he never took it.    His IS regimen includes sirolimus, prednisone. He has longstanding hypertension, h/o kidney transplant, h/o sq cell cancer of larynx, s/p RT, lung nodule, DVT of LE, on chronic anticoagulation and other medical problems.       Renal Function:  Lab Results   Component Value Date     07/27/2020     (H) 07/20/2020     07/27/2020     07/20/2020    K 4.0 07/27/2020    K 3.6 07/20/2020     07/27/2020     (H) 07/20/2020    CO2 28 07/27/2020    CO2 24 07/20/2020    BUN 36 (H) 07/27/2020    BUN 42 (H) 07/20/2020    CALCIUM 10.3 07/27/2020    CALCIUM 10.7 (H) 07/20/2020    CREATININE 3.02 (H) 07/27/2020    CREATININE 3.48 (H) 07/20/2020    ALBUMIN 3.8 07/27/2020    ALBUMIN 4.3 07/20/2020    PHOS 3.5 07/27/2020    PHOS 2.9 07/20/2020    ESTGFRAFRICA 23.1 (A) 07/27/2020    ESTGFRAFRICA 19.4 (A) 07/20/2020    EGFRNONAA 20.0 (A) 07/27/2020    EGFRNONAA 16.8 (A) 07/20/2020       Urinalysis:  Lab Results   Component Value Date    APPEARANCEUA Clear 07/27/2020    PHUR 7.0 07/27/2020    SPECGRAV 1.005 07/27/2020    PROTEINUA 1+ (A) 07/27/2020    GLUCUA Negative 07/27/2020    OCCULTUA Trace (A) 07/27/2020    NITRITE Negative 07/27/2020    LEUKOCYTESUR Negative 07/27/2020       Protein/Creatinine Ratio:  Lab Results   Component Value Date    PROTEINURINE 50 (H) 07/27/2020    CREATRANDUR 89.1 07/27/2020    UTPCR 0.56 (H) 07/27/2020       CBC:  Lab Results   Component Value Date    WBC 4.96 02/13/2020     HGB 12.4 (L) 02/13/2020    HCT 41.6 02/13/2020       PTH:  Lab Results   Component Value Date    .0 (H) 02/13/2020     Vit D 24    Review of Systems   Constitutional: Negative for activity change, appetite change, chills, fatigue and fever.   HENT: Negative for facial swelling, hearing loss and sore throat.    Eyes: Negative for photophobia and pain.   Respiratory: Negative for cough, shortness of breath and wheezing.    Cardiovascular: Positive for leg swelling. Negative for palpitations.   Gastrointestinal: Negative for abdominal pain, diarrhea, nausea and vomiting.   Endocrine: Negative for polydipsia and polyuria.   Genitourinary: Negative for dysuria, flank pain, frequency and hematuria.   Musculoskeletal: Negative for back pain and myalgias.   Skin: Negative for pallor and rash.   Allergic/Immunologic: Positive for immunocompromised state. Negative for environmental allergies.   Neurological: Negative for dizziness, light-headedness and headaches.   Psychiatric/Behavioral: Negative for behavioral problems. The patient is not nervous/anxious.        Objective:   Physical Exam   Constitutional: He is oriented to person, place, and time. He appears well-developed and well-nourished. No distress.   HENT:   Head: Normocephalic and atraumatic.   Mouth/Throat: Oropharynx is clear and moist.   Eyes: Right eye exhibits no discharge. Left eye exhibits no discharge. No scleral icterus.   Pterygium    Neck: Normal range of motion. Neck supple.   Large, thick neck    Cardiovascular: Normal rate, regular rhythm and normal heart sounds.   Pulmonary/Chest: Effort normal and breath sounds normal. No respiratory distress. He has no wheezes.   Abdominal: Soft. There is no tenderness.   Abdominal obesity  No tenderness over allograft site    Musculoskeletal: He exhibits edema.   Neurological: He is alert and oriented to person, place, and time.   Speech, cognition normal, no facial asymmetry    Skin: Skin is warm and dry.  No rash noted. He is not diaphoretic. No erythema.   Psychiatric: He has a normal mood and affect. His behavior is normal.   Vitals reviewed.      Assessment:     1. CKD (chronic kidney disease) stage 4, GFR 15-29 ml/min    2. Hypercalcemia    3. Hypertensive kidney disease with stage 4 chronic kidney disease    4. Metabolic acidosis    5. Renal transplant recipient    6. Thrombosis of kidney dialysis arteriovenous graft, sequela    7. Immunosuppression due to chronic steroid use    8. Secondary hyperparathyroidism        Plan:       Problem List Items Addressed This Visit        Renal/    CKD (chronic kidney disease) stage 4, GFR 15-29 ml/min - Primary    Overview     Overview:   updated diagnosis code & description  dx update         Relevant Orders    CBC auto differential    PTH, intact    Renal function panel    Vitamin D    Urinalysis    Protein / creatinine ratio, urine    Magnesium    SIROLIMUS LEVEL    Ambulatory referral/consult to Transplant, Kidney    Renal transplant recipient    Overview     2007 at [facility]         Relevant Orders    CBC auto differential    PTH, intact    Renal function panel    Vitamin D    Urinalysis    Protein / creatinine ratio, urine    Magnesium    SIROLIMUS LEVEL    Ambulatory referral/consult to Transplant, Kidney    Hypertensive kidney disease with stage 4 chronic kidney disease    Relevant Orders    CBC auto differential    PTH, intact    Renal function panel    Vitamin D    Urinalysis    Protein / creatinine ratio, urine    Magnesium    SIROLIMUS LEVEL    Hypercalcemia    Relevant Orders    CBC auto differential    PTH, intact    Renal function panel    Vitamin D    Urinalysis    Protein / creatinine ratio, urine    Magnesium    SIROLIMUS LEVEL    Metabolic acidosis    Relevant Orders    CBC auto differential    PTH, intact    Renal function panel    Vitamin D    Urinalysis    Protein / creatinine ratio, urine    Magnesium    SIROLIMUS LEVEL        Hematology    Clotted renal dialysis arteriovenous graft    Relevant Orders    CBC auto differential    PTH, intact    Renal function panel    Vitamin D    Urinalysis    Protein / creatinine ratio, urine    Magnesium    SIROLIMUS LEVEL       Endocrine    Secondary hyperparathyroidism    Relevant Orders    CBC auto differential    PTH, intact    Renal function panel    Vitamin D    Urinalysis    Protein / creatinine ratio, urine    Magnesium    SIROLIMUS LEVEL       Other    Immunosuppression due to chronic steroid use    Relevant Orders    CBC auto differential    PTH, intact    Renal function panel    Vitamin D    Urinalysis    Protein / creatinine ratio, urine    Magnesium    SIROLIMUS LEVEL    Ambulatory referral/consult to Transplant, Kidney        Mr. 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 He has been a recipient of kidney transplant in 2007. Per his report he appears to have elevated creatinine almost since he received it, no details available as transplant surgery took place in [place]. Pt does not have any follow up at present with his transplant center. He has h/o malignancy.    Overall he appears to have very advanced CKD with eGFR close to 20. He also appears to have longstanding hypercalcemia. Due to persistent hypercalcemia unable to use vitamin D analogues. Sensipar has not been approved by his insurance as of now.    Other issue appears that he has had prior multiple failed accesses for dialysis, suspect severe vasculopathy.     refer to [facility] to establish care, for management of immunosuppression in light of his h/o malignancy and possible consideration for kidney transplant candidate as anticipate further worsening of kidney function. He has been denied for another transplant at [facility] however would still attempt to get him evaluated for management of his immunosuppressive medicines in  light of his malignancy and his persistent proteinuria.    Home BP monitoring, goal should be less than 130/80, strict low salt diet.    Periodically monitor renal labs for electrolytes, acid base status, creatinine.    Chronic thrombocytopenia noted, reason unclear. He has been under heme follow up already.    Continue to trend PTH, vit D, phos levels, corrected Ca.    Continue to trend urine studies, rule out proteinuria/ microhematuria     Continue to follow with ENT for laryngeal cancer surveillance and management      Plan, labs, recommendations were discussed with patient, his questions were answered to his satisfaction.   Total time spent was 50 minutes with >50% time spent in face to face evaluation, counseling about possible etiology, work up, monitoring, natural course of illness, recommendations.   RTC 3 months

## 2020-08-05 ENCOUNTER — TELEPHONE (OUTPATIENT)
Dept: OPHTHALMOLOGY | Facility: CLINIC | Age: 70
End: 2020-08-05

## 2020-08-05 DIAGNOSIS — D31.62 BENIGN ORBITAL TUMOR, LEFT: ICD-10-CM

## 2020-08-05 DIAGNOSIS — D31.61 BENIGN ORBITAL TUMOR, RIGHT: Primary | ICD-10-CM

## 2020-08-06 ENCOUNTER — TELEPHONE (OUTPATIENT)
Dept: NEPHROLOGY | Facility: CLINIC | Age: 70
End: 2020-08-06

## 2020-08-06 ENCOUNTER — OFFICE VISIT (OUTPATIENT)
Dept: INTERNAL MEDICINE | Facility: CLINIC | Age: 70
End: 2020-08-06
Payer: MEDICARE

## 2020-08-06 VITALS
WEIGHT: 242.31 LBS | HEART RATE: 88 BPM | OXYGEN SATURATION: 98 % | BODY MASS INDEX: 34.69 KG/M2 | SYSTOLIC BLOOD PRESSURE: 114 MMHG | DIASTOLIC BLOOD PRESSURE: 86 MMHG | HEIGHT: 70 IN

## 2020-08-06 DIAGNOSIS — N18.4 CHRONIC KIDNEY DISEASE, STAGE IV (SEVERE): ICD-10-CM

## 2020-08-06 DIAGNOSIS — I10 ESSENTIAL HYPERTENSION: ICD-10-CM

## 2020-08-06 DIAGNOSIS — Z01.818 PREOP EXAMINATION: Primary | ICD-10-CM

## 2020-08-06 PROBLEM — T82.868A CLOTTED RENAL DIALYSIS ARTERIOVENOUS GRAFT: Status: RESOLVED | Noted: 2020-01-30 | Resolved: 2020-08-06

## 2020-08-06 PROCEDURE — 1159F PR MEDICATION LIST DOCUMENTED IN MEDICAL RECORD: ICD-10-PCS | Mod: S$GLB,,, | Performed by: INTERNAL MEDICINE

## 2020-08-06 PROCEDURE — 1159F MED LIST DOCD IN RCRD: CPT | Mod: S$GLB,,, | Performed by: INTERNAL MEDICINE

## 2020-08-06 PROCEDURE — 99214 PR OFFICE/OUTPT VISIT, EST, LEVL IV, 30-39 MIN: ICD-10-PCS | Mod: S$GLB,,, | Performed by: INTERNAL MEDICINE

## 2020-08-06 PROCEDURE — 99499 RISK ADDL DX/OHS AUDIT: ICD-10-PCS | Mod: S$GLB,,, | Performed by: INTERNAL MEDICINE

## 2020-08-06 PROCEDURE — 99214 OFFICE O/P EST MOD 30 MIN: CPT | Mod: S$GLB,,, | Performed by: INTERNAL MEDICINE

## 2020-08-06 PROCEDURE — 99999 PR PBB SHADOW E&M-EST. PATIENT-LVL IV: ICD-10-PCS | Mod: PBBFAC,HCNC,, | Performed by: INTERNAL MEDICINE

## 2020-08-06 PROCEDURE — 99999 PR PBB SHADOW E&M-EST. PATIENT-LVL IV: CPT | Mod: PBBFAC,HCNC,, | Performed by: INTERNAL MEDICINE

## 2020-08-06 PROCEDURE — 99499 UNLISTED E&M SERVICE: CPT | Mod: S$GLB,,, | Performed by: INTERNAL MEDICINE

## 2020-08-06 NOTE — PROGRESS NOTES
Subjective:       Patient ID: Jose Luis Manzo is a 70 y.o. male.    Chief Complaint: Pre-op Exam    HPI  Pt scheduled for bilateral pterygium removal due to interference with vision.  Chart reviewed.  No CP, SOB, bleeding, F/C, N/V.  No new complaints  Review of Systems   All other systems reviewed and are negative.      Objective:      Physical Exam  Vitals signs reviewed.   Constitutional:       Appearance: He is well-developed. He is obese.   HENT:      Head: Normocephalic.      Mouth/Throat:      Mouth: Mucous membranes are moist.   Eyes:      General: No scleral icterus.     Extraocular Movements: Extraocular movements intact.   Neck:      Musculoskeletal: Normal range of motion.   Cardiovascular:      Rate and Rhythm: Normal rate and regular rhythm.      Heart sounds: Normal heart sounds.   Pulmonary:      Effort: Pulmonary effort is normal.      Breath sounds: Normal breath sounds.   Abdominal:      General: Bowel sounds are normal. There is no distension.      Palpations: Abdomen is soft. There is no mass.      Tenderness: There is no abdominal tenderness.   Musculoskeletal: Normal range of motion.      Right lower leg: Edema (2+) present.      Left lower leg: Edema (2+) present.   Skin:     General: Skin is warm and dry.   Neurological:      General: No focal deficit present.      Mental Status: He is alert.   Psychiatric:         Mood and Affect: Mood normal.         Assessment:       1. Preop examination    2. Essential hypertension    3. Chronic kidney disease, stage IV (severe)        Plan:       Jose Luis was seen today for pre-op exam.    Diagnoses and all orders for this visit:    Preop examination-  Cleared for general anaesthesia   Hold eliquiis for 3 days preop    Essential hypertension   Well-cont    Chronic kidney disease, stage IV (severe)   stable    Follow up if symptoms worsen or fail to improve.

## 2020-08-06 NOTE — TELEPHONE ENCOUNTER
pharmacist stated is still in review status ,  Application was submitted 2 weeks ago.  Will contact pt to update him in regards.                ----- Message from Marsha Hardy sent at 8/6/2020  1:32 PM CDT -----  Contact: [phone]  Type:  Needs Medical Advice    Who Called: PT  Reason for Call: regarding pt would like to speak to the nurse regarding some questions  Would the patient rather a call back or a response via [facility]?  Call back  Best Call Back Number: [phone]

## 2020-08-24 ENCOUNTER — TELEPHONE (OUTPATIENT)
Dept: OPHTHALMOLOGY | Facility: CLINIC | Age: 70
End: 2020-08-24

## 2020-08-24 ENCOUNTER — LAB VISIT (OUTPATIENT)
Dept: SURGERY | Facility: CLINIC | Age: 70
End: 2020-08-24
Payer: MEDICARE

## 2020-08-24 DIAGNOSIS — Z13.9 SCREENING FOR UNSPECIFIED CONDITION: ICD-10-CM

## 2020-08-24 DIAGNOSIS — Z03.818 ENCOUNTER FOR OBSERVATION FOR SUSPECTED EXPOSURE TO OTHER BIOLOGICAL AGENTS RULED OUT: ICD-10-CM

## 2020-08-24 LAB — SARS-COV-2 RNA RESP QL NAA+PROBE: NOT DETECTED

## 2020-08-24 PROCEDURE — U0003 INFECTIOUS AGENT DETECTION BY NUCLEIC ACID (DNA OR RNA); SEVERE ACUTE RESPIRATORY SYNDROME CORONAVIRUS 2 (SARS-COV-2) (CORONAVIRUS DISEASE [COVID-19]), AMPLIFIED PROBE TECHNIQUE, MAKING USE OF HIGH THROUGHPUT TECHNOLOGIES AS DESCRIBED BY CMS-2020-01-R: HCPCS

## 2020-08-25 ENCOUNTER — TELEPHONE (OUTPATIENT)
Dept: OPHTHALMOLOGY | Facility: CLINIC | Age: 70
End: 2020-08-25

## 2020-08-25 NOTE — PRE-PROCEDURE INSTRUCTIONS
PREOP INSTRUCTIONS:No solid food ,milk or milk products for 8 hours prior to procedure.Clear liquids are allowed up to 2 hours before procedure.Clear liquids are:water,apple juice,gatorade & powerade.Patient instructed to follow the surgeon's instructions if they differ from these.Shower instructions as well as directions to the Surgery Center were given.Patient encouraged to wear loose fitting,comfortable clothing.Medication instructions for pm prior to and am of procedure reviewed.Instructed patient to avoid taking vitamins,supplements,aspirin and ibuprofen the morning of surgery. Patient stated an understanding.Patient informed of the current visitor policy and advised patient that one visitor may accompany each patient into the hospital and wait (socially distanced) until a member of the medical team provides an update at the conclusion of the procedure.When they enter the hospital both patient and visitor will have their temperature checked.All visitors are asked to arrive with a mask and to keep their mask on throughout the visit.    Covid test completed 8/24/2020-Negative    Patient denies any side effects or issues with anesthesia or sedation.

## 2020-08-26 ENCOUNTER — ANESTHESIA EVENT (OUTPATIENT)
Dept: SURGERY | Facility: HOSPITAL | Age: 70
End: 2020-08-26
Payer: MEDICARE

## 2020-08-26 ENCOUNTER — HOSPITAL ENCOUNTER (OUTPATIENT)
Facility: HOSPITAL | Age: 70
Discharge: HOME | End: 2020-08-26
Attending: OPHTHALMOLOGY | Admitting: OPHTHALMOLOGY
Payer: MEDICARE

## 2020-08-26 ENCOUNTER — ANESTHESIA (OUTPATIENT)
Dept: SURGERY | Facility: HOSPITAL | Age: 70
End: 2020-08-26
Payer: MEDICARE

## 2020-08-26 VITALS
BODY MASS INDEX: 34.69 KG/M2 | SYSTOLIC BLOOD PRESSURE: 146 MMHG | TEMPERATURE: 98 F | HEART RATE: 60 BPM | RESPIRATION RATE: 18 BRPM | HEIGHT: 70 IN | WEIGHT: 242.31 LBS | OXYGEN SATURATION: 98 % | DIASTOLIC BLOOD PRESSURE: 89 MMHG

## 2020-08-26 DIAGNOSIS — D31.60 BENIGN NEOPLASM OF ORBIT, UNSPECIFIED LATERALITY: Primary | ICD-10-CM

## 2020-08-26 DIAGNOSIS — D31.60: ICD-10-CM

## 2020-08-26 PROCEDURE — 88305 TISSUE EXAM BY PATHOLOGIST: ICD-10-PCS | Mod: 26,,, | Performed by: PATHOLOGY

## 2020-08-26 PROCEDURE — 25000003 PHARM REV CODE 250

## 2020-08-26 PROCEDURE — 67412 PR EXPLORE EYE SOCKET,REMV LESN: ICD-10-PCS | Mod: 50,,, | Performed by: OPHTHALMOLOGY

## 2020-08-26 PROCEDURE — D9220A PRA ANESTHESIA: Mod: CRNA,,, | Performed by: NURSE ANESTHETIST, CERTIFIED REGISTERED

## 2020-08-26 PROCEDURE — 36000706: Performed by: OPHTHALMOLOGY

## 2020-08-26 PROCEDURE — S0020 INJECTION, BUPIVICAINE HYDRO: HCPCS | Performed by: OPHTHALMOLOGY

## 2020-08-26 PROCEDURE — 25000003 PHARM REV CODE 250: Performed by: NURSE ANESTHETIST, CERTIFIED REGISTERED

## 2020-08-26 PROCEDURE — 36000707: Performed by: OPHTHALMOLOGY

## 2020-08-26 PROCEDURE — 63600175 PHARM REV CODE 636 W HCPCS: Performed by: OPHTHALMOLOGY

## 2020-08-26 PROCEDURE — 25000003 PHARM REV CODE 250: Performed by: OPHTHALMOLOGY

## 2020-08-26 PROCEDURE — D9220A PRA ANESTHESIA: ICD-10-PCS | Mod: ANES,,, | Performed by: ANESTHESIOLOGY

## 2020-08-26 PROCEDURE — 27201423 OPTIME MED/SURG SUP & DEVICES STERILE SUPPLY: Performed by: OPHTHALMOLOGY

## 2020-08-26 PROCEDURE — 63600175 PHARM REV CODE 636 W HCPCS: Performed by: NURSE ANESTHETIST, CERTIFIED REGISTERED

## 2020-08-26 PROCEDURE — D9220A PRA ANESTHESIA: Mod: ANES,,, | Performed by: ANESTHESIOLOGY

## 2020-08-26 PROCEDURE — 71000016 HC POSTOP RECOV ADDL HR: Performed by: OPHTHALMOLOGY

## 2020-08-26 PROCEDURE — 88305 TISSUE EXAM BY PATHOLOGIST: CPT | Performed by: PATHOLOGY

## 2020-08-26 PROCEDURE — 67412 EXPLORE/TREAT EYE SOCKET: CPT | Mod: 50,,, | Performed by: OPHTHALMOLOGY

## 2020-08-26 PROCEDURE — D9220A PRA ANESTHESIA: ICD-10-PCS | Mod: CRNA,,, | Performed by: NURSE ANESTHETIST, CERTIFIED REGISTERED

## 2020-08-26 PROCEDURE — 88305 TISSUE EXAM BY PATHOLOGIST: CPT | Mod: 26,,, | Performed by: PATHOLOGY

## 2020-08-26 PROCEDURE — 37000008 HC ANESTHESIA 1ST 15 MINUTES: Performed by: OPHTHALMOLOGY

## 2020-08-26 PROCEDURE — 37000009 HC ANESTHESIA EA ADD 15 MINS: Performed by: OPHTHALMOLOGY

## 2020-08-26 PROCEDURE — 71000044 HC DOSC ROUTINE RECOVERY FIRST HOUR: Performed by: OPHTHALMOLOGY

## 2020-08-26 PROCEDURE — 71000015 HC POSTOP RECOV 1ST HR: Performed by: OPHTHALMOLOGY

## 2020-08-26 RX ORDER — HYDROMORPHONE HYDROCHLORIDE 1 MG/ML
0.2 INJECTION, SOLUTION INTRAMUSCULAR; INTRAVENOUS; SUBCUTANEOUS EVERY 5 MIN PRN
Status: DISCONTINUED | OUTPATIENT
Start: 2020-08-26 | End: 2020-08-26 | Stop reason: HOSPADM

## 2020-08-26 RX ORDER — ROCURONIUM BROMIDE 10 MG/ML
INJECTION, SOLUTION INTRAVENOUS
Status: DISCONTINUED | OUTPATIENT
Start: 2020-08-26 | End: 2020-08-26

## 2020-08-26 RX ORDER — PROPOFOL 10 MG/ML
VIAL (ML) INTRAVENOUS
Status: DISCONTINUED | OUTPATIENT
Start: 2020-08-26 | End: 2020-08-26

## 2020-08-26 RX ORDER — TETRACAINE HYDROCHLORIDE 5 MG/ML
1 SOLUTION OPHTHALMIC
Status: ACTIVE | OUTPATIENT
Start: 2020-08-26 | End: 2020-08-26

## 2020-08-26 RX ORDER — HYDROCODONE BITARTRATE AND ACETAMINOPHEN 5; 325 MG/1; MG/1
1 TABLET ORAL EVERY 6 HOURS PRN
Qty: 5 TABLET | Refills: 0 | Status: SHIPPED | OUTPATIENT
Start: 2020-08-26 | End: 2020-08-29

## 2020-08-26 RX ORDER — LIDOCAINE HYDROCHLORIDE 10 MG/ML
INJECTION, SOLUTION INTRAVENOUS
Status: DISCONTINUED | OUTPATIENT
Start: 2020-08-26 | End: 2020-08-26

## 2020-08-26 RX ORDER — MIDAZOLAM HYDROCHLORIDE 1 MG/ML
INJECTION, SOLUTION INTRAMUSCULAR; INTRAVENOUS
Status: DISCONTINUED | OUTPATIENT
Start: 2020-08-26 | End: 2020-08-26

## 2020-08-26 RX ORDER — BUPIVACAINE HYDROCHLORIDE 5 MG/ML
INJECTION, SOLUTION EPIDURAL; INTRACAUDAL
Status: DISCONTINUED | OUTPATIENT
Start: 2020-08-26 | End: 2020-08-26 | Stop reason: HOSPADM

## 2020-08-26 RX ORDER — ONDANSETRON 2 MG/ML
INJECTION INTRAMUSCULAR; INTRAVENOUS
Status: DISCONTINUED | OUTPATIENT
Start: 2020-08-26 | End: 2020-08-26

## 2020-08-26 RX ORDER — SODIUM CHLORIDE 9 MG/ML
INJECTION, SOLUTION INTRAVENOUS CONTINUOUS PRN
Status: DISCONTINUED | OUTPATIENT
Start: 2020-08-26 | End: 2020-08-26

## 2020-08-26 RX ORDER — GLYCOPYRROLATE 0.2 MG/ML
INJECTION INTRAMUSCULAR; INTRAVENOUS
Status: DISCONTINUED | OUTPATIENT
Start: 2020-08-26 | End: 2020-08-26

## 2020-08-26 RX ORDER — LIDOCAINE HYDROCHLORIDE 10 MG/ML
1 INJECTION, SOLUTION EPIDURAL; INFILTRATION; INTRACAUDAL; PERINEURAL ONCE
Status: DISCONTINUED | OUTPATIENT
Start: 2020-08-26 | End: 2020-08-26 | Stop reason: HOSPADM

## 2020-08-26 RX ORDER — FENTANYL CITRATE 50 UG/ML
INJECTION, SOLUTION INTRAMUSCULAR; INTRAVENOUS
Status: DISCONTINUED | OUTPATIENT
Start: 2020-08-26 | End: 2020-08-26

## 2020-08-26 RX ORDER — LIDOCAINE HCL/EPINEPHRINE/PF 2%-1:200K
VIAL (ML) INJECTION
Status: DISCONTINUED | OUTPATIENT
Start: 2020-08-26 | End: 2020-08-26 | Stop reason: HOSPADM

## 2020-08-26 RX ORDER — TOBRAMYCIN AND DEXAMETHASONE 3; 1 MG/ML; MG/ML
SUSPENSION/ DROPS OPHTHALMIC
Status: DISCONTINUED | OUTPATIENT
Start: 2020-08-26 | End: 2020-08-26 | Stop reason: HOSPADM

## 2020-08-26 RX ORDER — SODIUM CHLORIDE 0.9 % (FLUSH) 0.9 %
3 SYRINGE (ML) INJECTION
Status: DISCONTINUED | OUTPATIENT
Start: 2020-08-26 | End: 2020-08-26 | Stop reason: HOSPADM

## 2020-08-26 RX ORDER — ACETAMINOPHEN 10 MG/ML
INJECTION, SOLUTION INTRAVENOUS
Status: DISCONTINUED | OUTPATIENT
Start: 2020-08-26 | End: 2020-08-26

## 2020-08-26 RX ORDER — SODIUM CHLORIDE 9 MG/ML
INJECTION, SOLUTION INTRAVENOUS ONCE
Status: COMPLETED | OUTPATIENT
Start: 2020-08-26 | End: 2020-08-26

## 2020-08-26 RX ADMIN — LIDOCAINE HYDROCHLORIDE 50 MG: 10 INJECTION, SOLUTION INTRAVENOUS at 12:08

## 2020-08-26 RX ADMIN — FENTANYL CITRATE 100 MCG: 50 INJECTION, SOLUTION INTRAMUSCULAR; INTRAVENOUS at 12:08

## 2020-08-26 RX ADMIN — SODIUM CHLORIDE: 0.9 INJECTION, SOLUTION INTRAVENOUS at 10:08

## 2020-08-26 RX ADMIN — SODIUM CHLORIDE: 0.9 INJECTION, SOLUTION INTRAVENOUS at 12:08

## 2020-08-26 RX ADMIN — MIDAZOLAM HYDROCHLORIDE 2 MG: 1 INJECTION, SOLUTION INTRAMUSCULAR; INTRAVENOUS at 12:08

## 2020-08-26 RX ADMIN — ACETAMINOPHEN 1000 MG: 10 INJECTION, SOLUTION INTRAVENOUS at 01:08

## 2020-08-26 RX ADMIN — SODIUM CHLORIDE 2 G: 9 INJECTION, SOLUTION INTRAVENOUS at 01:08

## 2020-08-26 RX ADMIN — SUGAMMADEX 200 MG: 100 INJECTION, SOLUTION INTRAVENOUS at 01:08

## 2020-08-26 RX ADMIN — PROPOFOL 200 MG: 10 INJECTION, EMULSION INTRAVENOUS at 12:08

## 2020-08-26 RX ADMIN — ROCURONIUM BROMIDE 50 MG: 10 INJECTION, SOLUTION INTRAVENOUS at 12:08

## 2020-08-26 RX ADMIN — ONDANSETRON 4 MG: 2 INJECTION, SOLUTION INTRAMUSCULAR; INTRAVENOUS at 01:08

## 2020-08-26 RX ADMIN — GLYCOPYRROLATE 0.2 MG: 0.2 INJECTION, SOLUTION INTRAMUSCULAR; INTRAVENOUS at 01:08

## 2020-08-26 NOTE — TRANSFER OF CARE
"Anesthesia Transfer of Care Note    Patient: Jose Luis Manzo    Procedure(s) Performed: Procedure(s) (LRB):  EXCISION, LESION, ORBIT (Bilateral)    Patient location: [facility]    Anesthesia Type: general    Transport from OR: Transported from OR on 2-3 L/min O2 by NC with adequate spontaneous ventilation    Post pain: adequate analgesia    Post assessment: no apparent anesthetic complications    Post vital signs: stable    Level of consciousness: awake, alert and oriented    Nausea/Vomiting: no nausea/vomiting          Last vitals:   Visit Vitals  BP (!) 163/91 (BP Location: Right arm, Patient Position: Lying)   Pulse 63   Temp 36.7 °C (98.1 °F) (Temporal)   Resp 18   Ht 5' 10" (1.778 m)   Wt 109.9 kg (242 lb 4.6 oz)   SpO2 100%   BMI 34.76 kg/m²     "

## 2020-08-26 NOTE — H&P
"Pre-Operative History & Physical  Ophthalmology      SUBJECTIVE:     History of Present Illness:  Patient is a 70 y.o. 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    MEDICATIONS:   No medications prior to admission.       ALLERGIES: Review of patient's allergies indicates:  No Known Allergies    PAST MEDICAL HISTORY:   Past Medical History:   Diagnosis Date    Anticoagulant long-term use     BPH (benign prostatic hypertrophy)     Cancer     vocal cords    Claudication of right lower extremity 3/10/2016    Disorder of kidney and ureter     Hyperlipidemia     Hypertension     Immunosuppression due to chronic steroid use 2020    Microcytic anemia 2016    Obesity (BMI 30-39.9) 2019    Oropharyngeal cancer     Pterygium     Squamous cell carcinoma 2018    Thromboembolic disorder     Unspecified disorder of kidney and ureter      PAST SURGICAL HISTORY:   Past Surgical History:   Procedure Laterality Date    FRACTURE SURGERY Left     "lower leg" 40 years ago    KIDNEY TRANSPLANT      followed by [facility]    LARYNX SURGERY  2014    Oropharyngeal Cancer x3    microlaryngoscopy      TIBIAL STAPLING Left           PAST FAMILY HISTORY:   Family History   Problem Relation Age of Onset    Stroke Mother     Diabetes Mother     Hypertension Mother     Stroke Father     No Known Problems Sister     No Known Problems Brother     No Known Problems Daughter     No Known Problems Brother      SOCIAL HISTORY:   Social History     Tobacco Use    Smoking status: Former Smoker     Packs/day: 1.00     Years: 20.00     Pack years: 20.00     Types: Cigarettes     Quit date:      Years since quittin.6    Smokeless tobacco: Never Used   Substance Use Topics    Alcohol use: No     Alcohol/week: 0.0 standard drinks    Drug use: No     Types: Marijuana     Comment: Patient quit         MENTAL STATUS: Alert    REVIEW " OF SYSTEMS: Negative    OBJECTIVE:     Vital Signs (Most Recent)       Physical Exam:  General: NAD  HEENT: Atraumatic  Lungs: Adequate respirations, LCTAB  Heart: RRR, No murmur  Abdomen: Soft NT    ASSESSMENT/PLAN:     Patient is a 70 y.o. 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     - Plan for orbitotomy with biopsy and debulking OU under general anesthesia.    - Risks/benefits/alternatives of the procedure including, but not limited to scarring, bleeding, infection, loss or decreased vision, and/or need for possible repeat surgery discussed with the patient and family.   - Informed consent obtained prior to surgery and the patient/family voiced good understanding.    Janelle Beck  8/25/2020  9:15 PM

## 2020-08-26 NOTE — ANESTHESIA POSTPROCEDURE EVALUATION
Anesthesia Post Evaluation    Patient: Jose Luis Manzo    Procedure(s) Performed: Procedure(s) (LRB):  EXCISION, LESION, ORBIT (Bilateral)    Final Anesthesia Type: general    Patient location during evaluation: PACU  Patient participation: Yes- Able to Participate  Level of consciousness: awake and alert, awake and oriented  Post-procedure vital signs: reviewed and stable  Pain management: adequate  Airway patency: patent    PONV status at discharge: No PONV  Anesthetic complications: no      Cardiovascular status: blood pressure returned to baseline, stable and hemodynamically stable  Respiratory status: unassisted, spontaneous ventilation and room air  Hydration status: euvolemic  Follow-up not needed.          Vitals Value Taken Time   /89 08/26/20 1531   Temp 36.7 °C (98.1 °F) 08/26/20 1410   Pulse 61 08/26/20 1538   Resp 22 08/26/20 1538   SpO2 97 % 08/26/20 1538   Vitals shown include unvalidated device data.      No case tracking events are documented in the log.      Pain/James Score: James Score: 10 (8/26/2020  3:30 PM)

## 2020-08-26 NOTE — ANESTHESIA PREPROCEDURE EVALUATION
08/26/2020  Jose Luis Manzo is a 70 y.o., male.    Anesthesia Evaluation    I have reviewed the Patient Summary Reports.         Review of Systems  Anesthesia Hx:  No problems with previous Anesthesia    Social:  Non-Smoker    Hematology/Oncology:  Hematology Normal   Oncology Normal     EENT/Dental:EENT/Dental Normal   Cardiovascular:   Hypertension    Pulmonary:  Pulmonary Normal    Renal/:   Chronic Renal Disease, CRI    Hepatic/GI:  Hepatic/GI Normal    Musculoskeletal:  Musculoskeletal Normal    Neurological:  Neurology Normal    Endocrine:  Endocrine Normal    Dermatological:  Skin Normal    Psych:  Psychiatric Normal           Physical Exam  General:  Well nourished    Airway/Jaw/Neck:  Airway Findings: Mouth Opening: Normal Tongue: Normal  General Airway Assessment: Adult  Mallampati: II  Improves to II with phonation.  TM Distance: Normal, at least 6 cm  Jaw/Neck Findings:  Neck ROM: Normal ROM      Dental:  Dental Findings: In tact   Chest/Lungs:  Chest/Lungs Findings: Clear to auscultation, Normal Respiratory Rate     Heart/Vascular:  Heart Findings: Rate: Normal  Rhythm: Regular Rhythm  Sounds: Normal             Anesthesia Plan  Type of Anesthesia, risks & benefits discussed:  Anesthesia Type:  general  Patient's Preference: General  Intra-op Monitoring Plan:   Intra-op Monitoring Plan Comments:   Post Op Pain Control Plan:   Post Op Pain Control Plan Comments:   Induction:   IV  Beta Blocker:  Patient is not currently on a Beta-Blocker (No further documentation required).       Informed Consent: Patient understands risks and agrees with Anesthesia plan.  Questions answered. Anesthesia consent signed with patient.  ASA Score: 3     Day of Surgery Review of History & Physical: I have interviewed and examined the patient. I have reviewed the patient's H&P dated:  There are no significant  changes.          Ready For Surgery From Anesthesia Perspective.

## 2020-08-26 NOTE — ANESTHESIA PROCEDURE NOTES
Intubation  Performed by: Shamika Wells CRNA  Authorized by: Wally Argueta MD     Intubation:     Induction:  Intravenous    Mask Ventilation:  Easy with oral airway    Attempts:  2    Attempted By:  Student and staff anesthesiologist    Blade:  Graham 3    Laryngeal View Grade: Grade IIb - only the arytenoids and epiglottis seen      Attempted By (2nd Attempt):  Student    Method of Intubation (2nd Attempt):  Video laryngoscopy    Blade (2nd Attempt):  Llamas 2    Laryngeal View Grade (2nd Attempt): Grade III - only epiglottis visible      Difficult Airway Encountered?: No      Airway Device:  Oral endotracheal tube    Airway Device Size:  7.5    Style/Cuff Inflation:  Cuffed    Inflation Amount (mL):  4    Tube secured:  22    Secured at:  The teeth    Placement Verified By:  Capnometry    DIFFICULT INTUBATION DESCRIPTOR: Multiple VC surgeries.    Findings Post-Intubation:  BS equal bilateral

## 2020-08-26 NOTE — DISCHARGE INSTRUCTIONS
Anesthesia: General Anesthesia     You are watched continuously during your procedure by your anesthesia provider.     Youre due to have surgery. During surgery, youll be given medicine called anesthesia or anesthetic. This will keep you comfortable and pain-free. Your anesthesia provider will use general anesthesia.  What is general anesthesia?  General anesthesia puts you into a state like deep sleep. It goes into the bloodstream (IV anesthetics), into the lungs (gas anesthetics), or both. You feel nothing during the procedure. You will not remember it. During the procedure, the anesthesia provider monitors you continuously. He or she checks your heart rate and rhythm, blood pressure, breathing, and blood oxygen.  · IV anesthetics. IV anesthetics are given through an IV line in your arm. Theyre often given first. This is so you are asleep before a gas anesthetic is started. Some kinds of IV anesthetics relieve pain. Others relax you. Your doctor will decide which kind is best in your case.  · Gas anesthetics. Gas anesthetics are breathed into the lungs. They are often used to keep you asleep. They can be given through a facemask or a tube placed in your larynx or trachea (breathing tube).  ¨ If you have a facemask, your anesthesia provider will most likely place it over your nose and mouth while youre still awake. Youll breathe oxygen through the mask as your IV anesthetic is started. Gas anesthetic may be added through the mask.  ¨ If you have a tube in the larynx or trachea, it will be inserted into your throat after youre asleep.  Anesthesia tools and medicines  You will likely have:  · IV anesthetics. These are put into an IV line into your bloodstream.  · Gas anesthetics. You breathe these anesthetics into your lungs, where they pass into your bloodstream.  · Pulse oximeter. This is a small clip that is attached to the end of your finger. This measures your blood oxygen level.  · Electrocardiography  leads (electrodes). These are small sticky pads that are placed on your chest. They record your heart rate and rhythm.  · Blood pressure cuff. This reads your blood pressure.  Risks and possible complications  General anesthesia has some risks. These include:  · Breathing problems  · Nausea and vomiting  · Sore throat or hoarseness (usually temporary)  · Allergic reaction to the anesthetic  · Irregular heartbeat (rare)  · Cardiac arrest (rare)   Anesthesia safety  · Follow all instructions you are given for how long not to eat or drink before your procedure.  · Be sure your doctor knows what medicines and drugs you take. This includes over-the-counter medicines, herbs, supplements, alcohol or other drugs. You will be asked when those were last taken.  · Have an adult family member or friend drive you home after the procedure.  · For the first 24 hours after your surgery:  ¨ Do not drive or use heavy equipment.  ¨ Do not make important decisions or sign legal documents. If important decisions or signing legal documents is necessary during the first 24 hours after surgery, have a trusted family member or spouse act on your behalf.  ¨ Avoid alcohol.  ¨ Have a responsible adult stay with you. He or she can watch for problems and help keep you safe.  Date Last Reviewed: 12/1/2016  © 3039-5986 [employer]. [address] [place] [address], [place] [address]. All rights reserved. This information is not intended as a substitute for professional medical care. Always follow your healthcare professional's instructions.

## 2020-08-26 NOTE — BRIEF OP NOTE
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 Right orbital tumor for pathology  2. Left orbital tumor for pathology    Discharge: Home    -------------------------------------------------------------  Full Operative Report to follow.

## 2020-08-26 NOTE — DISCHARGE SUMMARY
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    Hospital Course: Stable    Consults: None    Significant Diagnostic Studies: None    Disposition: Home    Patient Instructions:  - Resume same diet as prior to surgery  - Limit activity, no heavy lifting  - Call MD for severe uncontrolled pain, redness, or milky drainage  - Follow up with Dr. Tee at [facility], 10th floor, in 7-10 days - please call clinic to schedule if not given an appointment  - Use cold compresses twice a day for the first 48 hours after surgery; then use warm compresses twice a day for the following 48 hours  - Use Tobradex ointment three times a day to the surgical wounds. Apply using clean hands or a clean Q-tip  - Use Tobradex eye drops four times a day until the bottle is empty    Patient Instructions:   Current Discharge Medication List      CONTINUE these medications which have NOT CHANGED    Details   apixaban (ELIQUIS) 2.5 mg Tab TAKE 1 TABLET (2.5MG TOTAL) BY MOUTH TWICE DAILY  Qty: 180 tablet, Refills: 4    Associated Diagnoses: Acute deep vein thrombosis (DVT) of lower extremity, unspecified laterality, unspecified vein      atorvastatin (LIPITOR) 20 MG tablet TAKE 1 TABLET (20 MG TOTAL) BY MOUTH EVERY OTHER DAY  Qty: 45 tablet, Refills: 1      cloNIDine 0.3 mg/24 hr td ptwk (CATAPRES) 0.3 mg/24 hr Place 1 patch onto the skin every 7 days.  Qty: 12 patch, Refills: 3    Associated Diagnoses: Chronic kidney disease, stage IV (severe)      ferrous sulfate 325 mg (65 mg iron) Tab tablet Take 325 mg by  mouth once daily.      finasteride (PROSCAR) 5 mg tablet TAKE 1 TABLET EVERY DAY  Qty: 90 tablet, Refills: 4      furosemide (LASIX) 80 MG tablet Take 1 tablet (80 mg total) by mouth once daily.  Qty: 90 tablet, Refills: 3    Associated Diagnoses: Chronic kidney disease, stage IV (severe)      metoprolol succinate (TOPROL-XL) 100 MG 24 hr tablet TAKE 1 TABLET EVERY DAY  Qty: 90 tablet, Refills: 4      predniSONE (DELTASONE) 5 MG tablet Take 1 tablet (5 mg total) by mouth once daily.  Qty: 90 tablet, Refills: 4    Associated Diagnoses: Renal transplant recipient      sirolimus (RAPAMUNE) 1 MG Tab Take 3 tablets (3 mg total) by mouth once daily.  Qty: 90 tablet, Refills: 3    Associated Diagnoses: Kidney transplant recipient      sodium bicarbonate 650 MG tablet TAKE 3 TABLETS BY MOUTH THREE TIMES DAILY  Qty: 180 tablet, Refills: 4      cinacalcet (SENSIPAR) 30 MG Tab Take 1 tablet (30 mg total) by mouth daily with breakfast.  Qty: 30 tablet, Refills: 11      nitroGLYCERIN (NITROSTAT) 0.4 MG SL tablet Place 1 tablet (0.4 mg total) under the tongue every 5 (five) minutes as needed for Chest pain.  Qty: 30 tablet, Refills: 2    Associated Diagnoses: Chest pain, unspecified type             No discharge procedures on file.

## 2020-08-26 NOTE — PLAN OF CARE
Pt tolerated procedure well.  Discharge paperwork printed and reviewed with pt and family.  Copies of discharge paperwork printed and given to patient.  No complaints of pain or nausea.  Resident paged for clarification on eye ointment usage, not specified on discharge paper work, no return page.  Pt instructed to call clinic office for further instruction.  Pt tolerating PO liquids well.  VSS.  Safety maintained throughout care.

## 2020-08-27 NOTE — OP NOTE
Operative Note  Ophthalmology Service        Date of Procedure: 08/26/2020     Attending Physician: Linda Tee MD     Assistant: Janelle Beck MD     Pre-Operative Diagnosis: Benign tumor of the orbit, bilateral     Post-Operative Diagnosis: Same as pre-operative diagnosis     Treatments/Procedures: Bilateral orbitotomy with lesion removal (95933)     Intraoperative Findings: fatty tumor of the orbit bilateral     Anesthesia: Local (2% lidocaine with epinephrine, bupivacaine, and vitrase) and General      Complications: None     Estimated Blood Loss: < 5 cc    Specimen: 1. Right orbital lesion  2. Left orbital lesion     Indication for surgery: 70 y.o. male with benign tumor of the orbit in both eyes that is causing chronic irritation. Patient understands the risk of pain, bleeding, infection, ocular injury, loss of the eye, asymmetry, need for revision in future, scarring.      Procedure in detail: Informed consent was obtained prior and reviewed with patient today. After the patient was put under general anesthesia, a local injection of 2% lidocaine with epinephrine with Bupivacaine and vitrase was placed into the subconjunctival space bilaterally. The patient was prepped and draped in the usual sterile manner for ophthalmic plastic surgery. Attention was turned to the right eye.  A corneal shield was placed in the palpebral fissure. The conjunctiva and Tenons over the temporal lesion was lifted and opened using Ivon scissors.  The lesion was bluntly dissected from surrounding tissues using cotton swab with care to avoid palpebral lobe of the lacrimal gland and lateral rectus muscle. The lesion was clamped at the base using a hemostat, and the lesion was excised from its base with Ivon's scissors. The tissue was sent to pathology. The remaining stump of lesion was excised using bipolar cautery. The hemostat was removed. A simple interrupted suture was placed using 6-0 plain gut on a PC-1 needle to close  Eliseo, then a buried interrupted suture was used to close conjunctiva. The corneal shield was removed. The same procedure was performed on the left side.  Tobradex drops were placed into the eye and Tobradex ointment was applied to the wound. The patient tolerated the procedure well with no complications and was taken to recovery in stable condition.

## 2020-09-01 ENCOUNTER — TELEPHONE (OUTPATIENT)
Dept: OPHTHALMOLOGY | Facility: CLINIC | Age: 70
End: 2020-09-01

## 2020-09-01 LAB
FINAL PATHOLOGIC DIAGNOSIS: NORMAL
GROSS: NORMAL

## 2020-09-03 ENCOUNTER — OFFICE VISIT (OUTPATIENT)
Dept: OPHTHALMOLOGY | Facility: CLINIC | Age: 70
End: 2020-09-03
Payer: MEDICARE

## 2020-09-03 DIAGNOSIS — D31.61 BENIGN ORBITAL TUMOR, RIGHT: ICD-10-CM

## 2020-09-03 DIAGNOSIS — D31.62 BENIGN ORBITAL TUMOR, LEFT: ICD-10-CM

## 2020-09-03 DIAGNOSIS — Z98.890 POST-OPERATIVE STATE: Primary | ICD-10-CM

## 2020-09-03 PROCEDURE — 99024 POSTOP FOLLOW-UP VISIT: CPT | Mod: S$GLB,,, | Performed by: OPHTHALMOLOGY

## 2020-09-03 PROCEDURE — 99999 PR PBB SHADOW E&M-EST. PATIENT-LVL III: ICD-10-PCS | Mod: PBBFAC,,, | Performed by: OPHTHALMOLOGY

## 2020-09-03 PROCEDURE — 99024 PR POST-OP FOLLOW-UP VISIT: ICD-10-PCS | Mod: S$GLB,,, | Performed by: OPHTHALMOLOGY

## 2020-09-03 PROCEDURE — 99999 PR PBB SHADOW E&M-EST. PATIENT-LVL III: CPT | Mod: PBBFAC,,, | Performed by: OPHTHALMOLOGY

## 2020-09-03 NOTE — PROGRESS NOTES
HPI     Jose Luis Manzo is a/an 70 y.o. male here for 1 week post-op.  Pt denies any pain or problems. Pt states he received ointment but never   used it since he did not see any stitches.    Date of Procedure: 08/26/20  Procedure: Bilateral orbitotomy w/ lesion removal  Oral antibiotics: none   Eye meds: none  Tobradex gtts QID OU    Last edited by Sal Avila on 9/3/2020  9:06 AM. (History)            Assessment /Plan     For exam results, see Encounter Report.    Post-operative state  -     External/Slit Lamp Photography    Benign orbital tumor, right    Benign orbital tumor, left      Component 8d ago   Final Pathologic Diagnosis 1.  RIGHT ORBITAL TUMOR, RESECTION:   Orbital lipoma.   Negative for malignancy.   2.  LEFT ORBITAL TUMOR, RESECTION:   Orbital lipoma.   Negative for malignancy.      Patient doing well! Post-operative instructions reviewed. Sutures dissolving. All questions answered.  Return prn sooner any worsening of vision/symptoms or any concerns.

## 2020-09-09 DIAGNOSIS — Z12.11 SPECIAL SCREENING FOR MALIGNANT NEOPLASM OF COLON: Primary | ICD-10-CM

## 2020-11-11 ENCOUNTER — TELEPHONE (OUTPATIENT)
Dept: INTERNAL MEDICINE | Facility: CLINIC | Age: 70
End: 2020-11-11

## 2020-11-11 DIAGNOSIS — E78.2 MIXED HYPERLIPIDEMIA: ICD-10-CM

## 2020-11-11 DIAGNOSIS — N18.4 CHRONIC KIDNEY DISEASE, STAGE IV (SEVERE): Primary | ICD-10-CM

## 2020-11-11 NOTE — TELEPHONE ENCOUNTER
----- Message from Param Palomo sent at 11/11/2020 12:56 PM CST -----  Regarding: Labs  Contact: dax  Pt is currently scheduled for 11/17 @ 9:15 he wanted to know if he needed to have labs done prior to appt he can be reached at [phone] (home)

## 2020-11-13 ENCOUNTER — LAB VISIT (OUTPATIENT)
Dept: LAB | Facility: HOSPITAL | Age: 70
End: 2020-11-13
Attending: INTERNAL MEDICINE
Payer: MEDICARE

## 2020-11-13 ENCOUNTER — TELEPHONE (OUTPATIENT)
Dept: NEPHROLOGY | Facility: CLINIC | Age: 70
End: 2020-11-13

## 2020-11-13 DIAGNOSIS — N18.4 CHRONIC KIDNEY DISEASE, STAGE IV (SEVERE): ICD-10-CM

## 2020-11-13 DIAGNOSIS — E78.2 MIXED HYPERLIPIDEMIA: ICD-10-CM

## 2020-11-13 LAB
ALBUMIN SERPL BCP-MCNC: 3.9 G/DL (ref 3.5–5.2)
ALP SERPL-CCNC: 114 U/L (ref 38–126)
ALT SERPL W/O P-5'-P-CCNC: 21 U/L (ref 10–44)
ANION GAP SERPL CALC-SCNC: 9 MMOL/L (ref 8–16)
AST SERPL-CCNC: 24 U/L (ref 15–46)
BASOPHILS # BLD AUTO: 0.03 K/UL (ref 0–0.2)
BASOPHILS NFR BLD: 0.7 % (ref 0–1.9)
BILIRUB SERPL-MCNC: 0.3 MG/DL (ref 0.1–1)
BUN SERPL-MCNC: 46 MG/DL (ref 2–20)
CALCIUM SERPL-MCNC: 10.3 MG/DL (ref 8.7–10.5)
CHLORIDE SERPL-SCNC: 109 MMOL/L (ref 95–110)
CHOLEST SERPL-MCNC: 204 MG/DL (ref 120–199)
CHOLEST/HDLC SERPL: 4.1 {RATIO} (ref 2–5)
CO2 SERPL-SCNC: 26 MMOL/L (ref 23–29)
CREAT SERPL-MCNC: 3.53 MG/DL (ref 0.5–1.4)
DIFFERENTIAL METHOD: ABNORMAL
EOSINOPHIL # BLD AUTO: 0.1 K/UL (ref 0–0.5)
EOSINOPHIL NFR BLD: 1.6 % (ref 0–8)
ERYTHROCYTE [DISTWIDTH] IN BLOOD BY AUTOMATED COUNT: 15.9 % (ref 11.5–14.5)
EST. GFR  (AFRICAN AMERICAN): 19.1 ML/MIN/1.73 M^2
EST. GFR  (NON AFRICAN AMERICAN): 16.5 ML/MIN/1.73 M^2
GLUCOSE SERPL-MCNC: 105 MG/DL (ref 70–110)
HCT VFR BLD AUTO: 41.8 % (ref 40–54)
HDLC SERPL-MCNC: 50 MG/DL (ref 40–75)
HDLC SERPL: 24.5 % (ref 20–50)
HGB BLD-MCNC: 12.7 G/DL (ref 14–18)
IMM GRANULOCYTES # BLD AUTO: 0.07 K/UL (ref 0–0.04)
IMM GRANULOCYTES NFR BLD AUTO: 1.6 % (ref 0–0.5)
LDLC SERPL CALC-MCNC: 133.8 MG/DL (ref 63–159)
LYMPHOCYTES # BLD AUTO: 1.1 K/UL (ref 1–4.8)
LYMPHOCYTES NFR BLD: 26.1 % (ref 18–48)
MCH RBC QN AUTO: 25.4 PG (ref 27–31)
MCHC RBC AUTO-ENTMCNC: 30.4 G/DL (ref 32–36)
MCV RBC AUTO: 84 FL (ref 82–98)
MONOCYTES # BLD AUTO: 0.5 K/UL (ref 0.3–1)
MONOCYTES NFR BLD: 12 % (ref 4–15)
NEUTROPHILS # BLD AUTO: 2.5 K/UL (ref 1.8–7.7)
NEUTROPHILS NFR BLD: 58 % (ref 38–73)
NONHDLC SERPL-MCNC: 154 MG/DL
NRBC BLD-RTO: 0 /100 WBC
PLATELET # BLD AUTO: 121 K/UL (ref 150–350)
PMV BLD AUTO: 11.3 FL (ref 9.2–12.9)
POTASSIUM SERPL-SCNC: 3.6 MMOL/L (ref 3.5–5.1)
PROT SERPL-MCNC: 7.6 G/DL (ref 6–8.4)
RBC # BLD AUTO: 5 M/UL (ref 4.6–6.2)
SODIUM SERPL-SCNC: 144 MMOL/L (ref 136–145)
TRIGL SERPL-MCNC: 101 MG/DL (ref 30–150)
WBC # BLD AUTO: 4.33 K/UL (ref 3.9–12.7)

## 2020-11-13 PROCEDURE — 36415 COLL VENOUS BLD VENIPUNCTURE: CPT | Mod: PO

## 2020-11-13 PROCEDURE — 80053 COMPREHEN METABOLIC PANEL: CPT | Mod: PO

## 2020-11-13 PROCEDURE — 85025 COMPLETE CBC W/AUTO DIFF WBC: CPT | Mod: PO

## 2020-11-13 PROCEDURE — 80061 LIPID PANEL: CPT

## 2020-11-13 NOTE — TELEPHONE ENCOUNTER
Result Notes     Boris Gillette MD       Kidney function slightly worsened further. Please schedule an appointment. Thanks.

## 2020-11-18 ENCOUNTER — OFFICE VISIT (OUTPATIENT)
Dept: INTERNAL MEDICINE | Facility: CLINIC | Age: 70
End: 2020-11-18
Payer: MEDICARE

## 2020-11-18 VITALS
RESPIRATION RATE: 18 BRPM | DIASTOLIC BLOOD PRESSURE: 82 MMHG | WEIGHT: 244.94 LBS | BODY MASS INDEX: 35.07 KG/M2 | SYSTOLIC BLOOD PRESSURE: 120 MMHG | HEIGHT: 70 IN | HEART RATE: 60 BPM | OXYGEN SATURATION: 95 %

## 2020-11-18 DIAGNOSIS — Z00.00 ROUTINE GENERAL MEDICAL EXAMINATION AT A HEALTH CARE FACILITY: Primary | ICD-10-CM

## 2020-11-18 DIAGNOSIS — E78.2 MIXED HYPERLIPIDEMIA: ICD-10-CM

## 2020-11-18 DIAGNOSIS — N18.4 CHRONIC KIDNEY DISEASE, STAGE IV (SEVERE): ICD-10-CM

## 2020-11-18 DIAGNOSIS — I10 ESSENTIAL HYPERTENSION: ICD-10-CM

## 2020-11-18 DIAGNOSIS — Z12.11 COLON CANCER SCREENING: ICD-10-CM

## 2020-11-18 PROCEDURE — 99999 PR PBB SHADOW E&M-EST. PATIENT-LVL IV: CPT | Mod: PBBFAC,,, | Performed by: INTERNAL MEDICINE

## 2020-11-18 PROCEDURE — 90694 VACC AIIV4 NO PRSRV 0.5ML IM: CPT | Mod: S$GLB,,, | Performed by: INTERNAL MEDICINE

## 2020-11-18 PROCEDURE — 99397 PR PREVENTIVE VISIT,EST,65 & OVER: ICD-10-PCS | Mod: 25,S$GLB,, | Performed by: INTERNAL MEDICINE

## 2020-11-18 PROCEDURE — 90694 FLU VACCINE - QUADRIVALENT - ADJUVANTED: ICD-10-PCS | Mod: S$GLB,,, | Performed by: INTERNAL MEDICINE

## 2020-11-18 PROCEDURE — 99999 PR PBB SHADOW E&M-EST. PATIENT-LVL IV: ICD-10-PCS | Mod: PBBFAC,,, | Performed by: INTERNAL MEDICINE

## 2020-11-18 PROCEDURE — G0008 FLU VACCINE - QUADRIVALENT - ADJUVANTED: ICD-10-PCS | Mod: S$GLB,,, | Performed by: INTERNAL MEDICINE

## 2020-11-18 PROCEDURE — 99397 PER PM REEVAL EST PAT 65+ YR: CPT | Mod: 25,S$GLB,, | Performed by: INTERNAL MEDICINE

## 2020-11-18 PROCEDURE — G0008 ADMIN INFLUENZA VIRUS VAC: HCPCS | Mod: S$GLB,,, | Performed by: INTERNAL MEDICINE

## 2020-11-18 RX ORDER — ATORVASTATIN CALCIUM 20 MG/1
20 TABLET, FILM COATED ORAL NIGHTLY
Qty: 90 TABLET | Refills: 3 | Status: SHIPPED | OUTPATIENT
Start: 2020-11-18 | End: 2021-11-10

## 2020-11-18 NOTE — PROGRESS NOTES
Subjective:       Patient ID: Jose Luis Manzo is a 70 y.o. male.    Chief Complaint: Follow-up    HPI  Wellness check.  Chart reviewed.  Weight up 2 lbs this year.  No CP, SOB, nausea, pruritus, fatigue.  No complaints.  [employer] was given to patient on 11/18/2020 4:25 PM       Review of Systems   All other systems reviewed and are negative.      Objective:      Physical Exam  Vitals signs reviewed.   Constitutional:       General: He is not in acute distress.     Appearance: He is well-developed. He is obese.   HENT:      Head: Normocephalic.      Mouth/Throat:      Mouth: Mucous membranes are moist.   Eyes:      General: No scleral icterus.     Extraocular Movements: Extraocular movements intact.      Conjunctiva/sclera: Conjunctivae normal.   Neck:      Musculoskeletal: Normal range of motion.   Cardiovascular:      Rate and Rhythm: Normal rate and regular rhythm.      Pulses: Normal pulses.      Heart sounds: Normal heart sounds.   Pulmonary:      Effort: Pulmonary effort is normal.      Breath sounds: Normal breath sounds.   Abdominal:      General: Bowel sounds are normal. There is no distension.      Palpations: Abdomen is soft. There is no mass.   Musculoskeletal: Normal range of motion.      Right lower leg: No edema.      Left lower leg: No edema.   Skin:     General: Skin is warm and dry.   Neurological:      General: No focal deficit present.      Mental Status: He is alert.   Psychiatric:         Mood and Affect: Mood normal.         Assessment:       1. Routine general medical examination at a health care facility    2. Chronic kidney disease, stage IV (severe)    3. Mixed hyperlipidemia    4. Essential hypertension    5. Colon cancer screening        Plan:       Jose Luis was seen today for follow-up.    Diagnoses and all orders for this visit:    Routine general medical examination at a health care facility    Chronic kidney disease, stage IV (severe)  -     Influenza - Quadrivalent (Adjuvanted)  -      CBC Auto Differential; Future  -     Comprehensive Metabolic Panel; Future    Mixed hyperlipidemia  -     atorvastatin (LIPITOR) 20 MG tablet; Take 1 tablet (20 mg total) by mouth every evening.  -     Lipid Panel; Future    Essential hypertension   Well-cont    Colon cancer screening  -     Fecal Immunochemical Test (iFOBT); Future      Follow up in about 6 months (around 5/18/2021).

## 2020-12-09 ENCOUNTER — PATIENT OUTREACH (OUTPATIENT)
Dept: ADMINISTRATIVE | Facility: OTHER | Age: 70
End: 2020-12-09

## 2020-12-09 ENCOUNTER — LAB VISIT (OUTPATIENT)
Dept: LAB | Facility: HOSPITAL | Age: 70
End: 2020-12-09
Attending: INTERNAL MEDICINE
Payer: MEDICARE

## 2020-12-09 DIAGNOSIS — E87.20 METABOLIC ACIDOSIS: ICD-10-CM

## 2020-12-09 DIAGNOSIS — T38.0X5A IMMUNOSUPPRESSION DUE TO CHRONIC STEROID USE: ICD-10-CM

## 2020-12-09 DIAGNOSIS — Z94.0 RENAL TRANSPLANT RECIPIENT: ICD-10-CM

## 2020-12-09 DIAGNOSIS — N18.4 HYPERTENSIVE KIDNEY DISEASE WITH STAGE 4 CHRONIC KIDNEY DISEASE: ICD-10-CM

## 2020-12-09 DIAGNOSIS — Z79.52 IMMUNOSUPPRESSION DUE TO CHRONIC STEROID USE: ICD-10-CM

## 2020-12-09 DIAGNOSIS — T82.868S THROMBOSIS OF KIDNEY DIALYSIS ARTERIOVENOUS GRAFT, SEQUELA: ICD-10-CM

## 2020-12-09 DIAGNOSIS — I12.9 HYPERTENSIVE KIDNEY DISEASE WITH STAGE 4 CHRONIC KIDNEY DISEASE: ICD-10-CM

## 2020-12-09 DIAGNOSIS — N18.4 CKD (CHRONIC KIDNEY DISEASE) STAGE 4, GFR 15-29 ML/MIN: ICD-10-CM

## 2020-12-09 DIAGNOSIS — N25.81 SECONDARY HYPERPARATHYROIDISM: ICD-10-CM

## 2020-12-09 DIAGNOSIS — D84.821 IMMUNOSUPPRESSION DUE TO CHRONIC STEROID USE: ICD-10-CM

## 2020-12-09 DIAGNOSIS — E83.52 HYPERCALCEMIA: ICD-10-CM

## 2020-12-09 LAB
25(OH)D3+25(OH)D2 SERPL-MCNC: 18 NG/ML (ref 30–96)
ALBUMIN SERPL BCP-MCNC: 4.1 G/DL (ref 3.5–5.2)
ANION GAP SERPL CALC-SCNC: 11 MMOL/L (ref 8–16)
CALCIUM SERPL-MCNC: 10.4 MG/DL (ref 8.7–10.5)
CHLORIDE SERPL-SCNC: 108 MMOL/L (ref 95–110)
CO2 SERPL-SCNC: 24 MMOL/L (ref 23–29)
CREAT SERPL-MCNC: 3.28 MG/DL (ref 0.5–1.4)
EST. GFR  (AFRICAN AMERICAN): 20.9 ML/MIN/1.73 M^2
EST. GFR  (NON AFRICAN AMERICAN): 18.1 ML/MIN/1.73 M^2
GLUCOSE SERPL-MCNC: 93 MG/DL (ref 70–110)
MAGNESIUM SERPL-MCNC: 2.2 MG/DL (ref 1.6–2.6)
PHOSPHATE SERPL-MCNC: 3.3 MG/DL (ref 2.7–4.5)
POTASSIUM SERPL-SCNC: 3.7 MMOL/L (ref 3.5–5.1)
PTH-INTACT SERPL-MCNC: 610 PG/ML (ref 9–77)
SODIUM SERPL-SCNC: 143 MMOL/L (ref 136–145)
UUN UR-MCNC: 34 MG/DL (ref 2–20)

## 2020-12-09 PROCEDURE — 36415 COLL VENOUS BLD VENIPUNCTURE: CPT | Mod: PO

## 2020-12-09 PROCEDURE — 80195 ASSAY OF SIROLIMUS: CPT | Mod: PO

## 2020-12-09 PROCEDURE — 83735 ASSAY OF MAGNESIUM: CPT | Mod: PO

## 2020-12-09 PROCEDURE — 82306 VITAMIN D 25 HYDROXY: CPT | Mod: PO

## 2020-12-09 PROCEDURE — 80069 RENAL FUNCTION PANEL: CPT | Mod: PO

## 2020-12-09 PROCEDURE — 83970 ASSAY OF PARATHORMONE: CPT | Mod: PO

## 2020-12-09 NOTE — PROGRESS NOTES
Care Everywhere: updated  Immunization: updated, links delay   Health Maintenance: updated  Media Review: review for outside colon cancer report   Legacy Review:   Order placed:   Upcoming appts:  9.9.2020 colonoscopy case request

## 2020-12-10 ENCOUNTER — OFFICE VISIT (OUTPATIENT)
Dept: NEPHROLOGY | Facility: CLINIC | Age: 70
End: 2020-12-10
Payer: MEDICARE

## 2020-12-10 VITALS
HEART RATE: 59 BPM | SYSTOLIC BLOOD PRESSURE: 118 MMHG | DIASTOLIC BLOOD PRESSURE: 88 MMHG | WEIGHT: 245.81 LBS | HEIGHT: 70 IN | OXYGEN SATURATION: 98 % | BODY MASS INDEX: 35.19 KG/M2

## 2020-12-10 DIAGNOSIS — T38.0X5A IMMUNOSUPPRESSION DUE TO CHRONIC STEROID USE: ICD-10-CM

## 2020-12-10 DIAGNOSIS — N25.81 SECONDARY HYPERPARATHYROIDISM: ICD-10-CM

## 2020-12-10 DIAGNOSIS — E83.52 HYPERCALCEMIA: ICD-10-CM

## 2020-12-10 DIAGNOSIS — E87.20 METABOLIC ACIDOSIS: ICD-10-CM

## 2020-12-10 DIAGNOSIS — Z94.0 RENAL TRANSPLANT RECIPIENT: ICD-10-CM

## 2020-12-10 DIAGNOSIS — N18.4 HYPERTENSIVE KIDNEY DISEASE WITH STAGE 4 CHRONIC KIDNEY DISEASE: ICD-10-CM

## 2020-12-10 DIAGNOSIS — E66.9 OBESITY (BMI 30-39.9): ICD-10-CM

## 2020-12-10 DIAGNOSIS — D84.821 IMMUNOSUPPRESSION DUE TO CHRONIC STEROID USE: ICD-10-CM

## 2020-12-10 DIAGNOSIS — Z79.52 IMMUNOSUPPRESSION DUE TO CHRONIC STEROID USE: ICD-10-CM

## 2020-12-10 DIAGNOSIS — I12.9 HYPERTENSIVE KIDNEY DISEASE WITH STAGE 4 CHRONIC KIDNEY DISEASE: ICD-10-CM

## 2020-12-10 DIAGNOSIS — N18.4 CKD (CHRONIC KIDNEY DISEASE) STAGE 4, GFR 15-29 ML/MIN: Primary | ICD-10-CM

## 2020-12-10 LAB — SIROLIMUS BLD-MCNC: 8.9 NG/ML (ref 4–20)

## 2020-12-10 PROCEDURE — 99999 PR PBB SHADOW E&M-EST. PATIENT-LVL IV: CPT | Mod: PBBFAC,,, | Performed by: INTERNAL MEDICINE

## 2020-12-10 PROCEDURE — 99999 PR PBB SHADOW E&M-EST. PATIENT-LVL IV: ICD-10-PCS | Mod: PBBFAC,,, | Performed by: INTERNAL MEDICINE

## 2020-12-10 PROCEDURE — 99215 OFFICE O/P EST HI 40 MIN: CPT | Mod: S$GLB,,, | Performed by: INTERNAL MEDICINE

## 2020-12-10 PROCEDURE — 99215 PR OFFICE/OUTPT VISIT, EST, LEVL V, 40-54 MIN: ICD-10-PCS | Mod: S$GLB,,, | Performed by: INTERNAL MEDICINE

## 2020-12-10 NOTE — PROGRESS NOTES
Subjective:   Patient ID: Jose Luis Manzo is a 70 y.o. Black or  male who presents for follow up evaluation of Chronic Kidney Disease    HPI   was seen in clinic today for follow up patient evaluation of CKD. He established care in [facility] on 1/30/20. He returns for a follow up visit. He was last seen on 8/4/20.    He has not been able to afford the cost of Sensipar. He was not able to receive any financial assistance with the cost from . He did not bring home BP medicines. He brought paperwork he received from insurance company related to Sirolimus use, form signed and returned to him.    He admits he has not been exercising. He has been aware of need to lose weight. His BMI has been in mid 30's.    Overall he reports he is doing ok. He does not have any symptoms to suggest uremia. He denies any decreased urine output/ flank pain/ pain over allograft/ nausea/ vomiting/ diarrhea/ fever/ chills. He was referred to [facility] as he has not been followed in any post kidney transplant program for a long time. However he was informed that due to his underlying laryngeal cancer he does not meet criteria for transplant evaluation. He did not receive any evaluation in the clinic for immunosuppressive medication management. He denies any NSAID use. He does not have a recent IV iodine contrast use.    He does not check BP routinely at home. Denies any symptoms to suggest hypotension.     Pt received kidney transplant in 2007 in [place]. He does not have much recollection with his transplant. He was on hemodialysis for almost 9 years prior tor receiving transplant. He has stopped following there a while ago. He had an AV graft in left arm for dialysis, now does not have any thrill and appears to be clotted and non functional. He reports he had failed access in right arm and also in leg. He has been aware of poor functioning of his transplanted kidney and  creatinine always staying in the range of 2 to 3's since he received the transplant.    He has longstanding hypertension which seems to be the reason for kidney failure. He reports his parents had kidney problem and mother was on dialysis for sometime. He has been a non smoker, currently retired.     Serial labs noted for creatinine between 2.7-3.2 since 2015, some progression in 2019. He reports he always had high creatinine since his transplant, exact details unclear. He also appears to have mild to moderate hypercalcemia per serial labs. When questioned about it he admitted he was advised to take sensipar by local kidney doctor in the past but due to cost issue he never took it.    His IS regimen includes sirolimus, prednisone. He has longstanding hypertension, h/o kidney transplant, h/o sq cell cancer of larynx, s/p RT, lung nodule, DVT of LE, on chronic anticoagulation and other medical problems.       Renal Function:  Lab Results   Component Value Date    GLU 93 12/09/2020     11/13/2020     12/09/2020     11/13/2020    K 3.7 12/09/2020    K 3.6 11/13/2020     12/09/2020     11/13/2020    CO2 24 12/09/2020    CO2 26 11/13/2020    BUN 34 (H) 12/09/2020    BUN 46 (H) 11/13/2020    CALCIUM 10.4 12/09/2020    CALCIUM 10.3 11/13/2020    CREATININE 3.28 (H) 12/09/2020    CREATININE 3.53 (H) 11/13/2020    ALBUMIN 4.1 12/09/2020    ALBUMIN 3.9 11/13/2020    PHOS 3.3 12/09/2020    PHOS 3.5 07/27/2020    ESTGFRAFRICA 20.9 (A) 12/09/2020    ESTGFRAFRICA 19.1 (A) 11/13/2020    EGFRNONAA 18.1 (A) 12/09/2020    EGFRNONAA 16.5 (A) 11/13/2020       Urinalysis:  Lab Results   Component Value Date    APPEARANCEUA Clear 12/09/2020    PHUR 7.0 12/09/2020    SPECGRAV 1.010 12/09/2020    PROTEINUA 1+ (A) 12/09/2020    GLUCUA Negative 12/09/2020    OCCULTUA Trace (A) 12/09/2020    NITRITE Negative 12/09/2020    LEUKOCYTESUR Negative 12/09/2020       Protein/Creatinine Ratio:  Lab Results   Component  Value Date    PROTEINURINE 52 (H) 12/09/2020    CREATRANDUR 113.6 12/09/2020    UTPCR 0.46 (H) 12/09/2020       CBC:  Lab Results   Component Value Date    WBC 4.33 11/13/2020    HGB 12.7 (L) 11/13/2020    HCT 41.8 11/13/2020       PTH:  Lab Results   Component Value Date    .0 (H) 12/09/2020     Vit D 18  Sirolimus levels 8.9    Review of Systems   Constitutional: Negative for activity change, appetite change, chills, fatigue and fever.   HENT: Negative for facial swelling, hearing loss and sore throat.    Eyes: Negative for photophobia and pain.   Respiratory: Negative for cough, shortness of breath and wheezing.    Cardiovascular: Positive for leg swelling. Negative for palpitations.   Gastrointestinal: Negative for abdominal pain, diarrhea, nausea and vomiting.   Endocrine: Negative for polydipsia and polyuria.   Genitourinary: Negative for dysuria, flank pain, frequency and hematuria.   Musculoskeletal: Negative for back pain and myalgias.   Skin: Negative for pallor and rash.   Allergic/Immunologic: Positive for immunocompromised state. Negative for environmental allergies.   Neurological: Negative for dizziness, light-headedness and headaches.   Psychiatric/Behavioral: Negative for behavioral problems. The patient is not nervous/anxious.        Objective:   Physical Exam   Constitutional: He is oriented to person, place, and time. He appears well-developed and well-nourished. No distress.   HENT:   Head: Normocephalic and atraumatic.   Mouth/Throat: Oropharynx is clear and moist.   Eyes: Right eye exhibits no discharge. Left eye exhibits no discharge. No scleral icterus.   Pterygium    Neck:   Large, thick neck    Cardiovascular: Normal rate, regular rhythm.   Pulmonary/Chest: Effort normal. No respiratory distress. He has no audible wheezes.   Abdominal: abdominal obesity. There is no tenderness.   No tenderness over allograft site    Musculoskeletal: He exhibits edema.   Neurological: He is alert and  oriented to person, place, and time. No asterixis.  Skin: Skin is warm and dry. No rash noted. He is not diaphoretic. No erythema.   Psychiatric: He has a normal mood and affect. His behavior is normal.   Vitals reviewed.      Assessment:     1. CKD (chronic kidney disease) stage 4, GFR 15-29 ml/min    2. Hypertensive kidney disease with stage 4 chronic kidney disease    3. Hypercalcemia    4. Metabolic acidosis    5. Secondary hyperparathyroidism    6. Renal transplant recipient    7. Obesity (BMI 30-39.9)    8. Immunosuppression due to chronic steroid use        Plan:       Problem List Items Addressed This Visit        Renal/    CKD (chronic kidney disease) stage 4, GFR 15-29 ml/min - Primary    Overview     Overview:   updated diagnosis code & description  dx update         Relevant Orders    CBC Auto Differential    Iron and TIBC    Ferritin    PTH, intact    Renal Function Panel    Vitamin D    Protein / creatinine ratio, urine    Urinalysis    Renal transplant recipient    Overview     2007 at [facility]         Relevant Orders    CBC Auto Differential    Iron and TIBC    Ferritin    PTH, intact    Renal Function Panel    Vitamin D    Protein / creatinine ratio, urine    Urinalysis    Hypertensive kidney disease with stage 4 chronic kidney disease    Relevant Orders    CBC Auto Differential    Iron and TIBC    Ferritin    PTH, intact    Renal Function Panel    Vitamin D    Protein / creatinine ratio, urine    Urinalysis    Hypercalcemia    Relevant Orders    CBC Auto Differential    Iron and TIBC    Ferritin    PTH, intact    Renal Function Panel    Vitamin D    Protein / creatinine ratio, urine    Urinalysis    Metabolic acidosis    Relevant Orders    CBC Auto Differential    Iron and TIBC    Ferritin    PTH, intact    Renal Function Panel    Vitamin D    Protein / creatinine ratio, urine    Urinalysis       Endocrine    Obesity (BMI 30-39.9)    Relevant Orders    CBC Auto Differential    Iron  and TIBC    Ferritin    PTH, intact    Renal Function Panel    Vitamin D    Protein / creatinine ratio, urine    Urinalysis    Secondary hyperparathyroidism    Relevant Orders    CBC Auto Differential    Iron and TIBC    Ferritin    PTH, intact    Renal Function Panel    Vitamin D    Protein / creatinine ratio, urine    Urinalysis       Other    Immunosuppression due to chronic steroid use    Relevant Orders    CBC Auto Differential    Iron and TIBC    Ferritin    PTH, intact    Renal Function Panel    Vitamin D    Protein / creatinine ratio, urine    Urinalysis        Mr. 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 He has been a recipient of kidney transplant in 2007. Per his report he appears to have elevated creatinine almost since he received it, no details available as transplant surgery took place in [place]. Pt does not have any follow up at present with his transplant center. He has h/o malignancy.    Overall he appears to have very advanced CKD with eGFR close to 20. He has slowly worsening CKD IV. He also appears to have longstanding hypercalcemia. Due to persistent hypercalcemia unable to use vitamin D analogues. Sensipar has not been approved by his insurance as of now. PTH continuing to worsen. Explained to him in detail that due to insurance not covering cost of this medicine, unable to medically treat this adequately. If PTH continues to worsen, may have to consider referral to endocrine surgery for evaluation of parathyroidectomy.    Other issue appears that he has had prior multiple failed accesses for dialysis, suspect severe vasculopathy.     refer to [facility] to establish care, for management of immunosuppression in light of his h/o malignancy and possible consideration for kidney transplant candidate as anticipate further worsening of kidney function. He has been denied for another transplant at [facility]  transplant however would still attempt to get him evaluated for management of his immunosuppressive medicines in light of his malignancy and his persistent proteinuria. He reports he was not given appointment by transplant clinic.  Sirolimus levels appear to be therapeutic.     Home BP monitoring, goal should be less than 130/80, strict low salt diet.    Periodically monitor renal labs for electrolytes, acid base status, creatinine.    Chronic thrombocytopenia noted, reason unclear. He has been under heme follow up already.    Continue to trend PTH, vit D, phos levels, corrected Ca.  Phos levels stable.    K levels stable.    Met acidosis stable on current dose of sod bicarb, continue the same.    Continue to trend urine studies, rule out proteinuria/ microhematuria     Continue to follow with ENT for laryngeal cancer surveillance and management      Plan, labs, recommendations were discussed with patient, his questions were answered to his satisfaction.   Total time spent was 50 minutes with >50% time spent in face to face evaluation, counseling about possible etiology, work up, monitoring, natural course of illness, recommendations.   RTC 3 months

## 2020-12-11 DIAGNOSIS — Z12.11 SPECIAL SCREENING FOR MALIGNANT NEOPLASMS, COLON: Primary | ICD-10-CM

## 2020-12-21 DIAGNOSIS — N18.4 CHRONIC KIDNEY DISEASE, STAGE IV (SEVERE): ICD-10-CM

## 2020-12-21 RX ORDER — CLONIDINE 0.3 MG/24H
PATCH, EXTENDED RELEASE TRANSDERMAL
Qty: 12 PATCH | Refills: 4 | Status: ON HOLD | OUTPATIENT
Start: 2020-12-21 | End: 2021-01-05 | Stop reason: SDUPTHER

## 2020-12-21 NOTE — PROGRESS NOTES
Patient was waiting for someone from the office to call her back about scheduling Mg IV therapy.  Appears therapy plan is signed.  Transferred patient to central scheduling.   Subjective:   Patient ID:  Jose Luis Manzo is a 67 y.o. male who presents for evaluation and treatment of Deep Vein Thrombosis; Hypertension; and Hyperlipidemia      HPI:       Jose Luis Manzo 67 y.o. male is here follow up and feeling well without any new cardiovascular complaints. He established cardiovascular care by recommendation of Dr. Rojas for recurrent DVT. He had a R iliofemoral DVT at the time. He recalls another DVT prior to the one noted in 2016. He had no recollection of which limb but his chart stated L leg DVT after knee surgery. Recent DVT was unprovoked. US done 6/2016 revealed bilateral CFV/FV/POP DVT. He has done well on Eliquis 2.5 mg po bid. He has significant 2+  edema of R leg with 1 + edema of L leg associated with chronic venous stasis changes but no ulcerations. He does not wear stockings even though prescribed. Unfortunately he has recurrent squamous cells of larynx complicated with hoarseness. He is s/p XRT for SCCA larynx ([facility]) in [id number]. .               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    Pulmonary nodule, left 09/01/2017    H/O laryngeal cancer 08/18/2017    Iron deficiency anemia 10/25/2016    Thrombophilia 10/25/2016    Edema 04/13/2016    DVT (deep venous thrombosis) 04/13/2016     Previous DVT treated at [facility] (unknown leg)    R leg iliofemoral DVT with edema     Ultrasound 6/21/2016:   R CFV DVT   L CFV/femoral/POP non-occlusive chronic DVT      Findings are concerning bilateral iliac and caval stenosis      Dermolipoma 04/08/2016    Claudication of right lower extremity 03/10/2016    Eye swelling, bilateral 03/10/2016    Diminished night vision 03/10/2016    Edema of right lower extremity 03/10/2016    Renal transplant recipient 03/10/2016     2007 at [facility]      Chronic kidney disease, stage IV (severe) 03/13/2015    Essential hypertension 03/13/2015    Mixed  hyperlipidemia 2015    BPH (benign prostatic hyperplasia) 2015           Right Arm BP - Sittin/78  Left Arm BP - Sittin/78        LABS        Lipid panel  Lab Results   Component Value Date    CHOL 169 2017    CHOL 174 2016     Lab Results   Component Value Date    HDL 45 2017    HDL 51 2016     Lab Results   Component Value Date    LDLCALC 101.2 2017    LDLCALC 108.6 2016     Lab Results   Component Value Date    TRIG 114 2017    TRIG 72 2016     Lab Results   Component Value Date    CHOLHDL 26.6 2017    CHOLHDL 29.3 2016            Review of Systems   Constitution: Negative for diaphoresis, weakness, night sweats, weight gain and weight loss.   HENT: Negative for congestion.    Eyes: Negative for blurred vision, discharge and double vision.   Cardiovascular: Negative for chest pain, claudication, cyanosis, dyspnea on exertion, irregular heartbeat, leg swelling, near-syncope, orthopnea, palpitations, paroxysmal nocturnal dyspnea and syncope.   Respiratory: Negative for cough, shortness of breath and wheezing.    Endocrine: Negative for cold intolerance, heat intolerance and polyphagia.   Hematologic/Lymphatic: Negative for adenopathy and bleeding problem. Does not bruise/bleed easily.   Skin: Negative for dry skin and nail changes.   Musculoskeletal: Negative for arthritis, back pain, falls, joint pain, myalgias and neck pain.   Gastrointestinal: Negative for bloating, abdominal pain, change in bowel habit and constipation.   Genitourinary: Negative for bladder incontinence, dysuria, flank pain, genital sores and missed menses.   Neurological: Negative for aphonia, brief paralysis, difficulty with concentration and dizziness.   Psychiatric/Behavioral: Negative for altered mental status and memory loss. The patient does not have insomnia.    Allergic/Immunologic: Negative for environmental allergies.       Objective:   Physical Exam    Constitutional: He is oriented to person, place, and time. He appears well-developed and well-nourished. He is not intubated.   HENT:   Head: Normocephalic and atraumatic.   Right Ear: External ear normal.   Left Ear: External ear normal.   Mouth/Throat: Oropharynx is clear and moist.   Hoarseness      Eyes: Conjunctivae and EOM are normal. Pupils are equal, round, and reactive to light. Right eye exhibits no discharge. Left eye exhibits no discharge. No scleral icterus.   Neck: Normal range of motion. Neck supple. Normal carotid pulses, no hepatojugular reflux and no JVD present. Carotid bruit is not present. No tracheal deviation present. No thyromegaly present.   Cardiovascular: Normal rate, regular rhythm, S1 normal and S2 normal.   No extrasystoles are present. PMI is not displaced.  Exam reveals no gallop, no S3, no distant heart sounds, no friction rub and no midsystolic click.    No murmur heard.  Pulses:       Carotid pulses are 2+ on the right side, and 2+ on the left side.       Radial pulses are 2+ on the right side, and 2+ on the left side.        Femoral pulses are 2+ on the right side, and 2+ on the left side.       Popliteal pulses are 2+ on the right side, and 2+ on the left side.        Dorsalis pedis pulses are 1+ on the right side, and 1+ on the left side.        Posterior tibial pulses are 1+ on the right side, and 1+ on the left side.   Pulmonary/Chest: Effort normal and breath sounds normal. No accessory muscle usage or stridor. No apnea, no tachypnea and no bradypnea. He is not intubated. No respiratory distress. He has no decreased breath sounds. He has no wheezes. He has no rales. He exhibits no tenderness and no bony tenderness.   Abdominal: He exhibits no distension, no pulsatile liver, no abdominal bruit, no ascites, no pulsatile midline mass and no mass. There is no tenderness. There is no rebound and no guarding.   Musculoskeletal: Normal range of motion. He exhibits no edema or  tenderness.   Lymphadenopathy:     He has no cervical adenopathy.   2+ edema of R and 1+ L leg    no tenderness       Neurological: He is alert and oriented to person, place, and time. He has normal reflexes. No cranial nerve deficit. Coordination normal.   Skin: Skin is warm. No rash noted. No erythema. No pallor.   No ulceration     Psychiatric: He has a normal mood and affect. His behavior is normal. Judgment and thought content normal.         5/2016        Assessment:     1. Deep vein thrombosis (DVT) of both lower extremities, unspecified chronicity, unspecified vein    2. Essential hypertension    3. Mixed hyperlipidemia    4. Larynx cancer    5. Chronic kidney disease, stage IV (severe)        Plan:         Continue with Eliquis 2.5 mg po bid for life  Weight loss  Exercise  Stocking 20-30 mmHg      Proceed with non cardiac surgery  Aggressive DVT prophylaxis  Resume Eliquis post procedure when safe from a surgical standpoint          Continue with current medical plan and lifestyle changes.  Return sooner for concerns or questions. If symptoms persist go to the ED  I have reviewed all pertinent data on this patient       Follow up yearly          He expressed verbal understanding and agreed with the plan      Greater than 50% of the visit of 45 minutes was spent counseling, educating, and coordinating the care of the patient.  -In today's visit, at least 4 established conditions that pose a risk to life or bodily function have been addressed and the conditions are severe.    -In today's visit, monitoring for drug toxicity was accomplished.          Patient's Medications   New Prescriptions    No medications on file   Previous Medications    AMLODIPINE (NORVASC) 10 MG TABLET    TAKE 1 TABLET EVERY DAY    APIXABAN (ELIQUIS) 2.5 MG TAB    Take 1 tablet (2.5 mg total) by mouth 2 (two) times daily.    ATORVASTATIN (LIPITOR) 20 MG TABLET    TAKE 1 TABLET (20 MG TOTAL) BY MOUTH EVERY OTHER DAY.    FERROUS SULFATE  325 MG (65 MG IRON) TAB TABLET    Take 325 mg by mouth once daily.    FINASTERIDE (PROSCAR) 5 MG TABLET    TAKE 1 TABLET EVERY DAY    FLUTICASONE (FLONASE) 50 MCG/ACTUATION NASAL SPRAY    2 sprays by Each Nare route once daily.    HYDROCODONE-ACETAMINOPHEN 5-325MG (NORCO) 5-325 MG PER TABLET    Take 1 tablet by mouth every 6 (six) hours as needed for Pain.    METOPROLOL SUCCINATE (TOPROL-XL) 100 MG 24 HR TABLET    TAKE 1 TABLET EVERY DAY    OMEPRAZOLE (PRILOSEC) 40 MG CAPSULE    Take 1 capsule (40 mg total) by mouth every morning.    PREDNISONE (DELTASONE) 5 MG TABLET    Take 1 tablet (5 mg total) by mouth once daily.    SIROLIMUS (RAPAMUNE) 1 MG TAB    Take 3 tablets (3 mg total) by mouth once daily.    SODIUM BICARBONATE 650 MG TABLET    TAKE 3 TABLETS THREE TIMES DAILY    TAMSULOSIN (FLOMAX) 0.4 MG CP24    Take 1 capsule (0.4 mg total) by mouth once daily.   Modified Medications    No medications on file   Discontinued Medications    No medications on file

## 2020-12-22 ENCOUNTER — TELEPHONE (OUTPATIENT)
Dept: INTERNAL MEDICINE | Facility: CLINIC | Age: 70
End: 2020-12-22

## 2020-12-22 DIAGNOSIS — R05.9 COUGH: Primary | ICD-10-CM

## 2020-12-22 NOTE — TELEPHONE ENCOUNTER
----- Message from Sally Russell sent at 12/22/2020 12:25 PM CST -----  Type:  Needs Medical Advice    Who Called: Jose Luis  Symptoms (please be specific): pt is requesting to get an order for a covid test    How long has patient had these symptoms:  unknown  Pharmacy name and phone #:  n/a  Would the patient rather a call back or a response via [facility]? Call back  Best Call Back Number:  [phone]  Additional Information: none

## 2020-12-23 ENCOUNTER — LAB VISIT (OUTPATIENT)
Dept: FAMILY MEDICINE | Facility: CLINIC | Age: 70
End: 2020-12-23
Payer: MEDICARE

## 2020-12-23 DIAGNOSIS — Z03.818 ENCOUNTER FOR OBSERVATION FOR SUSPECTED EXPOSURE TO OTHER BIOLOGICAL AGENTS RULED OUT: ICD-10-CM

## 2020-12-23 DIAGNOSIS — R05.9 COUGH: ICD-10-CM

## 2020-12-23 PROCEDURE — U0003 INFECTIOUS AGENT DETECTION BY NUCLEIC ACID (DNA OR RNA); SEVERE ACUTE RESPIRATORY SYNDROME CORONAVIRUS 2 (SARS-COV-2) (CORONAVIRUS DISEASE [COVID-19]), AMPLIFIED PROBE TECHNIQUE, MAKING USE OF HIGH THROUGHPUT TECHNOLOGIES AS DESCRIBED BY CMS-2020-01-R: HCPCS

## 2020-12-24 DIAGNOSIS — U07.1 COVID-19 VIRUS DETECTED: ICD-10-CM

## 2020-12-24 LAB — SARS-COV-2 RNA RESP QL NAA+PROBE: DETECTED

## 2020-12-28 ENCOUNTER — TELEPHONE (OUTPATIENT)
Dept: INTERNAL MEDICINE | Facility: CLINIC | Age: 70
End: 2020-12-28

## 2020-12-28 ENCOUNTER — NURSE TRIAGE (OUTPATIENT)
Dept: ADMINISTRATIVE | Facility: CLINIC | Age: 70
End: 2020-12-28

## 2020-12-28 NOTE — TELEPHONE ENCOUNTER
----- Message from Janie Barcenas sent at 12/28/2020 11:50 AM CST -----  Regarding: Covid test  Contact: Jose Luis @[phone]  Test Results    Type of Test: Covid     Date of Test: 12/23    Communication Preference: Jose Luis @[phone]    Additional Information:   Pt is requesting a call back with test results.

## 2020-12-29 ENCOUNTER — NURSE TRIAGE (OUTPATIENT)
Dept: ADMINISTRATIVE | Facility: CLINIC | Age: 70
End: 2020-12-29

## 2020-12-29 ENCOUNTER — TELEPHONE (OUTPATIENT)
Dept: ADMINISTRATIVE | Facility: CLINIC | Age: 70
End: 2020-12-29

## 2020-12-29 NOTE — TELEPHONE ENCOUNTER
Called patient due to RN escalation in COVID Symptom monitoring program. Pt escalated due to SOB on phone with pt.    Patient location: [facility]    NO VS as symptom monitoring only     70 y.o. male with pertinent PMHx Asthma childhood CKD morbid obesity Larynx CA renal transplant on day  6 of Covid symptoms. Positive Covid screen 12/23/20. CXR no recent . Home oxygen: no. COVID-19 Hospitalization History: no    HPI:  71 yo male with PMHX of above states having poor appetite and has not eaten  In 4 days States was not having symptoms but was taking a friend to be tested and requested order in on the 22nd.  Since he found out on the 24th has been worsening with not been able to eat and increased SOB.States did have some SOB before.      ROS: Weakness poor appetite and SOB Denies worsening cough, light headedness, fever, chills, diaphoresis, chest pain, abdominal pain, emesis, diarrhea or further symptoms.     Assessment: Vitals not taken. During phone call, patient appears alert and oriented. Not able to speak in full sentences without difficulty. Very labored states SOB 3.5-4/5 at rest  And severe with activity.    Plan:  Advised to go to ER now, states will wait because brother is sick as well.  Advised both go to the ER. Will CC PCP to note as well.  Advised to call 911 is emergency.  If they are having a medical emergency, they will call 911. Otherwise, patient will continue to submit data as scheduled. Reviewed importance of wearing mask if self or family members leave the house.     Advised next steps: ED referral

## 2020-12-29 NOTE — TELEPHONE ENCOUNTER
Patient is having SOB while talking on the phone.  He said that it is mild, but when he doing something strenuous, it  Gets bad.     Reason for Disposition   MILD difficulty breathing (e.g., minimal/no SOB at rest, SOB with walking, pulse <100)    Additional Information   Negative: Severe difficulty breathing (e.g., struggling for each breath, speaks in single words)   Negative: Difficult to awaken or acting confused (e.g., disoriented, slurred speech)   Negative: Bluish (or gray) lips or face now   Negative: Shock suspected (e.g., cold/pale/clammy skin, too weak to stand, low BP, rapid pulse)   Negative: Sounds like a life-threatening emergency to the triager   Negative: [1] COVID-19 exposure AND [2] no symptoms   Negative: COVID-19 and Breastfeeding, questions about   Negative: [1] Adult with possible COVID-19 symptoms AND [2] triager concerned about severity of symptoms or other causes   Negative: SEVERE or constant chest pain or pressure (Exception: mild central chest pain, present only when coughing)   Negative: MODERATE difficulty breathing (e.g., speaks in phrases, SOB even at rest, pulse 100-120)    Protocols used: CORONAVIRUS (COVID-19) DIAGNOSED OR [id number]

## 2020-12-30 ENCOUNTER — HOSPITAL ENCOUNTER (EMERGENCY)
Facility: HOSPITAL | Age: 70
Discharge: STILL ADMITTED OR ADMINISTRATIVE | End: 2020-12-30
Attending: EMERGENCY MEDICINE
Payer: MEDICARE

## 2020-12-30 ENCOUNTER — HOSPITAL ENCOUNTER (INPATIENT)
Facility: HOSPITAL | Age: 70
LOS: 6 days | Discharge: HOME HEALTH | DRG: 177 | End: 2021-01-05
Attending: HOSPITALIST | Admitting: INTERNAL MEDICINE
Payer: MEDICARE

## 2020-12-30 VITALS
WEIGHT: 230 LBS | HEIGHT: 70 IN | BODY MASS INDEX: 32.93 KG/M2 | SYSTOLIC BLOOD PRESSURE: 123 MMHG | RESPIRATION RATE: 20 BRPM | TEMPERATURE: 98 F | DIASTOLIC BLOOD PRESSURE: 73 MMHG | HEART RATE: 80 BPM | OXYGEN SATURATION: 95 %

## 2020-12-30 DIAGNOSIS — N17.9 ACUTE RENAL FAILURE SUPERIMPOSED ON STAGE 4 CHRONIC KIDNEY DISEASE, UNSPECIFIED ACUTE RENAL FAILURE TYPE: ICD-10-CM

## 2020-12-30 DIAGNOSIS — N25.81 SECONDARY HYPERPARATHYROIDISM OF RENAL ORIGIN: ICD-10-CM

## 2020-12-30 DIAGNOSIS — E55.9 VITAMIN D DEFICIENCY DISEASE: ICD-10-CM

## 2020-12-30 DIAGNOSIS — N18.4 CHRONIC KIDNEY DISEASE, STAGE IV (SEVERE): ICD-10-CM

## 2020-12-30 DIAGNOSIS — N18.4 CKD (CHRONIC KIDNEY DISEASE) STAGE 4, GFR 15-29 ML/MIN: Primary | ICD-10-CM

## 2020-12-30 DIAGNOSIS — Z94.0 KIDNEY TRANSPLANT RECIPIENT: ICD-10-CM

## 2020-12-30 DIAGNOSIS — U07.1 ACUTE HYPOXEMIC RESPIRATORY FAILURE DUE TO COVID-19: ICD-10-CM

## 2020-12-30 DIAGNOSIS — E83.39 HYPERPHOSPHATEMIA: ICD-10-CM

## 2020-12-30 DIAGNOSIS — N18.4 ACUTE RENAL FAILURE SUPERIMPOSED ON STAGE 4 CHRONIC KIDNEY DISEASE, UNSPECIFIED ACUTE RENAL FAILURE TYPE: ICD-10-CM

## 2020-12-30 DIAGNOSIS — Z20.822 SUSPECTED COVID-19 VIRUS INFECTION: ICD-10-CM

## 2020-12-30 DIAGNOSIS — Z94.0 IMMUNOSUPPRESSIVE MANAGEMENT ENCOUNTER FOLLOWING KIDNEY TRANSPLANT: ICD-10-CM

## 2020-12-30 DIAGNOSIS — U07.1 COVID-19 VIRUS DETECTED: ICD-10-CM

## 2020-12-30 DIAGNOSIS — E87.5 HYPERKALEMIA: ICD-10-CM

## 2020-12-30 DIAGNOSIS — Z79.899 IMMUNOSUPPRESSIVE MANAGEMENT ENCOUNTER FOLLOWING KIDNEY TRANSPLANT: ICD-10-CM

## 2020-12-30 DIAGNOSIS — J96.01 ACUTE HYPOXEMIC RESPIRATORY FAILURE DUE TO COVID-19: ICD-10-CM

## 2020-12-30 DIAGNOSIS — Z94.0 TRANSPLANTED KIDNEY: ICD-10-CM

## 2020-12-30 PROBLEM — N18.9 ACUTE-ON-CHRONIC KIDNEY INJURY: Status: ACTIVE | Noted: 2020-12-30

## 2020-12-30 LAB
ALBUMIN SERPL BCP-MCNC: 4.1 G/DL (ref 3.5–5.2)
ALP SERPL-CCNC: 110 U/L (ref 38–126)
ALT SERPL W/O P-5'-P-CCNC: 94 U/L (ref 10–44)
ANION GAP SERPL CALC-SCNC: 12 MMOL/L (ref 8–16)
AST SERPL-CCNC: 100 U/L (ref 15–46)
BASOPHILS # BLD AUTO: 0.01 K/UL (ref 0–0.2)
BASOPHILS NFR BLD: 0.2 % (ref 0–1.9)
BILIRUB SERPL-MCNC: 0.7 MG/DL (ref 0.1–1)
CALCIUM SERPL-MCNC: 10.3 MG/DL (ref 8.7–10.5)
CHLORIDE SERPL-SCNC: 106 MMOL/L (ref 95–110)
CK SERPL-CCNC: 741 U/L (ref 55–170)
CO2 SERPL-SCNC: 21 MMOL/L (ref 23–29)
CREAT SERPL-MCNC: 4.6 MG/DL (ref 0.5–1.4)
CRP SERPL-MCNC: 6.95 MG/DL (ref 0–1)
DIFFERENTIAL METHOD: ABNORMAL
EOSINOPHIL # BLD AUTO: 0 K/UL (ref 0–0.5)
EOSINOPHIL NFR BLD: 0 % (ref 0–8)
ERYTHROCYTE [DISTWIDTH] IN BLOOD BY AUTOMATED COUNT: 15.3 % (ref 11.5–14.5)
EST. GFR  (AFRICAN AMERICAN): 13.9 ML/MIN/1.73 M^2
EST. GFR  (NON AFRICAN AMERICAN): 12 ML/MIN/1.73 M^2
GLUCOSE SERPL-MCNC: 114 MG/DL (ref 70–110)
HCT VFR BLD AUTO: 49.3 % (ref 40–54)
HGB BLD-MCNC: 15.4 G/DL (ref 14–18)
IMM GRANULOCYTES # BLD AUTO: 0.02 K/UL (ref 0–0.04)
IMM GRANULOCYTES NFR BLD AUTO: 0.4 % (ref 0–0.5)
LACTATE SERPL-SCNC: 1.5 MMOL/L (ref 0.5–2.2)
LDH SERPL L TO P-CCNC: 448 U/L (ref 110–260)
LYMPHOCYTES # BLD AUTO: 0.7 K/UL (ref 1–4.8)
LYMPHOCYTES NFR BLD: 12.2 % (ref 18–48)
MCH RBC QN AUTO: 24.8 PG (ref 27–31)
MCHC RBC AUTO-ENTMCNC: 31.2 G/DL (ref 32–36)
MCV RBC AUTO: 79 FL (ref 82–98)
MONOCYTES # BLD AUTO: 0.4 K/UL (ref 0.3–1)
MONOCYTES NFR BLD: 7.4 % (ref 4–15)
NEUTROPHILS # BLD AUTO: 4.3 K/UL (ref 1.8–7.7)
NEUTROPHILS NFR BLD: 79.8 % (ref 38–73)
NRBC BLD-RTO: 0 /100 WBC
NT-PROBNP SERPL-MCNC: 294 PG/ML (ref 5–900)
PLATELET # BLD AUTO: 129 K/UL (ref 150–350)
PMV BLD AUTO: 10.5 FL (ref 9.2–12.9)
POCT GLUCOSE: 160 MG/DL (ref 70–110)
POTASSIUM SERPL-SCNC: 4.3 MMOL/L (ref 3.5–5.1)
PROCALCITONIN SERPL IA-MCNC: 0.86 NG/ML
PROT SERPL-MCNC: 8.5 G/DL (ref 6–8.4)
RBC # BLD AUTO: 6.22 M/UL (ref 4.6–6.2)
SODIUM SERPL-SCNC: 139 MMOL/L (ref 136–145)
TROPONIN I SERPL-MCNC: 0.09 NG/ML (ref 0.01–0.03)
TROPONIN I SERPL-MCNC: 0.1 NG/ML (ref 0.01–0.03)
UUN UR-MCNC: 54 MG/DL (ref 2–20)
WBC # BLD AUTO: 5.43 K/UL (ref 3.9–12.7)

## 2020-12-30 PROCEDURE — 96375 TX/PRO/DX INJ NEW DRUG ADDON: CPT | Mod: ER

## 2020-12-30 PROCEDURE — 93010 EKG 12-LEAD: ICD-10-PCS | Mod: ,,, | Performed by: INTERNAL MEDICINE

## 2020-12-30 PROCEDURE — 93005 ELECTROCARDIOGRAM TRACING: CPT | Mod: ER

## 2020-12-30 PROCEDURE — 83605 ASSAY OF LACTIC ACID: CPT | Mod: ER

## 2020-12-30 PROCEDURE — 84145 PROCALCITONIN (PCT): CPT | Mod: ER

## 2020-12-30 PROCEDURE — 25000003 PHARM REV CODE 250: Performed by: STUDENT IN AN ORGANIZED HEALTH CARE EDUCATION/TRAINING PROGRAM

## 2020-12-30 PROCEDURE — 63600175 PHARM REV CODE 636 W HCPCS: Mod: ER | Performed by: EMERGENCY MEDICINE

## 2020-12-30 PROCEDURE — 96367 TX/PROPH/DG ADDL SEQ IV INF: CPT | Mod: ER

## 2020-12-30 PROCEDURE — 84484 ASSAY OF TROPONIN QUANT: CPT | Mod: ER

## 2020-12-30 PROCEDURE — 99285 EMERGENCY DEPT VISIT HI MDM: CPT | Mod: 25,ER

## 2020-12-30 PROCEDURE — 99223 1ST HOSP IP/OBS HIGH 75: CPT | Mod: GC,,, | Performed by: STUDENT IN AN ORGANIZED HEALTH CARE EDUCATION/TRAINING PROGRAM

## 2020-12-30 PROCEDURE — 84484 ASSAY OF TROPONIN QUANT: CPT | Mod: 91,ER

## 2020-12-30 PROCEDURE — 86140 C-REACTIVE PROTEIN: CPT | Mod: ER

## 2020-12-30 PROCEDURE — 99223 PR INITIAL HOSPITAL CARE,LEVL III: ICD-10-PCS | Mod: GC,,, | Performed by: STUDENT IN AN ORGANIZED HEALTH CARE EDUCATION/TRAINING PROGRAM

## 2020-12-30 PROCEDURE — 83880 ASSAY OF NATRIURETIC PEPTIDE: CPT | Mod: ER

## 2020-12-30 PROCEDURE — 82550 ASSAY OF CK (CPK): CPT | Mod: ER

## 2020-12-30 PROCEDURE — 96365 THER/PROPH/DIAG IV INF INIT: CPT | Mod: ER

## 2020-12-30 PROCEDURE — 93010 ELECTROCARDIOGRAM REPORT: CPT | Mod: ,,, | Performed by: INTERNAL MEDICINE

## 2020-12-30 PROCEDURE — 96374 THER/PROPH/DIAG INJ IV PUSH: CPT | Mod: 59,ER

## 2020-12-30 PROCEDURE — 85025 COMPLETE CBC W/AUTO DIFF WBC: CPT | Mod: ER

## 2020-12-30 PROCEDURE — 83615 LACTATE (LD) (LDH) ENZYME: CPT

## 2020-12-30 PROCEDURE — 25000003 PHARM REV CODE 250: Mod: ER | Performed by: EMERGENCY MEDICINE

## 2020-12-30 PROCEDURE — 80053 COMPREHEN METABOLIC PANEL: CPT | Mod: ER

## 2020-12-30 PROCEDURE — 20600001 HC STEP DOWN PRIVATE ROOM

## 2020-12-30 RX ORDER — SODIUM BICARBONATE 650 MG/1
650 TABLET ORAL 3 TIMES DAILY
Status: DISCONTINUED | OUTPATIENT
Start: 2020-12-30 | End: 2020-12-31

## 2020-12-30 RX ORDER — FINASTERIDE 5 MG/1
5 TABLET, FILM COATED ORAL NIGHTLY
Status: DISCONTINUED | OUTPATIENT
Start: 2020-12-30 | End: 2021-01-05 | Stop reason: HOSPADM

## 2020-12-30 RX ORDER — ASCORBIC ACID 500 MG
500 TABLET ORAL 2 TIMES DAILY
Status: DISCONTINUED | OUTPATIENT
Start: 2020-12-30 | End: 2021-01-05 | Stop reason: HOSPADM

## 2020-12-30 RX ORDER — HYDROCODONE BITARTRATE AND ACETAMINOPHEN 5; 325 MG/1; MG/1
1 TABLET ORAL EVERY 6 HOURS PRN
Status: DISCONTINUED | OUTPATIENT
Start: 2020-12-30 | End: 2021-01-05 | Stop reason: HOSPADM

## 2020-12-30 RX ORDER — METOPROLOL SUCCINATE 50 MG/1
100 TABLET, EXTENDED RELEASE ORAL DAILY
Status: DISCONTINUED | OUTPATIENT
Start: 2020-12-31 | End: 2021-01-05 | Stop reason: HOSPADM

## 2020-12-30 RX ORDER — CEFTRIAXONE 1 G/1
1 INJECTION, POWDER, FOR SOLUTION INTRAMUSCULAR; INTRAVENOUS
Status: DISCONTINUED | OUTPATIENT
Start: 2020-12-31 | End: 2021-01-05

## 2020-12-30 RX ORDER — SIROLIMUS 1 MG/1
3 TABLET, FILM COATED ORAL DAILY
Status: DISCONTINUED | OUTPATIENT
Start: 2020-12-31 | End: 2021-01-01

## 2020-12-30 RX ORDER — AZITHROMYCIN 250 MG/1
250 TABLET, FILM COATED ORAL DAILY
Status: COMPLETED | OUTPATIENT
Start: 2020-12-31 | End: 2021-01-03

## 2020-12-30 RX ORDER — IBUPROFEN 200 MG
24 TABLET ORAL
Status: DISCONTINUED | OUTPATIENT
Start: 2020-12-30 | End: 2021-01-05 | Stop reason: HOSPADM

## 2020-12-30 RX ORDER — BENZONATATE 100 MG/1
100 CAPSULE ORAL 3 TIMES DAILY PRN
Status: DISCONTINUED | OUTPATIENT
Start: 2020-12-30 | End: 2021-01-05 | Stop reason: HOSPADM

## 2020-12-30 RX ORDER — AZITHROMYCIN 250 MG/1
500 TABLET, FILM COATED ORAL DAILY
Status: DISCONTINUED | OUTPATIENT
Start: 2020-12-31 | End: 2020-12-30

## 2020-12-30 RX ORDER — CINACALCET 30 MG/1
30 TABLET, FILM COATED ORAL
Status: DISCONTINUED | OUTPATIENT
Start: 2020-12-31 | End: 2021-01-05 | Stop reason: HOSPADM

## 2020-12-30 RX ORDER — ONDANSETRON 4 MG/1
4 TABLET, ORALLY DISINTEGRATING ORAL EVERY 8 HOURS PRN
Status: DISCONTINUED | OUTPATIENT
Start: 2020-12-30 | End: 2021-01-05 | Stop reason: HOSPADM

## 2020-12-30 RX ORDER — AMOXICILLIN 250 MG
1 CAPSULE ORAL 2 TIMES DAILY PRN
Status: DISCONTINUED | OUTPATIENT
Start: 2020-12-30 | End: 2021-01-05 | Stop reason: HOSPADM

## 2020-12-30 RX ORDER — IBUPROFEN 200 MG
16 TABLET ORAL
Status: DISCONTINUED | OUTPATIENT
Start: 2020-12-30 | End: 2021-01-05 | Stop reason: HOSPADM

## 2020-12-30 RX ORDER — TALC
6 POWDER (GRAM) TOPICAL NIGHTLY PRN
Status: DISCONTINUED | OUTPATIENT
Start: 2020-12-30 | End: 2021-01-05 | Stop reason: HOSPADM

## 2020-12-30 RX ORDER — MUPIROCIN 20 MG/G
OINTMENT TOPICAL 2 TIMES DAILY
Status: COMPLETED | OUTPATIENT
Start: 2020-12-30 | End: 2021-01-04

## 2020-12-30 RX ORDER — INSULIN ASPART 100 [IU]/ML
0-5 INJECTION, SOLUTION INTRAVENOUS; SUBCUTANEOUS
Status: DISCONTINUED | OUTPATIENT
Start: 2020-12-30 | End: 2021-01-05 | Stop reason: HOSPADM

## 2020-12-30 RX ORDER — DEXAMETHASONE SODIUM PHOSPHATE 4 MG/ML
8 INJECTION, SOLUTION INTRA-ARTICULAR; INTRALESIONAL; INTRAMUSCULAR; INTRAVENOUS; SOFT TISSUE
Status: COMPLETED | OUTPATIENT
Start: 2020-12-30 | End: 2020-12-30

## 2020-12-30 RX ORDER — ATORVASTATIN CALCIUM 20 MG/1
20 TABLET, FILM COATED ORAL NIGHTLY
Status: DISCONTINUED | OUTPATIENT
Start: 2020-12-30 | End: 2021-01-05 | Stop reason: HOSPADM

## 2020-12-30 RX ORDER — SODIUM CHLORIDE 0.9 % (FLUSH) 0.9 %
10 SYRINGE (ML) INJECTION
Status: DISCONTINUED | OUTPATIENT
Start: 2020-12-30 | End: 2021-01-05 | Stop reason: HOSPADM

## 2020-12-30 RX ORDER — ACETAMINOPHEN 325 MG/1
650 TABLET ORAL EVERY 8 HOURS PRN
Status: DISCONTINUED | OUTPATIENT
Start: 2020-12-30 | End: 2021-01-05 | Stop reason: HOSPADM

## 2020-12-30 RX ORDER — FUROSEMIDE 40 MG/1
80 TABLET ORAL DAILY
Status: DISCONTINUED | OUTPATIENT
Start: 2020-12-31 | End: 2021-01-05 | Stop reason: HOSPADM

## 2020-12-30 RX ORDER — GLUCAGON 1 MG
1 KIT INJECTION
Status: DISCONTINUED | OUTPATIENT
Start: 2020-12-30 | End: 2021-01-05 | Stop reason: HOSPADM

## 2020-12-30 RX ADMIN — DEXAMETHASONE SODIUM PHOSPHATE 8 MG: 4 INJECTION, SOLUTION INTRAMUSCULAR; INTRAVENOUS at 05:12

## 2020-12-30 RX ADMIN — ATORVASTATIN CALCIUM 20 MG: 20 TABLET, FILM COATED ORAL at 08:12

## 2020-12-30 RX ADMIN — SODIUM BICARBONATE 650 MG TABLET 650 MG: at 08:12

## 2020-12-30 RX ADMIN — CEFTRIAXONE SODIUM 1 G: 1 INJECTION, POWDER, FOR SOLUTION INTRAMUSCULAR; INTRAVENOUS at 08:12

## 2020-12-30 RX ADMIN — FINASTERIDE 5 MG: 5 TABLET, FILM COATED ORAL at 08:12

## 2020-12-30 RX ADMIN — APIXABAN 2.5 MG: 2.5 TABLET, FILM COATED ORAL at 08:12

## 2020-12-30 RX ADMIN — AZITHROMYCIN MONOHYDRATE 500 MG: 500 INJECTION, POWDER, LYOPHILIZED, FOR SOLUTION INTRAVENOUS at 09:12

## 2020-12-30 RX ADMIN — MUPIROCIN: 20 OINTMENT TOPICAL at 09:12

## 2020-12-30 RX ADMIN — OXYCODONE HYDROCHLORIDE AND ACETAMINOPHEN 500 MG: 500 TABLET ORAL at 08:12

## 2020-12-31 PROBLEM — M89.8X9 METABOLIC BONE DISEASE: Status: ACTIVE | Noted: 2020-12-31

## 2020-12-31 LAB
ALBUMIN SERPL BCP-MCNC: 2.8 G/DL (ref 3.5–5.2)
ALP SERPL-CCNC: 81 U/L (ref 55–135)
ALT SERPL W/O P-5'-P-CCNC: 74 U/L (ref 10–44)
ANION GAP SERPL CALC-SCNC: 14 MMOL/L (ref 8–16)
AST SERPL-CCNC: 65 U/L (ref 10–40)
BACTERIA #/AREA URNS AUTO: NORMAL /HPF
BASOPHILS # BLD AUTO: 0 K/UL (ref 0–0.2)
BASOPHILS NFR BLD: 0 % (ref 0–1.9)
BILIRUB SERPL-MCNC: 0.2 MG/DL (ref 0.1–1)
BILIRUB UR QL STRIP: NEGATIVE
BUN SERPL-MCNC: 66 MG/DL (ref 8–23)
CALCIUM SERPL-MCNC: 9.9 MG/DL (ref 8.7–10.5)
CHLORIDE SERPL-SCNC: 107 MMOL/L (ref 95–110)
CLARITY UR REFRACT.AUTO: CLEAR
CO2 SERPL-SCNC: 13 MMOL/L (ref 23–29)
COLOR UR AUTO: YELLOW
CREAT SERPL-MCNC: 4.2 MG/DL (ref 0.5–1.4)
CREAT UR-MCNC: 173 MG/DL (ref 23–375)
CREAT UR-MCNC: 173 MG/DL (ref 23–375)
DIFFERENTIAL METHOD: ABNORMAL
EOSINOPHIL # BLD AUTO: 0 K/UL (ref 0–0.5)
EOSINOPHIL NFR BLD: 0 % (ref 0–8)
ERYTHROCYTE [DISTWIDTH] IN BLOOD BY AUTOMATED COUNT: 15.5 % (ref 11.5–14.5)
EST. GFR  (AFRICAN AMERICAN): 15.5 ML/MIN/1.73 M^2
EST. GFR  (NON AFRICAN AMERICAN): 13.4 ML/MIN/1.73 M^2
GLUCOSE SERPL-MCNC: 204 MG/DL (ref 70–110)
GLUCOSE UR QL STRIP: NEGATIVE
HCT VFR BLD AUTO: 47.9 % (ref 40–54)
HGB BLD-MCNC: 14.5 G/DL (ref 14–18)
HGB UR QL STRIP: ABNORMAL
HYALINE CASTS UR QL AUTO: 0 /LPF
IMM GRANULOCYTES # BLD AUTO: 0.03 K/UL (ref 0–0.04)
IMM GRANULOCYTES NFR BLD AUTO: 0.5 % (ref 0–0.5)
KETONES UR QL STRIP: ABNORMAL
LEUKOCYTE ESTERASE UR QL STRIP: NEGATIVE
LYMPHOCYTES # BLD AUTO: 0.3 K/UL (ref 1–4.8)
LYMPHOCYTES NFR BLD: 5.1 % (ref 18–48)
MAGNESIUM SERPL-MCNC: 2.7 MG/DL (ref 1.6–2.6)
MCH RBC QN AUTO: 24.1 PG (ref 27–31)
MCHC RBC AUTO-ENTMCNC: 30.3 G/DL (ref 32–36)
MCV RBC AUTO: 80 FL (ref 82–98)
MICROSCOPIC COMMENT: NORMAL
MONOCYTES # BLD AUTO: 0.2 K/UL (ref 0.3–1)
MONOCYTES NFR BLD: 2.5 % (ref 4–15)
NEUTROPHILS # BLD AUTO: 5.5 K/UL (ref 1.8–7.7)
NEUTROPHILS NFR BLD: 91.9 % (ref 38–73)
NITRITE UR QL STRIP: NEGATIVE
NRBC BLD-RTO: 0 /100 WBC
PH UR STRIP: 5 [PH] (ref 5–8)
PHOSPHATE SERPL-MCNC: 4.1 MG/DL (ref 2.7–4.5)
PLATELET # BLD AUTO: 166 K/UL (ref 150–350)
PMV BLD AUTO: 11.7 FL (ref 9.2–12.9)
POCT GLUCOSE: 115 MG/DL (ref 70–110)
POCT GLUCOSE: 173 MG/DL (ref 70–110)
POCT GLUCOSE: 209 MG/DL (ref 70–110)
POTASSIUM SERPL-SCNC: 4 MMOL/L (ref 3.5–5.1)
PROT SERPL-MCNC: 8 G/DL (ref 6–8.4)
PROT UR QL STRIP: ABNORMAL
PROT UR-MCNC: 233 MG/DL (ref 0–15)
PROT/CREAT UR: 1.35 MG/G{CREAT} (ref 0–0.2)
RBC # BLD AUTO: 6.01 M/UL (ref 4.6–6.2)
RBC #/AREA URNS AUTO: 0 /HPF (ref 0–4)
SODIUM SERPL-SCNC: 134 MMOL/L (ref 136–145)
SODIUM UR-SCNC: 34 MMOL/L (ref 20–250)
SP GR UR STRIP: 1.02 (ref 1–1.03)
URN SPEC COLLECT METH UR: ABNORMAL
WBC # BLD AUTO: 5.93 K/UL (ref 3.9–12.7)
WBC #/AREA URNS AUTO: 0 /HPF (ref 0–5)

## 2020-12-31 PROCEDURE — 99223 1ST HOSP IP/OBS HIGH 75: CPT | Mod: GC,,, | Performed by: INTERNAL MEDICINE

## 2020-12-31 PROCEDURE — 83735 ASSAY OF MAGNESIUM: CPT

## 2020-12-31 PROCEDURE — 25000003 PHARM REV CODE 250: Performed by: INTERNAL MEDICINE

## 2020-12-31 PROCEDURE — 87449 NOS EACH ORGANISM AG IA: CPT

## 2020-12-31 PROCEDURE — 85025 COMPLETE CBC W/AUTO DIFF WBC: CPT

## 2020-12-31 PROCEDURE — 25000003 PHARM REV CODE 250: Performed by: STUDENT IN AN ORGANIZED HEALTH CARE EDUCATION/TRAINING PROGRAM

## 2020-12-31 PROCEDURE — 84300 ASSAY OF URINE SODIUM: CPT

## 2020-12-31 PROCEDURE — 63600175 PHARM REV CODE 636 W HCPCS: Performed by: STUDENT IN AN ORGANIZED HEALTH CARE EDUCATION/TRAINING PROGRAM

## 2020-12-31 PROCEDURE — 99223 PR INITIAL HOSPITAL CARE,LEVL III: ICD-10-PCS | Mod: GC,,, | Performed by: INTERNAL MEDICINE

## 2020-12-31 PROCEDURE — 99233 SBSQ HOSP IP/OBS HIGH 50: CPT | Mod: GC,,, | Performed by: INTERNAL MEDICINE

## 2020-12-31 PROCEDURE — 97162 PT EVAL MOD COMPLEX 30 MIN: CPT

## 2020-12-31 PROCEDURE — C1751 CATH, INF, PER/CENT/MIDLINE: HCPCS

## 2020-12-31 PROCEDURE — 36410 VNPNXR 3YR/> PHY/QHP DX/THER: CPT

## 2020-12-31 PROCEDURE — 84100 ASSAY OF PHOSPHORUS: CPT

## 2020-12-31 PROCEDURE — 99233 PR SUBSEQUENT HOSPITAL CARE,LEVL III: ICD-10-PCS | Mod: GC,,, | Performed by: INTERNAL MEDICINE

## 2020-12-31 PROCEDURE — 80053 COMPREHEN METABOLIC PANEL: CPT

## 2020-12-31 PROCEDURE — 81001 URINALYSIS AUTO W/SCOPE: CPT

## 2020-12-31 PROCEDURE — 80195 ASSAY OF SIROLIMUS: CPT

## 2020-12-31 PROCEDURE — 97110 THERAPEUTIC EXERCISES: CPT

## 2020-12-31 PROCEDURE — 97165 OT EVAL LOW COMPLEX 30 MIN: CPT

## 2020-12-31 PROCEDURE — 99900035 HC TECH TIME PER 15 MIN (STAT)

## 2020-12-31 PROCEDURE — 63700000 PHARM REV CODE 250 ALT 637 W/O HCPCS: Performed by: STUDENT IN AN ORGANIZED HEALTH CARE EDUCATION/TRAINING PROGRAM

## 2020-12-31 PROCEDURE — A4217 STERILE WATER/SALINE, 500 ML: HCPCS | Performed by: INTERNAL MEDICINE

## 2020-12-31 PROCEDURE — 84156 ASSAY OF PROTEIN URINE: CPT

## 2020-12-31 PROCEDURE — 76937 US GUIDE VASCULAR ACCESS: CPT

## 2020-12-31 PROCEDURE — 97116 GAIT TRAINING THERAPY: CPT

## 2020-12-31 PROCEDURE — 87040 BLOOD CULTURE FOR BACTERIA: CPT | Mod: 59

## 2020-12-31 PROCEDURE — 97535 SELF CARE MNGMENT TRAINING: CPT

## 2020-12-31 PROCEDURE — 20600001 HC STEP DOWN PRIVATE ROOM

## 2020-12-31 PROCEDURE — 27000221 HC OXYGEN, UP TO 24 HOURS

## 2020-12-31 PROCEDURE — 36415 COLL VENOUS BLD VENIPUNCTURE: CPT

## 2020-12-31 PROCEDURE — 87086 URINE CULTURE/COLONY COUNT: CPT

## 2020-12-31 RX ORDER — SODIUM BICARBONATE 650 MG/1
1300 TABLET ORAL 3 TIMES DAILY
Status: DISCONTINUED | OUTPATIENT
Start: 2020-12-31 | End: 2021-01-03

## 2020-12-31 RX ORDER — LOPERAMIDE HYDROCHLORIDE 2 MG/1
2 CAPSULE ORAL 4 TIMES DAILY PRN
Status: DISCONTINUED | OUTPATIENT
Start: 2020-12-31 | End: 2021-01-05 | Stop reason: HOSPADM

## 2020-12-31 RX ORDER — ONDANSETRON 4 MG/1
4 TABLET, FILM COATED ORAL ONCE
Status: COMPLETED | OUTPATIENT
Start: 2020-12-31 | End: 2020-12-31

## 2020-12-31 RX ADMIN — FINASTERIDE 5 MG: 5 TABLET, FILM COATED ORAL at 09:12

## 2020-12-31 RX ADMIN — MUPIROCIN: 20 OINTMENT TOPICAL at 09:12

## 2020-12-31 RX ADMIN — REMDESIVIR 200 MG: 100 INJECTION, POWDER, LYOPHILIZED, FOR SOLUTION INTRAVENOUS at 06:12

## 2020-12-31 RX ADMIN — SODIUM BICARBONATE 650 MG TABLET 1300 MG: at 02:12

## 2020-12-31 RX ADMIN — OXYCODONE HYDROCHLORIDE AND ACETAMINOPHEN 500 MG: 500 TABLET ORAL at 09:12

## 2020-12-31 RX ADMIN — DEXAMETHASONE 6 MG: 4 TABLET ORAL at 12:12

## 2020-12-31 RX ADMIN — APIXABAN 2.5 MG: 2.5 TABLET, FILM COATED ORAL at 09:12

## 2020-12-31 RX ADMIN — CINACALCET 30 MG: 30 TABLET, FILM COATED ORAL at 09:12

## 2020-12-31 RX ADMIN — AZITHROMYCIN MONOHYDRATE 250 MG: 250 TABLET ORAL at 09:12

## 2020-12-31 RX ADMIN — THERA TABS 1 TABLET: TAB at 10:12

## 2020-12-31 RX ADMIN — ONDANSETRON HYDROCHLORIDE 4 MG: 4 TABLET, FILM COATED ORAL at 02:12

## 2020-12-31 RX ADMIN — ONDANSETRON 4 MG: 4 TABLET, ORALLY DISINTEGRATING ORAL at 09:12

## 2020-12-31 RX ADMIN — SODIUM BICARBONATE: 84 INJECTION, SOLUTION INTRAVENOUS at 02:12

## 2020-12-31 RX ADMIN — ATORVASTATIN CALCIUM 20 MG: 20 TABLET, FILM COATED ORAL at 09:12

## 2020-12-31 RX ADMIN — LOPERAMIDE HYDROCHLORIDE 2 MG: 2 CAPSULE ORAL at 09:12

## 2020-12-31 RX ADMIN — LOPERAMIDE HYDROCHLORIDE 2 MG: 2 CAPSULE ORAL at 10:12

## 2020-12-31 RX ADMIN — SODIUM BICARBONATE 650 MG TABLET 1300 MG: at 09:12

## 2020-12-31 RX ADMIN — FUROSEMIDE 80 MG: 40 TABLET ORAL at 09:12

## 2020-12-31 RX ADMIN — CEFTRIAXONE 1 G: 1 INJECTION, POWDER, FOR SOLUTION INTRAMUSCULAR; INTRAVENOUS at 09:12

## 2020-12-31 RX ADMIN — METOPROLOL SUCCINATE 100 MG: 50 TABLET, EXTENDED RELEASE ORAL at 09:12

## 2020-12-31 RX ADMIN — SODIUM BICARBONATE 650 MG TABLET 650 MG: at 09:12

## 2020-12-31 RX ADMIN — SIROLIMUS 3 MG: 1 TABLET ORAL at 09:12

## 2020-12-31 RX ADMIN — OXYCODONE HYDROCHLORIDE AND ACETAMINOPHEN 500 MG: 500 TABLET ORAL at 10:12

## 2021-01-01 PROBLEM — E87.5 HYPERKALEMIA: Status: ACTIVE | Noted: 2021-01-01

## 2021-01-01 LAB
ALBUMIN SERPL BCP-MCNC: 2.7 G/DL (ref 3.5–5.2)
ALP SERPL-CCNC: 83 U/L (ref 55–135)
ALT SERPL W/O P-5'-P-CCNC: 122 U/L (ref 10–44)
ANION GAP SERPL CALC-SCNC: 17 MMOL/L (ref 8–16)
AST SERPL-CCNC: 109 U/L (ref 10–40)
BASOPHILS # BLD AUTO: 0.01 K/UL (ref 0–0.2)
BASOPHILS NFR BLD: 0.1 % (ref 0–1.9)
BILIRUB SERPL-MCNC: 0.2 MG/DL (ref 0.1–1)
BUN SERPL-MCNC: 80 MG/DL (ref 8–23)
CALCIUM SERPL-MCNC: 9.8 MG/DL (ref 8.7–10.5)
CHLORIDE SERPL-SCNC: 106 MMOL/L (ref 95–110)
CO2 SERPL-SCNC: 15 MMOL/L (ref 23–29)
CREAT SERPL-MCNC: 4.3 MG/DL (ref 0.5–1.4)
CRP SERPL-MCNC: 43.4 MG/L (ref 0–8.2)
D DIMER PPP IA.FEU-MCNC: 1.09 MG/L FEU
DIFFERENTIAL METHOD: ABNORMAL
EOSINOPHIL # BLD AUTO: 0 K/UL (ref 0–0.5)
EOSINOPHIL NFR BLD: 0 % (ref 0–8)
ERYTHROCYTE [DISTWIDTH] IN BLOOD BY AUTOMATED COUNT: 16.9 % (ref 11.5–14.5)
EST. GFR  (AFRICAN AMERICAN): 15 ML/MIN/1.73 M^2
EST. GFR  (NON AFRICAN AMERICAN): 13 ML/MIN/1.73 M^2
FERRITIN SERPL-MCNC: 7997 NG/ML (ref 20–300)
GLUCOSE SERPL-MCNC: 131 MG/DL (ref 70–110)
HCT VFR BLD AUTO: 53.1 % (ref 40–54)
HGB BLD-MCNC: 15.7 G/DL (ref 14–18)
IMM GRANULOCYTES # BLD AUTO: 0.04 K/UL (ref 0–0.04)
IMM GRANULOCYTES NFR BLD AUTO: 0.5 % (ref 0–0.5)
LDH SERPL L TO P-CCNC: 504 U/L (ref 110–260)
LYMPHOCYTES # BLD AUTO: 0.4 K/UL (ref 1–4.8)
LYMPHOCYTES NFR BLD: 5.3 % (ref 18–48)
MAGNESIUM SERPL-MCNC: 2.6 MG/DL (ref 1.6–2.6)
MCH RBC QN AUTO: 24.5 PG (ref 27–31)
MCHC RBC AUTO-ENTMCNC: 29.6 G/DL (ref 32–36)
MCV RBC AUTO: 83 FL (ref 82–98)
MONOCYTES # BLD AUTO: 0.2 K/UL (ref 0.3–1)
MONOCYTES NFR BLD: 2.3 % (ref 4–15)
NEUTROPHILS # BLD AUTO: 7.1 K/UL (ref 1.8–7.7)
NEUTROPHILS NFR BLD: 91.8 % (ref 38–73)
NRBC BLD-RTO: 0 /100 WBC
PHOSPHATE SERPL-MCNC: 5.6 MG/DL (ref 2.7–4.5)
PLATELET # BLD AUTO: 163 K/UL (ref 150–350)
PMV BLD AUTO: 12.5 FL (ref 9.2–12.9)
POCT GLUCOSE: 187 MG/DL (ref 70–110)
POCT GLUCOSE: 191 MG/DL (ref 70–110)
POTASSIUM SERPL-SCNC: 5.4 MMOL/L (ref 3.5–5.1)
PROT SERPL-MCNC: 7.8 G/DL (ref 6–8.4)
RBC # BLD AUTO: 6.41 M/UL (ref 4.6–6.2)
SIROLIMUS BLD-MCNC: 14.4 NG/ML (ref 4–20)
SIROLIMUS BLD-MCNC: 18.3 NG/ML (ref 4–20)
SODIUM SERPL-SCNC: 138 MMOL/L (ref 136–145)
WBC # BLD AUTO: 7.71 K/UL (ref 3.9–12.7)

## 2021-01-01 PROCEDURE — 36415 COLL VENOUS BLD VENIPUNCTURE: CPT

## 2021-01-01 PROCEDURE — 63700000 PHARM REV CODE 250 ALT 637 W/O HCPCS: Performed by: STUDENT IN AN ORGANIZED HEALTH CARE EDUCATION/TRAINING PROGRAM

## 2021-01-01 PROCEDURE — 84100 ASSAY OF PHOSPHORUS: CPT

## 2021-01-01 PROCEDURE — 83735 ASSAY OF MAGNESIUM: CPT

## 2021-01-01 PROCEDURE — 85379 FIBRIN DEGRADATION QUANT: CPT

## 2021-01-01 PROCEDURE — 82728 ASSAY OF FERRITIN: CPT

## 2021-01-01 PROCEDURE — 99232 PR SUBSEQUENT HOSPITAL CARE,LEVL II: ICD-10-PCS | Mod: GC,,, | Performed by: INTERNAL MEDICINE

## 2021-01-01 PROCEDURE — 63600175 PHARM REV CODE 636 W HCPCS: Performed by: STUDENT IN AN ORGANIZED HEALTH CARE EDUCATION/TRAINING PROGRAM

## 2021-01-01 PROCEDURE — 20600001 HC STEP DOWN PRIVATE ROOM

## 2021-01-01 PROCEDURE — A4217 STERILE WATER/SALINE, 500 ML: HCPCS | Performed by: INTERNAL MEDICINE

## 2021-01-01 PROCEDURE — 83615 LACTATE (LD) (LDH) ENZYME: CPT

## 2021-01-01 PROCEDURE — 99233 SBSQ HOSP IP/OBS HIGH 50: CPT | Mod: ,,, | Performed by: INTERNAL MEDICINE

## 2021-01-01 PROCEDURE — 25000003 PHARM REV CODE 250: Performed by: INTERNAL MEDICINE

## 2021-01-01 PROCEDURE — 25000003 PHARM REV CODE 250: Performed by: STUDENT IN AN ORGANIZED HEALTH CARE EDUCATION/TRAINING PROGRAM

## 2021-01-01 PROCEDURE — 99232 SBSQ HOSP IP/OBS MODERATE 35: CPT | Mod: GC,,, | Performed by: INTERNAL MEDICINE

## 2021-01-01 PROCEDURE — 25000242 PHARM REV CODE 250 ALT 637 W/ HCPCS: Performed by: STUDENT IN AN ORGANIZED HEALTH CARE EDUCATION/TRAINING PROGRAM

## 2021-01-01 PROCEDURE — 80053 COMPREHEN METABOLIC PANEL: CPT

## 2021-01-01 PROCEDURE — 27000221 HC OXYGEN, UP TO 24 HOURS

## 2021-01-01 PROCEDURE — 85025 COMPLETE CBC W/AUTO DIFF WBC: CPT

## 2021-01-01 PROCEDURE — 86140 C-REACTIVE PROTEIN: CPT

## 2021-01-01 PROCEDURE — 94761 N-INVAS EAR/PLS OXIMETRY MLT: CPT

## 2021-01-01 PROCEDURE — 99233 PR SUBSEQUENT HOSPITAL CARE,LEVL III: ICD-10-PCS | Mod: ,,, | Performed by: INTERNAL MEDICINE

## 2021-01-01 PROCEDURE — 94640 AIRWAY INHALATION TREATMENT: CPT

## 2021-01-01 PROCEDURE — 80195 ASSAY OF SIROLIMUS: CPT

## 2021-01-01 RX ORDER — ALBUTEROL SULFATE 2.5 MG/.5ML
10 SOLUTION RESPIRATORY (INHALATION) ONCE
Status: COMPLETED | OUTPATIENT
Start: 2021-01-01 | End: 2021-01-01

## 2021-01-01 RX ORDER — SIROLIMUS 1 MG/1
1 TABLET, FILM COATED ORAL DAILY
Status: DISCONTINUED | OUTPATIENT
Start: 2021-01-03 | End: 2021-01-04

## 2021-01-01 RX ADMIN — ATORVASTATIN CALCIUM 20 MG: 20 TABLET, FILM COATED ORAL at 08:01

## 2021-01-01 RX ADMIN — FINASTERIDE 5 MG: 5 TABLET, FILM COATED ORAL at 08:01

## 2021-01-01 RX ADMIN — THERA TABS 1 TABLET: TAB at 08:01

## 2021-01-01 RX ADMIN — DEXAMETHASONE 6 MG: 4 TABLET ORAL at 08:01

## 2021-01-01 RX ADMIN — ALBUTEROL SULFATE 10 MG: 2.5 SOLUTION RESPIRATORY (INHALATION) at 08:01

## 2021-01-01 RX ADMIN — SODIUM BICARBONATE 650 MG TABLET 1300 MG: at 09:01

## 2021-01-01 RX ADMIN — APIXABAN 2.5 MG: 2.5 TABLET, FILM COATED ORAL at 08:01

## 2021-01-01 RX ADMIN — CEFTRIAXONE 1 G: 1 INJECTION, POWDER, FOR SOLUTION INTRAMUSCULAR; INTRAVENOUS at 08:01

## 2021-01-01 RX ADMIN — OXYCODONE HYDROCHLORIDE AND ACETAMINOPHEN 500 MG: 500 TABLET ORAL at 08:01

## 2021-01-01 RX ADMIN — METOPROLOL SUCCINATE 100 MG: 50 TABLET, EXTENDED RELEASE ORAL at 08:01

## 2021-01-01 RX ADMIN — FUROSEMIDE 80 MG: 40 TABLET ORAL at 08:01

## 2021-01-01 RX ADMIN — AZITHROMYCIN MONOHYDRATE 250 MG: 250 TABLET ORAL at 08:01

## 2021-01-01 RX ADMIN — SODIUM BICARBONATE: 84 INJECTION, SOLUTION INTRAVENOUS at 09:01

## 2021-01-01 RX ADMIN — SIROLIMUS 3 MG: 1 TABLET ORAL at 08:01

## 2021-01-01 RX ADMIN — REMDESIVIR 100 MG: 100 INJECTION, POWDER, LYOPHILIZED, FOR SOLUTION INTRAVENOUS at 04:01

## 2021-01-01 RX ADMIN — CINACALCET 30 MG: 30 TABLET, FILM COATED ORAL at 08:01

## 2021-01-01 RX ADMIN — MUPIROCIN: 20 OINTMENT TOPICAL at 08:01

## 2021-01-01 RX ADMIN — SODIUM BICARBONATE 650 MG TABLET 1300 MG: at 04:01

## 2021-01-01 RX ADMIN — MUPIROCIN: 20 OINTMENT TOPICAL at 09:01

## 2021-01-01 RX ADMIN — SODIUM POLYSTYRENE SULFONATE 30 G: 15 SUSPENSION ORAL; RECTAL at 04:01

## 2021-01-01 RX ADMIN — SODIUM BICARBONATE 650 MG TABLET 1300 MG: at 08:01

## 2021-01-02 LAB
ALBUMIN SERPL BCP-MCNC: 2.8 G/DL (ref 3.5–5.2)
ALP SERPL-CCNC: 96 U/L (ref 55–135)
ALT SERPL W/O P-5'-P-CCNC: 102 U/L (ref 10–44)
ANION GAP SERPL CALC-SCNC: 22 MMOL/L (ref 8–16)
AST SERPL-CCNC: 62 U/L (ref 10–40)
BACTERIA UR CULT: NORMAL
BASOPHILS # BLD AUTO: 0.01 K/UL (ref 0–0.2)
BASOPHILS NFR BLD: 0.1 % (ref 0–1.9)
BILIRUB SERPL-MCNC: 0.2 MG/DL (ref 0.1–1)
BUN SERPL-MCNC: 97 MG/DL (ref 8–23)
CALCIUM SERPL-MCNC: 9.4 MG/DL (ref 8.7–10.5)
CHLORIDE SERPL-SCNC: 101 MMOL/L (ref 95–110)
CO2 SERPL-SCNC: 17 MMOL/L (ref 23–29)
CREAT SERPL-MCNC: 4.5 MG/DL (ref 0.5–1.4)
DIFFERENTIAL METHOD: ABNORMAL
EOSINOPHIL # BLD AUTO: 0 K/UL (ref 0–0.5)
EOSINOPHIL NFR BLD: 0 % (ref 0–8)
ERYTHROCYTE [DISTWIDTH] IN BLOOD BY AUTOMATED COUNT: 17.2 % (ref 11.5–14.5)
EST. GFR  (AFRICAN AMERICAN): 14.2 ML/MIN/1.73 M^2
EST. GFR  (NON AFRICAN AMERICAN): 12.3 ML/MIN/1.73 M^2
GLUCOSE SERPL-MCNC: 142 MG/DL (ref 70–110)
HCT VFR BLD AUTO: 53.5 % (ref 40–54)
HGB BLD-MCNC: 16 G/DL (ref 14–18)
IMM GRANULOCYTES # BLD AUTO: 0.1 K/UL (ref 0–0.04)
IMM GRANULOCYTES NFR BLD AUTO: 1.1 % (ref 0–0.5)
LYMPHOCYTES # BLD AUTO: 0.3 K/UL (ref 1–4.8)
LYMPHOCYTES NFR BLD: 3.3 % (ref 18–48)
MAGNESIUM SERPL-MCNC: 2.6 MG/DL (ref 1.6–2.6)
MCH RBC QN AUTO: 24.1 PG (ref 27–31)
MCHC RBC AUTO-ENTMCNC: 29.9 G/DL (ref 32–36)
MCV RBC AUTO: 81 FL (ref 82–98)
MONOCYTES # BLD AUTO: 0.3 K/UL (ref 0.3–1)
MONOCYTES NFR BLD: 3.1 % (ref 4–15)
NEUTROPHILS # BLD AUTO: 8.2 K/UL (ref 1.8–7.7)
NEUTROPHILS NFR BLD: 92.4 % (ref 38–73)
NRBC BLD-RTO: 0 /100 WBC
PHOSPHATE SERPL-MCNC: 6.7 MG/DL (ref 2.7–4.5)
PLATELET # BLD AUTO: 187 K/UL (ref 150–350)
PMV BLD AUTO: 12.6 FL (ref 9.2–12.9)
POCT GLUCOSE: 170 MG/DL (ref 70–110)
POCT GLUCOSE: 172 MG/DL (ref 70–110)
POCT GLUCOSE: 176 MG/DL (ref 70–110)
POCT GLUCOSE: 177 MG/DL (ref 70–110)
POTASSIUM SERPL-SCNC: 4.3 MMOL/L (ref 3.5–5.1)
PROT SERPL-MCNC: 7.9 G/DL (ref 6–8.4)
RBC # BLD AUTO: 6.65 M/UL (ref 4.6–6.2)
SIROLIMUS BLD-MCNC: 15.2 NG/ML (ref 4–20)
SODIUM SERPL-SCNC: 140 MMOL/L (ref 136–145)
WBC # BLD AUTO: 8.91 K/UL (ref 3.9–12.7)

## 2021-01-02 PROCEDURE — 63600175 PHARM REV CODE 636 W HCPCS: Performed by: STUDENT IN AN ORGANIZED HEALTH CARE EDUCATION/TRAINING PROGRAM

## 2021-01-02 PROCEDURE — 25000003 PHARM REV CODE 250: Performed by: STUDENT IN AN ORGANIZED HEALTH CARE EDUCATION/TRAINING PROGRAM

## 2021-01-02 PROCEDURE — C1751 CATH, INF, PER/CENT/MIDLINE: HCPCS

## 2021-01-02 PROCEDURE — 84100 ASSAY OF PHOSPHORUS: CPT

## 2021-01-02 PROCEDURE — 27000221 HC OXYGEN, UP TO 24 HOURS

## 2021-01-02 PROCEDURE — 99233 SBSQ HOSP IP/OBS HIGH 50: CPT | Mod: GC,,, | Performed by: STUDENT IN AN ORGANIZED HEALTH CARE EDUCATION/TRAINING PROGRAM

## 2021-01-02 PROCEDURE — 36415 COLL VENOUS BLD VENIPUNCTURE: CPT

## 2021-01-02 PROCEDURE — 99233 SBSQ HOSP IP/OBS HIGH 50: CPT | Mod: ,,, | Performed by: INTERNAL MEDICINE

## 2021-01-02 PROCEDURE — 27000646 HC AEROBIKA DEVICE

## 2021-01-02 PROCEDURE — 36410 VNPNXR 3YR/> PHY/QHP DX/THER: CPT

## 2021-01-02 PROCEDURE — 76937 US GUIDE VASCULAR ACCESS: CPT

## 2021-01-02 PROCEDURE — 83735 ASSAY OF MAGNESIUM: CPT

## 2021-01-02 PROCEDURE — 25000003 PHARM REV CODE 250: Performed by: INTERNAL MEDICINE

## 2021-01-02 PROCEDURE — 99233 PR SUBSEQUENT HOSPITAL CARE,LEVL III: ICD-10-PCS | Mod: GC,,, | Performed by: STUDENT IN AN ORGANIZED HEALTH CARE EDUCATION/TRAINING PROGRAM

## 2021-01-02 PROCEDURE — 99900035 HC TECH TIME PER 15 MIN (STAT)

## 2021-01-02 PROCEDURE — 80195 ASSAY OF SIROLIMUS: CPT

## 2021-01-02 PROCEDURE — 80053 COMPREHEN METABOLIC PANEL: CPT

## 2021-01-02 PROCEDURE — 63600175 PHARM REV CODE 636 W HCPCS: Performed by: INTERNAL MEDICINE

## 2021-01-02 PROCEDURE — 85025 COMPLETE CBC W/AUTO DIFF WBC: CPT

## 2021-01-02 PROCEDURE — 20600001 HC STEP DOWN PRIVATE ROOM

## 2021-01-02 PROCEDURE — 94761 N-INVAS EAR/PLS OXIMETRY MLT: CPT

## 2021-01-02 PROCEDURE — 94664 DEMO&/EVAL PT USE INHALER: CPT

## 2021-01-02 PROCEDURE — 99233 PR SUBSEQUENT HOSPITAL CARE,LEVL III: ICD-10-PCS | Mod: ,,, | Performed by: INTERNAL MEDICINE

## 2021-01-02 PROCEDURE — 63700000 PHARM REV CODE 250 ALT 637 W/O HCPCS: Performed by: STUDENT IN AN ORGANIZED HEALTH CARE EDUCATION/TRAINING PROGRAM

## 2021-01-02 RX ORDER — FUROSEMIDE 10 MG/ML
80 INJECTION INTRAMUSCULAR; INTRAVENOUS ONCE
Status: COMPLETED | OUTPATIENT
Start: 2021-01-02 | End: 2021-01-02

## 2021-01-02 RX ORDER — SEVELAMER CARBONATE 800 MG/1
800 TABLET, FILM COATED ORAL
Status: DISCONTINUED | OUTPATIENT
Start: 2021-01-02 | End: 2021-01-03

## 2021-01-02 RX ADMIN — THERA TABS 1 TABLET: TAB at 08:01

## 2021-01-02 RX ADMIN — DEXAMETHASONE 6 MG: 4 TABLET ORAL at 08:01

## 2021-01-02 RX ADMIN — APIXABAN 2.5 MG: 2.5 TABLET, FILM COATED ORAL at 08:01

## 2021-01-02 RX ADMIN — CEFTRIAXONE 1 G: 1 INJECTION, POWDER, FOR SOLUTION INTRAMUSCULAR; INTRAVENOUS at 05:01

## 2021-01-02 RX ADMIN — FUROSEMIDE 80 MG: 10 INJECTION, SOLUTION INTRAMUSCULAR; INTRAVENOUS at 03:01

## 2021-01-02 RX ADMIN — AZITHROMYCIN MONOHYDRATE 250 MG: 250 TABLET ORAL at 08:01

## 2021-01-02 RX ADMIN — SODIUM BICARBONATE 650 MG TABLET 1300 MG: at 09:01

## 2021-01-02 RX ADMIN — MUPIROCIN: 20 OINTMENT TOPICAL at 09:01

## 2021-01-02 RX ADMIN — REMDESIVIR 100 MG: 100 INJECTION, POWDER, LYOPHILIZED, FOR SOLUTION INTRAVENOUS at 05:01

## 2021-01-02 RX ADMIN — FUROSEMIDE 80 MG: 40 TABLET ORAL at 08:01

## 2021-01-02 RX ADMIN — ATORVASTATIN CALCIUM 20 MG: 20 TABLET, FILM COATED ORAL at 09:01

## 2021-01-02 RX ADMIN — SODIUM BICARBONATE 650 MG TABLET 1300 MG: at 03:01

## 2021-01-02 RX ADMIN — OXYCODONE HYDROCHLORIDE AND ACETAMINOPHEN 500 MG: 500 TABLET ORAL at 08:01

## 2021-01-02 RX ADMIN — METOPROLOL SUCCINATE 100 MG: 50 TABLET, EXTENDED RELEASE ORAL at 09:01

## 2021-01-02 RX ADMIN — APIXABAN 2.5 MG: 2.5 TABLET, FILM COATED ORAL at 09:01

## 2021-01-02 RX ADMIN — SEVELAMER CARBONATE 800 MG: 800 TABLET, FILM COATED ORAL at 05:01

## 2021-01-02 RX ADMIN — CINACALCET 30 MG: 30 TABLET, FILM COATED ORAL at 08:01

## 2021-01-02 RX ADMIN — FINASTERIDE 5 MG: 5 TABLET, FILM COATED ORAL at 09:01

## 2021-01-02 RX ADMIN — MUPIROCIN: 20 OINTMENT TOPICAL at 08:01

## 2021-01-02 RX ADMIN — SODIUM BICARBONATE 650 MG TABLET 1300 MG: at 08:01

## 2021-01-02 RX ADMIN — SEVELAMER CARBONATE 800 MG: 800 TABLET, FILM COATED ORAL at 11:01

## 2021-01-02 RX ADMIN — OXYCODONE HYDROCHLORIDE AND ACETAMINOPHEN 500 MG: 500 TABLET ORAL at 09:01

## 2021-01-03 LAB
ALBUMIN SERPL BCP-MCNC: 3 G/DL (ref 3.5–5.2)
ALP SERPL-CCNC: 93 U/L (ref 55–135)
ALT SERPL W/O P-5'-P-CCNC: 85 U/L (ref 10–44)
ANION GAP SERPL CALC-SCNC: 22 MMOL/L (ref 8–16)
AST SERPL-CCNC: 49 U/L (ref 10–40)
BASOPHILS # BLD AUTO: 0.02 K/UL (ref 0–0.2)
BASOPHILS NFR BLD: 0.2 % (ref 0–1.9)
BILIRUB SERPL-MCNC: 0.4 MG/DL (ref 0.1–1)
BUN SERPL-MCNC: 100 MG/DL (ref 8–23)
CALCIUM SERPL-MCNC: 9.5 MG/DL (ref 8.7–10.5)
CHLORIDE SERPL-SCNC: 94 MMOL/L (ref 95–110)
CO2 SERPL-SCNC: 26 MMOL/L (ref 23–29)
CREAT SERPL-MCNC: 4.1 MG/DL (ref 0.5–1.4)
DIFFERENTIAL METHOD: ABNORMAL
EOSINOPHIL # BLD AUTO: 0 K/UL (ref 0–0.5)
EOSINOPHIL NFR BLD: 0 % (ref 0–8)
ERYTHROCYTE [DISTWIDTH] IN BLOOD BY AUTOMATED COUNT: 17.1 % (ref 11.5–14.5)
EST. GFR  (AFRICAN AMERICAN): 15.9 ML/MIN/1.73 M^2
EST. GFR  (NON AFRICAN AMERICAN): 13.8 ML/MIN/1.73 M^2
GLUCOSE SERPL-MCNC: 140 MG/DL (ref 70–110)
HCT VFR BLD AUTO: 54.8 % (ref 40–54)
HGB BLD-MCNC: 17 G/DL (ref 14–18)
IMM GRANULOCYTES # BLD AUTO: 0.11 K/UL (ref 0–0.04)
IMM GRANULOCYTES NFR BLD AUTO: 1.2 % (ref 0–0.5)
LYMPHOCYTES # BLD AUTO: 0.3 K/UL (ref 1–4.8)
LYMPHOCYTES NFR BLD: 3.1 % (ref 18–48)
MAGNESIUM SERPL-MCNC: 2.4 MG/DL (ref 1.6–2.6)
MCH RBC QN AUTO: 24.6 PG (ref 27–31)
MCHC RBC AUTO-ENTMCNC: 31 G/DL (ref 32–36)
MCV RBC AUTO: 79 FL (ref 82–98)
MONOCYTES # BLD AUTO: 0.4 K/UL (ref 0.3–1)
MONOCYTES NFR BLD: 4 % (ref 4–15)
NEUTROPHILS # BLD AUTO: 8.7 K/UL (ref 1.8–7.7)
NEUTROPHILS NFR BLD: 91.5 % (ref 38–73)
NRBC BLD-RTO: 0 /100 WBC
PHOSPHATE SERPL-MCNC: 6.3 MG/DL (ref 2.7–4.5)
PLATELET # BLD AUTO: 212 K/UL (ref 150–350)
PMV BLD AUTO: 11.5 FL (ref 9.2–12.9)
POCT GLUCOSE: 156 MG/DL (ref 70–110)
POCT GLUCOSE: 183 MG/DL (ref 70–110)
POCT GLUCOSE: 185 MG/DL (ref 70–110)
POCT GLUCOSE: 206 MG/DL (ref 70–110)
POTASSIUM SERPL-SCNC: 3.5 MMOL/L (ref 3.5–5.1)
PROT SERPL-MCNC: 8.1 G/DL (ref 6–8.4)
RBC # BLD AUTO: 6.91 M/UL (ref 4.6–6.2)
SIROLIMUS BLD-MCNC: 9.2 NG/ML (ref 4–20)
SODIUM SERPL-SCNC: 142 MMOL/L (ref 136–145)
WBC # BLD AUTO: 9.47 K/UL (ref 3.9–12.7)

## 2021-01-03 PROCEDURE — 80195 ASSAY OF SIROLIMUS: CPT

## 2021-01-03 PROCEDURE — 25000003 PHARM REV CODE 250: Performed by: STUDENT IN AN ORGANIZED HEALTH CARE EDUCATION/TRAINING PROGRAM

## 2021-01-03 PROCEDURE — 27000646 HC AEROBIKA DEVICE

## 2021-01-03 PROCEDURE — 63600175 PHARM REV CODE 636 W HCPCS: Performed by: INTERNAL MEDICINE

## 2021-01-03 PROCEDURE — 94664 DEMO&/EVAL PT USE INHALER: CPT

## 2021-01-03 PROCEDURE — 36415 COLL VENOUS BLD VENIPUNCTURE: CPT

## 2021-01-03 PROCEDURE — 63700000 PHARM REV CODE 250 ALT 637 W/O HCPCS: Performed by: STUDENT IN AN ORGANIZED HEALTH CARE EDUCATION/TRAINING PROGRAM

## 2021-01-03 PROCEDURE — 99233 PR SUBSEQUENT HOSPITAL CARE,LEVL III: ICD-10-PCS | Mod: GC,,, | Performed by: STUDENT IN AN ORGANIZED HEALTH CARE EDUCATION/TRAINING PROGRAM

## 2021-01-03 PROCEDURE — 99233 SBSQ HOSP IP/OBS HIGH 50: CPT | Mod: ,,, | Performed by: INTERNAL MEDICINE

## 2021-01-03 PROCEDURE — 99233 SBSQ HOSP IP/OBS HIGH 50: CPT | Mod: GC,,, | Performed by: STUDENT IN AN ORGANIZED HEALTH CARE EDUCATION/TRAINING PROGRAM

## 2021-01-03 PROCEDURE — 63600175 PHARM REV CODE 636 W HCPCS: Performed by: STUDENT IN AN ORGANIZED HEALTH CARE EDUCATION/TRAINING PROGRAM

## 2021-01-03 PROCEDURE — 94761 N-INVAS EAR/PLS OXIMETRY MLT: CPT

## 2021-01-03 PROCEDURE — 85025 COMPLETE CBC W/AUTO DIFF WBC: CPT

## 2021-01-03 PROCEDURE — 25000003 PHARM REV CODE 250: Performed by: INTERNAL MEDICINE

## 2021-01-03 PROCEDURE — 20600001 HC STEP DOWN PRIVATE ROOM

## 2021-01-03 PROCEDURE — 80053 COMPREHEN METABOLIC PANEL: CPT

## 2021-01-03 PROCEDURE — 27000221 HC OXYGEN, UP TO 24 HOURS

## 2021-01-03 PROCEDURE — 99900035 HC TECH TIME PER 15 MIN (STAT)

## 2021-01-03 PROCEDURE — 83735 ASSAY OF MAGNESIUM: CPT

## 2021-01-03 PROCEDURE — 84100 ASSAY OF PHOSPHORUS: CPT

## 2021-01-03 PROCEDURE — 99233 PR SUBSEQUENT HOSPITAL CARE,LEVL III: ICD-10-PCS | Mod: ,,, | Performed by: INTERNAL MEDICINE

## 2021-01-03 RX ORDER — SODIUM BICARBONATE 650 MG/1
650 TABLET ORAL 2 TIMES DAILY
Status: DISCONTINUED | OUTPATIENT
Start: 2021-01-03 | End: 2021-01-05 | Stop reason: HOSPADM

## 2021-01-03 RX ORDER — SEVELAMER CARBONATE 800 MG/1
1600 TABLET, FILM COATED ORAL
Status: DISCONTINUED | OUTPATIENT
Start: 2021-01-03 | End: 2021-01-04

## 2021-01-03 RX ORDER — FUROSEMIDE 10 MG/ML
80 INJECTION INTRAMUSCULAR; INTRAVENOUS ONCE
Status: COMPLETED | OUTPATIENT
Start: 2021-01-03 | End: 2021-01-03

## 2021-01-03 RX ADMIN — SODIUM BICARBONATE 650 MG TABLET 650 MG: at 09:01

## 2021-01-03 RX ADMIN — MUPIROCIN: 20 OINTMENT TOPICAL at 10:01

## 2021-01-03 RX ADMIN — OXYCODONE HYDROCHLORIDE AND ACETAMINOPHEN 500 MG: 500 TABLET ORAL at 10:01

## 2021-01-03 RX ADMIN — THERA TABS 1 TABLET: TAB at 10:01

## 2021-01-03 RX ADMIN — FINASTERIDE 5 MG: 5 TABLET, FILM COATED ORAL at 09:01

## 2021-01-03 RX ADMIN — METOPROLOL SUCCINATE 100 MG: 50 TABLET, EXTENDED RELEASE ORAL at 09:01

## 2021-01-03 RX ADMIN — MUPIROCIN: 20 OINTMENT TOPICAL at 09:01

## 2021-01-03 RX ADMIN — FUROSEMIDE 80 MG: 10 INJECTION, SOLUTION INTRAMUSCULAR; INTRAVENOUS at 02:01

## 2021-01-03 RX ADMIN — APIXABAN 2.5 MG: 2.5 TABLET, FILM COATED ORAL at 10:01

## 2021-01-03 RX ADMIN — SODIUM BICARBONATE 650 MG TABLET 1300 MG: at 10:01

## 2021-01-03 RX ADMIN — CEFTRIAXONE 1 G: 1 INJECTION, POWDER, FOR SOLUTION INTRAMUSCULAR; INTRAVENOUS at 04:01

## 2021-01-03 RX ADMIN — OXYCODONE HYDROCHLORIDE AND ACETAMINOPHEN 500 MG: 500 TABLET ORAL at 09:01

## 2021-01-03 RX ADMIN — AZITHROMYCIN MONOHYDRATE 250 MG: 250 TABLET ORAL at 10:01

## 2021-01-03 RX ADMIN — FUROSEMIDE 80 MG: 40 TABLET ORAL at 10:01

## 2021-01-03 RX ADMIN — REMDESIVIR 100 MG: 100 INJECTION, POWDER, LYOPHILIZED, FOR SOLUTION INTRAVENOUS at 05:01

## 2021-01-03 RX ADMIN — ATORVASTATIN CALCIUM 20 MG: 20 TABLET, FILM COATED ORAL at 09:01

## 2021-01-03 RX ADMIN — CINACALCET 30 MG: 30 TABLET, FILM COATED ORAL at 02:01

## 2021-01-03 RX ADMIN — APIXABAN 2.5 MG: 2.5 TABLET, FILM COATED ORAL at 09:01

## 2021-01-03 RX ADMIN — SEVELAMER CARBONATE 1600 MG: 800 TABLET, FILM COATED ORAL at 04:01

## 2021-01-03 RX ADMIN — DEXAMETHASONE 6 MG: 4 TABLET ORAL at 05:01

## 2021-01-03 RX ADMIN — SIROLIMUS 1 MG: 1 TABLET ORAL at 10:01

## 2021-01-04 PROBLEM — E55.9 VITAMIN D DEFICIENCY DISEASE: Status: ACTIVE | Noted: 2021-01-04

## 2021-01-04 PROBLEM — Z94.0 IMMUNOSUPPRESSIVE MANAGEMENT ENCOUNTER FOLLOWING KIDNEY TRANSPLANT: Status: ACTIVE | Noted: 2021-01-04

## 2021-01-04 PROBLEM — E83.39 HYPERPHOSPHATEMIA: Status: ACTIVE | Noted: 2021-01-04

## 2021-01-04 PROBLEM — Z79.899 IMMUNOSUPPRESSIVE MANAGEMENT ENCOUNTER FOLLOWING KIDNEY TRANSPLANT: Status: ACTIVE | Noted: 2021-01-04

## 2021-01-04 LAB
ALBUMIN SERPL BCP-MCNC: 2.7 G/DL (ref 3.5–5.2)
ALP SERPL-CCNC: 94 U/L (ref 55–135)
ALT SERPL W/O P-5'-P-CCNC: 70 U/L (ref 10–44)
ANION GAP SERPL CALC-SCNC: 20 MMOL/L (ref 8–16)
AST SERPL-CCNC: 45 U/L (ref 10–40)
BASOPHILS # BLD AUTO: 0.04 K/UL (ref 0–0.2)
BASOPHILS NFR BLD: 0.5 % (ref 0–1.9)
BILIRUB SERPL-MCNC: 0.3 MG/DL (ref 0.1–1)
BUN SERPL-MCNC: 106 MG/DL (ref 8–23)
CALCIUM SERPL-MCNC: 9 MG/DL (ref 8.7–10.5)
CHLORIDE SERPL-SCNC: 94 MMOL/L (ref 95–110)
CO2 SERPL-SCNC: 26 MMOL/L (ref 23–29)
CREAT SERPL-MCNC: 3.9 MG/DL (ref 0.5–1.4)
DIFFERENTIAL METHOD: ABNORMAL
EOSINOPHIL # BLD AUTO: 0 K/UL (ref 0–0.5)
EOSINOPHIL NFR BLD: 0 % (ref 0–8)
ERYTHROCYTE [DISTWIDTH] IN BLOOD BY AUTOMATED COUNT: 15.9 % (ref 11.5–14.5)
EST. GFR  (AFRICAN AMERICAN): 16.9 ML/MIN/1.73 M^2
EST. GFR  (NON AFRICAN AMERICAN): 14.6 ML/MIN/1.73 M^2
GLUCOSE SERPL-MCNC: 195 MG/DL (ref 70–110)
HCT VFR BLD AUTO: 52.1 % (ref 40–54)
HGB BLD-MCNC: 16 G/DL (ref 14–18)
IMM GRANULOCYTES # BLD AUTO: 0.18 K/UL (ref 0–0.04)
IMM GRANULOCYTES NFR BLD AUTO: 2.1 % (ref 0–0.5)
L PNEUMO AG UR QL IA: NEGATIVE
LYMPHOCYTES # BLD AUTO: 0.3 K/UL (ref 1–4.8)
LYMPHOCYTES NFR BLD: 3.6 % (ref 18–48)
MAGNESIUM SERPL-MCNC: 2.3 MG/DL (ref 1.6–2.6)
MCH RBC QN AUTO: 24.6 PG (ref 27–31)
MCHC RBC AUTO-ENTMCNC: 30.7 G/DL (ref 32–36)
MCV RBC AUTO: 80 FL (ref 82–98)
MONOCYTES # BLD AUTO: 0.3 K/UL (ref 0.3–1)
MONOCYTES NFR BLD: 3.2 % (ref 4–15)
NEUTROPHILS # BLD AUTO: 7.8 K/UL (ref 1.8–7.7)
NEUTROPHILS NFR BLD: 90.6 % (ref 38–73)
NRBC BLD-RTO: 0 /100 WBC
PHOSPHATE SERPL-MCNC: 6.9 MG/DL (ref 2.7–4.5)
PLATELET # BLD AUTO: 204 K/UL (ref 150–350)
PMV BLD AUTO: 11 FL (ref 9.2–12.9)
POCT GLUCOSE: 138 MG/DL (ref 70–110)
POCT GLUCOSE: 181 MG/DL (ref 70–110)
POCT GLUCOSE: 205 MG/DL (ref 70–110)
POCT GLUCOSE: 216 MG/DL (ref 70–110)
POCT GLUCOSE: 257 MG/DL (ref 70–110)
POTASSIUM SERPL-SCNC: 4.3 MMOL/L (ref 3.5–5.1)
PROT SERPL-MCNC: 7.6 G/DL (ref 6–8.4)
RBC # BLD AUTO: 6.5 M/UL (ref 4.6–6.2)
SIROLIMUS BLD-MCNC: 13.4 NG/ML (ref 4–20)
SODIUM SERPL-SCNC: 140 MMOL/L (ref 136–145)
WBC # BLD AUTO: 8.63 K/UL (ref 3.9–12.7)

## 2021-01-04 PROCEDURE — 20600001 HC STEP DOWN PRIVATE ROOM

## 2021-01-04 PROCEDURE — 99233 SBSQ HOSP IP/OBS HIGH 50: CPT | Mod: GC,,, | Performed by: STUDENT IN AN ORGANIZED HEALTH CARE EDUCATION/TRAINING PROGRAM

## 2021-01-04 PROCEDURE — 83735 ASSAY OF MAGNESIUM: CPT

## 2021-01-04 PROCEDURE — 99233 PR SUBSEQUENT HOSPITAL CARE,LEVL III: ICD-10-PCS | Mod: GC,,, | Performed by: STUDENT IN AN ORGANIZED HEALTH CARE EDUCATION/TRAINING PROGRAM

## 2021-01-04 PROCEDURE — 25000003 PHARM REV CODE 250: Performed by: INTERNAL MEDICINE

## 2021-01-04 PROCEDURE — 80053 COMPREHEN METABOLIC PANEL: CPT

## 2021-01-04 PROCEDURE — 99233 SBSQ HOSP IP/OBS HIGH 50: CPT | Mod: ,,, | Performed by: INTERNAL MEDICINE

## 2021-01-04 PROCEDURE — 25000003 PHARM REV CODE 250: Performed by: STUDENT IN AN ORGANIZED HEALTH CARE EDUCATION/TRAINING PROGRAM

## 2021-01-04 PROCEDURE — 27000221 HC OXYGEN, UP TO 24 HOURS

## 2021-01-04 PROCEDURE — 36415 COLL VENOUS BLD VENIPUNCTURE: CPT

## 2021-01-04 PROCEDURE — 97116 GAIT TRAINING THERAPY: CPT

## 2021-01-04 PROCEDURE — 94761 N-INVAS EAR/PLS OXIMETRY MLT: CPT

## 2021-01-04 PROCEDURE — 94664 DEMO&/EVAL PT USE INHALER: CPT

## 2021-01-04 PROCEDURE — 84100 ASSAY OF PHOSPHORUS: CPT

## 2021-01-04 PROCEDURE — 97530 THERAPEUTIC ACTIVITIES: CPT

## 2021-01-04 PROCEDURE — 99233 PR SUBSEQUENT HOSPITAL CARE,LEVL III: ICD-10-PCS | Mod: ,,, | Performed by: INTERNAL MEDICINE

## 2021-01-04 PROCEDURE — 85025 COMPLETE CBC W/AUTO DIFF WBC: CPT

## 2021-01-04 PROCEDURE — 80195 ASSAY OF SIROLIMUS: CPT

## 2021-01-04 PROCEDURE — 63600175 PHARM REV CODE 636 W HCPCS: Performed by: STUDENT IN AN ORGANIZED HEALTH CARE EDUCATION/TRAINING PROGRAM

## 2021-01-04 PROCEDURE — 27000646 HC AEROBIKA DEVICE

## 2021-01-04 PROCEDURE — 99900035 HC TECH TIME PER 15 MIN (STAT)

## 2021-01-04 RX ORDER — SEVELAMER CARBONATE 800 MG/1
3200 TABLET, FILM COATED ORAL
Status: DISCONTINUED | OUTPATIENT
Start: 2021-01-05 | End: 2021-01-05 | Stop reason: HOSPADM

## 2021-01-04 RX ORDER — ALUMINUM HYDROXIDE 320 MG/5ML
320 LIQUID ORAL
Status: DISCONTINUED | OUTPATIENT
Start: 2021-01-05 | End: 2021-01-04

## 2021-01-04 RX ADMIN — METOPROLOL SUCCINATE 100 MG: 50 TABLET, EXTENDED RELEASE ORAL at 09:01

## 2021-01-04 RX ADMIN — INSULIN ASPART 2 UNITS: 100 INJECTION, SOLUTION INTRAVENOUS; SUBCUTANEOUS at 12:01

## 2021-01-04 RX ADMIN — SODIUM BICARBONATE 650 MG TABLET 650 MG: at 08:01

## 2021-01-04 RX ADMIN — FUROSEMIDE 80 MG: 40 TABLET ORAL at 08:01

## 2021-01-04 RX ADMIN — CEFTRIAXONE 1 G: 1 INJECTION, POWDER, FOR SOLUTION INTRAMUSCULAR; INTRAVENOUS at 09:01

## 2021-01-04 RX ADMIN — APIXABAN 2.5 MG: 2.5 TABLET, FILM COATED ORAL at 09:01

## 2021-01-04 RX ADMIN — APIXABAN 2.5 MG: 2.5 TABLET, FILM COATED ORAL at 08:01

## 2021-01-04 RX ADMIN — SEVELAMER CARBONATE 1600 MG: 800 TABLET, FILM COATED ORAL at 08:01

## 2021-01-04 RX ADMIN — SEVELAMER CARBONATE 1600 MG: 800 TABLET, FILM COATED ORAL at 04:01

## 2021-01-04 RX ADMIN — CINACALCET 30 MG: 30 TABLET, FILM COATED ORAL at 08:01

## 2021-01-04 RX ADMIN — MUPIROCIN: 20 OINTMENT TOPICAL at 10:01

## 2021-01-04 RX ADMIN — DEXAMETHASONE 6 MG: 4 TABLET ORAL at 08:01

## 2021-01-04 RX ADMIN — OXYCODONE HYDROCHLORIDE AND ACETAMINOPHEN 500 MG: 500 TABLET ORAL at 08:01

## 2021-01-04 RX ADMIN — SEVELAMER CARBONATE 1600 MG: 800 TABLET, FILM COATED ORAL at 12:01

## 2021-01-04 RX ADMIN — OXYCODONE HYDROCHLORIDE AND ACETAMINOPHEN 500 MG: 500 TABLET ORAL at 09:01

## 2021-01-04 RX ADMIN — ATORVASTATIN CALCIUM 20 MG: 20 TABLET, FILM COATED ORAL at 09:01

## 2021-01-04 RX ADMIN — SODIUM BICARBONATE 650 MG TABLET 650 MG: at 09:01

## 2021-01-04 RX ADMIN — THERA TABS 1 TABLET: TAB at 10:01

## 2021-01-04 RX ADMIN — INSULIN ASPART 3 UNITS: 100 INJECTION, SOLUTION INTRAVENOUS; SUBCUTANEOUS at 04:01

## 2021-01-04 RX ADMIN — FINASTERIDE 5 MG: 5 TABLET, FILM COATED ORAL at 09:01

## 2021-01-05 VITALS
WEIGHT: 231 LBS | HEART RATE: 65 BPM | SYSTOLIC BLOOD PRESSURE: 111 MMHG | HEIGHT: 70 IN | RESPIRATION RATE: 16 BRPM | TEMPERATURE: 98 F | OXYGEN SATURATION: 92 % | BODY MASS INDEX: 33.07 KG/M2 | DIASTOLIC BLOOD PRESSURE: 87 MMHG

## 2021-01-05 LAB
ALBUMIN SERPL BCP-MCNC: 3 G/DL (ref 3.5–5.2)
ALP SERPL-CCNC: 101 U/L (ref 55–135)
ALT SERPL W/O P-5'-P-CCNC: 64 U/L (ref 10–44)
ANION GAP SERPL CALC-SCNC: 18 MMOL/L (ref 8–16)
AST SERPL-CCNC: 41 U/L (ref 10–40)
BACTERIA BLD CULT: NORMAL
BACTERIA BLD CULT: NORMAL
BASOPHILS # BLD AUTO: 0.04 K/UL (ref 0–0.2)
BASOPHILS NFR BLD: 0.4 % (ref 0–1.9)
BILIRUB SERPL-MCNC: 0.5 MG/DL (ref 0.1–1)
BUN SERPL-MCNC: 109 MG/DL (ref 8–23)
CALCIUM SERPL-MCNC: 9.4 MG/DL (ref 8.7–10.5)
CHLORIDE SERPL-SCNC: 90 MMOL/L (ref 95–110)
CO2 SERPL-SCNC: 29 MMOL/L (ref 23–29)
CREAT SERPL-MCNC: 4.3 MG/DL (ref 0.5–1.4)
DIFFERENTIAL METHOD: ABNORMAL
EOSINOPHIL # BLD AUTO: 0 K/UL (ref 0–0.5)
EOSINOPHIL NFR BLD: 0 % (ref 0–8)
ERYTHROCYTE [DISTWIDTH] IN BLOOD BY AUTOMATED COUNT: 16.8 % (ref 11.5–14.5)
EST. GFR  (AFRICAN AMERICAN): 15 ML/MIN/1.73 M^2
EST. GFR  (NON AFRICAN AMERICAN): 13 ML/MIN/1.73 M^2
GLUCOSE SERPL-MCNC: 137 MG/DL (ref 70–110)
HCT VFR BLD AUTO: 54.2 % (ref 40–54)
HGB BLD-MCNC: 16.5 G/DL (ref 14–18)
IMM GRANULOCYTES # BLD AUTO: 0.17 K/UL (ref 0–0.04)
IMM GRANULOCYTES NFR BLD AUTO: 1.8 % (ref 0–0.5)
LYMPHOCYTES # BLD AUTO: 0.3 K/UL (ref 1–4.8)
LYMPHOCYTES NFR BLD: 3.2 % (ref 18–48)
MCH RBC QN AUTO: 24.7 PG (ref 27–31)
MCHC RBC AUTO-ENTMCNC: 30.4 G/DL (ref 32–36)
MCV RBC AUTO: 81 FL (ref 82–98)
MONOCYTES # BLD AUTO: 0.5 K/UL (ref 0.3–1)
MONOCYTES NFR BLD: 5.3 % (ref 4–15)
NEUTROPHILS # BLD AUTO: 8.3 K/UL (ref 1.8–7.7)
NEUTROPHILS NFR BLD: 89.3 % (ref 38–73)
NRBC BLD-RTO: 0 /100 WBC
PLATELET # BLD AUTO: 233 K/UL (ref 150–350)
PMV BLD AUTO: 11.3 FL (ref 9.2–12.9)
POCT GLUCOSE: 173 MG/DL (ref 70–110)
POTASSIUM SERPL-SCNC: 3.7 MMOL/L (ref 3.5–5.1)
PROT SERPL-MCNC: 8 G/DL (ref 6–8.4)
RBC # BLD AUTO: 6.69 M/UL (ref 4.6–6.2)
SIROLIMUS BLD-MCNC: 8.9 NG/ML (ref 4–20)
SODIUM SERPL-SCNC: 137 MMOL/L (ref 136–145)
WBC # BLD AUTO: 9.25 K/UL (ref 3.9–12.7)

## 2021-01-05 PROCEDURE — 97530 THERAPEUTIC ACTIVITIES: CPT

## 2021-01-05 PROCEDURE — 94761 N-INVAS EAR/PLS OXIMETRY MLT: CPT

## 2021-01-05 PROCEDURE — 63600175 PHARM REV CODE 636 W HCPCS: Performed by: STUDENT IN AN ORGANIZED HEALTH CARE EDUCATION/TRAINING PROGRAM

## 2021-01-05 PROCEDURE — 80195 ASSAY OF SIROLIMUS: CPT

## 2021-01-05 PROCEDURE — 25000003 PHARM REV CODE 250: Performed by: STUDENT IN AN ORGANIZED HEALTH CARE EDUCATION/TRAINING PROGRAM

## 2021-01-05 PROCEDURE — 25000003 PHARM REV CODE 250: Performed by: INTERNAL MEDICINE

## 2021-01-05 PROCEDURE — 99239 HOSP IP/OBS DSCHRG MGMT >30: CPT | Mod: GC,,, | Performed by: STUDENT IN AN ORGANIZED HEALTH CARE EDUCATION/TRAINING PROGRAM

## 2021-01-05 PROCEDURE — 27000221 HC OXYGEN, UP TO 24 HOURS

## 2021-01-05 PROCEDURE — 99232 PR SUBSEQUENT HOSPITAL CARE,LEVL II: ICD-10-PCS | Mod: GC,,, | Performed by: INTERNAL MEDICINE

## 2021-01-05 PROCEDURE — 97116 GAIT TRAINING THERAPY: CPT

## 2021-01-05 PROCEDURE — 36415 COLL VENOUS BLD VENIPUNCTURE: CPT

## 2021-01-05 PROCEDURE — 99232 SBSQ HOSP IP/OBS MODERATE 35: CPT | Mod: GC,,, | Performed by: INTERNAL MEDICINE

## 2021-01-05 PROCEDURE — 85025 COMPLETE CBC W/AUTO DIFF WBC: CPT

## 2021-01-05 PROCEDURE — 80053 COMPREHEN METABOLIC PANEL: CPT

## 2021-01-05 PROCEDURE — 99239 PR HOSPITAL DISCHARGE DAY,>30 MIN: ICD-10-PCS | Mod: GC,,, | Performed by: STUDENT IN AN ORGANIZED HEALTH CARE EDUCATION/TRAINING PROGRAM

## 2021-01-05 RX ORDER — ERGOCALCIFEROL 1.25 MG/1
50000 CAPSULE ORAL
Qty: 5 CAPSULE | Refills: 0 | Status: SHIPPED | OUTPATIENT
Start: 2021-01-12 | End: 2021-02-11 | Stop reason: SDUPTHER

## 2021-01-05 RX ORDER — SODIUM BICARBONATE 650 MG/1
650 TABLET ORAL 2 TIMES DAILY
Qty: 180 TABLET | Refills: 4 | Status: CANCELLED
Start: 2021-01-05

## 2021-01-05 RX ORDER — SIROLIMUS 1 MG/1
2 TABLET, FILM COATED ORAL DAILY
Qty: 90 TABLET | Refills: 3
Start: 2021-01-05 | End: 2021-01-05 | Stop reason: SDUPTHER

## 2021-01-05 RX ORDER — ERGOCALCIFEROL 1.25 MG/1
50000 CAPSULE ORAL
Qty: 4 CAPSULE | Refills: 1 | Status: CANCELLED | OUTPATIENT
Start: 2021-01-12

## 2021-01-05 RX ORDER — CLONIDINE 0.3 MG/24H
PATCH, EXTENDED RELEASE TRANSDERMAL
Qty: 12 PATCH | Refills: 4
Start: 2021-01-05 | End: 2021-11-10

## 2021-01-05 RX ORDER — BENZONATATE 100 MG/1
100 CAPSULE ORAL 3 TIMES DAILY PRN
Qty: 15 CAPSULE | Refills: 0 | Status: CANCELLED | OUTPATIENT
Start: 2021-01-05 | End: 2021-01-10

## 2021-01-05 RX ORDER — SEVELAMER CARBONATE 800 MG/1
3200 TABLET, FILM COATED ORAL
Qty: 360 TABLET | Refills: 1 | Status: CANCELLED | OUTPATIENT
Start: 2021-01-05 | End: 2022-01-05

## 2021-01-05 RX ORDER — ASCORBIC ACID 500 MG
500 TABLET ORAL 2 TIMES DAILY
Status: CANCELLED | COMMUNITY
Start: 2021-01-05

## 2021-01-05 RX ORDER — SIROLIMUS 1 MG/1
1 TABLET, FILM COATED ORAL DAILY
Qty: 90 TABLET | Refills: 0
Start: 2021-01-05 | End: 2021-06-28 | Stop reason: SDUPTHER

## 2021-01-05 RX ORDER — ERGOCALCIFEROL 1.25 MG/1
50000 CAPSULE ORAL
Status: DISCONTINUED | OUTPATIENT
Start: 2021-01-05 | End: 2021-01-05 | Stop reason: HOSPADM

## 2021-01-05 RX ORDER — SODIUM BICARBONATE 650 MG/1
650 TABLET ORAL 2 TIMES DAILY
Qty: 60 TABLET | Refills: 2
Start: 2021-01-05 | End: 2021-12-09 | Stop reason: SDUPTHER

## 2021-01-05 RX ORDER — SODIUM BICARBONATE 650 MG/1
650 TABLET ORAL 2 TIMES DAILY
Qty: 60 TABLET | Refills: 11 | Status: CANCELLED | COMMUNITY
Start: 2021-01-05 | End: 2022-01-05

## 2021-01-05 RX ORDER — SEVELAMER CARBONATE 800 MG/1
3200 TABLET, FILM COATED ORAL
Qty: 360 TABLET | Refills: 1 | Status: SHIPPED | OUTPATIENT
Start: 2021-01-05 | End: 2021-02-11 | Stop reason: SDUPTHER

## 2021-01-05 RX ADMIN — ERGOCALCIFEROL 50000 UNITS: 1.25 CAPSULE ORAL at 09:01

## 2021-01-05 RX ADMIN — FUROSEMIDE 80 MG: 40 TABLET ORAL at 09:01

## 2021-01-05 RX ADMIN — SEVELAMER CARBONATE 3200 MG: 800 TABLET, FILM COATED ORAL at 09:01

## 2021-01-05 RX ADMIN — APIXABAN 2.5 MG: 2.5 TABLET, FILM COATED ORAL at 09:01

## 2021-01-05 RX ADMIN — ACETAMINOPHEN 650 MG: 325 TABLET ORAL at 12:01

## 2021-01-05 RX ADMIN — OXYCODONE HYDROCHLORIDE AND ACETAMINOPHEN 500 MG: 500 TABLET ORAL at 09:01

## 2021-01-05 RX ADMIN — SEVELAMER CARBONATE 3200 MG: 800 TABLET, FILM COATED ORAL at 01:01

## 2021-01-05 RX ADMIN — CINACALCET 30 MG: 30 TABLET, FILM COATED ORAL at 09:01

## 2021-01-05 RX ADMIN — DEXAMETHASONE 6 MG: 4 TABLET ORAL at 01:01

## 2021-01-05 RX ADMIN — THERA TABS 1 TABLET: TAB at 09:01

## 2021-01-05 RX ADMIN — SODIUM BICARBONATE 650 MG TABLET 650 MG: at 09:01

## 2021-01-06 ENCOUNTER — PATIENT OUTREACH (OUTPATIENT)
Dept: ADMINISTRATIVE | Facility: CLINIC | Age: 71
End: 2021-01-06

## 2021-01-06 DIAGNOSIS — N18.4 CHRONIC KIDNEY DISEASE, STAGE IV (SEVERE): Primary | ICD-10-CM

## 2021-01-08 ENCOUNTER — TELEPHONE (OUTPATIENT)
Dept: NEPHROLOGY | Facility: CLINIC | Age: 71
End: 2021-01-08

## 2021-01-12 ENCOUNTER — TELEPHONE (OUTPATIENT)
Dept: GASTROENTEROLOGY | Facility: CLINIC | Age: 71
End: 2021-01-12

## 2021-01-19 ENCOUNTER — TELEPHONE (OUTPATIENT)
Dept: INTERNAL MEDICINE | Facility: CLINIC | Age: 71
End: 2021-01-19

## 2021-01-19 ENCOUNTER — PATIENT OUTREACH (OUTPATIENT)
Dept: ADMINISTRATIVE | Facility: OTHER | Age: 71
End: 2021-01-19

## 2021-01-19 DIAGNOSIS — U07.1 COVID-19 VIRUS INFECTION: Primary | ICD-10-CM

## 2021-01-19 RX ORDER — ALBUTEROL SULFATE 90 UG/1
2 AEROSOL, METERED RESPIRATORY (INHALATION) EVERY 6 HOURS PRN
Qty: 18 G | Refills: 6 | Status: SHIPPED | OUTPATIENT
Start: 2021-01-19 | End: 2021-06-24

## 2021-01-22 ENCOUNTER — OFFICE VISIT (OUTPATIENT)
Dept: PULMONOLOGY | Facility: CLINIC | Age: 71
End: 2021-01-22
Payer: MEDICARE

## 2021-01-22 DIAGNOSIS — N17.9 ACUTE RENAL FAILURE SUPERIMPOSED ON STAGE 4 CHRONIC KIDNEY DISEASE, UNSPECIFIED ACUTE RENAL FAILURE TYPE: ICD-10-CM

## 2021-01-22 DIAGNOSIS — Z86.718 HISTORY OF DVT (DEEP VEIN THROMBOSIS): ICD-10-CM

## 2021-01-22 DIAGNOSIS — R06.02 SOB (SHORTNESS OF BREATH): ICD-10-CM

## 2021-01-22 DIAGNOSIS — Z94.0 IMMUNOSUPPRESSIVE MANAGEMENT ENCOUNTER FOLLOWING KIDNEY TRANSPLANT: ICD-10-CM

## 2021-01-22 DIAGNOSIS — J96.01 ACUTE HYPOXEMIC RESPIRATORY FAILURE DUE TO COVID-19: ICD-10-CM

## 2021-01-22 DIAGNOSIS — Z79.899 IMMUNOSUPPRESSIVE MANAGEMENT ENCOUNTER FOLLOWING KIDNEY TRANSPLANT: ICD-10-CM

## 2021-01-22 DIAGNOSIS — I70.0 ATHEROSCLEROSIS OF AORTA: ICD-10-CM

## 2021-01-22 DIAGNOSIS — R91.8 PULMONARY NODULES: ICD-10-CM

## 2021-01-22 DIAGNOSIS — E66.9 OBESITY (BMI 30-39.9): ICD-10-CM

## 2021-01-22 DIAGNOSIS — Z86.16 HISTORY OF COVID-19: ICD-10-CM

## 2021-01-22 DIAGNOSIS — N18.4 ACUTE RENAL FAILURE SUPERIMPOSED ON STAGE 4 CHRONIC KIDNEY DISEASE, UNSPECIFIED ACUTE RENAL FAILURE TYPE: ICD-10-CM

## 2021-01-22 DIAGNOSIS — U07.1 ACUTE HYPOXEMIC RESPIRATORY FAILURE DUE TO COVID-19: ICD-10-CM

## 2021-01-22 PROCEDURE — 3288F PR FALLS RISK ASSESSMENT DOCUMENTED: ICD-10-PCS | Mod: CPTII,S$GLB,, | Performed by: NURSE PRACTITIONER

## 2021-01-22 PROCEDURE — 3008F PR BODY MASS INDEX (BMI) DOCUMENTED: ICD-10-PCS | Mod: CPTII,S$GLB,, | Performed by: NURSE PRACTITIONER

## 2021-01-22 PROCEDURE — 1101F PT FALLS ASSESS-DOCD LE1/YR: CPT | Mod: CPTII,S$GLB,, | Performed by: NURSE PRACTITIONER

## 2021-01-22 PROCEDURE — 1126F AMNT PAIN NOTED NONE PRSNT: CPT | Mod: S$GLB,,, | Performed by: NURSE PRACTITIONER

## 2021-01-22 PROCEDURE — 99999 PR PBB SHADOW E&M-EST. PATIENT-LVL IV: CPT | Mod: PBBFAC,,, | Performed by: NURSE PRACTITIONER

## 2021-01-22 PROCEDURE — 99499 RISK ADDL DX/OHS AUDIT: ICD-10-PCS | Mod: S$GLB,,, | Performed by: NURSE PRACTITIONER

## 2021-01-22 PROCEDURE — 99204 PR OFFICE/OUTPT VISIT, NEW, LEVL IV, 45-59 MIN: ICD-10-PCS | Mod: S$GLB,,, | Performed by: NURSE PRACTITIONER

## 2021-01-22 PROCEDURE — 1126F PR PAIN SEVERITY QUANTIFIED, NO PAIN PRESENT: ICD-10-PCS | Mod: S$GLB,,, | Performed by: NURSE PRACTITIONER

## 2021-01-22 PROCEDURE — 99499 UNLISTED E&M SERVICE: CPT | Mod: S$GLB,,, | Performed by: NURSE PRACTITIONER

## 2021-01-22 PROCEDURE — 3288F FALL RISK ASSESSMENT DOCD: CPT | Mod: CPTII,S$GLB,, | Performed by: NURSE PRACTITIONER

## 2021-01-22 PROCEDURE — 99204 OFFICE O/P NEW MOD 45 MIN: CPT | Mod: S$GLB,,, | Performed by: NURSE PRACTITIONER

## 2021-01-22 PROCEDURE — 1101F PR PT FALLS ASSESS DOC 0-1 FALLS W/OUT INJ PAST YR: ICD-10-PCS | Mod: CPTII,S$GLB,, | Performed by: NURSE PRACTITIONER

## 2021-01-22 PROCEDURE — 3008F BODY MASS INDEX DOCD: CPT | Mod: CPTII,S$GLB,, | Performed by: NURSE PRACTITIONER

## 2021-01-22 PROCEDURE — 99999 PR PBB SHADOW E&M-EST. PATIENT-LVL IV: ICD-10-PCS | Mod: PBBFAC,,, | Performed by: NURSE PRACTITIONER

## 2021-01-28 ENCOUNTER — EXTERNAL HOME HEALTH (OUTPATIENT)
Dept: HOME HEALTH SERVICES | Facility: HOSPITAL | Age: 71
End: 2021-01-28
Payer: MEDICARE

## 2021-01-28 ENCOUNTER — TELEPHONE (OUTPATIENT)
Dept: PULMONOLOGY | Facility: CLINIC | Age: 71
End: 2021-01-28

## 2021-02-03 ENCOUNTER — OUTPATIENT CASE MANAGEMENT (OUTPATIENT)
Dept: ADMINISTRATIVE | Facility: OTHER | Age: 71
End: 2021-02-03

## 2021-02-05 ENCOUNTER — TELEPHONE (OUTPATIENT)
Dept: INTERNAL MEDICINE | Facility: CLINIC | Age: 71
End: 2021-02-05

## 2021-02-06 VITALS
DIASTOLIC BLOOD PRESSURE: 88 MMHG | HEART RATE: 80 BPM | BODY MASS INDEX: 31.85 KG/M2 | HEIGHT: 70 IN | WEIGHT: 222.44 LBS | OXYGEN SATURATION: 88 % | SYSTOLIC BLOOD PRESSURE: 132 MMHG

## 2021-02-08 ENCOUNTER — TELEPHONE (OUTPATIENT)
Dept: PULMONOLOGY | Facility: CLINIC | Age: 71
End: 2021-02-08

## 2021-02-11 ENCOUNTER — HOSPITAL ENCOUNTER (OUTPATIENT)
Dept: PULMONOLOGY | Facility: HOSPITAL | Age: 71
Discharge: HOME | End: 2021-02-11
Attending: NURSE PRACTITIONER
Payer: MEDICARE

## 2021-02-11 ENCOUNTER — HOSPITAL ENCOUNTER (OUTPATIENT)
Dept: CARDIOLOGY | Facility: HOSPITAL | Age: 71
Discharge: HOME | End: 2021-02-11
Attending: NURSE PRACTITIONER
Payer: MEDICARE

## 2021-02-11 ENCOUNTER — HOSPITAL ENCOUNTER (OUTPATIENT)
Dept: RADIOLOGY | Facility: HOSPITAL | Age: 71
Discharge: HOME | End: 2021-02-11
Attending: NURSE PRACTITIONER
Payer: MEDICARE

## 2021-02-11 VITALS — BODY MASS INDEX: 31.78 KG/M2 | HEIGHT: 70 IN | WEIGHT: 222 LBS

## 2021-02-11 DIAGNOSIS — Z86.16 HISTORY OF COVID-19: ICD-10-CM

## 2021-02-11 DIAGNOSIS — Z94.0 IMMUNOSUPPRESSIVE MANAGEMENT ENCOUNTER FOLLOWING KIDNEY TRANSPLANT: ICD-10-CM

## 2021-02-11 DIAGNOSIS — U07.1 ACUTE HYPOXEMIC RESPIRATORY FAILURE DUE TO COVID-19: ICD-10-CM

## 2021-02-11 DIAGNOSIS — R06.02 SOB (SHORTNESS OF BREATH): ICD-10-CM

## 2021-02-11 DIAGNOSIS — Z79.899 IMMUNOSUPPRESSIVE MANAGEMENT ENCOUNTER FOLLOWING KIDNEY TRANSPLANT: ICD-10-CM

## 2021-02-11 DIAGNOSIS — J96.01 ACUTE HYPOXEMIC RESPIRATORY FAILURE DUE TO COVID-19: ICD-10-CM

## 2021-02-11 LAB
AORTIC ROOT ANNULUS: 4.07 CM
ASCENDING AORTA: 3.27 CM
AV INDEX (PROSTH): 0.8
AV MEAN GRADIENT: 5 MMHG
AV PEAK GRADIENT: 8 MMHG
AV VALVE AREA: 3.23 CM2
AV VELOCITY RATIO: 0.8
BSA FOR ECHO PROCEDURE: 2.23 M2
CV ECHO LV RWT: 0.43 CM
DOP CALC AO PEAK VEL: 1.42 M/S
DOP CALC AO VTI: 28.8 CM
DOP CALC LVOT AREA: 4 CM2
DOP CALC LVOT DIAMETER: 2.27 CM
DOP CALC LVOT PEAK VEL: 1.14 M/S
DOP CALC LVOT STROKE VOLUME: 92.95 CM3
DOP CALCLVOT PEAK VEL VTI: 22.98 CM
E WAVE DECELERATION TIME: 277.66 MSEC
E/A RATIO: 0.8
E/E' RATIO: 21.14 M/S
ECHO LV POSTERIOR WALL: 0.98 CM (ref 0.6–1.1)
FRACTIONAL SHORTENING: 25 % (ref 28–44)
INTERVENTRICULAR SEPTUM: 2.15 CM (ref 0.6–1.1)
LA MAJOR: 4.53 CM
LA MINOR: 4.91 CM
LA WIDTH: 3.33 CM
LEFT ATRIUM SIZE: 2.85 CM
LEFT ATRIUM VOLUME INDEX MOD: 14.3 ML/M2
LEFT ATRIUM VOLUME INDEX: 17.4 ML/M2
LEFT ATRIUM VOLUME MOD: 31.19 CM3
LEFT ATRIUM VOLUME: 38.01 CM3
LEFT INTERNAL DIMENSION IN SYSTOLE: 3.39 CM (ref 2.1–4)
LEFT VENTRICLE DIASTOLIC VOLUME INDEX: 43.45 ML/M2
LEFT VENTRICLE DIASTOLIC VOLUME: 94.73 ML
LEFT VENTRICLE MASS INDEX: 137 G/M2
LEFT VENTRICLE SYSTOLIC VOLUME INDEX: 21.6 ML/M2
LEFT VENTRICLE SYSTOLIC VOLUME: 46.99 ML
LEFT VENTRICULAR INTERNAL DIMENSION IN DIASTOLE: 4.55 CM (ref 3.5–6)
LEFT VENTRICULAR MASS: 299.11 G
LV LATERAL E/E' RATIO: 18.5 M/S
LV SEPTAL E/E' RATIO: 24.67 M/S
MV A" WAVE DURATION": 12.27 MSEC
MV PEAK A VEL: 0.93 M/S
MV PEAK E VEL: 0.74 M/S
PISA TR MAX VEL: 2.5 M/S
PULM VEIN S/D RATIO: 1.6
PV PEAK D VEL: 0.35 M/S
PV PEAK S VEL: 0.56 M/S
RA MAJOR: 4.96 CM
RA PRESSURE: 3 MMHG
RA WIDTH: 3.9 CM
RIGHT VENTRICULAR END-DIASTOLIC DIMENSION: 3.08 CM
RV TISSUE DOPPLER FREE WALL SYSTOLIC VELOCITY 1 (APICAL 4 CHAMBER VIEW): 13.78 CM/S
STJ: 2.8 CM
TDI LATERAL: 0.04 M/S
TDI SEPTAL: 0.03 M/S
TDI: 0.04 M/S
TR MAX PG: 25 MMHG
TRICUSPID ANNULAR PLANE SYSTOLIC EXCURSION: 2.23 CM
TV REST PULMONARY ARTERY PRESSURE: 28 MMHG

## 2021-02-11 PROCEDURE — 94010 BREATHING CAPACITY TEST: ICD-10-PCS | Mod: 26,GC,, | Performed by: INTERNAL MEDICINE

## 2021-02-11 PROCEDURE — 71250 CT THORAX DX C-: CPT | Mod: 26,,, | Performed by: RADIOLOGY

## 2021-02-11 PROCEDURE — 94729 DIFFUSING CAPACITY: CPT

## 2021-02-11 PROCEDURE — 94010 BREATHING CAPACITY TEST: CPT | Mod: 26,GC,, | Performed by: INTERNAL MEDICINE

## 2021-02-11 PROCEDURE — 94010 BREATHING CAPACITY TEST: CPT

## 2021-02-11 PROCEDURE — 93306 TTE W/DOPPLER COMPLETE: CPT | Mod: 26,,, | Performed by: INTERNAL MEDICINE

## 2021-02-11 PROCEDURE — 93306 TTE W/DOPPLER COMPLETE: CPT

## 2021-02-11 PROCEDURE — 71250 CT THORAX DX C-: CPT | Mod: TC

## 2021-02-11 PROCEDURE — 94729 DIFFUSING CAPACITY: CPT | Mod: 26,GC,, | Performed by: INTERNAL MEDICINE

## 2021-02-11 PROCEDURE — 94729 PR C02/MEMBANE DIFFUSE CAPACITY: ICD-10-PCS | Mod: 26,GC,, | Performed by: INTERNAL MEDICINE

## 2021-02-11 PROCEDURE — 94727 GAS DIL/WSHOT DETER LNG VOL: CPT

## 2021-02-11 PROCEDURE — 94726 PLETHYSMOGRAPHY LUNG VOLUMES: CPT | Mod: 26,GC,, | Performed by: INTERNAL MEDICINE

## 2021-02-11 PROCEDURE — 93306 ECHO (CUPID ONLY): ICD-10-PCS | Mod: 26,,, | Performed by: INTERNAL MEDICINE

## 2021-02-11 PROCEDURE — 71250 CT CHEST WITHOUT CONTRAST: ICD-10-PCS | Mod: 26,,, | Performed by: RADIOLOGY

## 2021-02-11 PROCEDURE — 94726 PULM FUNCT TST PLETHYSMOGRAP: ICD-10-PCS | Mod: 26,GC,, | Performed by: INTERNAL MEDICINE

## 2021-02-11 RX ORDER — ERGOCALCIFEROL 1.25 MG/1
50000 CAPSULE ORAL
Qty: 12 CAPSULE | Refills: 0 | Status: SHIPPED | OUTPATIENT
Start: 2021-02-11 | End: 2021-05-14 | Stop reason: SDUPTHER

## 2021-02-11 RX ORDER — SEVELAMER CARBONATE 800 MG/1
3200 TABLET, FILM COATED ORAL
Qty: 360 TABLET | Refills: 1 | Status: SHIPPED | OUTPATIENT
Start: 2021-02-11 | End: 2021-02-19 | Stop reason: SDUPTHER

## 2021-02-15 LAB
BRPFT: ABNORMAL
DLCO ADJ PRE: 8.05 ML/(MIN*MMHG) (ref 20.32–34.18)
DLCO SINGLE BREATH LLN: 20.32
DLCO SINGLE BREATH PRE REF: 26.9 %
DLCO SINGLE BREATH REF: 27.25
DLCOC SBVA LLN: 2.66
DLCOC SBVA PRE REF: 53.3 %
DLCOC SBVA REF: 3.82
DLCOC SINGLE BREATH LLN: 20.32
DLCOC SINGLE BREATH PRE REF: 29.5 %
DLCOC SINGLE BREATH REF: 27.25
DLCOVA LLN: 2.66
DLCOVA PRE REF: 48.6 %
DLCOVA PRE: 1.85 ML/(MIN*MMHG*L) (ref 2.66–4.97)
DLCOVA REF: 3.82
DLVAADJ PRE: 2.03 ML/(MIN*MMHG*L) (ref 2.66–4.97)
ERVN2 LLN: -16448.94
ERVN2 PRE REF: 38.6 %
ERVN2 PRE: 0.41 L (ref -16448.94–16451.06)
ERVN2 REF: 1.06
FEF 25 75 LLN: 0.74
FEF 25 75 PRE REF: 69.2 %
FEF 25 75 REF: 2.09
FEV1 FVC LLN: 63
FEV1 FVC PRE REF: 97.1 %
FEV1 FVC REF: 76
FEV1 LLN: 1.86
FEV1 PRE REF: 69.8 %
FEV1 REF: 2.72
FRCN2 LLN: 2.72
FRCN2 PRE REF: 34.8 %
FRCN2 REF: 3.71
FVC LLN: 2.57
FVC PRE REF: 71.7 %
FVC REF: 3.57
IVC PRE: 2.56 L (ref 2.57–4.57)
IVC SINGLE BREATH LLN: 2.57
IVC SINGLE BREATH PRE REF: 71.6 %
IVC SINGLE BREATH REF: 3.57
PEF LLN: 5.37
PEF PRE REF: 56.5 %
PEF REF: 8.03
PRE DLCO: 7.33 ML/(MIN*MMHG) (ref 20.32–34.18)
PRE FEF 25 75: 1.45 L/S (ref 0.74–3.45)
PRE FET 100: 7.55 SEC
PRE FEV1 FVC: 74.22 % (ref 63.35–89.51)
PRE FEV1: 1.9 L (ref 1.86–3.58)
PRE FRC N2: 1.29 L
PRE FVC: 2.56 L (ref 2.57–4.57)
PRE PEF: 4.54 L/S (ref 5.37–10.69)
RVN2 LLN: 1.97
RVN2 PRE REF: 33.3 %
RVN2 PRE: 0.88 L (ref 1.97–3.32)
RVN2 REF: 2.64
RVN2TLCN2 LLN: 32.28
RVN2TLCN2 PRE REF: 70.4 %
RVN2TLCN2 PRE: 29.04 % (ref 32.28–50.24)
RVN2TLCN2 REF: 41.26
TLCN2 LLN: 5.99
TLCN2 PRE REF: 42.4 %
TLCN2 PRE: 3.03 L (ref 5.99–8.29)
TLCN2 REF: 7.14
VA PRE: 3.96 L (ref 6.99–6.99)
VA SINGLE BREATH LLN: 6.99
VA SINGLE BREATH PRE REF: 56.6 %
VA SINGLE BREATH REF: 6.99
VCMAXN2 LLN: 2.57
VCMAXN2 PRE REF: 74.5 %
VCMAXN2 PRE: 2.66 L (ref 2.57–4.57)
VCMAXN2 REF: 3.57

## 2021-02-16 ENCOUNTER — PATIENT MESSAGE (OUTPATIENT)
Dept: PULMONOLOGY | Facility: CLINIC | Age: 71
End: 2021-02-16

## 2021-02-17 ENCOUNTER — OUTPATIENT CASE MANAGEMENT (OUTPATIENT)
Dept: ADMINISTRATIVE | Facility: OTHER | Age: 71
End: 2021-02-17

## 2021-02-18 ENCOUNTER — OFFICE VISIT (OUTPATIENT)
Dept: INTERNAL MEDICINE | Facility: CLINIC | Age: 71
End: 2021-02-18
Payer: MEDICARE

## 2021-02-18 VITALS
SYSTOLIC BLOOD PRESSURE: 122 MMHG | HEART RATE: 52 BPM | HEIGHT: 70 IN | TEMPERATURE: 97 F | WEIGHT: 228.31 LBS | OXYGEN SATURATION: 95 % | DIASTOLIC BLOOD PRESSURE: 80 MMHG | BODY MASS INDEX: 32.69 KG/M2

## 2021-02-18 DIAGNOSIS — E78.2 MIXED HYPERLIPIDEMIA: ICD-10-CM

## 2021-02-18 DIAGNOSIS — N18.4 CHRONIC KIDNEY DISEASE, STAGE IV (SEVERE): Primary | ICD-10-CM

## 2021-02-18 DIAGNOSIS — I10 ESSENTIAL HYPERTENSION: ICD-10-CM

## 2021-02-18 PROCEDURE — 1126F AMNT PAIN NOTED NONE PRSNT: CPT | Mod: S$GLB,,, | Performed by: INTERNAL MEDICINE

## 2021-02-18 PROCEDURE — 99214 OFFICE O/P EST MOD 30 MIN: CPT | Mod: S$GLB,,, | Performed by: INTERNAL MEDICINE

## 2021-02-18 PROCEDURE — 3079F PR MOST RECENT DIASTOLIC BLOOD PRESSURE 80-89 MM HG: ICD-10-PCS | Mod: CPTII,S$GLB,, | Performed by: INTERNAL MEDICINE

## 2021-02-18 PROCEDURE — 3074F PR MOST RECENT SYSTOLIC BLOOD PRESSURE < 130 MM HG: ICD-10-PCS | Mod: CPTII,S$GLB,, | Performed by: INTERNAL MEDICINE

## 2021-02-18 PROCEDURE — 3008F PR BODY MASS INDEX (BMI) DOCUMENTED: ICD-10-PCS | Mod: CPTII,S$GLB,, | Performed by: INTERNAL MEDICINE

## 2021-02-18 PROCEDURE — 1101F PT FALLS ASSESS-DOCD LE1/YR: CPT | Mod: CPTII,S$GLB,, | Performed by: INTERNAL MEDICINE

## 2021-02-18 PROCEDURE — 3079F DIAST BP 80-89 MM HG: CPT | Mod: CPTII,S$GLB,, | Performed by: INTERNAL MEDICINE

## 2021-02-18 PROCEDURE — 3288F FALL RISK ASSESSMENT DOCD: CPT | Mod: CPTII,S$GLB,, | Performed by: INTERNAL MEDICINE

## 2021-02-18 PROCEDURE — 1126F PR PAIN SEVERITY QUANTIFIED, NO PAIN PRESENT: ICD-10-PCS | Mod: S$GLB,,, | Performed by: INTERNAL MEDICINE

## 2021-02-18 PROCEDURE — 3288F PR FALLS RISK ASSESSMENT DOCUMENTED: ICD-10-PCS | Mod: CPTII,S$GLB,, | Performed by: INTERNAL MEDICINE

## 2021-02-18 PROCEDURE — 99999 PR PBB SHADOW E&M-EST. PATIENT-LVL IV: CPT | Mod: PBBFAC,,, | Performed by: INTERNAL MEDICINE

## 2021-02-18 PROCEDURE — 3074F SYST BP LT 130 MM HG: CPT | Mod: CPTII,S$GLB,, | Performed by: INTERNAL MEDICINE

## 2021-02-18 PROCEDURE — 99999 PR PBB SHADOW E&M-EST. PATIENT-LVL IV: ICD-10-PCS | Mod: PBBFAC,,, | Performed by: INTERNAL MEDICINE

## 2021-02-18 PROCEDURE — 1101F PR PT FALLS ASSESS DOC 0-1 FALLS W/OUT INJ PAST YR: ICD-10-PCS | Mod: CPTII,S$GLB,, | Performed by: INTERNAL MEDICINE

## 2021-02-18 PROCEDURE — 99214 PR OFFICE/OUTPT VISIT, EST, LEVL IV, 30-39 MIN: ICD-10-PCS | Mod: S$GLB,,, | Performed by: INTERNAL MEDICINE

## 2021-02-18 PROCEDURE — 1159F MED LIST DOCD IN RCRD: CPT | Mod: S$GLB,,, | Performed by: INTERNAL MEDICINE

## 2021-02-18 PROCEDURE — 3008F BODY MASS INDEX DOCD: CPT | Mod: CPTII,S$GLB,, | Performed by: INTERNAL MEDICINE

## 2021-02-18 PROCEDURE — 1159F PR MEDICATION LIST DOCUMENTED IN MEDICAL RECORD: ICD-10-PCS | Mod: S$GLB,,, | Performed by: INTERNAL MEDICINE

## 2021-02-19 ENCOUNTER — OUTPATIENT CASE MANAGEMENT (OUTPATIENT)
Dept: ADMINISTRATIVE | Facility: OTHER | Age: 71
End: 2021-02-19

## 2021-02-19 RX ORDER — SEVELAMER CARBONATE 800 MG/1
3200 TABLET, FILM COATED ORAL
Qty: 360 TABLET | Refills: 4 | Status: SHIPPED | OUTPATIENT
Start: 2021-02-19 | End: 2021-10-18

## 2021-03-03 ENCOUNTER — PATIENT MESSAGE (OUTPATIENT)
Dept: PULMONOLOGY | Facility: CLINIC | Age: 71
End: 2021-03-03

## 2021-03-03 ENCOUNTER — TELEPHONE (OUTPATIENT)
Dept: PULMONOLOGY | Facility: CLINIC | Age: 71
End: 2021-03-03

## 2021-03-03 ENCOUNTER — OUTPATIENT CASE MANAGEMENT (OUTPATIENT)
Dept: ADMINISTRATIVE | Facility: OTHER | Age: 71
End: 2021-03-03

## 2021-03-05 ENCOUNTER — OUTPATIENT CASE MANAGEMENT (OUTPATIENT)
Dept: ADMINISTRATIVE | Facility: OTHER | Age: 71
End: 2021-03-05

## 2021-03-10 ENCOUNTER — TELEPHONE (OUTPATIENT)
Dept: GASTROENTEROLOGY | Facility: CLINIC | Age: 71
End: 2021-03-10

## 2021-03-12 ENCOUNTER — PATIENT MESSAGE (OUTPATIENT)
Dept: PULMONOLOGY | Facility: CLINIC | Age: 71
End: 2021-03-12

## 2021-03-12 ENCOUNTER — PES CALL (OUTPATIENT)
Dept: ADMINISTRATIVE | Facility: CLINIC | Age: 71
End: 2021-03-12

## 2021-03-17 ENCOUNTER — OFFICE VISIT (OUTPATIENT)
Dept: HOME HEALTH SERVICES | Facility: CLINIC | Age: 71
End: 2021-03-17
Payer: MEDICARE

## 2021-03-17 VITALS
HEIGHT: 70 IN | WEIGHT: 230 LBS | SYSTOLIC BLOOD PRESSURE: 162 MMHG | DIASTOLIC BLOOD PRESSURE: 93 MMHG | HEART RATE: 55 BPM | TEMPERATURE: 97 F | BODY MASS INDEX: 32.93 KG/M2

## 2021-03-17 DIAGNOSIS — N40.1 BENIGN PROSTATIC HYPERPLASIA WITH NOCTURIA: ICD-10-CM

## 2021-03-17 DIAGNOSIS — C32.9 LARYNX CANCER: ICD-10-CM

## 2021-03-17 DIAGNOSIS — R35.1 BENIGN PROSTATIC HYPERPLASIA WITH NOCTURIA: ICD-10-CM

## 2021-03-17 DIAGNOSIS — E78.2 MIXED HYPERLIPIDEMIA: ICD-10-CM

## 2021-03-17 DIAGNOSIS — N25.81 SECONDARY HYPERPARATHYROIDISM OF RENAL ORIGIN: ICD-10-CM

## 2021-03-17 DIAGNOSIS — D50.9 IRON DEFICIENCY ANEMIA, UNSPECIFIED IRON DEFICIENCY ANEMIA TYPE: ICD-10-CM

## 2021-03-17 DIAGNOSIS — D69.6 THROMBOCYTOPENIA: ICD-10-CM

## 2021-03-17 DIAGNOSIS — Z94.0 TRANSPLANTED KIDNEY: ICD-10-CM

## 2021-03-17 DIAGNOSIS — I10 ESSENTIAL HYPERTENSION: ICD-10-CM

## 2021-03-17 DIAGNOSIS — I73.9 CLAUDICATION OF RIGHT LOWER EXTREMITY: ICD-10-CM

## 2021-03-17 DIAGNOSIS — D68.59 THROMBOPHILIA: ICD-10-CM

## 2021-03-17 DIAGNOSIS — Z00.00 ENCOUNTER FOR PREVENTIVE HEALTH EXAMINATION: Primary | ICD-10-CM

## 2021-03-17 DIAGNOSIS — N18.4 HYPERTENSIVE KIDNEY DISEASE WITH STAGE 4 CHRONIC KIDNEY DISEASE: ICD-10-CM

## 2021-03-17 DIAGNOSIS — I70.0 ATHEROSCLEROSIS OF AORTA: ICD-10-CM

## 2021-03-17 DIAGNOSIS — J84.10 GRANULOMATOUS LUNG DISEASE: ICD-10-CM

## 2021-03-17 DIAGNOSIS — I12.9 HYPERTENSIVE KIDNEY DISEASE WITH STAGE 4 CHRONIC KIDNEY DISEASE: ICD-10-CM

## 2021-03-17 PROBLEM — U07.1 COVID-19 VIRUS DETECTED: Status: RESOLVED | Noted: 2020-12-30 | Resolved: 2021-03-17

## 2021-03-17 PROBLEM — N17.9 ACUTE-ON-CHRONIC KIDNEY INJURY: Status: RESOLVED | Noted: 2020-12-30 | Resolved: 2021-03-17

## 2021-03-17 PROBLEM — J96.01 ACUTE HYPOXEMIC RESPIRATORY FAILURE DUE TO COVID-19: Status: RESOLVED | Noted: 2020-12-30 | Resolved: 2021-03-17

## 2021-03-17 PROBLEM — U07.1 ACUTE HYPOXEMIC RESPIRATORY FAILURE DUE TO COVID-19: Status: RESOLVED | Noted: 2020-12-30 | Resolved: 2021-03-17

## 2021-03-17 PROBLEM — E87.20 METABOLIC ACIDOSIS: Status: RESOLVED | Noted: 2020-01-30 | Resolved: 2021-03-17

## 2021-03-17 PROBLEM — N18.9 ACUTE-ON-CHRONIC KIDNEY INJURY: Status: RESOLVED | Noted: 2020-12-30 | Resolved: 2021-03-17

## 2021-03-17 PROCEDURE — 3080F PR MOST RECENT DIASTOLIC BLOOD PRESSURE >= 90 MM HG: ICD-10-PCS | Mod: CPTII,S$GLB,, | Performed by: NURSE PRACTITIONER

## 2021-03-17 PROCEDURE — 1101F PT FALLS ASSESS-DOCD LE1/YR: CPT | Mod: CPTII,S$GLB,, | Performed by: NURSE PRACTITIONER

## 2021-03-17 PROCEDURE — G0439 PPPS, SUBSEQ VISIT: HCPCS | Mod: S$GLB,,, | Performed by: NURSE PRACTITIONER

## 2021-03-17 PROCEDURE — 1101F PR PT FALLS ASSESS DOC 0-1 FALLS W/OUT INJ PAST YR: ICD-10-PCS | Mod: CPTII,S$GLB,, | Performed by: NURSE PRACTITIONER

## 2021-03-17 PROCEDURE — 99499 RISK ADDL DX/OHS AUDIT: ICD-10-PCS | Mod: S$GLB,,, | Performed by: NURSE PRACTITIONER

## 2021-03-17 PROCEDURE — 1158F ADVNC CARE PLAN TLK DOCD: CPT | Mod: S$GLB,,, | Performed by: NURSE PRACTITIONER

## 2021-03-17 PROCEDURE — 3288F FALL RISK ASSESSMENT DOCD: CPT | Mod: CPTII,S$GLB,, | Performed by: NURSE PRACTITIONER

## 2021-03-17 PROCEDURE — 3080F DIAST BP >= 90 MM HG: CPT | Mod: CPTII,S$GLB,, | Performed by: NURSE PRACTITIONER

## 2021-03-17 PROCEDURE — 3077F PR MOST RECENT SYSTOLIC BLOOD PRESSURE >= 140 MM HG: ICD-10-PCS | Mod: CPTII,S$GLB,, | Performed by: NURSE PRACTITIONER

## 2021-03-17 PROCEDURE — 99499 UNLISTED E&M SERVICE: CPT | Mod: S$GLB,,, | Performed by: NURSE PRACTITIONER

## 2021-03-17 PROCEDURE — G0439 PR MEDICARE ANNUAL WELLNESS SUBSEQUENT VISIT: ICD-10-PCS | Mod: S$GLB,,, | Performed by: NURSE PRACTITIONER

## 2021-03-17 PROCEDURE — 3008F BODY MASS INDEX DOCD: CPT | Mod: CPTII,S$GLB,, | Performed by: NURSE PRACTITIONER

## 2021-03-17 PROCEDURE — 3008F PR BODY MASS INDEX (BMI) DOCUMENTED: ICD-10-PCS | Mod: CPTII,S$GLB,, | Performed by: NURSE PRACTITIONER

## 2021-03-17 PROCEDURE — 3288F PR FALLS RISK ASSESSMENT DOCUMENTED: ICD-10-PCS | Mod: CPTII,S$GLB,, | Performed by: NURSE PRACTITIONER

## 2021-03-17 PROCEDURE — 1158F PR ADVANCE CARE PLANNING DISCUSS DOCUMENTED IN MEDICAL RECORD: ICD-10-PCS | Mod: S$GLB,,, | Performed by: NURSE PRACTITIONER

## 2021-03-17 PROCEDURE — 3077F SYST BP >= 140 MM HG: CPT | Mod: CPTII,S$GLB,, | Performed by: NURSE PRACTITIONER

## 2021-03-19 ENCOUNTER — PATIENT MESSAGE (OUTPATIENT)
Dept: PULMONOLOGY | Facility: CLINIC | Age: 71
End: 2021-03-19

## 2021-03-26 ENCOUNTER — HOSPITAL ENCOUNTER (OUTPATIENT)
Dept: PULMONOLOGY | Facility: CLINIC | Age: 71
Discharge: HOME | End: 2021-03-26
Payer: MEDICARE

## 2021-03-26 ENCOUNTER — OFFICE VISIT (OUTPATIENT)
Dept: PULMONOLOGY | Facility: CLINIC | Age: 71
End: 2021-03-26
Payer: MEDICARE

## 2021-03-26 VITALS
SYSTOLIC BLOOD PRESSURE: 140 MMHG | HEIGHT: 70 IN | DIASTOLIC BLOOD PRESSURE: 80 MMHG | WEIGHT: 236.31 LBS | OXYGEN SATURATION: 98 % | BODY MASS INDEX: 33.83 KG/M2 | HEART RATE: 49 BPM

## 2021-03-26 VITALS — HEIGHT: 70 IN | BODY MASS INDEX: 33.64 KG/M2 | WEIGHT: 235 LBS

## 2021-03-26 DIAGNOSIS — N18.4 HYPERTENSIVE KIDNEY DISEASE WITH STAGE 4 CHRONIC KIDNEY DISEASE: ICD-10-CM

## 2021-03-26 DIAGNOSIS — I10 ESSENTIAL HYPERTENSION: ICD-10-CM

## 2021-03-26 DIAGNOSIS — R06.02 SOB (SHORTNESS OF BREATH): Primary | ICD-10-CM

## 2021-03-26 DIAGNOSIS — I12.9 HYPERTENSIVE KIDNEY DISEASE WITH STAGE 4 CHRONIC KIDNEY DISEASE: ICD-10-CM

## 2021-03-26 DIAGNOSIS — R91.8 PULMONARY NODULES: ICD-10-CM

## 2021-03-26 DIAGNOSIS — R06.02 SOB (SHORTNESS OF BREATH): ICD-10-CM

## 2021-03-26 PROCEDURE — 1101F PR PT FALLS ASSESS DOC 0-1 FALLS W/OUT INJ PAST YR: ICD-10-PCS | Mod: CPTII,S$GLB,, | Performed by: NURSE PRACTITIONER

## 2021-03-26 PROCEDURE — 99499 UNLISTED E&M SERVICE: CPT | Mod: S$GLB,,, | Performed by: NURSE PRACTITIONER

## 2021-03-26 PROCEDURE — 1126F AMNT PAIN NOTED NONE PRSNT: CPT | Mod: S$GLB,,, | Performed by: NURSE PRACTITIONER

## 2021-03-26 PROCEDURE — 99214 PR OFFICE/OUTPT VISIT, EST, LEVL IV, 30-39 MIN: ICD-10-PCS | Mod: S$GLB,,, | Performed by: NURSE PRACTITIONER

## 2021-03-26 PROCEDURE — 99214 OFFICE O/P EST MOD 30 MIN: CPT | Mod: S$GLB,,, | Performed by: NURSE PRACTITIONER

## 2021-03-26 PROCEDURE — 99999 PR PBB SHADOW E&M-EST. PATIENT-LVL IV: CPT | Mod: PBBFAC,,, | Performed by: NURSE PRACTITIONER

## 2021-03-26 PROCEDURE — 99499 RISK ADDL DX/OHS AUDIT: ICD-10-PCS | Mod: S$GLB,,, | Performed by: NURSE PRACTITIONER

## 2021-03-26 PROCEDURE — 99999 PR PBB SHADOW E&M-EST. PATIENT-LVL IV: ICD-10-PCS | Mod: PBBFAC,,, | Performed by: NURSE PRACTITIONER

## 2021-03-26 PROCEDURE — 94618 PULMONARY STRESS TESTING: ICD-10-PCS | Mod: S$GLB,,, | Performed by: INTERNAL MEDICINE

## 2021-03-26 PROCEDURE — 3288F FALL RISK ASSESSMENT DOCD: CPT | Mod: CPTII,S$GLB,, | Performed by: NURSE PRACTITIONER

## 2021-03-26 PROCEDURE — 1126F PR PAIN SEVERITY QUANTIFIED, NO PAIN PRESENT: ICD-10-PCS | Mod: S$GLB,,, | Performed by: NURSE PRACTITIONER

## 2021-03-26 PROCEDURE — 94618 PULMONARY STRESS TESTING: CPT | Mod: S$GLB,,, | Performed by: INTERNAL MEDICINE

## 2021-03-26 PROCEDURE — 3288F PR FALLS RISK ASSESSMENT DOCUMENTED: ICD-10-PCS | Mod: CPTII,S$GLB,, | Performed by: NURSE PRACTITIONER

## 2021-03-26 PROCEDURE — 3008F PR BODY MASS INDEX (BMI) DOCUMENTED: ICD-10-PCS | Mod: CPTII,S$GLB,, | Performed by: NURSE PRACTITIONER

## 2021-03-26 PROCEDURE — 1101F PT FALLS ASSESS-DOCD LE1/YR: CPT | Mod: CPTII,S$GLB,, | Performed by: NURSE PRACTITIONER

## 2021-03-26 PROCEDURE — 3008F BODY MASS INDEX DOCD: CPT | Mod: CPTII,S$GLB,, | Performed by: NURSE PRACTITIONER

## 2021-04-05 ENCOUNTER — PATIENT MESSAGE (OUTPATIENT)
Dept: ADMINISTRATIVE | Facility: HOSPITAL | Age: 71
End: 2021-04-05

## 2021-04-08 ENCOUNTER — OUTPATIENT CASE MANAGEMENT (OUTPATIENT)
Dept: ADMINISTRATIVE | Facility: OTHER | Age: 71
End: 2021-04-08

## 2021-04-19 ENCOUNTER — OFFICE VISIT (OUTPATIENT)
Dept: OTOLARYNGOLOGY | Facility: CLINIC | Age: 71
End: 2021-04-19
Payer: MEDICARE

## 2021-04-19 VITALS
HEART RATE: 58 BPM | SYSTOLIC BLOOD PRESSURE: 169 MMHG | WEIGHT: 239 LBS | DIASTOLIC BLOOD PRESSURE: 86 MMHG | BODY MASS INDEX: 34.29 KG/M2

## 2021-04-19 DIAGNOSIS — C32.9 LARYNX CANCER: Primary | ICD-10-CM

## 2021-04-19 PROCEDURE — 1159F PR MEDICATION LIST DOCUMENTED IN MEDICAL RECORD: ICD-10-PCS | Mod: S$GLB,,, | Performed by: OTOLARYNGOLOGY

## 2021-04-19 PROCEDURE — 1159F MED LIST DOCD IN RCRD: CPT | Mod: S$GLB,,, | Performed by: OTOLARYNGOLOGY

## 2021-04-19 PROCEDURE — 99213 OFFICE O/P EST LOW 20 MIN: CPT | Mod: S$GLB,,, | Performed by: OTOLARYNGOLOGY

## 2021-04-19 PROCEDURE — 99999 PR PBB SHADOW E&M-EST. PATIENT-LVL III: ICD-10-PCS | Mod: PBBFAC,,, | Performed by: OTOLARYNGOLOGY

## 2021-04-19 PROCEDURE — 1101F PR PT FALLS ASSESS DOC 0-1 FALLS W/OUT INJ PAST YR: ICD-10-PCS | Mod: CPTII,S$GLB,, | Performed by: OTOLARYNGOLOGY

## 2021-04-19 PROCEDURE — 1101F PT FALLS ASSESS-DOCD LE1/YR: CPT | Mod: CPTII,S$GLB,, | Performed by: OTOLARYNGOLOGY

## 2021-04-19 PROCEDURE — 3008F PR BODY MASS INDEX (BMI) DOCUMENTED: ICD-10-PCS | Mod: CPTII,S$GLB,, | Performed by: OTOLARYNGOLOGY

## 2021-04-19 PROCEDURE — 1126F PR PAIN SEVERITY QUANTIFIED, NO PAIN PRESENT: ICD-10-PCS | Mod: S$GLB,,, | Performed by: OTOLARYNGOLOGY

## 2021-04-19 PROCEDURE — 1126F AMNT PAIN NOTED NONE PRSNT: CPT | Mod: S$GLB,,, | Performed by: OTOLARYNGOLOGY

## 2021-04-19 PROCEDURE — 99213 PR OFFICE/OUTPT VISIT, EST, LEVL III, 20-29 MIN: ICD-10-PCS | Mod: S$GLB,,, | Performed by: OTOLARYNGOLOGY

## 2021-04-19 PROCEDURE — 99999 PR PBB SHADOW E&M-EST. PATIENT-LVL III: CPT | Mod: PBBFAC,,, | Performed by: OTOLARYNGOLOGY

## 2021-04-19 PROCEDURE — 3288F PR FALLS RISK ASSESSMENT DOCUMENTED: ICD-10-PCS | Mod: CPTII,S$GLB,, | Performed by: OTOLARYNGOLOGY

## 2021-04-19 PROCEDURE — 3008F BODY MASS INDEX DOCD: CPT | Mod: CPTII,S$GLB,, | Performed by: OTOLARYNGOLOGY

## 2021-04-19 PROCEDURE — 3288F FALL RISK ASSESSMENT DOCD: CPT | Mod: CPTII,S$GLB,, | Performed by: OTOLARYNGOLOGY

## 2021-04-20 ENCOUNTER — OUTPATIENT CASE MANAGEMENT (OUTPATIENT)
Dept: ADMINISTRATIVE | Facility: OTHER | Age: 71
End: 2021-04-20

## 2021-05-07 ENCOUNTER — OUTPATIENT CASE MANAGEMENT (OUTPATIENT)
Dept: ADMINISTRATIVE | Facility: OTHER | Age: 71
End: 2021-05-07

## 2021-05-07 ENCOUNTER — LAB VISIT (OUTPATIENT)
Dept: LAB | Facility: HOSPITAL | Age: 71
End: 2021-05-07
Attending: INTERNAL MEDICINE
Payer: MEDICARE

## 2021-05-07 DIAGNOSIS — E78.2 MIXED HYPERLIPIDEMIA: ICD-10-CM

## 2021-05-07 DIAGNOSIS — N18.4 CHRONIC KIDNEY DISEASE, STAGE IV (SEVERE): ICD-10-CM

## 2021-05-07 LAB
ALBUMIN SERPL BCP-MCNC: 4.1 G/DL (ref 3.5–5.2)
ALP SERPL-CCNC: 118 U/L (ref 38–126)
ALT SERPL W/O P-5'-P-CCNC: 17 U/L (ref 10–44)
ANION GAP SERPL CALC-SCNC: 7 MMOL/L (ref 8–16)
AST SERPL-CCNC: 20 U/L (ref 15–46)
BASOPHILS # BLD AUTO: 0.02 K/UL (ref 0–0.2)
BASOPHILS NFR BLD: 0.4 % (ref 0–1.9)
BILIRUB SERPL-MCNC: 0.3 MG/DL (ref 0.1–1)
CALCIUM SERPL-MCNC: 10.9 MG/DL (ref 8.7–10.5)
CHLORIDE SERPL-SCNC: 113 MMOL/L (ref 95–110)
CHOLEST SERPL-MCNC: 140 MG/DL (ref 120–199)
CHOLEST/HDLC SERPL: 3 {RATIO} (ref 2–5)
CO2 SERPL-SCNC: 20 MMOL/L (ref 23–29)
CREAT SERPL-MCNC: 3.64 MG/DL (ref 0.5–1.4)
DIFFERENTIAL METHOD: ABNORMAL
EOSINOPHIL # BLD AUTO: 0.1 K/UL (ref 0–0.5)
EOSINOPHIL NFR BLD: 2.5 % (ref 0–8)
ERYTHROCYTE [DISTWIDTH] IN BLOOD BY AUTOMATED COUNT: 15.7 % (ref 11.5–14.5)
EST. GFR  (AFRICAN AMERICAN): 18.3 ML/MIN/1.73 M^2
EST. GFR  (NON AFRICAN AMERICAN): 15.8 ML/MIN/1.73 M^2
GLUCOSE SERPL-MCNC: 104 MG/DL (ref 70–110)
HCT VFR BLD AUTO: 38.9 % (ref 40–54)
HDLC SERPL-MCNC: 47 MG/DL (ref 40–75)
HDLC SERPL: 33.6 % (ref 20–50)
HGB BLD-MCNC: 11.7 G/DL (ref 14–18)
IMM GRANULOCYTES # BLD AUTO: 0.02 K/UL (ref 0–0.04)
IMM GRANULOCYTES NFR BLD AUTO: 0.4 % (ref 0–0.5)
LDLC SERPL CALC-MCNC: 76.2 MG/DL (ref 63–159)
LYMPHOCYTES # BLD AUTO: 1.5 K/UL (ref 1–4.8)
LYMPHOCYTES NFR BLD: 32.3 % (ref 18–48)
MCH RBC QN AUTO: 25.8 PG (ref 27–31)
MCHC RBC AUTO-ENTMCNC: 30.1 G/DL (ref 32–36)
MCV RBC AUTO: 86 FL (ref 82–98)
MONOCYTES # BLD AUTO: 0.5 K/UL (ref 0.3–1)
MONOCYTES NFR BLD: 10.6 % (ref 4–15)
NEUTROPHILS # BLD AUTO: 2.5 K/UL (ref 1.8–7.7)
NEUTROPHILS NFR BLD: 53.8 % (ref 38–73)
NONHDLC SERPL-MCNC: 93 MG/DL
NRBC BLD-RTO: 0 /100 WBC
PLATELET # BLD AUTO: 163 K/UL (ref 150–450)
PMV BLD AUTO: 11.4 FL (ref 9.2–12.9)
POTASSIUM SERPL-SCNC: 4.4 MMOL/L (ref 3.5–5.1)
PROT SERPL-MCNC: 8.3 G/DL (ref 6–8.4)
RBC # BLD AUTO: 4.53 M/UL (ref 4.6–6.2)
SODIUM SERPL-SCNC: 140 MMOL/L (ref 136–145)
TRIGL SERPL-MCNC: 84 MG/DL (ref 30–150)
UUN UR-MCNC: 38 MG/DL (ref 2–20)
WBC # BLD AUTO: 4.73 K/UL (ref 3.9–12.7)

## 2021-05-07 PROCEDURE — 85025 COMPLETE CBC W/AUTO DIFF WBC: CPT | Mod: PO | Performed by: INTERNAL MEDICINE

## 2021-05-07 PROCEDURE — 80053 COMPREHEN METABOLIC PANEL: CPT | Mod: PO | Performed by: INTERNAL MEDICINE

## 2021-05-07 PROCEDURE — 80061 LIPID PANEL: CPT | Performed by: INTERNAL MEDICINE

## 2021-05-07 PROCEDURE — 36415 COLL VENOUS BLD VENIPUNCTURE: CPT | Mod: PO | Performed by: INTERNAL MEDICINE

## 2021-05-14 ENCOUNTER — OFFICE VISIT (OUTPATIENT)
Dept: FAMILY MEDICINE | Facility: CLINIC | Age: 71
End: 2021-05-14
Payer: MEDICARE

## 2021-05-14 VITALS
DIASTOLIC BLOOD PRESSURE: 84 MMHG | SYSTOLIC BLOOD PRESSURE: 138 MMHG | OXYGEN SATURATION: 96 % | WEIGHT: 234.25 LBS | HEIGHT: 71 IN | TEMPERATURE: 98 F | BODY MASS INDEX: 32.79 KG/M2 | HEART RATE: 81 BPM

## 2021-05-14 DIAGNOSIS — E83.52 HYPERCALCEMIA: ICD-10-CM

## 2021-05-14 DIAGNOSIS — R60.9 EDEMA, UNSPECIFIED TYPE: Primary | ICD-10-CM

## 2021-05-14 DIAGNOSIS — E66.9 OBESITY (BMI 30-39.9): ICD-10-CM

## 2021-05-14 DIAGNOSIS — R35.1 BENIGN PROSTATIC HYPERPLASIA WITH NOCTURIA: ICD-10-CM

## 2021-05-14 DIAGNOSIS — E87.5 HYPERKALEMIA: ICD-10-CM

## 2021-05-14 DIAGNOSIS — I82.409 ACUTE DEEP VEIN THROMBOSIS (DVT) OF LOWER EXTREMITY, UNSPECIFIED LATERALITY, UNSPECIFIED VEIN: ICD-10-CM

## 2021-05-14 DIAGNOSIS — N18.4 CHRONIC KIDNEY DISEASE, STAGE IV (SEVERE): ICD-10-CM

## 2021-05-14 DIAGNOSIS — N40.0 BENIGN PROSTATIC HYPERPLASIA, UNSPECIFIED WHETHER LOWER URINARY TRACT SYMPTOMS PRESENT: ICD-10-CM

## 2021-05-14 DIAGNOSIS — R73.9 HYPERGLYCEMIA: ICD-10-CM

## 2021-05-14 DIAGNOSIS — D64.9 ANEMIA, UNSPECIFIED TYPE: ICD-10-CM

## 2021-05-14 DIAGNOSIS — E55.9 VITAMIN D DEFICIENCY: ICD-10-CM

## 2021-05-14 DIAGNOSIS — I10 ESSENTIAL HYPERTENSION: ICD-10-CM

## 2021-05-14 DIAGNOSIS — N40.1 BENIGN PROSTATIC HYPERPLASIA WITH NOCTURIA: ICD-10-CM

## 2021-05-14 PROCEDURE — 1159F PR MEDICATION LIST DOCUMENTED IN MEDICAL RECORD: ICD-10-PCS | Mod: S$GLB,,, | Performed by: FAMILY MEDICINE

## 2021-05-14 PROCEDURE — 3008F BODY MASS INDEX DOCD: CPT | Mod: CPTII,S$GLB,, | Performed by: FAMILY MEDICINE

## 2021-05-14 PROCEDURE — 3288F FALL RISK ASSESSMENT DOCD: CPT | Mod: CPTII,S$GLB,, | Performed by: FAMILY MEDICINE

## 2021-05-14 PROCEDURE — 1101F PT FALLS ASSESS-DOCD LE1/YR: CPT | Mod: CPTII,S$GLB,, | Performed by: FAMILY MEDICINE

## 2021-05-14 PROCEDURE — 99214 PR OFFICE/OUTPT VISIT, EST, LEVL IV, 30-39 MIN: ICD-10-PCS | Mod: S$GLB,,, | Performed by: FAMILY MEDICINE

## 2021-05-14 PROCEDURE — 1126F AMNT PAIN NOTED NONE PRSNT: CPT | Mod: S$GLB,,, | Performed by: FAMILY MEDICINE

## 2021-05-14 PROCEDURE — 99214 OFFICE O/P EST MOD 30 MIN: CPT | Mod: S$GLB,,, | Performed by: FAMILY MEDICINE

## 2021-05-14 PROCEDURE — 3288F PR FALLS RISK ASSESSMENT DOCUMENTED: ICD-10-PCS | Mod: CPTII,S$GLB,, | Performed by: FAMILY MEDICINE

## 2021-05-14 PROCEDURE — 1126F PR PAIN SEVERITY QUANTIFIED, NO PAIN PRESENT: ICD-10-PCS | Mod: S$GLB,,, | Performed by: FAMILY MEDICINE

## 2021-05-14 PROCEDURE — 1159F MED LIST DOCD IN RCRD: CPT | Mod: S$GLB,,, | Performed by: FAMILY MEDICINE

## 2021-05-14 PROCEDURE — 1101F PR PT FALLS ASSESS DOC 0-1 FALLS W/OUT INJ PAST YR: ICD-10-PCS | Mod: CPTII,S$GLB,, | Performed by: FAMILY MEDICINE

## 2021-05-14 PROCEDURE — 3008F PR BODY MASS INDEX (BMI) DOCUMENTED: ICD-10-PCS | Mod: CPTII,S$GLB,, | Performed by: FAMILY MEDICINE

## 2021-05-14 RX ORDER — METOLAZONE 10 MG/1
10 TABLET ORAL DAILY
Qty: 7 TABLET | Refills: 0 | Status: SHIPPED | OUTPATIENT
Start: 2021-05-14 | End: 2021-07-07

## 2021-05-14 RX ORDER — ERGOCALCIFEROL 1.25 MG/1
50000 CAPSULE ORAL
Qty: 12 CAPSULE | Refills: 0 | Status: SHIPPED | OUTPATIENT
Start: 2021-05-14 | End: 2021-06-24 | Stop reason: SDUPTHER

## 2021-05-14 RX ORDER — FINASTERIDE 5 MG/1
5 TABLET, FILM COATED ORAL NIGHTLY
Qty: 90 TABLET | Refills: 3 | Status: SHIPPED | OUTPATIENT
Start: 2021-05-14 | End: 2021-11-10

## 2021-05-14 RX ORDER — FUROSEMIDE 40 MG/1
40 TABLET ORAL DAILY
Qty: 90 TABLET | Refills: 3 | Status: SHIPPED | OUTPATIENT
Start: 2021-05-14 | End: 2021-11-10

## 2021-05-17 ENCOUNTER — HOSPITAL ENCOUNTER (OUTPATIENT)
Dept: RADIOLOGY | Facility: HOSPITAL | Age: 71
Discharge: HOME | End: 2021-05-17
Attending: FAMILY MEDICINE
Payer: MEDICARE

## 2021-05-17 DIAGNOSIS — R60.9 EDEMA, UNSPECIFIED TYPE: ICD-10-CM

## 2021-05-17 PROCEDURE — 93971 EXTREMITY STUDY: CPT | Mod: TC,PO,LT

## 2021-05-18 ENCOUNTER — PATIENT MESSAGE (OUTPATIENT)
Dept: FAMILY MEDICINE | Facility: CLINIC | Age: 71
End: 2021-05-18

## 2021-05-18 DIAGNOSIS — I82.401 ACUTE DEEP VEIN THROMBOSIS (DVT) OF RIGHT LOWER EXTREMITY, UNSPECIFIED VEIN: Primary | ICD-10-CM

## 2021-05-27 ENCOUNTER — TELEPHONE (OUTPATIENT)
Dept: VASCULAR SURGERY | Facility: CLINIC | Age: 71
End: 2021-05-27

## 2021-05-27 DIAGNOSIS — R60.9 EDEMA, UNSPECIFIED TYPE: Primary | ICD-10-CM

## 2021-05-28 ENCOUNTER — OUTPATIENT CASE MANAGEMENT (OUTPATIENT)
Dept: ADMINISTRATIVE | Facility: OTHER | Age: 71
End: 2021-05-28

## 2021-06-03 ENCOUNTER — PATIENT MESSAGE (OUTPATIENT)
Dept: FAMILY MEDICINE | Facility: CLINIC | Age: 71
End: 2021-06-03

## 2021-06-04 ENCOUNTER — OFFICE VISIT (OUTPATIENT)
Dept: FAMILY MEDICINE | Facility: CLINIC | Age: 71
End: 2021-06-04
Payer: MEDICARE

## 2021-06-04 VITALS
WEIGHT: 230.38 LBS | OXYGEN SATURATION: 95 % | DIASTOLIC BLOOD PRESSURE: 76 MMHG | HEART RATE: 61 BPM | HEIGHT: 71 IN | SYSTOLIC BLOOD PRESSURE: 126 MMHG | TEMPERATURE: 97 F | BODY MASS INDEX: 32.25 KG/M2

## 2021-06-04 DIAGNOSIS — M62.838 MUSCLE SPASM: Primary | ICD-10-CM

## 2021-06-04 PROCEDURE — 3008F PR BODY MASS INDEX (BMI) DOCUMENTED: ICD-10-PCS | Mod: CPTII,S$GLB,, | Performed by: FAMILY MEDICINE

## 2021-06-04 PROCEDURE — 99214 OFFICE O/P EST MOD 30 MIN: CPT | Mod: S$GLB,,, | Performed by: FAMILY MEDICINE

## 2021-06-04 PROCEDURE — 3008F BODY MASS INDEX DOCD: CPT | Mod: CPTII,S$GLB,, | Performed by: FAMILY MEDICINE

## 2021-06-04 PROCEDURE — 99214 PR OFFICE/OUTPT VISIT, EST, LEVL IV, 30-39 MIN: ICD-10-PCS | Mod: S$GLB,,, | Performed by: FAMILY MEDICINE

## 2021-06-04 PROCEDURE — 1101F PT FALLS ASSESS-DOCD LE1/YR: CPT | Mod: CPTII,S$GLB,, | Performed by: FAMILY MEDICINE

## 2021-06-04 PROCEDURE — 1159F PR MEDICATION LIST DOCUMENTED IN MEDICAL RECORD: ICD-10-PCS | Mod: S$GLB,,, | Performed by: FAMILY MEDICINE

## 2021-06-04 PROCEDURE — 3288F PR FALLS RISK ASSESSMENT DOCUMENTED: ICD-10-PCS | Mod: CPTII,S$GLB,, | Performed by: FAMILY MEDICINE

## 2021-06-04 PROCEDURE — 1101F PR PT FALLS ASSESS DOC 0-1 FALLS W/OUT INJ PAST YR: ICD-10-PCS | Mod: CPTII,S$GLB,, | Performed by: FAMILY MEDICINE

## 2021-06-04 PROCEDURE — 3288F FALL RISK ASSESSMENT DOCD: CPT | Mod: CPTII,S$GLB,, | Performed by: FAMILY MEDICINE

## 2021-06-04 PROCEDURE — 1126F AMNT PAIN NOTED NONE PRSNT: CPT | Mod: S$GLB,,, | Performed by: FAMILY MEDICINE

## 2021-06-04 PROCEDURE — 1159F MED LIST DOCD IN RCRD: CPT | Mod: S$GLB,,, | Performed by: FAMILY MEDICINE

## 2021-06-04 PROCEDURE — 1126F PR PAIN SEVERITY QUANTIFIED, NO PAIN PRESENT: ICD-10-PCS | Mod: S$GLB,,, | Performed by: FAMILY MEDICINE

## 2021-06-04 RX ORDER — TIZANIDINE 2 MG/1
2 TABLET ORAL EVERY 8 HOURS PRN
Qty: 30 TABLET | Refills: 0 | Status: SHIPPED | OUTPATIENT
Start: 2021-06-04 | End: 2021-06-14

## 2021-06-08 ENCOUNTER — PATIENT OUTREACH (OUTPATIENT)
Dept: ADMINISTRATIVE | Facility: OTHER | Age: 71
End: 2021-06-08

## 2021-06-10 ENCOUNTER — OFFICE VISIT (OUTPATIENT)
Dept: CARDIOLOGY | Facility: CLINIC | Age: 71
End: 2021-06-10
Payer: MEDICARE

## 2021-06-10 ENCOUNTER — OUTPATIENT CASE MANAGEMENT (OUTPATIENT)
Dept: ADMINISTRATIVE | Facility: OTHER | Age: 71
End: 2021-06-10

## 2021-06-10 VITALS
SYSTOLIC BLOOD PRESSURE: 114 MMHG | HEIGHT: 70 IN | OXYGEN SATURATION: 96 % | WEIGHT: 226.63 LBS | HEART RATE: 57 BPM | DIASTOLIC BLOOD PRESSURE: 75 MMHG | BODY MASS INDEX: 32.45 KG/M2

## 2021-06-10 DIAGNOSIS — I73.9 CLAUDICATION OF RIGHT LOWER EXTREMITY: ICD-10-CM

## 2021-06-10 DIAGNOSIS — I70.0 ATHEROSCLEROSIS OF AORTA: ICD-10-CM

## 2021-06-10 DIAGNOSIS — I10 ESSENTIAL HYPERTENSION: ICD-10-CM

## 2021-06-10 DIAGNOSIS — E66.9 OBESITY (BMI 30-39.9): ICD-10-CM

## 2021-06-10 DIAGNOSIS — Z86.16 HISTORY OF COVID-19: ICD-10-CM

## 2021-06-10 DIAGNOSIS — R60.9 EDEMA, UNSPECIFIED TYPE: ICD-10-CM

## 2021-06-10 DIAGNOSIS — Z86.718 HISTORY OF DVT (DEEP VEIN THROMBOSIS): Primary | ICD-10-CM

## 2021-06-10 DIAGNOSIS — E78.2 MIXED HYPERLIPIDEMIA: ICD-10-CM

## 2021-06-10 PROCEDURE — 1159F MED LIST DOCD IN RCRD: CPT | Mod: S$GLB,,, | Performed by: INTERNAL MEDICINE

## 2021-06-10 PROCEDURE — 1126F PR PAIN SEVERITY QUANTIFIED, NO PAIN PRESENT: ICD-10-PCS | Mod: S$GLB,,, | Performed by: INTERNAL MEDICINE

## 2021-06-10 PROCEDURE — 99499 RISK ADDL DX/OHS AUDIT: ICD-10-PCS | Mod: S$GLB,,, | Performed by: INTERNAL MEDICINE

## 2021-06-10 PROCEDURE — 1101F PT FALLS ASSESS-DOCD LE1/YR: CPT | Mod: CPTII,S$GLB,, | Performed by: INTERNAL MEDICINE

## 2021-06-10 PROCEDURE — 1101F PR PT FALLS ASSESS DOC 0-1 FALLS W/OUT INJ PAST YR: ICD-10-PCS | Mod: CPTII,S$GLB,, | Performed by: INTERNAL MEDICINE

## 2021-06-10 PROCEDURE — 99999 PR PBB SHADOW E&M-EST. PATIENT-LVL V: CPT | Mod: PBBFAC,,, | Performed by: INTERNAL MEDICINE

## 2021-06-10 PROCEDURE — 99499 UNLISTED E&M SERVICE: CPT | Mod: S$GLB,,, | Performed by: INTERNAL MEDICINE

## 2021-06-10 PROCEDURE — 3008F BODY MASS INDEX DOCD: CPT | Mod: CPTII,S$GLB,, | Performed by: INTERNAL MEDICINE

## 2021-06-10 PROCEDURE — 1159F PR MEDICATION LIST DOCUMENTED IN MEDICAL RECORD: ICD-10-PCS | Mod: S$GLB,,, | Performed by: INTERNAL MEDICINE

## 2021-06-10 PROCEDURE — 3008F PR BODY MASS INDEX (BMI) DOCUMENTED: ICD-10-PCS | Mod: CPTII,S$GLB,, | Performed by: INTERNAL MEDICINE

## 2021-06-10 PROCEDURE — 99204 OFFICE O/P NEW MOD 45 MIN: CPT | Mod: S$GLB,,, | Performed by: INTERNAL MEDICINE

## 2021-06-10 PROCEDURE — 1126F AMNT PAIN NOTED NONE PRSNT: CPT | Mod: S$GLB,,, | Performed by: INTERNAL MEDICINE

## 2021-06-10 PROCEDURE — 3288F FALL RISK ASSESSMENT DOCD: CPT | Mod: CPTII,S$GLB,, | Performed by: INTERNAL MEDICINE

## 2021-06-10 PROCEDURE — 3288F PR FALLS RISK ASSESSMENT DOCUMENTED: ICD-10-PCS | Mod: CPTII,S$GLB,, | Performed by: INTERNAL MEDICINE

## 2021-06-10 PROCEDURE — 99999 PR PBB SHADOW E&M-EST. PATIENT-LVL V: ICD-10-PCS | Mod: PBBFAC,,, | Performed by: INTERNAL MEDICINE

## 2021-06-10 PROCEDURE — 99204 PR OFFICE/OUTPT VISIT, NEW, LEVL IV, 45-59 MIN: ICD-10-PCS | Mod: S$GLB,,, | Performed by: INTERNAL MEDICINE

## 2021-06-11 ENCOUNTER — PATIENT OUTREACH (OUTPATIENT)
Dept: ADMINISTRATIVE | Facility: HOSPITAL | Age: 71
End: 2021-06-11

## 2021-06-11 ENCOUNTER — PATIENT MESSAGE (OUTPATIENT)
Dept: ADMINISTRATIVE | Facility: HOSPITAL | Age: 71
End: 2021-06-11

## 2021-06-16 ENCOUNTER — HOSPITAL ENCOUNTER (OUTPATIENT)
Dept: RADIOLOGY | Facility: HOSPITAL | Age: 71
Discharge: HOME | End: 2021-06-16
Attending: INTERNAL MEDICINE
Payer: MEDICARE

## 2021-06-16 DIAGNOSIS — R60.9 EDEMA, UNSPECIFIED TYPE: ICD-10-CM

## 2021-06-16 PROCEDURE — 93970 EXTREMITY STUDY: CPT | Mod: TC,PO

## 2021-06-22 ENCOUNTER — LAB VISIT (OUTPATIENT)
Dept: LAB | Facility: HOSPITAL | Age: 71
End: 2021-06-22
Attending: INTERNAL MEDICINE
Payer: MEDICARE

## 2021-06-22 ENCOUNTER — OUTPATIENT CASE MANAGEMENT (OUTPATIENT)
Dept: ADMINISTRATIVE | Facility: OTHER | Age: 71
End: 2021-06-22

## 2021-06-22 DIAGNOSIS — N18.4 HYPERTENSIVE KIDNEY DISEASE WITH STAGE 4 CHRONIC KIDNEY DISEASE: ICD-10-CM

## 2021-06-22 DIAGNOSIS — E87.20 METABOLIC ACIDOSIS: ICD-10-CM

## 2021-06-22 DIAGNOSIS — E66.9 OBESITY (BMI 30-39.9): ICD-10-CM

## 2021-06-22 DIAGNOSIS — Z79.52 IMMUNOSUPPRESSION DUE TO CHRONIC STEROID USE: ICD-10-CM

## 2021-06-22 DIAGNOSIS — Z94.0 RENAL TRANSPLANT RECIPIENT: ICD-10-CM

## 2021-06-22 DIAGNOSIS — T38.0X5A IMMUNOSUPPRESSION DUE TO CHRONIC STEROID USE: ICD-10-CM

## 2021-06-22 DIAGNOSIS — D84.821 IMMUNOSUPPRESSION DUE TO CHRONIC STEROID USE: ICD-10-CM

## 2021-06-22 DIAGNOSIS — E83.52 HYPERCALCEMIA: ICD-10-CM

## 2021-06-22 DIAGNOSIS — N25.81 SECONDARY HYPERPARATHYROIDISM: ICD-10-CM

## 2021-06-22 DIAGNOSIS — I12.9 HYPERTENSIVE KIDNEY DISEASE WITH STAGE 4 CHRONIC KIDNEY DISEASE: ICD-10-CM

## 2021-06-22 DIAGNOSIS — N18.4 CKD (CHRONIC KIDNEY DISEASE) STAGE 4, GFR 15-29 ML/MIN: ICD-10-CM

## 2021-06-22 LAB
25(OH)D3+25(OH)D2 SERPL-MCNC: 19 NG/ML (ref 30–96)
ALBUMIN SERPL BCP-MCNC: 4.1 G/DL (ref 3.5–5.2)
ANION GAP SERPL CALC-SCNC: 7 MMOL/L (ref 8–16)
BASOPHILS # BLD AUTO: 0.01 K/UL (ref 0–0.2)
BASOPHILS NFR BLD: 0.2 % (ref 0–1.9)
CALCIUM SERPL-MCNC: 10.6 MG/DL (ref 8.7–10.5)
CHLORIDE SERPL-SCNC: 109 MMOL/L (ref 95–110)
CO2 SERPL-SCNC: 24 MMOL/L (ref 23–29)
CREAT SERPL-MCNC: 3.86 MG/DL (ref 0.5–1.4)
DIFFERENTIAL METHOD: ABNORMAL
EOSINOPHIL # BLD AUTO: 0.1 K/UL (ref 0–0.5)
EOSINOPHIL NFR BLD: 1.1 % (ref 0–8)
ERYTHROCYTE [DISTWIDTH] IN BLOOD BY AUTOMATED COUNT: 15 % (ref 11.5–14.5)
EST. GFR  (AFRICAN AMERICAN): 17 ML/MIN/1.73 M^2
EST. GFR  (NON AFRICAN AMERICAN): 14.7 ML/MIN/1.73 M^2
FERRITIN SERPL-MCNC: 756 NG/ML (ref 20–300)
GLUCOSE SERPL-MCNC: 124 MG/DL (ref 70–110)
HCT VFR BLD AUTO: 44.6 % (ref 40–54)
HGB BLD-MCNC: 13.7 G/DL (ref 14–18)
IMM GRANULOCYTES # BLD AUTO: 0.01 K/UL (ref 0–0.04)
IMM GRANULOCYTES NFR BLD AUTO: 0.2 % (ref 0–0.5)
IRON SERPL-MCNC: 65 UG/DL (ref 45–160)
LYMPHOCYTES # BLD AUTO: 1.7 K/UL (ref 1–4.8)
LYMPHOCYTES NFR BLD: 36.8 % (ref 18–48)
MCH RBC QN AUTO: 25.7 PG (ref 27–31)
MCHC RBC AUTO-ENTMCNC: 30.7 G/DL (ref 32–36)
MCV RBC AUTO: 84 FL (ref 82–98)
MONOCYTES # BLD AUTO: 0.4 K/UL (ref 0.3–1)
MONOCYTES NFR BLD: 9.4 % (ref 4–15)
NEUTROPHILS # BLD AUTO: 2.5 K/UL (ref 1.8–7.7)
NEUTROPHILS NFR BLD: 52.3 % (ref 38–73)
NRBC BLD-RTO: 0 /100 WBC
PHOSPHATE SERPL-MCNC: 3.1 MG/DL (ref 2.7–4.5)
PLATELET # BLD AUTO: 149 K/UL (ref 150–450)
PMV BLD AUTO: 10.6 FL (ref 9.2–12.9)
POTASSIUM SERPL-SCNC: 3.4 MMOL/L (ref 3.5–5.1)
PTH-INTACT SERPL-MCNC: 515 PG/ML (ref 9–77)
RBC # BLD AUTO: 5.33 M/UL (ref 4.6–6.2)
SATURATED IRON: 29 % (ref 20–50)
SODIUM SERPL-SCNC: 140 MMOL/L (ref 136–145)
TOTAL IRON BINDING CAPACITY: 226 UG/DL (ref 250–450)
TRANSFERRIN SERPL-MCNC: 153 MG/DL (ref 200–375)
UUN UR-MCNC: 48 MG/DL (ref 2–20)
WBC # BLD AUTO: 4.7 K/UL (ref 3.9–12.7)

## 2021-06-22 PROCEDURE — 85025 COMPLETE CBC W/AUTO DIFF WBC: CPT | Mod: PO | Performed by: INTERNAL MEDICINE

## 2021-06-22 PROCEDURE — 82728 ASSAY OF FERRITIN: CPT | Performed by: INTERNAL MEDICINE

## 2021-06-22 PROCEDURE — 82306 VITAMIN D 25 HYDROXY: CPT | Mod: PO | Performed by: INTERNAL MEDICINE

## 2021-06-22 PROCEDURE — 83540 ASSAY OF IRON: CPT | Mod: PO | Performed by: INTERNAL MEDICINE

## 2021-06-22 PROCEDURE — 80069 RENAL FUNCTION PANEL: CPT | Mod: PO | Performed by: INTERNAL MEDICINE

## 2021-06-22 PROCEDURE — 83970 ASSAY OF PARATHORMONE: CPT | Mod: PO | Performed by: INTERNAL MEDICINE

## 2021-06-22 PROCEDURE — 36415 COLL VENOUS BLD VENIPUNCTURE: CPT | Mod: PO | Performed by: INTERNAL MEDICINE

## 2021-06-23 ENCOUNTER — OFFICE VISIT (OUTPATIENT)
Dept: NEPHROLOGY | Facility: CLINIC | Age: 71
End: 2021-06-23
Payer: MEDICARE

## 2021-06-23 VITALS
HEART RATE: 53 BPM | SYSTOLIC BLOOD PRESSURE: 118 MMHG | OXYGEN SATURATION: 98 % | BODY MASS INDEX: 31.91 KG/M2 | WEIGHT: 222.88 LBS | DIASTOLIC BLOOD PRESSURE: 74 MMHG | HEIGHT: 70 IN

## 2021-06-23 DIAGNOSIS — E83.39 HYPERPHOSPHATEMIA: ICD-10-CM

## 2021-06-23 DIAGNOSIS — E66.9 OBESITY (BMI 30-39.9): ICD-10-CM

## 2021-06-23 DIAGNOSIS — Z94.0 RENAL TRANSPLANT RECIPIENT: ICD-10-CM

## 2021-06-23 DIAGNOSIS — T38.0X5A IMMUNOSUPPRESSION DUE TO CHRONIC STEROID USE: ICD-10-CM

## 2021-06-23 DIAGNOSIS — I12.9 HYPERTENSIVE KIDNEY DISEASE WITH STAGE 4 CHRONIC KIDNEY DISEASE: Primary | ICD-10-CM

## 2021-06-23 DIAGNOSIS — Z79.899 IMMUNOSUPPRESSIVE MANAGEMENT ENCOUNTER FOLLOWING KIDNEY TRANSPLANT: ICD-10-CM

## 2021-06-23 DIAGNOSIS — D84.9 IMMUNOSUPPRESSION: ICD-10-CM

## 2021-06-23 DIAGNOSIS — E55.9 VITAMIN D DEFICIENCY DISEASE: ICD-10-CM

## 2021-06-23 DIAGNOSIS — N25.81 SECONDARY HYPERPARATHYROIDISM OF RENAL ORIGIN: ICD-10-CM

## 2021-06-23 DIAGNOSIS — R80.9 PROTEINURIA, UNSPECIFIED TYPE: ICD-10-CM

## 2021-06-23 DIAGNOSIS — Z86.16 HISTORY OF COVID-19: ICD-10-CM

## 2021-06-23 DIAGNOSIS — N18.4 HYPERTENSIVE KIDNEY DISEASE WITH STAGE 4 CHRONIC KIDNEY DISEASE: Primary | ICD-10-CM

## 2021-06-23 DIAGNOSIS — E87.6 HYPOKALEMIA: ICD-10-CM

## 2021-06-23 DIAGNOSIS — R35.1 BENIGN PROSTATIC HYPERPLASIA WITH NOCTURIA: ICD-10-CM

## 2021-06-23 DIAGNOSIS — Z79.52 IMMUNOSUPPRESSION DUE TO CHRONIC STEROID USE: ICD-10-CM

## 2021-06-23 DIAGNOSIS — N40.1 BENIGN PROSTATIC HYPERPLASIA WITH NOCTURIA: ICD-10-CM

## 2021-06-23 DIAGNOSIS — Z94.0 IMMUNOSUPPRESSIVE MANAGEMENT ENCOUNTER FOLLOWING KIDNEY TRANSPLANT: ICD-10-CM

## 2021-06-23 DIAGNOSIS — D84.821 IMMUNOSUPPRESSION DUE TO CHRONIC STEROID USE: ICD-10-CM

## 2021-06-23 DIAGNOSIS — E83.52 HYPERCALCEMIA: ICD-10-CM

## 2021-06-23 DIAGNOSIS — E87.20 METABOLIC ACIDOSIS: ICD-10-CM

## 2021-06-23 PROCEDURE — 3074F SYST BP LT 130 MM HG: CPT | Mod: CPTII,S$GLB,, | Performed by: INTERNAL MEDICINE

## 2021-06-23 PROCEDURE — 1159F PR MEDICATION LIST DOCUMENTED IN MEDICAL RECORD: ICD-10-PCS | Mod: S$GLB,,, | Performed by: INTERNAL MEDICINE

## 2021-06-23 PROCEDURE — 99499 UNLISTED E&M SERVICE: CPT | Mod: HCNC,S$GLB,, | Performed by: INTERNAL MEDICINE

## 2021-06-23 PROCEDURE — 99999 PR PBB SHADOW E&M-EST. PATIENT-LVL IV: CPT | Mod: PBBFAC,,, | Performed by: INTERNAL MEDICINE

## 2021-06-23 PROCEDURE — 1159F MED LIST DOCD IN RCRD: CPT | Mod: S$GLB,,, | Performed by: INTERNAL MEDICINE

## 2021-06-23 PROCEDURE — 99215 OFFICE O/P EST HI 40 MIN: CPT | Mod: S$GLB,,, | Performed by: INTERNAL MEDICINE

## 2021-06-23 PROCEDURE — 3074F PR MOST RECENT SYSTOLIC BLOOD PRESSURE < 130 MM HG: ICD-10-PCS | Mod: CPTII,S$GLB,, | Performed by: INTERNAL MEDICINE

## 2021-06-23 PROCEDURE — 3008F PR BODY MASS INDEX (BMI) DOCUMENTED: ICD-10-PCS | Mod: CPTII,S$GLB,, | Performed by: INTERNAL MEDICINE

## 2021-06-23 PROCEDURE — 99499 RISK ADDL DX/OHS AUDIT: ICD-10-PCS | Mod: HCNC,S$GLB,, | Performed by: INTERNAL MEDICINE

## 2021-06-23 PROCEDURE — 1126F PR PAIN SEVERITY QUANTIFIED, NO PAIN PRESENT: ICD-10-PCS | Mod: S$GLB,,, | Performed by: INTERNAL MEDICINE

## 2021-06-23 PROCEDURE — 99999 PR PBB SHADOW E&M-EST. PATIENT-LVL IV: ICD-10-PCS | Mod: PBBFAC,,, | Performed by: INTERNAL MEDICINE

## 2021-06-23 PROCEDURE — 3288F FALL RISK ASSESSMENT DOCD: CPT | Mod: CPTII,S$GLB,, | Performed by: INTERNAL MEDICINE

## 2021-06-23 PROCEDURE — 3288F PR FALLS RISK ASSESSMENT DOCUMENTED: ICD-10-PCS | Mod: CPTII,S$GLB,, | Performed by: INTERNAL MEDICINE

## 2021-06-23 PROCEDURE — 3078F DIAST BP <80 MM HG: CPT | Mod: CPTII,S$GLB,, | Performed by: INTERNAL MEDICINE

## 2021-06-23 PROCEDURE — 99215 PR OFFICE/OUTPT VISIT, EST, LEVL V, 40-54 MIN: ICD-10-PCS | Mod: S$GLB,,, | Performed by: INTERNAL MEDICINE

## 2021-06-23 PROCEDURE — 1101F PR PT FALLS ASSESS DOC 0-1 FALLS W/OUT INJ PAST YR: ICD-10-PCS | Mod: CPTII,S$GLB,, | Performed by: INTERNAL MEDICINE

## 2021-06-23 PROCEDURE — 1101F PT FALLS ASSESS-DOCD LE1/YR: CPT | Mod: CPTII,S$GLB,, | Performed by: INTERNAL MEDICINE

## 2021-06-23 PROCEDURE — 3078F PR MOST RECENT DIASTOLIC BLOOD PRESSURE < 80 MM HG: ICD-10-PCS | Mod: CPTII,S$GLB,, | Performed by: INTERNAL MEDICINE

## 2021-06-23 PROCEDURE — 1126F AMNT PAIN NOTED NONE PRSNT: CPT | Mod: S$GLB,,, | Performed by: INTERNAL MEDICINE

## 2021-06-23 PROCEDURE — 3008F BODY MASS INDEX DOCD: CPT | Mod: CPTII,S$GLB,, | Performed by: INTERNAL MEDICINE

## 2021-06-24 ENCOUNTER — OFFICE VISIT (OUTPATIENT)
Dept: FAMILY MEDICINE | Facility: CLINIC | Age: 71
End: 2021-06-24
Payer: MEDICARE

## 2021-06-24 ENCOUNTER — LAB VISIT (OUTPATIENT)
Dept: LAB | Facility: HOSPITAL | Age: 71
End: 2021-06-24
Attending: INTERNAL MEDICINE
Payer: MEDICARE

## 2021-06-24 VITALS
TEMPERATURE: 98 F | SYSTOLIC BLOOD PRESSURE: 120 MMHG | WEIGHT: 222.75 LBS | HEART RATE: 65 BPM | BODY MASS INDEX: 31.89 KG/M2 | HEIGHT: 70 IN | OXYGEN SATURATION: 98 % | DIASTOLIC BLOOD PRESSURE: 62 MMHG

## 2021-06-24 DIAGNOSIS — Z79.899 IMMUNOSUPPRESSIVE MANAGEMENT ENCOUNTER FOLLOWING KIDNEY TRANSPLANT: ICD-10-CM

## 2021-06-24 DIAGNOSIS — Z12.11 COLON CANCER SCREENING: Primary | ICD-10-CM

## 2021-06-24 DIAGNOSIS — N25.81 SECONDARY HYPERPARATHYROIDISM OF RENAL ORIGIN: ICD-10-CM

## 2021-06-24 DIAGNOSIS — E66.9 OBESITY (BMI 30-39.9): ICD-10-CM

## 2021-06-24 DIAGNOSIS — E83.39 HYPERPHOSPHATEMIA: ICD-10-CM

## 2021-06-24 DIAGNOSIS — R13.19 DYSPHAGIA DUE TO LARYNGECTOMY: ICD-10-CM

## 2021-06-24 DIAGNOSIS — R13.10 DYSPHAGIA, UNSPECIFIED TYPE: ICD-10-CM

## 2021-06-24 DIAGNOSIS — D84.821 IMMUNOSUPPRESSION DUE TO CHRONIC STEROID USE: ICD-10-CM

## 2021-06-24 DIAGNOSIS — E55.9 VITAMIN D DEFICIENCY: ICD-10-CM

## 2021-06-24 DIAGNOSIS — Z94.0 IMMUNOSUPPRESSIVE MANAGEMENT ENCOUNTER FOLLOWING KIDNEY TRANSPLANT: ICD-10-CM

## 2021-06-24 DIAGNOSIS — K22.2 ESOPHAGEAL OBSTRUCTION: ICD-10-CM

## 2021-06-24 DIAGNOSIS — Z94.0 RENAL TRANSPLANT RECIPIENT: ICD-10-CM

## 2021-06-24 DIAGNOSIS — R73.9 HYPERGLYCEMIA: ICD-10-CM

## 2021-06-24 DIAGNOSIS — Z86.16 HISTORY OF COVID-19: ICD-10-CM

## 2021-06-24 DIAGNOSIS — T38.0X5A IMMUNOSUPPRESSION DUE TO CHRONIC STEROID USE: ICD-10-CM

## 2021-06-24 DIAGNOSIS — N18.4 HYPERTENSIVE KIDNEY DISEASE WITH STAGE 4 CHRONIC KIDNEY DISEASE: ICD-10-CM

## 2021-06-24 DIAGNOSIS — E87.20 METABOLIC ACIDOSIS: ICD-10-CM

## 2021-06-24 DIAGNOSIS — Z90.02 DYSPHAGIA DUE TO LARYNGECTOMY: ICD-10-CM

## 2021-06-24 DIAGNOSIS — E55.9 VITAMIN D DEFICIENCY DISEASE: ICD-10-CM

## 2021-06-24 DIAGNOSIS — R35.1 BENIGN PROSTATIC HYPERPLASIA WITH NOCTURIA: ICD-10-CM

## 2021-06-24 DIAGNOSIS — E83.52 HYPERCALCEMIA: ICD-10-CM

## 2021-06-24 DIAGNOSIS — Z79.52 IMMUNOSUPPRESSION DUE TO CHRONIC STEROID USE: ICD-10-CM

## 2021-06-24 DIAGNOSIS — N40.1 BENIGN PROSTATIC HYPERPLASIA WITH NOCTURIA: ICD-10-CM

## 2021-06-24 DIAGNOSIS — I12.9 HYPERTENSIVE KIDNEY DISEASE WITH STAGE 4 CHRONIC KIDNEY DISEASE: ICD-10-CM

## 2021-06-24 PROCEDURE — 1126F AMNT PAIN NOTED NONE PRSNT: CPT | Mod: S$GLB,,, | Performed by: FAMILY MEDICINE

## 2021-06-24 PROCEDURE — 3288F FALL RISK ASSESSMENT DOCD: CPT | Mod: CPTII,S$GLB,, | Performed by: FAMILY MEDICINE

## 2021-06-24 PROCEDURE — 99214 OFFICE O/P EST MOD 30 MIN: CPT | Mod: S$GLB,,, | Performed by: FAMILY MEDICINE

## 2021-06-24 PROCEDURE — 1126F PR PAIN SEVERITY QUANTIFIED, NO PAIN PRESENT: ICD-10-PCS | Mod: S$GLB,,, | Performed by: FAMILY MEDICINE

## 2021-06-24 PROCEDURE — 99214 PR OFFICE/OUTPT VISIT, EST, LEVL IV, 30-39 MIN: ICD-10-PCS | Mod: S$GLB,,, | Performed by: FAMILY MEDICINE

## 2021-06-24 PROCEDURE — 1159F MED LIST DOCD IN RCRD: CPT | Mod: S$GLB,,, | Performed by: FAMILY MEDICINE

## 2021-06-24 PROCEDURE — 36415 COLL VENOUS BLD VENIPUNCTURE: CPT | Mod: PO | Performed by: INTERNAL MEDICINE

## 2021-06-24 PROCEDURE — 1101F PR PT FALLS ASSESS DOC 0-1 FALLS W/OUT INJ PAST YR: ICD-10-PCS | Mod: CPTII,S$GLB,, | Performed by: FAMILY MEDICINE

## 2021-06-24 PROCEDURE — 3008F PR BODY MASS INDEX (BMI) DOCUMENTED: ICD-10-PCS | Mod: CPTII,S$GLB,, | Performed by: FAMILY MEDICINE

## 2021-06-24 PROCEDURE — 3008F BODY MASS INDEX DOCD: CPT | Mod: CPTII,S$GLB,, | Performed by: FAMILY MEDICINE

## 2021-06-24 PROCEDURE — 1159F PR MEDICATION LIST DOCUMENTED IN MEDICAL RECORD: ICD-10-PCS | Mod: S$GLB,,, | Performed by: FAMILY MEDICINE

## 2021-06-24 PROCEDURE — 80195 ASSAY OF SIROLIMUS: CPT | Mod: PO | Performed by: INTERNAL MEDICINE

## 2021-06-24 PROCEDURE — 3288F PR FALLS RISK ASSESSMENT DOCUMENTED: ICD-10-PCS | Mod: CPTII,S$GLB,, | Performed by: FAMILY MEDICINE

## 2021-06-24 PROCEDURE — 1101F PT FALLS ASSESS-DOCD LE1/YR: CPT | Mod: CPTII,S$GLB,, | Performed by: FAMILY MEDICINE

## 2021-06-24 RX ORDER — ERGOCALCIFEROL 1.25 MG/1
50000 CAPSULE ORAL
Qty: 24 CAPSULE | Refills: 3 | Status: SHIPPED | OUTPATIENT
Start: 2021-06-24 | End: 2021-11-10

## 2021-06-25 LAB — SIROLIMUS BLD-MCNC: 4.1 NG/ML (ref 4–20)

## 2021-06-26 PROBLEM — D84.9 IMMUNOSUPPRESSION: Status: ACTIVE | Noted: 2021-06-26

## 2021-06-28 ENCOUNTER — TELEPHONE (OUTPATIENT)
Dept: FAMILY MEDICINE | Facility: CLINIC | Age: 71
End: 2021-06-28

## 2021-06-28 DIAGNOSIS — Z94.0 KIDNEY TRANSPLANT RECIPIENT: ICD-10-CM

## 2021-07-01 RX ORDER — SIROLIMUS 1 MG/1
3 TABLET, FILM COATED ORAL DAILY
Qty: 90 TABLET | Refills: 3 | Status: SHIPPED | OUTPATIENT
Start: 2021-07-01 | End: 2021-12-08 | Stop reason: SDUPTHER

## 2021-07-07 ENCOUNTER — PATIENT MESSAGE (OUTPATIENT)
Dept: ADMINISTRATIVE | Facility: HOSPITAL | Age: 71
End: 2021-07-07

## 2021-07-07 ENCOUNTER — OFFICE VISIT (OUTPATIENT)
Dept: CARDIOLOGY | Facility: CLINIC | Age: 71
End: 2021-07-07
Payer: MEDICARE

## 2021-07-07 VITALS
SYSTOLIC BLOOD PRESSURE: 105 MMHG | BODY MASS INDEX: 32.93 KG/M2 | WEIGHT: 230 LBS | DIASTOLIC BLOOD PRESSURE: 71 MMHG | HEIGHT: 70 IN | HEART RATE: 59 BPM

## 2021-07-07 DIAGNOSIS — Z86.16 HISTORY OF COVID-19: ICD-10-CM

## 2021-07-07 DIAGNOSIS — E78.2 MIXED HYPERLIPIDEMIA: ICD-10-CM

## 2021-07-07 DIAGNOSIS — Z86.718 HISTORY OF DVT (DEEP VEIN THROMBOSIS): ICD-10-CM

## 2021-07-07 DIAGNOSIS — I70.0 ATHEROSCLEROSIS OF AORTA: ICD-10-CM

## 2021-07-07 DIAGNOSIS — I87.2 VENOUS INSUFFICIENCY OF LOWER EXTREMITY, UNSPECIFIED LATERALITY: Primary | ICD-10-CM

## 2021-07-07 DIAGNOSIS — Z79.899 IMMUNOSUPPRESSIVE MANAGEMENT ENCOUNTER FOLLOWING KIDNEY TRANSPLANT: ICD-10-CM

## 2021-07-07 DIAGNOSIS — Z79.01 ANTICOAGULANT LONG-TERM USE: ICD-10-CM

## 2021-07-07 DIAGNOSIS — I12.9 HYPERTENSIVE KIDNEY DISEASE WITH STAGE 4 CHRONIC KIDNEY DISEASE: ICD-10-CM

## 2021-07-07 DIAGNOSIS — Z94.0 IMMUNOSUPPRESSIVE MANAGEMENT ENCOUNTER FOLLOWING KIDNEY TRANSPLANT: ICD-10-CM

## 2021-07-07 DIAGNOSIS — N18.4 HYPERTENSIVE KIDNEY DISEASE WITH STAGE 4 CHRONIC KIDNEY DISEASE: ICD-10-CM

## 2021-07-07 DIAGNOSIS — E66.9 OBESITY (BMI 30-39.9): ICD-10-CM

## 2021-07-07 DIAGNOSIS — I10 ESSENTIAL HYPERTENSION: ICD-10-CM

## 2021-07-07 PROCEDURE — 1101F PR PT FALLS ASSESS DOC 0-1 FALLS W/OUT INJ PAST YR: ICD-10-PCS | Mod: CPTII,S$GLB,, | Performed by: INTERNAL MEDICINE

## 2021-07-07 PROCEDURE — 3008F BODY MASS INDEX DOCD: CPT | Mod: CPTII,S$GLB,, | Performed by: INTERNAL MEDICINE

## 2021-07-07 PROCEDURE — 99999 PR PBB SHADOW E&M-EST. PATIENT-LVL IV: CPT | Mod: PBBFAC,,, | Performed by: INTERNAL MEDICINE

## 2021-07-07 PROCEDURE — 1159F PR MEDICATION LIST DOCUMENTED IN MEDICAL RECORD: ICD-10-PCS | Mod: S$GLB,,, | Performed by: INTERNAL MEDICINE

## 2021-07-07 PROCEDURE — 3008F PR BODY MASS INDEX (BMI) DOCUMENTED: ICD-10-PCS | Mod: CPTII,S$GLB,, | Performed by: INTERNAL MEDICINE

## 2021-07-07 PROCEDURE — 1101F PT FALLS ASSESS-DOCD LE1/YR: CPT | Mod: CPTII,S$GLB,, | Performed by: INTERNAL MEDICINE

## 2021-07-07 PROCEDURE — 99215 PR OFFICE/OUTPT VISIT, EST, LEVL V, 40-54 MIN: ICD-10-PCS | Mod: S$GLB,,, | Performed by: INTERNAL MEDICINE

## 2021-07-07 PROCEDURE — 99499 RISK ADDL DX/OHS AUDIT: ICD-10-PCS | Mod: HCNC,S$GLB,, | Performed by: INTERNAL MEDICINE

## 2021-07-07 PROCEDURE — 99499 UNLISTED E&M SERVICE: CPT | Mod: HCNC,S$GLB,, | Performed by: INTERNAL MEDICINE

## 2021-07-07 PROCEDURE — 3288F PR FALLS RISK ASSESSMENT DOCUMENTED: ICD-10-PCS | Mod: CPTII,S$GLB,, | Performed by: INTERNAL MEDICINE

## 2021-07-07 PROCEDURE — 1126F AMNT PAIN NOTED NONE PRSNT: CPT | Mod: S$GLB,,, | Performed by: INTERNAL MEDICINE

## 2021-07-07 PROCEDURE — 3288F FALL RISK ASSESSMENT DOCD: CPT | Mod: CPTII,S$GLB,, | Performed by: INTERNAL MEDICINE

## 2021-07-07 PROCEDURE — 1159F MED LIST DOCD IN RCRD: CPT | Mod: S$GLB,,, | Performed by: INTERNAL MEDICINE

## 2021-07-07 PROCEDURE — 99215 OFFICE O/P EST HI 40 MIN: CPT | Mod: S$GLB,,, | Performed by: INTERNAL MEDICINE

## 2021-07-07 PROCEDURE — 99999 PR PBB SHADOW E&M-EST. PATIENT-LVL IV: ICD-10-PCS | Mod: PBBFAC,,, | Performed by: INTERNAL MEDICINE

## 2021-07-07 PROCEDURE — 1126F PR PAIN SEVERITY QUANTIFIED, NO PAIN PRESENT: ICD-10-PCS | Mod: S$GLB,,, | Performed by: INTERNAL MEDICINE

## 2021-07-07 RX ORDER — METOPROLOL SUCCINATE 50 MG/1
50 TABLET, EXTENDED RELEASE ORAL DAILY
Qty: 90 TABLET | Refills: 3 | Status: SHIPPED | OUTPATIENT
Start: 2021-07-07 | End: 2021-11-10

## 2021-07-09 ENCOUNTER — PATIENT OUTREACH (OUTPATIENT)
Dept: ADMINISTRATIVE | Facility: HOSPITAL | Age: 71
End: 2021-07-09

## 2021-07-19 ENCOUNTER — OUTPATIENT CASE MANAGEMENT (OUTPATIENT)
Dept: ADMINISTRATIVE | Facility: OTHER | Age: 71
End: 2021-07-19

## 2021-07-19 ENCOUNTER — OFFICE VISIT (OUTPATIENT)
Dept: OTOLARYNGOLOGY | Facility: CLINIC | Age: 71
End: 2021-07-19
Payer: MEDICARE

## 2021-07-19 VITALS
HEART RATE: 53 BPM | BODY MASS INDEX: 33.12 KG/M2 | SYSTOLIC BLOOD PRESSURE: 152 MMHG | WEIGHT: 230.81 LBS | DIASTOLIC BLOOD PRESSURE: 92 MMHG

## 2021-07-19 DIAGNOSIS — J38.7 CHONDRONECROSIS OF LARYNX: ICD-10-CM

## 2021-07-19 DIAGNOSIS — C32.9 LARYNX CANCER: Primary | ICD-10-CM

## 2021-07-19 PROCEDURE — 31575 PR LARYNGOSCOPY, FLEXIBLE; DIAGNOSTIC: ICD-10-PCS | Mod: S$GLB,,, | Performed by: OTOLARYNGOLOGY

## 2021-07-19 PROCEDURE — 99999 PR PBB SHADOW E&M-EST. PATIENT-LVL III: ICD-10-PCS | Mod: PBBFAC,,, | Performed by: OTOLARYNGOLOGY

## 2021-07-19 PROCEDURE — 1159F PR MEDICATION LIST DOCUMENTED IN MEDICAL RECORD: ICD-10-PCS | Mod: CPTII,S$GLB,, | Performed by: OTOLARYNGOLOGY

## 2021-07-19 PROCEDURE — 31575 DIAGNOSTIC LARYNGOSCOPY: CPT | Mod: S$GLB,,, | Performed by: OTOLARYNGOLOGY

## 2021-07-19 PROCEDURE — 1126F AMNT PAIN NOTED NONE PRSNT: CPT | Mod: CPTII,S$GLB,, | Performed by: OTOLARYNGOLOGY

## 2021-07-19 PROCEDURE — 3288F FALL RISK ASSESSMENT DOCD: CPT | Mod: CPTII,S$GLB,, | Performed by: OTOLARYNGOLOGY

## 2021-07-19 PROCEDURE — 3008F PR BODY MASS INDEX (BMI) DOCUMENTED: ICD-10-PCS | Mod: CPTII,S$GLB,, | Performed by: OTOLARYNGOLOGY

## 2021-07-19 PROCEDURE — 3008F BODY MASS INDEX DOCD: CPT | Mod: CPTII,S$GLB,, | Performed by: OTOLARYNGOLOGY

## 2021-07-19 PROCEDURE — 99999 PR PBB SHADOW E&M-EST. PATIENT-LVL III: CPT | Mod: PBBFAC,,, | Performed by: OTOLARYNGOLOGY

## 2021-07-19 PROCEDURE — 3288F PR FALLS RISK ASSESSMENT DOCUMENTED: ICD-10-PCS | Mod: CPTII,S$GLB,, | Performed by: OTOLARYNGOLOGY

## 2021-07-19 PROCEDURE — 1159F MED LIST DOCD IN RCRD: CPT | Mod: CPTII,S$GLB,, | Performed by: OTOLARYNGOLOGY

## 2021-07-19 PROCEDURE — 1101F PR PT FALLS ASSESS DOC 0-1 FALLS W/OUT INJ PAST YR: ICD-10-PCS | Mod: CPTII,S$GLB,, | Performed by: OTOLARYNGOLOGY

## 2021-07-19 PROCEDURE — 1126F PR PAIN SEVERITY QUANTIFIED, NO PAIN PRESENT: ICD-10-PCS | Mod: CPTII,S$GLB,, | Performed by: OTOLARYNGOLOGY

## 2021-07-19 PROCEDURE — 99213 OFFICE O/P EST LOW 20 MIN: CPT | Mod: 25,S$GLB,, | Performed by: OTOLARYNGOLOGY

## 2021-07-19 PROCEDURE — 1101F PT FALLS ASSESS-DOCD LE1/YR: CPT | Mod: CPTII,S$GLB,, | Performed by: OTOLARYNGOLOGY

## 2021-07-19 PROCEDURE — 99213 PR OFFICE/OUTPT VISIT, EST, LEVL III, 20-29 MIN: ICD-10-PCS | Mod: 25,S$GLB,, | Performed by: OTOLARYNGOLOGY

## 2021-07-20 ENCOUNTER — PATIENT MESSAGE (OUTPATIENT)
Dept: ADMINISTRATIVE | Facility: HOSPITAL | Age: 71
End: 2021-07-20

## 2021-07-20 ENCOUNTER — PATIENT OUTREACH (OUTPATIENT)
Dept: ADMINISTRATIVE | Facility: HOSPITAL | Age: 71
End: 2021-07-20

## 2021-07-21 ENCOUNTER — HOSPITAL ENCOUNTER (OUTPATIENT)
Dept: RADIOLOGY | Facility: HOSPITAL | Age: 71
Discharge: HOME | End: 2021-07-21
Attending: INTERNAL MEDICINE
Payer: MEDICARE

## 2021-07-21 DIAGNOSIS — I87.2 VENOUS INSUFFICIENCY OF LOWER EXTREMITY, UNSPECIFIED LATERALITY: ICD-10-CM

## 2021-07-28 ENCOUNTER — LAB VISIT (OUTPATIENT)
Dept: LAB | Facility: HOSPITAL | Age: 71
End: 2021-07-28
Attending: FAMILY MEDICINE
Payer: MEDICARE

## 2021-07-28 DIAGNOSIS — Z12.11 COLON CANCER SCREENING: ICD-10-CM

## 2021-07-28 PROCEDURE — 82274 ASSAY TEST FOR BLOOD FECAL: CPT | Performed by: FAMILY MEDICINE

## 2021-08-10 LAB — HEMOCCULT STL QL IA: NEGATIVE

## 2021-08-18 ENCOUNTER — CLINICAL SUPPORT (OUTPATIENT)
Dept: SPEECH THERAPY | Facility: HOSPITAL | Age: 71
End: 2021-08-18
Attending: FAMILY MEDICINE
Payer: MEDICARE

## 2021-08-18 ENCOUNTER — HOSPITAL ENCOUNTER (OUTPATIENT)
Dept: RADIOLOGY | Facility: HOSPITAL | Age: 71
Discharge: HOME | End: 2021-08-18
Attending: FAMILY MEDICINE
Payer: MEDICARE

## 2021-08-18 DIAGNOSIS — K22.2 ESOPHAGEAL OBSTRUCTION: ICD-10-CM

## 2021-08-18 DIAGNOSIS — Z90.02 DYSPHAGIA DUE TO LARYNGECTOMY: ICD-10-CM

## 2021-08-18 DIAGNOSIS — R13.19 DYSPHAGIA DUE TO LARYNGECTOMY: ICD-10-CM

## 2021-08-18 PROCEDURE — A9698 NON-RAD CONTRAST MATERIALNOC: HCPCS | Performed by: FAMILY MEDICINE

## 2021-08-18 PROCEDURE — 97535 SELF CARE MNGMENT TRAINING: CPT

## 2021-08-18 PROCEDURE — 74230 FL MODIFIED BARIUM SWALLOW SPEECH STUDY: ICD-10-PCS | Mod: 26,,, | Performed by: RADIOLOGY

## 2021-08-18 PROCEDURE — 74230 X-RAY XM SWLNG FUNCJ C+: CPT | Mod: TC

## 2021-08-18 PROCEDURE — 25500020 PHARM REV CODE 255: Performed by: FAMILY MEDICINE

## 2021-08-18 PROCEDURE — 92611 MOTION FLUOROSCOPY/SWALLOW: CPT

## 2021-08-18 PROCEDURE — 74230 X-RAY XM SWLNG FUNCJ C+: CPT | Mod: 26,,, | Performed by: RADIOLOGY

## 2021-08-18 RX ADMIN — BARIUM SULFATE 60 ML: 0.81 POWDER, FOR SUSPENSION ORAL at 10:08

## 2021-08-26 ENCOUNTER — TELEPHONE (OUTPATIENT)
Dept: FAMILY MEDICINE | Facility: CLINIC | Age: 71
End: 2021-08-26

## 2021-08-26 ENCOUNTER — TELEPHONE (OUTPATIENT)
Dept: PULMONOLOGY | Facility: CLINIC | Age: 71
End: 2021-08-26

## 2021-08-26 ENCOUNTER — OFFICE VISIT (OUTPATIENT)
Dept: FAMILY MEDICINE | Facility: CLINIC | Age: 71
End: 2021-08-26
Payer: MEDICARE

## 2021-08-26 VITALS
WEIGHT: 217.69 LBS | DIASTOLIC BLOOD PRESSURE: 80 MMHG | TEMPERATURE: 97 F | HEART RATE: 63 BPM | SYSTOLIC BLOOD PRESSURE: 122 MMHG | HEIGHT: 70 IN | BODY MASS INDEX: 31.16 KG/M2 | OXYGEN SATURATION: 98 %

## 2021-08-26 DIAGNOSIS — K92.1 BLACK STOOL: ICD-10-CM

## 2021-08-26 DIAGNOSIS — R04.2 COUGHING UP BLOOD: Primary | ICD-10-CM

## 2021-08-26 PROCEDURE — 3288F FALL RISK ASSESSMENT DOCD: CPT | Mod: CPTII,S$GLB,, | Performed by: STUDENT IN AN ORGANIZED HEALTH CARE EDUCATION/TRAINING PROGRAM

## 2021-08-26 PROCEDURE — 3079F DIAST BP 80-89 MM HG: CPT | Mod: CPTII,S$GLB,, | Performed by: STUDENT IN AN ORGANIZED HEALTH CARE EDUCATION/TRAINING PROGRAM

## 2021-08-26 PROCEDURE — 3079F PR MOST RECENT DIASTOLIC BLOOD PRESSURE 80-89 MM HG: ICD-10-PCS | Mod: CPTII,S$GLB,, | Performed by: STUDENT IN AN ORGANIZED HEALTH CARE EDUCATION/TRAINING PROGRAM

## 2021-08-26 PROCEDURE — 3074F PR MOST RECENT SYSTOLIC BLOOD PRESSURE < 130 MM HG: ICD-10-PCS | Mod: CPTII,S$GLB,, | Performed by: STUDENT IN AN ORGANIZED HEALTH CARE EDUCATION/TRAINING PROGRAM

## 2021-08-26 PROCEDURE — 1160F RVW MEDS BY RX/DR IN RCRD: CPT | Mod: CPTII,S$GLB,, | Performed by: STUDENT IN AN ORGANIZED HEALTH CARE EDUCATION/TRAINING PROGRAM

## 2021-08-26 PROCEDURE — 99214 OFFICE O/P EST MOD 30 MIN: CPT | Mod: S$GLB,,, | Performed by: STUDENT IN AN ORGANIZED HEALTH CARE EDUCATION/TRAINING PROGRAM

## 2021-08-26 PROCEDURE — 1101F PT FALLS ASSESS-DOCD LE1/YR: CPT | Mod: CPTII,S$GLB,, | Performed by: STUDENT IN AN ORGANIZED HEALTH CARE EDUCATION/TRAINING PROGRAM

## 2021-08-26 PROCEDURE — 1126F AMNT PAIN NOTED NONE PRSNT: CPT | Mod: CPTII,S$GLB,, | Performed by: STUDENT IN AN ORGANIZED HEALTH CARE EDUCATION/TRAINING PROGRAM

## 2021-08-26 PROCEDURE — 99214 PR OFFICE/OUTPT VISIT, EST, LEVL IV, 30-39 MIN: ICD-10-PCS | Mod: S$GLB,,, | Performed by: STUDENT IN AN ORGANIZED HEALTH CARE EDUCATION/TRAINING PROGRAM

## 2021-08-26 PROCEDURE — 3008F PR BODY MASS INDEX (BMI) DOCUMENTED: ICD-10-PCS | Mod: CPTII,S$GLB,, | Performed by: STUDENT IN AN ORGANIZED HEALTH CARE EDUCATION/TRAINING PROGRAM

## 2021-08-26 PROCEDURE — 3008F BODY MASS INDEX DOCD: CPT | Mod: CPTII,S$GLB,, | Performed by: STUDENT IN AN ORGANIZED HEALTH CARE EDUCATION/TRAINING PROGRAM

## 2021-08-26 PROCEDURE — 1159F PR MEDICATION LIST DOCUMENTED IN MEDICAL RECORD: ICD-10-PCS | Mod: CPTII,S$GLB,, | Performed by: STUDENT IN AN ORGANIZED HEALTH CARE EDUCATION/TRAINING PROGRAM

## 2021-08-26 PROCEDURE — 1126F PR PAIN SEVERITY QUANTIFIED, NO PAIN PRESENT: ICD-10-PCS | Mod: CPTII,S$GLB,, | Performed by: STUDENT IN AN ORGANIZED HEALTH CARE EDUCATION/TRAINING PROGRAM

## 2021-08-26 PROCEDURE — 1159F MED LIST DOCD IN RCRD: CPT | Mod: CPTII,S$GLB,, | Performed by: STUDENT IN AN ORGANIZED HEALTH CARE EDUCATION/TRAINING PROGRAM

## 2021-08-26 PROCEDURE — 3288F PR FALLS RISK ASSESSMENT DOCUMENTED: ICD-10-PCS | Mod: CPTII,S$GLB,, | Performed by: STUDENT IN AN ORGANIZED HEALTH CARE EDUCATION/TRAINING PROGRAM

## 2021-08-26 PROCEDURE — 3074F SYST BP LT 130 MM HG: CPT | Mod: CPTII,S$GLB,, | Performed by: STUDENT IN AN ORGANIZED HEALTH CARE EDUCATION/TRAINING PROGRAM

## 2021-08-26 PROCEDURE — 1160F PR REVIEW ALL MEDS BY PRESCRIBER/CLIN PHARMACIST DOCUMENTED: ICD-10-PCS | Mod: CPTII,S$GLB,, | Performed by: STUDENT IN AN ORGANIZED HEALTH CARE EDUCATION/TRAINING PROGRAM

## 2021-08-26 PROCEDURE — 1101F PR PT FALLS ASSESS DOC 0-1 FALLS W/OUT INJ PAST YR: ICD-10-PCS | Mod: CPTII,S$GLB,, | Performed by: STUDENT IN AN ORGANIZED HEALTH CARE EDUCATION/TRAINING PROGRAM

## 2021-09-14 ENCOUNTER — OUTPATIENT CASE MANAGEMENT (OUTPATIENT)
Dept: ADMINISTRATIVE | Facility: OTHER | Age: 71
End: 2021-09-14

## 2021-09-14 ENCOUNTER — PATIENT MESSAGE (OUTPATIENT)
Dept: ADMINISTRATIVE | Facility: OTHER | Age: 71
End: 2021-09-14

## 2021-09-17 ENCOUNTER — LAB VISIT (OUTPATIENT)
Dept: LAB | Facility: HOSPITAL | Age: 71
End: 2021-09-17
Attending: FAMILY MEDICINE
Payer: MEDICARE

## 2021-09-17 DIAGNOSIS — R73.9 HYPERGLYCEMIA: ICD-10-CM

## 2021-09-17 LAB
ESTIMATED AVG GLUCOSE: 120 MG/DL (ref 68–131)
HBA1C MFR BLD: 5.8 % (ref 4–5.6)

## 2021-09-17 PROCEDURE — 36415 COLL VENOUS BLD VENIPUNCTURE: CPT | Mod: HCNC,PO | Performed by: FAMILY MEDICINE

## 2021-09-17 PROCEDURE — 83036 HEMOGLOBIN GLYCOSYLATED A1C: CPT | Mod: HCNC | Performed by: FAMILY MEDICINE

## 2021-09-24 ENCOUNTER — LAB VISIT (OUTPATIENT)
Dept: LAB | Facility: HOSPITAL | Age: 71
End: 2021-09-24
Attending: INTERNAL MEDICINE
Payer: MEDICARE

## 2021-09-24 DIAGNOSIS — Z94.0 RENAL TRANSPLANT RECIPIENT: ICD-10-CM

## 2021-09-24 DIAGNOSIS — Z94.0 IMMUNOSUPPRESSIVE MANAGEMENT ENCOUNTER FOLLOWING KIDNEY TRANSPLANT: ICD-10-CM

## 2021-09-24 DIAGNOSIS — R35.1 BENIGN PROSTATIC HYPERPLASIA WITH NOCTURIA: ICD-10-CM

## 2021-09-24 DIAGNOSIS — I12.9 HYPERTENSIVE KIDNEY DISEASE WITH STAGE 4 CHRONIC KIDNEY DISEASE: ICD-10-CM

## 2021-09-24 DIAGNOSIS — E66.9 OBESITY (BMI 30-39.9): ICD-10-CM

## 2021-09-24 DIAGNOSIS — Z79.52 IMMUNOSUPPRESSION DUE TO CHRONIC STEROID USE: ICD-10-CM

## 2021-09-24 DIAGNOSIS — E83.39 HYPERPHOSPHATEMIA: ICD-10-CM

## 2021-09-24 DIAGNOSIS — E83.52 HYPERCALCEMIA: ICD-10-CM

## 2021-09-24 DIAGNOSIS — Z86.16 HISTORY OF COVID-19: ICD-10-CM

## 2021-09-24 DIAGNOSIS — T38.0X5A IMMUNOSUPPRESSION DUE TO CHRONIC STEROID USE: ICD-10-CM

## 2021-09-24 DIAGNOSIS — Z79.899 IMMUNOSUPPRESSIVE MANAGEMENT ENCOUNTER FOLLOWING KIDNEY TRANSPLANT: ICD-10-CM

## 2021-09-24 DIAGNOSIS — N25.81 SECONDARY HYPERPARATHYROIDISM OF RENAL ORIGIN: ICD-10-CM

## 2021-09-24 DIAGNOSIS — E87.20 METABOLIC ACIDOSIS: ICD-10-CM

## 2021-09-24 DIAGNOSIS — N18.4 HYPERTENSIVE KIDNEY DISEASE WITH STAGE 4 CHRONIC KIDNEY DISEASE: ICD-10-CM

## 2021-09-24 DIAGNOSIS — D84.821 IMMUNOSUPPRESSION DUE TO CHRONIC STEROID USE: ICD-10-CM

## 2021-09-24 DIAGNOSIS — E55.9 VITAMIN D DEFICIENCY DISEASE: ICD-10-CM

## 2021-09-24 DIAGNOSIS — N40.1 BENIGN PROSTATIC HYPERPLASIA WITH NOCTURIA: ICD-10-CM

## 2021-09-24 LAB
25(OH)D3+25(OH)D2 SERPL-MCNC: 24 NG/ML (ref 30–96)
ALBUMIN SERPL BCP-MCNC: 4.1 G/DL (ref 3.5–5.2)
ANION GAP SERPL CALC-SCNC: 11 MMOL/L (ref 8–16)
BASOPHILS # BLD AUTO: 0.02 K/UL (ref 0–0.2)
BASOPHILS NFR BLD: 0.3 % (ref 0–1.9)
CALCIUM SERPL-MCNC: 10.2 MG/DL (ref 8.7–10.5)
CHLORIDE SERPL-SCNC: 111 MMOL/L (ref 95–110)
CO2 SERPL-SCNC: 22 MMOL/L (ref 23–29)
CREAT SERPL-MCNC: 3.07 MG/DL (ref 0.5–1.4)
DIFFERENTIAL METHOD: ABNORMAL
EOSINOPHIL # BLD AUTO: 0.1 K/UL (ref 0–0.5)
EOSINOPHIL NFR BLD: 1.7 % (ref 0–8)
ERYTHROCYTE [DISTWIDTH] IN BLOOD BY AUTOMATED COUNT: 16.7 % (ref 11.5–14.5)
EST. GFR  (AFRICAN AMERICAN): 22.5 ML/MIN/1.73 M^2
EST. GFR  (NON AFRICAN AMERICAN): 19.4 ML/MIN/1.73 M^2
GLUCOSE SERPL-MCNC: 85 MG/DL (ref 70–110)
HCT VFR BLD AUTO: 37.8 % (ref 40–54)
HGB BLD-MCNC: 11.3 G/DL (ref 14–18)
IMM GRANULOCYTES # BLD AUTO: 0.04 K/UL (ref 0–0.04)
IMM GRANULOCYTES NFR BLD AUTO: 0.7 % (ref 0–0.5)
LYMPHOCYTES # BLD AUTO: 1.3 K/UL (ref 1–4.8)
LYMPHOCYTES NFR BLD: 23 % (ref 18–48)
MCH RBC QN AUTO: 25.9 PG (ref 27–31)
MCHC RBC AUTO-ENTMCNC: 29.9 G/DL (ref 32–36)
MCV RBC AUTO: 87 FL (ref 82–98)
MONOCYTES # BLD AUTO: 0.6 K/UL (ref 0.3–1)
MONOCYTES NFR BLD: 10.5 % (ref 4–15)
NEUTROPHILS # BLD AUTO: 3.7 K/UL (ref 1.8–7.7)
NEUTROPHILS NFR BLD: 63.8 % (ref 38–73)
NRBC BLD-RTO: 0 /100 WBC
PHOSPHATE SERPL-MCNC: 3.4 MG/DL (ref 2.7–4.5)
PLATELET # BLD AUTO: 173 K/UL (ref 150–450)
PMV BLD AUTO: 11.4 FL (ref 9.2–12.9)
POTASSIUM SERPL-SCNC: 5.2 MMOL/L (ref 3.5–5.1)
PTH-INTACT SERPL-MCNC: 317.1 PG/ML (ref 9–77)
RBC # BLD AUTO: 4.36 M/UL (ref 4.6–6.2)
SODIUM SERPL-SCNC: 144 MMOL/L (ref 136–145)
UUN UR-MCNC: 36 MG/DL (ref 2–20)
WBC # BLD AUTO: 5.79 K/UL (ref 3.9–12.7)

## 2021-09-24 PROCEDURE — 80069 RENAL FUNCTION PANEL: CPT | Mod: HCNC,PO | Performed by: INTERNAL MEDICINE

## 2021-09-24 PROCEDURE — 85025 COMPLETE CBC W/AUTO DIFF WBC: CPT | Mod: HCNC,PO | Performed by: INTERNAL MEDICINE

## 2021-09-24 PROCEDURE — 36415 COLL VENOUS BLD VENIPUNCTURE: CPT | Mod: HCNC,PO | Performed by: INTERNAL MEDICINE

## 2021-09-24 PROCEDURE — 82306 VITAMIN D 25 HYDROXY: CPT | Mod: HCNC,PO | Performed by: INTERNAL MEDICINE

## 2021-09-24 PROCEDURE — 80195 ASSAY OF SIROLIMUS: CPT | Mod: HCNC,PO | Performed by: INTERNAL MEDICINE

## 2021-09-24 PROCEDURE — 83970 ASSAY OF PARATHORMONE: CPT | Mod: HCNC,PO | Performed by: INTERNAL MEDICINE

## 2021-09-25 LAB — SIROLIMUS BLD-MCNC: 3 NG/ML (ref 4–20)

## 2021-09-26 ENCOUNTER — PATIENT MESSAGE (OUTPATIENT)
Dept: FAMILY MEDICINE | Facility: CLINIC | Age: 71
End: 2021-09-26

## 2021-09-27 ENCOUNTER — TELEPHONE (OUTPATIENT)
Dept: NEPHROLOGY | Facility: CLINIC | Age: 71
End: 2021-09-27

## 2021-09-27 DIAGNOSIS — N18.4 CKD (CHRONIC KIDNEY DISEASE) STAGE 4, GFR 15-29 ML/MIN: Primary | ICD-10-CM

## 2021-09-28 ENCOUNTER — PATIENT OUTREACH (OUTPATIENT)
Dept: ADMINISTRATIVE | Facility: OTHER | Age: 71
End: 2021-09-28

## 2021-09-30 ENCOUNTER — OUTPATIENT CASE MANAGEMENT (OUTPATIENT)
Dept: ADMINISTRATIVE | Facility: OTHER | Age: 71
End: 2021-09-30
Payer: MEDICARE

## 2021-10-11 DIAGNOSIS — Z94.0 RENAL TRANSPLANT RECIPIENT: ICD-10-CM

## 2021-10-11 RX ORDER — PREDNISONE 5 MG/1
5 TABLET ORAL DAILY
Qty: 90 TABLET | Refills: 4 | Status: SHIPPED | OUTPATIENT
Start: 2021-10-11 | End: 2021-11-10

## 2021-10-14 ENCOUNTER — PATIENT MESSAGE (OUTPATIENT)
Dept: ADMINISTRATIVE | Facility: OTHER | Age: 71
End: 2021-10-14
Payer: MEDICARE

## 2021-10-15 ENCOUNTER — OFFICE VISIT (OUTPATIENT)
Dept: FAMILY MEDICINE | Facility: CLINIC | Age: 71
End: 2021-10-15
Payer: MEDICARE

## 2021-10-15 VITALS
HEIGHT: 70 IN | WEIGHT: 223 LBS | SYSTOLIC BLOOD PRESSURE: 120 MMHG | OXYGEN SATURATION: 98 % | TEMPERATURE: 99 F | DIASTOLIC BLOOD PRESSURE: 78 MMHG | BODY MASS INDEX: 31.92 KG/M2 | HEART RATE: 64 BPM

## 2021-10-15 DIAGNOSIS — I10 ESSENTIAL HYPERTENSION: ICD-10-CM

## 2021-10-15 DIAGNOSIS — N18.4 HYPERTENSIVE KIDNEY DISEASE WITH STAGE 4 CHRONIC KIDNEY DISEASE: ICD-10-CM

## 2021-10-15 DIAGNOSIS — Z94.0 TRANSPLANTED KIDNEY: ICD-10-CM

## 2021-10-15 DIAGNOSIS — E78.2 MIXED HYPERLIPIDEMIA: ICD-10-CM

## 2021-10-15 DIAGNOSIS — E55.9 VITAMIN D DEFICIENCY: Primary | ICD-10-CM

## 2021-10-15 DIAGNOSIS — I12.9 HYPERTENSIVE KIDNEY DISEASE WITH STAGE 4 CHRONIC KIDNEY DISEASE: ICD-10-CM

## 2021-10-15 PROCEDURE — 3066F PR DOCUMENTATION OF TREATMENT FOR NEPHROPATHY: ICD-10-PCS | Mod: CPTII,S$GLB,, | Performed by: FAMILY MEDICINE

## 2021-10-15 PROCEDURE — 3288F PR FALLS RISK ASSESSMENT DOCUMENTED: ICD-10-PCS | Mod: CPTII,S$GLB,, | Performed by: FAMILY MEDICINE

## 2021-10-15 PROCEDURE — 3078F PR MOST RECENT DIASTOLIC BLOOD PRESSURE < 80 MM HG: ICD-10-PCS | Mod: CPTII,S$GLB,, | Performed by: FAMILY MEDICINE

## 2021-10-15 PROCEDURE — 99214 PR OFFICE/OUTPT VISIT, EST, LEVL IV, 30-39 MIN: ICD-10-PCS | Mod: S$GLB,,, | Performed by: FAMILY MEDICINE

## 2021-10-15 PROCEDURE — 99214 OFFICE O/P EST MOD 30 MIN: CPT | Mod: S$GLB,,, | Performed by: FAMILY MEDICINE

## 2021-10-15 PROCEDURE — 3074F SYST BP LT 130 MM HG: CPT | Mod: CPTII,S$GLB,, | Performed by: FAMILY MEDICINE

## 2021-10-15 PROCEDURE — 1160F PR REVIEW ALL MEDS BY PRESCRIBER/CLIN PHARMACIST DOCUMENTED: ICD-10-PCS | Mod: CPTII,S$GLB,, | Performed by: FAMILY MEDICINE

## 2021-10-15 PROCEDURE — 1159F MED LIST DOCD IN RCRD: CPT | Mod: CPTII,S$GLB,, | Performed by: FAMILY MEDICINE

## 2021-10-15 PROCEDURE — 3066F NEPHROPATHY DOC TX: CPT | Mod: CPTII,S$GLB,, | Performed by: FAMILY MEDICINE

## 2021-10-15 PROCEDURE — 3078F DIAST BP <80 MM HG: CPT | Mod: CPTII,S$GLB,, | Performed by: FAMILY MEDICINE

## 2021-10-15 PROCEDURE — 3074F PR MOST RECENT SYSTOLIC BLOOD PRESSURE < 130 MM HG: ICD-10-PCS | Mod: CPTII,S$GLB,, | Performed by: FAMILY MEDICINE

## 2021-10-15 PROCEDURE — 1101F PR PT FALLS ASSESS DOC 0-1 FALLS W/OUT INJ PAST YR: ICD-10-PCS | Mod: CPTII,S$GLB,, | Performed by: FAMILY MEDICINE

## 2021-10-15 PROCEDURE — 1160F RVW MEDS BY RX/DR IN RCRD: CPT | Mod: CPTII,S$GLB,, | Performed by: FAMILY MEDICINE

## 2021-10-15 PROCEDURE — 3288F FALL RISK ASSESSMENT DOCD: CPT | Mod: CPTII,S$GLB,, | Performed by: FAMILY MEDICINE

## 2021-10-15 PROCEDURE — 1101F PT FALLS ASSESS-DOCD LE1/YR: CPT | Mod: CPTII,S$GLB,, | Performed by: FAMILY MEDICINE

## 2021-10-15 PROCEDURE — 3044F PR MOST RECENT HEMOGLOBIN A1C LEVEL <7.0%: ICD-10-PCS | Mod: CPTII,S$GLB,, | Performed by: FAMILY MEDICINE

## 2021-10-15 PROCEDURE — 3008F PR BODY MASS INDEX (BMI) DOCUMENTED: ICD-10-PCS | Mod: CPTII,S$GLB,, | Performed by: FAMILY MEDICINE

## 2021-10-15 PROCEDURE — 99499 RISK ADDL DX/OHS AUDIT: ICD-10-PCS | Mod: S$GLB,,, | Performed by: FAMILY MEDICINE

## 2021-10-15 PROCEDURE — 3008F BODY MASS INDEX DOCD: CPT | Mod: CPTII,S$GLB,, | Performed by: FAMILY MEDICINE

## 2021-10-15 PROCEDURE — 99499 UNLISTED E&M SERVICE: CPT | Mod: S$GLB,,, | Performed by: FAMILY MEDICINE

## 2021-10-15 PROCEDURE — 3044F HG A1C LEVEL LT 7.0%: CPT | Mod: CPTII,S$GLB,, | Performed by: FAMILY MEDICINE

## 2021-10-15 PROCEDURE — 1126F AMNT PAIN NOTED NONE PRSNT: CPT | Mod: CPTII,S$GLB,, | Performed by: FAMILY MEDICINE

## 2021-10-15 PROCEDURE — 1159F PR MEDICATION LIST DOCUMENTED IN MEDICAL RECORD: ICD-10-PCS | Mod: CPTII,S$GLB,, | Performed by: FAMILY MEDICINE

## 2021-10-15 PROCEDURE — 1126F PR PAIN SEVERITY QUANTIFIED, NO PAIN PRESENT: ICD-10-PCS | Mod: CPTII,S$GLB,, | Performed by: FAMILY MEDICINE

## 2021-10-18 ENCOUNTER — OFFICE VISIT (OUTPATIENT)
Dept: NEPHROLOGY | Facility: CLINIC | Age: 71
End: 2021-10-18
Payer: MEDICARE

## 2021-10-18 VITALS
SYSTOLIC BLOOD PRESSURE: 140 MMHG | DIASTOLIC BLOOD PRESSURE: 80 MMHG | HEIGHT: 70 IN | WEIGHT: 223.31 LBS | HEART RATE: 60 BPM | OXYGEN SATURATION: 100 % | BODY MASS INDEX: 31.97 KG/M2

## 2021-10-18 DIAGNOSIS — Z79.899 IMMUNOSUPPRESSIVE MANAGEMENT ENCOUNTER FOLLOWING KIDNEY TRANSPLANT: ICD-10-CM

## 2021-10-18 DIAGNOSIS — E83.52 HYPERCALCEMIA: ICD-10-CM

## 2021-10-18 DIAGNOSIS — E87.5 HYPERKALEMIA: ICD-10-CM

## 2021-10-18 DIAGNOSIS — D50.9 IRON DEFICIENCY ANEMIA, UNSPECIFIED IRON DEFICIENCY ANEMIA TYPE: ICD-10-CM

## 2021-10-18 DIAGNOSIS — E87.20 METABOLIC ACIDOSIS: ICD-10-CM

## 2021-10-18 DIAGNOSIS — N40.1 BENIGN PROSTATIC HYPERPLASIA WITH NOCTURIA: ICD-10-CM

## 2021-10-18 DIAGNOSIS — R35.1 BENIGN PROSTATIC HYPERPLASIA WITH NOCTURIA: ICD-10-CM

## 2021-10-18 DIAGNOSIS — N18.4 HYPERTENSIVE KIDNEY DISEASE WITH STAGE 4 CHRONIC KIDNEY DISEASE: ICD-10-CM

## 2021-10-18 DIAGNOSIS — R80.9 PROTEINURIA, UNSPECIFIED TYPE: ICD-10-CM

## 2021-10-18 DIAGNOSIS — N25.81 SECONDARY HYPERPARATHYROIDISM OF RENAL ORIGIN: ICD-10-CM

## 2021-10-18 DIAGNOSIS — Z94.0 IMMUNOSUPPRESSIVE MANAGEMENT ENCOUNTER FOLLOWING KIDNEY TRANSPLANT: ICD-10-CM

## 2021-10-18 DIAGNOSIS — I12.9 HYPERTENSIVE KIDNEY DISEASE WITH STAGE 4 CHRONIC KIDNEY DISEASE: ICD-10-CM

## 2021-10-18 DIAGNOSIS — Z94.0 RENAL TRANSPLANT RECIPIENT: Primary | ICD-10-CM

## 2021-10-18 PROCEDURE — 3288F FALL RISK ASSESSMENT DOCD: CPT | Mod: HCNC,CPTII,S$GLB, | Performed by: INTERNAL MEDICINE

## 2021-10-18 PROCEDURE — 3008F PR BODY MASS INDEX (BMI) DOCUMENTED: ICD-10-PCS | Mod: HCNC,CPTII,S$GLB, | Performed by: INTERNAL MEDICINE

## 2021-10-18 PROCEDURE — 1126F AMNT PAIN NOTED NONE PRSNT: CPT | Mod: HCNC,CPTII,S$GLB, | Performed by: INTERNAL MEDICINE

## 2021-10-18 PROCEDURE — 3066F NEPHROPATHY DOC TX: CPT | Mod: HCNC,CPTII,S$GLB, | Performed by: INTERNAL MEDICINE

## 2021-10-18 PROCEDURE — 3077F PR MOST RECENT SYSTOLIC BLOOD PRESSURE >= 140 MM HG: ICD-10-PCS | Mod: HCNC,CPTII,S$GLB, | Performed by: INTERNAL MEDICINE

## 2021-10-18 PROCEDURE — 1160F PR REVIEW ALL MEDS BY PRESCRIBER/CLIN PHARMACIST DOCUMENTED: ICD-10-PCS | Mod: HCNC,CPTII,S$GLB, | Performed by: INTERNAL MEDICINE

## 2021-10-18 PROCEDURE — 1160F RVW MEDS BY RX/DR IN RCRD: CPT | Mod: HCNC,CPTII,S$GLB, | Performed by: INTERNAL MEDICINE

## 2021-10-18 PROCEDURE — 3044F PR MOST RECENT HEMOGLOBIN A1C LEVEL <7.0%: ICD-10-PCS | Mod: HCNC,CPTII,S$GLB, | Performed by: INTERNAL MEDICINE

## 2021-10-18 PROCEDURE — 3008F BODY MASS INDEX DOCD: CPT | Mod: HCNC,CPTII,S$GLB, | Performed by: INTERNAL MEDICINE

## 2021-10-18 PROCEDURE — 3079F PR MOST RECENT DIASTOLIC BLOOD PRESSURE 80-89 MM HG: ICD-10-PCS | Mod: HCNC,CPTII,S$GLB, | Performed by: INTERNAL MEDICINE

## 2021-10-18 PROCEDURE — 99215 OFFICE O/P EST HI 40 MIN: CPT | Mod: HCNC,S$GLB,, | Performed by: INTERNAL MEDICINE

## 2021-10-18 PROCEDURE — 1126F PR PAIN SEVERITY QUANTIFIED, NO PAIN PRESENT: ICD-10-PCS | Mod: HCNC,CPTII,S$GLB, | Performed by: INTERNAL MEDICINE

## 2021-10-18 PROCEDURE — 3066F PR DOCUMENTATION OF TREATMENT FOR NEPHROPATHY: ICD-10-PCS | Mod: HCNC,CPTII,S$GLB, | Performed by: INTERNAL MEDICINE

## 2021-10-18 PROCEDURE — 3044F HG A1C LEVEL LT 7.0%: CPT | Mod: HCNC,CPTII,S$GLB, | Performed by: INTERNAL MEDICINE

## 2021-10-18 PROCEDURE — 99499 RISK ADDL DX/OHS AUDIT: ICD-10-PCS | Mod: S$GLB,,, | Performed by: INTERNAL MEDICINE

## 2021-10-18 PROCEDURE — 99499 UNLISTED E&M SERVICE: CPT | Mod: S$GLB,,, | Performed by: INTERNAL MEDICINE

## 2021-10-18 PROCEDURE — 3288F PR FALLS RISK ASSESSMENT DOCUMENTED: ICD-10-PCS | Mod: HCNC,CPTII,S$GLB, | Performed by: INTERNAL MEDICINE

## 2021-10-18 PROCEDURE — 3077F SYST BP >= 140 MM HG: CPT | Mod: HCNC,CPTII,S$GLB, | Performed by: INTERNAL MEDICINE

## 2021-10-18 PROCEDURE — 99999 PR PBB SHADOW E&M-EST. PATIENT-LVL IV: CPT | Mod: PBBFAC,HCNC,, | Performed by: INTERNAL MEDICINE

## 2021-10-18 PROCEDURE — 1101F PR PT FALLS ASSESS DOC 0-1 FALLS W/OUT INJ PAST YR: ICD-10-PCS | Mod: HCNC,CPTII,S$GLB, | Performed by: INTERNAL MEDICINE

## 2021-10-18 PROCEDURE — 1101F PT FALLS ASSESS-DOCD LE1/YR: CPT | Mod: HCNC,CPTII,S$GLB, | Performed by: INTERNAL MEDICINE

## 2021-10-18 PROCEDURE — 3079F DIAST BP 80-89 MM HG: CPT | Mod: HCNC,CPTII,S$GLB, | Performed by: INTERNAL MEDICINE

## 2021-10-18 PROCEDURE — 99215 PR OFFICE/OUTPT VISIT, EST, LEVL V, 40-54 MIN: ICD-10-PCS | Mod: HCNC,S$GLB,, | Performed by: INTERNAL MEDICINE

## 2021-10-18 PROCEDURE — 99999 PR PBB SHADOW E&M-EST. PATIENT-LVL IV: ICD-10-PCS | Mod: PBBFAC,HCNC,, | Performed by: INTERNAL MEDICINE

## 2021-10-18 PROCEDURE — 1159F PR MEDICATION LIST DOCUMENTED IN MEDICAL RECORD: ICD-10-PCS | Mod: HCNC,CPTII,S$GLB, | Performed by: INTERNAL MEDICINE

## 2021-10-18 PROCEDURE — 1159F MED LIST DOCD IN RCRD: CPT | Mod: HCNC,CPTII,S$GLB, | Performed by: INTERNAL MEDICINE

## 2021-12-01 ENCOUNTER — LAB VISIT (OUTPATIENT)
Dept: LAB | Facility: HOSPITAL | Age: 71
End: 2021-12-01
Attending: INTERNAL MEDICINE
Payer: MEDICARE

## 2021-12-01 DIAGNOSIS — Z94.0 IMMUNOSUPPRESSIVE MANAGEMENT ENCOUNTER FOLLOWING KIDNEY TRANSPLANT: ICD-10-CM

## 2021-12-01 DIAGNOSIS — N40.1 BENIGN PROSTATIC HYPERPLASIA WITH NOCTURIA: ICD-10-CM

## 2021-12-01 DIAGNOSIS — Z79.899 IMMUNOSUPPRESSIVE MANAGEMENT ENCOUNTER FOLLOWING KIDNEY TRANSPLANT: ICD-10-CM

## 2021-12-01 DIAGNOSIS — Z94.0 RENAL TRANSPLANT RECIPIENT: ICD-10-CM

## 2021-12-01 DIAGNOSIS — I12.9 HYPERTENSIVE KIDNEY DISEASE WITH STAGE 4 CHRONIC KIDNEY DISEASE: ICD-10-CM

## 2021-12-01 DIAGNOSIS — E87.20 METABOLIC ACIDOSIS: ICD-10-CM

## 2021-12-01 DIAGNOSIS — E83.52 HYPERCALCEMIA: ICD-10-CM

## 2021-12-01 DIAGNOSIS — R35.1 BENIGN PROSTATIC HYPERPLASIA WITH NOCTURIA: ICD-10-CM

## 2021-12-01 DIAGNOSIS — N18.4 HYPERTENSIVE KIDNEY DISEASE WITH STAGE 4 CHRONIC KIDNEY DISEASE: ICD-10-CM

## 2021-12-01 DIAGNOSIS — E87.5 HYPERKALEMIA: ICD-10-CM

## 2021-12-01 DIAGNOSIS — N25.81 SECONDARY HYPERPARATHYROIDISM OF RENAL ORIGIN: ICD-10-CM

## 2021-12-01 DIAGNOSIS — N18.4 CKD (CHRONIC KIDNEY DISEASE) STAGE 4, GFR 15-29 ML/MIN: ICD-10-CM

## 2021-12-01 DIAGNOSIS — D50.9 IRON DEFICIENCY ANEMIA, UNSPECIFIED IRON DEFICIENCY ANEMIA TYPE: ICD-10-CM

## 2021-12-01 DIAGNOSIS — R80.9 PROTEINURIA, UNSPECIFIED TYPE: ICD-10-CM

## 2021-12-02 ENCOUNTER — PATIENT OUTREACH (OUTPATIENT)
Dept: ADMINISTRATIVE | Facility: HOSPITAL | Age: 71
End: 2021-12-02
Payer: MEDICARE

## 2021-12-02 ENCOUNTER — TELEPHONE (OUTPATIENT)
Dept: FAMILY MEDICINE | Facility: CLINIC | Age: 71
End: 2021-12-02
Payer: MEDICARE

## 2021-12-08 ENCOUNTER — TELEPHONE (OUTPATIENT)
Dept: NEPHROLOGY | Facility: CLINIC | Age: 71
End: 2021-12-08
Payer: MEDICARE

## 2021-12-08 ENCOUNTER — PATIENT OUTREACH (OUTPATIENT)
Dept: ADMINISTRATIVE | Facility: OTHER | Age: 71
End: 2021-12-08
Payer: MEDICARE

## 2021-12-08 DIAGNOSIS — N18.4 CKD (CHRONIC KIDNEY DISEASE) STAGE 4, GFR 15-29 ML/MIN: Primary | ICD-10-CM

## 2021-12-08 DIAGNOSIS — Z94.0 KIDNEY TRANSPLANT RECIPIENT: ICD-10-CM

## 2021-12-08 DIAGNOSIS — D84.9 IMMUNOSUPPRESSION: Primary | ICD-10-CM

## 2021-12-08 RX ORDER — SIROLIMUS 1 MG/1
3 TABLET, FILM COATED ORAL DAILY
Qty: 90 TABLET | Refills: 3 | Status: SHIPPED | OUTPATIENT
Start: 2021-12-08 | End: 2022-06-08 | Stop reason: SDUPTHER

## 2021-12-09 ENCOUNTER — OFFICE VISIT (OUTPATIENT)
Dept: NEPHROLOGY | Facility: CLINIC | Age: 71
End: 2021-12-09
Payer: MEDICARE

## 2021-12-09 ENCOUNTER — LAB VISIT (OUTPATIENT)
Dept: LAB | Facility: HOSPITAL | Age: 71
End: 2021-12-09
Attending: INTERNAL MEDICINE
Payer: MEDICARE

## 2021-12-09 VITALS
SYSTOLIC BLOOD PRESSURE: 157 MMHG | HEART RATE: 58 BPM | HEIGHT: 70 IN | WEIGHT: 227.94 LBS | BODY MASS INDEX: 32.63 KG/M2 | OXYGEN SATURATION: 99 % | DIASTOLIC BLOOD PRESSURE: 91 MMHG

## 2021-12-09 DIAGNOSIS — N18.4 CKD (CHRONIC KIDNEY DISEASE) STAGE 4, GFR 15-29 ML/MIN: ICD-10-CM

## 2021-12-09 DIAGNOSIS — N18.4 HYPERTENSIVE KIDNEY DISEASE WITH STAGE 4 CHRONIC KIDNEY DISEASE: Primary | ICD-10-CM

## 2021-12-09 DIAGNOSIS — D84.821 IMMUNOSUPPRESSION DUE TO CHRONIC STEROID USE: ICD-10-CM

## 2021-12-09 DIAGNOSIS — Z94.0 RENAL TRANSPLANT RECIPIENT: ICD-10-CM

## 2021-12-09 DIAGNOSIS — E87.20 METABOLIC ACIDOSIS: ICD-10-CM

## 2021-12-09 DIAGNOSIS — N25.81 SECONDARY HYPERPARATHYROIDISM OF RENAL ORIGIN: ICD-10-CM

## 2021-12-09 DIAGNOSIS — R80.9 PROTEINURIA, UNSPECIFIED TYPE: ICD-10-CM

## 2021-12-09 DIAGNOSIS — T38.0X5A IMMUNOSUPPRESSION DUE TO CHRONIC STEROID USE: ICD-10-CM

## 2021-12-09 DIAGNOSIS — E83.52 HYPERCALCEMIA: ICD-10-CM

## 2021-12-09 DIAGNOSIS — Z79.52 IMMUNOSUPPRESSION DUE TO CHRONIC STEROID USE: ICD-10-CM

## 2021-12-09 DIAGNOSIS — Z79.899 IMMUNOSUPPRESSIVE MANAGEMENT ENCOUNTER FOLLOWING KIDNEY TRANSPLANT: ICD-10-CM

## 2021-12-09 DIAGNOSIS — E87.5 HYPERKALEMIA: ICD-10-CM

## 2021-12-09 DIAGNOSIS — E55.9 VITAMIN D DEFICIENCY DISEASE: ICD-10-CM

## 2021-12-09 DIAGNOSIS — D84.9 IMMUNOSUPPRESSION: ICD-10-CM

## 2021-12-09 DIAGNOSIS — Z94.0 IMMUNOSUPPRESSIVE MANAGEMENT ENCOUNTER FOLLOWING KIDNEY TRANSPLANT: ICD-10-CM

## 2021-12-09 DIAGNOSIS — I12.9 HYPERTENSIVE KIDNEY DISEASE WITH STAGE 4 CHRONIC KIDNEY DISEASE: Primary | ICD-10-CM

## 2021-12-09 LAB
25(OH)D3+25(OH)D2 SERPL-MCNC: 26 NG/ML (ref 30–96)
ALBUMIN SERPL BCP-MCNC: 3.2 G/DL (ref 3.5–5.2)
ANION GAP SERPL CALC-SCNC: 9 MMOL/L (ref 8–16)
BASOPHILS # BLD AUTO: 0.02 K/UL (ref 0–0.2)
BASOPHILS NFR BLD: 0.4 % (ref 0–1.9)
BUN SERPL-MCNC: 39 MG/DL (ref 8–23)
CALCIUM SERPL-MCNC: 10.4 MG/DL (ref 8.7–10.5)
CHLORIDE SERPL-SCNC: 112 MMOL/L (ref 95–110)
CO2 SERPL-SCNC: 20 MMOL/L (ref 23–29)
CREAT SERPL-MCNC: 3.2 MG/DL (ref 0.5–1.4)
DIFFERENTIAL METHOD: ABNORMAL
EOSINOPHIL # BLD AUTO: 0.1 K/UL (ref 0–0.5)
EOSINOPHIL NFR BLD: 1.2 % (ref 0–8)
ERYTHROCYTE [DISTWIDTH] IN BLOOD BY AUTOMATED COUNT: 15.2 % (ref 11.5–14.5)
EST. GFR  (AFRICAN AMERICAN): 21.4 ML/MIN/1.73 M^2
EST. GFR  (NON AFRICAN AMERICAN): 18.5 ML/MIN/1.73 M^2
FERRITIN SERPL-MCNC: 676 NG/ML (ref 20–300)
GLUCOSE SERPL-MCNC: 120 MG/DL (ref 70–110)
HCT VFR BLD AUTO: 44.6 % (ref 40–54)
HGB BLD-MCNC: 13.3 G/DL (ref 14–18)
IMM GRANULOCYTES # BLD AUTO: 0.03 K/UL (ref 0–0.04)
IMM GRANULOCYTES NFR BLD AUTO: 0.6 % (ref 0–0.5)
IRON SERPL-MCNC: 50 UG/DL (ref 45–160)
LYMPHOCYTES # BLD AUTO: 0.9 K/UL (ref 1–4.8)
LYMPHOCYTES NFR BLD: 18.8 % (ref 18–48)
MCH RBC QN AUTO: 25.7 PG (ref 27–31)
MCHC RBC AUTO-ENTMCNC: 29.8 G/DL (ref 32–36)
MCV RBC AUTO: 86 FL (ref 82–98)
MONOCYTES # BLD AUTO: 0.4 K/UL (ref 0.3–1)
MONOCYTES NFR BLD: 8.2 % (ref 4–15)
NEUTROPHILS # BLD AUTO: 3.5 K/UL (ref 1.8–7.7)
NEUTROPHILS NFR BLD: 70.8 % (ref 38–73)
NRBC BLD-RTO: 0 /100 WBC
PHOSPHATE SERPL-MCNC: 2.7 MG/DL (ref 2.7–4.5)
PLATELET # BLD AUTO: 116 K/UL (ref 150–450)
PMV BLD AUTO: 10.9 FL (ref 9.2–12.9)
POTASSIUM SERPL-SCNC: 4 MMOL/L (ref 3.5–5.1)
PTH-INTACT SERPL-MCNC: 498.3 PG/ML (ref 9–77)
RBC # BLD AUTO: 5.17 M/UL (ref 4.6–6.2)
SATURATED IRON: 26 % (ref 20–50)
SIROLIMUS BLD-MCNC: 2.7 NG/ML (ref 4–20)
SODIUM SERPL-SCNC: 141 MMOL/L (ref 136–145)
TOTAL IRON BINDING CAPACITY: 194 UG/DL (ref 250–450)
TRANSFERRIN SERPL-MCNC: 131 MG/DL (ref 200–375)
WBC # BLD AUTO: 4.9 K/UL (ref 3.9–12.7)

## 2021-12-09 PROCEDURE — 99999 PR PBB SHADOW E&M-EST. PATIENT-LVL IV: CPT | Mod: PBBFAC,HCNC,, | Performed by: INTERNAL MEDICINE

## 2021-12-09 PROCEDURE — 80195 ASSAY OF SIROLIMUS: CPT | Mod: HCNC | Performed by: INTERNAL MEDICINE

## 2021-12-09 PROCEDURE — 82728 ASSAY OF FERRITIN: CPT | Mod: HCNC | Performed by: INTERNAL MEDICINE

## 2021-12-09 PROCEDURE — 83970 ASSAY OF PARATHORMONE: CPT | Mod: HCNC | Performed by: INTERNAL MEDICINE

## 2021-12-09 PROCEDURE — 99499 UNLISTED E&M SERVICE: CPT | Mod: S$GLB,,, | Performed by: INTERNAL MEDICINE

## 2021-12-09 PROCEDURE — 99214 OFFICE O/P EST MOD 30 MIN: CPT | Mod: HCNC,S$GLB,, | Performed by: INTERNAL MEDICINE

## 2021-12-09 PROCEDURE — 3066F PR DOCUMENTATION OF TREATMENT FOR NEPHROPATHY: ICD-10-PCS | Mod: HCNC,CPTII,S$GLB, | Performed by: INTERNAL MEDICINE

## 2021-12-09 PROCEDURE — 36415 COLL VENOUS BLD VENIPUNCTURE: CPT | Mod: HCNC | Performed by: INTERNAL MEDICINE

## 2021-12-09 PROCEDURE — 82306 VITAMIN D 25 HYDROXY: CPT | Mod: HCNC | Performed by: INTERNAL MEDICINE

## 2021-12-09 PROCEDURE — 99999 PR PBB SHADOW E&M-EST. PATIENT-LVL IV: ICD-10-PCS | Mod: PBBFAC,HCNC,, | Performed by: INTERNAL MEDICINE

## 2021-12-09 PROCEDURE — 99499 RISK ADDL DX/OHS AUDIT: ICD-10-PCS | Mod: S$GLB,,, | Performed by: INTERNAL MEDICINE

## 2021-12-09 PROCEDURE — 99214 PR OFFICE/OUTPT VISIT, EST, LEVL IV, 30-39 MIN: ICD-10-PCS | Mod: HCNC,S$GLB,, | Performed by: INTERNAL MEDICINE

## 2021-12-09 PROCEDURE — 80069 RENAL FUNCTION PANEL: CPT | Mod: HCNC | Performed by: INTERNAL MEDICINE

## 2021-12-09 PROCEDURE — 3066F NEPHROPATHY DOC TX: CPT | Mod: HCNC,CPTII,S$GLB, | Performed by: INTERNAL MEDICINE

## 2021-12-09 PROCEDURE — 85025 COMPLETE CBC W/AUTO DIFF WBC: CPT | Mod: HCNC | Performed by: INTERNAL MEDICINE

## 2021-12-09 PROCEDURE — 84466 ASSAY OF TRANSFERRIN: CPT | Mod: HCNC | Performed by: INTERNAL MEDICINE

## 2021-12-09 RX ORDER — ERGOCALCIFEROL 1.25 MG/1
CAPSULE ORAL
COMMUNITY
Start: 2021-01-05

## 2021-12-09 RX ORDER — SODIUM BICARBONATE 650 MG/1
650 TABLET ORAL 2 TIMES DAILY
Qty: 60 TABLET | Refills: 4
Start: 2021-12-09 | End: 2022-04-19 | Stop reason: SDUPTHER

## 2021-12-16 ENCOUNTER — TELEPHONE (OUTPATIENT)
Dept: NEPHROLOGY | Facility: CLINIC | Age: 71
End: 2021-12-16
Payer: MEDICARE

## 2021-12-17 ENCOUNTER — TELEPHONE (OUTPATIENT)
Dept: NEPHROLOGY | Facility: CLINIC | Age: 71
End: 2021-12-17
Payer: MEDICARE

## 2022-01-07 ENCOUNTER — LAB VISIT (OUTPATIENT)
Dept: PRIMARY CARE CLINIC | Facility: CLINIC | Age: 72
End: 2022-01-07
Payer: MEDICARE

## 2022-01-07 DIAGNOSIS — Z20.822 CONTACT WITH AND (SUSPECTED) EXPOSURE TO COVID-19: ICD-10-CM

## 2022-01-07 LAB
CTP QC/QA: YES
SARS-COV-2 AG RESP QL IA.RAPID: POSITIVE

## 2022-01-07 PROCEDURE — 87811 SARS-COV-2 COVID19 W/OPTIC: CPT | Mod: HCNC

## 2022-01-12 ENCOUNTER — IMMUNIZATION (OUTPATIENT)
Dept: PHARMACY | Facility: CLINIC | Age: 72
End: 2022-01-12
Payer: MEDICARE

## 2022-01-12 DIAGNOSIS — Z23 NEED FOR VACCINATION: Primary | ICD-10-CM

## 2022-03-18 DIAGNOSIS — N18.4 CKD (CHRONIC KIDNEY DISEASE) STAGE 4, GFR 15-29 ML/MIN: Primary | ICD-10-CM

## 2022-04-05 ENCOUNTER — PATIENT OUTREACH (OUTPATIENT)
Dept: ADMINISTRATIVE | Facility: HOSPITAL | Age: 72
End: 2022-04-05
Payer: MEDICARE

## 2022-04-13 ENCOUNTER — LAB VISIT (OUTPATIENT)
Dept: LAB | Facility: HOSPITAL | Age: 72
End: 2022-04-13
Attending: INTERNAL MEDICINE
Payer: MEDICARE

## 2022-04-13 DIAGNOSIS — N18.4 HYPERTENSIVE KIDNEY DISEASE WITH STAGE 4 CHRONIC KIDNEY DISEASE: ICD-10-CM

## 2022-04-13 DIAGNOSIS — D84.821 IMMUNOSUPPRESSION DUE TO CHRONIC STEROID USE: ICD-10-CM

## 2022-04-13 DIAGNOSIS — R80.9 PROTEINURIA, UNSPECIFIED TYPE: ICD-10-CM

## 2022-04-13 DIAGNOSIS — E87.5 HYPERKALEMIA: ICD-10-CM

## 2022-04-13 DIAGNOSIS — E87.20 METABOLIC ACIDOSIS: ICD-10-CM

## 2022-04-13 DIAGNOSIS — Z94.0 RENAL TRANSPLANT RECIPIENT: ICD-10-CM

## 2022-04-13 DIAGNOSIS — E55.9 VITAMIN D DEFICIENCY DISEASE: ICD-10-CM

## 2022-04-13 DIAGNOSIS — N25.81 SECONDARY HYPERPARATHYROIDISM OF RENAL ORIGIN: ICD-10-CM

## 2022-04-13 DIAGNOSIS — Z79.899 IMMUNOSUPPRESSIVE MANAGEMENT ENCOUNTER FOLLOWING KIDNEY TRANSPLANT: ICD-10-CM

## 2022-04-13 DIAGNOSIS — T38.0X5A IMMUNOSUPPRESSION DUE TO CHRONIC STEROID USE: ICD-10-CM

## 2022-04-13 DIAGNOSIS — Z79.52 IMMUNOSUPPRESSION DUE TO CHRONIC STEROID USE: ICD-10-CM

## 2022-04-13 DIAGNOSIS — E83.52 HYPERCALCEMIA: ICD-10-CM

## 2022-04-13 DIAGNOSIS — I12.9 HYPERTENSIVE KIDNEY DISEASE WITH STAGE 4 CHRONIC KIDNEY DISEASE: ICD-10-CM

## 2022-04-13 DIAGNOSIS — Z94.0 IMMUNOSUPPRESSIVE MANAGEMENT ENCOUNTER FOLLOWING KIDNEY TRANSPLANT: ICD-10-CM

## 2022-04-13 LAB
ALBUMIN SERPL BCP-MCNC: 4.2 G/DL (ref 3.5–5.2)
ANION GAP SERPL CALC-SCNC: 12 MMOL/L (ref 8–16)
BASOPHILS # BLD AUTO: 0.01 K/UL (ref 0–0.2)
BASOPHILS NFR BLD: 0.2 % (ref 0–1.9)
CALCIUM SERPL-MCNC: 10.3 MG/DL (ref 8.7–10.5)
CHLORIDE SERPL-SCNC: 112 MMOL/L (ref 95–110)
CO2 SERPL-SCNC: 17 MMOL/L (ref 23–29)
CREAT SERPL-MCNC: 3.63 MG/DL (ref 0.5–1.4)
DIFFERENTIAL METHOD: ABNORMAL
EOSINOPHIL # BLD AUTO: 0.1 K/UL (ref 0–0.5)
EOSINOPHIL NFR BLD: 1.4 % (ref 0–8)
ERYTHROCYTE [DISTWIDTH] IN BLOOD BY AUTOMATED COUNT: 15.3 % (ref 11.5–14.5)
EST. GFR  (AFRICAN AMERICAN): 18.2 ML/MIN/1.73 M^2
EST. GFR  (NON AFRICAN AMERICAN): 15.8 ML/MIN/1.73 M^2
FERRITIN SERPL-MCNC: 568 NG/ML (ref 20–300)
GLUCOSE SERPL-MCNC: 104 MG/DL (ref 70–110)
HCT VFR BLD AUTO: 40.7 % (ref 40–54)
HGB BLD-MCNC: 12.3 G/DL (ref 14–18)
IMM GRANULOCYTES # BLD AUTO: 0.04 K/UL (ref 0–0.04)
IMM GRANULOCYTES NFR BLD AUTO: 0.8 % (ref 0–0.5)
IRON SERPL-MCNC: 55 UG/DL (ref 45–160)
LYMPHOCYTES # BLD AUTO: 1.3 K/UL (ref 1–4.8)
LYMPHOCYTES NFR BLD: 25.9 % (ref 18–48)
MCH RBC QN AUTO: 25.2 PG (ref 27–31)
MCHC RBC AUTO-ENTMCNC: 30.2 G/DL (ref 32–36)
MCV RBC AUTO: 83 FL (ref 82–98)
MONOCYTES # BLD AUTO: 0.6 K/UL (ref 0.3–1)
MONOCYTES NFR BLD: 11.3 % (ref 4–15)
NEUTROPHILS # BLD AUTO: 2.9 K/UL (ref 1.8–7.7)
NEUTROPHILS NFR BLD: 60.4 % (ref 38–73)
NRBC BLD-RTO: 0 /100 WBC
PHOSPHATE SERPL-MCNC: 3.6 MG/DL (ref 2.7–4.5)
PLATELET # BLD AUTO: 147 K/UL (ref 150–450)
PMV BLD AUTO: 10.2 FL (ref 9.2–12.9)
POTASSIUM SERPL-SCNC: 4.5 MMOL/L (ref 3.5–5.1)
PTH-INTACT SERPL-MCNC: 498.1 PG/ML (ref 9–77)
RBC # BLD AUTO: 4.89 M/UL (ref 4.6–6.2)
SATURATED IRON: 26 % (ref 20–50)
SODIUM SERPL-SCNC: 141 MMOL/L (ref 136–145)
TOTAL IRON BINDING CAPACITY: 212 UG/DL (ref 250–450)
TRANSFERRIN SERPL-MCNC: 143 MG/DL (ref 200–375)
UUN UR-MCNC: 35 MG/DL (ref 2–20)
WBC # BLD AUTO: 4.87 K/UL (ref 3.9–12.7)

## 2022-04-13 PROCEDURE — 36415 COLL VENOUS BLD VENIPUNCTURE: CPT | Mod: PO | Performed by: INTERNAL MEDICINE

## 2022-04-13 PROCEDURE — 80195 ASSAY OF SIROLIMUS: CPT | Mod: PO | Performed by: INTERNAL MEDICINE

## 2022-04-13 PROCEDURE — 85025 COMPLETE CBC W/AUTO DIFF WBC: CPT | Mod: PO | Performed by: INTERNAL MEDICINE

## 2022-04-13 PROCEDURE — 83970 ASSAY OF PARATHORMONE: CPT | Mod: PO | Performed by: INTERNAL MEDICINE

## 2022-04-13 PROCEDURE — 80069 RENAL FUNCTION PANEL: CPT | Mod: PO | Performed by: INTERNAL MEDICINE

## 2022-04-13 PROCEDURE — 82728 ASSAY OF FERRITIN: CPT | Performed by: INTERNAL MEDICINE

## 2022-04-13 PROCEDURE — 84466 ASSAY OF TRANSFERRIN: CPT | Mod: PO | Performed by: INTERNAL MEDICINE

## 2022-04-13 NOTE — PROGRESS NOTES
Kidney function slightly worsened. Acid build up in the blood. Please take sodium bicarbonate regularly. If already taking 650 mg twice a day then increase to 650 mg 3 times per day.     Repeat renal panel in 4 weeks.    Appointment for may.     Thanks.

## 2022-04-14 LAB — SIROLIMUS BLD-MCNC: 9.4 NG/ML (ref 4–20)

## 2022-04-19 ENCOUNTER — OFFICE VISIT (OUTPATIENT)
Dept: FAMILY MEDICINE | Facility: CLINIC | Age: 72
End: 2022-04-19
Payer: MEDICARE

## 2022-04-19 ENCOUNTER — PATIENT OUTREACH (OUTPATIENT)
Dept: ADMINISTRATIVE | Facility: OTHER | Age: 72
End: 2022-04-19
Payer: MEDICARE

## 2022-04-19 VITALS
TEMPERATURE: 98 F | DIASTOLIC BLOOD PRESSURE: 84 MMHG | SYSTOLIC BLOOD PRESSURE: 124 MMHG | WEIGHT: 241.94 LBS | OXYGEN SATURATION: 95 % | BODY MASS INDEX: 34.64 KG/M2 | HEART RATE: 70 BPM | HEIGHT: 70 IN

## 2022-04-19 DIAGNOSIS — I12.9 HYPERTENSIVE KIDNEY DISEASE WITH STAGE 4 CHRONIC KIDNEY DISEASE: ICD-10-CM

## 2022-04-19 DIAGNOSIS — E87.20 METABOLIC ACIDOSIS: ICD-10-CM

## 2022-04-19 DIAGNOSIS — N52.9 ERECTILE DYSFUNCTION, UNSPECIFIED ERECTILE DYSFUNCTION TYPE: Primary | ICD-10-CM

## 2022-04-19 DIAGNOSIS — N18.4 HYPERTENSIVE KIDNEY DISEASE WITH STAGE 4 CHRONIC KIDNEY DISEASE: ICD-10-CM

## 2022-04-19 DIAGNOSIS — Z87.891 PERSONAL HISTORY OF NICOTINE DEPENDENCE: ICD-10-CM

## 2022-04-19 DIAGNOSIS — N25.81 SECONDARY HYPERPARATHYROIDISM OF RENAL ORIGIN: ICD-10-CM

## 2022-04-19 PROCEDURE — 1159F PR MEDICATION LIST DOCUMENTED IN MEDICAL RECORD: ICD-10-PCS | Mod: CPTII,S$GLB,, | Performed by: STUDENT IN AN ORGANIZED HEALTH CARE EDUCATION/TRAINING PROGRAM

## 2022-04-19 PROCEDURE — 1160F RVW MEDS BY RX/DR IN RCRD: CPT | Mod: CPTII,S$GLB,, | Performed by: STUDENT IN AN ORGANIZED HEALTH CARE EDUCATION/TRAINING PROGRAM

## 2022-04-19 PROCEDURE — 3079F PR MOST RECENT DIASTOLIC BLOOD PRESSURE 80-89 MM HG: ICD-10-PCS | Mod: CPTII,S$GLB,, | Performed by: STUDENT IN AN ORGANIZED HEALTH CARE EDUCATION/TRAINING PROGRAM

## 2022-04-19 PROCEDURE — 3288F PR FALLS RISK ASSESSMENT DOCUMENTED: ICD-10-PCS | Mod: CPTII,S$GLB,, | Performed by: STUDENT IN AN ORGANIZED HEALTH CARE EDUCATION/TRAINING PROGRAM

## 2022-04-19 PROCEDURE — 3288F FALL RISK ASSESSMENT DOCD: CPT | Mod: CPTII,S$GLB,, | Performed by: STUDENT IN AN ORGANIZED HEALTH CARE EDUCATION/TRAINING PROGRAM

## 2022-04-19 PROCEDURE — 99214 PR OFFICE/OUTPT VISIT, EST, LEVL IV, 30-39 MIN: ICD-10-PCS | Mod: S$GLB,,, | Performed by: STUDENT IN AN ORGANIZED HEALTH CARE EDUCATION/TRAINING PROGRAM

## 2022-04-19 PROCEDURE — 1126F PR PAIN SEVERITY QUANTIFIED, NO PAIN PRESENT: ICD-10-PCS | Mod: CPTII,S$GLB,, | Performed by: STUDENT IN AN ORGANIZED HEALTH CARE EDUCATION/TRAINING PROGRAM

## 2022-04-19 PROCEDURE — 1160F PR REVIEW ALL MEDS BY PRESCRIBER/CLIN PHARMACIST DOCUMENTED: ICD-10-PCS | Mod: CPTII,S$GLB,, | Performed by: STUDENT IN AN ORGANIZED HEALTH CARE EDUCATION/TRAINING PROGRAM

## 2022-04-19 PROCEDURE — 1126F AMNT PAIN NOTED NONE PRSNT: CPT | Mod: CPTII,S$GLB,, | Performed by: STUDENT IN AN ORGANIZED HEALTH CARE EDUCATION/TRAINING PROGRAM

## 2022-04-19 PROCEDURE — 3074F SYST BP LT 130 MM HG: CPT | Mod: CPTII,S$GLB,, | Performed by: STUDENT IN AN ORGANIZED HEALTH CARE EDUCATION/TRAINING PROGRAM

## 2022-04-19 PROCEDURE — 1101F PT FALLS ASSESS-DOCD LE1/YR: CPT | Mod: CPTII,S$GLB,, | Performed by: STUDENT IN AN ORGANIZED HEALTH CARE EDUCATION/TRAINING PROGRAM

## 2022-04-19 PROCEDURE — 3079F DIAST BP 80-89 MM HG: CPT | Mod: CPTII,S$GLB,, | Performed by: STUDENT IN AN ORGANIZED HEALTH CARE EDUCATION/TRAINING PROGRAM

## 2022-04-19 PROCEDURE — 99214 OFFICE O/P EST MOD 30 MIN: CPT | Mod: S$GLB,,, | Performed by: STUDENT IN AN ORGANIZED HEALTH CARE EDUCATION/TRAINING PROGRAM

## 2022-04-19 PROCEDURE — 3008F BODY MASS INDEX DOCD: CPT | Mod: CPTII,S$GLB,, | Performed by: STUDENT IN AN ORGANIZED HEALTH CARE EDUCATION/TRAINING PROGRAM

## 2022-04-19 PROCEDURE — 1101F PR PT FALLS ASSESS DOC 0-1 FALLS W/OUT INJ PAST YR: ICD-10-PCS | Mod: CPTII,S$GLB,, | Performed by: STUDENT IN AN ORGANIZED HEALTH CARE EDUCATION/TRAINING PROGRAM

## 2022-04-19 PROCEDURE — 1159F MED LIST DOCD IN RCRD: CPT | Mod: CPTII,S$GLB,, | Performed by: STUDENT IN AN ORGANIZED HEALTH CARE EDUCATION/TRAINING PROGRAM

## 2022-04-19 PROCEDURE — 3074F PR MOST RECENT SYSTOLIC BLOOD PRESSURE < 130 MM HG: ICD-10-PCS | Mod: CPTII,S$GLB,, | Performed by: STUDENT IN AN ORGANIZED HEALTH CARE EDUCATION/TRAINING PROGRAM

## 2022-04-19 PROCEDURE — 3008F PR BODY MASS INDEX (BMI) DOCUMENTED: ICD-10-PCS | Mod: CPTII,S$GLB,, | Performed by: STUDENT IN AN ORGANIZED HEALTH CARE EDUCATION/TRAINING PROGRAM

## 2022-04-19 RX ORDER — SILDENAFIL 50 MG/1
50 TABLET, FILM COATED ORAL DAILY PRN
Qty: 9 TABLET | Refills: 4 | Status: SHIPPED | OUTPATIENT
Start: 2022-04-19 | End: 2022-06-15

## 2022-04-19 RX ORDER — SODIUM BICARBONATE 650 MG/1
650 TABLET ORAL 2 TIMES DAILY
Qty: 180 TABLET | Refills: 3 | Status: SHIPPED | OUTPATIENT
Start: 2022-04-19 | End: 2023-04-26

## 2022-04-19 NOTE — PROGRESS NOTES
Patient ID: Jose Luis Manzo is a 72 y.o. male.     Chief Complaint: Follow-up    Erectile Dysfunction  This is a chronic problem. The current episode started more than 1 year ago. The problem is unchanged. The nature of his difficulty is achieving erection and maintaining erection. He reports no anxiety. He reports his erection duration to be less than 1 minute. Irritative symptoms do not include frequency, nocturia or urgency. Obstructive symptoms do not include dribbling or incomplete emptying. Pertinent negatives include no dysuria, genital pain, hematuria or hesitancy. Nothing aggravates the symptoms. Past treatments include sildenafil. The treatment provided moderate relief. He has had no adverse reactions caused by medications.          Review of Systems  Review of Systems   Constitutional: Negative for fever.   HENT: Negative for ear pain and sinus pain.    Eyes: Negative for discharge.   Respiratory: Negative for cough and shortness of breath.    Cardiovascular: Negative for chest pain and leg swelling.   Gastrointestinal: Negative for diarrhea, nausea and vomiting.   Genitourinary: Negative for dysuria, frequency, hematuria, hesitancy, incomplete emptying, nocturia and urgency.   Musculoskeletal: Negative for myalgias.   Skin: Negative for rash.   Neurological: Negative for weakness and headaches.   Psychiatric/Behavioral: Negative for depression. The patient is not nervous/anxious.    All other systems reviewed and are negative.      Currently Medications  Current Outpatient Medications on File Prior to Visit   Medication Sig Dispense Refill    apixaban (ELIQUIS) 2.5 mg Tab Take 1 tablet (2.5 mg total) by mouth 2 (two) times daily. 180 tablet 3    atorvastatin (LIPITOR) 20 MG tablet Take 1 tablet (20 mg total) by mouth every evening. 90 tablet 3    cloNIDine 0.3 mg/24 hr td ptwk (CATAPRES) 0.3 mg/24 hr Place 1 patch onto the skin once a week. 12 patch 3    ergocalciferol (ERGOCALCIFEROL) 50,000 unit  "Cap Take one tablet once a week      ergocalciferol (VITAMIN D2) 50,000 unit Cap Take 1 capsule (50,000 Units total) by mouth every 7 days. 12 capsule 3    ferrous sulfate 325 mg (65 mg iron) Tab tablet Take 325 mg by mouth once daily.      finasteride (PROSCAR) 5 mg tablet Take 1 tablet (5 mg total) by mouth once daily. 90 tablet 3    furosemide (LASIX) 40 MG tablet Take 1 tablet (40 mg total) by mouth once daily. 90 tablet 3    metoprolol succinate (TOPROL-XL) 50 MG 24 hr tablet Take 1 tablet (50 mg total) by mouth once daily. 90 tablet 3    nitroGLYCERIN (NITROSTAT) 0.4 MG SL tablet Place 1 tablet (0.4 mg total) under the tongue every 5 (five) minutes as needed for Chest pain. 30 tablet 2    predniSONE (DELTASONE) 5 MG tablet Take 1 tablet (5 mg total) by mouth once daily. 90 tablet 3    sirolimus (RAPAMUNE) 1 MG Tab Take 3 tablets (3 mg total) by mouth once daily. 90 tablet 3    [DISCONTINUED] sodium bicarbonate 650 MG tablet Take 1 tablet (650 mg total) by mouth 2 (two) times daily. 60 tablet 4    [DISCONTINUED] SODIUM BICARBONATE ORAL 650 mg       No current facility-administered medications on file prior to visit.       Physical  Exam  Vitals:    04/19/22 1322   BP: 124/84   BP Location: Right arm   Patient Position: Sitting   Pulse: 70   Temp: 98 °F (36.7 °C)   SpO2: 95%   Weight: 109.8 kg (241 lb 15.3 oz)   Height: 5' 10" (1.778 m)      Body mass index is 34.72 kg/m².    Physical Exam  Vitals and nursing note reviewed.   Constitutional:       General: He is not in acute distress.     Appearance: He is not ill-appearing.   HENT:      Head: Normocephalic and atraumatic.      Right Ear: External ear normal.      Left Ear: External ear normal.      Nose: Nose normal.      Mouth/Throat:      Mouth: Mucous membranes are moist.   Eyes:      Extraocular Movements: Extraocular movements intact.      Conjunctiva/sclera: Conjunctivae normal.   Cardiovascular:      Rate and Rhythm: Normal rate and regular " rhythm.      Pulses: Normal pulses.      Heart sounds: No murmur heard.  Pulmonary:      Effort: Pulmonary effort is normal. No respiratory distress.      Breath sounds: No wheezing.   Abdominal:      General: There is no distension.      Palpations: Abdomen is soft. There is no mass.      Tenderness: There is no abdominal tenderness.   Musculoskeletal:         General: No swelling.      Cervical back: Normal range of motion.   Skin:     Coloration: Skin is not jaundiced.      Findings: No rash.   Neurological:      General: No focal deficit present.      Mental Status: He is alert and oriented to person, place, and time.   Psychiatric:         Mood and Affect: Mood normal.         Thought Content: Thought content normal.         Labs:    Complete Blood Count  Lab Results   Component Value Date    RBC 4.89 04/13/2022    HGB 12.3 (L) 04/13/2022    HCT 40.7 04/13/2022    MCV 83 04/13/2022    MCH 25.2 (L) 04/13/2022    MCHC 30.2 (L) 04/13/2022    RDW 15.3 (H) 04/13/2022     (L) 04/13/2022    MPV 10.2 04/13/2022    GRAN 2.9 04/13/2022    GRAN 60.4 04/13/2022    LYMPH 1.3 04/13/2022    LYMPH 25.9 04/13/2022    MONO 0.6 04/13/2022    MONO 11.3 04/13/2022    EOS 0.1 04/13/2022    BASO 0.01 04/13/2022    EOSINOPHIL 1.4 04/13/2022    BASOPHIL 0.2 04/13/2022    DIFFMETHOD Automated 04/13/2022       Comprehensive Metabolic Panel  Lab Results   Component Value Date     04/13/2022    BUN 35 (H) 04/13/2022    CREATININE 3.63 (H) 04/13/2022     04/13/2022    K 4.5 04/13/2022     (H) 04/13/2022    ALBUMIN 4.2 04/13/2022    CO2 17 (L) 04/13/2022    ANIONGAP 12 04/13/2022    EGFRNONAA 15.8 (A) 04/13/2022    ESTGFRAFRICA 18.2 (A) 04/13/2022       TSH  No results found for: TSH    Imaging:  Fl Modified Barium Swallow Speech  Narrative: EXAMINATION:  FL MODIFIED BARIUM SWALLOW SPEECH STUDY    CLINICAL HISTORY:  Other dysphagiadysphag;    TECHNIQUE:  In conjunction with speech pathology, the patient ingested  barium of varying consistencies to include thin, pudding, barium coated peaches and cracker.    Fluoroscopy time: 1.0 minutes.    COMPARISON:  None available    FINDINGS:  Instances of penetration without aspiration with thin barium consistency via straw as well as thin liquid wash upon conclusion of study.  No evidence for laryngeal penetration or tracheal aspiration across remaining consistencies.  Impression: As above.    Please see separate speech pathology report for further detail.    Electronically signed by: Julio C Grande  Date:    08/18/2021  Time:    11:59      Assessment/Plan:    Problem List Items Addressed This Visit        Renal/    Hypertensive kidney disease with stage 4 chronic kidney disease    Overview     Strongly encouraged patient follows up with Nephrology            Metabolic acidosis    Relevant Medications    sodium bicarbonate 650 MG tablet       Endocrine    Secondary hyperparathyroidism of renal origin      Other Visit Diagnoses     Erectile dysfunction, unspecified erectile dysfunction type    -  Primary    Relevant Medications    sildenafiL (VIAGRA) 50 MG tablet    Personal history of nicotine dependence        Relevant Orders    CT Chest Lung Screening Low Dose           Discussed how to stay healthy including: diet, exercise, refraining from smoking and discussed screening exams / tests needed for age, sex and family Hx.    RTC 6 mo    Home Alexis MD

## 2022-04-19 NOTE — PROGRESS NOTES
Patient, Jose Luis Manzo (MRN #[id number]), presented with a recent Platelet count less than 150 K/uL consistent with the definition of thrombocytopenia (ICD10 - D69.6).    Platelets   Date Value Ref Range Status   04/13/2022 147 (L) 150 - 450 K/uL Final     The patient's thrombocytopenia was monitored, evaluated, addressed and/or treated. This addendum to the medical record is made on 04/19/2022.

## 2022-04-19 NOTE — PROGRESS NOTES
Health Maintenance Due   Topic Date Due    DEXA Scan  05/01/2019    Shingles Vaccine (2 of 2) 10/17/2019    Influenza Vaccine (1) 09/01/2021    LDCT Lung Screen  02/11/2022     Updates were requested from care everywhere.  Chart was reviewed for overdue Proactive [facility] Encounters ([facility]ATARINA) topics (CRS, Breast Cancer Screening, Eye exam)  Health Maintenance has been updated.  LINKS immunization registry triggered.  Immunizations were reconciled.

## 2022-04-20 ENCOUNTER — TELEPHONE (OUTPATIENT)
Dept: NEPHROLOGY | Facility: CLINIC | Age: 72
End: 2022-04-20
Payer: MEDICARE

## 2022-04-20 DIAGNOSIS — R31.9 HEMATURIA SYNDROME: Primary | ICD-10-CM

## 2022-04-20 NOTE — TELEPHONE ENCOUNTER
MD Phyllis Fernandez, ANSLEY  Cc: MARCEL Escalante Staff  Microscopic amount of blood in the urine. Please inform him he needs to see urologist.   Will send referral if he does not have an established urology care already.   Thanks and will follow.       Phoned patient no answer, Urology appointment scheduled

## 2022-04-20 NOTE — TELEPHONE ENCOUNTER
MD Phyllis Fernandez, ANSLEY  Cc: MARCEL Escalante Staff  Kidney function slightly worsened. Acid build up in the blood. Please take sodium bicarbonate regularly. If already taking 650 mg twice a day then increase to 650 mg 3 times per day.     Repeat renal panel in 4 weeks.     Appointment for may.       Phoned patient to relay lab results no answer, left voice mail to contact Nephrology Clinic

## 2022-04-21 ENCOUNTER — OFFICE VISIT (OUTPATIENT)
Dept: NEPHROLOGY | Facility: CLINIC | Age: 72
End: 2022-04-21
Payer: MEDICARE

## 2022-04-21 VITALS
WEIGHT: 241.88 LBS | OXYGEN SATURATION: 98 % | HEIGHT: 70 IN | HEART RATE: 56 BPM | SYSTOLIC BLOOD PRESSURE: 137 MMHG | BODY MASS INDEX: 34.63 KG/M2 | DIASTOLIC BLOOD PRESSURE: 85 MMHG

## 2022-04-21 DIAGNOSIS — N18.4 HYPERTENSIVE KIDNEY DISEASE WITH STAGE 4 CHRONIC KIDNEY DISEASE: ICD-10-CM

## 2022-04-21 DIAGNOSIS — E83.52 HYPERCALCEMIA: ICD-10-CM

## 2022-04-21 DIAGNOSIS — E87.20 METABOLIC ACIDOSIS: ICD-10-CM

## 2022-04-21 DIAGNOSIS — Z94.0 TRANSPLANTED KIDNEY: Primary | ICD-10-CM

## 2022-04-21 DIAGNOSIS — Z94.0 RENAL TRANSPLANT RECIPIENT: ICD-10-CM

## 2022-04-21 DIAGNOSIS — D50.9 IRON DEFICIENCY ANEMIA, UNSPECIFIED IRON DEFICIENCY ANEMIA TYPE: ICD-10-CM

## 2022-04-21 DIAGNOSIS — I12.9 HYPERTENSIVE KIDNEY DISEASE WITH STAGE 4 CHRONIC KIDNEY DISEASE: ICD-10-CM

## 2022-04-21 DIAGNOSIS — E55.9 VITAMIN D DEFICIENCY DISEASE: ICD-10-CM

## 2022-04-21 DIAGNOSIS — N25.81 SECONDARY HYPERPARATHYROIDISM OF RENAL ORIGIN: ICD-10-CM

## 2022-04-21 DIAGNOSIS — E83.39 HYPERPHOSPHATEMIA: ICD-10-CM

## 2022-04-21 DIAGNOSIS — R80.9 PROTEINURIA, UNSPECIFIED TYPE: ICD-10-CM

## 2022-04-21 PROCEDURE — 99999 PR PBB SHADOW E&M-EST. PATIENT-LVL V: ICD-10-PCS | Mod: PBBFAC,,, | Performed by: INTERNAL MEDICINE

## 2022-04-21 PROCEDURE — 3079F DIAST BP 80-89 MM HG: CPT | Mod: CPTII,S$GLB,, | Performed by: INTERNAL MEDICINE

## 2022-04-21 PROCEDURE — 1101F PR PT FALLS ASSESS DOC 0-1 FALLS W/OUT INJ PAST YR: ICD-10-PCS | Mod: CPTII,S$GLB,, | Performed by: INTERNAL MEDICINE

## 2022-04-21 PROCEDURE — 1160F RVW MEDS BY RX/DR IN RCRD: CPT | Mod: CPTII,S$GLB,, | Performed by: INTERNAL MEDICINE

## 2022-04-21 PROCEDURE — 3066F PR DOCUMENTATION OF TREATMENT FOR NEPHROPATHY: ICD-10-PCS | Mod: CPTII,S$GLB,, | Performed by: INTERNAL MEDICINE

## 2022-04-21 PROCEDURE — 99214 OFFICE O/P EST MOD 30 MIN: CPT | Mod: S$GLB,,, | Performed by: INTERNAL MEDICINE

## 2022-04-21 PROCEDURE — 3008F PR BODY MASS INDEX (BMI) DOCUMENTED: ICD-10-PCS | Mod: CPTII,S$GLB,, | Performed by: INTERNAL MEDICINE

## 2022-04-21 PROCEDURE — 1126F AMNT PAIN NOTED NONE PRSNT: CPT | Mod: CPTII,S$GLB,, | Performed by: INTERNAL MEDICINE

## 2022-04-21 PROCEDURE — 3079F PR MOST RECENT DIASTOLIC BLOOD PRESSURE 80-89 MM HG: ICD-10-PCS | Mod: CPTII,S$GLB,, | Performed by: INTERNAL MEDICINE

## 2022-04-21 PROCEDURE — 99499 RISK ADDL DX/OHS AUDIT: ICD-10-PCS | Mod: S$GLB,,, | Performed by: INTERNAL MEDICINE

## 2022-04-21 PROCEDURE — 99214 PR OFFICE/OUTPT VISIT, EST, LEVL IV, 30-39 MIN: ICD-10-PCS | Mod: S$GLB,,, | Performed by: INTERNAL MEDICINE

## 2022-04-21 PROCEDURE — 3288F FALL RISK ASSESSMENT DOCD: CPT | Mod: CPTII,S$GLB,, | Performed by: INTERNAL MEDICINE

## 2022-04-21 PROCEDURE — 3075F SYST BP GE 130 - 139MM HG: CPT | Mod: CPTII,S$GLB,, | Performed by: INTERNAL MEDICINE

## 2022-04-21 PROCEDURE — 1126F PR PAIN SEVERITY QUANTIFIED, NO PAIN PRESENT: ICD-10-PCS | Mod: CPTII,S$GLB,, | Performed by: INTERNAL MEDICINE

## 2022-04-21 PROCEDURE — 1160F PR REVIEW ALL MEDS BY PRESCRIBER/CLIN PHARMACIST DOCUMENTED: ICD-10-PCS | Mod: CPTII,S$GLB,, | Performed by: INTERNAL MEDICINE

## 2022-04-21 PROCEDURE — 3288F PR FALLS RISK ASSESSMENT DOCUMENTED: ICD-10-PCS | Mod: CPTII,S$GLB,, | Performed by: INTERNAL MEDICINE

## 2022-04-21 PROCEDURE — 1159F MED LIST DOCD IN RCRD: CPT | Mod: CPTII,S$GLB,, | Performed by: INTERNAL MEDICINE

## 2022-04-21 PROCEDURE — 3075F PR MOST RECENT SYSTOLIC BLOOD PRESS GE 130-139MM HG: ICD-10-PCS | Mod: CPTII,S$GLB,, | Performed by: INTERNAL MEDICINE

## 2022-04-21 PROCEDURE — 3066F NEPHROPATHY DOC TX: CPT | Mod: CPTII,S$GLB,, | Performed by: INTERNAL MEDICINE

## 2022-04-21 PROCEDURE — 1101F PT FALLS ASSESS-DOCD LE1/YR: CPT | Mod: CPTII,S$GLB,, | Performed by: INTERNAL MEDICINE

## 2022-04-21 PROCEDURE — 99999 PR PBB SHADOW E&M-EST. PATIENT-LVL V: CPT | Mod: PBBFAC,,, | Performed by: INTERNAL MEDICINE

## 2022-04-21 PROCEDURE — 99499 UNLISTED E&M SERVICE: CPT | Mod: S$GLB,,, | Performed by: INTERNAL MEDICINE

## 2022-04-21 PROCEDURE — 1159F PR MEDICATION LIST DOCUMENTED IN MEDICAL RECORD: ICD-10-PCS | Mod: CPTII,S$GLB,, | Performed by: INTERNAL MEDICINE

## 2022-04-21 PROCEDURE — 3008F BODY MASS INDEX DOCD: CPT | Mod: CPTII,S$GLB,, | Performed by: INTERNAL MEDICINE

## 2022-04-21 NOTE — PROGRESS NOTES
Subjective:   Patient ID: Jose Luis Manzo is a 72 y.o. Black or  male who presents for follow up evaluation of Chronic Kidney Disease    HPI   was seen in clinic today for follow up patient evaluation of CKD. He established care in [facility] on 1/30/20. He returns for a follow up visit. He was last seen on 12/9/21.    Most recently he did not have ER visit/ hospital admission.     He does not check BP routinely at home. Denies any symptoms to suggest hypotension.     He has chronic SPEARS, he denies any worsening.    His sirolimus prescription was renewed. Paperwork for prior authorization received from pharmacy, filled the physician part. There is a part to be filled by the patient, he was made aware and advised to fill that up so that further paperwork can be processes urgently. He stated he would like to do so at home and bring it back. As such he has reported almost running out of his sirolimus supply. Pt was made aware of importance of having form completed from his side to avoid missing the doses. Office staff notified of this and advised close follow up with pharmacy and patient.    He was admitted to [facility] due to COVID pneumonia from 12/30/20 till 1/5/21. His hospital course was complicated by acute hypoxemic respiratory failure due to covid. He was treated with Remdesivir, decadron and antibiotic for CAP coverage. He developed CHANG, worsened metabolic acidosis for which he required bicarbonate drip. He also developed sirolimus toxicity. He was followed by [facility] during hospital stay.    Per discharge summary his Sirolimus dose was reduced to 1 mg per day however pt reported during this visit he has been taking 3 tablets of 1 mg per day, so has been taking total dose of 3 mg. He has not been able to afford the cost of Sensipar. He was not able to receive any financial assistance with the cost from . He did not bring home BP medicines.     He admits he has not been  exercising. He has been aware of need to lose weight. His BMI has been in mid 30's.    Overall he reports he is doing ok. He does not have any symptoms to suggest uremia. He denies any decreased urine output/ flank pain/ pain over allograft/ nausea/ vomiting/ diarrhea/ fever/ chills. He was referred to [facility] as he has not been followed in any post kidney transplant program for a long time. However he was informed that due to his underlying laryngeal cancer he does not meet criteria for transplant evaluation. He did not receive any evaluation in the clinic for immunosuppressive medication management. He denies any NSAID use. He does not have a recent IV iodine contrast use.    Pt received kidney transplant in 2007 in [place]. He does not have much recollection with his transplant. He was on hemodialysis for almost 9 years prior tor receiving transplant. He has stopped following there a while ago. He had an AV graft in left arm for dialysis, now does not have any thrill and appears to be clotted and non functional. He reports he had failed access in right arm and also in leg. He has been aware of poor functioning of his transplanted kidney and creatinine always staying in the range of 2 to 3's since he received the transplant.    He has longstanding hypertension which seems to be the reason for kidney failure. He reports his parents had kidney problem and mother was on dialysis for sometime. He has been a non smoker, currently retired.     Serial labs noted for creatinine between 2.7-3.2 since 2015, some progression in 2019. He reports he always had high creatinine since his transplant, exact details unclear. He also appears to have mild to moderate hypercalcemia per serial labs. When questioned about it he admitted he was advised to take sensipar by local kidney doctor in the past but due to cost issue he never took it.    His IS regimen includes sirolimus, prednisone. He has longstanding  hypertension, h/o kidney transplant, h/o sq cell cancer of larynx, s/p RT, lung nodule, DVT of LE, on chronic anticoagulation and other medical problems.       Renal Function:  Lab Results   Component Value Date     04/13/2022     (H) 12/09/2021     04/13/2022     12/09/2021    K 4.5 04/13/2022    K 4.0 12/09/2021     (H) 04/13/2022     (H) 12/09/2021    CO2 17 (L) 04/13/2022    CO2 20 (L) 12/09/2021    BUN 35 (H) 04/13/2022    BUN 39 (H) 12/09/2021    CALCIUM 10.3 04/13/2022    CALCIUM 10.4 12/09/2021    CREATININE 3.63 (H) 04/13/2022    CREATININE 3.2 (H) 12/09/2021    ALBUMIN 4.2 04/13/2022    ALBUMIN 3.2 (L) 12/09/2021    PHOS 3.6 04/13/2022    PHOS 2.7 12/09/2021    ESTGFRAFRICA 18.2 (A) 04/13/2022    ESTGFRAFRICA 21.4 (A) 12/09/2021    EGFRNONAA 15.8 (A) 04/13/2022    EGFRNONAA 18.5 (A) 12/09/2021       Urinalysis:  Lab Results   Component Value Date    APPEARANCEUA Clear 04/13/2022    PHUR 7.0 04/13/2022    SPECGRAV 1.010 04/13/2022    PROTEINUA 2+ (A) 04/13/2022    GLUCUA Negative 04/13/2022    OCCULTUA 1+ (A) 04/13/2022    NITRITE Negative 04/13/2022    LEUKOCYTESUR Negative 04/13/2022       Protein/Creatinine Ratio:  Lab Results   Component Value Date    PROTEINURINE 62 (H) 04/13/2022    CREATRANDUR 82.3 04/13/2022    UTPCR 0.75 (H) 04/13/2022       CBC:  Lab Results   Component Value Date    WBC 4.87 04/13/2022    HGB 12.3 (L) 04/13/2022    HCT 40.7 04/13/2022       PTH:  Lab Results   Component Value Date    .1 (H) 04/13/2022     Vit D 26  Sirolimus level 9.4    Sirolimus levels 8.9 from 1/5/21  Sirolimus toxicity on 12/31/21 through 1/2/21 with levels from 14.4 to 18.3     Sirolimus level 3 from 9/24/21, pt unable to confirm exact dose and there remains discrepancy in dose prescribed and dose taken, he is advised to bring medicine bottles      12/31/20  US transplanted kidney  Satisfactory color Doppler and grayscale ultrasound evaluation of a right lower  quadrant renal transplant    Review of Systems   Constitutional: Negative for activity change, appetite change, chills, fatigue and fever.   HENT: Negative for facial swelling, hearing loss and sore throat.    Eyes: Negative for photophobia and pain.   Respiratory: Negative for cough, shortness of breath and wheezing.    Cardiovascular: Positive for leg swelling. Negative for palpitations.   Gastrointestinal: Negative for abdominal pain, diarrhea, nausea and vomiting.   Endocrine: Negative for polydipsia and polyuria.   Genitourinary: Negative for dysuria, flank pain, frequency and hematuria.   Musculoskeletal: Negative for back pain and myalgias.   Skin: Negative for pallor and rash.   Allergic/Immunologic: Positive for immunocompromised state. Negative for environmental allergies.   Neurological: Negative for dizziness, light-headedness and headaches.   Psychiatric/Behavioral: Negative for behavioral problems. The patient is not nervous/anxious.        Objective:   Physical Exam   Constitutional: He is oriented to person, place, and time. He appears well-developed and well-nourished. No distress.   HENT:   Head: Normocephalic and atraumatic.   Mouth/Throat: Oropharynx is clear and moist.   Eyes: Right eye exhibits no discharge. Left eye exhibits no discharge. No scleral icterus.   Pterygium    Neck:   Large, thick neck    Cardiovascular: Normal rate, regular rhythm.   Pulmonary/Chest: Effort normal. No respiratory distress. He has no audible wheezes.   Abdominal: abdominal obesity. There is no tenderness.   No tenderness over allograft site    Musculoskeletal: He exhibits trace edema.   Clotted AVG left forearm  Neurological: He is alert and oriented to person, place, and time. No asterixis.  Skin: Skin is warm and dry. No rash noted. He is not diaphoretic. No erythema.   Psychiatric: He has a normal mood and affect. His behavior is normal.   Vitals reviewed.      Assessment:     1. Transplanted kidney    2.  Secondary hyperparathyroidism of renal origin    3. Renal transplant recipient    4. Vitamin D deficiency disease    5. Proteinuria, unspecified type    6. Metabolic acidosis    7. Iron deficiency anemia, unspecified iron deficiency anemia type    8. Hyperphosphatemia    9. Hypercalcemia    10. Hypertensive kidney disease with stage 4 chronic kidney disease        Plan:       Problem List Items Addressed This Visit        Renal/    Renal transplant recipient    Overview     2007 at [facility] [place] [facility]           Relevant Orders    CBC Auto Differential    PTH, intact    Renal Function Panel    Urinalysis    Protein / creatinine ratio, urine    SIROLIMUS LEVEL    Renal Function Panel    Ambulatory referral/consult to Vascular Surgery    Transplanted kidney - Primary    Relevant Orders    CBC Auto Differential    PTH, intact    Renal Function Panel    Urinalysis    Protein / creatinine ratio, urine    SIROLIMUS LEVEL    Renal Function Panel    Hypertensive kidney disease with stage 4 chronic kidney disease    Overview     Strongly encouraged patient follows up with Nephrology            Relevant Orders    CBC Auto Differential    PTH, intact    Renal Function Panel    Urinalysis    Protein / creatinine ratio, urine    SIROLIMUS LEVEL    Renal Function Panel    Ambulatory referral/consult to Vascular Surgery    Hypercalcemia    Relevant Orders    CBC Auto Differential    PTH, intact    Renal Function Panel    Urinalysis    Protein / creatinine ratio, urine    SIROLIMUS LEVEL    Renal Function Panel    Hyperphosphatemia    Relevant Orders    CBC Auto Differential    PTH, intact    Renal Function Panel    Urinalysis    Protein / creatinine ratio, urine    SIROLIMUS LEVEL    Renal Function Panel    Metabolic acidosis    Relevant Orders    CBC Auto Differential    PTH, intact    Renal Function Panel    Urinalysis    Protein / creatinine ratio, urine    SIROLIMUS LEVEL    Renal Function Panel    Proteinuria    Relevant  Orders    CBC Auto Differential    PTH, intact    Renal Function Panel    Urinalysis    Protein / creatinine ratio, urine    SIROLIMUS LEVEL    Renal Function Panel       Oncology    Iron deficiency anemia    Relevant Orders    CBC Auto Differential    PTH, intact    Renal Function Panel    Urinalysis    Protein / creatinine ratio, urine    SIROLIMUS LEVEL    Renal Function Panel       Endocrine    Secondary hyperparathyroidism of renal origin    Relevant Orders    CBC Auto Differential    PTH, intact    Renal Function Panel    Urinalysis    Protein / creatinine ratio, urine    SIROLIMUS LEVEL    Renal Function Panel    Vitamin D deficiency disease    Relevant Orders    CBC Auto Differential    PTH, intact    Renal Function Panel    Urinalysis    Protein / creatinine ratio, urine    SIROLIMUS LEVEL    Renal Function Panel        Mr. 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 He has been a recipient of kidney transplant in 2007. Per his report he appears to have elevated creatinine almost since he received it, no details available as transplant surgery took place in [place]. Pt does not have any follow up at present with his transplant center. He has h/o malignancy.    S/p CHANG due to covid, complicated by Sirolimus toxicity  Follow on Sirolimus trough levels as pt had toxicity due to possible drug interaction  Closely monitor labs to detect and treat such drug toxicity  Monitor for cytopenia as a potential side effect from immunosuppressive therapy     Copy note to transplant team to review recent sirolimus levels and see if he needs dose adjustment.     His CKD is worsening.  Referral to vascular for access creation. He has prior failed accesses.    Overall he appears to have very advanced CKD with declining eGFR. He has slowly worsening CKD IV. He also appears to have longstanding hypercalcemia. Due to persistent hypercalcemia unable to use  vitamin D analogues. Sensipar has not been approved by his insurance as of now. PTH continuing to worsen. Explained to him in detail that due to insurance not covering cost of this medicine, unable to medically treat this adequately. If PTH continues to worsen, may have to consider referral to endocrine surgery for evaluation of parathyroidectomy.    Other issue appears that he has had prior multiple failed accesses for dialysis, suspect severe vasculopathy.     S/p referral to [facility] to establish care, for management of immunosuppression in light of his h/o malignancy and possible consideration for kidney transplant candidate as anticipate further worsening of kidney function. He has been denied for another transplant at [facility] however would still attempt to get him evaluated for management of his immunosuppressive medicines in light of his malignancy and his persistent proteinuria. He reports he was not given appointment by transplant clinic.  Continue to monitor sirolimus levels    Repeat levels in 4-8 weeks     Home BP monitoring, goal should be less than 130/80, strict low salt diet.    Periodically monitor renal labs for electrolytes, acid base status, creatinine.    Chronic thrombocytopenia noted, reason unclear. He has been under heme follow up already.    Continue to trend PTH, vit D, phos levels, corrected Ca.  Phos levels stable.  Stop phos binder.  Continue to follow labs for monitoring   Pt reminded to bring all medicine bottles next visit as he could not confirm intake of phos binder per Rx on the chart and proper time of intake of binder which should be with meals.    Mild hyperkalemia, follow low K diet, follow labs     Met acidosis worsened, not sure if he is taking sod bicarb  New Rx sent, sod bicarb 650 mg bid, stressed compliance and need to correct met acidosis to slow down progression of CKD    Continue to trend urine studies, rule out proteinuria/  microhematuria     Continue to follow with ENT for laryngeal cancer surveillance and management      Plan, labs, recommendations were discussed with patient, his questions were answered to his satisfaction.   Total time spent was 50 minutes with 25 minutes spent in face to face evaluation, counseling about possible etiology, work up, monitoring, natural course of illness, recommendations.   RTC 6 weeks

## 2022-04-21 NOTE — Clinical Note
Does his sirolimus need a dose change? He had prior toxicity towards end of 2020 when he was admitted with covid and also had CHANG.  Thanks.

## 2022-04-25 NOTE — PROGRESS NOTES
Message received from Nephrology to review sirolimus.  It is noted the patient is not [place] [facility] [place].  In general, sirolimus level should be about 5 for so many years post transplant, like this patient.    Recent Labs   Lab 09/24/21  1055 12/09/21  0921 04/13/22  1114   Sirolimus Lvl 3.0 L 2.7 L 9.4     Chart review finds that level was 2.7 in December while already on 3 mg of sirolimus.  Since this shows wide fluctuation on the same dose, I suggest repeating the lab prior to making a dose adjustment.  Also, make sure level is 12 hour trough.  Lastly, assess for drug interactions [none noted from medications and chart, but check for OTC remedies in herbal that can interact].

## 2022-04-27 ENCOUNTER — HOSPITAL ENCOUNTER (OUTPATIENT)
Dept: RADIOLOGY | Facility: HOSPITAL | Age: 72
Discharge: HOME | End: 2022-04-27
Attending: STUDENT IN AN ORGANIZED HEALTH CARE EDUCATION/TRAINING PROGRAM
Payer: MEDICARE

## 2022-04-27 DIAGNOSIS — Z87.891 PERSONAL HISTORY OF NICOTINE DEPENDENCE: ICD-10-CM

## 2022-04-27 PROCEDURE — 71271 CT THORAX LUNG CANCER SCR C-: CPT | Mod: TC,PO

## 2022-05-02 ENCOUNTER — OFFICE VISIT (OUTPATIENT)
Dept: UROLOGY | Facility: CLINIC | Age: 72
End: 2022-05-02
Payer: MEDICARE

## 2022-05-02 VITALS
DIASTOLIC BLOOD PRESSURE: 82 MMHG | HEART RATE: 57 BPM | HEIGHT: 70 IN | WEIGHT: 233.94 LBS | SYSTOLIC BLOOD PRESSURE: 145 MMHG | BODY MASS INDEX: 33.49 KG/M2

## 2022-05-02 DIAGNOSIS — R31.9 HEMATURIA SYNDROME: ICD-10-CM

## 2022-05-02 DIAGNOSIS — R31.29 MICROSCOPIC HEMATURIA: Primary | ICD-10-CM

## 2022-05-02 PROCEDURE — 99999 PR PBB SHADOW E&M-EST. PATIENT-LVL V: ICD-10-PCS | Mod: PBBFAC,,, | Performed by: STUDENT IN AN ORGANIZED HEALTH CARE EDUCATION/TRAINING PROGRAM

## 2022-05-02 PROCEDURE — 1126F PR PAIN SEVERITY QUANTIFIED, NO PAIN PRESENT: ICD-10-PCS | Mod: CPTII,S$GLB,, | Performed by: STUDENT IN AN ORGANIZED HEALTH CARE EDUCATION/TRAINING PROGRAM

## 2022-05-02 PROCEDURE — 3008F BODY MASS INDEX DOCD: CPT | Mod: CPTII,S$GLB,, | Performed by: STUDENT IN AN ORGANIZED HEALTH CARE EDUCATION/TRAINING PROGRAM

## 2022-05-02 PROCEDURE — 1101F PR PT FALLS ASSESS DOC 0-1 FALLS W/OUT INJ PAST YR: ICD-10-PCS | Mod: CPTII,S$GLB,, | Performed by: STUDENT IN AN ORGANIZED HEALTH CARE EDUCATION/TRAINING PROGRAM

## 2022-05-02 PROCEDURE — 3066F NEPHROPATHY DOC TX: CPT | Mod: CPTII,S$GLB,, | Performed by: STUDENT IN AN ORGANIZED HEALTH CARE EDUCATION/TRAINING PROGRAM

## 2022-05-02 PROCEDURE — 1160F PR REVIEW ALL MEDS BY PRESCRIBER/CLIN PHARMACIST DOCUMENTED: ICD-10-PCS | Mod: CPTII,S$GLB,, | Performed by: STUDENT IN AN ORGANIZED HEALTH CARE EDUCATION/TRAINING PROGRAM

## 2022-05-02 PROCEDURE — 3077F SYST BP >= 140 MM HG: CPT | Mod: CPTII,S$GLB,, | Performed by: STUDENT IN AN ORGANIZED HEALTH CARE EDUCATION/TRAINING PROGRAM

## 2022-05-02 PROCEDURE — 1126F AMNT PAIN NOTED NONE PRSNT: CPT | Mod: CPTII,S$GLB,, | Performed by: STUDENT IN AN ORGANIZED HEALTH CARE EDUCATION/TRAINING PROGRAM

## 2022-05-02 PROCEDURE — 3079F DIAST BP 80-89 MM HG: CPT | Mod: CPTII,S$GLB,, | Performed by: STUDENT IN AN ORGANIZED HEALTH CARE EDUCATION/TRAINING PROGRAM

## 2022-05-02 PROCEDURE — 99204 PR OFFICE/OUTPT VISIT, NEW, LEVL IV, 45-59 MIN: ICD-10-PCS | Mod: S$GLB,,, | Performed by: STUDENT IN AN ORGANIZED HEALTH CARE EDUCATION/TRAINING PROGRAM

## 2022-05-02 PROCEDURE — 1101F PT FALLS ASSESS-DOCD LE1/YR: CPT | Mod: CPTII,S$GLB,, | Performed by: STUDENT IN AN ORGANIZED HEALTH CARE EDUCATION/TRAINING PROGRAM

## 2022-05-02 PROCEDURE — 99204 OFFICE O/P NEW MOD 45 MIN: CPT | Mod: S$GLB,,, | Performed by: STUDENT IN AN ORGANIZED HEALTH CARE EDUCATION/TRAINING PROGRAM

## 2022-05-02 PROCEDURE — 1160F RVW MEDS BY RX/DR IN RCRD: CPT | Mod: CPTII,S$GLB,, | Performed by: STUDENT IN AN ORGANIZED HEALTH CARE EDUCATION/TRAINING PROGRAM

## 2022-05-02 PROCEDURE — 3079F PR MOST RECENT DIASTOLIC BLOOD PRESSURE 80-89 MM HG: ICD-10-PCS | Mod: CPTII,S$GLB,, | Performed by: STUDENT IN AN ORGANIZED HEALTH CARE EDUCATION/TRAINING PROGRAM

## 2022-05-02 PROCEDURE — 99999 PR PBB SHADOW E&M-EST. PATIENT-LVL V: CPT | Mod: PBBFAC,,, | Performed by: STUDENT IN AN ORGANIZED HEALTH CARE EDUCATION/TRAINING PROGRAM

## 2022-05-02 PROCEDURE — 3077F PR MOST RECENT SYSTOLIC BLOOD PRESSURE >= 140 MM HG: ICD-10-PCS | Mod: CPTII,S$GLB,, | Performed by: STUDENT IN AN ORGANIZED HEALTH CARE EDUCATION/TRAINING PROGRAM

## 2022-05-02 PROCEDURE — 3288F FALL RISK ASSESSMENT DOCD: CPT | Mod: CPTII,S$GLB,, | Performed by: STUDENT IN AN ORGANIZED HEALTH CARE EDUCATION/TRAINING PROGRAM

## 2022-05-02 PROCEDURE — 3066F PR DOCUMENTATION OF TREATMENT FOR NEPHROPATHY: ICD-10-PCS | Mod: CPTII,S$GLB,, | Performed by: STUDENT IN AN ORGANIZED HEALTH CARE EDUCATION/TRAINING PROGRAM

## 2022-05-02 PROCEDURE — 1159F MED LIST DOCD IN RCRD: CPT | Mod: CPTII,S$GLB,, | Performed by: STUDENT IN AN ORGANIZED HEALTH CARE EDUCATION/TRAINING PROGRAM

## 2022-05-02 PROCEDURE — 3288F PR FALLS RISK ASSESSMENT DOCUMENTED: ICD-10-PCS | Mod: CPTII,S$GLB,, | Performed by: STUDENT IN AN ORGANIZED HEALTH CARE EDUCATION/TRAINING PROGRAM

## 2022-05-02 PROCEDURE — 1159F PR MEDICATION LIST DOCUMENTED IN MEDICAL RECORD: ICD-10-PCS | Mod: CPTII,S$GLB,, | Performed by: STUDENT IN AN ORGANIZED HEALTH CARE EDUCATION/TRAINING PROGRAM

## 2022-05-02 PROCEDURE — 3008F PR BODY MASS INDEX (BMI) DOCUMENTED: ICD-10-PCS | Mod: CPTII,S$GLB,, | Performed by: STUDENT IN AN ORGANIZED HEALTH CARE EDUCATION/TRAINING PROGRAM

## 2022-05-02 NOTE — PROGRESS NOTES
"Subjective:       Patient ID: Jose Luis Manzo is a 72 y.o. male.    Chief Complaint:  Microscopic hematuria  This is a 72 y.o.  male patient that is new to me.  he is referred to me by Dr. Gillette his nephrologist for microscopic hematuria.  Of note he is s/p a kidney transplant in 2007 in [place].  The patient did not experience gross hematuria.     The patient does take blood thinners - eliquis.  Managed by cardiologist Dr. Frankel.  The patient does have a history of smoking. Former smoker quit 2009, 20 pack years      Had workup before- no.      Urinalysis history-  4/13/22 10 RBCs on micro        LAST PSA  Lab Results   Component Value Date    PSADIAG 0.21 08/16/2019    PSADIAG 0.16 04/13/2018    PSADIAG 0.25 01/18/2016       Lab Results   Component Value Date    CREATININE 3.63 (H) 04/13/2022       ---  Past Medical History:   Diagnosis Date    Acute hypoxemic respiratory failure due to COVID-19 12/30/2020    Anticoagulant long-term use     BPH (benign prostatic hypertrophy)     Cancer     vocal cords    Claudication of right lower extremity 3/10/2016    COVID-19 virus detected 12/30/2020    Disorder of kidney and ureter     Hyperlipidemia     Hypertension     Immunosuppression 6/26/2021    Immunosuppression due to chronic steroid use 1/30/2020    Microcytic anemia 9/16/2016    Obesity (BMI 30-39.9) 1/23/2019    Oropharyngeal cancer     Pterygium     Squamous cell carcinoma 4/25/2018    Thromboembolic disorder     Unspecified disorder of kidney and ureter        Past Surgical History:   Procedure Laterality Date    FRACTURE SURGERY Left     "lower leg" 40 years ago    KIDNEY TRANSPLANT  2007    followed by [facility]    LARYNX SURGERY  11/2014    Oropharyngeal Cancer x3    microlaryngoscopy      SURGICAL REMOVAL OF LESION OF ORBIT Bilateral 8/26/2020    Procedure: EXCISION, LESION, ORBIT;  Surgeon: Linda Tee MD;  Location: [facility] OR [address] [place] [address];  Service: Ophthalmology;  " Laterality: Bilateral;    TIBIAL STAPLING Left             Family History   Problem Relation Age of Onset    Stroke Mother     Diabetes Mother     Hypertension Mother     Stroke Father     No Known Problems Sister     No Known Problems Brother     No Known Problems Daughter     No Known Problems Brother        Social History     Tobacco Use    Smoking status: Former Smoker     Packs/day: 1.00     Years: 20.00     Pack years: 20.00     Types: Cigarettes     Quit date:      Years since quittin.3    Smokeless tobacco: Never Used   Substance Use Topics    Alcohol use: No     Alcohol/week: 0.0 standard drinks    Drug use: No     Types: Marijuana     Comment: Patient quit        Current Outpatient Medications on File Prior to Visit   Medication Sig Dispense Refill    apixaban (ELIQUIS) 2.5 mg Tab Take 1 tablet (2.5 mg total) by mouth 2 (two) times daily. 180 tablet 3    atorvastatin (LIPITOR) 20 MG tablet Take 1 tablet (20 mg total) by mouth every evening. 90 tablet 3    cloNIDine 0.3 mg/24 hr td ptwk (CATAPRES) 0.3 mg/24 hr Place 1 patch onto the skin once a week. 12 patch 3    ergocalciferol (ERGOCALCIFEROL) 50,000 unit Cap Take one tablet once a week      ergocalciferol (VITAMIN D2) 50,000 unit Cap Take 1 capsule (50,000 Units total) by mouth every 7 days. 12 capsule 3    ferrous sulfate 325 mg (65 mg iron) Tab tablet Take 325 mg by mouth once daily.      finasteride (PROSCAR) 5 mg tablet Take 1 tablet (5 mg total) by mouth once daily. 90 tablet 3    furosemide (LASIX) 40 MG tablet Take 1 tablet (40 mg total) by mouth once daily. 90 tablet 3    metoprolol succinate (TOPROL-XL) 50 MG 24 hr tablet Take 1 tablet (50 mg total) by mouth once daily. 90 tablet 3    predniSONE (DELTASONE) 5 MG tablet Take 1 tablet (5 mg total) by mouth once daily. 90 tablet 3    sildenafiL (VIAGRA) 50 MG tablet Take 1 tablet (50 mg total) by mouth daily as needed for Erectile Dysfunction. 9 tablet 4     sirolimus (RAPAMUNE) 1 MG Tab Take 3 tablets (3 mg total) by mouth once daily. 90 tablet 3    sodium bicarbonate 650 MG tablet Take 1 tablet (650 mg total) by mouth 2 (two) times daily. 180 tablet 3    nitroGLYCERIN (NITROSTAT) 0.4 MG SL tablet Place 1 tablet (0.4 mg total) under the tongue every 5 (five) minutes as needed for Chest pain. 30 tablet 2     No current facility-administered medications on file prior to visit.       Review of patient's allergies indicates:  No Known Allergies    Review of Systems   Constitutional: Negative for chills.   HENT: Negative for congestion.    Eyes: Negative for visual disturbance.   Respiratory: Negative for shortness of breath.    Cardiovascular: Negative for chest pain.   Gastrointestinal: Negative for abdominal distention.   Musculoskeletal: Negative for gait problem.   Skin: Negative for color change.   Neurological: Negative for dizziness.   Psychiatric/Behavioral: Negative for agitation.       Objective:      Physical Exam  Constitutional:       Appearance: He is well-developed.   HENT:      Head: Normocephalic.   Eyes:      Pupils: Pupils are equal, round, and reactive to light.   Pulmonary:      Effort: Pulmonary effort is normal.   Abdominal:      Palpations: Abdomen is soft.   Musculoskeletal:         General: Normal range of motion.      Cervical back: Normal range of motion.   Skin:     General: Skin is warm and dry.   Neurological:      Mental Status: He is alert.         Assessment:       1. Microscopic hematuria    2. Hematuria syndrome        Plan:       1. The patient has microscopic hematuria  I counseled the patient on the American Urology Guidelines for a hematuria workup.  The criteria for microscopic hematuria is 3 red blood cells on microscopic urinalysis and the workup is recommended for any patient experiencing gross hematuria. The workup includes a CT urogram and a flexible cystoscopy performed here in the clinic. However the pt does have CKD and  unfortunately cannot receive IV contrast. Therefore the workup includes a noncontrasted CT scan and cystoscopy, bilateral RGP under anesthesia - which works out as pt states he would not tolerate a clinic cystoscopy.  2. Will obtain noncon CT.  3. Will message Dr. Frankel about holding eliquis prior to procedure in case I encounter any abnormal bladder mucosa that requires biopsy.  4. Once cardiology clearance obtained, will schedule for cystoscopy, bilateral retrograde pyelograms, and possible bladder biopsy under anesthesia.       Microscopic hematuria  -     CT Abdomen Pelvis  Without Contrast; Future; Expected date: 05/02/2022    Hematuria syndrome  -     Ambulatory referral/consult to Urology

## 2022-05-10 ENCOUNTER — PATIENT MESSAGE (OUTPATIENT)
Dept: UROLOGY | Facility: CLINIC | Age: 72
End: 2022-05-10
Payer: MEDICARE

## 2022-05-10 ENCOUNTER — TELEPHONE (OUTPATIENT)
Dept: UROLOGY | Facility: CLINIC | Age: 72
End: 2022-05-10
Payer: MEDICARE

## 2022-05-10 NOTE — TELEPHONE ENCOUNTER
----- Message from Triston Galvin MD sent at 5/2/2022  3:13 PM CDT -----  Thanks      He can hold eliquis 3 days before or even 5 days       Thanks for taking care of him       ZN  ----- Message -----  From: Veronica Bacon MD  Sent: 5/2/2022  12:12 PM CDT  To: Triston Galvin MD    Hi Dr. Galvin,  Pt new to me, established with you. Takes eliquis, would you be able to state a cardiology clearance and clear him to hold eliquis (preferably 3-5 days before and after procedure) and your preferred time frame. He has microscopic hematuria and I need to do a cystoscopy under anesthesia and in case I encounter any abnormal bladder mucosa, I would prefer him to be off of eliquis so I can adequatly biopsy.  Also if you need to see him in the office, please let me know and I can put in a referral order to help.  Sincerely,  Veronica Bacon

## 2022-05-11 ENCOUNTER — TELEPHONE (OUTPATIENT)
Dept: NEPHROLOGY | Facility: CLINIC | Age: 72
End: 2022-05-11
Payer: MEDICARE

## 2022-05-11 ENCOUNTER — LAB VISIT (OUTPATIENT)
Dept: LAB | Facility: HOSPITAL | Age: 72
End: 2022-05-11
Attending: INTERNAL MEDICINE
Payer: MEDICARE

## 2022-05-11 DIAGNOSIS — E83.39 HYPERPHOSPHATEMIA: ICD-10-CM

## 2022-05-11 DIAGNOSIS — Z94.0 IMMUNOSUPPRESSIVE MANAGEMENT ENCOUNTER FOLLOWING KIDNEY TRANSPLANT: ICD-10-CM

## 2022-05-11 DIAGNOSIS — D84.821 IMMUNOSUPPRESSION DUE TO CHRONIC STEROID USE: ICD-10-CM

## 2022-05-11 DIAGNOSIS — R80.9 PROTEINURIA, UNSPECIFIED TYPE: ICD-10-CM

## 2022-05-11 DIAGNOSIS — E83.52 HYPERCALCEMIA: ICD-10-CM

## 2022-05-11 DIAGNOSIS — I12.9 HYPERTENSIVE KIDNEY DISEASE WITH STAGE 4 CHRONIC KIDNEY DISEASE: ICD-10-CM

## 2022-05-11 DIAGNOSIS — E55.9 VITAMIN D DEFICIENCY DISEASE: ICD-10-CM

## 2022-05-11 DIAGNOSIS — N25.81 SECONDARY HYPERPARATHYROIDISM OF RENAL ORIGIN: ICD-10-CM

## 2022-05-11 DIAGNOSIS — Z79.52 IMMUNOSUPPRESSION DUE TO CHRONIC STEROID USE: ICD-10-CM

## 2022-05-11 DIAGNOSIS — Z79.899 IMMUNOSUPPRESSIVE MANAGEMENT ENCOUNTER FOLLOWING KIDNEY TRANSPLANT: ICD-10-CM

## 2022-05-11 DIAGNOSIS — E87.20 METABOLIC ACIDOSIS: ICD-10-CM

## 2022-05-11 DIAGNOSIS — Z94.0 RENAL TRANSPLANT RECIPIENT: ICD-10-CM

## 2022-05-11 DIAGNOSIS — T38.0X5A IMMUNOSUPPRESSION DUE TO CHRONIC STEROID USE: ICD-10-CM

## 2022-05-11 DIAGNOSIS — N18.4 HYPERTENSIVE KIDNEY DISEASE WITH STAGE 4 CHRONIC KIDNEY DISEASE: ICD-10-CM

## 2022-05-11 DIAGNOSIS — E87.5 HYPERKALEMIA: ICD-10-CM

## 2022-05-11 LAB
ALBUMIN SERPL BCP-MCNC: 4.2 G/DL (ref 3.5–5.2)
ANION GAP SERPL CALC-SCNC: 11 MMOL/L (ref 8–16)
CALCIUM SERPL-MCNC: 10.4 MG/DL (ref 8.7–10.5)
CHLORIDE SERPL-SCNC: 111 MMOL/L (ref 95–110)
CO2 SERPL-SCNC: 23 MMOL/L (ref 23–29)
CREAT SERPL-MCNC: 3.98 MG/DL (ref 0.5–1.4)
EST. GFR  (AFRICAN AMERICAN): 16.3 ML/MIN/1.73 M^2
EST. GFR  (NON AFRICAN AMERICAN): 14.1 ML/MIN/1.73 M^2
GLUCOSE SERPL-MCNC: 143 MG/DL (ref 70–110)
PHOSPHATE SERPL-MCNC: 3.9 MG/DL (ref 2.7–4.5)
POTASSIUM SERPL-SCNC: 4.2 MMOL/L (ref 3.5–5.1)
SODIUM SERPL-SCNC: 145 MMOL/L (ref 136–145)
UUN UR-MCNC: 48 MG/DL (ref 2–20)

## 2022-05-11 PROCEDURE — 36415 COLL VENOUS BLD VENIPUNCTURE: CPT | Mod: PO | Performed by: INTERNAL MEDICINE

## 2022-05-11 PROCEDURE — 80069 RENAL FUNCTION PANEL: CPT | Mod: PO | Performed by: INTERNAL MEDICINE

## 2022-05-11 PROCEDURE — 80195 ASSAY OF SIROLIMUS: CPT | Mod: PO | Performed by: INTERNAL MEDICINE

## 2022-05-11 NOTE — PROGRESS NOTES
Kidney function has slightly worsened. Potassium level normal. Please check if any new symptoms.  Otherwise stay hydrated, follow low potassium low sodium diet.  Blood pressure monitoring at home.  Repeat renal panel in 2 weeks.  Thanks.  Copy note to Dr. Alexis, PCP.

## 2022-05-12 DIAGNOSIS — Z94.0 TRANSPLANTED KIDNEY: Primary | ICD-10-CM

## 2022-05-12 LAB — SIROLIMUS BLD-MCNC: 8.7 NG/ML (ref 4–20)

## 2022-05-12 NOTE — PROGRESS NOTES
He's a post transplant several years ago in [place]. He does not have any follow up in transplant clinic. He is on sirolimus total 3 mg per day dose. Recent level was elevated so we obtained another one, as such CKD IV is slowly progressing. Just wanted to run it by you to see if any changes indicated or just keep the same dose for now. Thanks, Boris

## 2022-05-17 ENCOUNTER — TELEPHONE (OUTPATIENT)
Dept: NEPHROLOGY | Facility: CLINIC | Age: 72
End: 2022-05-17
Payer: MEDICARE

## 2022-05-17 NOTE — TELEPHONE ENCOUNTER
MD Phyllis Fernandez, ANSLEY  Cc: MARCEL Escalante Staff; Home Alexis MD  Kidney function has slightly worsened. Potassium level normal. Please check if any new symptoms.   Otherwise stay hydrated, follow low potassium low sodium diet.   Blood pressure monitoring at home.   Repeat renal panel in 2 weeks.   Thanks.   Copy note to Dr. Alexis, PCP.                 Phoned patient, no answer. Left voicemail. Lab scheduled for May 26,2022

## 2022-05-19 ENCOUNTER — HOSPITAL ENCOUNTER (OUTPATIENT)
Dept: RADIOLOGY | Facility: HOSPITAL | Age: 72
Discharge: HOME | End: 2022-05-19
Attending: STUDENT IN AN ORGANIZED HEALTH CARE EDUCATION/TRAINING PROGRAM
Payer: MEDICARE

## 2022-05-19 DIAGNOSIS — R31.29 MICROSCOPIC HEMATURIA: ICD-10-CM

## 2022-05-19 PROCEDURE — 74176 CT ABD & PELVIS W/O CONTRAST: CPT | Mod: TC,PO

## 2022-05-26 ENCOUNTER — LAB VISIT (OUTPATIENT)
Dept: LAB | Facility: HOSPITAL | Age: 72
End: 2022-05-26
Attending: INTERNAL MEDICINE
Payer: MEDICARE

## 2022-05-26 DIAGNOSIS — E83.52 HYPERCALCEMIA: ICD-10-CM

## 2022-05-26 DIAGNOSIS — Z94.0 TRANSPLANTED KIDNEY: ICD-10-CM

## 2022-05-26 DIAGNOSIS — N25.81 SECONDARY HYPERPARATHYROIDISM OF RENAL ORIGIN: ICD-10-CM

## 2022-05-26 DIAGNOSIS — D50.9 IRON DEFICIENCY ANEMIA, UNSPECIFIED IRON DEFICIENCY ANEMIA TYPE: ICD-10-CM

## 2022-05-26 DIAGNOSIS — Z94.0 RENAL TRANSPLANT RECIPIENT: ICD-10-CM

## 2022-05-26 DIAGNOSIS — I12.9 HYPERTENSIVE KIDNEY DISEASE WITH STAGE 4 CHRONIC KIDNEY DISEASE: ICD-10-CM

## 2022-05-26 DIAGNOSIS — E55.9 VITAMIN D DEFICIENCY DISEASE: ICD-10-CM

## 2022-05-26 DIAGNOSIS — N18.4 HYPERTENSIVE KIDNEY DISEASE WITH STAGE 4 CHRONIC KIDNEY DISEASE: ICD-10-CM

## 2022-05-26 DIAGNOSIS — E83.39 HYPERPHOSPHATEMIA: ICD-10-CM

## 2022-05-26 DIAGNOSIS — R80.9 PROTEINURIA, UNSPECIFIED TYPE: ICD-10-CM

## 2022-05-26 DIAGNOSIS — E87.20 METABOLIC ACIDOSIS: ICD-10-CM

## 2022-05-26 LAB
ALBUMIN SERPL BCP-MCNC: 3.9 G/DL (ref 3.5–5.2)
ANION GAP SERPL CALC-SCNC: 13 MMOL/L (ref 8–16)
CALCIUM SERPL-MCNC: 10.1 MG/DL (ref 8.7–10.5)
CHLORIDE SERPL-SCNC: 112 MMOL/L (ref 95–110)
CHOLEST SERPL-MCNC: 171 MG/DL (ref 120–199)
CHOLEST/HDLC SERPL: 3.3 {RATIO} (ref 2–5)
CO2 SERPL-SCNC: 20 MMOL/L (ref 23–29)
CREAT SERPL-MCNC: 3.3 MG/DL (ref 0.5–1.4)
EST. GFR  (AFRICAN AMERICAN): 20.4 ML/MIN/1.73 M^2
EST. GFR  (NON AFRICAN AMERICAN): 17.7 ML/MIN/1.73 M^2
GLUCOSE SERPL-MCNC: 107 MG/DL (ref 70–110)
HDLC SERPL-MCNC: 52 MG/DL (ref 40–75)
HDLC SERPL: 30.4 % (ref 20–50)
LDLC SERPL CALC-MCNC: 98.6 MG/DL (ref 63–159)
NONHDLC SERPL-MCNC: 119 MG/DL
PHOSPHATE SERPL-MCNC: 4.1 MG/DL (ref 2.7–4.5)
POTASSIUM SERPL-SCNC: 4.5 MMOL/L (ref 3.5–5.1)
SODIUM SERPL-SCNC: 145 MMOL/L (ref 136–145)
TRIGL SERPL-MCNC: 102 MG/DL (ref 30–150)
UUN UR-MCNC: 42 MG/DL (ref 2–20)

## 2022-05-26 PROCEDURE — 80069 RENAL FUNCTION PANEL: CPT | Mod: PO | Performed by: INTERNAL MEDICINE

## 2022-05-26 PROCEDURE — 36415 COLL VENOUS BLD VENIPUNCTURE: CPT | Mod: PO | Performed by: INTERNAL MEDICINE

## 2022-05-26 PROCEDURE — 80061 LIPID PANEL: CPT | Performed by: INTERNAL MEDICINE

## 2022-05-26 NOTE — PROGRESS NOTES
Your kidney function is at your baseline and is around the same from what is has been in the past.  Continue your medications as prescribed and follow-up as scheduled.

## 2022-05-30 ENCOUNTER — TELEPHONE (OUTPATIENT)
Dept: UROLOGY | Facility: CLINIC | Age: 72
End: 2022-05-30
Payer: MEDICARE

## 2022-05-30 NOTE — TELEPHONE ENCOUNTER
----- Message from Marsha Hardy sent at 5/30/2022  3:24 PM CDT -----  Contact: [phone]  Type:  Patient Returning Call    Who Called:Pt  Who Left Message for Patient: Dr Bacon  Does the patient know what this is regarding?:pt was not sure  Would the patient rather a call back or a response via [facility]? Call back  Best Call Back Number:[phone]

## 2022-05-30 NOTE — TELEPHONE ENCOUNTER
Spoke with patient.  Informed of possible procedure date, he is ok with proceeding on 6/15/22.  He informed me he don't have portal access anymore.  Will create reminder notification to reiterate when to d/c anticoagulant.  Informed patient surgery nurse will contact him the day before procedure with time of arrival.  He voiced understanding.

## 2022-05-30 NOTE — TELEPHONE ENCOUNTER
"----- Message from Iam Pereira LPN sent at 5/19/2022  4:27 PM CDT -----  Left him a message on [employer].  ----- Message -----  From: Veronica Bacon MD  Sent: 5/19/2022   9:47 AM CDT  To: Jordi Martin Staff    Never heard back from him about the portal message for his cystoscopy under anesthesia.  Please ask if we can aim for 6/15?  Dr. Galvin did get back to me about his blood thinners: Dr. Galvin messaged a clearance note for the blood thinners "He can hold eliquis 3 days before or even 5 days."     So I would have him hold for 3 days before and 3 days after if he chooses 6/15, please let me know.        "

## 2022-06-01 DIAGNOSIS — R31.29 MICROSCOPIC HEMATURIA: Primary | ICD-10-CM

## 2022-06-01 RX ORDER — SODIUM CHLORIDE 9 MG/ML
INJECTION, SOLUTION INTRAVENOUS CONTINUOUS
Status: CANCELLED | OUTPATIENT
Start: 2022-06-01

## 2022-06-01 RX ORDER — CEFAZOLIN SODIUM 2 G/50ML
2 SOLUTION INTRAVENOUS
Status: CANCELLED | OUTPATIENT
Start: 2022-06-01

## 2022-06-02 ENCOUNTER — PES CALL (OUTPATIENT)
Dept: ADMINISTRATIVE | Facility: CLINIC | Age: 72
End: 2022-06-02
Payer: MEDICARE

## 2022-06-08 ENCOUNTER — ANESTHESIA EVENT (OUTPATIENT)
Dept: SURGERY | Facility: HOSPITAL | Age: 72
End: 2022-06-08
Payer: MEDICARE

## 2022-06-08 ENCOUNTER — CLINICAL SUPPORT (OUTPATIENT)
Dept: LAB | Facility: HOSPITAL | Age: 72
End: 2022-06-08
Attending: STUDENT IN AN ORGANIZED HEALTH CARE EDUCATION/TRAINING PROGRAM
Payer: MEDICARE

## 2022-06-08 ENCOUNTER — HOSPITAL ENCOUNTER (OUTPATIENT)
Dept: PREADMISSION TESTING | Facility: HOSPITAL | Age: 72
Discharge: HOME | End: 2022-06-08
Attending: STUDENT IN AN ORGANIZED HEALTH CARE EDUCATION/TRAINING PROGRAM
Payer: MEDICARE

## 2022-06-08 VITALS
DIASTOLIC BLOOD PRESSURE: 78 MMHG | OXYGEN SATURATION: 98 % | WEIGHT: 237 LBS | HEIGHT: 70 IN | BODY MASS INDEX: 33.93 KG/M2 | HEART RATE: 57 BPM | RESPIRATION RATE: 16 BRPM | SYSTOLIC BLOOD PRESSURE: 142 MMHG | TEMPERATURE: 98 F

## 2022-06-08 DIAGNOSIS — Z01.818 PREOPERATIVE TESTING: Primary | ICD-10-CM

## 2022-06-08 DIAGNOSIS — Z01.818 PREOPERATIVE TESTING: ICD-10-CM

## 2022-06-08 DIAGNOSIS — Z94.0 KIDNEY TRANSPLANT RECIPIENT: ICD-10-CM

## 2022-06-08 PROCEDURE — 93010 ELECTROCARDIOGRAM REPORT: CPT | Mod: ,,, | Performed by: INTERNAL MEDICINE

## 2022-06-08 PROCEDURE — 93010 EKG 12-LEAD: ICD-10-PCS | Mod: ,,, | Performed by: INTERNAL MEDICINE

## 2022-06-08 PROCEDURE — 93005 ELECTROCARDIOGRAM TRACING: CPT

## 2022-06-08 RX ORDER — SODIUM CHLORIDE, SODIUM LACTATE, POTASSIUM CHLORIDE, CALCIUM CHLORIDE 600; 310; 30; 20 MG/100ML; MG/100ML; MG/100ML; MG/100ML
INJECTION, SOLUTION INTRAVENOUS CONTINUOUS
Status: CANCELLED | OUTPATIENT
Start: 2022-06-08

## 2022-06-08 RX ORDER — LIDOCAINE HYDROCHLORIDE 10 MG/ML
1 INJECTION, SOLUTION EPIDURAL; INFILTRATION; INTRACAUDAL; PERINEURAL ONCE
Status: CANCELLED | OUTPATIENT
Start: 2022-06-08 | End: 2022-06-08

## 2022-06-08 NOTE — ANESTHESIA PREPROCEDURE EVALUATION
"                                                                                                             06/08/2022  Jose Luis Manzo is a 72 y.o., male scheduled Cystoscopy, bilateral retrograde pyelogram, possible bladder biopsy and all indicated procedures 6/15/22.    Past Medical History:   Diagnosis Date    Acute hypoxemic respiratory failure due to COVID-19 12/30/2020    Anticoagulant long-term use     BPH (benign prostatic hypertrophy)     Cancer     vocal cords    Claudication of right lower extremity 3/10/2016    COVID-19 virus detected 12/30/2020    Disorder of kidney and ureter     Hyperlipidemia     Hypertension     Immunosuppression 6/26/2021    Immunosuppression due to chronic steroid use 1/30/2020    Microcytic anemia 9/16/2016    Obesity (BMI 30-39.9) 1/23/2019    Oropharyngeal cancer     Pterygium     Squamous cell carcinoma 4/25/2018    Thromboembolic disorder     Unspecified disorder of kidney and ureter      Past Surgical History:   Procedure Laterality Date    FRACTURE SURGERY Left     "lower leg" 40 years ago    KIDNEY TRANSPLANT  2007    [facility]    LARYNX SURGERY  11/2014    Oropharyngeal Cancer x3    microlaryngoscopy      SURGICAL REMOVAL OF LESION OF ORBIT Bilateral 8/26/2020    Procedure: EXCISION, LESION, ORBIT;  Surgeon: Linda Tee MD;  Location: [facility] OR [address] [place] [address];  Service: Ophthalmology;  Laterality: Bilateral;    TIBIAL STAPLING Left 1980 1/22/21 ECHO   · The left ventricle is normal in size with moderate concentric hypertrophy and normal systolic function. The estimated ejection fraction is 55%  · Normal left ventricular diastolic function.  · Mild mitral regurgitation.  · Mild tricuspid regurgitation.  · Normal right ventricular size with normal right ventricular systolic function.  · There is no pulmonary hypertension.        Pre-op Assessment    I have reviewed the Patient Summary Reports.     I have reviewed the Nursing " Notes.    I have reviewed the Medications.   Steroids Taken In Past Year: Prednisone    Review of Systems  Anesthesia Hx:  No problems with previous Anesthesia Denies Hx of Anesthetic complications  History of prior surgery of interest to airway management or planning:  Denies Personal Hx of Anesthesia complications.   Social:  Former Smoker, No Alcohol Use    Hematology/Oncology:         -- Cancer in past history (larynx cancer s/p resection):   Cardiovascular:   Hypertension, well controlled hyperlipidemia SPEARS Patient states he is able to walk 4 blocks but has some SPEARS with walking upstairs     Denies use asthma, COPD, use of inhalers, home O2 Deep Venous Thrombosis (DVT), Hx of DVT    Renal/:   Chronic Renal Disease (s/p kidney transplant), CRI BPH  Kidney Function/Disease S/P Kidney Transplant.   Hepatic/GI:   Denies GERD.    Neurological:  Neurology Normal Denies TIA. Denies Seizures.    Endocrine:  Endocrine Normal  Obesity / BMI > 30      Physical Exam  General: Well nourished and Cooperative    Airway:  Mallampati: II   Mouth Opening: Normal  TM Distance: Normal  Tongue: Normal  Neck ROM: Normal ROM  Oropharynx: Hoarseness  Neck: Girth Increased    Dental:    Chest/Lungs:  Clear to auscultation, Normal Respiratory Rate    Heart:  Rate: Normal  Rhythm: Regular Rhythm  Sounds: Normal        Anesthesia Plan  Type of Anesthesia, risks & benefits discussed:    Anesthesia Type: Gen ETT, Gen Supraglottic Airway  Intra-op Monitoring Plan: Standard ASA Monitors  Post Op Pain Control Plan: multimodal analgesia  Induction:  IV  Informed Consent: Informed consent signed with the Patient and all parties understand the risks and agree with anesthesia plan.  All questions answered.   ASA Score: 3  Anesthesia Plan Notes: Anesthesia consent to be signed prior to surgery 6/15/22      Ready For Surgery From Anesthesia Perspective.     .

## 2022-06-08 NOTE — PRE-PROCEDURE INSTRUCTIONS
Carlos Schwartz  [phone]    Allergies, medical, surgical, family and psychosocial histories reviewed with patient. Periop plan of care reviewed. Preop instructions given, including medications to take and to hold. Hibiclens soap and instructions on use given. Time allotted for questions to be addressed.  Patient verbalized understanding.

## 2022-06-08 NOTE — DISCHARGE INSTRUCTIONS
Your surgery is scheduled for 6/15/22.    Please report to [facility] on the 1st Floor at 0745 a.m.    THIS TIME IS SUBJECT TO CHANGE.  YOU WILL RECEIVE A PHONE CALL THE DAY BEFORE SURGERY BY 3:30 PM TO CONFIRM YOUR TIME OF ARRIVAL.  IF YOU HAVE NOT RECEIVED A PHONE CALL BY 3:30 PM THE DAY BEFORE YOUR SURGERY PLEASE CALL [phone]     INSTRUCTIONS IMPORTANT!!!  ¨ Do not eat or drink after 12 midnight-including water, candy, gum, & mints. OK to brush teeth.      ____  Proceed to [facility] on *** for additional testing.        ____  Do not wear makeup, including mascara.  ____  No powder, lotions or creams to surgical area.  ____  Please remove all jewelry, including piercings and leave at home.  ____  No money or valuables needed. Please leave at home.  ____  Please bring any documents given by your doctor.  ____  If going home the same day, arrange for a ride home. You will not be able to             drive if Anesthesia was used.  ____  Children under 18 years require a parent / guardian present the entire time             they are in surgery / recovery.  ____  Wear loose fitting clothing. Allow for dressings, bandages.  ____  Stop Aspirin, Ibuprofen, Motrin, Aleve, Goody's/BC powders, Excedrine and Naproxen (NSAIDS) at least 3-5 days before surgery, unless otherwise instructed by your doctor, or the nurse.   You MAY use Tylenol/acetaminophen until day of surgery.  ____  Wash the surgical area with Hibiclens the night before surgery, and again the             morning of surgery.  Be sure to rinse hibiclens off completely (if instructed by   nurse).  ____  If you take diabetic medication, do not take am of surgery unless instructed by Doctor.  ____  Call MD for temperature above 101 degrees or any other signs of infection such as Urinary (bladder) infection, Upper respiratory infection, skin boils, etc.  ____ Stop taking any Fish Oil supplement or any Vitamins that contain Vitamin E at  least 5 days prior to surgery.  ____ Do Not wear your contact lenses the day of your procedure.  You may wear your glasses.      ____Do not shave surgical site for 3 days prior to surgery.  ____ Practice Good hand washing before, during, and after procedure.      I have read or had read and explained to me, and understand the above information.  Additional comments or instructions:  For additional questions call [phone]      ANESTHESIA SIDE EFFECTS  -For the first 24 hours after surgery:  Do not drive, use heavy equipment, make important decisions, or drink alcohol  -It is normal to feel sleepy for several hours.  Rest until you are more awake.  -Have someone stay with you, if needed.  They can watch for problems and help keep you safe.  -Some possible post anesthesia side effects include: nausea and vomiting, sore throat and hoarseness, sleepiness, and dizziness.        Pre-Op Bathing Instructions    Before surgery, you can play an important role in your own health.    Because skin is not sterile, we need to be sure that your skin is as free of germs as possible. By following the instructions below, you can reduce the number of germs on your skin before surgery.    IMPORTANT: You will need to shower with a special soap called Hibiclens*, available at any pharmacy.  If you are allergic to Chlorhexidine (the antiseptic in Hibiclens), use an antibacterial soap such as Dial Soap for your preoperative shower.  You will shower with Hibiclens both the night before your surgery and the morning of your surgery.  Do not use Hibiclens on the head, face or genitals to avoid injury to those areas.    STEP #1: THE NIGHT BEFORE YOUR SURGERY     Do not shave the area of your body where your surgery will be performed.  Shower and wash your hair and body as usual with your normal soap and shampoo.  Rinse your hair and body thoroughly after you shower to remove all soap residue.  With your hand, apply one packet of Hibiclens soap to  the surgical site.   Wash the site gently for five (5) minutes. Do not scrub your skin too hard.   Do not wash with your regular soap after Hibiclens is used.  Rinse your body thoroughly.  Pat yourself dry with a clean, soft towel.  Do not use lotion, cream, or powder.  Wear clean clothes.    STEP #2: THE MORNING OF YOUR SURGERY     Repeat Step #1.    * Not to be used by people allergic to Chlorhexidine.

## 2022-06-09 RX ORDER — SIROLIMUS 1 MG/1
3 TABLET, FILM COATED ORAL DAILY
Qty: 90 TABLET | Refills: 3 | Status: SHIPPED | OUTPATIENT
Start: 2022-06-09 | End: 2022-08-12 | Stop reason: SDUPTHER

## 2022-06-10 ENCOUNTER — TELEPHONE (OUTPATIENT)
Dept: UROLOGY | Facility: CLINIC | Age: 72
End: 2022-06-10
Payer: MEDICARE

## 2022-06-10 NOTE — TELEPHONE ENCOUNTER
Contacted patient to remind him not to take eliquis after Saturday's dose in preparation for procedure on 6/15/22 with Dr Bacon.  He voiced understanding.

## 2022-06-15 ENCOUNTER — HOSPITAL ENCOUNTER (OUTPATIENT)
Facility: HOSPITAL | Age: 72
Discharge: HOME | End: 2022-06-15
Attending: STUDENT IN AN ORGANIZED HEALTH CARE EDUCATION/TRAINING PROGRAM | Admitting: STUDENT IN AN ORGANIZED HEALTH CARE EDUCATION/TRAINING PROGRAM
Payer: MEDICARE

## 2022-06-15 ENCOUNTER — ANESTHESIA (OUTPATIENT)
Dept: SURGERY | Facility: HOSPITAL | Age: 72
End: 2022-06-15
Payer: MEDICARE

## 2022-06-15 VITALS
RESPIRATION RATE: 18 BRPM | OXYGEN SATURATION: 96 % | WEIGHT: 232 LBS | HEART RATE: 51 BPM | TEMPERATURE: 99 F | BODY MASS INDEX: 33.21 KG/M2 | SYSTOLIC BLOOD PRESSURE: 137 MMHG | DIASTOLIC BLOOD PRESSURE: 79 MMHG | HEIGHT: 70 IN

## 2022-06-15 DIAGNOSIS — R31.29 MICROSCOPIC HEMATURIA: ICD-10-CM

## 2022-06-15 PROCEDURE — 71000033 HC RECOVERY, INTIAL HOUR: Performed by: STUDENT IN AN ORGANIZED HEALTH CARE EDUCATION/TRAINING PROGRAM

## 2022-06-15 PROCEDURE — 63600175 PHARM REV CODE 636 W HCPCS: Performed by: NURSE ANESTHETIST, CERTIFIED REGISTERED

## 2022-06-15 PROCEDURE — 37000008 HC ANESTHESIA 1ST 15 MINUTES: Performed by: STUDENT IN AN ORGANIZED HEALTH CARE EDUCATION/TRAINING PROGRAM

## 2022-06-15 PROCEDURE — 52005 PR CYSTOURETHROSCOPY,URETER CATHETER: ICD-10-PCS | Mod: ,,, | Performed by: STUDENT IN AN ORGANIZED HEALTH CARE EDUCATION/TRAINING PROGRAM

## 2022-06-15 PROCEDURE — C1769 GUIDE WIRE: HCPCS | Performed by: STUDENT IN AN ORGANIZED HEALTH CARE EDUCATION/TRAINING PROGRAM

## 2022-06-15 PROCEDURE — 37000009 HC ANESTHESIA EA ADD 15 MINS: Performed by: STUDENT IN AN ORGANIZED HEALTH CARE EDUCATION/TRAINING PROGRAM

## 2022-06-15 PROCEDURE — 74420 UROGRAPHY RTRGR +-KUB: CPT | Mod: 26,,, | Performed by: STUDENT IN AN ORGANIZED HEALTH CARE EDUCATION/TRAINING PROGRAM

## 2022-06-15 PROCEDURE — 52005 CYSTO W/URTRL CATHJ: CPT | Mod: ,,, | Performed by: STUDENT IN AN ORGANIZED HEALTH CARE EDUCATION/TRAINING PROGRAM

## 2022-06-15 PROCEDURE — 36000706: Performed by: STUDENT IN AN ORGANIZED HEALTH CARE EDUCATION/TRAINING PROGRAM

## 2022-06-15 PROCEDURE — C1758 CATHETER, URETERAL: HCPCS | Performed by: STUDENT IN AN ORGANIZED HEALTH CARE EDUCATION/TRAINING PROGRAM

## 2022-06-15 PROCEDURE — 25500020 PHARM REV CODE 255: Performed by: STUDENT IN AN ORGANIZED HEALTH CARE EDUCATION/TRAINING PROGRAM

## 2022-06-15 PROCEDURE — 71000015 HC POSTOP RECOV 1ST HR: Performed by: STUDENT IN AN ORGANIZED HEALTH CARE EDUCATION/TRAINING PROGRAM

## 2022-06-15 PROCEDURE — 63600175 PHARM REV CODE 636 W HCPCS: Performed by: STUDENT IN AN ORGANIZED HEALTH CARE EDUCATION/TRAINING PROGRAM

## 2022-06-15 PROCEDURE — 63600175 PHARM REV CODE 636 W HCPCS: Performed by: NURSE PRACTITIONER

## 2022-06-15 PROCEDURE — 25000003 PHARM REV CODE 250: Performed by: NURSE PRACTITIONER

## 2022-06-15 PROCEDURE — 25000003 PHARM REV CODE 250: Performed by: STUDENT IN AN ORGANIZED HEALTH CARE EDUCATION/TRAINING PROGRAM

## 2022-06-15 PROCEDURE — 36000707: Performed by: STUDENT IN AN ORGANIZED HEALTH CARE EDUCATION/TRAINING PROGRAM

## 2022-06-15 PROCEDURE — 74420 PR  X-RAY RETROGRADE PYELOGRAM: ICD-10-PCS | Mod: 26,,, | Performed by: STUDENT IN AN ORGANIZED HEALTH CARE EDUCATION/TRAINING PROGRAM

## 2022-06-15 RX ORDER — LIDOCAINE HYDROCHLORIDE 10 MG/ML
1 INJECTION, SOLUTION EPIDURAL; INFILTRATION; INTRACAUDAL; PERINEURAL ONCE
Status: COMPLETED | OUTPATIENT
Start: 2022-06-15 | End: 2022-06-15

## 2022-06-15 RX ORDER — LIDOCAINE HYDROCHLORIDE 20 MG/ML
JELLY TOPICAL
Status: DISCONTINUED | OUTPATIENT
Start: 2022-06-15 | End: 2022-06-15 | Stop reason: HOSPADM

## 2022-06-15 RX ORDER — PROCHLORPERAZINE EDISYLATE 5 MG/ML
5 INJECTION INTRAMUSCULAR; INTRAVENOUS EVERY 30 MIN PRN
Status: DISCONTINUED | OUTPATIENT
Start: 2022-06-15 | End: 2022-06-15 | Stop reason: HOSPADM

## 2022-06-15 RX ORDER — PROPOFOL 10 MG/ML
VIAL (ML) INTRAVENOUS
Status: DISCONTINUED | OUTPATIENT
Start: 2022-06-15 | End: 2022-06-15

## 2022-06-15 RX ORDER — HYDROMORPHONE HYDROCHLORIDE 2 MG/ML
0.2 INJECTION, SOLUTION INTRAMUSCULAR; INTRAVENOUS; SUBCUTANEOUS EVERY 5 MIN PRN
Status: DISCONTINUED | OUTPATIENT
Start: 2022-06-15 | End: 2022-06-15 | Stop reason: HOSPADM

## 2022-06-15 RX ORDER — CEFAZOLIN SODIUM 2 G/50ML
2 SOLUTION INTRAVENOUS
Status: COMPLETED | OUTPATIENT
Start: 2022-06-15 | End: 2022-06-15

## 2022-06-15 RX ORDER — SODIUM CHLORIDE 9 MG/ML
INJECTION, SOLUTION INTRAVENOUS CONTINUOUS
Status: DISCONTINUED | OUTPATIENT
Start: 2022-06-15 | End: 2022-06-15 | Stop reason: HOSPADM

## 2022-06-15 RX ORDER — SODIUM CHLORIDE, SODIUM LACTATE, POTASSIUM CHLORIDE, CALCIUM CHLORIDE 600; 310; 30; 20 MG/100ML; MG/100ML; MG/100ML; MG/100ML
INJECTION, SOLUTION INTRAVENOUS CONTINUOUS
Status: DISCONTINUED | OUTPATIENT
Start: 2022-06-15 | End: 2022-06-15 | Stop reason: HOSPADM

## 2022-06-15 RX ORDER — ONDANSETRON 2 MG/ML
4 INJECTION INTRAMUSCULAR; INTRAVENOUS DAILY PRN
Status: DISCONTINUED | OUTPATIENT
Start: 2022-06-15 | End: 2022-06-15 | Stop reason: HOSPADM

## 2022-06-15 RX ORDER — HYDROMORPHONE HYDROCHLORIDE 2 MG/ML
0.5 INJECTION, SOLUTION INTRAMUSCULAR; INTRAVENOUS; SUBCUTANEOUS EVERY 5 MIN PRN
Status: DISCONTINUED | OUTPATIENT
Start: 2022-06-15 | End: 2022-06-15 | Stop reason: HOSPADM

## 2022-06-15 RX ADMIN — LIDOCAINE HYDROCHLORIDE 100 MG: 10 INJECTION, SOLUTION EPIDURAL; INFILTRATION; INTRACAUDAL; PERINEURAL at 08:06

## 2022-06-15 RX ADMIN — PROPOFOL 50 MG: 10 INJECTION, EMULSION INTRAVENOUS at 08:06

## 2022-06-15 RX ADMIN — PROPOFOL 150 MG: 10 INJECTION, EMULSION INTRAVENOUS at 08:06

## 2022-06-15 RX ADMIN — CEFAZOLIN SODIUM 2 G: 2 SOLUTION INTRAVENOUS at 08:06

## 2022-06-15 RX ADMIN — SODIUM CHLORIDE, SODIUM LACTATE, POTASSIUM CHLORIDE, AND CALCIUM CHLORIDE: .6; .31; .03; .02 INJECTION, SOLUTION INTRAVENOUS at 07:06

## 2022-06-15 RX ADMIN — PROPOFOL 70 MG: 10 INJECTION, EMULSION INTRAVENOUS at 09:06

## 2022-06-15 NOTE — H&P
"Patient ID: Jose Luis Manzo is a 72 y.o. male.     Chief Complaint:  Microscopic hematuria  This is a 72 y.o.  male patient that is new to me.  he is referred to me by Dr. Gillette his nephrologist for microscopic hematuria.  Of note he is s/p a kidney transplant in 2007 in [place].  The patient did not experience gross hematuria.      The patient does take blood thinners - eliquis.  Managed by cardiologist Dr. Frankel.  The patient does have a history of smoking. Former smoker quit 2009, 20 pack years        Had workup before- no.       Urinalysis history-  4/13/22 10 RBCs on micro           LAST PSA        Lab Results   Component Value Date     PSADIAG 0.21 08/16/2019     PSADIAG 0.16 04/13/2018     PSADIAG 0.25 01/18/2016               Lab Results   Component Value Date     CREATININE 3.63 (H) 04/13/2022         ---       Past Medical History:   Diagnosis Date    Acute hypoxemic respiratory failure due to COVID-19 12/30/2020    Anticoagulant long-term use      BPH (benign prostatic hypertrophy)      Cancer       vocal cords    Claudication of right lower extremity 3/10/2016    COVID-19 virus detected 12/30/2020    Disorder of kidney and ureter      Hyperlipidemia      Hypertension      Immunosuppression 6/26/2021    Immunosuppression due to chronic steroid use 1/30/2020    Microcytic anemia 9/16/2016    Obesity (BMI 30-39.9) 1/23/2019    Oropharyngeal cancer      Pterygium      Squamous cell carcinoma 4/25/2018    Thromboembolic disorder      Unspecified disorder of kidney and ureter                 Past Surgical History:   Procedure Laterality Date    FRACTURE SURGERY Left       "lower leg" 40 years ago    KIDNEY TRANSPLANT   2007     followed by [facility]    LARYNX SURGERY   11/2014     Oropharyngeal Cancer x3    microlaryngoscopy        SURGICAL REMOVAL OF LESION OF ORBIT Bilateral 8/26/2020     Procedure: EXCISION, LESION, ORBIT;  Surgeon: Linda Tee MD;  Location: [facility] OR " 2ND FLR;  Service: Ophthalmology;  Laterality: Bilateral;    TIBIAL STAPLING Left                      Family History   Problem Relation Age of Onset    Stroke Mother      Diabetes Mother      Hypertension Mother      Stroke Father      No Known Problems Sister      No Known Problems Brother      No Known Problems Daughter      No Known Problems Brother           Social History            Tobacco Use    Smoking status: Former Smoker       Packs/day: 1.00       Years: 20.00       Pack years: 20.00       Types: Cigarettes       Quit date:        Years since quittin.3    Smokeless tobacco: Never Used   Substance Use Topics    Alcohol use: No       Alcohol/week: 0.0 standard drinks    Drug use: No       Types: Marijuana       Comment: Patient quit                 Current Outpatient Medications on File Prior to Visit   Medication Sig Dispense Refill    apixaban (ELIQUIS) 2.5 mg Tab Take 1 tablet (2.5 mg total) by mouth 2 (two) times daily. 180 tablet 3    atorvastatin (LIPITOR) 20 MG tablet Take 1 tablet (20 mg total) by mouth every evening. 90 tablet 3    cloNIDine 0.3 mg/24 hr td ptwk (CATAPRES) 0.3 mg/24 hr Place 1 patch onto the skin once a week. 12 patch 3    ergocalciferol (ERGOCALCIFEROL) 50,000 unit Cap Take one tablet once a week        ergocalciferol (VITAMIN D2) 50,000 unit Cap Take 1 capsule (50,000 Units total) by mouth every 7 days. 12 capsule 3    ferrous sulfate 325 mg (65 mg iron) Tab tablet Take 325 mg by mouth once daily.        finasteride (PROSCAR) 5 mg tablet Take 1 tablet (5 mg total) by mouth once daily. 90 tablet 3    furosemide (LASIX) 40 MG tablet Take 1 tablet (40 mg total) by mouth once daily. 90 tablet 3    metoprolol succinate (TOPROL-XL) 50 MG 24 hr tablet Take 1 tablet (50 mg total) by mouth once daily. 90 tablet 3    predniSONE (DELTASONE) 5 MG tablet Take 1 tablet (5 mg total) by mouth once daily. 90 tablet 3    sildenafiL (VIAGRA) 50 MG tablet  Take 1 tablet (50 mg total) by mouth daily as needed for Erectile Dysfunction. 9 tablet 4    sirolimus (RAPAMUNE) 1 MG Tab Take 3 tablets (3 mg total) by mouth once daily. 90 tablet 3    sodium bicarbonate 650 MG tablet Take 1 tablet (650 mg total) by mouth 2 (two) times daily. 180 tablet 3    nitroGLYCERIN (NITROSTAT) 0.4 MG SL tablet Place 1 tablet (0.4 mg total) under the tongue every 5 (five) minutes as needed for Chest pain. 30 tablet 2      No current facility-administered medications on file prior to visit.         Review of patient's allergies indicates:  No Known Allergies     Review of Systems   Constitutional: Negative for chills.   HENT: Negative for congestion.    Eyes: Negative for visual disturbance.   Respiratory: Negative for shortness of breath.    Cardiovascular: Negative for chest pain.   Gastrointestinal: Negative for abdominal distention.   Musculoskeletal: Negative for gait problem.   Skin: Negative for color change.   Neurological: Negative for dizziness.   Psychiatric/Behavioral: Negative for agitation.       Objective:   Physical Exam  Constitutional:       Appearance: He is well-developed.   HENT:      Head: Normocephalic.   Eyes:      Pupils: Pupils are equal, round, and reactive to light.   Pulmonary:      Effort: Pulmonary effort is normal.   Abdominal:      Palpations: Abdomen is soft.   Musculoskeletal:         General: Normal range of motion.      Cervical back: Normal range of motion.   Skin:     General: Skin is warm and dry.   Neurological:      Mental Status: He is alert.        Assessment:       1. Microscopic hematuria    2. Hematuria syndrome        Plan:       1. The patient has microscopic hematuria  I counseled the patient on the American Urology Guidelines for a hematuria workup.  The criteria for microscopic hematuria is 3 red blood cells on microscopic urinalysis and the workup is recommended for any patient experiencing gross hematuria. The workup includes a CT  urogram and a flexible cystoscopy performed here in the clinic. However the pt does have CKD and unfortunately cannot receive IV contrast. Therefore the workup includes a noncontrasted CT scan and cystoscopy, bilateral RGP under anesthesia - which works out as pt states he would not tolerate a clinic cystoscopy.  2. Will obtain noncon CT.  3. Will message Dr. Frankel about holding eliquis prior to procedure in case I encounter any abnormal bladder mucosa that requires biopsy.  4. Once cardiology clearance obtained, will schedule for cystoscopy, bilateral retrograde pyelograms, and possible bladder biopsy under anesthesia.

## 2022-06-15 NOTE — DISCHARGE SUMMARY
[facility] ([facility])  Urology  Discharge Summary      Patient Name: Jose Luis Manzo  MRN: [id number]  Admission Date: 6/15/2022  Hospital Length of Stay: 0 days  Discharge Date: 06/15/2022  Attending Physician: Veronica Bacon MD   Discharging Provider: Veronica Bacon MD  Primary Care Physician: Home Alexis MD    HPI: The patient is a 72 y.o. male with past medical history (listed below) microscopic hematuria    The patient elected to proceed with the procedure(s) below. Please see H&P and/or clinic progress note(s) for full details.     Procedure(s) (LRB):  Cystoscopy, bilateral retrograde pyelogram, and all indicated procedures (Bilateral)     Past Medical History:   Diagnosis Date    Acute hypoxemic respiratory failure due to COVID-19 12/30/2020    Anticoagulant long-term use     BPH (benign prostatic hypertrophy)     Cancer     vocal cords    Claudication of right lower extremity 3/10/2016    COVID-19 virus detected 12/30/2020    Disorder of kidney and ureter     Hyperlipidemia     Hypertension     Immunosuppression 6/26/2021    Immunosuppression due to chronic steroid use 1/30/2020    Microcytic anemia 9/16/2016    Obesity (BMI 30-39.9) 1/23/2019    Oropharyngeal cancer     Pterygium     Squamous cell carcinoma 4/25/2018    Thromboembolic disorder     Unspecified disorder of kidney and ureter        Hospital Course (synopsis of major diagnoses, care, treatment, and services provided during the course of the hospital stay): pt tolerated procedure well, was transferred to pacu and discharged home in stable condition after he convalesced.      Consults:     Significant Diagnostic Studies:      Pending Diagnostic Studies:     Procedure Component Value Units Date/Time    SURG FL Surgery Retrograde Pyelogram [[id number]]     Order Status: Sent Lab Status: No result           There are no hospital problems to display for this patient.      Discharged Condition: good    Disposition: Home or Self  Care    Follow Up:      Patient Instructions:      Diet Adult Regular     No dressing needed       Medications:  Reconciled Home Medications:      Medication List      CONTINUE taking these medications    apixaban 2.5 mg Tab  Commonly known as: ELIQUIS  Take 1 tablet (2.5 mg total) by mouth 2 (two) times daily.     atorvastatin 20 MG tablet  Commonly known as: LIPITOR  Take 1 tablet (20 mg total) by mouth every evening.     cloNIDine 0.3 mg/24 hr td ptwk 0.3 mg/24 hr  Commonly known as: CATAPRES  Place 1 patch onto the skin once a week.     * ergocalciferol 50,000 unit Cap  Commonly known as: ERGOCALCIFEROL  Take one tablet once a week     * ergocalciferol 50,000 unit Cap  Commonly known as: VITAMIN D2  Take 1 capsule (50,000 Units total) by mouth every 7 days.     ferrous sulfate 325 mg (65 mg iron) Tab tablet  Commonly known as: FEOSOL  Take 325 mg by mouth once daily.     finasteride 5 mg tablet  Commonly known as: PROSCAR  Take 1 tablet (5 mg total) by mouth once daily.     furosemide 40 MG tablet  Commonly known as: LASIX  Take 1 tablet (40 mg total) by mouth once daily.     metoprolol succinate 50 MG 24 hr tablet  Commonly known as: TOPROL-XL  Take 1 tablet (50 mg total) by mouth once daily.     nitroGLYCERIN 0.4 MG SL tablet  Commonly known as: NITROSTAT  Place 1 tablet (0.4 mg total) under the tongue every 5 (five) minutes as needed for Chest pain.     predniSONE 5 MG tablet  Commonly known as: DELTASONE  Take 1 tablet (5 mg total) by mouth once daily.     sirolimus 1 MG Tab  Commonly known as: RAPAMUNE  Take 3 tablets (3 mg total) by mouth once daily.     sodium bicarbonate 650 MG tablet  Take 1 tablet (650 mg total) by mouth 2 (two) times daily.         * This list has 2 medication(s) that are the same as other medications prescribed for you. Read the directions carefully, and ask your doctor or other care provider to review them with you.            STOP taking these medications    sildenafiL 50 MG  tablet  Commonly known as: VIAGRA            Time spent on the discharge of patient: 15 minutes    Veronica Bacon MD  Urology  [facility])

## 2022-06-15 NOTE — PLAN OF CARE
Signed out from pacu per Dr Bacon. Report called to Rangel Rn/ops. Transported to ops via stretcher,sr upx2.

## 2022-06-15 NOTE — TRANSFER OF CARE
"Anesthesia Transfer of Care Note    Patient: Jose Luis Manzo    Procedure(s) Performed: Procedure(s) (LRB):  Cystoscopy, bilateral retrograde pyelogram, and all indicated procedures (Bilateral)    Patient location: [facility]    Anesthesia Type: general    Transport from OR: Transported from OR on room air with adequate spontaneous ventilation    Post pain: adequate analgesia    Post assessment: no apparent anesthetic complications and tolerated procedure well    Post vital signs: stable    Level of consciousness: responds to stimulation and sedated    Nausea/Vomiting: no nausea/vomiting    Complications: none    Transfer of care protocol was followed      Last vitals:   Visit Vitals  BP (!) 153/84   Pulse (!) 50   Temp 36.5 °C (97.7 °F) (Skin)   Resp 16   Ht 5' 10" (1.778 m)   Wt 105.2 kg (232 lb)   SpO2 96%   BMI 33.29 kg/m²     "

## 2022-06-15 NOTE — ANESTHESIA PROCEDURE NOTES
Intubation    Date/Time: 6/15/2022 8:46 AM  Performed by: Francoise Mccurdy CRNA  Authorized by: Francoise Mccurdy CRNA     Intubation:     Induction:  Intravenous    Intubated:  Postinduction    Attempts:  1    Attempted By:  Student (DYLON ALMEIDA)    Difficult Airway Encountered?: No      Complications:  None    Airway Device:  Supraglottic airway/LMA    Airway Device Size:  5.0    Style/Cuff Inflation:  Cuffed (inflated to minimal occlusive pressure)    Placement Verified By:  Capnometry    Complicating Factors:  None    Findings Post-Intubation:  BS equal bilateral and atraumatic/condition of teeth unchanged

## 2022-06-15 NOTE — OP NOTE
OPERATIVE DICTATION  DATE OF OPERATION: 06/15/2022    SERVICE: Urology    SURGEONS:  1. Veronica Bacon MD    ANESTHESIA:  No anesthesia staff entered.    STAFF:  Circulator: Ava Hatch RN  Scrub Person: ST Pratik  Technician: Juan Jose Jean Baptiste, RT    ANESTHESIA: General    PREOPERATIVE DIAGNOSIS: Pre-Op Diagnosis Codes:     * Microscopic hematuria [R31.29]    POSTOPERATIVE DIAGNOSIS: Post-Op Diagnosis Codes:     * Microscopic hematuria [R31.29]    PROCEDURES:   1. Cystoscopy, bilateral retrograde pyelograms  2. Fluoroscopy <1 hour  3. Interpretation of fluoroscopic images    COMPLICATIONS: * No complications entered in OR log *    DRAINS: none    TUBES: none    IMPLANTS: none    FLUIDS: see anesthesia record     ESTIMATED BLOOD LOSS: * No values recorded between 6/15/2022  9:06 AM and 6/15/2022  9:14 AM *    FINDINGS:   1. No abnormal bladder tumors appreciated   2. No median lobe, moderate enlargement of lateral lobes of prostate. No urethral stricture.   3. Bilateral retrograde pyelograms performed - no hydronephrosis, no filling defects, no hydroureter. 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     SPECIMEN(S):   None     CONDITION: stable    INDICATIONS FOR THE PROCEDURE:  This is a 72 y.o.  male patient that is new to me.  he is referred to me by Dr. Gillette his nephrologist for microscopic hematuria.  Of note he is s/p a kidney transplant in 2007 in [place].  The patient did not experience gross hematuria.      The patient does take blood thinners - eliquis.  Managed by cardiologist Dr. Frankel.  The patient does have a history of smoking. Former smoker quit 2009, 20 pack years        Had workup before- no.       Urinalysis history-  4/13/22 10 RBCs on micro     The patient has microscopic hematuria  I counseled the patient on the American Urology Guidelines for a hematuria workup.  The criteria for microscopic hematuria is 3  red blood cells on microscopic urinalysis and the workup is recommended for any patient experiencing gross hematuria. The workup includes a CT urogram and a flexible cystoscopy performed here in the clinic. However the pt does have CKD and unfortunately cannot receive IV contrast. Therefore the workup includes a noncontrasted CT scan and cystoscopy, bilateral RGP under anesthesia - which works out as pt states he would not tolerate a clinic cystoscopy.  2. Will obtain noncon CT.  3. Will message Dr. Frankel about holding eliquis prior to procedure in case I encounter any abnormal bladder mucosa that requires biopsy.    PROCEDURE IN DETAIL:  After appropriate informed consent was obtained, the patient was taken to the operating room and placed in the supine position. After induction of General, he was placed in the dorsal lithotomy position. he was prepped and draped in the usual sterile fashion.     Thereafter a WHO timeout was performed and the procedure was initiated. Lidocaine jelly was instilled into the urethra. The 22 [place]ian cystoscope was inserted into the urethra and bladder. No urethral stricture was seen. No abnormal bladder tumors appreciated. No median lobe, moderate enlargement of lateral lobes of prostate. No urethral stricture.     To perform the retrograde pyelograms, I started on the right. I passed a 5 [place]ian open ended catheter into the right ureteral orifice. I injected dilute isovue contrast into the ureter to opacify the ureter and collecting system. no hydronephrosis, no filling defects, no hydroureter was seen on the right. The right side had a duplicated collecting system - proximal ureteral duplication which connect in the lower proximal ureter. No abnormal findings therefore no intervention performed.    The left retrograde pyelogram was performed in a similar fashion - no hydronephrosis, no filling defects, no hydroureter.     This concluded the procedure.  All surgical needle, sponge,  ray-tomer, and lap counts were correct.     ATTENDING ATTESTATION  I was present and scrubbed for the entire duration  of the procedure.      CASE DURATION:  * Missing case tracking time(s) *    DISPOSITION:   The patient tolerated the procedure well. he was extubated, and taken to post-anesthesia care unit in satisfactory condition.  He will be discharged home today. No prescriptions needed.    Veronica Bacon MD

## 2022-06-15 NOTE — ANESTHESIA POSTPROCEDURE EVALUATION
Anesthesia Post Evaluation    Patient: Jose Luis Manzo    Procedure(s) Performed: Procedure(s) (LRB):  Cystoscopy, bilateral retrograde pyelogram, and all indicated procedures (Bilateral)    Final Anesthesia Type: general      Patient location during evaluation: PACU  Patient participation: Yes- Able to Participate  Level of consciousness: awake and alert, oriented and awake  Post-procedure vital signs: reviewed and stable  Pain management: adequate  Airway patency: patent    PONV status at discharge: No PONV  Anesthetic complications: no      Cardiovascular status: blood pressure returned to baseline  Respiratory status: unassisted and room air  Hydration status: euvolemic  Follow-up not needed.          Vitals Value Taken Time   /79 06/15/22 1105   Temp 37.2 °C (98.9 °F) 06/15/22 1105   Pulse 51 06/15/22 1105   Resp 18 06/15/22 1105   SpO2 96 % 06/15/22 1105         Event Time   Out of Recovery 10:10:00         Pain/James Score: James Score: 10 (6/15/2022 11:15 AM)

## 2022-06-16 ENCOUNTER — PATIENT MESSAGE (OUTPATIENT)
Dept: UROLOGY | Facility: CLINIC | Age: 72
End: 2022-06-16
Payer: MEDICARE

## 2022-06-20 ENCOUNTER — TELEPHONE (OUTPATIENT)
Dept: CARDIOLOGY | Facility: CLINIC | Age: 72
End: 2022-06-20
Payer: MEDICARE

## 2022-06-20 NOTE — TELEPHONE ENCOUNTER
Called patient to discuss ECG       He has NSR + LAD + non specific intraventricular block       No changes noted from previous ECG       He expressed verbal understanding

## 2022-06-22 ENCOUNTER — TELEPHONE (OUTPATIENT)
Dept: NEPHROLOGY | Facility: CLINIC | Age: 72
End: 2022-06-22
Payer: MEDICARE

## 2022-07-01 ENCOUNTER — OFFICE VISIT (OUTPATIENT)
Dept: HOME HEALTH SERVICES | Facility: CLINIC | Age: 72
End: 2022-07-01
Payer: MEDICARE

## 2022-07-01 DIAGNOSIS — J38.7 CHONDRONECROSIS OF LARYNX: ICD-10-CM

## 2022-07-01 DIAGNOSIS — J84.10 GRANULOMATOUS LUNG DISEASE: ICD-10-CM

## 2022-07-01 DIAGNOSIS — Z12.11 SPECIAL SCREENING FOR MALIGNANT NEOPLASMS, COLON: ICD-10-CM

## 2022-07-01 DIAGNOSIS — E55.9 VITAMIN D DEFICIENCY DISEASE: ICD-10-CM

## 2022-07-01 DIAGNOSIS — Z85.21 HISTORY OF CANCER OF LARYNX: ICD-10-CM

## 2022-07-01 DIAGNOSIS — Z79.52 IMMUNOSUPPRESSION DUE TO CHRONIC STEROID USE: ICD-10-CM

## 2022-07-01 DIAGNOSIS — Z86.718 HISTORY OF DVT (DEEP VEIN THROMBOSIS): ICD-10-CM

## 2022-07-01 DIAGNOSIS — D69.6 THROMBOCYTOPENIA: ICD-10-CM

## 2022-07-01 DIAGNOSIS — D50.9 IRON DEFICIENCY ANEMIA, UNSPECIFIED IRON DEFICIENCY ANEMIA TYPE: ICD-10-CM

## 2022-07-01 DIAGNOSIS — N40.1 BENIGN PROSTATIC HYPERPLASIA WITH NOCTURIA: ICD-10-CM

## 2022-07-01 DIAGNOSIS — I70.0 ATHEROSCLEROSIS OF AORTA: ICD-10-CM

## 2022-07-01 DIAGNOSIS — R35.1 BENIGN PROSTATIC HYPERPLASIA WITH NOCTURIA: ICD-10-CM

## 2022-07-01 DIAGNOSIS — E78.2 MIXED HYPERLIPIDEMIA: ICD-10-CM

## 2022-07-01 DIAGNOSIS — I12.9 HYPERTENSIVE KIDNEY DISEASE WITH STAGE 4 CHRONIC KIDNEY DISEASE: ICD-10-CM

## 2022-07-01 DIAGNOSIS — T38.0X5A IMMUNOSUPPRESSION DUE TO CHRONIC STEROID USE: ICD-10-CM

## 2022-07-01 DIAGNOSIS — N18.4 HYPERTENSIVE KIDNEY DISEASE WITH STAGE 4 CHRONIC KIDNEY DISEASE: ICD-10-CM

## 2022-07-01 DIAGNOSIS — Z94.0 TRANSPLANTED KIDNEY: ICD-10-CM

## 2022-07-01 DIAGNOSIS — Z00.00 ENCOUNTER FOR PREVENTIVE HEALTH EXAMINATION: Primary | ICD-10-CM

## 2022-07-01 DIAGNOSIS — N25.81 SECONDARY HYPERPARATHYROIDISM OF RENAL ORIGIN: ICD-10-CM

## 2022-07-01 DIAGNOSIS — D84.821 IMMUNOSUPPRESSION DUE TO CHRONIC STEROID USE: ICD-10-CM

## 2022-07-01 PROCEDURE — 1170F PR FUNCTIONAL STATUS ASSESSED: ICD-10-PCS | Mod: CPTII,S$GLB,, | Performed by: NURSE PRACTITIONER

## 2022-07-01 PROCEDURE — 3066F PR DOCUMENTATION OF TREATMENT FOR NEPHROPATHY: ICD-10-PCS | Mod: CPTII,S$GLB,, | Performed by: NURSE PRACTITIONER

## 2022-07-01 PROCEDURE — G0439 PR MEDICARE ANNUAL WELLNESS SUBSEQUENT VISIT: ICD-10-PCS | Mod: S$GLB,,, | Performed by: NURSE PRACTITIONER

## 2022-07-01 PROCEDURE — 3008F PR BODY MASS INDEX (BMI) DOCUMENTED: ICD-10-PCS | Mod: CPTII,S$GLB,, | Performed by: NURSE PRACTITIONER

## 2022-07-01 PROCEDURE — G0439 PPPS, SUBSEQ VISIT: HCPCS | Mod: S$GLB,,, | Performed by: NURSE PRACTITIONER

## 2022-07-01 PROCEDURE — 3008F BODY MASS INDEX DOCD: CPT | Mod: CPTII,S$GLB,, | Performed by: NURSE PRACTITIONER

## 2022-07-01 PROCEDURE — 1101F PR PT FALLS ASSESS DOC 0-1 FALLS W/OUT INJ PAST YR: ICD-10-PCS | Mod: CPTII,S$GLB,, | Performed by: NURSE PRACTITIONER

## 2022-07-01 PROCEDURE — 1160F RVW MEDS BY RX/DR IN RCRD: CPT | Mod: CPTII,S$GLB,, | Performed by: NURSE PRACTITIONER

## 2022-07-01 PROCEDURE — 1159F PR MEDICATION LIST DOCUMENTED IN MEDICAL RECORD: ICD-10-PCS | Mod: CPTII,S$GLB,, | Performed by: NURSE PRACTITIONER

## 2022-07-01 PROCEDURE — 1159F MED LIST DOCD IN RCRD: CPT | Mod: CPTII,S$GLB,, | Performed by: NURSE PRACTITIONER

## 2022-07-01 PROCEDURE — 3288F FALL RISK ASSESSMENT DOCD: CPT | Mod: CPTII,S$GLB,, | Performed by: NURSE PRACTITIONER

## 2022-07-01 PROCEDURE — 1160F PR REVIEW ALL MEDS BY PRESCRIBER/CLIN PHARMACIST DOCUMENTED: ICD-10-PCS | Mod: CPTII,S$GLB,, | Performed by: NURSE PRACTITIONER

## 2022-07-01 PROCEDURE — 1170F FXNL STATUS ASSESSED: CPT | Mod: CPTII,S$GLB,, | Performed by: NURSE PRACTITIONER

## 2022-07-01 PROCEDURE — 1101F PT FALLS ASSESS-DOCD LE1/YR: CPT | Mod: CPTII,S$GLB,, | Performed by: NURSE PRACTITIONER

## 2022-07-01 PROCEDURE — 3288F PR FALLS RISK ASSESSMENT DOCUMENTED: ICD-10-PCS | Mod: CPTII,S$GLB,, | Performed by: NURSE PRACTITIONER

## 2022-07-01 PROCEDURE — 3066F NEPHROPATHY DOC TX: CPT | Mod: CPTII,S$GLB,, | Performed by: NURSE PRACTITIONER

## 2022-07-04 VITALS
OXYGEN SATURATION: 98 % | WEIGHT: 230 LBS | HEIGHT: 70 IN | BODY MASS INDEX: 32.93 KG/M2 | SYSTOLIC BLOOD PRESSURE: 115 MMHG | DIASTOLIC BLOOD PRESSURE: 77 MMHG | HEART RATE: 66 BPM | TEMPERATURE: 98 F

## 2022-07-04 NOTE — PATIENT INSTRUCTIONS
Counseling and Referral of Other Preventative  (Italic type indicates deductible and co-insurance are waived)    Patient Name: Jose Luis Restrepo's Date: 7/4/2022    Health Maintenance       Date Due Completion Date    DEXA Scan 05/01/2019 5/1/2018    Shingles Vaccine (2 of 2) 10/17/2019 8/22/2019 (Done)    Override on 8/22/2019: Done    COVID-19 Vaccine (4 - Booster for Moderna series) 05/12/2022 1/12/2022    Colorectal Cancer Screening 08/10/2022 8/10/2021    Override on 7/15/2010: Done    Influenza Vaccine (1) 09/01/2022 11/18/2020    LDCT Lung Screen 04/27/2023 4/27/2022    Lipid Panel 05/26/2023 5/26/2022    High Dose Statin 06/15/2023 6/15/2022    TETANUS VACCINE 09/12/2026 9/12/2016        No orders of the defined types were placed in this encounter.    The following information is provided to all patients.  This information is to help you find resources for any of the problems found today that may be affecting your health:                Living healthy guide: www.[facility].[place].gov      Understanding Diabetes: www.diabetes.org      Eating healthy: www.cdc.gov/healthyweight      CDC home safety checklist: www.cdc.gov/steadi/patient.html      Agency on Aging: www.goea.[place].[facility]      Alcoholics anonymous (AA): www.aa.org      Physical Activity: www.[place].nih.gov/ng7owvp      Tobacco use: www.quitwithusla.org

## 2022-07-04 NOTE — PROGRESS NOTES
"  Jose Luis Manzo presented for a  Medicare AWV and comprehensive Health Risk Assessment today. The following components were reviewed and updated:    · Medical history  · Family History  · Social history  · Allergies and Current Medications  · Health Risk Assessment  · Health Maintenance  · Care Team         ** See Completed Assessments for Annual Wellness Visit within the encounter summary.**         The following assessments were completed:  · Living Situation  · CAGE  · Depression Screening  · Timed Get Up and Go  · Whisper Test  · Cognitive Function Screening  · Nutrition Screening  · ADL Screening  · PAQ Screening        Vitals:    07/04/22 0928   BP: 115/77   Pulse: 66   Temp: 97.7 °F (36.5 °C)   SpO2: 98%   Weight: 104.3 kg (230 lb)   Height: 5' 10" (1.778 m)     Body mass index is 33 kg/m².  Physical Exam  Vitals reviewed.   Constitutional:       Appearance: He is well-developed.   HENT:      Head: Normocephalic.   Eyes:      Pupils: Pupils are equal, round, and reactive to light.   Cardiovascular:      Rate and Rhythm: Normal rate and regular rhythm.      Heart sounds: Normal heart sounds.   Pulmonary:      Effort: Pulmonary effort is normal.      Breath sounds: Normal breath sounds.   Abdominal:      General: Bowel sounds are normal. There is no distension.      Palpations: Abdomen is soft.      Tenderness: There is no abdominal tenderness.   Musculoskeletal:         General: Normal range of motion.      Cervical back: Normal range of motion.      Right lower leg: No edema.      Left lower leg: No edema.   Skin:     General: Skin is warm and dry.   Neurological:      Mental Status: He is alert and oriented to person, place, and time.   Psychiatric:         Behavior: Behavior normal.               Diagnoses and health risks identified today and associated recommendations/orders:    1. Encounter for preventive health examination  - Above assessments completed. Preventive measures and health maintenance reviewed " "with patient.  -discussed/encouraged overdue vaccines    2. Hypertensive kidney disease with stage 4 chronic kidney disease  3. Secondary hyperparathyroidism of renal origin  4. Transplanted kidney - 2--7  5. Immunosuppression due to chronic steroid use  Stable and chronic, followed by Nephrology  -on rapamune and prednisone    5. Thrombocytopenia  Stable and chronic, followed by PCP    6. Granulomatous lung disease  Stable and chronic, followed by PCP  -last LDCT 4/7/22 -  "Benign Appearance or Behavior - continue annual screening with LDCT in 12 months"    7. Atherosclerosis of aorta  Stable, followed by PCP  -on statin, also takes eliquis for hx of DVT    8. History of DVT (deep vein thrombosis)  -on eliquis    9. History of cancer of larynx  10. Chondronecrosis of larynx  Stable, followed by ENT    11. Mixed hyperlipidemia  Stable, followed by PCP  -on statin    12. Benign prostatic hyperplasia with nocturia  Stable, followed by PCP and Urology  -on finasteride    13. Iron deficiency anemia, unspecified iron deficiency anemia type  Stable, followed by PCP and Nephrology  -on iron po    14. Vitamin D deficiency disease  Stable, followed by PCP  -Vit D 50,000 iu weekly    15. Special screening for malignant neoplasms, colon  - Case Request Endoscopy: colon cancer screening  - patient will f/u with PCP for results    Provided Jose Luis with a 5-10 year written screening schedule and personal prevention plan. Recommendations were developed using the USPSTF age appropriate recommendations. Education, counseling, and referrals were provided as needed. After Visit Summary printed and given to patient which includes a list of additional screenings\tests needed.    Follow up in about 1 year (around 7/1/2023) for your next annual wellness visit.    Arlette Ludwig NP       I offered to discuss advanced care planning, including how to pick a person who would make decisions for you if you were unable to make them for yourself, " called a health care power of , and what kind of decisions you might make such as use of life sustaining treatments such as ventilators and tube feeding when faced with a life limiting illness recorded on a living will that they will need to know. (How you want to be cared for as you near the end of your natural life)     X Patient is interested in learning more about how to make advanced directives.  I provided them paperwork and offered to discuss this with them.

## 2022-07-05 DIAGNOSIS — Z01.818 PRE-OP EVALUATION: ICD-10-CM

## 2022-07-05 DIAGNOSIS — N18.5 CKD (CHRONIC KIDNEY DISEASE) STAGE 5, GFR LESS THAN 15 ML/MIN: Primary | ICD-10-CM

## 2022-07-07 ENCOUNTER — LAB VISIT (OUTPATIENT)
Dept: LAB | Facility: HOSPITAL | Age: 72
End: 2022-07-07
Attending: SURGERY
Payer: MEDICARE

## 2022-07-07 ENCOUNTER — HOSPITAL ENCOUNTER (OUTPATIENT)
Dept: VASCULAR SURGERY | Facility: CLINIC | Age: 72
Discharge: HOME | End: 2022-07-07
Attending: SURGERY
Payer: MEDICARE

## 2022-07-07 ENCOUNTER — INITIAL CONSULT (OUTPATIENT)
Dept: VASCULAR SURGERY | Facility: CLINIC | Age: 72
End: 2022-07-07
Attending: SURGERY
Payer: MEDICARE

## 2022-07-07 VITALS
HEART RATE: 56 BPM | TEMPERATURE: 98 F | SYSTOLIC BLOOD PRESSURE: 127 MMHG | DIASTOLIC BLOOD PRESSURE: 78 MMHG | HEIGHT: 70 IN | BODY MASS INDEX: 33.77 KG/M2 | WEIGHT: 235.88 LBS

## 2022-07-07 DIAGNOSIS — Z01.818 PRE-OP EVALUATION: ICD-10-CM

## 2022-07-07 DIAGNOSIS — N18.5 CKD (CHRONIC KIDNEY DISEASE) STAGE 5, GFR LESS THAN 15 ML/MIN: Primary | ICD-10-CM

## 2022-07-07 DIAGNOSIS — Z94.0 RENAL TRANSPLANT RECIPIENT: ICD-10-CM

## 2022-07-07 DIAGNOSIS — N18.4 HYPERTENSIVE KIDNEY DISEASE WITH STAGE 4 CHRONIC KIDNEY DISEASE: Primary | ICD-10-CM

## 2022-07-07 DIAGNOSIS — I12.9 HYPERTENSIVE KIDNEY DISEASE WITH STAGE 4 CHRONIC KIDNEY DISEASE: Primary | ICD-10-CM

## 2022-07-07 LAB
ANION GAP SERPL CALC-SCNC: 5 MMOL/L (ref 8–16)
ANISOCYTOSIS BLD QL SMEAR: SLIGHT
BASOPHILS # BLD AUTO: 0.03 K/UL (ref 0–0.2)
BASOPHILS NFR BLD: 0.6 % (ref 0–1.9)
BUN SERPL-MCNC: 35 MG/DL (ref 8–23)
CALCIUM SERPL-MCNC: 10.2 MG/DL (ref 8.7–10.5)
CHLORIDE SERPL-SCNC: 114 MMOL/L (ref 95–110)
CO2 SERPL-SCNC: 20 MMOL/L (ref 23–29)
CREAT SERPL-MCNC: 3.4 MG/DL (ref 0.5–1.4)
DIFFERENTIAL METHOD: ABNORMAL
EOSINOPHIL # BLD AUTO: 0.1 K/UL (ref 0–0.5)
EOSINOPHIL NFR BLD: 1.9 % (ref 0–8)
ERYTHROCYTE [DISTWIDTH] IN BLOOD BY AUTOMATED COUNT: 16.7 % (ref 11.5–14.5)
EST. GFR  (AFRICAN AMERICAN): 19.7 ML/MIN/1.73 M^2
EST. GFR  (NON AFRICAN AMERICAN): 17 ML/MIN/1.73 M^2
GLUCOSE SERPL-MCNC: 112 MG/DL (ref 70–110)
HCT VFR BLD AUTO: 40.9 % (ref 40–54)
HGB BLD-MCNC: 12.5 G/DL (ref 14–18)
HYPOCHROMIA BLD QL SMEAR: ABNORMAL
IMM GRANULOCYTES # BLD AUTO: 0.09 K/UL (ref 0–0.04)
IMM GRANULOCYTES NFR BLD AUTO: 1.7 % (ref 0–0.5)
LYMPHOCYTES # BLD AUTO: 1 K/UL (ref 1–4.8)
LYMPHOCYTES NFR BLD: 18.5 % (ref 18–48)
MCH RBC QN AUTO: 25.4 PG (ref 27–31)
MCHC RBC AUTO-ENTMCNC: 30.6 G/DL (ref 32–36)
MCV RBC AUTO: 83 FL (ref 82–98)
MONOCYTES # BLD AUTO: 0.4 K/UL (ref 0.3–1)
MONOCYTES NFR BLD: 7.7 % (ref 4–15)
NEUTROPHILS # BLD AUTO: 3.7 K/UL (ref 1.8–7.7)
NEUTROPHILS NFR BLD: 69.6 % (ref 38–73)
NRBC BLD-RTO: 0 /100 WBC
OVALOCYTES BLD QL SMEAR: ABNORMAL
PLATELET # BLD AUTO: 119 K/UL (ref 150–450)
PLATELET BLD QL SMEAR: ABNORMAL
PMV BLD AUTO: 13 FL (ref 9.2–12.9)
POIKILOCYTOSIS BLD QL SMEAR: SLIGHT
POLYCHROMASIA BLD QL SMEAR: ABNORMAL
POTASSIUM SERPL-SCNC: 3.8 MMOL/L (ref 3.5–5.1)
RBC # BLD AUTO: 4.92 M/UL (ref 4.6–6.2)
SODIUM SERPL-SCNC: 139 MMOL/L (ref 136–145)
WBC # BLD AUTO: 5.34 K/UL (ref 3.9–12.7)

## 2022-07-07 PROCEDURE — 36415 COLL VENOUS BLD VENIPUNCTURE: CPT | Performed by: SURGERY

## 2022-07-07 PROCEDURE — 99499 RISK ADDL DX/OHS AUDIT: ICD-10-PCS | Mod: S$GLB,,, | Performed by: SURGERY

## 2022-07-07 PROCEDURE — 99203 PR OFFICE/OUTPT VISIT, NEW, LEVL III, 30-44 MIN: ICD-10-PCS | Mod: S$GLB,,, | Performed by: SURGERY

## 2022-07-07 PROCEDURE — 99203 OFFICE O/P NEW LOW 30 MIN: CPT | Mod: S$GLB,,, | Performed by: SURGERY

## 2022-07-07 PROCEDURE — 99499 UNLISTED E&M SERVICE: CPT | Mod: S$GLB,,, | Performed by: SURGERY

## 2022-07-07 PROCEDURE — 93970 EXTREMITY STUDY: CPT | Mod: S$GLB,,, | Performed by: SURGERY

## 2022-07-07 PROCEDURE — 99999 PR PBB SHADOW E&M-EST. PATIENT-LVL IV: ICD-10-PCS | Mod: PBBFAC,,, | Performed by: SURGERY

## 2022-07-07 PROCEDURE — 93970 PR US DUPLEX, UPPER OR LOWER EXT VENOUS,COMPLETE BILAT: ICD-10-PCS | Mod: S$GLB,,, | Performed by: SURGERY

## 2022-07-07 PROCEDURE — 80048 BASIC METABOLIC PNL TOTAL CA: CPT | Performed by: SURGERY

## 2022-07-07 PROCEDURE — 85025 COMPLETE CBC W/AUTO DIFF WBC: CPT | Performed by: SURGERY

## 2022-07-07 PROCEDURE — 99999 PR PBB SHADOW E&M-EST. PATIENT-LVL IV: CPT | Mod: PBBFAC,,, | Performed by: SURGERY

## 2022-07-07 NOTE — H&P (VIEW-ONLY)
"VASCULAR SURGERY CLINIC NOTE    Patient ID: Jose Luis Manzo is a 72 y.o. male.    I. HISTORY     Chief Complaint: AV ACCESS    HPI: This is a 72 y.o. male who is here today for a new patient initial appointment. Here to discuss permanent AV access creation. right hand dominant. Currently CKD Stage 4. Pertinent medical history includes kidney transplant (2007), oropharyngeal cancer, HTN, HLD. Has had several permanent AV access points in the past (states he has had a graft in R leg, fistulas in both arms). Does take anticoagulation (Eloquis, 2.5mg BID).     Past Medical History:   Diagnosis Date    Acute hypoxemic respiratory failure due to COVID-19 12/30/2020    Anticoagulant long-term use     BPH (benign prostatic hypertrophy)     Cancer     vocal cords    Claudication of right lower extremity 3/10/2016    COVID-19 virus detected 12/30/2020    Disorder of kidney and ureter     Hyperlipidemia     Hypertension     Immunosuppression 6/26/2021    Immunosuppression due to chronic steroid use 1/30/2020    Microcytic anemia 9/16/2016    Obesity (BMI 30-39.9) 1/23/2019    Oropharyngeal cancer     Pterygium     Squamous cell carcinoma 4/25/2018    Thromboembolic disorder     Unspecified disorder of kidney and ureter         Past Surgical History:   Procedure Laterality Date    CYSTOSCOPY W/ RETROGRADES Bilateral 6/15/2022    Procedure: Cystoscopy, bilateral retrograde pyelogram, and all indicated procedures;  Surgeon: Veronica Bacon MD;  Location: [facility] OR;  Service: Urology;  Laterality: Bilateral;    FRACTURE SURGERY Left     "lower leg" 40 years ago    KIDNEY TRANSPLANT  2007    followed by [facility] Transplant    LARYNX SURGERY  11/2014    Oropharyngeal Cancer x3    microlaryngoscopy      SURGICAL REMOVAL OF LESION OF ORBIT Bilateral 8/26/2020    Procedure: EXCISION, LESION, ORBIT;  Surgeon: Linda Tee MD;  Location: [facility] OR [address] [place] [address];  Service: Ophthalmology;  Laterality: Bilateral;    TIBIAL " STAPLING Left             Social History     Occupational History    Not on file   Tobacco Use    Smoking status: Former Smoker     Packs/day: 1.00     Years: 20.00     Pack years: 20.00     Types: Cigarettes     Quit date:      Years since quittin.5    Smokeless tobacco: Never Used   Substance and Sexual Activity    Alcohol use: No     Alcohol/week: 0.0 standard drinks    Drug use: No     Types: Marijuana     Comment: Patient quit     Sexual activity: Not Currently     Partners: Female       Review of Systems   Constitutional: Negative for weight loss.   HENT: Negative for ear pain and nosebleeds.    Eyes: Negative for discharge and pain.   Cardiovascular: Negative for chest pain and palpitations.   Respiratory: Negative for cough, shortness of breath and wheezing.    Endocrine: Negative for cold intolerance, heat intolerance and polyphagia.   Hematologic/Lymphatic: Negative for adenopathy. Does not bruise/bleed easily.   Skin: Negative for itching and rash.   Musculoskeletal: Negative for joint swelling and muscle cramps.   Gastrointestinal: Negative for abdominal pain, diarrhea, nausea and vomiting.   Genitourinary: Negative for dysuria and flank pain.   Neurological: Negative for numbness and seizures.       Current Medications Reviewed    II. PHYSICAL EXAM     Physical Exam  Constitutional:       Appearance: Normal appearance. He is normal weight.   HENT:      Head: Normocephalic.      Mouth/Throat:      Mouth: Mucous membranes are moist.      Comments: Hoarse voice.  Eyes:      Extraocular Movements: Extraocular movements intact.      Conjunctiva/sclera: Conjunctivae normal.      Pupils: Pupils are equal, round, and reactive to light.   Cardiovascular:      Rate and Rhythm: Bradycardia present.      Comments: No thrill palpable over any of his prior upper extremity AV access sites, 2+ brachial pulses bilaterally  Pulmonary:      Effort: Pulmonary effort is normal.   Abdominal:       General: Abdomen is flat.      Palpations: Abdomen is soft.   Musculoskeletal:         General: Normal range of motion.      Cervical back: Normal range of motion.      Right lower leg: No edema.      Left lower leg: No edema.   Skin:     General: Skin is warm.      Comments: Well healed scars on bilateral arms from former access creations, bilateral scars on chest from prior catheter placements.    Neurological:      Mental Status: He is alert and oriented to person, place, and time.         III. ASSESSMENT & PLAN (MEDICAL DECISION MAKING)     1. Hypertensive kidney disease with stage 4 chronic kidney disease    2. Renal transplant recipient        Imaging Results: (I have personally reviewed the images/studies and provided my interpretation below)  Vein Mapping  7/7/2022:  All veins less than 3mm at AC fossa       Assessment/Diagnosis and Plan:    72 y.o. male here for permanent AV access creation. CKD IV. Vein mapping shows anadequate veins for fistula creation due to small size. Based on our discussion and his history of leg graft, I think it's best to obtain a bilateral upper extremity axillary/subclavian venogram prior to any attempt at upper extremity access. We discussed risks wound infection, contrast), benefits, and alternatives to the procedure and the patient expressed full understanding. The patient had no questions about the procedure at the end of our visit.     -Will plan for bilateral upper extremity venogram on August 11, 2022.    KENIA Mueller II, MD, [facility]  Vascular Surgery  [facility]

## 2022-07-07 NOTE — PROGRESS NOTES
"VASCULAR SURGERY CLINIC NOTE    Patient ID: Jose Luis Manzo is a 72 y.o. male.    I. HISTORY     Chief Complaint: AV ACCESS    HPI: This is a 72 y.o. male who is here today for a new patient initial appointment. Here to discuss permanent AV access creation. right hand dominant. Currently CKD Stage 4. Pertinent medical history includes kidney transplant (2007), oropharyngeal cancer, HTN, HLD. Has had several permanent AV access points in the past (states he has had a graft in R leg, fistulas in both arms). Does take anticoagulation (Eloquis, 2.5mg BID).     Past Medical History:   Diagnosis Date    Acute hypoxemic respiratory failure due to COVID-19 12/30/2020    Anticoagulant long-term use     BPH (benign prostatic hypertrophy)     Cancer     vocal cords    Claudication of right lower extremity 3/10/2016    COVID-19 virus detected 12/30/2020    Disorder of kidney and ureter     Hyperlipidemia     Hypertension     Immunosuppression 6/26/2021    Immunosuppression due to chronic steroid use 1/30/2020    Microcytic anemia 9/16/2016    Obesity (BMI 30-39.9) 1/23/2019    Oropharyngeal cancer     Pterygium     Squamous cell carcinoma 4/25/2018    Thromboembolic disorder     Unspecified disorder of kidney and ureter         Past Surgical History:   Procedure Laterality Date    CYSTOSCOPY W/ RETROGRADES Bilateral 6/15/2022    Procedure: Cystoscopy, bilateral retrograde pyelogram, and all indicated procedures;  Surgeon: Veroniac Bacon MD;  Location[facility] OR;  Service: Urology;  Laterality: Bilateral;    FRACTURE SURGERY Left     "lower leg" 40 years ago    KIDNEY TRANSPLANT  2007    followed by [facility] Transplant    LARYNX SURGERY  11/2014    Oropharyngeal Cancer x3    microlaryngoscopy      SURGICAL REMOVAL OF LESION OF ORBIT Bilateral 8/26/2020    Procedure: EXCISION, LESION, ORBIT;  Surgeon: Linda Tee MD;  Location: [facility] OR [address] [place] [address];  Service: Ophthalmology;  Laterality: Bilateral;    TIBIAL " STAPLING Left             Social History     Occupational History    Not on file   Tobacco Use    Smoking status: Former Smoker     Packs/day: 1.00     Years: 20.00     Pack years: 20.00     Types: Cigarettes     Quit date:      Years since quittin.5    Smokeless tobacco: Never Used   Substance and Sexual Activity    Alcohol use: No     Alcohol/week: 0.0 standard drinks    Drug use: No     Types: Marijuana     Comment: Patient quit     Sexual activity: Not Currently     Partners: Female       Review of Systems   Constitutional: Negative for weight loss.   HENT: Negative for ear pain and nosebleeds.    Eyes: Negative for discharge and pain.   Cardiovascular: Negative for chest pain and palpitations.   Respiratory: Negative for cough, shortness of breath and wheezing.    Endocrine: Negative for cold intolerance, heat intolerance and polyphagia.   Hematologic/Lymphatic: Negative for adenopathy. Does not bruise/bleed easily.   Skin: Negative for itching and rash.   Musculoskeletal: Negative for joint swelling and muscle cramps.   Gastrointestinal: Negative for abdominal pain, diarrhea, nausea and vomiting.   Genitourinary: Negative for dysuria and flank pain.   Neurological: Negative for numbness and seizures.       Current Medications Reviewed    II. PHYSICAL EXAM     Physical Exam  Constitutional:       Appearance: Normal appearance. He is normal weight.   HENT:      Head: Normocephalic.      Mouth/Throat:      Mouth: Mucous membranes are moist.      Comments: Hoarse voice.  Eyes:      Extraocular Movements: Extraocular movements intact.      Conjunctiva/sclera: Conjunctivae normal.      Pupils: Pupils are equal, round, and reactive to light.   Cardiovascular:      Rate and Rhythm: Bradycardia present.      Comments: No thrill palpable over any of his prior upper extremity AV access sites, 2+ brachial pulses bilaterally  Pulmonary:      Effort: Pulmonary effort is normal.   Abdominal:       General: Abdomen is flat.      Palpations: Abdomen is soft.   Musculoskeletal:         General: Normal range of motion.      Cervical back: Normal range of motion.      Right lower leg: No edema.      Left lower leg: No edema.   Skin:     General: Skin is warm.      Comments: Well healed scars on bilateral arms from former access creations, bilateral scars on chest from prior catheter placements.    Neurological:      Mental Status: He is alert and oriented to person, place, and time.         III. ASSESSMENT & PLAN (MEDICAL DECISION MAKING)     1. Hypertensive kidney disease with stage 4 chronic kidney disease    2. Renal transplant recipient        Imaging Results: (I have personally reviewed the images/studies and provided my interpretation below)  Vein Mapping  7/7/2022:  All veins less than 3mm at AC fossa       Assessment/Diagnosis and Plan:    72 y.o. male here for permanent AV access creation. CKD IV. Vein mapping shows anadequate veins for fistula creation due to small size. Based on our discussion and his history of leg graft, I think it's best to obtain a bilateral upper extremity axillary/subclavian venogram prior to any attempt at upper extremity access. We discussed risks wound infection, contrast), benefits, and alternatives to the procedure and the patient expressed full understanding. The patient had no questions about the procedure at the end of our visit.     -Will plan for bilateral upper extremity venogram on August 11, 2022.    KENIA Mueller II, MD, [facility]  Vascular Surgery  [facility]

## 2022-08-02 ENCOUNTER — HOSPITAL ENCOUNTER (OUTPATIENT)
Facility: HOSPITAL | Age: 72
Discharge: HOME | End: 2022-08-02
Attending: SURGERY | Admitting: SURGERY
Payer: MEDICARE

## 2022-08-02 VITALS
WEIGHT: 230 LBS | HEART RATE: 46 BPM | SYSTOLIC BLOOD PRESSURE: 156 MMHG | HEIGHT: 71 IN | TEMPERATURE: 98 F | DIASTOLIC BLOOD PRESSURE: 91 MMHG | BODY MASS INDEX: 32.2 KG/M2 | OXYGEN SATURATION: 96 % | RESPIRATION RATE: 18 BRPM

## 2022-08-02 DIAGNOSIS — N18.5 CKD (CHRONIC KIDNEY DISEASE) STAGE 5, GFR LESS THAN 15 ML/MIN: ICD-10-CM

## 2022-08-02 DIAGNOSIS — Z01.818 PRE-OP TESTING: ICD-10-CM

## 2022-08-02 PROCEDURE — 99152 MOD SED SAME PHYS/QHP 5/>YRS: CPT | Performed by: SURGERY

## 2022-08-02 PROCEDURE — 36011 PLACE CATHETER IN VEIN: CPT | Mod: LT,,, | Performed by: SURGERY

## 2022-08-02 PROCEDURE — 63600175 PHARM REV CODE 636 W HCPCS: Performed by: SURGERY

## 2022-08-02 PROCEDURE — 36011 PR PLACE CATH IN VEIN,SELECT: ICD-10-PCS | Mod: LT,,, | Performed by: SURGERY

## 2022-08-02 PROCEDURE — 99153 MOD SED SAME PHYS/QHP EA: CPT | Performed by: SURGERY

## 2022-08-02 PROCEDURE — 75822 PR VENOGRAM EXTREMITY BILAT: ICD-10-PCS | Mod: 26,,, | Performed by: SURGERY

## 2022-08-02 PROCEDURE — 36011 PLACE CATHETER IN VEIN: CPT | Mod: 50 | Performed by: SURGERY

## 2022-08-02 PROCEDURE — 25500020 PHARM REV CODE 255: Performed by: SURGERY

## 2022-08-02 PROCEDURE — 75822 VEIN X-RAY ARMS/LEGS: CPT | Mod: 26,,, | Performed by: SURGERY

## 2022-08-02 PROCEDURE — 75822 VEIN X-RAY ARMS/LEGS: CPT | Performed by: SURGERY

## 2022-08-02 PROCEDURE — 25000003 PHARM REV CODE 250: Performed by: SURGERY

## 2022-08-02 PROCEDURE — C1894 INTRO/SHEATH, NON-LASER: HCPCS | Performed by: SURGERY

## 2022-08-02 RX ORDER — LIDOCAINE HYDROCHLORIDE 20 MG/ML
INJECTION, SOLUTION INFILTRATION; PERINEURAL
Status: DISCONTINUED | OUTPATIENT
Start: 2022-08-02 | End: 2022-08-02 | Stop reason: HOSPADM

## 2022-08-02 RX ORDER — MIDAZOLAM HYDROCHLORIDE 1 MG/ML
INJECTION, SOLUTION INTRAMUSCULAR; INTRAVENOUS
Status: DISCONTINUED | OUTPATIENT
Start: 2022-08-02 | End: 2022-08-02 | Stop reason: HOSPADM

## 2022-08-02 RX ORDER — SODIUM CHLORIDE 9 MG/ML
INJECTION, SOLUTION INTRAVENOUS CONTINUOUS
Status: CANCELLED | OUTPATIENT
Start: 2022-08-02

## 2022-08-02 RX ORDER — HEPARIN SOD,PORCINE/0.9 % NACL 1000/500ML
INTRAVENOUS SOLUTION INTRAVENOUS
Status: DISCONTINUED | OUTPATIENT
Start: 2022-08-02 | End: 2022-08-02

## 2022-08-02 RX ORDER — NAPROXEN SODIUM 220 MG/1
81 TABLET, FILM COATED ORAL DAILY
Status: DISCONTINUED | OUTPATIENT
Start: 2022-08-02 | End: 2022-08-02

## 2022-08-02 RX ORDER — SODIUM CHLORIDE 9 MG/ML
INJECTION, SOLUTION INTRAVENOUS CONTINUOUS
Status: DISCONTINUED | OUTPATIENT
Start: 2022-08-02 | End: 2022-08-02 | Stop reason: HOSPADM

## 2022-08-02 NOTE — Clinical Note
The brachials was prepped. The site was prepped with ChloraPrep. The patient was draped. The patient was positioned supine.

## 2022-08-02 NOTE — BRIEF OP NOTE
[facility] - Cath Lab  Brief Operative Note    Surgery Date: 8/2/2022     Surgeon(s) and Role:     * KENIA Mueller II, MD - Primary    Assisting Surgeon: None    Pre-op Diagnosis:  CKD (chronic kidney disease) stage 5, GFR less than 15 ml/min [N18.5]    Post-op Diagnosis:  Post-Op Diagnosis Codes:     * CKD (chronic kidney disease) stage 5, GFR less than 15 ml/min [N18.5]    Procedure(s) (LRB):  Venogram (Bilateral)    Anesthesia: RN IV Sedation    Operative Findings: Central occlusions bilaterally     Estimated Blood Loss: minimal         Specimens:   Specimen (24h ago, onward)            None            Discharge Note    OUTCOME: Patient tolerated treatment/procedure well without complication and is now ready for discharge.    DISPOSITION: Home or Self Care    FINAL DIAGNOSIS:  CKD    FOLLOWUP: In clinic    DISCHARGE INSTRUCTIONS:    Discharge Procedure Orders   COVID-19 Routine Screening   Standing Status: Future Standing Exp. Date: 10/01/23     Order Specific Question Answer Comments   Is the patient symptomatic? No    Is this needed for pre-procedure or pre-op testing? Yes    Diagnosis: Pre-op testing [[id number]]

## 2022-08-02 NOTE — PLAN OF CARE
Patient arrived to room. PIV placed, labs sent. Admit assessment completed. Plan of care discussed with patient. Brother at bedside. Nurse call bell within reach. Will monitor

## 2022-08-02 NOTE — PLAN OF CARE
Received report from ANSLEY Ortiz. Patient s/p venogram, AAOx3. VSS, no c/o pain or discomfort at this time, resp even and unlabored. Post procedure protocol reviewed with patient and patient's family. Understanding verbalized. Family members at bedside. Nurse call bell within reach. Will continue to monitor per post procedure protocol.

## 2022-08-02 NOTE — DISCHARGE SUMMARY
[facility] - Cath Lab  Discharge Note  Short Stay    Procedure(s) (LRB):  Venogram (Bilateral)    OUTCOME: Patient tolerated treatment/procedure well without complication and is now ready for discharge.    DISPOSITION: Home or Self Care    FINAL DIAGNOSIS:  CKD    FOLLOWUP: In clinic    DISCHARGE INSTRUCTIONS:    Discharge Procedure Orders   COVID-19 Routine Screening   Standing Status: Future Standing Exp. Date: 10/01/23     Order Specific Question Answer Comments   Is the patient symptomatic? No    Is this needed for pre-procedure or pre-op testing? Yes    Diagnosis: Pre-op testing [[id number]]         TIME SPENT ON DISCHARGE: 15 minutes

## 2022-08-02 NOTE — Clinical Note
A percutaneous stick to the left axillary vein vein was performed. Ultrasound guidance was used to obtain access.

## 2022-08-02 NOTE — Clinical Note
6 ml of contrast were injected throughout the case. 44 mL of contrast was the total wasted during the case. 50 mL was the total amount used during the case.

## 2022-08-02 NOTE — PLAN OF CARE
Patient discharged per MD orders. Instructions given on medications, wound care, activity, signs of infection, when to call MD, and follow up appointments. Instructed pt not to drive for 24 hours. Pt verbalized understanding. PIV removed. Patient and family refused transport, ambulated off unit.

## 2022-08-03 NOTE — OP NOTE
Vascular Surgery Op Note    Date of Operation/Procedure:  08/02/2022    Pre-operative Diagnosis:  CKD for    Post-operative Diagnosis:  Same    Anesthesia:  Local    Operation/Procedure Performed:  Bilateral upper extremity venogram, central venogram    Attending Surgeon: KENIA Mueller II, MD    Resident/Fellow:  Karlos Simpson MD, PGY 1    Indications:  72-year-old male with history of previous thigh graft and bilateral upper extremity AV access creation is.  He had a previous renal transplant and 2007 and He has now progressed to CKD 4. He was referred to me for creation of permanent AV access.  I recommended bilateral upper extremity venography to confirm whether upper extremity access could be attempted.    Procedure in Detail:   Bilateral arms were prepped and draped in normal sterile fashion.  Ultrasound-guided percutaneous access was gained to the bilateral axillary veins in the upper arm using 21G needle and seldinger technique after initial infiltration of 1% lidocaine for local anesthesia.  We then removed the needle and advanced the micropuncture sheath over the wire and to the axillary vein.  We 1st performed venography through the sheath on the right.  This showed a patent axillary vein with occlusion at the confluence of the axillary and subclavian vein and filling of collaterals.  There was no filling of the subclavian vein suggesting central stenosis.  We then performed venography through the left-sided sheath and this again showed occlusion of the axillary vein at the confluence of the axillary and subclavian vein at the clavicle with filling of multiple collaterals.  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  Based on these findings we did not feel that any additional imaging or interventions were necessary.  Patient will need lower extremity access in the future.    Estimated Blood loss: 10ml    Contrast volume: 6ml    Complications: none    KENIA Mueller II, MD,  [facility]  Vascular Surgery  [facility]

## 2022-08-12 DIAGNOSIS — Z94.0 KIDNEY TRANSPLANT RECIPIENT: ICD-10-CM

## 2022-08-12 NOTE — TELEPHONE ENCOUNTER
----- Message from Danay Hackett sent at 8/12/2022 12:51 PM CDT -----  Regarding: Refill  Contact: pt @ [phone]  Pt is requesting a Medication Refill :sirolimus (RAPAMUNE) 1 MG Tab. Pt states he has 4 days of medicine left.    Pt is asking to have medication sent to a different  special program Pharmacy. Please  call to discuss further

## 2022-08-13 RX ORDER — SIROLIMUS 1 MG/1
3 TABLET, FILM COATED ORAL DAILY
Qty: 90 TABLET | Refills: 1 | Status: SHIPPED | OUTPATIENT
Start: 2022-08-13 | End: 2022-08-16 | Stop reason: SDUPTHER

## 2022-09-09 ENCOUNTER — LAB VISIT (OUTPATIENT)
Dept: LAB | Facility: HOSPITAL | Age: 72
End: 2022-09-09
Attending: STUDENT IN AN ORGANIZED HEALTH CARE EDUCATION/TRAINING PROGRAM
Payer: MEDICARE

## 2022-09-09 DIAGNOSIS — I12.9 HYPERTENSIVE KIDNEY DISEASE WITH STAGE 4 CHRONIC KIDNEY DISEASE: ICD-10-CM

## 2022-09-09 DIAGNOSIS — Z94.0 TRANSPLANTED KIDNEY: ICD-10-CM

## 2022-09-09 DIAGNOSIS — E83.39 HYPERPHOSPHATEMIA: ICD-10-CM

## 2022-09-09 DIAGNOSIS — D50.9 IRON DEFICIENCY ANEMIA, UNSPECIFIED IRON DEFICIENCY ANEMIA TYPE: ICD-10-CM

## 2022-09-09 DIAGNOSIS — N25.81 SECONDARY HYPERPARATHYROIDISM OF RENAL ORIGIN: ICD-10-CM

## 2022-09-09 DIAGNOSIS — R80.9 PROTEINURIA, UNSPECIFIED TYPE: ICD-10-CM

## 2022-09-09 DIAGNOSIS — E55.9 VITAMIN D DEFICIENCY DISEASE: ICD-10-CM

## 2022-09-09 DIAGNOSIS — N18.4 HYPERTENSIVE KIDNEY DISEASE WITH STAGE 4 CHRONIC KIDNEY DISEASE: ICD-10-CM

## 2022-09-09 DIAGNOSIS — E83.52 HYPERCALCEMIA: ICD-10-CM

## 2022-09-09 DIAGNOSIS — E87.20 METABOLIC ACIDOSIS: ICD-10-CM

## 2022-09-09 DIAGNOSIS — Z94.0 RENAL TRANSPLANT RECIPIENT: ICD-10-CM

## 2022-09-09 LAB
ALBUMIN SERPL BCP-MCNC: 4.2 G/DL (ref 3.5–5.2)
ANION GAP SERPL CALC-SCNC: 11 MMOL/L (ref 8–16)
BASOPHILS # BLD AUTO: 0.02 K/UL (ref 0–0.2)
BASOPHILS NFR BLD: 0.4 % (ref 0–1.9)
CALCIUM SERPL-MCNC: 10.1 MG/DL (ref 8.7–10.5)
CHLORIDE SERPL-SCNC: 110 MMOL/L (ref 95–110)
CO2 SERPL-SCNC: 23 MMOL/L (ref 23–29)
CREAT SERPL-MCNC: 3.49 MG/DL (ref 0.5–1.4)
DIFFERENTIAL METHOD: ABNORMAL
EOSINOPHIL # BLD AUTO: 0.1 K/UL (ref 0–0.5)
EOSINOPHIL NFR BLD: 1.1 % (ref 0–8)
ERYTHROCYTE [DISTWIDTH] IN BLOOD BY AUTOMATED COUNT: 15.7 % (ref 11.5–14.5)
EST. GFR  (NO RACE VARIABLE): 17.8 ML/MIN/1.73 M^2
GLUCOSE SERPL-MCNC: 103 MG/DL (ref 70–110)
HCT VFR BLD AUTO: 43.7 % (ref 40–54)
HGB BLD-MCNC: 13.3 G/DL (ref 14–18)
IMM GRANULOCYTES # BLD AUTO: 0.06 K/UL (ref 0–0.04)
IMM GRANULOCYTES NFR BLD AUTO: 1.1 % (ref 0–0.5)
LYMPHOCYTES # BLD AUTO: 1.8 K/UL (ref 1–4.8)
LYMPHOCYTES NFR BLD: 33.1 % (ref 18–48)
MCH RBC QN AUTO: 25.4 PG (ref 27–31)
MCHC RBC AUTO-ENTMCNC: 30.4 G/DL (ref 32–36)
MCV RBC AUTO: 83 FL (ref 82–98)
MONOCYTES # BLD AUTO: 0.6 K/UL (ref 0.3–1)
MONOCYTES NFR BLD: 11.1 % (ref 4–15)
NEUTROPHILS # BLD AUTO: 2.8 K/UL (ref 1.8–7.7)
NEUTROPHILS NFR BLD: 53.2 % (ref 38–73)
NRBC BLD-RTO: 0 /100 WBC
PHOSPHATE SERPL-MCNC: 3.2 MG/DL (ref 2.7–4.5)
PLATELET # BLD AUTO: 130 K/UL (ref 150–450)
PMV BLD AUTO: 10.7 FL (ref 9.2–12.9)
POTASSIUM SERPL-SCNC: 3.9 MMOL/L (ref 3.5–5.1)
PTH-INTACT SERPL-MCNC: 396 PG/ML (ref 9–77)
RBC # BLD AUTO: 5.24 M/UL (ref 4.6–6.2)
SODIUM SERPL-SCNC: 144 MMOL/L (ref 136–145)
UUN UR-MCNC: 40 MG/DL (ref 2–20)
WBC # BLD AUTO: 5.32 K/UL (ref 3.9–12.7)

## 2022-09-09 PROCEDURE — 36415 COLL VENOUS BLD VENIPUNCTURE: CPT | Mod: PO | Performed by: INTERNAL MEDICINE

## 2022-09-09 PROCEDURE — 80069 RENAL FUNCTION PANEL: CPT | Mod: PO | Performed by: INTERNAL MEDICINE

## 2022-09-09 PROCEDURE — 85025 COMPLETE CBC W/AUTO DIFF WBC: CPT | Mod: PO | Performed by: INTERNAL MEDICINE

## 2022-09-09 PROCEDURE — 83970 ASSAY OF PARATHORMONE: CPT | Mod: PO | Performed by: INTERNAL MEDICINE

## 2022-09-15 ENCOUNTER — OFFICE VISIT (OUTPATIENT)
Dept: NEPHROLOGY | Facility: CLINIC | Age: 72
End: 2022-09-15
Payer: MEDICARE

## 2022-09-15 VITALS
HEIGHT: 71 IN | DIASTOLIC BLOOD PRESSURE: 78 MMHG | WEIGHT: 237.44 LBS | BODY MASS INDEX: 33.24 KG/M2 | OXYGEN SATURATION: 98 % | HEART RATE: 62 BPM | SYSTOLIC BLOOD PRESSURE: 130 MMHG

## 2022-09-15 DIAGNOSIS — Z79.899 IMMUNOSUPPRESSIVE MANAGEMENT ENCOUNTER FOLLOWING KIDNEY TRANSPLANT: ICD-10-CM

## 2022-09-15 DIAGNOSIS — Z94.0 TRANSPLANTED KIDNEY: ICD-10-CM

## 2022-09-15 DIAGNOSIS — Z86.718 HISTORY OF DVT (DEEP VEIN THROMBOSIS): ICD-10-CM

## 2022-09-15 DIAGNOSIS — N18.4 STAGE 4 CHRONIC KIDNEY DISEASE: ICD-10-CM

## 2022-09-15 DIAGNOSIS — I10 ESSENTIAL HYPERTENSION: ICD-10-CM

## 2022-09-15 DIAGNOSIS — E55.9 VITAMIN D DEFICIENCY DISEASE: ICD-10-CM

## 2022-09-15 DIAGNOSIS — N25.81 SECONDARY HYPERPARATHYROIDISM OF RENAL ORIGIN: ICD-10-CM

## 2022-09-15 DIAGNOSIS — E87.20 METABOLIC ACIDOSIS: ICD-10-CM

## 2022-09-15 DIAGNOSIS — E78.2 MIXED HYPERLIPIDEMIA: ICD-10-CM

## 2022-09-15 DIAGNOSIS — Z94.0 IMMUNOSUPPRESSIVE MANAGEMENT ENCOUNTER FOLLOWING KIDNEY TRANSPLANT: ICD-10-CM

## 2022-09-15 DIAGNOSIS — C32.9 LARYNX CANCER: ICD-10-CM

## 2022-09-15 PROCEDURE — 99499 RISK ADDL DX/OHS AUDIT: ICD-10-PCS | Mod: S$GLB,,, | Performed by: STUDENT IN AN ORGANIZED HEALTH CARE EDUCATION/TRAINING PROGRAM

## 2022-09-15 PROCEDURE — 99999 PR PBB SHADOW E&M-EST. PATIENT-LVL IV: ICD-10-PCS | Mod: PBBFAC,GC,, | Performed by: STUDENT IN AN ORGANIZED HEALTH CARE EDUCATION/TRAINING PROGRAM

## 2022-09-15 PROCEDURE — 99999 PR PBB SHADOW E&M-EST. PATIENT-LVL IV: CPT | Mod: PBBFAC,GC,, | Performed by: STUDENT IN AN ORGANIZED HEALTH CARE EDUCATION/TRAINING PROGRAM

## 2022-09-15 PROCEDURE — 99499 UNLISTED E&M SERVICE: CPT | Mod: S$GLB,,, | Performed by: STUDENT IN AN ORGANIZED HEALTH CARE EDUCATION/TRAINING PROGRAM

## 2022-09-15 NOTE — PROGRESS NOTES
Nephrology Clinic Note   9/18/2022    Chief Complaint   Patient presents with    Chronic Kidney Disease    Kidney Transplant    Hypertension      History of present illness:  Patient is a 72 y.o. 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 Pt presents today for f/u on CKD    Pt's last appt was on: 4/21/2022 w/ Dr. Gillette  Since his last visit pt was seen by vascular surgery on 8/2022 who perform vein mapping in preparation for HD access creation, per their notes, pt will likely need access in his lower extremities. This is likely d/t previously failed access in upper extremities prior to kidney transplant.     Pt denies: Fever, chills, shortness of breath, chest pain, palpitations, nausea, vomiting, diarrhea, abd pain, hematuria, dysuria, urinary frequency or urgency, headaches, visual disturbances or weakness.          HTN, hyperlipidemia:   Pt monitors blood pressure at home and his SBPs are usually in the 130's and sometimes he does see readings around 180's  Meds: Clonidine patch weekly, atorvastatin, furosemide 40mg daily, metoprolol 50mg daily,         CKD IV  2/2 hypertensive nephrosclerosis & chronic CNI exposure   Baseline sCr 3.3-3.6 (in 2022)   2021 pt had COVID related CHANG  CNI toxicity in the past   On Dr. 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 Gillette's notes)   Patient is not a candidate for another kidney transplant   Meds: Nabicarb 650mg bid, vitamin D 50K weekly, ferrous sulfate 325mg daily      Kidney  Transplant:   Done in [place]   CNI toxicity in the past   Meds: sirolimus 3mg daily,prednisone 5mg daily,   Does not follow with kidney transplant team       Oropharyngeal CA-follows with ENT, not on any current medications/treatments          Problem Noted   Essential Hypertension 3/13/2015   Metabolic Acidosis 6/23/2021   Benign Prostatic Hyperplasia With Lower Urinary Tract Symptoms 1/9/2021   Immunosuppressive Management Encounter Following Kidney Transplant 1/4/2021   Vitamin D Deficiency Disease 1/4/2021   Secondary Hyperparathyroidism of Renal Origin 8/4/2020   Transplanted kidney - 2007 1/30/2020   Larynx Cancer 10/19/2017   History of Dvt (Deep Vein Thrombosis) 4/13/2016    Previous DVT treated at [facility] (unknown leg)    R leg iliofemoral DVT with edema     Ultrasound 6/21/2016:   R CFV DVT   L CFV/femoral/POP non-occlusive chronic DVT      Findings are concerning bilateral iliac and caval stenosis     Mixed Hyperlipidemia 3/13/2015   Stage 4 Chronic Kidney Disease 10/4/2011    Overview:   updated diagnosis code & description  dx update     Hyperkalemia (Resolved) 1/1/2021     ROS as per HPI    History:  Past Medical History:   Diagnosis Date    Acute hypoxemic respiratory failure due to COVID-19 12/30/2020    Anticoagulant long-term use     BPH (benign prostatic hypertrophy)     Cancer     vocal cords    Claudication of right lower extremity 3/10/2016    COVID-19 virus detected 12/30/2020    Disorder of kidney and ureter     Hyperkalemia 1/1/2021    Hyperlipidemia     Hypertension     Immunosuppression 6/26/2021    Immunosuppression due to chronic steroid use 1/30/2020    Microcytic anemia 9/16/2016    Obesity (BMI 30-39.9) 1/23/2019    Oropharyngeal cancer     Pterygium     Squamous cell carcinoma 4/25/2018    Thromboembolic disorder     Unspecified disorder of kidney and ureter       Past Surgical History:   Procedure Laterality Date    CYSTOSCOPY W/ RETROGRADES Bilateral 6/15/2022    Procedure:  "Cystoscopy, bilateral retrograde pyelogram, and all indicated procedures;  Surgeon: Veronica Bacon MD;  Location: [facility] OR;  Service: Urology;  Laterality: Bilateral;    FRACTURE SURGERY Left     "lower leg" 40 years ago    KIDNEY TRANSPLANT  2007    followed by [facility] Transplant    LARYNX SURGERY  11/2014    Oropharyngeal Cancer x3    microlaryngoscopy      PHLEBOGRAPHY Bilateral 8/2/2022    Procedure: Venogram;  Surgeon: KENIA Mueller II, MD;  Location: [facility] CATH LAB;  Service: Vascular;  Laterality: Bilateral;    SURGICAL REMOVAL OF LESION OF ORBIT Bilateral 8/26/2020    Procedure: EXCISION, LESION, ORBIT;  Surgeon: Linda Tee MD;  Location: [facility] OR 2ND FLR;  Service: Ophthalmology;  Laterality: Bilateral;    TIBIAL STAPLING Left 1980             Current Outpatient Medications:     apixaban (ELIQUIS) 2.5 mg Tab, Take 1 tablet (2.5 mg total) by mouth 2 (two) times daily., Disp: 180 tablet, Rfl: 3    atorvastatin (LIPITOR) 20 MG tablet, Take 1 tablet (20 mg total) by mouth every evening., Disp: 90 tablet, Rfl: 3    cloNIDine 0.3 mg/24 hr td ptwk (CATAPRES) 0.3 mg/24 hr, Place 1 patch onto the skin once a week., Disp: 12 patch, Rfl: 3    ergocalciferol (ERGOCALCIFEROL) 50,000 unit Cap, Take one tablet once a week, Disp: , Rfl:     ferrous sulfate 325 mg (65 mg iron) Tab tablet, Take 325 mg by mouth once daily., Disp: , Rfl:     finasteride (PROSCAR) 5 mg tablet, Take 1 tablet (5 mg total) by mouth once daily., Disp: 90 tablet, Rfl: 3    furosemide (LASIX) 40 MG tablet, Take 1 tablet (40 mg total) by mouth once daily., Disp: 90 tablet, Rfl: 3    metoprolol succinate (TOPROL-XL) 50 MG 24 hr tablet, Take 1 tablet (50 mg total) by mouth once daily., Disp: 90 tablet, Rfl: 3    predniSONE (DELTASONE) 5 MG tablet, Take 1 tablet (5 mg total) by mouth once daily., Disp: 90 tablet, Rfl: 3    sirolimus (RAPAMUNE) 1 MG Tab, Take 3 tablets (3 mg total) by mouth once daily., Disp: 90 tablet, Rfl: 0    sodium bicarbonate " 650 MG tablet, Take 1 tablet (650 mg total) by mouth 2 (two) times daily., Disp: 180 tablet, Rfl: 3    ergocalciferol (VITAMIN D2) 50,000 unit Cap, Take 1 capsule (50,000 Units total) by mouth every 7 days., Disp: 12 capsule, Rfl: 3    lisinopriL 10 MG tablet, Take 1 tablet (10 mg total) by mouth once daily., Disp: 90 tablet, Rfl: 3    nitroGLYCERIN (NITROSTAT) 0.4 MG SL tablet, Place 1 tablet (0.4 mg total) under the tongue every 5 (five) minutes as needed for Chest pain. (Patient not taking: Reported on 9/15/2022), Disp: 30 tablet, Rfl: 2  Review of patient's allergies indicates:  No Known Allergies   Social History     Tobacco Use    Smoking status: Former     Packs/day: 1.00     Years: 20.00     Pack years: 20.00     Types: Cigarettes     Quit date:      Years since quittin.    Smokeless tobacco: Never   Substance Use Topics    Alcohol use: No     Alcohol/week: 0.0 standard drinks      Family History   Problem Relation Age of Onset    Stroke Mother     Diabetes Mother     Hypertension Mother     Stroke Father     No Known Problems Sister     No Known Problems Brother     No Known Problems Daughter     No Known Problems Brother         Physical Exam :  Vitals:    09/15/22 1512   BP: 130/78   Pulse: 62     Physical Exam  Vitals and nursing note reviewed.   Constitutional:       General: He is not in acute distress.     Appearance: Normal appearance. He is obese. He is not ill-appearing, toxic-appearing or diaphoretic.   HENT:      Mouth/Throat:      Mouth: Mucous membranes are moist.   Cardiovascular:      Rate and Rhythm: Normal rate and regular rhythm.      Pulses: Normal pulses.      Heart sounds: Murmur heard.   Pulmonary:      Effort: Pulmonary effort is normal. No respiratory distress.      Breath sounds: Normal breath sounds. No stridor. No wheezing, rhonchi or rales.   Musculoskeletal:         General: No swelling, tenderness, deformity or signs of injury.      Right lower leg: Edema present.       Left lower leg: No edema.      Comments: R leg swelling 1+   Skin:     General: Skin is warm.      Capillary Refill: Capillary refill takes 2 to 3 seconds.      Coloration: Skin is not jaundiced or pale.      Findings: No bruising, erythema, lesion or rash.   Neurological:      Mental Status: He is alert and oriented to person, place, and time.   Psychiatric:         Mood and Affect: Mood normal.         Behavior: Behavior normal.         Thought Content: Thought content normal.         Judgment: Judgment normal.       Labs reviewed   Images Reviewed    Assessment:    1. Essential hypertension    2. Mixed hyperlipidemia    3. Transplanted kidney - 2007    4. Immunosuppressive management encounter following kidney transplant    5. Metabolic acidosis    6. History of DVT (deep vein thrombosis)    7. Larynx cancer    8. Secondary hyperparathyroidism of renal origin    9. Vitamin D deficiency disease    10. Stage 4 chronic kidney disease        Plan:    Essential hypertension  Current meds: Clonidine patch weekly, atorvastatin, furosemide 40mg daily, metotrolol 50mg daily  Plan:   Will consider increasing lasix if LE is worsening and/or SBP >130's   -low Na diet reviewed      Mixed hyperlipidemia  On atorvastatin  F/u with PCP    Transplanted kidney - 2007  Done in [place]   CNI toxicity in the past   Plan:  -refill sirolimus 3mg daily   -yearly transplant f/u appt recommended       Immunosuppressive management encounter following kidney transplant  Recent Labs   Lab 12/09/21  0921 04/13/22  1114 05/11/22  1109   Sirolimus Lvl 2.7 L 9.4 8.7     -check level    -Monitor for side effects and toxicities, given narrow therapeutic window and significant risk of AE      Metabolic acidosis  See CKD    History of DVT (deep vein thrombosis)  On eliquis     Larynx cancer  F/u with ENT    Secondary hyperparathyroidism of renal origin  See CKD    Vitamin D deficiency disease  See CKD    Stage 4 chronic kidney disease  Post-kidney  transplant CKD IV 2/2 hypertensive nephrosclerosis & chronic CNI exposure   Baseline sCr: 3.3-3.6  Lab Results   Component Value Date    CREATININE 3.49 (H) 09/09/2022     Plan  -stable CKD IV  -Electrolytes: acceptable  -Acid/Base: continue Nabicarb 650mg BID  -volume status: will consider increasing lasix, currently 40mg daily, if LE is worsening   -Mineral & Bone Disorder:  Phosphorus level: 3.3, PTH level 396, vitamin D level 26 in 12/2021. Check vitamin D level as pt is taking vitamin D weekly   -CKD anemia: hgb 13.3, goal hgb 10-11g/dL, 4/2022 iron studies acceptable, will repeat iron studies as pt is taking iron tablets  -start ACE-I: lisinopril 10mg daily   -SGLT2-2 inh: not a candidate d/t advanced CKD  -Tight control of  HTN (<130/80)  -advanced CKD planning: vein mapping completed, pt will need to return to vascular surgeon to start creation of HD access, will monitor closely as pt declines to start process at this time  -F/u in 3 months with labs (as above, including UPCR)  -When possible avoid nephrotoxins (NSAIDS, long term use of PPIs, IV contrast/Gadolinium exposure)    No follow-ups on file.     Orders Placed This Encounter   Procedures    SIROLIMUS LEVEL    Calcitriol (1,25 di-OH Vitamin D)     There are no discontinued medications.   Future Appointments   Date Time Provider Department Center   10/18/2022 11:00 AM Home Alexis MD [facility]   12/15/2022  2:00 PM Marium Rich MD [facility] NEPHRO Gibran Wood

## 2022-09-17 PROBLEM — N40.1 BENIGN PROSTATIC HYPERPLASIA WITH LOWER URINARY TRACT SYMPTOMS: Status: ACTIVE | Noted: 2021-01-09

## 2022-09-18 DIAGNOSIS — Z94.0 KIDNEY TRANSPLANT RECIPIENT: ICD-10-CM

## 2022-09-18 PROBLEM — E87.5 HYPERKALEMIA: Status: RESOLVED | Noted: 2021-01-01 | Resolved: 2022-09-18

## 2022-09-18 RX ORDER — LISINOPRIL 10 MG/1
10 TABLET ORAL DAILY
Qty: 90 TABLET | Refills: 3 | Status: SHIPPED | OUTPATIENT
Start: 2022-09-18 | End: 2024-03-21 | Stop reason: ALTCHOICE

## 2022-09-18 RX ORDER — SIROLIMUS 1 MG/1
3 TABLET, FILM COATED ORAL DAILY
Qty: 90 TABLET | Refills: 3 | Status: SHIPPED | OUTPATIENT
Start: 2022-09-18 | End: 2022-09-20 | Stop reason: SDUPTHER

## 2022-09-18 NOTE — ASSESSMENT & PLAN NOTE
Post-kidney transplant CKD IV 2/2 hypertensive nephrosclerosis & chronic CNI exposure   Baseline sCr: 3.3-3.6  Lab Results   Component Value Date    CREATININE 3.49 (H) 09/09/2022     Plan  -stable CKD IV  -Electrolytes: acceptable  -Acid/Base: continue Nabicarb 650mg BID  -volume status: will consider increasing lasix, currently 40mg daily, if LE is worsening   -Mineral & Bone Disorder:  Phosphorus level: 3.3, PTH level 396, vitamin D level 26 in 12/2021. Check vitamin D level as pt is taking vitamin D weekly   -CKD anemia: hgb 13.3, goal hgb 10-11g/dL, 4/2022 iron studies acceptable, will repeat iron studies as pt is taking iron tablets  -start ACE-I: lisinopril 10mg daily   -SGLT2-2 inh: not a candidate d/t advanced CKD  -Tight control of  HTN (<130/80)  -advanced CKD planning: vein mapping completed, pt will need to return to vascular surgeon to start creation of HD access, will monitor closely as pt declines to start process at this time  -F/u in 3 months with labs (as above, including UPCR)  -When possible avoid nephrotoxins (NSAIDS, long term use of PPIs, IV contrast/Gadolinium exposure)

## 2022-09-18 NOTE — ASSESSMENT & PLAN NOTE
Current meds: Clonidine patch weekly, atorvastatin, furosemide 40mg daily, metotrolol 50mg daily  Plan:   Will consider increasing lasix if LE is worsening and/or SBP >130's   -low Na diet reviewed

## 2022-09-18 NOTE — ASSESSMENT & PLAN NOTE
Recent Labs   Lab 12/09/21  0921 04/13/22  1114 05/11/22  1109   Sirolimus Lvl 2.7 L 9.4 8.7     -check level    -Monitor for side effects and toxicities, given narrow therapeutic window and significant risk of AE

## 2022-09-18 NOTE — ASSESSMENT & PLAN NOTE
Done in [place]   CNI toxicity in the past   Plan:  -refill sirolimus 3mg daily   -yearly transplant f/u appt recommended

## 2022-09-19 NOTE — TELEPHONE ENCOUNTER
Yuli Causey Staff  Caller: pt (Today,  8:28 AM)  Refill request.     Pt says his Rx keeps going to the wrong pharmacy , pt has a week left in pills         sirolimus (RAPAMUNE) 1 MG Tab         [facility]  [phone]   ([phone] Pharmacy services )       Confirmed patient's contact info below:   Contact Name: Jose Luis Manzo   Phone Number: [phone]

## 2022-09-20 RX ORDER — SIROLIMUS 1 MG/1
3 TABLET, FILM COATED ORAL DAILY
Qty: 90 TABLET | Refills: 3 | Status: SHIPPED | OUTPATIENT
Start: 2022-09-20 | End: 2022-12-22 | Stop reason: SDUPTHER

## 2022-09-21 ENCOUNTER — TELEPHONE (OUTPATIENT)
Dept: NEPHROLOGY | Facility: CLINIC | Age: 72
End: 2022-09-21
Payer: MEDICARE

## 2022-09-30 DIAGNOSIS — Z94.0 KIDNEY TRANSPLANT RECIPIENT: ICD-10-CM

## 2022-10-03 RX ORDER — SIROLIMUS 1 MG/1
3 TABLET, FILM COATED ORAL DAILY
Qty: 90 TABLET | Refills: 3 | OUTPATIENT
Start: 2022-10-03

## 2022-10-12 DIAGNOSIS — N18.4 CHRONIC KIDNEY DISEASE, STAGE IV (SEVERE): ICD-10-CM

## 2022-10-12 RX ORDER — CLONIDINE 0.3 MG/24H
1 PATCH, EXTENDED RELEASE TRANSDERMAL WEEKLY
Qty: 12 PATCH | Refills: 3 | Status: SHIPPED | OUTPATIENT
Start: 2022-10-12 | End: 2023-11-10

## 2022-10-18 ENCOUNTER — OFFICE VISIT (OUTPATIENT)
Dept: FAMILY MEDICINE | Facility: CLINIC | Age: 72
End: 2022-10-18
Payer: MEDICARE

## 2022-10-18 VITALS
OXYGEN SATURATION: 98 % | HEART RATE: 63 BPM | WEIGHT: 236.31 LBS | DIASTOLIC BLOOD PRESSURE: 82 MMHG | BODY MASS INDEX: 33.83 KG/M2 | SYSTOLIC BLOOD PRESSURE: 118 MMHG | HEIGHT: 70 IN

## 2022-10-18 DIAGNOSIS — J38.7 CHONDRONECROSIS OF LARYNX: ICD-10-CM

## 2022-10-18 DIAGNOSIS — Z23 NEED FOR INFLUENZA VACCINATION: ICD-10-CM

## 2022-10-18 DIAGNOSIS — Z12.11 COLON CANCER SCREENING: ICD-10-CM

## 2022-10-18 DIAGNOSIS — M85.80 OSTEOPENIA, UNSPECIFIED LOCATION: Primary | ICD-10-CM

## 2022-10-18 PROCEDURE — 1160F RVW MEDS BY RX/DR IN RCRD: CPT | Mod: CPTII,S$GLB,, | Performed by: STUDENT IN AN ORGANIZED HEALTH CARE EDUCATION/TRAINING PROGRAM

## 2022-10-18 PROCEDURE — 3079F PR MOST RECENT DIASTOLIC BLOOD PRESSURE 80-89 MM HG: ICD-10-PCS | Mod: CPTII,S$GLB,, | Performed by: STUDENT IN AN ORGANIZED HEALTH CARE EDUCATION/TRAINING PROGRAM

## 2022-10-18 PROCEDURE — 3288F FALL RISK ASSESSMENT DOCD: CPT | Mod: CPTII,S$GLB,, | Performed by: STUDENT IN AN ORGANIZED HEALTH CARE EDUCATION/TRAINING PROGRAM

## 2022-10-18 PROCEDURE — 3288F PR FALLS RISK ASSESSMENT DOCUMENTED: ICD-10-PCS | Mod: CPTII,S$GLB,, | Performed by: STUDENT IN AN ORGANIZED HEALTH CARE EDUCATION/TRAINING PROGRAM

## 2022-10-18 PROCEDURE — 99397 PR PREVENTIVE VISIT,EST,65 & OVER: ICD-10-PCS | Mod: 25,S$GLB,, | Performed by: STUDENT IN AN ORGANIZED HEALTH CARE EDUCATION/TRAINING PROGRAM

## 2022-10-18 PROCEDURE — 90694 FLU VACCINE - QUADRIVALENT - ADJUVANTED: ICD-10-PCS | Mod: S$GLB,,, | Performed by: STUDENT IN AN ORGANIZED HEALTH CARE EDUCATION/TRAINING PROGRAM

## 2022-10-18 PROCEDURE — 1101F PR PT FALLS ASSESS DOC 0-1 FALLS W/OUT INJ PAST YR: ICD-10-PCS | Mod: CPTII,S$GLB,, | Performed by: STUDENT IN AN ORGANIZED HEALTH CARE EDUCATION/TRAINING PROGRAM

## 2022-10-18 PROCEDURE — 3066F NEPHROPATHY DOC TX: CPT | Mod: CPTII,S$GLB,, | Performed by: STUDENT IN AN ORGANIZED HEALTH CARE EDUCATION/TRAINING PROGRAM

## 2022-10-18 PROCEDURE — 1126F PR PAIN SEVERITY QUANTIFIED, NO PAIN PRESENT: ICD-10-PCS | Mod: CPTII,S$GLB,, | Performed by: STUDENT IN AN ORGANIZED HEALTH CARE EDUCATION/TRAINING PROGRAM

## 2022-10-18 PROCEDURE — 4010F PR ACE/ARB THEARPY RXD/TAKEN: ICD-10-PCS | Mod: CPTII,S$GLB,, | Performed by: STUDENT IN AN ORGANIZED HEALTH CARE EDUCATION/TRAINING PROGRAM

## 2022-10-18 PROCEDURE — 3066F PR DOCUMENTATION OF TREATMENT FOR NEPHROPATHY: ICD-10-PCS | Mod: CPTII,S$GLB,, | Performed by: STUDENT IN AN ORGANIZED HEALTH CARE EDUCATION/TRAINING PROGRAM

## 2022-10-18 PROCEDURE — 3074F PR MOST RECENT SYSTOLIC BLOOD PRESSURE < 130 MM HG: ICD-10-PCS | Mod: CPTII,S$GLB,, | Performed by: STUDENT IN AN ORGANIZED HEALTH CARE EDUCATION/TRAINING PROGRAM

## 2022-10-18 PROCEDURE — 99397 PER PM REEVAL EST PAT 65+ YR: CPT | Mod: 25,S$GLB,, | Performed by: STUDENT IN AN ORGANIZED HEALTH CARE EDUCATION/TRAINING PROGRAM

## 2022-10-18 PROCEDURE — 3074F SYST BP LT 130 MM HG: CPT | Mod: CPTII,S$GLB,, | Performed by: STUDENT IN AN ORGANIZED HEALTH CARE EDUCATION/TRAINING PROGRAM

## 2022-10-18 PROCEDURE — 1101F PT FALLS ASSESS-DOCD LE1/YR: CPT | Mod: CPTII,S$GLB,, | Performed by: STUDENT IN AN ORGANIZED HEALTH CARE EDUCATION/TRAINING PROGRAM

## 2022-10-18 PROCEDURE — G0008 ADMIN INFLUENZA VIRUS VAC: HCPCS | Mod: S$GLB,,, | Performed by: STUDENT IN AN ORGANIZED HEALTH CARE EDUCATION/TRAINING PROGRAM

## 2022-10-18 PROCEDURE — 1159F PR MEDICATION LIST DOCUMENTED IN MEDICAL RECORD: ICD-10-PCS | Mod: CPTII,S$GLB,, | Performed by: STUDENT IN AN ORGANIZED HEALTH CARE EDUCATION/TRAINING PROGRAM

## 2022-10-18 PROCEDURE — 1160F PR REVIEW ALL MEDS BY PRESCRIBER/CLIN PHARMACIST DOCUMENTED: ICD-10-PCS | Mod: CPTII,S$GLB,, | Performed by: STUDENT IN AN ORGANIZED HEALTH CARE EDUCATION/TRAINING PROGRAM

## 2022-10-18 PROCEDURE — 1126F AMNT PAIN NOTED NONE PRSNT: CPT | Mod: CPTII,S$GLB,, | Performed by: STUDENT IN AN ORGANIZED HEALTH CARE EDUCATION/TRAINING PROGRAM

## 2022-10-18 PROCEDURE — 4010F ACE/ARB THERAPY RXD/TAKEN: CPT | Mod: CPTII,S$GLB,, | Performed by: STUDENT IN AN ORGANIZED HEALTH CARE EDUCATION/TRAINING PROGRAM

## 2022-10-18 PROCEDURE — G0008 FLU VACCINE - QUADRIVALENT - ADJUVANTED: ICD-10-PCS | Mod: S$GLB,,, | Performed by: STUDENT IN AN ORGANIZED HEALTH CARE EDUCATION/TRAINING PROGRAM

## 2022-10-18 PROCEDURE — 3079F DIAST BP 80-89 MM HG: CPT | Mod: CPTII,S$GLB,, | Performed by: STUDENT IN AN ORGANIZED HEALTH CARE EDUCATION/TRAINING PROGRAM

## 2022-10-18 PROCEDURE — 1159F MED LIST DOCD IN RCRD: CPT | Mod: CPTII,S$GLB,, | Performed by: STUDENT IN AN ORGANIZED HEALTH CARE EDUCATION/TRAINING PROGRAM

## 2022-10-18 PROCEDURE — 90694 VACC AIIV4 NO PRSRV 0.5ML IM: CPT | Mod: S$GLB,,, | Performed by: STUDENT IN AN ORGANIZED HEALTH CARE EDUCATION/TRAINING PROGRAM
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
## 2022-11-07 ENCOUNTER — OFFICE VISIT (OUTPATIENT)
Dept: OTOLARYNGOLOGY | Facility: CLINIC | Age: 72
End: 2022-11-07
Payer: MEDICARE

## 2022-11-07 VITALS
SYSTOLIC BLOOD PRESSURE: 173 MMHG | HEART RATE: 101 BPM | WEIGHT: 242 LBS | DIASTOLIC BLOOD PRESSURE: 98 MMHG | BODY MASS INDEX: 34.72 KG/M2

## 2022-11-07 DIAGNOSIS — J38.7 CHONDRONECROSIS OF LARYNX: ICD-10-CM

## 2022-11-07 DIAGNOSIS — C32.9 LARYNX CANCER: Primary | ICD-10-CM

## 2022-11-07 PROCEDURE — 3008F BODY MASS INDEX DOCD: CPT | Mod: CPTII,S$GLB,, | Performed by: OTOLARYNGOLOGY

## 2022-11-07 PROCEDURE — 3066F NEPHROPATHY DOC TX: CPT | Mod: CPTII,S$GLB,, | Performed by: OTOLARYNGOLOGY

## 2022-11-07 PROCEDURE — 99999 PR PBB SHADOW E&M-EST. PATIENT-LVL IV: ICD-10-PCS | Mod: PBBFAC,,, | Performed by: OTOLARYNGOLOGY

## 2022-11-07 PROCEDURE — 31575 DIAGNOSTIC LARYNGOSCOPY: CPT | Mod: S$GLB,,, | Performed by: OTOLARYNGOLOGY

## 2022-11-07 PROCEDURE — 3080F DIAST BP >= 90 MM HG: CPT | Mod: CPTII,S$GLB,, | Performed by: OTOLARYNGOLOGY

## 2022-11-07 PROCEDURE — 1159F PR MEDICATION LIST DOCUMENTED IN MEDICAL RECORD: ICD-10-PCS | Mod: CPTII,S$GLB,, | Performed by: OTOLARYNGOLOGY

## 2022-11-07 PROCEDURE — 31575 PR LARYNGOSCOPY, FLEXIBLE; DIAGNOSTIC: ICD-10-PCS | Mod: S$GLB,,, | Performed by: OTOLARYNGOLOGY

## 2022-11-07 PROCEDURE — 4010F PR ACE/ARB THEARPY RXD/TAKEN: ICD-10-PCS | Mod: CPTII,S$GLB,, | Performed by: OTOLARYNGOLOGY

## 2022-11-07 PROCEDURE — 1159F MED LIST DOCD IN RCRD: CPT | Mod: CPTII,S$GLB,, | Performed by: OTOLARYNGOLOGY

## 2022-11-07 PROCEDURE — 3077F PR MOST RECENT SYSTOLIC BLOOD PRESSURE >= 140 MM HG: ICD-10-PCS | Mod: CPTII,S$GLB,, | Performed by: OTOLARYNGOLOGY

## 2022-11-07 PROCEDURE — 1160F PR REVIEW ALL MEDS BY PRESCRIBER/CLIN PHARMACIST DOCUMENTED: ICD-10-PCS | Mod: CPTII,S$GLB,, | Performed by: OTOLARYNGOLOGY

## 2022-11-07 PROCEDURE — 3008F PR BODY MASS INDEX (BMI) DOCUMENTED: ICD-10-PCS | Mod: CPTII,S$GLB,, | Performed by: OTOLARYNGOLOGY

## 2022-11-07 PROCEDURE — 99213 OFFICE O/P EST LOW 20 MIN: CPT | Mod: 25,S$GLB,, | Performed by: OTOLARYNGOLOGY

## 2022-11-07 PROCEDURE — 4010F ACE/ARB THERAPY RXD/TAKEN: CPT | Mod: CPTII,S$GLB,, | Performed by: OTOLARYNGOLOGY

## 2022-11-07 PROCEDURE — 3066F PR DOCUMENTATION OF TREATMENT FOR NEPHROPATHY: ICD-10-PCS | Mod: CPTII,S$GLB,, | Performed by: OTOLARYNGOLOGY

## 2022-11-07 PROCEDURE — 3080F PR MOST RECENT DIASTOLIC BLOOD PRESSURE >= 90 MM HG: ICD-10-PCS | Mod: CPTII,S$GLB,, | Performed by: OTOLARYNGOLOGY

## 2022-11-07 PROCEDURE — 1160F RVW MEDS BY RX/DR IN RCRD: CPT | Mod: CPTII,S$GLB,, | Performed by: OTOLARYNGOLOGY

## 2022-11-07 PROCEDURE — 99213 PR OFFICE/OUTPT VISIT, EST, LEVL III, 20-29 MIN: ICD-10-PCS | Mod: 25,S$GLB,, | Performed by: OTOLARYNGOLOGY

## 2022-11-07 PROCEDURE — 3077F SYST BP >= 140 MM HG: CPT | Mod: CPTII,S$GLB,, | Performed by: OTOLARYNGOLOGY

## 2022-11-07 PROCEDURE — 1126F AMNT PAIN NOTED NONE PRSNT: CPT | Mod: CPTII,S$GLB,, | Performed by: OTOLARYNGOLOGY

## 2022-11-07 PROCEDURE — 99999 PR PBB SHADOW E&M-EST. PATIENT-LVL IV: CPT | Mod: PBBFAC,,, | Performed by: OTOLARYNGOLOGY

## 2022-11-07 PROCEDURE — 1126F PR PAIN SEVERITY QUANTIFIED, NO PAIN PRESENT: ICD-10-PCS | Mod: CPTII,S$GLB,, | Performed by: OTOLARYNGOLOGY

## 2022-11-07 NOTE — PROGRESS NOTES
Chief Complaint   Patient presents with    chodronecrosis of larynx     Treatment History  1. 2015: XRT for SCCA larynx ([facility]).  2. 10/2/17: DL and biopsy TVC.  Path revealed severe dysplasia/CIS/superficially invasive SCCA (Dr. Lizet Desouza).  3. 12/21/17: MSL with resection R TVC lesion, biopsy L TVC lesion.  Path revealed severe dysplasia. (Dr. Gibran Smith).  4. 3/1/17: MSL with laser resection L TVC.  Path benign. (Dr. Smith).    HPI   72 y.o. male presents with the above treatment history. No new complaints.       Review of Systems   Constitutional: Negative for fatigue and unexpected weight change.   HENT: Per HPI.  Eyes: Negative for visual disturbance.   Respiratory: Negative for shortness of breath, hemoptysis   Cardiovascular: Negative for chest pain and palpitations.   Musculoskeletal: Negative for decreased ROM, back pain.   Skin: Negative for rash, sunburn, itching.   Neurological: Negative for dizziness and seizures.   Hematological: Negative for adenopathy. Does not bruise/bleed easily.   Endocrine: Negative for rapid weight loss/weight gain, heat/cold intolerance.     Past Medical History   Patient Active Problem List   Diagnosis    Stage 4 chronic kidney disease    Essential hypertension    Mixed hyperlipidemia    Benign prostatic hyperplasia with nocturia    Claudication of right lower extremity    Eye swelling, bilateral    Diminished night vision    Renal transplant recipient    Thrombocytopenia    Dermolipoma    History of DVT (deep vein thrombosis)    Iron deficiency anemia    Thrombophilia    Pulmonary nodules    Larynx cancer    Voice disturbance    Atherosclerosis of aorta    Carcinoma in situ of vocal cord    Dysphagia, pharyngoesophageal    Chondronecrosis of larynx    Squamous cell carcinoma    Obesity (BMI 30-39.9)    Pterygium of both eyes    History of cancer of larynx    Nodule of left lung    Granulomatous lung disease    Transplanted kidney - 2007    Immunosuppression due to  "chronic steroid use    Hypertensive kidney disease with stage 4 chronic kidney disease    Hypercalcemia    Secondary hyperparathyroidism of renal origin    Benign tumor of orbit    Metabolic bone disease    Immunosuppressive management encounter following kidney transplant    Hyperphosphatemia    Vitamin D deficiency disease    History of COVID-19    SOB (shortness of breath)    Metabolic acidosis    Proteinuria    Hypokalemia    Immunosuppression    Anticoagulant long-term use    Pre-op evaluation    Benign prostatic hyperplasia with lower urinary tract symptoms           Past Surgical History   Past Surgical History:   Procedure Laterality Date    CYSTOSCOPY W/ RETROGRADES Bilateral 6/15/2022    Procedure: Cystoscopy, bilateral retrograde pyelogram, and all indicated procedures;  Surgeon: Veronica Bacon MD;  Location: [facility] OR;  Service: Urology;  Laterality: Bilateral;    FRACTURE SURGERY Left     "lower leg" 40 years ago    KIDNEY TRANSPLANT  2007    followed by [facility] Transplant    LARYNX SURGERY  11/2014    Oropharyngeal Cancer x3    microlaryngoscopy      PHLEBOGRAPHY Bilateral 8/2/2022    Procedure: Venogram;  Surgeon: KENIA Mueller II, MD;  Location: [facility] CATH LAB;  Service: Vascular;  Laterality: Bilateral;    SURGICAL REMOVAL OF LESION OF ORBIT Bilateral 8/26/2020    Procedure: EXCISION, LESION, ORBIT;  Surgeon: Linda Tee MD;  Location: [address];  Service: Ophthalmology;  Laterality: Bilateral;    TIBIAL STAPLING Left 1980              Family History   Family History   Problem Relation Age of Onset    Stroke Mother     Diabetes Mother     Hypertension Mother     Stroke Father     No Known Problems Sister     No Known Problems Brother     No Known Problems Daughter     No Known Problems Brother            Social History   .  Social History     Socioeconomic History    Marital status: Single    Number of children: 1   Tobacco Use    Smoking status: Former     Packs/day: 1.00     Years: 20.00 "     Pack years: 20.00     Types: Cigarettes     Quit date:      Years since quittin.8    Smokeless tobacco: Never   Substance and Sexual Activity    Alcohol use: No     Alcohol/week: 0.0 standard drinks    Drug use: No     Types: Marijuana     Comment: Patient quit     Sexual activity: Not Currently     Partners: Female     Social Determinants of Health     Financial Resource Strain: Low Risk     Difficulty of Paying Living Expenses: Not hard at all   Food Insecurity: No Food Insecurity    Worried About Running Out of Food in the Last Year: Never true    Ran Out of Food in the Last Year: Never true   Transportation Needs: No Transportation Needs    Lack of Transportation (Medical): No    Lack of Transportation (Non-Medical): No   Physical Activity: Inactive    Days of Exercise per Week: 0 days    Minutes of Exercise per Session: 0 min   Stress: No Stress Concern Present    Feeling of Stress : Not at all   Social Connections: Unknown    Frequency of Communication with Friends and Family: More than three times a week    Frequency of Social Gatherings with Friends and Family: More than three times a week    Attends [employer] Services: Never    Active Member of Clubs or Organizations: No    Attends Club or Organization Meetings: Never   Housing Stability: Unknown    Unable to Pay for Housing in the Last Year: No    Unstable Housing in the Last Year: No         Allergies   Review of patient's allergies indicates:  No Known Allergies        Physical Exam     Vitals:    22 1103   BP: (!) 173/98   Pulse: 101         Body mass index is 34.72 kg/m².    General: AOx3, NAD   Respiratory:  Symmetric chest rise, normal effort  Nose: No gross nasal septal deviation. Inferior Turbinates WNL bilaterally. No septal perforation. No masses/lesions.   Oral Cavity:  Oral Tongue mobile, no lesions noted. Hard Palate WNL. No buccal or FOM lesions.  Oropharynx:  No masses/lesions of the posterior pharyngeal wall.  Tonsillar fossa without lesions. Soft palate without masses. Midline uvula.   Neck: No scars. Mild fibrosis status post XRT.  No cervical lymphadenopathy, thyromegaly or thyroid nodules.  Normal range of motion.    Face: House Brackmann I bilaterally.     Flex Naso Heike Hypo Procedures #2     Procedure:  Diagnostic flexible nasopharyngoscopy, laryngoscopy and hypopharyngoscopy:     Routine preparation with local atomizer with 1% neosynephrine/pontocaine with customary flexible endoscope.     Nasopharynx:  No lesions.        Mucosa:  No lesions.        Adenoids:  Present.  Posterior Choanae:  Patent.  Eustachian Tubes:  Patent.  Posterior pharynx:  No lesions.  Larynx/hypopharynx:        Epiglottis:  No lesions.         AE Folds:  No lesions.        Vocal cords:  Stable nodularity L TVC        Mobility:  R TVC slightly limited, L TVC immobile.         Hypopharynx:  No lesions.        Piriform sinus:  No pooling, no lesions.        Post Cricoid:  No erythema, no edema.      Assessment/Plan  Problem List Items Addressed This Visit          ENT    Chondronecrosis of larynx       Oncology    Larynx cancer - Primary     TONY.  Exam stable.  RTC 1 year.

## 2022-11-08 ENCOUNTER — HOSPITAL ENCOUNTER (OUTPATIENT)
Dept: RADIOLOGY | Facility: HOSPITAL | Age: 72
Discharge: HOME | End: 2022-11-08
Attending: STUDENT IN AN ORGANIZED HEALTH CARE EDUCATION/TRAINING PROGRAM
Payer: MEDICARE

## 2022-11-08 DIAGNOSIS — M85.80 OSTEOPENIA, UNSPECIFIED LOCATION: ICD-10-CM

## 2022-11-08 PROCEDURE — 77080 DXA BONE DENSITY AXIAL: CPT | Mod: TC,PO

## 2022-12-08 LAB — HEMOCCULT STL QL IA: NEGATIVE

## 2022-12-12 DIAGNOSIS — Z94.0 TRANSPLANTED KIDNEY: Primary | ICD-10-CM

## 2022-12-12 RX ORDER — SIROLIMUS 1 MG/ML
3 SOLUTION ORAL DAILY
Qty: 90 ML | Refills: 12 | Status: SHIPPED | OUTPATIENT
Start: 2022-12-12 | End: 2023-01-11

## 2022-12-12 NOTE — PROGRESS NOTES
Called [employer] to inquire status of pt's refill.   [employer] will no longer have the Rapamune patient assistance program after 1/2023.   Currently only the Rapamune solution is available not the tablets.   Other programs for pt to look into are: Rx outreach and needy meds     New prescription for Rapamune solution 3mg daily sent to [employer] via fax.     Pt notified.     Marium Rich M.D.   Nephrology  [facility]

## 2022-12-15 ENCOUNTER — LAB VISIT (OUTPATIENT)
Dept: LAB | Facility: HOSPITAL | Age: 72
End: 2022-12-15
Payer: MEDICARE

## 2022-12-15 ENCOUNTER — OFFICE VISIT (OUTPATIENT)
Dept: NEPHROLOGY | Facility: CLINIC | Age: 72
End: 2022-12-15
Payer: MEDICARE

## 2022-12-15 VITALS
SYSTOLIC BLOOD PRESSURE: 135 MMHG | DIASTOLIC BLOOD PRESSURE: 84 MMHG | WEIGHT: 242.5 LBS | OXYGEN SATURATION: 96 % | HEART RATE: 64 BPM | BODY MASS INDEX: 34.8 KG/M2

## 2022-12-15 DIAGNOSIS — D63.1 ANEMIA IN CHRONIC KIDNEY DISEASE, UNSPECIFIED CKD STAGE: ICD-10-CM

## 2022-12-15 DIAGNOSIS — E83.52 HYPERCALCEMIA: ICD-10-CM

## 2022-12-15 DIAGNOSIS — E83.39 HYPERPHOSPHATEMIA: ICD-10-CM

## 2022-12-15 DIAGNOSIS — N18.9 ANEMIA IN CHRONIC KIDNEY DISEASE, UNSPECIFIED CKD STAGE: ICD-10-CM

## 2022-12-15 DIAGNOSIS — Z85.21 HISTORY OF CANCER OF LARYNX: ICD-10-CM

## 2022-12-15 DIAGNOSIS — Z94.0 TRANSPLANTED KIDNEY: ICD-10-CM

## 2022-12-15 DIAGNOSIS — N18.4 STAGE 4 CHRONIC KIDNEY DISEASE: Primary | ICD-10-CM

## 2022-12-15 DIAGNOSIS — Z86.718 HISTORY OF DVT (DEEP VEIN THROMBOSIS): ICD-10-CM

## 2022-12-15 DIAGNOSIS — D50.8 OTHER IRON DEFICIENCY ANEMIA: ICD-10-CM

## 2022-12-15 DIAGNOSIS — N18.4 STAGE 4 CHRONIC KIDNEY DISEASE: ICD-10-CM

## 2022-12-15 DIAGNOSIS — Z94.0 IMMUNOSUPPRESSIVE MANAGEMENT ENCOUNTER FOLLOWING KIDNEY TRANSPLANT: ICD-10-CM

## 2022-12-15 DIAGNOSIS — R80.1 PERSISTENT PROTEINURIA: ICD-10-CM

## 2022-12-15 DIAGNOSIS — Z79.899 IMMUNOSUPPRESSIVE MANAGEMENT ENCOUNTER FOLLOWING KIDNEY TRANSPLANT: ICD-10-CM

## 2022-12-15 DIAGNOSIS — E87.20 METABOLIC ACIDOSIS: ICD-10-CM

## 2022-12-15 LAB
ALBUMIN SERPL BCP-MCNC: 3.4 G/DL (ref 3.5–5.2)
ANION GAP SERPL CALC-SCNC: 12 MMOL/L (ref 8–16)
BASOPHILS # BLD AUTO: 0.03 K/UL (ref 0–0.2)
BASOPHILS NFR BLD: 0.5 % (ref 0–1.9)
BUN SERPL-MCNC: 41 MG/DL (ref 8–23)
CALCIUM SERPL-MCNC: 11 MG/DL (ref 8.7–10.5)
CHLORIDE SERPL-SCNC: 109 MMOL/L (ref 95–110)
CO2 SERPL-SCNC: 19 MMOL/L (ref 23–29)
CREAT SERPL-MCNC: 3.4 MG/DL (ref 0.5–1.4)
DIFFERENTIAL METHOD: ABNORMAL
EOSINOPHIL # BLD AUTO: 0.1 K/UL (ref 0–0.5)
EOSINOPHIL NFR BLD: 0.9 % (ref 0–8)
ERYTHROCYTE [DISTWIDTH] IN BLOOD BY AUTOMATED COUNT: 15.8 % (ref 11.5–14.5)
EST. GFR  (NO RACE VARIABLE): 18.4 ML/MIN/1.73 M^2
FERRITIN SERPL-MCNC: 753 NG/ML (ref 20–300)
GLUCOSE SERPL-MCNC: 117 MG/DL (ref 70–110)
HCT VFR BLD AUTO: 41.4 % (ref 40–54)
HGB BLD-MCNC: 12.4 G/DL (ref 14–18)
IMM GRANULOCYTES # BLD AUTO: 0.13 K/UL (ref 0–0.04)
IMM GRANULOCYTES NFR BLD AUTO: 2 % (ref 0–0.5)
IRON SERPL-MCNC: 60 UG/DL (ref 45–160)
LYMPHOCYTES # BLD AUTO: 0.8 K/UL (ref 1–4.8)
LYMPHOCYTES NFR BLD: 12.7 % (ref 18–48)
MCH RBC QN AUTO: 25.3 PG (ref 27–31)
MCHC RBC AUTO-ENTMCNC: 30 G/DL (ref 32–36)
MCV RBC AUTO: 85 FL (ref 82–98)
MONOCYTES # BLD AUTO: 0.5 K/UL (ref 0.3–1)
MONOCYTES NFR BLD: 8 % (ref 4–15)
NEUTROPHILS # BLD AUTO: 5.1 K/UL (ref 1.8–7.7)
NEUTROPHILS NFR BLD: 75.9 % (ref 38–73)
NRBC BLD-RTO: 0 /100 WBC
PHOSPHATE SERPL-MCNC: 2 MG/DL (ref 2.7–4.5)
PLATELET # BLD AUTO: 151 K/UL (ref 150–450)
PMV BLD AUTO: 10.5 FL (ref 9.2–12.9)
POTASSIUM SERPL-SCNC: 3.8 MMOL/L (ref 3.5–5.1)
PTH-INTACT SERPL-MCNC: 417.7 PG/ML (ref 9–77)
RBC # BLD AUTO: 4.9 M/UL (ref 4.6–6.2)
RETICS/RBC NFR AUTO: 1.8 % (ref 0.4–2)
SATURATED IRON: 31 % (ref 20–50)
SODIUM SERPL-SCNC: 140 MMOL/L (ref 136–145)
TOTAL IRON BINDING CAPACITY: 195 UG/DL (ref 250–450)
TRANSFERRIN SERPL-MCNC: 132 MG/DL (ref 200–375)
WBC # BLD AUTO: 6.64 K/UL (ref 3.9–12.7)

## 2022-12-15 PROCEDURE — 99999 PR PBB SHADOW E&M-EST. PATIENT-LVL IV: ICD-10-PCS | Mod: PBBFAC,GC,, | Performed by: STUDENT IN AN ORGANIZED HEALTH CARE EDUCATION/TRAINING PROGRAM

## 2022-12-15 PROCEDURE — 84466 ASSAY OF TRANSFERRIN: CPT | Performed by: STUDENT IN AN ORGANIZED HEALTH CARE EDUCATION/TRAINING PROGRAM

## 2022-12-15 PROCEDURE — 82728 ASSAY OF FERRITIN: CPT | Performed by: STUDENT IN AN ORGANIZED HEALTH CARE EDUCATION/TRAINING PROGRAM

## 2022-12-15 PROCEDURE — 99999 PR PBB SHADOW E&M-EST. PATIENT-LVL IV: CPT | Mod: PBBFAC,GC,, | Performed by: STUDENT IN AN ORGANIZED HEALTH CARE EDUCATION/TRAINING PROGRAM

## 2022-12-15 PROCEDURE — 83970 ASSAY OF PARATHORMONE: CPT | Performed by: STUDENT IN AN ORGANIZED HEALTH CARE EDUCATION/TRAINING PROGRAM

## 2022-12-15 PROCEDURE — 85045 AUTOMATED RETICULOCYTE COUNT: CPT | Performed by: STUDENT IN AN ORGANIZED HEALTH CARE EDUCATION/TRAINING PROGRAM

## 2022-12-15 PROCEDURE — 85025 COMPLETE CBC W/AUTO DIFF WBC: CPT | Performed by: STUDENT IN AN ORGANIZED HEALTH CARE EDUCATION/TRAINING PROGRAM

## 2022-12-15 PROCEDURE — 80069 RENAL FUNCTION PANEL: CPT | Performed by: STUDENT IN AN ORGANIZED HEALTH CARE EDUCATION/TRAINING PROGRAM

## 2022-12-15 PROCEDURE — 36415 COLL VENOUS BLD VENIPUNCTURE: CPT | Performed by: STUDENT IN AN ORGANIZED HEALTH CARE EDUCATION/TRAINING PROGRAM

## 2022-12-15 PROCEDURE — 80195 ASSAY OF SIROLIMUS: CPT | Performed by: STUDENT IN AN ORGANIZED HEALTH CARE EDUCATION/TRAINING PROGRAM

## 2022-12-15 NOTE — PROGRESS NOTES
Nephrology Clinic Note   12/15/2022    Chief Complaint   Patient presents with    Chronic Kidney Disease      History of present illness:  Patient is a 72 y.o. 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 Pt presents today for f/u on CKD    Pt's last appt was on: 09/15/2022  Since his last appt pt has had difficulty obtaining his Rapamune via [employer] patient assistance program.  Otherwise pt has been feeling the same. He has not had any hospitalizations since his last appt. Has not been taking lisinopril (out of refills)       Pt denies: Fever, chills, shortness of breath, chest pain, palpitations, nausea, vomiting, diarrhea, abd pain, hematuria, dysuria, urinary frequency or urgency, headaches, visual disturbances or weakness.          HTN, hyperlipidemia:   Pt monitors blood pressure at home and his SBPs are usually in 130s-150's  Meds: Clonidine patch weekly, atorvastatin, furosemide 40mg daily, metoprolol 50mg daily,         CKD IV  2/2 hypertensive nephrosclerosis & chronic CNI exposure   Baseline sCr 3.3-3.6 (in 2022)   Lab Results   Component Value Date    CREATININE 3.4 (H) 12/15/2022       2021 pt had COVID related CHANG  CNI toxicity in the past   On Dr. 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 contributing to pt's CKD   Anemia  Iron   Date Value Ref Range Status   12/15/2022 60 45 - 160 ug/dL Final   04/13/2022 55 45 - 160 ug/dL Final     TIBC   Date Value Ref Range Status   12/15/2022 195 (L) 250 - 450 ug/dL Final   04/13/2022 212 (L) 250 - 450 ug/dL Final     Ferritin   Date Value Ref Range Status   12/15/2022 753 (H) 20.0 - 300.0 ng/mL Final   04/13/2022 568 (H) 20.0 -  300.0 ng/mL Final     Retic   Date Value Ref Range Status   12/15/2022 1.8 0.4 - 2.0 % Final     Hemoglobin   Date Value Ref Range Status   12/15/2022 12.4 (L) 14.0 - 18.0 g/dL Final   09/09/2022 13.3 (L) 14.0 - 18.0 g/dL Final       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 Gillette's notes)   Lab Results   Component Value Date    .7 (H) 12/15/2022    CALCIUM 11.0 (H) 12/15/2022    PHOS 2.0 (L) 12/15/2022     Patient is not a candidate for another kidney transplant   Meds: Nabicarb 650mg bid, vitamin D 50K weekly, ferrous sulfate 325mg daily      Kidney Transplant:   Done in [place]   CNI toxicity in the past   Meds: sirolimus 3mg daily (has not been taking for 1 week d/t coverage), prednisone 5mg daily,   Does not follow with kidney transplant team       Oropharyngeal CA-follows with ENT, not on any current medications/treatments       ROS as per HPI    History:  Past Medical History:   Diagnosis Date    Acute hypoxemic respiratory failure due to COVID-19 12/30/2020    Anticoagulant long-term use     BPH (benign prostatic hypertrophy)     Cancer     vocal cords    Claudication of right lower extremity 3/10/2016    COVID-19 virus detected 12/30/2020    Disorder of kidney and ureter     Hyperkalemia 1/1/2021    Hyperlipidemia     Hypertension     Hypokalemia 6/23/2021    Immunosuppression 6/26/2021    Immunosuppression due to chronic steroid use 1/30/2020    Microcytic anemia 9/16/2016    Obesity (BMI 30-39.9) 1/23/2019    Oropharyngeal cancer     Pterygium     Squamous cell carcinoma 4/25/2018    Thromboembolic disorder     Unspecified disorder of kidney and ureter       Past Surgical History:   Procedure Laterality Date    CYSTOSCOPY W/ RETROGRADES Bilateral 6/15/2022    Procedure: Cystoscopy, bilateral retrograde pyelogram, and all indicated procedures;  Surgeon: Veronica Bacon MD;  Location: [facility];  Service:  "Urology;  Laterality: Bilateral;    FRACTURE SURGERY Left     "lower leg" 40 years ago    KIDNEY TRANSPLANT  2007    followed by [facility] Transplant    LARYNX SURGERY  11/2014    Oropharyngeal Cancer x3    microlaryngoscopy      PHLEBOGRAPHY Bilateral 8/2/2022    Procedure: Venogram;  Surgeon: KENIA Mueller II, MD;  Location: [facility] CATH LAB;  Service: Vascular;  Laterality: Bilateral;    SURGICAL REMOVAL OF LESION OF ORBIT Bilateral 8/26/2020    Procedure: EXCISION, LESION, ORBIT;  Surgeon: Linda Tee MD;  [facility];  Service: Ophthalmology;  Laterality: Bilateral;    TIBIAL STAPLING Left 1980             Current Outpatient Medications:     apixaban (ELIQUIS) 2.5 mg Tab, Take 1 tablet (2.5 mg total) by mouth 2 (two) times daily., Disp: 180 tablet, Rfl: 3    atorvastatin (LIPITOR) 20 MG tablet, Take 1 tablet (20 mg total) by mouth every evening., Disp: 90 tablet, Rfl: 3    cloNIDine 0.3 mg/24 hr td ptwk (CATAPRES) 0.3 mg/24 hr, PLACE 1 PATCH ONTO THE SKIN ONCE A WEEK., Disp: 12 patch, Rfl: 3    ergocalciferol (ERGOCALCIFEROL) 50,000 unit Cap, Take one tablet once a week, Disp: , Rfl:     ergocalciferol (VITAMIN D2) 50,000 unit Cap, Take 1 capsule (50,000 Units total) by mouth every 7 days., Disp: 12 capsule, Rfl: 3    ferrous sulfate 325 mg (65 mg iron) Tab tablet, Take 325 mg by mouth once daily., Disp: , Rfl:     finasteride (PROSCAR) 5 mg tablet, Take 1 tablet (5 mg total) by mouth once daily., Disp: 90 tablet, Rfl: 3    furosemide (LASIX) 40 MG tablet, Take 1 tablet (40 mg total) by mouth once daily., Disp: 90 tablet, Rfl: 3    metoprolol succinate (TOPROL-XL) 50 MG 24 hr tablet, Take 1 tablet (50 mg total) by mouth once daily., Disp: 90 tablet, Rfl: 3    predniSONE (DELTASONE) 5 MG tablet, Take 1 tablet (5 mg total) by mouth once daily., Disp: 90 tablet, Rfl: 3    sirolimus (RAPAMUNE) 1 MG Tab, Take 3 tablets (3 mg total) by mouth once daily., Disp: 90 tablet, Rfl: 3    sirolimus (RAPAMUNE) " 1 mg/mL solution, Take 3 mLs (3 mg total) by mouth once daily., Disp: 90 mL, Rfl: 12    sodium bicarbonate 650 MG tablet, Take 1 tablet (650 mg total) by mouth 2 (two) times daily., Disp: 180 tablet, Rfl: 3    lisinopriL 10 MG tablet, Take 1 tablet (10 mg total) by mouth once daily. (Patient not taking: Reported on 10/18/2022), Disp: 90 tablet, Rfl: 3    nitroGLYCERIN (NITROSTAT) 0.4 MG SL tablet, Place 1 tablet (0.4 mg total) under the tongue every 5 (five) minutes as needed for Chest pain. (Patient not taking: Reported on 9/15/2022), Disp: 30 tablet, Rfl: 2  Review of patient's allergies indicates:  No Known Allergies   Social History     Tobacco Use    Smoking status: Former     Packs/day: 1.00     Years: 20.00     Pack years: 20.00     Types: Cigarettes     Quit date:      Years since quittin.9    Smokeless tobacco: Never   Substance Use Topics    Alcohol use: No     Alcohol/week: 0.0 standard drinks      Family History   Problem Relation Age of Onset    Stroke Mother     Diabetes Mother     Hypertension Mother     Stroke Father     No Known Problems Sister     No Known Problems Brother     No Known Problems Daughter     No Known Problems Brother         Physical Exam :  Vitals:    12/15/22 1408   BP: 135/84   Pulse: 64     Physical Exam  Vitals and nursing note reviewed.   Constitutional:       General: He is not in acute distress.     Appearance: Normal appearance. He is obese. He is not ill-appearing, toxic-appearing or diaphoretic.   HENT:      Mouth/Throat:      Mouth: Mucous membranes are moist.   Cardiovascular:      Rate and Rhythm: Normal rate and regular rhythm.      Pulses: Normal pulses.      Heart sounds: Murmur heard.   Pulmonary:      Effort: Pulmonary effort is normal. No respiratory distress.      Breath sounds: Normal breath sounds. No stridor. No wheezing, rhonchi or rales.   Musculoskeletal:         General: No swelling, tenderness, deformity or signs of injury.      Right lower leg:  Edema present.      Left lower leg: No edema.      Comments: R leg swelling 1+   Skin:     General: Skin is warm.      Capillary Refill: Capillary refill takes 2 to 3 seconds.      Coloration: Skin is not jaundiced or pale.      Findings: No bruising, erythema, lesion or rash.   Neurological:      Mental Status: He is alert and oriented to person, place, and time.   Psychiatric:         Mood and Affect: Mood normal.         Behavior: Behavior normal.         Thought Content: Thought content normal.         Judgment: Judgment normal.       Labs reviewed   Images Reviewed    Assessment:    1. Stage 4 chronic kidney disease    2. Anemia in chronic kidney disease, unspecified CKD stage    3. Transplanted kidney - 2007    4. Hypercalcemia    5. Immunosuppressive management encounter following kidney transplant    6. Hyperphosphatemia    7. Metabolic acidosis    8. Persistent proteinuria    9. Other iron deficiency anemia    10. History of cancer of larynx    11. History of DVT (deep vein thrombosis)          Plan:    Stage 4 chronic kidney disease  Post-kidney transplant CKD IV 2/2 hypertensive nephrosclerosis & chronic CNI exposure   Baseline sCr: 3.3-3.6  Lab Results   Component Value Date    CREATININE 3.4 (H) 12/15/2022      Plan  -stable CKD IV  -Electrolytes: acceptable  -Acid/Base: continue Nabicarb 650mg BID  -volume status: will consider increasing lasix, currently 40mg daily, if LE is worsening   -Mineral & Bone Disorder:  Phosphorus level: 3.0, PTH level 418, calcium 11, cont  vitamin D 50K weekly and avoid additional calcium supplements 2/2 hypercalcemia d/t tertiary hyperparathyroidism  -CKD anemia: hgb 12.4, goal hgb 10-11g/dL, iron studies acceptable, no indication for iron infusions or ESAs  -start ACE-I: re-start lisinopril 10mg daily   -SGLT2-2 inh: not a candidate d/t advanced CKD  -Tight control of  HTN (<130/80)  -advanced CKD planning: vein mapping completed, pt will need to return to vascular  surgeon to start creation of HD access, will monitor closely as pt declines to start process at this time  -F/u in 3 months with labs (as above)  -When possible avoid nephrotoxins (NSAIDS, long term use of PPIs, IV contrast/Gadolinium exposure)      Transplanted kidney - 2007  Done in [place]   CNI toxicity in the past   Plan:  -called [employer] and they are going to check for a liquid sirolimus 3mg formulation, new prescription faxed  -yearly transplant f/u appt recommended       Hypercalcemia  2/2 tertiary hyperparathyroidism  See CKD    Immunosuppressive management encounter following kidney transplant  Recent Labs   Lab 04/13/22  1114 05/11/22  1109 12/15/22  1517   Sirolimus Lvl 9.4 8.7 2.6 L     -Monitor for side effects and toxicities, given narrow therapeutic window and significant risk of AE      Hyperphosphatemia  See CKD    Metabolic acidosis  See CKD    Proteinuria  2/2 CKD  See CKD    Iron deficiency anemia  See CKD    History of cancer of larynx  F/u with ENT    History of DVT (deep vein thrombosis)  On eliquis       Follow up in about 3 months (around 3/15/2023).     Orders Placed This Encounter   Procedures    Renal Function Panel    CBC Auto Differential    Iron and TIBC    Reticulocytes    Ferritin    PTH, Intact    Calcitriol (1,25 DI-OH Vitamin D)    Protein/Creatinine Ratio, Urine     There are no discontinued medications.   Future Appointments   Date Time Provider Department Center   4/18/2023 10:20 AM Home Alexis MD [facility]     Patient was seen and plan was discussed with attending, Dr. Evangelista

## 2022-12-16 LAB — SIROLIMUS BLD-MCNC: 2.6 NG/ML (ref 4–20)

## 2022-12-21 PROBLEM — E87.6 HYPOKALEMIA: Status: RESOLVED | Noted: 2021-06-23 | Resolved: 2022-12-21

## 2022-12-22 DIAGNOSIS — Z94.0 KIDNEY TRANSPLANT RECIPIENT: ICD-10-CM

## 2022-12-22 RX ORDER — SIROLIMUS 1 MG/1
3 TABLET, FILM COATED ORAL DAILY
Qty: 90 TABLET | Refills: 3 | Status: SHIPPED | OUTPATIENT
Start: 2022-12-22 | End: 2023-01-18

## 2022-12-22 NOTE — PROGRESS NOTES
Rapamune tab prescription sent to [facility] pharmacy per pt's request     Marium Rich M.D.   Nephrology  [facility]

## 2022-12-22 NOTE — ASSESSMENT & PLAN NOTE
Recent Labs   Lab 04/13/22  1114 05/11/22  1109 12/15/22  1517   Sirolimus Lvl 9.4 8.7 2.6 L     -Monitor for side effects and toxicities, given narrow therapeutic window and significant risk of AE

## 2022-12-22 NOTE — ASSESSMENT & PLAN NOTE
Post-kidney transplant CKD IV 2/2 hypertensive nephrosclerosis & chronic CNI exposure   Baseline sCr: 3.3-3.6  Lab Results   Component Value Date    CREATININE 3.4 (H) 12/15/2022      Plan  -stable CKD IV  -Electrolytes: acceptable  -Acid/Base: continue Nabicarb 650mg BID  -volume status: will consider increasing lasix, currently 40mg daily, if LE is worsening   -Mineral & Bone Disorder:  Phosphorus level: 3.0, PTH level 418, calcium 11, cont  vitamin D 50K weekly and avoid additional calcium supplements 2/2 hypercalcemia d/t tertiary hyperparathyroidism  -CKD anemia: hgb 12.4, goal hgb 10-11g/dL, iron studies acceptable, no indication for iron infusions or ESAs  -start ACE-I: re-start lisinopril 10mg daily   -SGLT2-2 inh: not a candidate d/t advanced CKD  -Tight control of  HTN (<130/80)  -advanced CKD planning: vein mapping completed, pt will need to return to vascular surgeon to start creation of HD access, will monitor closely as pt declines to start process at this time  -F/u in 3 months with labs (as above)  -When possible avoid nephrotoxins (NSAIDS, long term use of PPIs, IV contrast/Gadolinium exposure)

## 2023-01-09 NOTE — PROGRESS NOTES
Applied for Rx outreach prescription services this morning as pt is unable to afford the cost of the prescription at [facility] or [facility]. Once the program has created an account for this pt a new prescription will be sent. Will continue to f/u     Marium Rich M.D.   Nephrology  [facility]

## 2023-01-12 DIAGNOSIS — E55.9 VITAMIN D DEFICIENCY: ICD-10-CM

## 2023-01-13 RX ORDER — ERGOCALCIFEROL 1.25 MG/1
CAPSULE ORAL
Qty: 12 CAPSULE | Refills: 3 | Status: SHIPPED | OUTPATIENT
Start: 2023-01-13 | End: 2024-01-22

## 2023-01-18 DIAGNOSIS — Z94.0 KIDNEY TRANSPLANT RECIPIENT: ICD-10-CM

## 2023-01-18 RX ORDER — SIROLIMUS 1 MG/1
3 TABLET, FILM COATED ORAL DAILY
Qty: 90 TABLET | Refills: 3 | Status: SHIPPED | OUTPATIENT
Start: 2023-01-18 | End: 2023-01-19 | Stop reason: SDUPTHER

## 2023-01-18 NOTE — PROGRESS NOTES
Sent RX for tacrolimus 3mg daily  to [facility], pt's case will be evaluated for prescription coverage assistance.   Program Contact #s  Phone: [phone]   Fax [phone]      
3

## 2023-01-19 ENCOUNTER — TELEPHONE (OUTPATIENT)
Dept: NEPHROLOGY | Facility: CLINIC | Age: 73
End: 2023-01-19
Payer: MEDICARE

## 2023-01-19 DIAGNOSIS — Z94.0 IMMUNOSUPPRESSIVE MANAGEMENT ENCOUNTER FOLLOWING KIDNEY TRANSPLANT: ICD-10-CM

## 2023-01-19 DIAGNOSIS — Z79.899 IMMUNOSUPPRESSIVE MANAGEMENT ENCOUNTER FOLLOWING KIDNEY TRANSPLANT: ICD-10-CM

## 2023-01-19 DIAGNOSIS — Z94.0 TRANSPLANTED KIDNEY: Primary | ICD-10-CM

## 2023-01-19 DIAGNOSIS — E78.2 MIXED HYPERLIPIDEMIA: ICD-10-CM

## 2023-01-19 DIAGNOSIS — Z94.0 RENAL TRANSPLANT RECIPIENT: ICD-10-CM

## 2023-01-19 DIAGNOSIS — R60.9 EDEMA, UNSPECIFIED TYPE: ICD-10-CM

## 2023-01-19 DIAGNOSIS — N18.4 CHRONIC KIDNEY DISEASE, STAGE IV (SEVERE): ICD-10-CM

## 2023-01-19 DIAGNOSIS — N40.0 BENIGN PROSTATIC HYPERPLASIA, UNSPECIFIED WHETHER LOWER URINARY TRACT SYMPTOMS PRESENT: ICD-10-CM

## 2023-01-19 DIAGNOSIS — I82.409 ACUTE DEEP VEIN THROMBOSIS (DVT) OF LOWER EXTREMITY, UNSPECIFIED LATERALITY, UNSPECIFIED VEIN: ICD-10-CM

## 2023-01-19 RX ORDER — SIROLIMUS 1 MG/1
3 TABLET, FILM COATED ORAL DAILY
Qty: 270 TABLET | Refills: 3
Start: 2023-01-19 | End: 2023-04-05 | Stop reason: SDUPTHER

## 2023-01-19 NOTE — TELEPHONE ENCOUNTER
Called pt to f/u on his sirolimus coverage with his insurance, pt states that he was able to get sirolimus 1mg prescription 3 tabs daily for ~300.00 for a 90 day supply with [facility] pharmacy ([phone]) which is more affordable than other pharmacies and it is also guaranteed for 1 year. Have cancelled the application for the transplant medication assistance program in [place].       Marium Rich M.D.   Nephrology  [facility]

## 2023-01-20 RX ORDER — FUROSEMIDE 40 MG/1
TABLET ORAL
Qty: 90 TABLET | Refills: 3 | Status: SHIPPED | OUTPATIENT
Start: 2023-01-20

## 2023-01-20 RX ORDER — METOPROLOL SUCCINATE 50 MG/1
TABLET, EXTENDED RELEASE ORAL
Qty: 90 TABLET | Refills: 3 | Status: SHIPPED | OUTPATIENT
Start: 2023-01-20

## 2023-01-20 RX ORDER — PREDNISONE 5 MG/1
TABLET ORAL
Qty: 90 TABLET | Refills: 3 | Status: SHIPPED | OUTPATIENT
Start: 2023-01-20

## 2023-01-20 RX ORDER — FINASTERIDE 5 MG/1
TABLET, FILM COATED ORAL
Qty: 90 TABLET | Refills: 3 | Status: SHIPPED | OUTPATIENT
Start: 2023-01-20

## 2023-01-20 RX ORDER — APIXABAN 2.5 MG/1
TABLET, FILM COATED ORAL
Qty: 180 TABLET | Refills: 3 | Status: SHIPPED | OUTPATIENT
Start: 2023-01-20

## 2023-01-20 RX ORDER — ATORVASTATIN CALCIUM 20 MG/1
TABLET, FILM COATED ORAL
Qty: 90 TABLET | Refills: 3 | Status: SHIPPED | OUTPATIENT
Start: 2023-01-20

## 2023-01-24 ENCOUNTER — PATIENT OUTREACH (OUTPATIENT)
Dept: ADMINISTRATIVE | Facility: HOSPITAL | Age: 73
End: 2023-01-24
Payer: MEDICARE

## 2023-02-09 DIAGNOSIS — Z00.00 ENCOUNTER FOR MEDICARE ANNUAL WELLNESS EXAM: ICD-10-CM

## 2023-04-03 ENCOUNTER — TELEPHONE (OUTPATIENT)
Dept: FAMILY MEDICINE | Facility: CLINIC | Age: 73
End: 2023-04-03
Payer: MEDICARE

## 2023-04-18 ENCOUNTER — OFFICE VISIT (OUTPATIENT)
Dept: FAMILY MEDICINE | Facility: CLINIC | Age: 73
End: 2023-04-18
Payer: MEDICARE

## 2023-04-18 VITALS
WEIGHT: 241.5 LBS | DIASTOLIC BLOOD PRESSURE: 80 MMHG | SYSTOLIC BLOOD PRESSURE: 126 MMHG | HEIGHT: 70 IN | BODY MASS INDEX: 34.57 KG/M2 | OXYGEN SATURATION: 95 % | TEMPERATURE: 98 F | HEART RATE: 68 BPM

## 2023-04-18 DIAGNOSIS — I10 ESSENTIAL HYPERTENSION: Primary | ICD-10-CM

## 2023-04-18 DIAGNOSIS — N25.81 SECONDARY HYPERPARATHYROIDISM OF RENAL ORIGIN: ICD-10-CM

## 2023-04-18 DIAGNOSIS — R73.9 HYPERGLYCEMIA: ICD-10-CM

## 2023-04-18 DIAGNOSIS — E78.2 MIXED HYPERLIPIDEMIA: ICD-10-CM

## 2023-04-18 DIAGNOSIS — E66.09 CLASS 1 OBESITY DUE TO EXCESS CALORIES WITH SERIOUS COMORBIDITY AND BODY MASS INDEX (BMI) OF 34.0 TO 34.9 IN ADULT: ICD-10-CM

## 2023-04-18 DIAGNOSIS — C32.9 LARYNX CANCER: ICD-10-CM

## 2023-04-18 DIAGNOSIS — J43.2 CENTRILOBULAR EMPHYSEMA: ICD-10-CM

## 2023-04-18 DIAGNOSIS — Z99.2 DEPENDENCE ON RENAL DIALYSIS: ICD-10-CM

## 2023-04-18 DIAGNOSIS — N18.4 STAGE 4 CHRONIC KIDNEY DISEASE: ICD-10-CM

## 2023-04-18 DIAGNOSIS — Z94.0 TRANSPLANTED KIDNEY: ICD-10-CM

## 2023-04-18 DIAGNOSIS — I70.0 ATHEROSCLEROSIS OF AORTA: ICD-10-CM

## 2023-04-18 PROBLEM — D84.821 IMMUNOSUPPRESSION DUE TO CHRONIC STEROID USE: Status: RESOLVED | Noted: 2020-01-30 | Resolved: 2023-04-18

## 2023-04-18 PROBLEM — Z79.52 IMMUNOSUPPRESSION DUE TO CHRONIC STEROID USE: Status: RESOLVED | Noted: 2020-01-30 | Resolved: 2023-04-18

## 2023-04-18 PROBLEM — T38.0X5A IMMUNOSUPPRESSION DUE TO CHRONIC STEROID USE: Status: RESOLVED | Noted: 2020-01-30 | Resolved: 2023-04-18

## 2023-04-18 PROBLEM — D84.9 IMMUNOSUPPRESSION: Status: RESOLVED | Noted: 2021-06-26 | Resolved: 2023-04-18

## 2023-04-18 PROBLEM — E66.811 CLASS 1 OBESITY DUE TO EXCESS CALORIES WITH SERIOUS COMORBIDITY AND BODY MASS INDEX (BMI) OF 34.0 TO 34.9 IN ADULT: Status: ACTIVE | Noted: 2023-04-18

## 2023-04-18 PROCEDURE — 99214 PR OFFICE/OUTPT VISIT, EST, LEVL IV, 30-39 MIN: ICD-10-PCS | Mod: S$GLB,,, | Performed by: STUDENT IN AN ORGANIZED HEALTH CARE EDUCATION/TRAINING PROGRAM

## 2023-04-18 PROCEDURE — 3079F PR MOST RECENT DIASTOLIC BLOOD PRESSURE 80-89 MM HG: ICD-10-PCS | Mod: CPTII,S$GLB,, | Performed by: STUDENT IN AN ORGANIZED HEALTH CARE EDUCATION/TRAINING PROGRAM

## 2023-04-18 PROCEDURE — 3079F DIAST BP 80-89 MM HG: CPT | Mod: CPTII,S$GLB,, | Performed by: STUDENT IN AN ORGANIZED HEALTH CARE EDUCATION/TRAINING PROGRAM

## 2023-04-18 PROCEDURE — 1126F AMNT PAIN NOTED NONE PRSNT: CPT | Mod: CPTII,S$GLB,, | Performed by: STUDENT IN AN ORGANIZED HEALTH CARE EDUCATION/TRAINING PROGRAM

## 2023-04-18 PROCEDURE — 99214 OFFICE O/P EST MOD 30 MIN: CPT | Mod: S$GLB,,, | Performed by: STUDENT IN AN ORGANIZED HEALTH CARE EDUCATION/TRAINING PROGRAM

## 2023-04-18 PROCEDURE — 1159F MED LIST DOCD IN RCRD: CPT | Mod: CPTII,S$GLB,, | Performed by: STUDENT IN AN ORGANIZED HEALTH CARE EDUCATION/TRAINING PROGRAM

## 2023-04-18 PROCEDURE — 3074F PR MOST RECENT SYSTOLIC BLOOD PRESSURE < 130 MM HG: ICD-10-PCS | Mod: CPTII,S$GLB,, | Performed by: STUDENT IN AN ORGANIZED HEALTH CARE EDUCATION/TRAINING PROGRAM

## 2023-04-18 PROCEDURE — 1101F PT FALLS ASSESS-DOCD LE1/YR: CPT | Mod: CPTII,S$GLB,, | Performed by: STUDENT IN AN ORGANIZED HEALTH CARE EDUCATION/TRAINING PROGRAM

## 2023-04-18 PROCEDURE — 1126F PR PAIN SEVERITY QUANTIFIED, NO PAIN PRESENT: ICD-10-PCS | Mod: CPTII,S$GLB,, | Performed by: STUDENT IN AN ORGANIZED HEALTH CARE EDUCATION/TRAINING PROGRAM

## 2023-04-18 PROCEDURE — 3074F SYST BP LT 130 MM HG: CPT | Mod: CPTII,S$GLB,, | Performed by: STUDENT IN AN ORGANIZED HEALTH CARE EDUCATION/TRAINING PROGRAM

## 2023-04-18 PROCEDURE — 3008F PR BODY MASS INDEX (BMI) DOCUMENTED: ICD-10-PCS | Mod: CPTII,S$GLB,, | Performed by: STUDENT IN AN ORGANIZED HEALTH CARE EDUCATION/TRAINING PROGRAM

## 2023-04-18 PROCEDURE — 3008F BODY MASS INDEX DOCD: CPT | Mod: CPTII,S$GLB,, | Performed by: STUDENT IN AN ORGANIZED HEALTH CARE EDUCATION/TRAINING PROGRAM

## 2023-04-18 PROCEDURE — 1160F RVW MEDS BY RX/DR IN RCRD: CPT | Mod: CPTII,S$GLB,, | Performed by: STUDENT IN AN ORGANIZED HEALTH CARE EDUCATION/TRAINING PROGRAM

## 2023-04-18 PROCEDURE — 1160F PR REVIEW ALL MEDS BY PRESCRIBER/CLIN PHARMACIST DOCUMENTED: ICD-10-PCS | Mod: CPTII,S$GLB,, | Performed by: STUDENT IN AN ORGANIZED HEALTH CARE EDUCATION/TRAINING PROGRAM

## 2023-04-18 PROCEDURE — 3288F PR FALLS RISK ASSESSMENT DOCUMENTED: ICD-10-PCS | Mod: CPTII,S$GLB,, | Performed by: STUDENT IN AN ORGANIZED HEALTH CARE EDUCATION/TRAINING PROGRAM

## 2023-04-18 PROCEDURE — 1159F PR MEDICATION LIST DOCUMENTED IN MEDICAL RECORD: ICD-10-PCS | Mod: CPTII,S$GLB,, | Performed by: STUDENT IN AN ORGANIZED HEALTH CARE EDUCATION/TRAINING PROGRAM

## 2023-04-18 PROCEDURE — 3288F FALL RISK ASSESSMENT DOCD: CPT | Mod: CPTII,S$GLB,, | Performed by: STUDENT IN AN ORGANIZED HEALTH CARE EDUCATION/TRAINING PROGRAM

## 2023-04-18 PROCEDURE — 1101F PR PT FALLS ASSESS DOC 0-1 FALLS W/OUT INJ PAST YR: ICD-10-PCS | Mod: CPTII,S$GLB,, | Performed by: STUDENT IN AN ORGANIZED HEALTH CARE EDUCATION/TRAINING PROGRAM

## 2023-04-18 NOTE — PROGRESS NOTES
Patient ID: Jose Luis Manzo is a 73 y.o. male.     Chief Complaint: Follow-up    Follow-up  Pertinent negatives include no chest pain, coughing, fever, headaches, myalgias, nausea, rash, vomiting or weakness.    Patient here for follow up    CKD with history of kidney transplant- he is avoiding nephrotoxic agents    HTN- blood pressure remains at goal <130/80    HLD- denies chest pain and shortness of breath      Review of Systems  Review of Systems   Constitutional:  Negative for fever.   HENT:  Negative for ear pain and sinus pain.    Eyes:  Negative for discharge.   Respiratory:  Negative for cough and shortness of breath.    Cardiovascular:  Negative for chest pain and leg swelling.   Gastrointestinal:  Negative for diarrhea, nausea and vomiting.   Genitourinary:  Negative for urgency.   Musculoskeletal:  Negative for myalgias.   Skin:  Negative for rash.   Neurological:  Negative for weakness and headaches.   Psychiatric/Behavioral:  Negative for depression.    All other systems reviewed and are negative.    Currently Medications  Current Outpatient Medications on File Prior to Visit   Medication Sig Dispense Refill    atorvastatin (LIPITOR) 20 MG tablet TAKE 1 TABLET EVERY EVENING 90 tablet 3    cloNIDine 0.3 mg/24 hr td ptwk (CATAPRES) 0.3 mg/24 hr PLACE 1 PATCH ONTO THE SKIN ONCE A WEEK. 12 patch 3    ELIQUIS 2.5 mg Tab TAKE 1 TABLET TWICE DAILY 180 tablet 3    ergocalciferol (ERGOCALCIFEROL) 50,000 unit Cap Take one tablet once a week      ferrous sulfate 325 mg (65 mg iron) Tab tablet Take 325 mg by mouth once daily.      finasteride (PROSCAR) 5 mg tablet TAKE 1 TABLET EVERY DAY 90 tablet 3    furosemide (LASIX) 40 MG tablet TAKE 1 TABLET EVERY DAY 90 tablet 3    metoprolol succinate (TOPROL-XL) 50 MG 24 hr tablet TAKE 1 TABLET EVERY DAY 90 tablet 3    predniSONE (DELTASONE) 5 MG tablet TAKE 1 TABLET EVERY DAY 90 tablet 3    sirolimus (RAPAMUNE) 1 MG Tab Take 3 tablets (3 mg total) by mouth once daily.  "270 tablet 3    sodium bicarbonate 650 MG tablet Take 1 tablet (650 mg total) by mouth 2 (two) times daily. 180 tablet 3    ergocalciferol (ERGOCALCIFEROL) 50,000 unit Cap TAKE 1 CAPSULE BY MOUTH EVERY 7 DAYS. 12 capsule 3    lisinopriL 10 MG tablet Take 1 tablet (10 mg total) by mouth once daily. (Patient not taking: Reported on 10/18/2022) 90 tablet 3    nitroGLYCERIN (NITROSTAT) 0.4 MG SL tablet Place 1 tablet (0.4 mg total) under the tongue every 5 (five) minutes as needed for Chest pain. (Patient not taking: Reported on 9/15/2022) 30 tablet 2    [DISCONTINUED] sildenafiL (VIAGRA) 50 MG tablet Take 1 tablet (50 mg total) by mouth daily as needed for Erectile Dysfunction. 9 tablet 4     No current facility-administered medications on file prior to visit.       Physical  Exam  Vitals:    04/18/23 1020   BP: 126/80   BP Location: Left arm   Patient Position: Sitting   Pulse: 68   Temp: 97.5 °F (36.4 °C)   SpO2: 95%   Weight: 109.5 kg (241 lb 8.2 oz)   Height: 5' 10" (1.778 m)      Body mass index is 34.65 kg/m².  Wt Readings from Last 3 Encounters:   04/18/23 109.5 kg (241 lb 8.2 oz)   12/15/22 110 kg (242 lb 8.1 oz)   11/07/22 109.8 kg (242 lb)       Physical Exam  Vitals and nursing note reviewed.   Constitutional:       General: He is not in acute distress.     Appearance: He is obese. He is not ill-appearing.   HENT:      Head: Normocephalic and atraumatic.      Right Ear: External ear normal.      Left Ear: External ear normal.      Nose: Nose normal.      Mouth/Throat:      Mouth: Mucous membranes are moist.   Eyes:      Extraocular Movements: Extraocular movements intact.      Conjunctiva/sclera: Conjunctivae normal.   Cardiovascular:      Rate and Rhythm: Normal rate and regular rhythm.      Pulses: Normal pulses.      Heart sounds: No murmur heard.  Pulmonary:      Effort: Pulmonary effort is normal. No respiratory distress.      Breath sounds: No wheezing.   Abdominal:      General: There is no " distension.      Palpations: Abdomen is soft. There is no mass.      Tenderness: There is no abdominal tenderness.   Musculoskeletal:         General: No swelling.      Cervical back: Normal range of motion.   Skin:     Coloration: Skin is not jaundiced.      Findings: No rash.   Neurological:      General: No focal deficit present.      Mental Status: He is alert and oriented to person, place, and time.   Psychiatric:         Mood and Affect: Mood normal.         Thought Content: Thought content normal.       Labs:    Complete Blood Count  Lab Results   Component Value Date    RBC 4.90 12/15/2022    HGB 12.4 (L) 12/15/2022    HCT 41.4 12/15/2022    MCV 85 12/15/2022    MCH 25.3 (L) 12/15/2022    MCHC 30.0 (L) 12/15/2022    RDW 15.8 (H) 12/15/2022     12/15/2022    MPV 10.5 12/15/2022    GRAN 5.1 12/15/2022    GRAN 75.9 (H) 12/15/2022    LYMPH 0.8 (L) 12/15/2022    LYMPH 12.7 (L) 12/15/2022    MONO 0.5 12/15/2022    MONO 8.0 12/15/2022    EOS 0.1 12/15/2022    BASO 0.03 12/15/2022    EOSINOPHIL 0.9 12/15/2022    BASOPHIL 0.5 12/15/2022    DIFFMETHOD Automated 12/15/2022       Comprehensive Metabolic Panel  Lab Results   Component Value Date     (H) 12/15/2022    BUN 41 (H) 12/15/2022    CREATININE 3.4 (H) 12/15/2022     12/15/2022    K 3.8 12/15/2022     12/15/2022    ALBUMIN 3.4 (L) 12/15/2022    CO2 19 (L) 12/15/2022    ANIONGAP 12 12/15/2022       TSH  No results found for: TSH    Imaging:  DXA Bone Density Spine And Hip  Narrative: EXAMINATION:  DEXA BONE DENSITY SPINE HIP    CLINICAL HISTORY:  Other specified disorders of bone density and structure, unspecified site    COMPARISON:  May 1, 2018    FINDINGS:  The T score associated with the lumbar spine is 3.6, a decrease of 0.1.    The T score associated with the left femoral neck is -1.6, an increase of 0.3.    The T score associated with the right femoral neck is -1.2, a decrease of 0.3.  Impression: 1.  Lumbar spine BMD in the normal  range.  Fracture risk is low.    2.  Femoral neck BMD in the osteopenic range.  Fracture risk is moderate.    Electronically signed by: Grady Dumas MD  Date:    11/08/2022  Time:    14:42      Assessment/Plan:    1. Essential hypertension  -     CBC Auto Differential; Future  -     Comprehensive metabolic panel; Future; Expected date: 04/18/2023  -     TSH; Future; Expected date: 04/18/2023    2. Mixed hyperlipidemia  -     LIPID PANEL; Future; Expected date: 04/18/2023    3. Hyperglycemia  -     HEMOGLOBIN A1C; Future; Expected date: 04/18/2023    4. Centrilobular emphysema  Assessment & Plan:  - patient is not smoking  - he denies shortness of breath      5. Dependence on renal dialysis    6. Larynx cancer  Assessment & Plan:  - in remission  - follow up with ENT      7. Secondary hyperparathyroidism of renal origin  Assessment & Plan:  - stable  - follow up with renal      8. Atherosclerosis of aorta  Overview:  Noted on cxr dated 9/14/2017.     Assessment & Plan:  - stable  - continue current medication regimen      9. Stage 4 chronic kidney disease  Overview:  Overview:   updated diagnosis code & description  dx update      10. Transplanted kidney - 2007    11. Class 1 obesity due to excess calories with serious comorbidity and body mass index (BMI) of 34.0 to 34.9 in adult         Discussed how to stay healthy including: diet, exercise, refraining from smoking and discussed screening exams / tests needed for age, sex and family Hx.    RTC pending labs    Home Alexis MD

## 2023-04-20 ENCOUNTER — LAB VISIT (OUTPATIENT)
Dept: LAB | Facility: HOSPITAL | Age: 73
End: 2023-04-20
Attending: STUDENT IN AN ORGANIZED HEALTH CARE EDUCATION/TRAINING PROGRAM
Payer: MEDICARE

## 2023-04-20 DIAGNOSIS — I10 ESSENTIAL HYPERTENSION: ICD-10-CM

## 2023-04-20 DIAGNOSIS — E78.2 MIXED HYPERLIPIDEMIA: ICD-10-CM

## 2023-04-20 DIAGNOSIS — R73.9 HYPERGLYCEMIA: ICD-10-CM

## 2023-04-20 LAB
ALBUMIN SERPL BCP-MCNC: 4.1 G/DL (ref 3.5–5.2)
ALP SERPL-CCNC: 110 U/L (ref 38–126)
ALT SERPL W/O P-5'-P-CCNC: 25 U/L (ref 10–44)
ANION GAP SERPL CALC-SCNC: 8 MMOL/L (ref 8–16)
AST SERPL-CCNC: 24 U/L (ref 15–46)
BASOPHILS # BLD AUTO: 0.03 K/UL (ref 0–0.2)
BASOPHILS NFR BLD: 0.6 % (ref 0–1.9)
BILIRUB SERPL-MCNC: 0.5 MG/DL (ref 0.1–1)
CALCIUM SERPL-MCNC: 10.1 MG/DL (ref 8.7–10.5)
CHLORIDE SERPL-SCNC: 110 MMOL/L (ref 95–110)
CHOLEST SERPL-MCNC: 155 MG/DL (ref 120–199)
CHOLEST/HDLC SERPL: 3.5 {RATIO} (ref 2–5)
CO2 SERPL-SCNC: 25 MMOL/L (ref 23–29)
CREAT SERPL-MCNC: 3.65 MG/DL (ref 0.5–1.4)
DIFFERENTIAL METHOD: ABNORMAL
EOSINOPHIL # BLD AUTO: 0.1 K/UL (ref 0–0.5)
EOSINOPHIL NFR BLD: 1.5 % (ref 0–8)
ERYTHROCYTE [DISTWIDTH] IN BLOOD BY AUTOMATED COUNT: 15.3 % (ref 11.5–14.5)
EST. GFR  (NO RACE VARIABLE): 16.8 ML/MIN/1.73 M^2
ESTIMATED AVG GLUCOSE: 140 MG/DL (ref 68–131)
GLUCOSE SERPL-MCNC: 104 MG/DL (ref 70–110)
HBA1C MFR BLD: 6.5 % (ref 4–5.6)
HCT VFR BLD AUTO: 41.8 % (ref 40–54)
HDLC SERPL-MCNC: 44 MG/DL (ref 40–75)
HDLC SERPL: 28.4 % (ref 20–50)
HGB BLD-MCNC: 12.7 G/DL (ref 14–18)
IMM GRANULOCYTES # BLD AUTO: 0.05 K/UL (ref 0–0.04)
IMM GRANULOCYTES NFR BLD AUTO: 1.1 % (ref 0–0.5)
LDLC SERPL CALC-MCNC: 94.4 MG/DL (ref 63–159)
LYMPHOCYTES # BLD AUTO: 1.3 K/UL (ref 1–4.8)
LYMPHOCYTES NFR BLD: 28.8 % (ref 18–48)
MCH RBC QN AUTO: 25.5 PG (ref 27–31)
MCHC RBC AUTO-ENTMCNC: 30.4 G/DL (ref 32–36)
MCV RBC AUTO: 84 FL (ref 82–98)
MONOCYTES # BLD AUTO: 0.5 K/UL (ref 0.3–1)
MONOCYTES NFR BLD: 11.4 % (ref 4–15)
NEUTROPHILS # BLD AUTO: 2.6 K/UL (ref 1.8–7.7)
NEUTROPHILS NFR BLD: 56.6 % (ref 38–73)
NONHDLC SERPL-MCNC: 111 MG/DL
NRBC BLD-RTO: 0 /100 WBC
PLATELET # BLD AUTO: 147 K/UL (ref 150–450)
PMV BLD AUTO: 11.1 FL (ref 9.2–12.9)
POTASSIUM SERPL-SCNC: 4.1 MMOL/L (ref 3.5–5.1)
PROT SERPL-MCNC: 7.8 G/DL (ref 6–8.4)
RBC # BLD AUTO: 4.99 M/UL (ref 4.6–6.2)
SODIUM SERPL-SCNC: 143 MMOL/L (ref 136–145)
T4 FREE SERPL-MCNC: 0.9 NG/DL (ref 0.71–1.51)
TRIGL SERPL-MCNC: 83 MG/DL (ref 30–150)
TSH SERPL DL<=0.005 MIU/L-ACNC: 4.17 UIU/ML (ref 0.4–4)
UUN UR-MCNC: 47 MG/DL (ref 2–20)
WBC # BLD AUTO: 4.66 K/UL (ref 3.9–12.7)

## 2023-04-20 PROCEDURE — 83036 HEMOGLOBIN GLYCOSYLATED A1C: CPT | Mod: HCNC | Performed by: STUDENT IN AN ORGANIZED HEALTH CARE EDUCATION/TRAINING PROGRAM

## 2023-04-20 PROCEDURE — 36415 COLL VENOUS BLD VENIPUNCTURE: CPT | Mod: HCNC,PO | Performed by: STUDENT IN AN ORGANIZED HEALTH CARE EDUCATION/TRAINING PROGRAM

## 2023-04-20 PROCEDURE — 80053 COMPREHEN METABOLIC PANEL: CPT | Mod: HCNC,PO | Performed by: STUDENT IN AN ORGANIZED HEALTH CARE EDUCATION/TRAINING PROGRAM

## 2023-04-20 PROCEDURE — 80061 LIPID PANEL: CPT | Mod: HCNC | Performed by: STUDENT IN AN ORGANIZED HEALTH CARE EDUCATION/TRAINING PROGRAM

## 2023-04-20 PROCEDURE — 84443 ASSAY THYROID STIM HORMONE: CPT | Mod: HCNC,PO | Performed by: STUDENT IN AN ORGANIZED HEALTH CARE EDUCATION/TRAINING PROGRAM

## 2023-04-20 PROCEDURE — 85025 COMPLETE CBC W/AUTO DIFF WBC: CPT | Mod: HCNC,PO | Performed by: STUDENT IN AN ORGANIZED HEALTH CARE EDUCATION/TRAINING PROGRAM

## 2023-04-20 PROCEDURE — 84439 ASSAY OF FREE THYROXINE: CPT | Mod: HCNC | Performed by: STUDENT IN AN ORGANIZED HEALTH CARE EDUCATION/TRAINING PROGRAM

## 2023-04-26 DIAGNOSIS — E87.20 METABOLIC ACIDOSIS: ICD-10-CM

## 2023-04-26 RX ORDER — SODIUM BICARBONATE 650 MG/1
TABLET ORAL
Qty: 180 TABLET | Refills: 3 | Status: SHIPPED | OUTPATIENT
Start: 2023-04-26

## 2023-05-17 ENCOUNTER — TELEPHONE (OUTPATIENT)
Dept: ADMINISTRATIVE | Facility: CLINIC | Age: 73
End: 2023-05-17
Payer: MEDICARE

## 2023-05-19 ENCOUNTER — OFFICE VISIT (OUTPATIENT)
Dept: INTERNAL MEDICINE | Facility: CLINIC | Age: 73
End: 2023-05-19
Payer: MEDICARE

## 2023-05-19 VITALS
HEART RATE: 66 BPM | SYSTOLIC BLOOD PRESSURE: 130 MMHG | BODY MASS INDEX: 33.6 KG/M2 | WEIGHT: 234.69 LBS | HEIGHT: 70 IN | DIASTOLIC BLOOD PRESSURE: 86 MMHG

## 2023-05-19 DIAGNOSIS — D17.30: ICD-10-CM

## 2023-05-19 DIAGNOSIS — N18.4 HYPERTENSIVE KIDNEY DISEASE WITH STAGE 4 CHRONIC KIDNEY DISEASE: ICD-10-CM

## 2023-05-19 DIAGNOSIS — Z00.00 ENCOUNTER FOR MEDICARE ANNUAL WELLNESS EXAM: Primary | ICD-10-CM

## 2023-05-19 DIAGNOSIS — Z79.899 IMMUNOSUPPRESSIVE MANAGEMENT ENCOUNTER FOLLOWING KIDNEY TRANSPLANT: ICD-10-CM

## 2023-05-19 DIAGNOSIS — E66.9 OBESITY (BMI 30-39.9): ICD-10-CM

## 2023-05-19 DIAGNOSIS — I12.9 HYPERTENSIVE KIDNEY DISEASE WITH STAGE 4 CHRONIC KIDNEY DISEASE: ICD-10-CM

## 2023-05-19 DIAGNOSIS — I73.9 CLAUDICATION OF RIGHT LOWER EXTREMITY: ICD-10-CM

## 2023-05-19 DIAGNOSIS — N25.81 SECONDARY HYPERPARATHYROIDISM OF RENAL ORIGIN: ICD-10-CM

## 2023-05-19 DIAGNOSIS — R49.9 VOICE DISTURBANCE: ICD-10-CM

## 2023-05-19 DIAGNOSIS — R91.8 PULMONARY NODULES: ICD-10-CM

## 2023-05-19 DIAGNOSIS — D02.0 CARCINOMA IN SITU OF VOCAL CORD: ICD-10-CM

## 2023-05-19 DIAGNOSIS — Z87.891 FORMER CIGARETTE SMOKER: ICD-10-CM

## 2023-05-19 DIAGNOSIS — Z86.718 HISTORY OF DVT (DEEP VEIN THROMBOSIS): ICD-10-CM

## 2023-05-19 DIAGNOSIS — J84.10 GRANULOMATOUS LUNG DISEASE: ICD-10-CM

## 2023-05-19 DIAGNOSIS — I70.0 ATHEROSCLEROSIS OF AORTA: ICD-10-CM

## 2023-05-19 DIAGNOSIS — Z94.0 TRANSPLANTED KIDNEY: ICD-10-CM

## 2023-05-19 DIAGNOSIS — E55.9 VITAMIN D DEFICIENCY DISEASE: ICD-10-CM

## 2023-05-19 DIAGNOSIS — C32.9 LARYNX CANCER: ICD-10-CM

## 2023-05-19 DIAGNOSIS — D31.60 BENIGN NEOPLASM OF ORBIT, UNSPECIFIED LATERALITY: ICD-10-CM

## 2023-05-19 DIAGNOSIS — J43.2 CENTRILOBULAR EMPHYSEMA: ICD-10-CM

## 2023-05-19 DIAGNOSIS — Z94.0 IMMUNOSUPPRESSIVE MANAGEMENT ENCOUNTER FOLLOWING KIDNEY TRANSPLANT: ICD-10-CM

## 2023-05-19 DIAGNOSIS — M89.8X9 METABOLIC BONE DISEASE: ICD-10-CM

## 2023-05-19 DIAGNOSIS — Z94.0 RENAL TRANSPLANT RECIPIENT: ICD-10-CM

## 2023-05-19 DIAGNOSIS — Z79.01 ANTICOAGULANT LONG-TERM USE: ICD-10-CM

## 2023-05-19 DIAGNOSIS — I10 ESSENTIAL HYPERTENSION: ICD-10-CM

## 2023-05-19 DIAGNOSIS — H11.003 PTERYGIUM OF BOTH EYES: ICD-10-CM

## 2023-05-19 DIAGNOSIS — E78.2 MIXED HYPERLIPIDEMIA: ICD-10-CM

## 2023-05-19 DIAGNOSIS — N40.1 BENIGN PROSTATIC HYPERPLASIA WITH LOWER URINARY TRACT SYMPTOMS, SYMPTOM DETAILS UNSPECIFIED: ICD-10-CM

## 2023-05-19 DIAGNOSIS — H53.69 DIMINISHED NIGHT VISION: ICD-10-CM

## 2023-05-19 DIAGNOSIS — D50.8 OTHER IRON DEFICIENCY ANEMIA: ICD-10-CM

## 2023-05-19 PROCEDURE — 99215 OFFICE O/P EST HI 40 MIN: CPT | Mod: PN | Performed by: NURSE PRACTITIONER

## 2023-05-19 PROCEDURE — 99999 PR PBB SHADOW E&M-EST. PATIENT-LVL V: ICD-10-PCS | Mod: PBBFAC,,, | Performed by: NURSE PRACTITIONER

## 2023-05-19 PROCEDURE — 1159F MED LIST DOCD IN RCRD: CPT | Mod: CPTII,,, | Performed by: NURSE PRACTITIONER

## 2023-05-19 PROCEDURE — 1101F PT FALLS ASSESS-DOCD LE1/YR: CPT | Mod: CPTII,,, | Performed by: NURSE PRACTITIONER

## 2023-05-19 PROCEDURE — 3008F BODY MASS INDEX DOCD: CPT | Mod: CPTII,,, | Performed by: NURSE PRACTITIONER

## 2023-05-19 PROCEDURE — 3288F FALL RISK ASSESSMENT DOCD: CPT | Mod: CPTII,,, | Performed by: NURSE PRACTITIONER

## 2023-05-19 PROCEDURE — 1160F PR REVIEW ALL MEDS BY PRESCRIBER/CLIN PHARMACIST DOCUMENTED: ICD-10-PCS | Mod: CPTII,,, | Performed by: NURSE PRACTITIONER

## 2023-05-19 PROCEDURE — 1159F PR MEDICATION LIST DOCUMENTED IN MEDICAL RECORD: ICD-10-PCS | Mod: CPTII,,, | Performed by: NURSE PRACTITIONER

## 2023-05-19 PROCEDURE — 1170F PR FUNCTIONAL STATUS ASSESSED: ICD-10-PCS | Mod: CPTII,,, | Performed by: NURSE PRACTITIONER

## 2023-05-19 PROCEDURE — 3288F PR FALLS RISK ASSESSMENT DOCUMENTED: ICD-10-PCS | Mod: CPTII,,, | Performed by: NURSE PRACTITIONER

## 2023-05-19 PROCEDURE — 3008F PR BODY MASS INDEX (BMI) DOCUMENTED: ICD-10-PCS | Mod: CPTII,,, | Performed by: NURSE PRACTITIONER

## 2023-05-19 PROCEDURE — 1101F PR PT FALLS ASSESS DOC 0-1 FALLS W/OUT INJ PAST YR: ICD-10-PCS | Mod: CPTII,,, | Performed by: NURSE PRACTITIONER

## 2023-05-19 PROCEDURE — 3044F HG A1C LEVEL LT 7.0%: CPT | Mod: CPTII,,, | Performed by: NURSE PRACTITIONER

## 2023-05-19 PROCEDURE — 3079F DIAST BP 80-89 MM HG: CPT | Mod: CPTII,,, | Performed by: NURSE PRACTITIONER

## 2023-05-19 PROCEDURE — 3079F PR MOST RECENT DIASTOLIC BLOOD PRESSURE 80-89 MM HG: ICD-10-PCS | Mod: CPTII,,, | Performed by: NURSE PRACTITIONER

## 2023-05-19 PROCEDURE — 99999 PR PBB SHADOW E&M-EST. PATIENT-LVL V: CPT | Mod: PBBFAC,,, | Performed by: NURSE PRACTITIONER

## 2023-05-19 PROCEDURE — 3044F PR MOST RECENT HEMOGLOBIN A1C LEVEL <7.0%: ICD-10-PCS | Mod: CPTII,,, | Performed by: NURSE PRACTITIONER

## 2023-05-19 PROCEDURE — 1170F FXNL STATUS ASSESSED: CPT | Mod: CPTII,,, | Performed by: NURSE PRACTITIONER

## 2023-05-19 PROCEDURE — G0439 PR MEDICARE ANNUAL WELLNESS SUBSEQUENT VISIT: ICD-10-PCS | Mod: ,,, | Performed by: NURSE PRACTITIONER

## 2023-05-19 PROCEDURE — 1126F PR PAIN SEVERITY QUANTIFIED, NO PAIN PRESENT: ICD-10-PCS | Mod: CPTII,,, | Performed by: NURSE PRACTITIONER

## 2023-05-19 PROCEDURE — 3075F SYST BP GE 130 - 139MM HG: CPT | Mod: CPTII,,, | Performed by: NURSE PRACTITIONER

## 2023-05-19 PROCEDURE — G0439 PPPS, SUBSEQ VISIT: HCPCS | Mod: ,,, | Performed by: NURSE PRACTITIONER

## 2023-05-19 PROCEDURE — 1160F RVW MEDS BY RX/DR IN RCRD: CPT | Mod: CPTII,,, | Performed by: NURSE PRACTITIONER

## 2023-05-19 PROCEDURE — 3075F PR MOST RECENT SYSTOLIC BLOOD PRESS GE 130-139MM HG: ICD-10-PCS | Mod: CPTII,,, | Performed by: NURSE PRACTITIONER

## 2023-05-19 PROCEDURE — 1126F AMNT PAIN NOTED NONE PRSNT: CPT | Mod: CPTII,,, | Performed by: NURSE PRACTITIONER

## 2023-05-19 NOTE — PROGRESS NOTES
"  Jose Luis Manzo presented for a  Medicare AWV and comprehensive Health Risk Assessment today. The following components were reviewed and updated:    Medical history  Family History  Social history  Allergies and Current Medications  Health Risk Assessment  Health Maintenance  Care Team         ** See Completed Assessments for Annual Wellness Visit within the encounter summary.**         The following assessments were completed:  Living Situation  CAGE  Depression Screening  Timed Get Up and Go  Whisper Test  Cognitive Function Screening  Nutrition Screening  ADL Screening  PAQ Screening        Vitals:    05/19/23 1415   BP: 130/86   Pulse: 66   Weight: 106.4 kg (234 lb 10.9 oz)   Height: 5' 10" (1.778 m)     Body mass index is 33.67 kg/m².  Physical Exam  Vitals and nursing note reviewed.   Constitutional:       General: He is not in acute distress.     Appearance: He is well-developed. He is obese. He is not ill-appearing.   HENT:      Head: Normocephalic and atraumatic.   Eyes:      Pupils: Pupils are equal, round, and reactive to light.   Cardiovascular:      Rate and Rhythm: Normal rate and regular rhythm.      Heart sounds: Normal heart sounds.   Pulmonary:      Effort: Pulmonary effort is normal. No respiratory distress.      Breath sounds: Normal breath sounds.   Musculoskeletal:         General: Normal range of motion.      Cervical back: Normal range of motion.   Skin:     General: Skin is warm and dry.   Neurological:      Mental Status: He is alert and oriented to person, place, and time.      Coordination: Coordination normal.   Psychiatric:         Mood and Affect: Mood normal.         Behavior: Behavior normal.         Thought Content: Thought content normal.         Judgment: Judgment normal.             Diagnoses and health risks identified today and associated recommendations/orders:    1. Encounter for Medicare annual wellness exam  - Ambulatory Referral/Consult to Enhanced Annual Wellness Visit " (eAWV)    2. Granulomatous lung disease  Chronic; stable. Continue current treatment plan as previously prescribed by PCP.   - CT Chest Lung Screening Low Dose; Future    3. Centrilobular emphysema  Chronic; stable. Continue current treatment plan as previously prescribed by PCP.  - CT Chest Lung Screening Low Dose; Future    4. Pulmonary nodules  Chronic; stable. Continue current treatment plan as previously prescribed by PCP.  - CT Chest Lung Screening Low Dose; Future    5. Hypertensive kidney disease with stage 4 chronic kidney disease  Chronic; stable. Continue current treatment plan as previously prescribed by PCP.    6. Renal transplant recipient  Chronic; stable. Continue current treatment plan as previously prescribed by PCP.    7. Transplanted kidney - 2007  Chronic; stable. Continue current treatment plan as previously prescribed by PCP.    8. Immunosuppressive management encounter following kidney transplant  Chronic; stable. Continue current treatment plan as previously prescribed by PCP.    9. Atherosclerosis of aorta  Chronic; stable. Continue current treatment plan as previously prescribed by PCP.    10. Mixed hyperlipidemia  Chronic; stable. Continue current treatment plan as previously prescribed by PCP.    11. Essential hypertension  Chronic; stable. Continue current treatment plan as previously prescribed by PCP.    12. Secondary hyperparathyroidism of renal origin  Chronic; stable. Continue current treatment plan as previously prescribed by PCP.    13. Larynx cancer  Chronic; stable. Continue current treatment plan as previously prescribed by PCP.    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 Continue current treatment plan as previously prescribed by PCP.    15. Benign prostatic hyperplasia with lower urinary tract symptoms, symptom details unspecified  Chronic; stable. Continue current treatment plan as previously prescribed by PCP.    16. History of DVT (deep vein  thrombosis)  Chronic; stable. Continue current treatment plan as previously prescribed by PCP.    17. Anticoagulant long-term use  Chronic; stable. Continue current treatment plan as previously prescribed by PCP.    18. Former cigarette smoker  Chronic; stable. Continue current treatment plan as previously prescribed by PCP.  - CT Chest Lung Screening Low Dose; Future    19. Dermolipoma  Chronic; stable. Continue current treatment plan as previously prescribed by PCP.    20. Other iron deficiency anemia  Chronic; stable. Continue current treatment plan as previously prescribed by PCP.    21. Metabolic bone disease  Chronic; stable. Continue current treatment plan as previously prescribed by PCP.    22. Vitamin D deficiency disease  Chronic; stable. Continue current treatment plan as previously prescribed by PCP.    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 Continue current treatment plan as previously prescribed by PCP.    24. Voice disturbance  Chronic; stable. Continue current treatment plan as previously prescribed by PCP.    25. Pterygium of both eyes  Chronic; stable. Continue current treatment plan as previously prescribed by PCP.    26. Diminished night vision  Chronic; stable. Continue current treatment plan as previously prescribed by PCP.    27. Claudication of right lower extremity  Chronic; stable. Continue current treatment plan as previously prescribed by PCP.    28. Obesity (BMI 30-39.9)  Current BMI 33.67. Lifestyle modifications discussed with patient.       Provided Jose Luis with a 5-10 year written screening schedule and personal prevention plan. Recommendations were developed using the USPSTF age appropriate recommendations. Education, counseling, and referrals were provided as needed. After Visit Summary printed and given to patient which includes a list of additional screenings\tests needed.    Follow up for AWV in 1 year.    Johnathon Santoyo NP    I offered to  discuss advanced care planning, including how to pick a person who would make decisions for you if you were unable to make them for yourself, called a health care power of , and what kind of decisions you might make such as use of life sustaining treatments such as ventilators and tube feeding when faced with a life limiting illness recorded on a living will that they will need to know. (How you want to be cared for as you near the end of your natural life)     X Patient is interested in learning more about how to make advanced directives.  I provided them paperwork and offered to discuss this with them.

## 2023-05-19 NOTE — PATIENT INSTRUCTIONS
Counseling and Referral of Other Preventative  (Italic type indicates deductible and co-insurance are waived)    Patient Name: Jose Luis Manzo  Today's Date: 5/19/2023    Health Maintenance         Date Due Completion Date    Shingles Vaccine (2 of 2) 10/17/2019 8/22/2019 (Done)    Override on 8/22/2019: Done    COVID-19 Vaccine (4 - Booster for Moderna series) 03/09/2022 1/12/2022    LDCT Lung Screen 04/27/2023 4/27/2022    Colorectal Cancer Screening 12/08/2023 12/8/2022    Override on 7/15/2010: Done    High Dose Statin 04/18/2024 4/18/2023    Lipid Panel 04/20/2024 4/20/2023    TETANUS VACCINE 09/12/2026 9/12/2016          Orders Placed This Encounter   Procedures    CT Chest Lung Screening Low Dose       The following information is provided to all patients.  This information is to help you find resources for any of the problems found today that may be affecting your health:                Living healthy guide: www.Count includes the [facility].[place].gov      Understanding Diabetes: www.diabetes.org      Eating healthy: www.cdc.gov/healthyweight      CDC home safety checklist: www.cdc.gov/steadi/patient.html      Agency on Aging: www.goea.[place].gov      Alcoholics anonymous (AA): www.aa.org      Physical Activity: www.loreto.nih.gov/nl3yflh      Tobacco use: www.quitwithusla.org

## 2023-05-23 ENCOUNTER — HOSPITAL ENCOUNTER (OUTPATIENT)
Dept: RADIOLOGY | Facility: HOSPITAL | Age: 73
Discharge: HOME | End: 2023-05-23
Attending: NURSE PRACTITIONER
Payer: MEDICARE

## 2023-05-23 ENCOUNTER — PATIENT MESSAGE (OUTPATIENT)
Dept: FAMILY MEDICINE | Facility: CLINIC | Age: 73
End: 2023-05-23
Payer: MEDICARE

## 2023-05-23 DIAGNOSIS — Z87.891 FORMER CIGARETTE SMOKER: ICD-10-CM

## 2023-05-23 DIAGNOSIS — R91.8 PULMONARY NODULES: ICD-10-CM

## 2023-05-23 DIAGNOSIS — J84.10 GRANULOMATOUS LUNG DISEASE: ICD-10-CM

## 2023-05-23 DIAGNOSIS — J43.2 CENTRILOBULAR EMPHYSEMA: ICD-10-CM

## 2023-05-23 PROCEDURE — 71271 CT THORAX LUNG CANCER SCR C-: CPT | Mod: 26,,, | Performed by: RADIOLOGY

## 2023-05-23 PROCEDURE — 71271 CT CHEST LUNG SCREENING LOW DOSE: ICD-10-PCS | Mod: 26,,, | Performed by: RADIOLOGY

## 2023-05-23 PROCEDURE — 71271 CT THORAX LUNG CANCER SCR C-: CPT | Mod: TC,PO

## 2023-05-25 ENCOUNTER — TELEPHONE (OUTPATIENT)
Dept: FAMILY MEDICINE | Facility: CLINIC | Age: 73
End: 2023-05-25
Payer: MEDICARE

## 2023-05-25 NOTE — TELEPHONE ENCOUNTER
----- Message from Home Alexis MD sent at 5/23/2023 11:35 AM CDT -----  Ct scan looks good. We will repeat it in 1 year.

## 2023-05-25 NOTE — TELEPHONE ENCOUNTER
Spoke with pt and inform him that CT scan looks good.Repeat test in one yr.Pt agrees verbalize and understands.

## 2023-07-20 ENCOUNTER — PATIENT OUTREACH (OUTPATIENT)
Dept: ADMINISTRATIVE | Facility: HOSPITAL | Age: 73
End: 2023-07-20
Payer: MEDICARE

## 2023-07-28 ENCOUNTER — PATIENT MESSAGE (OUTPATIENT)
Dept: CARDIOLOGY | Facility: CLINIC | Age: 73
End: 2023-07-28
Payer: MEDICARE

## 2023-11-08 DIAGNOSIS — N18.4 CHRONIC KIDNEY DISEASE, STAGE IV (SEVERE): ICD-10-CM

## 2023-11-08 NOTE — TELEPHONE ENCOUNTER
No care due was identified.  Health [facility] Embedded Care Due Messages. Reference number: [id number].   11/08/2023 1:10:38 PM CST   What Is The Reason For Today's Visit?: Full Body Skin Examination What Is The Reason For Today's Visit? (Being Monitored For X): concerning skin lesions on an annual basis

## 2023-11-09 DIAGNOSIS — N18.4 CHRONIC KIDNEY DISEASE, STAGE IV (SEVERE): ICD-10-CM

## 2023-11-09 NOTE — TELEPHONE ENCOUNTER
No care due was identified.  Health [facility] Embedded Care Due Messages. Reference number: [id number].   11/09/2023 10:52:35 AM CST

## 2023-11-10 RX ORDER — CLONIDINE 0.3 MG/24H
1 PATCH, EXTENDED RELEASE TRANSDERMAL WEEKLY
Qty: 12 PATCH | Refills: 10 | Status: SHIPPED | OUTPATIENT
Start: 2023-11-10

## 2023-11-10 RX ORDER — CLONIDINE 0.3 MG/24H
PATCH, EXTENDED RELEASE TRANSDERMAL
Qty: 90 PATCH | Refills: 0 | OUTPATIENT
Start: 2023-11-10

## 2023-11-20 ENCOUNTER — PATIENT OUTREACH (OUTPATIENT)
Dept: ADMINISTRATIVE | Facility: HOSPITAL | Age: 73
End: 2023-11-20
Payer: MEDICARE

## 2023-11-20 DIAGNOSIS — Z12.11 SPECIAL SCREENING FOR MALIGNANT NEOPLASMS, COLON: Primary | ICD-10-CM

## 2023-11-21 NOTE — PROGRESS NOTES
Updates were requested from care everywhere.  Health Maintenance has been updated.  LIN[place] immunization registry triggered.  Immunizations were reconciled.  WOG order for FIT kit placed.  MA gap report review completed.

## 2023-12-18 ENCOUNTER — TELEPHONE (OUTPATIENT)
Dept: PHARMACY | Facility: CLINIC | Age: 73
End: 2023-12-18
Payer: MEDICARE

## 2023-12-18 ENCOUNTER — OUTPATIENT CASE MANAGEMENT (OUTPATIENT)
Dept: ADMINISTRATIVE | Facility: OTHER | Age: 73
End: 2023-12-18
Payer: MEDICARE

## 2023-12-18 ENCOUNTER — TELEPHONE (OUTPATIENT)
Dept: FAMILY MEDICINE | Facility: CLINIC | Age: 73
End: 2023-12-18
Payer: MEDICARE

## 2023-12-18 DIAGNOSIS — N18.4 STAGE 4 CHRONIC KIDNEY DISEASE: Primary | ICD-10-CM

## 2023-12-18 NOTE — TELEPHONE ENCOUNTER
Contacted patient patient about referral for Rapamune.  Explained to the patient that there is no programs available for this medication. Patient understands and will contact his doctor to see if there is a alternative.  No assistance at this time.

## 2023-12-18 NOTE — TELEPHONE ENCOUNTER
----- Message from Tia Araya RN sent at 12/18/2023  2:55 PM CST -----  Regarding: Outpatient Care Management  12/18/2023     Welcome Dr. Home Alexis:    Your patient [id number] Jose Luis Manzo  was  referred to [facility] by Self Referral .      My name is Tia Araya RN, Care Manager.  At times, it may be necessary to have a  involved in the coordination of care, their names will be added to the Care Team where appropriate.    In our enrollment encounter, the following needs were identified:  Care Coordination and Disease Management Education    All needs and services provided by [facility] and other care team members will be communicated to you via your Resource Pool.      Our documentation can be readily accessed in the EMR using the following tabs: Chart review -> Episodes: High Risk.     It is our goal to provide holistic care, if at any time you feel other areas of concern need to be addressed, please communicate with me by Inbasket messaging.      NOTE: During enrollment patient reported he has not been scheduled for a follow up appointment with his PCP, Dr. Alexis in over 6 months, or his Nephrologist Dr. Rich in over a year. Patient is requesting to set up appointment with both. He would like to inform his PCP and or Nephrologist he is having a difficult time affording his medication Sirolimus (Rapamune) 1 mg tab everyday. Patient stated he is paying $100 a month for this medication, would like to know if any alternate, more affordable medication could be prescribed. Patient stated he is having swelling to both his legs, as he is active during the day swelling initiates, right leg is bothering patient more. Swelling will subside at night. If you feel this is appropriate please contact the patient to further discuss.     Respectfully,    Tia Araya RN

## 2023-12-18 NOTE — PROGRESS NOTES
Outpatient Care Management  Initial Patient Assessment    Patient: Jose Luis Manzo  MRN: [id number]  Date of Service: 12/18/2023  Completed by: Tia Araya RN  Referral Date: 12/18/2023  Date of Eligibility: 12/18/2023  Program: High Risk  Status: Ongoing  Effective Dates: 12/18/2023 - present  Responsible Staff: Tia Araya RN        Reason for Visit   Patient presents with    OPCM Chart Review    OPCM Enrollment Call    Nursing Assessment     12/18/23       Brief Summary:  Jose Luis Manzo was referred was self referred for CKD. Patient qualifies for program based on risk score of 26.4 % (escalated to high risk due to high complex needs).   Active problem list, medical, surgical and social history reviewed. Active comorbidities include CKD. Areas of need identified by patient include Knowledge Deficit as evidenced by patient unable to identify some s/s related to CKD. Patient has not had continued follow up care in more than 6 months.   Next steps: Sending educational information to patient via patient portal. Sending message to patient's PCP Dr. Home Alexis to request establishing follow up visit to help further manage patient's care notify patient is having difficulty affording his medication, Sirolimus (Rapamune). Patient would like to evaluate if alternate medication could be prescribed. Copay is $100/month. Reinforce s/s of CKD with patient : Swelling in the extremities, ankles, eyes, temperature greater than 100.4, decreased urine output, n/v, muscle cramps, weakness, decreased alertness. Patient is aware primarily of swelling. Patient agrees to follow up call with in 2 weeks    Disability Status  Is the patient alert and oriented (person, place, time, and situation)?: Alert and oriented x 4  Hearing Difficulty or Deaf: no  Visual Difficulty or Blind: no (uses reading glasses)  Visual and Hearing Needs Conclusion: pt does not use any hearing aids, uses reading glasses  Difficulty Concentrating,  Remembering or Making Decisions: no  Communication Difficulty: no (hoarse voice/ CA throat)  Eating/Swallowing Difficulty: no (thick clear phelgm - CA in throat)  Walking or Climbing Stairs Difficulty: no (may get tired when walking up stairs - Does not use AD)  Mobility Management: pt does not use any AD, has to take his time with walking up stairs  Dressing/Bathing Difficulty: no  Dressing/Bathing Management: pt is able to perform task independently  Toileting : Independent  Difficulty Managing Errands Independently: no  Equipment Currently Used at Home: blood pressure machine  ADL Conclusion Statement: pt is able to perform ADL/IADL's independently,  Change in Functional Status Since Onset of Current Illness/Injury: yes        Spiritual Beliefs  Spiritual, Cultural Beliefs, Advent Practices, Values that Affect Care: no      Social History     Socioeconomic History    Marital status: Single    Number of children: 1   Tobacco Use    Smoking status: Former     Current packs/day: 0.00     Average packs/day: 1 pack/day for 20.0 years (20.0 ttl pk-yrs)     Types: Cigarettes     Start date:      Quit date:      Years since quittin.9    Smokeless tobacco: Never   Substance and Sexual Activity    Alcohol use: No     Alcohol/week: 0.0 standard drinks of alcohol    Drug use: No     Types: Marijuana     Comment: Occ.    Sexual activity: Not Currently     Partners: Female     Social Determinants of Health     Financial Resource Strain: Medium Risk (2023)    Overall Financial Resource Strain (CARDIA)     Difficulty of Paying Living Expenses: Somewhat hard   Food Insecurity: No Food Insecurity (2023)    Hunger Vital Sign     Worried About Running Out of Food in the Last Year: Never true     Ran Out of Food in the Last Year: Never true   Transportation Needs: No Transportation Needs (2023)    PRAPARE - Transportation     Lack of Transportation (Medical): No     Lack of Transportation  (Non-Medical): No   Physical Activity: Sufficiently Active (12/18/2023)    Exercise Vital Sign     Days of Exercise per Week: 4 days     Minutes of Exercise per Session: 40 min   Stress: No Stress Concern Present (7/4/2022)    [employer] [place] [employer] - Occupational Stress Questionnaire     Feeling of Stress : Not at all   Social Connections: Moderately Integrated (12/18/2023)    Social Connection and Isolation Panel [NHANES]     Frequency of Communication with Friends and Family: More than three times a week     Frequency of Social Gatherings with Friends and Family: More than three times a week     Attends Spiritism Services: 1 to 4 times per year     Active Member of Clubs or Organizations: No     Attends Club or Organization Meetings: 1 to 4 times per year     Marital Status: Patient unable to answer   Housing Stability: Unknown (12/18/2023)    Housing Stability Vital Sign     Unstable Housing in the Last Year: No       Roles and Relationships  Primary Source of Support/Comfort: sibling(s)  Name of Support/Comfort Primary Source: Gene Manzo  Primary Roles/Responsibilities: retired; disabled  Secondary Source of Support/Comfort: no one  Name of Support/Comfort Secondary Source: no one identified      Advance Directives (For Healthcare)  Advance Directive  (If Adv Dir status is received, view document under Adv Dir in header or Chart Review Media tab): Patient does not have Advance Directive, declines information.        Patient Reported Insurance  Verified current insurance plan:: [employer]vantage  [employer] benefits discussed:: OTC Prescription Discounts; Transportation; Mail Order Pharmacy; [facility] (aware of benefits)            12/18/2023    11:25 AM 5/19/2023     2:25 PM 4/18/2023    10:19 AM 9/15/2022     3:10 PM 7/4/2022     9:35 AM 12/9/2021    11:18 AM 6/24/2021     8:43 AM   Depression Patient Health Questionnaire   Over the last two weeks how often have you been bothered by  little interest or pleasure in doing things Not at all Not at all Not at all Not at all Not at all Not at all Not at all   Over the last two weeks how often have you been bothered by feeling down, depressed or hopeless Several days Not at all Not at all Not at all Not at all Not at all Not at all   PHQ-2 Total Score 1 0 0 0 0 0 0       Learning Assessment       12/18/2023 1541 [facility] (12/18/2023 - Present)   Created by Tia Araya, RN - RN (Nurse) Status: Complete                 PRIMARY LEARNER     Primary Learner Name:  Jose Luis Manzo  - 12/18/2023 1541    Relationship:  Patient  - 12/18/2023 1541    Does the primary learner have any barriers to learning?:  No Barriers  - 12/18/2023 1541    What is the preferred language of the primary learner?:  English  - 12/18/2023 1541    Is an  required?:  No [facility] 12/18/2023 1541    How does the primary learner prefer to learn new concepts?:  Listening, Demonstration  - 12/18/2023 1541    How often do you need to have someone help you read instructions, pamphlets, or written material from your doctor or pharmacy?:  Never  - 12/18/2023 1541        CO-LEARNER #1     No question answered        CO-LEARNER #2     No question answered        SPECIAL TOPICS     No question answered        ANSWERED BY:     No question answered        Edit History       Tia Araya, RN - RN (Nurse)   12/18/2023 1541

## 2023-12-18 NOTE — LETTER
Jose Luis Jovany  [address] [address]    Dear  Mr. Jose Luis Manzo,     Welcome to [facility]s Outpatient Care Management Program. We are here to assist patients with multiple long-term (chronic) conditions who often need more personalized healthcare.    It was a pleasure talking with you today. My name is Tia Araya RN. I look forward to working with you as your Care Manager. I will be contacting you by telephone routinely to help coordinate care and resolve issues.    My goal is to help you function at the healthiest and highest level possible. You can contact me directly at [phone].    As an [facility] patient with [employer], some of the services we provide, at no cost to you, include:     Development of an individualized care plan with a Registered Nurse   Connection with a   Assistance from a Community Health Worker  Connection with available resources and services    Coordinate communication among your care team members   Provide coaching and education  Help you understand your doctor's treatment plan  Help you obtain information about your insurance coverage.    All services provided by [facility]s Outpatient Care Managers and other care team members are coordinated with and communicated to your primary care team.      As part of your enrollment, you will be receiving education materials and more information about these services in your [facility] account, by phone, or through the mail. If you do not wish to participate or receive information, you can Opt Out by contacting our office at [phone].      Sincerely,        Tia Araya RN  [facility]   Outpatient Care Management                    Patient Education        Chronic Kidney Disease   The Basics   Written by the doctors and editors at [facility]   What is chronic kidney disease? -- Chronic kidney disease (CKD) is when the kidneys stop working as well as they should. When they are working normally,  "the kidneys filter the blood and remove waste and excess salt and water (figure 1).  In people with CKD, the kidneys slowly lose the ability to filter the blood. In time, the kidneys can stop working completely. That is why it is so important to keep CKD from getting worse.  What are the symptoms of CKD? -- At first, CKD causes no symptoms. As the disease gets worse, it can:  Make your feet, ankles, or legs swell (doctors call this "edema")  Give you high blood pressure  Make you very tired  Damage your bones  Is there anything I can do to keep my kidneys from getting worse if I have CKD? -- Yes, you can protect your kidneys by:  Taking blood pressure and other medicines every day, if your doctor or nurse prescribes them to you  Keeping your blood sugar in a healthy range, if you have diabetes  Changing your diet, if your doctor or nurse says you should  Quitting smoking, if you smoke  Losing weight, if you are overweight  Avoiding medicines known as "nonsteroidal antiinflammatory drugs," or NSAIDs. These medicines include ibuprofen (sample brand names: Advil, Motrin) and naproxen (sample brand name: Aleve). Check with your doctor, nurse, or kidney specialist before starting any new medicines - even over-the-counter ones.  What are the treatments for CKD? -- People in the early stages of CKD can take medicines to keep the disease from getting worse. For example, many people with CKD should take medicines known as "ACE inhibitors" or "angiotensin receptor blockers." If your doctor or nurse prescribes these medicines, it is very important that you take them every day as directed. If they cause side effects or cost too much, speak to your doctor or nurse about it. He or she might have solutions to offer.  What happens if my kidneys stop working completely? -- If your kidneys stop working completely, you can choose between 3 different treatments to take over the job of your kidneys. Your choices are described " "below.  You can have kidney transplant surgery. That way, the new kidney can do the job of your own kidneys. If you have a kidney transplant, you will need to take medicines for the rest of your life to keep your body from reacting badly to the new kidney. (You only need 1 kidney to live.)  You can have your blood filtered by a machine. This treatment is called "hemodialysis," but many people call it just "dialysis." If you choose this approach, you will need to be hooked up to the machine at least 3 times a week for a few hours for the rest of your life. Before you start, you will also need to have surgery to prepare a blood vessel for attachment to the machine.  You can learn to use a special fluid that has to be piped in and out of your belly every day. This treatment is called "peritoneal dialysis." If you choose this type of dialysis, you will need surgery to have a tube implanted in your belly. Then you will have to learn how to pipe the fluid in and out through that tube.  How do I choose between the different treatment options? -- You and your doctor will need to work together to find a treatment that's right for you. Kidney transplant surgery is usually the best option for most people. But often there are no kidneys available for transplant.  Ask your doctor to explain all of your options and how they might work for you. Then talk openly with him or her about how you feel about all of the options. You might even decide that you do not want any treatment. That is your choice.  All topics are updated as new evidence becomes available and our peer review process is complete.  This topic retrieved from [employer] on: Sep 21, 2021.  Topic 34537 Version 22.0  Release: 29.4.2 - C29.263  © 2021 UpToDate, Inc. and/or its affiliates. All rights reserved.  figure 1: Anatomy of the urinary tract     Urine is made by the kidneys. It passes from the kidneys into the bladder through two tubes called the ureters. Then it " leaves the bladder through another tube called the urethra.  Graphic 41844 Version 7.0     Consumer Information Use and Disclaimer   This information is not specific medical advice and does not replace information you receive from your health care provider. This is only a brief summary of general information. It does NOT include all information about conditions, illnesses, injuries, tests, procedures, treatments, therapies, discharge instructions or life-style choices that may apply to you. You must talk with your health care provider for complete information about your health and treatment options. This information should not be used to decide whether or not to accept your health care provider's advice, instructions or recommendations. Only your health care provider has the knowledge and training to provide advice that is right for you. The use of this information is governed by the [employer] End User License Agreement, available at https://www.[employer]/en/solutions/[employer]/about/jeanette.The use of [facility] content is governed by the [employer] [facility] Terms of Use. ©2021 UpToDate, Inc. All rights reserved.  Copyright   © 2021 UpToDate, Inc. and/or its affiliates. All rights reserved.     Patient Education        Kidney Disease Diet (For People Not on Dialysis)   About this topic   Watching what you eat is important if you have kidney disease. With this illness, your kidneys are not filtering blood as well as they should. You may have a buildup of some nutrients and waste products. Eating a special diet can help.  General   Your body needs to balance the level of many things in your diet. Some of them are water, protein, potassium, calcium, phosphorus, and sodium. You also have to have the right amount of calories, protein, vitamins, and minerals to meet your body needs. Your doctor and dietitian can help find the best foods for you to eat.     What will the results be?   You can lower the stress on your kidneys by  watching what you eat. You may have less bad effects. You may stay healthier for a longer time.  What lifestyle changes are needed?   You will need to make changes in your diet to limit some nutrients. Keeping a daily food log may help you track what you eat. This can help you make better choices. Learn to read food labels with care. They will show you how much of each nutrient is in a serving. Reading the labels will help you make healthy food choices.  What drugs may be needed?   The doctor may order drugs to:  Lower your phosphate levels  Lower your blood pressure  Give you more nutrients  What changes to diet are needed?   Talk to your doctor or dietitian about your own diet plan. Let your doctor or dietitian know about special conditions that may change your diet. You may:  Be a vegetarian  Need to control your blood sugar  Have heart disease  You will need to limit sodium, potassium, phosphorus, protein, and fluid.  Sodium ? Is a mineral in food and found in table salt. Salt is in most foods you eat. Extra is added to canned, frozen, and processed food. Too much sodium can make you keep extra fluids in your body. To cut back on sodium in your diet, avoid high sodium foods and condiments. Also, do not add salt to foods while cooking or eating.  Potassium ? Is a mineral found in foods. It plays a major role in the work of your heart and muscles. The doctor will watch your potassium level closely. Too much or too little potassium is unsafe. You may have to limit or avoid high potassium foods.  Phosphorus ? Is a mineral found in all foods. It is filtered by your kidneys in most cases. If your blood has too much phosphorus, your body will try to get rid of it by soaking up calcium from your bones. This makes your bones weak. Your doctor may give you a drug to help your body get rid of the extra phosphate. You may also have to limit the amount that you eat.  Protein ? One of the building blocks of our body. You need  this for repairing your body and for fighting infections. When protein is broken down, it makes waste products. In most cases, your kidneys get rid of these waste products. If you have kidney disease, your body cannot get rid of these waste products as well. You will have to limit your protein intake. Your dietitian will give you a suggested amount of daily protein. This is based on your body size and how your kidneys are working.  Fluids ? Based on your stage of kidney disease, you may need to watch how much you drink. Too much fluid can cause your body to swell and also raise your blood pressure. Ask your doctor if you need to limit fluids.  When is this diet used?   This diet is used for people who have kidney disease. This diet is for people who are not on dialysis.  Who should not use this diet?   People who do not have kidney disease should not follow this diet. It is also not the right diet for you if you are on dialysis.  What foods are good to eat?   Meats and proteins like: Beef, fish, poultry, pork, eggs  Breads and grains like: Cereals, white rice, white pasta and white bread  Fruits like: Apples, berries, cherries, grapes, peaches, pears, pineapples, plums, watermelon  Vegetables like: Cabbage, cooked carrots, cauliflower, celery, cucumber, eggplant, lettuce, peppers, radish, zucchini, yellow squash, white mushrooms  What foods should be limited or avoided?   Limit or avoid eating these foods:  Breads and grains like: Whole wheat bread, brown rice, whole grains, bran cereals  Fruits like: Oranges, kiwis, prunes, raisins, bananas, cantaloupe, honeydew melon  Vegetables like: Potatoes, tomatoes, winter squash, pumpkin, spinach, parsnips  Spices and herbs like: Salt, soy sauce, teriyaki sauce, salt substitute (some may be high in potassium)  Dairy foods like: Milk and yogurt  Processed foods  High amounts of protein foods  Other foods and drinks like: Chocolate, beer, wine, nuts and peanut butter, canned  foods, chips, tea, dark colored soda  Will there be any other care needed?   Talk to your doctor and dietitian often. They will help you learn how much of each nutrient you may have in your diet. This will change as your kidney disease changes. It is important to get enough calories to maintain your weight.  What problems could happen?   Too much fluid  High blood pressure  Too much phosphorus or potassium in your blood  Waste product build-up in your blood  When do I need to call the doctor?   Burning or tingling in your legs or feet  Confusion or a feeling of not knowing where you are  Slurred speech  Extreme sleepiness or feeling very weak  A lot of unplanned weight gain or loss  Upset stomach, throwing up, or loose stools that do not go away  Swelling in your hands and feet  Helpful tips   Choose a water bottle with markings for volume to keep track of your fluid intake.  Make a menu in advance to help you carefully choose what to have in your diet. Be cautious when eating out at restaurants. It may help if you call ahead and ask questions about the menu so you can follow your diet more closely  Keep a record of the food you eat. This will help you count the calories you are taking in.  Eat less salty food to avoid thirst.  Eat fresh foods.  Use herbs and spices instead of salt to put flavor in your food.  Use nondairy creamers instead of milk to lower the amount of phosphorus in your diet.  Where can I learn more?   NHS  https://www.nhs.uk/conditions/kidney-disease/living-with/   Last Reviewed Date   2021-09-17  Consumer Information Use and Disclaimer   This information is not specific medical advice and does not replace information you receive from your health care provider. This is only a brief summary of general information. It does NOT include all information about conditions, illnesses, injuries, tests, procedures, treatments, therapies, discharge instructions or life-style choices that may apply to you.  You must talk with your health care provider for complete information about your health and treatment options. This information should not be used to decide whether or not to accept your health care providers advice, instructions or recommendations. Only your health care provider has the knowledge and training to provide advice that is right for you.  Copyright   Copyright © 2021 UpToDate, Inc. and its affiliates and/or licensors. All rights reserved.

## 2023-12-26 ENCOUNTER — OUTPATIENT CASE MANAGEMENT (OUTPATIENT)
Dept: ADMINISTRATIVE | Facility: OTHER | Age: 73
End: 2023-12-26
Payer: MEDICARE

## 2023-12-26 NOTE — PROGRESS NOTES
Outpatient Care Management  Plan of Care Follow Up Visit    Patient: Jose Luis Manzo  MRN: [id number]  Date of Service: 12/26/2023  Completed by: Tia Araya RN  Referral Date: 12/18/2023    Reason for Visit   Patient presents with    OPCM Chart Review    Update Plan Of Care     12/26/23       Brief Summary: OPCM follow up complete. Continued education on CKD.   Next Steps: Continue to reinforce s/s of CKD with patient. Help patient identify symptom(s) other than swelling. Continue to encourage patient asking for help from fellow [employer] members.  Patient agrees to follow up call with in 3 weeks

## 2024-01-09 ENCOUNTER — OUTPATIENT CASE MANAGEMENT (OUTPATIENT)
Dept: ADMINISTRATIVE | Facility: OTHER | Age: 74
End: 2024-01-09

## 2024-01-09 NOTE — PROGRESS NOTES
Outpatient Care Management  Plan of Care Follow Up Visit    Patient: Jose Luis Manzo  MRN: [id number]  Date of Service: 01/09/2024  Completed by: Tia Araya RN  Referral Date: 12/18/2023    No chief complaint on file.      Brief Summary: OPCM follow up complete. Continued education on CKD.  Next Steps: Follow up with patient after his appointment with Nephrology on 01/18/24. See what recommendations were suggested. Reinforce s/s of CKD with patient Patient agrees to follow up call with in 2 weeks

## 2024-01-17 ENCOUNTER — HOSPITAL ENCOUNTER (EMERGENCY)
Facility: HOSPITAL | Age: 74
Discharge: HOME | End: 2024-01-17
Attending: EMERGENCY MEDICINE
Payer: MEDICARE

## 2024-01-17 ENCOUNTER — TELEPHONE (OUTPATIENT)
Dept: OPHTHALMOLOGY | Facility: CLINIC | Age: 74
End: 2024-01-17

## 2024-01-17 VITALS
TEMPERATURE: 97 F | SYSTOLIC BLOOD PRESSURE: 165 MMHG | OXYGEN SATURATION: 97 % | HEIGHT: 70 IN | BODY MASS INDEX: 33.64 KG/M2 | DIASTOLIC BLOOD PRESSURE: 89 MMHG | RESPIRATION RATE: 18 BRPM | WEIGHT: 235 LBS | HEART RATE: 62 BPM

## 2024-01-17 DIAGNOSIS — H00.014 HORDEOLUM EXTERNUM OF LEFT UPPER EYELID: ICD-10-CM

## 2024-01-17 DIAGNOSIS — L03.213 PRESEPTAL CELLULITIS OF LEFT EYE: Primary | ICD-10-CM

## 2024-01-17 PROCEDURE — 99284 EMERGENCY DEPT VISIT MOD MDM: CPT | Mod: HCNC,ER

## 2024-01-17 RX ORDER — AMOXICILLIN AND CLAVULANATE POTASSIUM 875; 125 MG/1; MG/1
1 TABLET, FILM COATED ORAL 2 TIMES DAILY
Qty: 14 TABLET | Refills: 0 | Status: ON HOLD | OUTPATIENT
Start: 2024-01-17 | End: 2024-05-02 | Stop reason: HOSPADM

## 2024-01-17 RX ORDER — ERYTHROMYCIN 5 MG/G
OINTMENT OPHTHALMIC
Qty: 15 G | Refills: 0 | Status: ON HOLD | OUTPATIENT
Start: 2024-01-17 | End: 2024-05-02 | Stop reason: HOSPADM

## 2024-01-17 NOTE — ED PROVIDER NOTES
"Encounter Date: 1/17/2024       History     Chief Complaint   Patient presents with    Facial Swelling     Swelling to left upper/lower eyelid since waking up 2 morning ago      HPI  Review of patient's allergies indicates:  No Known Allergies  Past Medical History:   Diagnosis Date    Acute hypoxemic respiratory failure due to COVID-19 12/30/2020    Anticoagulant long-term use     BPH (benign prostatic hypertrophy)     Cancer     vocal cords    Claudication of right lower extremity 3/10/2016    COVID-19 virus detected 12/30/2020    Dependence on renal dialysis 4/18/2023    Disorder of kidney and ureter     Hyperkalemia 1/1/2021    Hyperlipidemia     Hypertension     Hypokalemia 6/23/2021    Immunosuppression 6/26/2021    Immunosuppression due to chronic steroid use 1/30/2020    Microcytic anemia 9/16/2016    Obesity (BMI 30-39.9) 1/23/2019    Oropharyngeal cancer     Pterygium     Squamous cell carcinoma 4/25/2018    Thromboembolic disorder     Unspecified disorder of kidney and ureter      Past Surgical History:   Procedure Laterality Date    CYSTOSCOPY W/ RETROGRADES Bilateral 6/15/2022    Procedure: Cystoscopy, bilateral retrograde pyelogram, and all indicated procedures;  Surgeon: Veronica Bacon MD;  Location: [facility];  Service: Urology;  Laterality: Bilateral;    FRACTURE SURGERY Left     "lower leg" 40 years ago    KIDNEY TRANSPLANT  2007    followed by [facility]plant    LARYNX SURGERY  11/2014    Oropharyngeal Cancer x3    microlaryngoscopy      PHLEBOGRAPHY Bilateral 8/2/2022    Procedure: Venogram;  Surgeon: KENIA Mueller II, MD;  Location: [facility] CATH LAB;  Service: Vascular;  Laterality: Bilateral;    SURGICAL REMOVAL OF LESION OF ORBIT Bilateral 8/26/2020    Procedure: EXCISION, LESION, ORBIT;  Surgeon: Linda Tee MD;  [address];  Service: Ophthalmology;  Laterality: Bilateral;    TIBIAL STAPLING Left 1980          Family History   Problem Relation " "Age of Onset    Stroke Mother     Diabetes Mother     Hypertension Mother     Stroke Father     No Known Problems Sister     No Known Problems Brother     No Known Problems Daughter     No Known Problems Brother      Social History     Tobacco Use    Smoking status: Former     Current packs/day: 0.00     Average packs/day: 1 pack/day for 20.0 years (20.0 ttl pk-yrs)     Types: Cigarettes     Start date: 1989     Quit date: 2009     Years since quitting: 15.0    Smokeless tobacco: Never   Substance Use Topics    Alcohol use: No     Alcohol/week: 0.0 standard drinks of alcohol    Drug use: No     Types: Marijuana     Comment: Occ.     Review of Systems    Physical Exam     Initial Vitals [01/17/24 0927]   BP Pulse Resp Temp SpO2   (!) 219/102 62 18 97 °F (36.1 °C) 97 %      MAP       --         Physical Exam    ED Course   Procedures  Labs Reviewed - No data to display       Imaging Results    None          Medications - No data to display  Medical Decision Making                                    Clinical Impression:   ***Please document a Clinical Impression and click the "Refresh" button to refresh your note and automatically pull in before signing.***           "

## 2024-01-17 NOTE — TELEPHONE ENCOUNTER
----- Message from Yuli Mejia sent at 1/17/2024 11:12 AM CST -----  Good morning,    The patient have a referral from the emergency department with a diagnosis of Preseptal cellulitis of left eye. Please assist with scheduling the patient.    Thank You

## 2024-01-17 NOTE — ED PROVIDER NOTES
"Encounter Date: 1/17/2024       History     Chief Complaint   Patient presents with    Facial Swelling     Swelling to left upper/lower eyelid since waking up 2 morning ago      73-year-old patient presents to the emergency department with swelling of the left eyelid.  Patient states that he has been having mucus come out of it and initially it started as a small bump and now his entire left eyelid is swollen.  Patient denied any other complaints.  Patient states that he does have a history of high blood pressure and he did not take his metoprolol today but he denied any headache blurred vision or any other complaints.    The history is provided by the patient.     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    Disorder of kidney and ureter     Hyperkalemia 1/1/2021    Hyperlipidemia     Hypertension     Hypokalemia 6/23/2021    Immunosuppression 6/26/2021    Immunosuppression due to chronic steroid use 1/30/2020    Microcytic anemia 9/16/2016    Obesity (BMI 30-39.9) 1/23/2019    Oropharyngeal cancer     Pterygium     Squamous cell carcinoma 4/25/2018    Thromboembolic disorder     Unspecified disorder of kidney and ureter      Past Surgical History:   Procedure Laterality Date    CYSTOSCOPY W/ RETROGRADES Bilateral 6/15/2022    Procedure: Cystoscopy, bilateral retrograde pyelogram, and all indicated procedures;  Surgeon: Veronica Bacon MD;  Location: [facility];  Service: Urology;  Laterality: Bilateral;    FRACTURE SURGERY Left     "lower leg" 40 years ago    KIDNEY TRANSPLANT  2007    followed by [facility] Transplant    LARYNX SURGERY  11/2014    Oropharyngeal Cancer x3    " microlaryngoscopy      PHLEBOGRAPHY Bilateral 8/2/2022    Procedure: Venogram;  Surgeon: KENIA Mueller II, MD;  Location: [facility] CATH LAB;  Service: Vascular;  Laterality: Bilateral;    SURGICAL REMOVAL OF LESION OF ORBIT Bilateral 8/26/2020    Procedure: EXCISION, LESION, ORBIT;  Surgeon: Linda Tee MD;  [facility];  Service: Ophthalmology;  Laterality: Bilateral;    TIBIAL STAPLING Left 1980          Family History   Problem Relation Age of Onset    Stroke Mother     Diabetes Mother     Hypertension Mother     Stroke Father     No Known Problems Sister     No Known Problems Brother     No Known Problems Daughter     No Known Problems Brother      Social History     Tobacco Use    Smoking status: Former     Current packs/day: 0.00     Average packs/day: 1 pack/day for 20.0 years (20.0 ttl pk-yrs)     Types: Cigarettes     Start date: 1989     Quit date: 2009     Years since quitting: 15.0    Smokeless tobacco: Never   Substance Use Topics    Alcohol use: No     Alcohol/week: 0.0 standard drinks of alcohol    Drug use: No     Types: Marijuana     Comment: Occ.     Review of Systems   Constitutional: Negative.    HENT: Negative.     Eyes:  Positive for discharge.   Respiratory: Negative.     Cardiovascular: Negative.    Gastrointestinal: Negative.    Endocrine: Negative.    Genitourinary: Negative.    Musculoskeletal: Negative.    Skin: Negative.    Allergic/Immunologic: Negative.    Neurological: Negative.    Hematological: Negative.    Psychiatric/Behavioral: Negative.         Physical Exam     Initial Vitals [01/17/24 0927]   BP Pulse Resp Temp SpO2   (!) 219/102 62 18 97 °F (36.1 °C) 97 %      MAP       --         Physical Exam    Constitutional: He appears well-developed and well-nourished.   HENT:   Head: Normocephalic.   Right Ear: Tympanic membrane normal.   Left Ear: Tympanic membrane normal.   Nose: Nose normal.   Mouth/Throat: Uvula is midline and oropharynx is clear and moist.   Eyes:  Conjunctivae and EOM are normal. Left eye exhibits discharge and hordeolum.   Entire left upper eyelid was swollen and erythematous.     Neck:    Full passive range of motion without pain.     Cardiovascular:  Normal rate and regular rhythm.           Pulmonary/Chest: Effort normal and breath sounds normal.   Musculoskeletal:      Cervical back: Full passive range of motion without pain.           ED Course   Procedures  Labs Reviewed - No data to display       Imaging Results    None          Medications - No data to display  Medical Decision Making  73 year old patient presents with swelling of left eyelid.  Patient had two bumps initially but now the entire eyelid is swollen.                                        Clinical Impression:  Final diagnoses:  [L03.213] Preseptal cellulitis of left eye (Primary)  [H00.014] Hordeolum externum of left upper eyelid          ED Disposition Condition    Discharge Stable          ED Prescriptions       Medication Sig Dispense Start Date End Date Auth. Provider    amoxicillin-clavulanate 875-125mg (AUGMENTIN) 875-125 mg per tablet Take 1 tablet by mouth 2 (two) times daily. 14 tablet 1/17/2024 -- Jose F Fox PA-C    erythromycin (ROMYCIN) ophthalmic ointment Place a 1/2 inch ribbon of ointment into the left lower eyelid twice a day for 7 days 15 g 1/17/2024 -- Jose F Fox PA-C          Follow-up Information       Follow up With Specialties Details Why Contact [facility] - Emergency Dept Emergency Medicine Go to  If symptoms worsen [address] [place] [place] [address] [phone]    ophthalmologist                 Jose F Fox PA-C  01/17/24 4059

## 2024-01-17 NOTE — ED PROVIDER NOTES
"Encounter Date: 1/17/2024       History     Chief Complaint   Patient presents with    Facial Swelling     Swelling to left upper/lower eyelid since waking up 2 morning ago      HPI  Review of patient's allergies indicates:  No Known Allergies  Past Medical History:   Diagnosis Date    Acute hypoxemic respiratory failure due to COVID-19 12/30/2020    Anticoagulant long-term use     BPH (benign prostatic hypertrophy)     Cancer     vocal cords    Claudication of right lower extremity 3/10/2016    COVID-19 virus detected 12/30/2020    Dependence on renal dialysis 4/18/2023    Disorder of kidney and ureter     Hyperkalemia 1/1/2021    Hyperlipidemia     Hypertension     Hypokalemia 6/23/2021    Immunosuppression 6/26/2021    Immunosuppression due to chronic steroid use 1/30/2020    Microcytic anemia 9/16/2016    Obesity (BMI 30-39.9) 1/23/2019    Oropharyngeal cancer     Pterygium     Squamous cell carcinoma 4/25/2018    Thromboembolic disorder     Unspecified disorder of kidney and ureter      Past Surgical History:   Procedure Laterality Date    CYSTOSCOPY W/ RETROGRADES Bilateral 6/15/2022    Procedure: Cystoscopy, bilateral retrograde pyelogram, and all indicated procedures;  Surgeon: Veronica Bacon MD;  Location: [facility];  Service: Urology;  Laterality: Bilateral;    FRACTURE SURGERY Left     "lower leg" 40 years ago    KIDNEY TRANSPLANT  2007    followed by [facility]    LARYNX SURGERY  11/2014    Oropharyngeal Cancer x3    microlaryngoscopy      PHLEBOGRAPHY Bilateral 8/2/2022    Procedure: Venogram;  Surgeon: KENIA Mueller II, MD;  Location: [facility] CATH LAB;  Service: Vascular;  Laterality: Bilateral;    SURGICAL REMOVAL OF LESION OF ORBIT Bilateral 8/26/2020    Procedure: EXCISION, LESION, ORBIT;  Surgeon: Linda Tee MD;  [address];  Service: Ophthalmology;  Laterality: Bilateral;    TIBIAL STAPLING Left 1980          Family History   Problem Relation " "Age of Onset    Stroke Mother     Diabetes Mother     Hypertension Mother     Stroke Father     No Known Problems Sister     No Known Problems Brother     No Known Problems Daughter     No Known Problems Brother      Social History     Tobacco Use    Smoking status: Former     Current packs/day: 0.00     Average packs/day: 1 pack/day for 20.0 years (20.0 ttl pk-yrs)     Types: Cigarettes     Start date: 1989     Quit date: 2009     Years since quitting: 15.0    Smokeless tobacco: Never   Substance Use Topics    Alcohol use: No     Alcohol/week: 0.0 standard drinks of alcohol    Drug use: No     Types: Marijuana     Comment: Occ.     Review of Systems    Physical Exam     Initial Vitals [01/17/24 0927]   BP Pulse Resp Temp SpO2   (!) 219/102 62 18 97 °F (36.1 °C) 97 %      MAP       --         Physical Exam    ED Course   Procedures  Labs Reviewed - No data to display       Imaging Results    None          Medications - No data to display  Medical Decision Making                                    Clinical Impression:   ***Please document a Clinical Impression and click the "Refresh" button to refresh your note and automatically pull in before signing.***           "

## 2024-01-18 ENCOUNTER — LAB VISIT (OUTPATIENT)
Dept: LAB | Facility: HOSPITAL | Age: 74
End: 2024-01-18
Payer: MEDICARE

## 2024-01-18 ENCOUNTER — OFFICE VISIT (OUTPATIENT)
Dept: NEPHROLOGY | Facility: CLINIC | Age: 74
End: 2024-01-18
Payer: MEDICARE

## 2024-01-18 VITALS
SYSTOLIC BLOOD PRESSURE: 156 MMHG | HEART RATE: 98 BPM | DIASTOLIC BLOOD PRESSURE: 94 MMHG | BODY MASS INDEX: 35.11 KG/M2 | OXYGEN SATURATION: 96 % | WEIGHT: 244.69 LBS

## 2024-01-18 DIAGNOSIS — Z94.0 TRANSPLANTED KIDNEY: Primary | ICD-10-CM

## 2024-01-18 DIAGNOSIS — R35.1 BENIGN PROSTATIC HYPERPLASIA WITH NOCTURIA: ICD-10-CM

## 2024-01-18 DIAGNOSIS — N18.4 STAGE 4 CHRONIC KIDNEY DISEASE: ICD-10-CM

## 2024-01-18 DIAGNOSIS — I10 ESSENTIAL HYPERTENSION: ICD-10-CM

## 2024-01-18 DIAGNOSIS — E87.20 METABOLIC ACIDOSIS: ICD-10-CM

## 2024-01-18 DIAGNOSIS — D63.1 ANEMIA IN CHRONIC KIDNEY DISEASE, UNSPECIFIED CKD STAGE: ICD-10-CM

## 2024-01-18 DIAGNOSIS — N40.1 BENIGN PROSTATIC HYPERPLASIA WITH NOCTURIA: ICD-10-CM

## 2024-01-18 DIAGNOSIS — E78.2 MIXED HYPERLIPIDEMIA: ICD-10-CM

## 2024-01-18 DIAGNOSIS — D84.9 IMMUNOSUPPRESSION: ICD-10-CM

## 2024-01-18 DIAGNOSIS — D50.8 OTHER IRON DEFICIENCY ANEMIA: ICD-10-CM

## 2024-01-18 DIAGNOSIS — N18.9 ANEMIA IN CHRONIC KIDNEY DISEASE, UNSPECIFIED CKD STAGE: ICD-10-CM

## 2024-01-18 DIAGNOSIS — Z94.0 TRANSPLANTED KIDNEY: ICD-10-CM

## 2024-01-18 DIAGNOSIS — E55.9 VITAMIN D DEFICIENCY DISEASE: ICD-10-CM

## 2024-01-18 LAB
25(OH)D3+25(OH)D2 SERPL-MCNC: 33 NG/ML (ref 30–96)
ALBUMIN SERPL BCP-MCNC: 3.3 G/DL (ref 3.5–5.2)
ANION GAP SERPL CALC-SCNC: 4 MMOL/L (ref 8–16)
BASOPHILS # BLD AUTO: 0.03 K/UL (ref 0–0.2)
BASOPHILS NFR BLD: 0.5 % (ref 0–1.9)
BUN SERPL-MCNC: 23 MG/DL (ref 8–23)
CALCIUM SERPL-MCNC: 10.4 MG/DL (ref 8.7–10.5)
CHLORIDE SERPL-SCNC: 113 MMOL/L (ref 95–110)
CO2 SERPL-SCNC: 24 MMOL/L (ref 23–29)
CREAT SERPL-MCNC: 3.4 MG/DL (ref 0.5–1.4)
DIFFERENTIAL METHOD BLD: ABNORMAL
EOSINOPHIL # BLD AUTO: 0.1 K/UL (ref 0–0.5)
EOSINOPHIL NFR BLD: 2.1 % (ref 0–8)
ERYTHROCYTE [DISTWIDTH] IN BLOOD BY AUTOMATED COUNT: 16.9 % (ref 11.5–14.5)
EST. GFR  (NO RACE VARIABLE): 18.3 ML/MIN/1.73 M^2
GLUCOSE SERPL-MCNC: 100 MG/DL (ref 70–110)
HCT VFR BLD AUTO: 42.1 % (ref 40–54)
HGB BLD-MCNC: 12.4 G/DL (ref 14–18)
IMM GRANULOCYTES # BLD AUTO: 0.07 K/UL (ref 0–0.04)
IMM GRANULOCYTES NFR BLD AUTO: 1.1 % (ref 0–0.5)
LYMPHOCYTES # BLD AUTO: 1.6 K/UL (ref 1–4.8)
LYMPHOCYTES NFR BLD: 24.5 % (ref 18–48)
MCH RBC QN AUTO: 25 PG (ref 27–31)
MCHC RBC AUTO-ENTMCNC: 29.5 G/DL (ref 32–36)
MCV RBC AUTO: 85 FL (ref 82–98)
MONOCYTES # BLD AUTO: 0.7 K/UL (ref 0.3–1)
MONOCYTES NFR BLD: 10.8 % (ref 4–15)
NEUTROPHILS # BLD AUTO: 3.9 K/UL (ref 1.8–7.7)
NEUTROPHILS NFR BLD: 61 % (ref 38–73)
NRBC BLD-RTO: 0 /100 WBC
PHOSPHATE SERPL-MCNC: 2.3 MG/DL (ref 2.7–4.5)
PLATELET # BLD AUTO: 187 K/UL (ref 150–450)
PMV BLD AUTO: 10.9 FL (ref 9.2–12.9)
POTASSIUM SERPL-SCNC: 4 MMOL/L (ref 3.5–5.1)
PTH-INTACT SERPL-MCNC: 459.5 PG/ML (ref 9–77)
RBC # BLD AUTO: 4.96 M/UL (ref 4.6–6.2)
SODIUM SERPL-SCNC: 141 MMOL/L (ref 136–145)
WBC # BLD AUTO: 6.32 K/UL (ref 3.9–12.7)

## 2024-01-18 PROCEDURE — 36415 COLL VENOUS BLD VENIPUNCTURE: CPT | Mod: HCNC | Performed by: STUDENT IN AN ORGANIZED HEALTH CARE EDUCATION/TRAINING PROGRAM

## 2024-01-18 PROCEDURE — 80069 RENAL FUNCTION PANEL: CPT | Mod: HCNC | Performed by: STUDENT IN AN ORGANIZED HEALTH CARE EDUCATION/TRAINING PROGRAM

## 2024-01-18 PROCEDURE — 80195 ASSAY OF SIROLIMUS: CPT | Mod: HCNC | Performed by: STUDENT IN AN ORGANIZED HEALTH CARE EDUCATION/TRAINING PROGRAM

## 2024-01-18 PROCEDURE — 82306 VITAMIN D 25 HYDROXY: CPT | Mod: HCNC | Performed by: STUDENT IN AN ORGANIZED HEALTH CARE EDUCATION/TRAINING PROGRAM

## 2024-01-18 PROCEDURE — 99214 OFFICE O/P EST MOD 30 MIN: CPT | Mod: S$PBB,,, | Performed by: STUDENT IN AN ORGANIZED HEALTH CARE EDUCATION/TRAINING PROGRAM

## 2024-01-18 PROCEDURE — 85025 COMPLETE CBC W/AUTO DIFF WBC: CPT | Mod: HCNC | Performed by: STUDENT IN AN ORGANIZED HEALTH CARE EDUCATION/TRAINING PROGRAM

## 2024-01-18 PROCEDURE — 83970 ASSAY OF PARATHORMONE: CPT | Mod: HCNC | Performed by: STUDENT IN AN ORGANIZED HEALTH CARE EDUCATION/TRAINING PROGRAM

## 2024-01-18 PROCEDURE — 99999 PR PBB SHADOW E&M-EST. PATIENT-LVL IV: CPT | Mod: PBBFAC,,, | Performed by: STUDENT IN AN ORGANIZED HEALTH CARE EDUCATION/TRAINING PROGRAM

## 2024-01-18 PROCEDURE — 3066F NEPHROPATHY DOC TX: CPT | Mod: ,,, | Performed by: STUDENT IN AN ORGANIZED HEALTH CARE EDUCATION/TRAINING PROGRAM

## 2024-01-18 NOTE — PROGRESS NOTES
Nephrology Clinic Note   12/15/2022    Chief Complaint   Patient presents with    Chronic Kidney Disease      History of present illness:  Patient is a 73 y.o. 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 Pt presents today for f/u on CKD 4 fllow up.      Clinic visit; Last office visit was December 2022. Previously he was receiving his Rapamune via the [employer] drug program which was providing this medication at no cost to him. In Jan. 2023 he was no longer able to receive this medication and now it is costing him $300 for a 90 day supply.  Otherwise pt has been feeling the same. Has not been taking lisinopril (out of refills)       Pt denies: Fever, chills, shortness of breath, chest pain, palpitations, nausea, vomiting, diarrhea, abd pain, hematuria, dysuria, urinary frequency or urgency, headaches, visual disturbances or weakness.          HTN, hyperlipidemia:   Pt monitors blood pressure at home and his SBPs are usually in 130s-150's  Meds: Clonidine patch weekly, atorvastatin, furosemide 40mg daily, metoprolol 50mg daily,   --He is currently not taking his Lisinopril.        CKD IV  2/2 hypertensive nephrosclerosis & chronic CNI exposure   Baseline sCr 3.3-3.6 (in 2022)   Lab Results   Component Value Date    CREATININE 3.65 (H) 04/20/2023 2021 pt had COVID related CHANG  CNI toxicity in the past   On Dr. 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 contributing to pt's CKD   Anemia  Iron   Date Value Ref Range Status   12/15/2022 60 45 - 160 ug/dL Final   04/13/2022 55 45 - 160 ug/dL Final     TIBC   Date Value Ref Range Status   12/15/2022 195 (L) 250 - 450 ug/dL Final    04/13/2022 212 (L) 250 - 450 ug/dL Final     Ferritin   Date Value Ref Range Status   12/15/2022 753 (H) 20.0 - 300.0 ng/mL Final   04/13/2022 568 (H) 20.0 - 300.0 ng/mL Final     Retic   Date Value Ref Range Status   12/15/2022 1.8 0.4 - 2.0 % Final     Hemoglobin   Date Value Ref Range Status   04/20/2023 12.7 (L) 14.0 - 18.0 g/dL Final   12/15/2022 12.4 (L) 14.0 - 18.0 g/dL Final       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 Gillette's notes)   Lab Results   Component Value Date    .7 (H) 12/15/2022    CALCIUM 10.1 04/20/2023    PHOS 2.0 (L) 12/15/2022     Patient is not a candidate for another kidney transplant   Meds: Nabicarb 650mg bid, vitamin D 50K weekly, ferrous sulfate 325mg daily      Kidney Transplant:   Done in [place]   CNI toxicity in the past   Meds: sirolimus 3mg daily, prednisone 5mg daily,   Does not follow with kidney transplant team       Oropharyngeal CA-follows with ENT, not on any current medications/treatments       ROS as per HPI    History:  Past Medical History:   Diagnosis Date    Acute hypoxemic respiratory failure due to COVID-19 12/30/2020    Anticoagulant long-term use     BPH (benign prostatic hypertrophy)     Cancer     vocal cords    Claudication of right lower extremity 3/10/2016    COVID-19 virus detected 12/30/2020    Dependence on renal dialysis 4/18/2023    Disorder of kidney and ureter     Hyperkalemia 1/1/2021    Hyperlipidemia     Hypertension     Hypokalemia 6/23/2021    Immunosuppression 6/26/2021    Immunosuppression due to chronic steroid use 1/30/2020    Microcytic anemia 9/16/2016    Obesity (BMI 30-39.9) 1/23/2019    Oropharyngeal cancer     Pterygium     Squamous cell carcinoma 4/25/2018    Thromboembolic disorder     Unspecified disorder of kidney and ureter       Past Surgical History:   Procedure Laterality Date    CYSTOSCOPY W/ RETROGRADES  "Bilateral 6/15/2022    Procedure: Cystoscopy, bilateral retrograde pyelogram, and all indicated procedures;  Surgeon: Veronica Bacon MD;  [facility];  Service: Urology;  Laterality: Bilateral;    FRACTURE SURGERY Left     "lower leg" 40 years ago    KIDNEY TRANSPLANT  2007    follow[facility]    LARYNX SURGERY  11/2014    Oropharyngeal Cancer x3    microlaryngoscopy      PHLEBOGRAPHY Bilateral 8/2/2022    Procedure: Venogram;  Surgeon: KENIA Mueller II, MD;  Location: [facility] CATH LAB;  Service: Vascular;  Laterality: Bilateral;    SURGICAL REMOVAL OF LESION OF ORBIT Bilateral 8/26/2020    Procedure: EXCISION, LESION, ORBIT;  Surgeon: Linda Tee MD;  Location: [facility] OR 2ND FLR;  Service: Ophthalmology;  Laterality: Bilateral;    TIBIAL STAPLING Left 1980             Current Outpatient Medications:     amoxicillin-clavulanate 875-125mg (AUGMENTIN) 875-125 mg per tablet, Take 1 tablet by mouth 2 (two) times daily., Disp: 14 tablet, Rfl: 0    atorvastatin (LIPITOR) 20 MG tablet, TAKE 1 TABLET EVERY EVENING, Disp: 90 tablet, Rfl: 3    cloNIDine 0.3 mg/24 hr td ptwk (CATAPRES) 0.3 mg/24 hr, PLACE 1 PATCH ONTO THE SKIN ONCE A WEEK., Disp: 12 patch, Rfl: 10    ELIQUIS 2.5 mg Tab, TAKE 1 TABLET TWICE DAILY, Disp: 180 tablet, Rfl: 3    ergocalciferol (ERGOCALCIFEROL) 50,000 unit Cap, Take one tablet once a week, Disp: , Rfl:     ergocalciferol (ERGOCALCIFEROL) 50,000 unit Cap, TAKE 1 CAPSULE BY MOUTH EVERY 7 DAYS., Disp: 12 capsule, Rfl: 3    erythromycin (ROMYCIN) ophthalmic ointment, Place a 1/2 inch ribbon of ointment into the left lower eyelid twice a day for 7 days, Disp: 15 g, Rfl: 0    ferrous sulfate 325 mg (65 mg iron) Tab tablet, Take 325 mg by mouth once daily., Disp: , Rfl:     finasteride (PROSCAR) 5 mg tablet, TAKE 1 TABLET EVERY DAY, Disp: 90 tablet, Rfl: 3    furosemide (LASIX) 40 MG tablet, TAKE 1 TABLET EVERY DAY, Disp: 90 tablet, Rfl: 3    metoprolol succinate " (TOPROL-XL) 50 MG 24 hr tablet, TAKE 1 TABLET EVERY DAY, Disp: 90 tablet, Rfl: 3    nitroGLYCERIN (NITROSTAT) 0.4 MG SL tablet, Place 1 tablet (0.4 mg total) under the tongue every 5 (five) minutes as needed for Chest pain., Disp: 30 tablet, Rfl: 2    predniSONE (DELTASONE) 5 MG tablet, TAKE 1 TABLET EVERY DAY, Disp: 90 tablet, Rfl: 3    sirolimus (RAPAMUNE) 1 MG Tab, Take 3 tablets (3 mg total) by mouth once daily., Disp: 270 tablet, Rfl: 3    sodium bicarbonate 650 MG tablet, TAKE 1 TABLET TWICE DAILY, Disp: 180 tablet, Rfl: 3    lisinopriL 10 MG tablet, Take 1 tablet (10 mg total) by mouth once daily., Disp: 90 tablet, Rfl: 3  Review of patient's allergies indicates:  No Known Allergies   Social History     Tobacco Use    Smoking status: Former     Current packs/day: 0.00     Average packs/day: 1 pack/day for 20.0 years (20.0 ttl pk-yrs)     Types: Cigarettes     Start date: 1989     Quit date: 2009     Years since quitting: 15.0    Smokeless tobacco: Never   Substance Use Topics    Alcohol use: No     Alcohol/week: 0.0 standard drinks of alcohol      Family History   Problem Relation Age of Onset    Stroke Mother     Diabetes Mother     Hypertension Mother     Stroke Father     No Known Problems Sister     No Known Problems Brother     No Known Problems Daughter     No Known Problems Brother         Physical Exam  Vitals and nursing note reviewed.   Constitutional:       General: He is not in acute distress.     Appearance: Normal appearance. He is obese. He is not ill-appearing, toxic-appearing or diaphoretic.   HENT:      Mouth/Throat:      Mouth: Mucous membranes are moist.   Cardiovascular:      Rate and Rhythm: Normal rate and regular rhythm.      Pulses: Normal pulses.      Heart sounds: Murmur heard.   Pulmonary:      Effort: Pulmonary effort is normal. No respiratory distress.      Breath sounds: Normal breath sounds. No stridor. No wheezing, rhonchi or rales.   Musculoskeletal:          General: No swelling, tenderness, deformity or signs of injury.      Left lower leg: No edema.   Skin:     General: Skin is warm.      Capillary Refill: Capillary refill takes less than 2 seconds.      Coloration: Skin is not jaundiced or pale.      Findings: No bruising, erythema, lesion or rash.   Neurological:      Mental Status: He is alert and oriented to person, place, and time.   Psychiatric:         Mood and Affect: Mood normal.         Behavior: Behavior normal.         Thought Content: Thought content normal.         Judgment: Judgment normal.       Labs reviewed   Images Reviewed    Transplanted kidney - 2007  -     SIROLIMUS LEVEL; Future; Expected date: 01/18/2024    Stage 4 chronic kidney disease  Patient has not had labs done since April 2023, will plan to repeat labs today, send for renal transplant evaluation, Dialysis education class, and CKD dietary education. I have engaged with the patient on his wishes if his kidneys were to fail. At this time, he states that he is uncertain if he would decide to resume dialysis if his kidneys failed, although he does admit that he is unfamiliar with peritoneal dialysis and is interested in learning more about PD.   -     CBC Auto Differential; Future; Expected date: 01/18/2024  -     Renal Function Panel; Future; Expected date: 01/18/2024  -     Vitamin D; Future; Expected date: 01/18/2024  -     PTH, intact; Future; Expected date: 01/18/2024  -     Urinalysis; Future; Expected date: 01/18/2024  -     Protein / creatinine ratio, urine; Future; Expected date: 01/18/2024  -     Microalbumin/creatinine urine ratio; Future; Expected date: 01/18/2024  -     KIDNEY DISEASE EDUCATION; Future; Expected date: 01/18/2024  -     RENAL DIETICIAN EDUCATION; Future; Expected date: 01/18/2024  -     Ambulatory referral/consult to Transplant, Kidney; Future; Expected date: 01/25/2024  -     Renal Function Panel; Future; Expected date: 02/18/2024    Anemia in chronic kidney  disease, unspecified CKD stage    Metabolic acidosis    Other iron deficiency anemia    Essential hypertension  -Patient's blood pressure is elevated in the office and at home. Will recheck renal function panel, if labs appear stable and renal function allows, then I plan to resume his home lisinopril.     Mixed hyperlipidemia    Vitamin D deficiency disease  Patient has been taking 50,000 units of vitamin D for the past year, will plan to recheck vitamin D levels now.     Benign prostatic hyperplasia with nocturia       RTC in 1 month    Case discussed and staffed with Dr. Nunn, attestation to follow.

## 2024-01-19 ENCOUNTER — OFFICE VISIT (OUTPATIENT)
Dept: OPTOMETRY | Facility: CLINIC | Age: 74
End: 2024-01-19
Payer: MEDICARE

## 2024-01-19 DIAGNOSIS — L03.213 PRESEPTAL CELLULITIS OF LEFT EYE: ICD-10-CM

## 2024-01-19 DIAGNOSIS — L03.213 PRESEPTAL CELLULITIS OF LEFT UPPER EYELID: Primary | ICD-10-CM

## 2024-01-19 LAB — SIROLIMUS BLD-MCNC: 7.3 NG/ML (ref 4–20)

## 2024-01-19 PROCEDURE — 1101F PT FALLS ASSESS-DOCD LE1/YR: CPT | Mod: HCNC,CPTII,S$GLB, | Performed by: OPTOMETRIST

## 2024-01-19 PROCEDURE — 99999 PR PBB SHADOW E&M-EST. PATIENT-LVL III: CPT | Mod: PBBFAC,HCNC,, | Performed by: OPTOMETRIST

## 2024-01-19 PROCEDURE — 3288F FALL RISK ASSESSMENT DOCD: CPT | Mod: HCNC,CPTII,S$GLB, | Performed by: OPTOMETRIST

## 2024-01-19 PROCEDURE — 92002 INTRM OPH EXAM NEW PATIENT: CPT | Mod: HCNC,S$GLB,, | Performed by: OPTOMETRIST

## 2024-01-19 PROCEDURE — 3066F NEPHROPATHY DOC TX: CPT | Mod: HCNC,CPTII,S$GLB, | Performed by: OPTOMETRIST

## 2024-01-19 PROCEDURE — 1159F MED LIST DOCD IN RCRD: CPT | Mod: HCNC,CPTII,S$GLB, | Performed by: OPTOMETRIST

## 2024-01-19 PROCEDURE — 1126F AMNT PAIN NOTED NONE PRSNT: CPT | Mod: HCNC,CPTII,S$GLB, | Performed by: OPTOMETRIST

## 2024-01-19 NOTE — PROGRESS NOTES
HPI    CC: Pt presents today for [facility] due to ER f/u for cellulitis. Pt states on   Monday when he woke up his left eye was extremely swollen. He went the ER   on Wednesday and was prescribed AUGMENTIN and ROMYCIN for relief. Pt   denies eye pain and states occasional blurry vision.     Which eye: OS  How lon days  Improvement: yes  (-)redness  (+)itching   (-)pain  (-)light sensitivity   (-)changes in vision   (-)discharge  (-)tearing   (-)CL wear   (-)gtt use        Last edited by Layla Quinonez MA on 2024  9:31 AM.            Assessment /Plan     For exam results, see Encounter Report.    Preseptal cellulitis of left upper eyelid      MONITOR. ED PT ON ALL EXAM FINDINGS  IMPROVING PRESEPTAL CELLULITIS OS W/ SECONDARY HORDEOULUMS OS  CONTINUE WITH AUGMENTIN  BID PO; E-MYCIN YEE QHS; INITIATE TOBRADEX GTS QID OS; DISCUSSED GENTLE WARM COMPRESSES   RTC 1-2 WEEKS FOR F/U //PRN

## 2024-01-22 ENCOUNTER — PATIENT MESSAGE (OUTPATIENT)
Dept: ADMINISTRATIVE | Facility: HOSPITAL | Age: 74
End: 2024-01-22

## 2024-01-22 ENCOUNTER — PATIENT OUTREACH (OUTPATIENT)
Dept: ADMINISTRATIVE | Facility: HOSPITAL | Age: 74
End: 2024-01-22

## 2024-01-22 DIAGNOSIS — E55.9 VITAMIN D DEFICIENCY: ICD-10-CM

## 2024-01-22 RX ORDER — ERGOCALCIFEROL 1.25 MG/1
CAPSULE ORAL
Qty: 12 CAPSULE | Refills: 3 | Status: SHIPPED | OUTPATIENT
Start: 2024-01-22

## 2024-01-22 NOTE — PROGRESS NOTES
Care Everywhere updates requested and reviewed.  Immunizations reconciled. Media reports reviewed.  Duplicate HM overrides and  orders removed.  Overdue HM topic chart audit and/or requested.  Overdue lab testing linked to upcoming lab appointments if applies.      Health Maintenance Due   Topic Date Due    RSV Vaccine (Age 60+ and Pregnant patients) (1 - 1-dose 60+ series) Never done    Shingles Vaccine (2 of 2) 10/17/2019    Influenza Vaccine (1) 2023    COVID-19 Vaccine (2023-24 season) 2023    Colorectal Cancer Screening  2023

## 2024-01-31 ENCOUNTER — OUTPATIENT CASE MANAGEMENT (OUTPATIENT)
Dept: ADMINISTRATIVE | Facility: OTHER | Age: 74
End: 2024-01-31

## 2024-01-31 NOTE — PROGRESS NOTES
Outpatient Care Management  Plan of Care Follow Up Visit    Patient: Jose Luis Manzo  MRN: [id number]  Date of Service: 01/31/2024  Completed by: Tia Araya RN  Referral Date: 12/18/2023    Reason for Visit   Patient presents with    OPCM Chart Review    Update Plan Of Care     01/31/24       Brief Summary: OPCM follow up complete. Continued education on CKD.   Next Steps: Follow up patient to inquire if his insurer is new provider [employer]. Research in Epic. If so, continue to encourage he seek CM with [employer]. Assist patient as needed to facilitate transition. Patient agrees to follow up call with in 3 weeks

## 2024-02-01 NOTE — PROGRESS NOTES
I have reviewed the notes, assessments, and/or procedures performed by Dr. Mtz, I concur with her/his documentation of Jose Luis Manzo.  Date of Service: 1/18/2024    S/p renal txp, now eGFR around 16-17. Will refer to renal txp (for re-listing evaluation, and alternative anti-rejection medication evaluation, the pt states the sirolimus is very expensive). Will also refer to CKD education class for dialysis planning.

## 2024-02-05 ENCOUNTER — TELEPHONE (OUTPATIENT)
Dept: NEPHROLOGY | Facility: CLINIC | Age: 74
End: 2024-02-05
Payer: MEDICARE

## 2024-02-05 ENCOUNTER — OFFICE VISIT (OUTPATIENT)
Dept: FAMILY MEDICINE | Facility: CLINIC | Age: 74
End: 2024-02-05
Payer: MEDICARE

## 2024-02-05 VITALS
BODY MASS INDEX: 32.61 KG/M2 | HEART RATE: 66 BPM | HEIGHT: 70 IN | WEIGHT: 227.75 LBS | OXYGEN SATURATION: 96 % | DIASTOLIC BLOOD PRESSURE: 84 MMHG | SYSTOLIC BLOOD PRESSURE: 120 MMHG

## 2024-02-05 DIAGNOSIS — C32.9 LARYNX CANCER: ICD-10-CM

## 2024-02-05 DIAGNOSIS — Z23 NEED FOR INFLUENZA VACCINATION: ICD-10-CM

## 2024-02-05 DIAGNOSIS — E78.2 MIXED HYPERLIPIDEMIA: ICD-10-CM

## 2024-02-05 DIAGNOSIS — I10 ESSENTIAL HYPERTENSION: ICD-10-CM

## 2024-02-05 DIAGNOSIS — R09.82 POST-NASAL DRIP: ICD-10-CM

## 2024-02-05 DIAGNOSIS — K92.1 BLACK STOOL: Primary | ICD-10-CM

## 2024-02-05 DIAGNOSIS — R73.9 HYPERGLYCEMIA: ICD-10-CM

## 2024-02-05 PROCEDURE — 3288F FALL RISK ASSESSMENT DOCD: CPT | Mod: CPTII,S$GLB,, | Performed by: STUDENT IN AN ORGANIZED HEALTH CARE EDUCATION/TRAINING PROGRAM

## 2024-02-05 PROCEDURE — 1160F RVW MEDS BY RX/DR IN RCRD: CPT | Mod: CPTII,S$GLB,, | Performed by: STUDENT IN AN ORGANIZED HEALTH CARE EDUCATION/TRAINING PROGRAM

## 2024-02-05 PROCEDURE — 1126F AMNT PAIN NOTED NONE PRSNT: CPT | Mod: CPTII,S$GLB,, | Performed by: STUDENT IN AN ORGANIZED HEALTH CARE EDUCATION/TRAINING PROGRAM

## 2024-02-05 PROCEDURE — 99214 OFFICE O/P EST MOD 30 MIN: CPT | Mod: 25,S$GLB,, | Performed by: STUDENT IN AN ORGANIZED HEALTH CARE EDUCATION/TRAINING PROGRAM

## 2024-02-05 PROCEDURE — 1159F MED LIST DOCD IN RCRD: CPT | Mod: CPTII,S$GLB,, | Performed by: STUDENT IN AN ORGANIZED HEALTH CARE EDUCATION/TRAINING PROGRAM

## 2024-02-05 PROCEDURE — 3066F NEPHROPATHY DOC TX: CPT | Mod: CPTII,S$GLB,, | Performed by: STUDENT IN AN ORGANIZED HEALTH CARE EDUCATION/TRAINING PROGRAM

## 2024-02-05 PROCEDURE — G0008 ADMIN INFLUENZA VIRUS VAC: HCPCS | Mod: S$GLB,,, | Performed by: STUDENT IN AN ORGANIZED HEALTH CARE EDUCATION/TRAINING PROGRAM

## 2024-02-05 PROCEDURE — 3008F BODY MASS INDEX DOCD: CPT | Mod: CPTII,S$GLB,, | Performed by: STUDENT IN AN ORGANIZED HEALTH CARE EDUCATION/TRAINING PROGRAM

## 2024-02-05 PROCEDURE — 3079F DIAST BP 80-89 MM HG: CPT | Mod: CPTII,S$GLB,, | Performed by: STUDENT IN AN ORGANIZED HEALTH CARE EDUCATION/TRAINING PROGRAM

## 2024-02-05 PROCEDURE — 3074F SYST BP LT 130 MM HG: CPT | Mod: CPTII,S$GLB,, | Performed by: STUDENT IN AN ORGANIZED HEALTH CARE EDUCATION/TRAINING PROGRAM

## 2024-02-05 PROCEDURE — 1101F PT FALLS ASSESS-DOCD LE1/YR: CPT | Mod: CPTII,S$GLB,, | Performed by: STUDENT IN AN ORGANIZED HEALTH CARE EDUCATION/TRAINING PROGRAM

## 2024-02-05 PROCEDURE — 90694 VACC AIIV4 NO PRSRV 0.5ML IM: CPT | Mod: S$GLB,,, | Performed by: STUDENT IN AN ORGANIZED HEALTH CARE EDUCATION/TRAINING PROGRAM

## 2024-02-05 RX ORDER — FLUTICASONE PROPIONATE 50 MCG
1 SPRAY, SUSPENSION (ML) NASAL NIGHTLY
Qty: 10 ML | Refills: 5 | Status: SHIPPED | OUTPATIENT
Start: 2024-02-05

## 2024-02-06 NOTE — PROGRESS NOTES
Patient ID: Jose Luis Manzo is a 73 y.o. male.     Chief Complaint: black stool    Rectal Bleeding  This is a new problem. The current episode started 1 to 4 weeks ago. The problem occurs daily. The problem has been unchanged. Pertinent negatives include no abdominal pain, change in bowel habit, chest pain, chills, coughing, fatigue, fever, headaches, myalgias, nausea, rash, vomiting or weakness. Nothing aggravates the symptoms.   Patient reports that he has been having black stool for 1.5 weeks. He was previously on iron pills and did not have black stools. He ran out of the medication and then started taking iron again 1.5 weeks ago. This was around the same time that his stools turned black. He denies weight loss. He denies abdominal pains.     He would also like assistance with scheduling a follow up with his ENT for surveillance of previous larynx cancer        Review of Systems  Review of Systems   Constitutional:  Negative for chills, fatigue and fever.   HENT:  Negative for ear pain and sinus pain.    Eyes:  Negative for discharge.   Respiratory:  Negative for cough and shortness of breath.    Cardiovascular:  Negative for chest pain and leg swelling.   Gastrointestinal:  Positive for hematochezia. Negative for abdominal pain, change in bowel habit, diarrhea, nausea and vomiting.   Genitourinary:  Negative for urgency.   Musculoskeletal:  Negative for myalgias.   Skin:  Negative for rash.   Neurological:  Negative for weakness and headaches.   Psychiatric/Behavioral:  Negative for depression.    All other systems reviewed and are negative.      Currently Medications  Current Outpatient Medications on File Prior to Visit   Medication Sig Dispense Refill    amoxicillin-clavulanate 875-125mg (AUGMENTIN) 875-125 mg per tablet Take 1 tablet by mouth 2 (two) times daily. 14 tablet 0    atorvastatin (LIPITOR) 20 MG tablet TAKE 1 TABLET EVERY EVENING 90 tablet 3    cloNIDine 0.3 mg/24 hr td ptwk (CATAPRES) 0.3  "mg/24 hr PLACE 1 PATCH ONTO THE SKIN ONCE A WEEK. 12 patch 10    ELIQUIS 2.5 mg Tab TAKE 1 TABLET TWICE DAILY 180 tablet 3    ergocalciferol (ERGOCALCIFEROL) 50,000 unit Cap Take one tablet once a week      ergocalciferol (ERGOCALCIFEROL) 50,000 unit Cap TAKE 1 CAPSULE BY MOUTH EVERY 7 DAYS. 12 capsule 3    erythromycin (ROMYCIN) ophthalmic ointment Place a 1/2 inch ribbon of ointment into the left lower eyelid twice a day for 7 days 15 g 0    ferrous sulfate 325 mg (65 mg iron) Tab tablet Take 325 mg by mouth once daily.      finasteride (PROSCAR) 5 mg tablet TAKE 1 TABLET EVERY DAY 90 tablet 3    furosemide (LASIX) 40 MG tablet TAKE 1 TABLET EVERY DAY 90 tablet 3    metoprolol succinate (TOPROL-XL) 50 MG 24 hr tablet TAKE 1 TABLET EVERY DAY 90 tablet 3    nitroGLYCERIN (NITROSTAT) 0.4 MG SL tablet Place 1 tablet (0.4 mg total) under the tongue every 5 (five) minutes as needed for Chest pain. 30 tablet 2    predniSONE (DELTASONE) 5 MG tablet TAKE 1 TABLET EVERY DAY 90 tablet 3    sirolimus (RAPAMUNE) 1 MG Tab Take 3 tablets (3 mg total) by mouth once daily. 270 tablet 3    sodium bicarbonate 650 MG tablet TAKE 1 TABLET TWICE DAILY 180 tablet 3    lisinopriL 10 MG tablet Take 1 tablet (10 mg total) by mouth once daily. 90 tablet 3    [DISCONTINUED] sildenafiL (VIAGRA) 50 MG tablet Take 1 tablet (50 mg total) by mouth daily as needed for Erectile Dysfunction. 9 tablet 4     No current facility-administered medications on file prior to visit.       Physical  Exam  Vitals:    02/05/24 1111   BP: 120/84   BP Location: Right arm   Patient Position: Sitting   Pulse: 66   SpO2: 96%   Weight: 103.3 kg (227 lb 11.8 oz)   Height: 5' 10" (1.778 m)      Body mass index is 32.68 kg/m².  Wt Readings from Last 3 Encounters:   02/05/24 103.3 kg (227 lb 11.8 oz)   01/18/24 111 kg (244 lb 11.4 oz)   01/17/24 106.6 kg (235 lb)       Physical Exam    Labs:    Complete Blood Count  Lab Results   Component Value Date    RBC 4.96 " "01/18/2024    HGB 12.4 (L) 01/18/2024    HCT 42.1 01/18/2024    MCV 85 01/18/2024    MCH 25.0 (L) 01/18/2024    MCHC 29.5 (L) 01/18/2024    RDW 16.9 (H) 01/18/2024     01/18/2024    MPV 10.9 01/18/2024    GRAN 3.9 01/18/2024    GRAN 61.0 01/18/2024    LYMPH 1.6 01/18/2024    LYMPH 24.5 01/18/2024    MONO 0.7 01/18/2024    MONO 10.8 01/18/2024    EOS 0.1 01/18/2024    BASO 0.03 01/18/2024    EOSINOPHIL 2.1 01/18/2024    BASOPHIL 0.5 01/18/2024    DIFFMETHOD Automated 01/18/2024       Comprehensive Metabolic Panel  Lab Results   Component Value Date     01/18/2024    BUN 23 01/18/2024    CREATININE 3.4 (H) 01/18/2024     01/18/2024    K 4.0 01/18/2024     (H) 01/18/2024    ALBUMIN 3.3 (L) 01/18/2024    CO2 24 01/18/2024    ANIONGAP 4 (L) 01/18/2024       TSH  No results found for: "TSH"    Imaging:  CT Chest Lung Screening Low Dose  Narrative: EXAMINATION:  CT CHEST LUNG SCREENING LOW DOSE    CLINICAL HISTORY:  Lung cancer screening, >= 20 pk-yr smoking history, risk factor(s) (Age >= 50y); Other nonspecific abnormal finding of lung field    TECHNIQUE:  CT of the thorax was performed with low dose, lung screening protocol.  No contrast was administered.  Sagittal and coronal reconstructions were obtained.    COMPARISON:  04/27/2022    FINDINGS:  The size of heart is within normal limits.  There is a tiny pericardial effusion.  There is a mild amount of atherosclerosis in the left anterior descending artery.  There is no evidence of an acute pulmonary process.  There is a mild amount of scarring in both lungs.  There is a 7 mm noncalcified pulmonary nodule in the posterior aspect of the right lower lobe.  On the prior examination it measured 8 mm.  There is no pneumothorax or pleural effusion.  There are hypodense masses scattered throughout the liver.  One of the larger ones measures 32 mm and is located in the superior aspect of the left lobe of the liver.  It has a Hounsfield measurement of " -3.  There is an 18 mm exophytic hypodense mass off of the anterior aspect of the midpole of the left kidney.  This mass has a Hounsfield measurement of -3.  Impression: Lung-RADS Category:  2 - Benign Appearance or Behavior - continue annual screening with LDCT in 12 months.    Clinically or potentially clinically significant non lung cancer finding:  None.    Prior Lung Cancer Modifier:  No history of prior lung cancer.    1. There is no evidence of an acute pulmonary process. There is a mild amount of scarring in both lungs. There is a 7 mm noncalcified pulmonary nodule in the posterior aspect of the right lower lobe.  On the prior examination it measured 8 mm.  2. There is a tiny pericardial effusion.  3. There is a mild amount of atherosclerosis in the left anterior descending artery.  4. There are hypodense masses scattered throughout the liver.  One of the larger ones measures 32 mm and is located in the superior aspect of the left lobe of the liver.  It has a Hounsfield measurement of -3.  This is characteristic of a cyst.  5. There is an 18 mm exophytic hypodense mass off of the anterior aspect of the midpole of the left kidney. This mass has a Hounsfield measurement of -3.  This is characteristic of a cyst.  All CT scans at this facility use dose modulation, iterative reconstruction, and/or weight base dosing when appropriate to reduce radiation dose when appropriate to reduce radiation dose to as low as reasonably achievable.    Electronically signed by: Ru Garcia MD  Date:    05/23/2023  Time:    09:27      Assessment/Plan:    1. Black stool  -     Occult blood x 1, stool; Future; Expected date: 02/05/2024    2. Larynx cancer  -     Ambulatory referral/consult to ENT; Future; Expected date: 02/12/2024    3. Post-nasal drip  -     fluticasone propionate (FLONASE) 50 mcg/actuation nasal spray; 1 spray (50 mcg total) by Each Nostril route nightly.  Dispense: 10 mL; Refill: 5    4. Hyperglycemia  -      HEMOGLOBIN A1C; Future; Expected date: 02/05/2024    5. Mixed hyperlipidemia  -     LIPID PANEL; Future; Expected date: 02/05/2024    6. Essential hypertension  -     TSH; Future; Expected date: 02/05/2024  -     CBC Auto Differential; Future  -     Comprehensive metabolic panel; Future; Expected date: 02/05/2024    7. Need for influenza vaccination  -     Influenza (FLUAD) - Quadrivalent (Adjuvanted) *Preferred* (65+) (PF)         Discussed how to stay healthy including: diet, exercise, refraining from smoking and discussed screening exams / tests needed for age, sex and family Hx.    RTC 6 mo    Home Alexis MD

## 2024-02-07 ENCOUNTER — TELEPHONE (OUTPATIENT)
Dept: TRANSPLANT | Facility: CLINIC | Age: 74
End: 2024-02-07
Payer: MEDICARE

## 2024-02-07 ENCOUNTER — LAB VISIT (OUTPATIENT)
Dept: LAB | Facility: HOSPITAL | Age: 74
End: 2024-02-07
Attending: STUDENT IN AN ORGANIZED HEALTH CARE EDUCATION/TRAINING PROGRAM
Payer: MEDICARE

## 2024-02-07 DIAGNOSIS — R73.9 HYPERGLYCEMIA: ICD-10-CM

## 2024-02-07 DIAGNOSIS — E78.2 MIXED HYPERLIPIDEMIA: ICD-10-CM

## 2024-02-07 DIAGNOSIS — I10 ESSENTIAL HYPERTENSION: ICD-10-CM

## 2024-02-07 LAB
ALBUMIN SERPL BCP-MCNC: 4 G/DL (ref 3.5–5.2)
ALP SERPL-CCNC: 92 U/L (ref 38–126)
ALT SERPL W/O P-5'-P-CCNC: 21 U/L (ref 10–44)
ANION GAP SERPL CALC-SCNC: 10 MMOL/L (ref 8–16)
AST SERPL-CCNC: 25 U/L (ref 15–46)
BASOPHILS # BLD AUTO: 0.03 K/UL (ref 0–0.2)
BASOPHILS NFR BLD: 0.7 % (ref 0–1.9)
BILIRUB SERPL-MCNC: 0.4 MG/DL (ref 0.1–1)
CALCIUM SERPL-MCNC: 10.3 MG/DL (ref 8.7–10.5)
CHLORIDE SERPL-SCNC: 111 MMOL/L (ref 95–110)
CHOLEST SERPL-MCNC: 158 MG/DL (ref 120–199)
CHOLEST/HDLC SERPL: 4.4 {RATIO} (ref 2–5)
CO2 SERPL-SCNC: 21 MMOL/L (ref 23–29)
CREAT SERPL-MCNC: 3.67 MG/DL (ref 0.5–1.4)
DIFFERENTIAL METHOD BLD: ABNORMAL
EOSINOPHIL # BLD AUTO: 0 K/UL (ref 0–0.5)
EOSINOPHIL NFR BLD: 0.7 % (ref 0–8)
ERYTHROCYTE [DISTWIDTH] IN BLOOD BY AUTOMATED COUNT: 15.3 % (ref 11.5–14.5)
EST. GFR  (NO RACE VARIABLE): 16.7 ML/MIN/1.73 M^2
ESTIMATED AVG GLUCOSE: 143 MG/DL (ref 68–131)
GLUCOSE SERPL-MCNC: 113 MG/DL (ref 70–110)
HBA1C MFR BLD: 6.6 % (ref 4–5.6)
HCT VFR BLD AUTO: 39.6 % (ref 40–54)
HDLC SERPL-MCNC: 36 MG/DL (ref 40–75)
HDLC SERPL: 22.8 % (ref 20–50)
HGB BLD-MCNC: 12.1 G/DL (ref 14–18)
IMM GRANULOCYTES # BLD AUTO: 0.03 K/UL (ref 0–0.04)
IMM GRANULOCYTES NFR BLD AUTO: 0.7 % (ref 0–0.5)
LDLC SERPL CALC-MCNC: 101.4 MG/DL (ref 63–159)
LYMPHOCYTES # BLD AUTO: 1.2 K/UL (ref 1–4.8)
LYMPHOCYTES NFR BLD: 27.4 % (ref 18–48)
MCH RBC QN AUTO: 25.3 PG (ref 27–31)
MCHC RBC AUTO-ENTMCNC: 30.6 G/DL (ref 32–36)
MCV RBC AUTO: 83 FL (ref 82–98)
MONOCYTES # BLD AUTO: 0.4 K/UL (ref 0.3–1)
MONOCYTES NFR BLD: 9.7 % (ref 4–15)
NEUTROPHILS # BLD AUTO: 2.7 K/UL (ref 1.8–7.7)
NEUTROPHILS NFR BLD: 60.8 % (ref 38–73)
NONHDLC SERPL-MCNC: 122 MG/DL
NRBC BLD-RTO: 0 /100 WBC
PLATELET # BLD AUTO: 192 K/UL (ref 150–450)
PMV BLD AUTO: 10.2 FL (ref 9.2–12.9)
POTASSIUM SERPL-SCNC: 4.1 MMOL/L (ref 3.5–5.1)
PROT SERPL-MCNC: 8.2 G/DL (ref 6–8.4)
RBC # BLD AUTO: 4.79 M/UL (ref 4.6–6.2)
SODIUM SERPL-SCNC: 142 MMOL/L (ref 136–145)
TRIGL SERPL-MCNC: 103 MG/DL (ref 30–150)
TSH SERPL DL<=0.005 MIU/L-ACNC: 3.31 UIU/ML (ref 0.4–4)
UUN UR-MCNC: 36 MG/DL (ref 2–20)
WBC # BLD AUTO: 4.35 K/UL (ref 3.9–12.7)

## 2024-02-07 PROCEDURE — 80053 COMPREHEN METABOLIC PANEL: CPT | Mod: PN | Performed by: STUDENT IN AN ORGANIZED HEALTH CARE EDUCATION/TRAINING PROGRAM

## 2024-02-07 PROCEDURE — 84443 ASSAY THYROID STIM HORMONE: CPT | Mod: PN | Performed by: STUDENT IN AN ORGANIZED HEALTH CARE EDUCATION/TRAINING PROGRAM

## 2024-02-07 PROCEDURE — 80061 LIPID PANEL: CPT | Performed by: STUDENT IN AN ORGANIZED HEALTH CARE EDUCATION/TRAINING PROGRAM

## 2024-02-07 PROCEDURE — 85025 COMPLETE CBC W/AUTO DIFF WBC: CPT | Mod: PN | Performed by: STUDENT IN AN ORGANIZED HEALTH CARE EDUCATION/TRAINING PROGRAM

## 2024-02-07 PROCEDURE — 36415 COLL VENOUS BLD VENIPUNCTURE: CPT | Mod: PN | Performed by: STUDENT IN AN ORGANIZED HEALTH CARE EDUCATION/TRAINING PROGRAM

## 2024-02-07 PROCEDURE — 83036 HEMOGLOBIN GLYCOSYLATED A1C: CPT | Performed by: STUDENT IN AN ORGANIZED HEALTH CARE EDUCATION/TRAINING PROGRAM

## 2024-02-08 ENCOUNTER — TELEPHONE (OUTPATIENT)
Dept: FAMILY MEDICINE | Facility: CLINIC | Age: 74
End: 2024-02-08
Payer: MEDICARE

## 2024-02-08 NOTE — TELEPHONE ENCOUNTER
----- Message from Ama Carmona sent at 2/8/2024  1:01 PM CST -----  .Type:  Needs Medical Advice    Who Called: pt    Would the patient rather a call back or a response via [facility]? Call back  Best Call Back Number: [phone]  Additional Information:     Pt stated he would like a call back concerning his stool sample

## 2024-02-26 ENCOUNTER — OUTPATIENT CASE MANAGEMENT (OUTPATIENT)
Dept: ADMINISTRATIVE | Facility: OTHER | Age: 74
End: 2024-02-26
Payer: MEDICARE

## 2024-02-26 NOTE — PROGRESS NOTES
02/26/24- Patient in agreement with episode closure. Advised contacting new Medicare insurer for continued cm services if patient wishes.

## 2024-03-12 ENCOUNTER — TELEPHONE (OUTPATIENT)
Dept: NEPHROLOGY | Facility: CLINIC | Age: 74
End: 2024-03-12
Payer: MEDICARE

## 2024-03-12 DIAGNOSIS — Z94.0 IMMUNOSUPPRESSIVE MANAGEMENT ENCOUNTER FOLLOWING KIDNEY TRANSPLANT: Primary | ICD-10-CM

## 2024-03-12 DIAGNOSIS — Z79.899 IMMUNOSUPPRESSIVE MANAGEMENT ENCOUNTER FOLLOWING KIDNEY TRANSPLANT: Primary | ICD-10-CM

## 2024-03-12 RX ORDER — SIROLIMUS 1 MG/1
3 TABLET, FILM COATED ORAL DAILY
Qty: 90 TABLET | Refills: 11
Start: 2024-03-12 | End: 2024-04-11 | Stop reason: SDUPTHER

## 2024-03-12 NOTE — TELEPHONE ENCOUNTER
Patient states that he is unable to afford the 90 day supply of Sirolimus 3 mg, and is requesting a new prescription for a 30 day supply instead. A 30 day supply was sent into his pharmacy.     Immunosuppressive management encounter following kidney transplant  -     sirolimus (RAPAMUNE) 1 MG Tab; Take 3 tablets (3 mg total) by mouth once daily.  Dispense: 90 tablet; Refill: 11

## 2024-03-21 ENCOUNTER — OFFICE VISIT (OUTPATIENT)
Dept: NEPHROLOGY | Facility: CLINIC | Age: 74
End: 2024-03-21
Payer: MEDICARE

## 2024-03-21 VITALS
HEART RATE: 86 BPM | WEIGHT: 227.06 LBS | OXYGEN SATURATION: 97 % | SYSTOLIC BLOOD PRESSURE: 184 MMHG | DIASTOLIC BLOOD PRESSURE: 99 MMHG | BODY MASS INDEX: 32.58 KG/M2

## 2024-03-21 DIAGNOSIS — N40.1 BENIGN PROSTATIC HYPERPLASIA WITH NOCTURIA: ICD-10-CM

## 2024-03-21 DIAGNOSIS — N18.4 STAGE 4 CHRONIC KIDNEY DISEASE: Primary | ICD-10-CM

## 2024-03-21 DIAGNOSIS — E87.20 METABOLIC ACIDOSIS: ICD-10-CM

## 2024-03-21 DIAGNOSIS — I10 ESSENTIAL HYPERTENSION: ICD-10-CM

## 2024-03-21 DIAGNOSIS — R80.1 PERSISTENT PROTEINURIA: ICD-10-CM

## 2024-03-21 DIAGNOSIS — Z79.899 IMMUNOSUPPRESSIVE MANAGEMENT ENCOUNTER FOLLOWING KIDNEY TRANSPLANT: ICD-10-CM

## 2024-03-21 DIAGNOSIS — E55.9 VITAMIN D DEFICIENCY DISEASE: ICD-10-CM

## 2024-03-21 DIAGNOSIS — R35.1 BENIGN PROSTATIC HYPERPLASIA WITH NOCTURIA: ICD-10-CM

## 2024-03-21 DIAGNOSIS — Z94.0 IMMUNOSUPPRESSIVE MANAGEMENT ENCOUNTER FOLLOWING KIDNEY TRANSPLANT: ICD-10-CM

## 2024-03-21 DIAGNOSIS — N25.81 SECONDARY HYPERPARATHYROIDISM OF RENAL ORIGIN: ICD-10-CM

## 2024-03-21 DIAGNOSIS — Z94.0 TRANSPLANTED KIDNEY: ICD-10-CM

## 2024-03-21 PROCEDURE — 3288F FALL RISK ASSESSMENT DOCD: CPT | Mod: CPTII,GC,S$GLB, | Performed by: STUDENT IN AN ORGANIZED HEALTH CARE EDUCATION/TRAINING PROGRAM

## 2024-03-21 PROCEDURE — 1101F PT FALLS ASSESS-DOCD LE1/YR: CPT | Mod: CPTII,GC,S$GLB, | Performed by: STUDENT IN AN ORGANIZED HEALTH CARE EDUCATION/TRAINING PROGRAM

## 2024-03-21 PROCEDURE — 3008F BODY MASS INDEX DOCD: CPT | Mod: CPTII,GC,S$GLB, | Performed by: STUDENT IN AN ORGANIZED HEALTH CARE EDUCATION/TRAINING PROGRAM

## 2024-03-21 PROCEDURE — 3080F DIAST BP >= 90 MM HG: CPT | Mod: CPTII,GC,S$GLB, | Performed by: STUDENT IN AN ORGANIZED HEALTH CARE EDUCATION/TRAINING PROGRAM

## 2024-03-21 PROCEDURE — 4010F ACE/ARB THERAPY RXD/TAKEN: CPT | Mod: CPTII,GC,S$GLB, | Performed by: STUDENT IN AN ORGANIZED HEALTH CARE EDUCATION/TRAINING PROGRAM

## 2024-03-21 PROCEDURE — 3066F NEPHROPATHY DOC TX: CPT | Mod: CPTII,GC,S$GLB, | Performed by: STUDENT IN AN ORGANIZED HEALTH CARE EDUCATION/TRAINING PROGRAM

## 2024-03-21 PROCEDURE — 99214 OFFICE O/P EST MOD 30 MIN: CPT | Mod: GC,S$GLB,, | Performed by: STUDENT IN AN ORGANIZED HEALTH CARE EDUCATION/TRAINING PROGRAM

## 2024-03-21 PROCEDURE — 3077F SYST BP >= 140 MM HG: CPT | Mod: CPTII,GC,S$GLB, | Performed by: STUDENT IN AN ORGANIZED HEALTH CARE EDUCATION/TRAINING PROGRAM

## 2024-03-21 PROCEDURE — 99999 PR PBB SHADOW E&M-EST. PATIENT-LVL III: CPT | Mod: PBBFAC,GC,, | Performed by: STUDENT IN AN ORGANIZED HEALTH CARE EDUCATION/TRAINING PROGRAM

## 2024-03-21 PROCEDURE — 1126F AMNT PAIN NOTED NONE PRSNT: CPT | Mod: CPTII,GC,S$GLB, | Performed by: STUDENT IN AN ORGANIZED HEALTH CARE EDUCATION/TRAINING PROGRAM

## 2024-03-21 PROCEDURE — 3044F HG A1C LEVEL LT 7.0%: CPT | Mod: CPTII,GC,S$GLB, | Performed by: STUDENT IN AN ORGANIZED HEALTH CARE EDUCATION/TRAINING PROGRAM

## 2024-03-21 RX ORDER — LOSARTAN POTASSIUM 25 MG/1
25 TABLET ORAL DAILY
Qty: 90 TABLET | Refills: 3 | Status: SHIPPED | OUTPATIENT
Start: 2024-03-21 | End: 2024-04-11 | Stop reason: SDUPTHER

## 2024-03-21 NOTE — PROGRESS NOTES
Nephrology Clinic Note   12/15/2022    No chief complaint on file.     History of present illness:  Patient is a 74 y.o. 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 Pt presents today for f/u on CKD 4 fllow up.    Previously he was receiving his Sirolimus via the [employer] drug program which was providing this medication at no cost to him. In Jan. 2023 he was no longer able to receive this medication and now it is costing him $300 for a 90 day supply.      Clinic visit 2/29/24; During his last office visit, we re-discussed his desires in regards to resuming dialysis if he requires it. At that time, he continues uncertain what his wishes would be, he was referred for CKD education and dialysis education classes last visit since he admitted that he was unfamiliar with PD modality, however he had to cancel this class and is not interested in attending at this time. Pt denies: Fever, chills, shortness of breath, chest pain, palpitations, nausea, vomiting, diarrhea, abd pain, hematuria, dysuria, urinary frequency or urgency, headaches, visual disturbances or weakness. He reports continued barriers to cost with his Sirolimus, and while he was previously able to afford the medication through [employer], they have increased the cost and it is now costing him $150 for a 30 day supply. I will refer him to our patient assistance team to see if he qualifies for any cost assistance programs.          HTN, hyperlipidemia:   Pt monitors blood pressure at home and his SBPs are usually in 140s-150's  Meds: Clonidine patch weekly, atorvastatin, furosemide 40mg daily, metoprolol 50mg daily,   --He is currently not taking his  Lisinopril.        CKD IV  2/2 hypertensive nephrosclerosis & chronic CNI exposure   Baseline sCr 3.3-3.6 (in 2022)   Lab Results   Component Value Date    CREATININE 3.67 (H) 02/07/2024 2021 pt had COVID related CHANG  CNI toxicity in the past   On Dr. 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 contributing to pt's CKD     CKD education class:   [x] Referred  [] Attended    CKD nutrition education  [x] Referred  [] Attended        Anemia  Iron   Date Value Ref Range Status   12/15/2022 60 45 - 160 ug/dL Final   04/13/2022 55 45 - 160 ug/dL Final     TIBC   Date Value Ref Range Status   12/15/2022 195 (L) 250 - 450 ug/dL Final   04/13/2022 212 (L) 250 - 450 ug/dL Final     Ferritin   Date Value Ref Range Status   12/15/2022 753 (H) 20.0 - 300.0 ng/mL Final   04/13/2022 568 (H) 20.0 - 300.0 ng/mL Final     Retic   Date Value Ref Range Status   12/15/2022 1.8 0.4 - 2.0 % Final     Hemoglobin   Date Value Ref Range Status   02/07/2024 12.1 (L) 14.0 - 18.0 g/dL Final   01/18/2024 12.4 (L) 14.0 - 18.0 g/dL Final       MBD: patient's insurance does not cover sensipar, he has had persistent hypercalcemia in the past.     Lab Results   Component Value Date    .5 (H) 01/18/2024    CALCIUM 10.3 02/07/2024    PHOS 2.3 (L) 01/18/2024   Meds: [place] 650mg bid, vitamin D 50K weekly, ferrous sulfate 325mg daily      Kidney Transplant:   Done in [place]   CNI toxicity in the past   Meds: sirolimus 3mg daily, prednisone 5mg daily,   Does not follow with kidney transplant team       Oropharyngeal CA-follows with ENT, not on any current medications/treatments       Review of Systems   Constitutional:  Negative for chills, diaphoresis, fever and weight loss.   Eyes:  Negative for blurred vision, double vision and pain.   Respiratory:  Negative for cough, hemoptysis, shortness of breath and wheezing.    Cardiovascular:  Negative for chest pain, orthopnea, claudication and leg swelling.  "  Gastrointestinal:  Negative for blood in stool, diarrhea, melena, nausea and vomiting.   Genitourinary:  Negative for dysuria, flank pain, frequency, hematuria and urgency.        Positive for nocturia   Musculoskeletal:  Negative for myalgias.   Skin:  Negative for rash.   Neurological:  Negative for dizziness, tremors, loss of consciousness and headaches.       History:  Past Medical History:   Diagnosis Date    Acute hypoxemic respiratory failure due to COVID-19 12/30/2020    Anticoagulant long-term use     BPH (benign prostatic hypertrophy)     Cancer     vocal cords    Claudication of right lower extremity 3/10/2016    COVID-19 virus detected 12/30/2020    Dependence on renal dialysis 4/18/2023    Disorder of kidney and ureter     Hyperkalemia 1/1/2021    Hyperlipidemia     Hypertension     Hypokalemia 6/23/2021    Immunosuppression 6/26/2021    Immunosuppression due to chronic steroid use 1/30/2020    Microcytic anemia 9/16/2016    Obesity (BMI 30-39.9) 1/23/2019    Oropharyngeal cancer     Pterygium     Squamous cell carcinoma 4/25/2018    Thromboembolic disorder     Unspecified disorder of kidney and ureter       Past Surgical History:   Procedure Laterality Date    CYSTOSCOPY W/ RETROGRADES Bilateral 6/15/2022    Procedure: Cystoscopy, bilateral retrograde pyelogram, and all indicated procedures;  Surgeon: Veronica Bacon MD;  Location: [facility] OR;  Service: Urology;  Laterality: Bilateral;    FRACTURE SURGERY Left     "lower leg" 40 years ago    KIDNEY TRANSPLANT  2007    followed by [facility] Transplant    LARYNX SURGERY  11/2014    Oropharyngeal Cancer x3    microlaryngoscopy      PHLEBOGRAPHY Bilateral 8/2/2022    Procedure: Venogram;  Surgeon: KENIA Mueller II, MD;  Location: [facility] CATH LAB;  Service: Vascular;  Laterality: Bilateral;    SURGICAL REMOVAL OF LESION OF ORBIT Bilateral 8/26/2020    Procedure: EXCISION, LESION, ORBIT;  Surgeon: Linda Tee MD;  Location: [facility] OR [address] [place] [address];  Service: " Ophthalmology;  Laterality: Bilateral;    TIBIAL STAPLING Left 1980             Current Outpatient Medications:     amoxicillin-clavulanate 875-125mg (AUGMENTIN) 875-125 mg per tablet, Take 1 tablet by mouth 2 (two) times daily., Disp: 14 tablet, Rfl: 0    atorvastatin (LIPITOR) 20 MG tablet, TAKE 1 TABLET EVERY EVENING, Disp: 90 tablet, Rfl: 3    cloNIDine 0.3 mg/24 hr td ptwk (CATAPRES) 0.3 mg/24 hr, PLACE 1 PATCH ONTO THE SKIN ONCE A WEEK., Disp: 12 patch, Rfl: 10    ELIQUIS 2.5 mg Tab, TAKE 1 TABLET TWICE DAILY, Disp: 180 tablet, Rfl: 3    ergocalciferol (ERGOCALCIFEROL) 50,000 unit Cap, Take one tablet once a week, Disp: , Rfl:     ergocalciferol (ERGOCALCIFEROL) 50,000 unit Cap, TAKE 1 CAPSULE BY MOUTH EVERY 7 DAYS., Disp: 12 capsule, Rfl: 3    erythromycin (ROMYCIN) ophthalmic ointment, Place a 1/2 inch ribbon of ointment into the left lower eyelid twice a day for 7 days, Disp: 15 g, Rfl: 0    ferrous sulfate 325 mg (65 mg iron) Tab tablet, Take 325 mg by mouth once daily., Disp: , Rfl:     finasteride (PROSCAR) 5 mg tablet, TAKE 1 TABLET EVERY DAY, Disp: 90 tablet, Rfl: 3    fluticasone propionate (FLONASE) 50 mcg/actuation nasal spray, 1 spray (50 mcg total) by Each Nostril route nightly., Disp: 10 mL, Rfl: 5    furosemide (LASIX) 40 MG tablet, TAKE 1 TABLET EVERY DAY, Disp: 90 tablet, Rfl: 3    metoprolol succinate (TOPROL-XL) 50 MG 24 hr tablet, TAKE 1 TABLET EVERY DAY, Disp: 90 tablet, Rfl: 3    nitroGLYCERIN (NITROSTAT) 0.4 MG SL tablet, Place 1 tablet (0.4 mg total) under the tongue every 5 (five) minutes as needed for Chest pain., Disp: 30 tablet, Rfl: 2    predniSONE (DELTASONE) 5 MG tablet, TAKE 1 TABLET EVERY DAY, Disp: 90 tablet, Rfl: 3    sirolimus (RAPAMUNE) 1 MG Tab, Take 3 tablets (3 mg total) by mouth once daily., Disp: 90 tablet, Rfl: 11    sodium bicarbonate 650 MG tablet, TAKE 1 TABLET TWICE DAILY, Disp: 180 tablet, Rfl: 3    losartan (COZAAR) 25 MG tablet, Take 1 tablet (25 mg total) by  mouth once daily., Disp: 90 tablet, Rfl: 3  Review of patient's allergies indicates:  No Known Allergies   Social History     Tobacco Use    Smoking status: Former     Current packs/day: 0.00     Average packs/day: 1 pack/day for 20.0 years (20.0 ttl pk-yrs)     Types: Cigarettes     Start date: 1989     Quit date: 2009     Years since quitting: 15.2    Smokeless tobacco: Never   Substance Use Topics    Alcohol use: No     Alcohol/week: 0.0 standard drinks of alcohol      Family History   Problem Relation Age of Onset    Stroke Mother     Diabetes Mother     Hypertension Mother     Stroke Father     No Known Problems Sister     No Known Problems Brother     No Known Problems Daughter     No Known Problems Brother         Physical Exam  Vitals and nursing note reviewed.   Constitutional:       General: He is not in acute distress.     Appearance: Normal appearance. He is obese. He is not ill-appearing, toxic-appearing or diaphoretic.   HENT:      Mouth/Throat:      Mouth: Mucous membranes are moist.   Cardiovascular:      Rate and Rhythm: Normal rate and regular rhythm.      Pulses: Normal pulses.      Heart sounds: Murmur heard.   Pulmonary:      Effort: Pulmonary effort is normal. No respiratory distress.      Breath sounds: Normal breath sounds. No stridor. No wheezing, rhonchi or rales.   Musculoskeletal:         General: No swelling, tenderness, deformity or signs of injury.      Left lower leg: No edema.   Skin:     General: Skin is warm.      Capillary Refill: Capillary refill takes less than 2 seconds.      Coloration: Skin is not jaundiced or pale.      Findings: No bruising, erythema, lesion or rash.   Neurological:      Mental Status: He is alert and oriented to person, place, and time.   Psychiatric:         Mood and Affect: Mood normal.         Behavior: Behavior normal.         Thought Content: Thought content normal.         Judgment: Judgment normal.         Labs reviewed   Images  Reviewed    ASSESSMENT & PLAN:   Mr. Jose Luis Manzo is a 74 y.o. male with past medical history of renal failure 2/2 hypertensive nephrosclerosis previously on HD who is now s/p kidney transplant in 2007, he has developed post-transplant CKD IV likely 2/2 HTN and chronic CNI use. Currently his renal function is stable with a eGFR around 16-18, and he is without uremic symptoms. He was offered and referred to CKD education classes as well as dialysis education classes which he declined at this time. He was referred to renal transplant medicine for evaluation of potentially receiving another transplant in Jan. 2024, and is awaiting to schedule the appointment.     Due to his elevated blood pressures at home (140-150s systolic) I will add on Losartan 25 mg daily. I have asked him to check his blood pressure twice a day for the first week after starting Losartan and to alert me if his blood pressure drops below 110/70 or if he develops any lightheadedness or dizziness. I have asked him to recheck a renal function panel in one week after starting Lisinopril.     I have also asked him to think about what his wishes would be in regards to dialysis if his kidneys should fail. I reiterated that he is currently CKD 4 and approaching CKD 5 and may be nearing dialysis in the near future. I stressed the importance that if he decides to initiate HD again, then we will need to refer him to vascular surgery for AVF placement and evaluation. He states understanding of all of this and will make a decision within the next month.    Stage 4 chronic kidney disease  -     Renal Function Panel; Future; Expected date: 03/28/2024  -     Renal Function Panel; Standing  -     CBC Auto Differential; Standing    Essential hypertension  -     losartan (COZAAR) 25 MG tablet; Take 1 tablet (25 mg total) by mouth once daily.  Dispense: 90 tablet; Refill: 3  -     Renal Function Panel; Future; Expected date: 03/28/2024    Immunosuppressive  management encounter following kidney transplant  -     SIROLIMUS LEVEL; Future; Expected date: 03/21/2024    Transplanted kidney - 2007    Metabolic acidosis    Vitamin D deficiency disease    Benign prostatic hyperplasia with nocturia    Persistent proteinuria    Secondary hyperparathyroidism of renal origin           RTC in 1 month    Discussed with Dr. Cristobal  - staff attestation to follow      Jonas Mtz MD  Nephrology PGY-4    [address] [place] [address]  [phone]

## 2024-03-25 ENCOUNTER — OFFICE VISIT (OUTPATIENT)
Dept: OTOLARYNGOLOGY | Facility: CLINIC | Age: 74
End: 2024-03-25
Payer: MEDICARE

## 2024-03-25 VITALS
BODY MASS INDEX: 34.32 KG/M2 | DIASTOLIC BLOOD PRESSURE: 87 MMHG | WEIGHT: 239.19 LBS | HEART RATE: 49 BPM | SYSTOLIC BLOOD PRESSURE: 148 MMHG

## 2024-03-25 DIAGNOSIS — C32.9 LARYNX CANCER: ICD-10-CM

## 2024-03-25 DIAGNOSIS — L60.8 DEFORMITY OF TOENAIL: Primary | ICD-10-CM

## 2024-03-25 PROCEDURE — 99213 OFFICE O/P EST LOW 20 MIN: CPT | Mod: 25,S$GLB,, | Performed by: OTOLARYNGOLOGY

## 2024-03-25 PROCEDURE — 3044F HG A1C LEVEL LT 7.0%: CPT | Mod: CPTII,S$GLB,, | Performed by: OTOLARYNGOLOGY

## 2024-03-25 PROCEDURE — 3077F SYST BP >= 140 MM HG: CPT | Mod: CPTII,S$GLB,, | Performed by: OTOLARYNGOLOGY

## 2024-03-25 PROCEDURE — 3079F DIAST BP 80-89 MM HG: CPT | Mod: CPTII,S$GLB,, | Performed by: OTOLARYNGOLOGY

## 2024-03-25 PROCEDURE — 3066F NEPHROPATHY DOC TX: CPT | Mod: CPTII,S$GLB,, | Performed by: OTOLARYNGOLOGY

## 2024-03-25 PROCEDURE — 4010F ACE/ARB THERAPY RXD/TAKEN: CPT | Mod: CPTII,S$GLB,, | Performed by: OTOLARYNGOLOGY

## 2024-03-25 PROCEDURE — 1126F AMNT PAIN NOTED NONE PRSNT: CPT | Mod: CPTII,S$GLB,, | Performed by: OTOLARYNGOLOGY

## 2024-03-25 PROCEDURE — 99999 PR PBB SHADOW E&M-EST. PATIENT-LVL V: CPT | Mod: PBBFAC,,, | Performed by: OTOLARYNGOLOGY

## 2024-03-25 PROCEDURE — 31575 DIAGNOSTIC LARYNGOSCOPY: CPT | Mod: S$GLB,,, | Performed by: OTOLARYNGOLOGY

## 2024-03-25 PROCEDURE — 1159F MED LIST DOCD IN RCRD: CPT | Mod: CPTII,S$GLB,, | Performed by: OTOLARYNGOLOGY

## 2024-03-25 PROCEDURE — 1160F RVW MEDS BY RX/DR IN RCRD: CPT | Mod: CPTII,S$GLB,, | Performed by: OTOLARYNGOLOGY

## 2024-03-25 PROCEDURE — 3008F BODY MASS INDEX DOCD: CPT | Mod: CPTII,S$GLB,, | Performed by: OTOLARYNGOLOGY

## 2024-03-25 NOTE — PROGRESS NOTES
Chief Complaint   Patient presents with    Larynx cancer     Treatment History  1. 2015: XRT for SCCA larynx (Merged with [facility]).  2. 10/2/17: DL and biopsy TVC.  Path revealed severe dysplasia/CIS/superficially invasive SCCA (Dr. Lizet Desouza).  3. 12/21/17: MSL with resection R TVC lesion, biopsy L TVC lesion.  Path revealed severe dysplasia. (Dr. Gibran Smith).  4. 3/1/17: MSL with laser resection L TVC.  Path benign. (Dr. Smith).    HPI   74 y.o. male presents with the above treatment history. No new complaints.       Review of Systems   Constitutional: Negative for fatigue and unexpected weight change.   HENT: Per HPI.  Eyes: Negative for visual disturbance.   Respiratory: Negative for shortness of breath, hemoptysis   Cardiovascular: Negative for chest pain and palpitations.   Musculoskeletal: Negative for decreased ROM, back pain.   Skin: Negative for rash, sunburn, itching.   Neurological: Negative for dizziness and seizures.   Hematological: Negative for adenopathy. Does not bruise/bleed easily.   Endocrine: Negative for rapid weight loss/weight gain, heat/cold intolerance.     Past Medical History   Patient Active Problem List   Diagnosis    Stage 4 chronic kidney disease    Essential hypertension    Mixed hyperlipidemia    Benign prostatic hyperplasia with nocturia    Claudication of right lower extremity    Eye swelling, bilateral    Diminished night vision    Renal transplant recipient    Dermolipoma    History of DVT (deep vein thrombosis)    Iron deficiency anemia    Pulmonary nodules    Larynx cancer    Voice disturbance    Atherosclerosis of aorta    Carcinoma in situ of vocal cord    Dysphagia, pharyngoesophageal    Chondronecrosis of larynx    Squamous cell carcinoma    Obesity (BMI 30-39.9)    Pterygium of both eyes    History of cancer of larynx    Nodule of left lung    Granulomatous lung disease    Transplanted kidney - 2007    Hypertensive kidney disease with stage 4 chronic kidney disease     "Hypercalcemia    Secondary hyperparathyroidism of renal origin    Benign tumor of orbit    Metabolic bone disease    Immunosuppressive management encounter following kidney transplant    Hyperphosphatemia    Vitamin D deficiency disease    History of COVID-19    SOB (shortness of breath)    Metabolic acidosis    Proteinuria    Anticoagulant long-term use    Pre-op evaluation    Benign prostatic hyperplasia with lower urinary tract symptoms    Centrilobular emphysema    Dependence on renal dialysis    Class 1 obesity due to excess calories with serious comorbidity and body mass index (BMI) of 34.0 to 34.9 in adult    Former cigarette smoker    Deformity of toenail           Past Surgical History   Past Surgical History:   Procedure Laterality Date    CYSTOSCOPY W/ RETROGRADES Bilateral 6/15/2022    Procedure: Cystoscopy, bilateral retrograde pyelogram, and all indicated procedures;  Surgeon: Veronica Bacon MD;  [facility]: [facility];  Service: Urology;  Laterality: Bilateral;    FRACTURE SURGERY Left     "lower leg" 40 years ago    KIDNEY TRANSPLANT  2007    followed by [facility] Transplant    LARYNX SURGERY  11/2014    Oropharyngeal Cancer x3    microlaryngoscopy      PHLEBOGRAPHY Bilateral 8/2/2022    Procedure: Venogram;  Surgeon: KENIA Mueller II, MD;  Location: [facility] CATH LAB;  Service: Vascular;  Laterality: Bilateral;    SURGICAL REMOVAL OF LESION OF ORBIT Bilateral 8/26/2020    Procedure: EXCISION, LESION, ORBIT;  Surgeon: Linda Tee MD;  Location: [address];  Service: Ophthalmology;  Laterality: Bilateral;    TIBIAL STAPLING Left 1980              Family History   Family History   Problem Relation Age of Onset    Stroke Mother     Diabetes Mother     Hypertension Mother     Stroke Father     No Known Problems Sister     No Known Problems Brother     No Known Problems Daughter     No Known Problems Brother            Social History   .  Social History     Socioeconomic History    Marital status: Single "    Number of children: 1   Tobacco Use    Smoking status: Former     Current packs/day: 0.00     Average packs/day: 1 pack/day for 20.0 years (20.0 ttl pk-yrs)     Types: Cigarettes     Start date: 1989     Quit date: 2009     Years since quitting: 15.2    Smokeless tobacco: Never   Substance and Sexual Activity    Alcohol use: No     Alcohol/week: 0.0 standard drinks of alcohol    Drug use: No     Types: Marijuana     Comment: Occ.    Sexual activity: Not Currently     Partners: Female     Social Determinants of Health     Financial Resource Strain: Medium Risk (12/18/2023)    Overall Financial Resource Strain (CARDIA)     Difficulty of Paying Living Expenses: Somewhat hard   Food Insecurity: No Food Insecurity (12/18/2023)    Hunger Vital Sign     Worried About Running Out of Food in the Last Year: Never true     Ran Out of Food in the Last Year: Never true   Transportation Needs: No Transportation Needs (12/18/2023)    PRAPARE - Transportation     Lack of Transportation (Medical): No     Lack of Transportation (Non-Medical): No   Physical Activity: Sufficiently Active (12/18/2023)    Exercise Vital Sign     Days of Exercise per Week: 4 days     Minutes of Exercise per Session: 40 min   Stress: No Stress Concern Present (7/4/2022)    [place] [employer] - Occupational Stress Questionnaire     Feeling of Stress : Not at all   Social Connections: Moderately Integrated (12/18/2023)    Social Connection and Isolation Panel [NHANES]     Frequency of Communication with Friends and Family: More than three times a week     Frequency of Social Gatherings with Friends and Family: More than three times a week     Attends Worship Services: 1 to 4 times per year     Active Member of Clubs or Organizations: No     Attends Club or Organization Meetings: 1 to 4 times per year     Marital Status: Patient unable to answer   Housing Stability: Unknown (12/18/2023)    Housing Stability Vital Sign     Unstable  Housing in the Last Year: No         Allergies   Review of patient's allergies indicates:  No Known Allergies        Physical Exam     Vitals:    03/25/24 1501   BP: (!) 148/87   Pulse: (!) 49         Body mass index is 34.32 kg/m².    General: AOx3, NAD   Respiratory:  Symmetric chest rise, normal effort  Nose: No gross nasal septal deviation. Inferior Turbinates WNL bilaterally. No septal perforation. No masses/lesions.   Oral Cavity:  Oral Tongue mobile, no lesions noted. Hard Palate WNL. No buccal or FOM lesions.  Oropharynx:  No masses/lesions of the posterior pharyngeal wall. Tonsillar fossa without lesions. Soft palate without masses. Midline uvula.   Neck: No scars. Mild fibrosis status post XRT.  No cervical lymphadenopathy, thyromegaly or thyroid nodules.  Normal range of motion.    Face: House Brackmann I bilaterally.     Flex Naso Heike Hypo Procedures #2     Procedure:  Diagnostic flexible nasopharyngoscopy, laryngoscopy and hypopharyngoscopy:     Routine preparation with local atomizer with 1% neosynephrine/pontocaine with customary flexible endoscope.     Nasopharynx:  No lesions.        Mucosa:  No lesions.        Adenoids:  Present.  Posterior Choanae:  Patent.  Eustachian Tubes:  Patent.  Posterior pharynx:  No lesions.  Larynx/hypopharynx:        Epiglottis:  No lesions.         AE Folds:  No lesions.        Vocal cords:  Stable nodularity L TVC        Mobility:  R TVC slightly limited, L TVC immobile.         Hypopharynx:  No lesions.        Piriform sinus:  No pooling, no lesions.        Post Cricoid:  No erythema, no edema.      Assessment/Plan  Problem List Items Addressed This Visit          Derm    Deformity of toenail - Primary     To podiatry.            Oncology    Larynx cancer     TONY.  RTC 1 year.         Relevant Orders    Ambulatory referral/consult to Podiatry

## 2024-03-27 ENCOUNTER — OFFICE VISIT (OUTPATIENT)
Dept: PODIATRY | Facility: CLINIC | Age: 74
End: 2024-03-27
Payer: MEDICARE

## 2024-03-27 VITALS
RESPIRATION RATE: 15 BRPM | BODY MASS INDEX: 34.28 KG/M2 | TEMPERATURE: 99 F | HEIGHT: 70 IN | OXYGEN SATURATION: 96 % | HEART RATE: 87 BPM | WEIGHT: 239.44 LBS | DIASTOLIC BLOOD PRESSURE: 92 MMHG | SYSTOLIC BLOOD PRESSURE: 168 MMHG

## 2024-03-27 DIAGNOSIS — T45.1X5A PERIPHERAL NEUROPATHY DUE TO CHEMOTHERAPY: Primary | ICD-10-CM

## 2024-03-27 DIAGNOSIS — G62.0 PERIPHERAL NEUROPATHY DUE TO CHEMOTHERAPY: Primary | ICD-10-CM

## 2024-03-27 DIAGNOSIS — B35.1 TINEA UNGUIUM: ICD-10-CM

## 2024-03-27 DIAGNOSIS — L97.512 ULCER OF RIGHT FOOT WITH FAT LAYER EXPOSED: ICD-10-CM

## 2024-03-27 PROCEDURE — 3044F HG A1C LEVEL LT 7.0%: CPT | Mod: CPTII,S$GLB,, | Performed by: STUDENT IN AN ORGANIZED HEALTH CARE EDUCATION/TRAINING PROGRAM

## 2024-03-27 PROCEDURE — 1125F AMNT PAIN NOTED PAIN PRSNT: CPT | Mod: CPTII,S$GLB,, | Performed by: STUDENT IN AN ORGANIZED HEALTH CARE EDUCATION/TRAINING PROGRAM

## 2024-03-27 PROCEDURE — 11721 DEBRIDE NAIL 6 OR MORE: CPT | Mod: Q9,S$GLB,, | Performed by: STUDENT IN AN ORGANIZED HEALTH CARE EDUCATION/TRAINING PROGRAM

## 2024-03-27 PROCEDURE — 3077F SYST BP >= 140 MM HG: CPT | Mod: CPTII,S$GLB,, | Performed by: STUDENT IN AN ORGANIZED HEALTH CARE EDUCATION/TRAINING PROGRAM

## 2024-03-27 PROCEDURE — 3008F BODY MASS INDEX DOCD: CPT | Mod: CPTII,S$GLB,, | Performed by: STUDENT IN AN ORGANIZED HEALTH CARE EDUCATION/TRAINING PROGRAM

## 2024-03-27 PROCEDURE — 99204 OFFICE O/P NEW MOD 45 MIN: CPT | Mod: 25,S$GLB,, | Performed by: STUDENT IN AN ORGANIZED HEALTH CARE EDUCATION/TRAINING PROGRAM

## 2024-03-27 PROCEDURE — 4010F ACE/ARB THERAPY RXD/TAKEN: CPT | Mod: CPTII,S$GLB,, | Performed by: STUDENT IN AN ORGANIZED HEALTH CARE EDUCATION/TRAINING PROGRAM

## 2024-03-27 PROCEDURE — 99999 PR PBB SHADOW E&M-EST. PATIENT-LVL III: CPT | Mod: PBBFAC,,, | Performed by: STUDENT IN AN ORGANIZED HEALTH CARE EDUCATION/TRAINING PROGRAM

## 2024-03-27 PROCEDURE — 3066F NEPHROPATHY DOC TX: CPT | Mod: CPTII,S$GLB,, | Performed by: STUDENT IN AN ORGANIZED HEALTH CARE EDUCATION/TRAINING PROGRAM

## 2024-03-27 PROCEDURE — 3080F DIAST BP >= 90 MM HG: CPT | Mod: CPTII,S$GLB,, | Performed by: STUDENT IN AN ORGANIZED HEALTH CARE EDUCATION/TRAINING PROGRAM

## 2024-03-27 RX ORDER — TRAMADOL HYDROCHLORIDE 50 MG/1
50 TABLET ORAL EVERY 6 HOURS PRN
Qty: 28 TABLET | Refills: 0 | Status: SHIPPED | OUTPATIENT
Start: 2024-03-27

## 2024-03-27 RX ORDER — DOXYCYCLINE 100 MG/1
100 CAPSULE ORAL 2 TIMES DAILY
Qty: 20 CAPSULE | Refills: 0 | Status: ON HOLD | OUTPATIENT
Start: 2024-03-27 | End: 2024-05-02 | Stop reason: HOSPADM

## 2024-03-27 NOTE — PROCEDURES
"Routine Foot Care    Date/Time: 3/27/2024 3:00 PM    Performed by: Giuliano Bro DPM  Authorized by: Giuliano Bro DPM    Time out: Immediately prior to procedure a "time out" was called to verify the correct patient, procedure, equipment, support staff and site/side marked as required.    Consent Done?:  Yes (Verbal)  Hyperkeratotic Skin Lesions?: No      Nail Care Type:  Debride  Location(s): All  (Left 1st Toe, Left 3rd Toe, Left 2nd Toe, Left 4th Toe, Left 5th Toe, Right 1st Toe, Right 2nd Toe, Right 3rd Toe, Right 4th Toe and Right 5th Toe)  Patient tolerance:  Patient tolerated the procedure well with no immediate complications    "

## 2024-03-27 NOTE — PROGRESS NOTES
"      Subjective:     Patient    Jose Luis Manzo is a 74 y.o. male.    Problem    New to [facility] pod[facility]. Presents for thick discolored toenails x10, growing into the skin and causing pain; worst at right 4th toe. Right 4th toe swollen and discolored with dark drainage underneath nail. Admits to numbness, tingling, burning, shooting.      History    History obtained from patient and review of medical records.     Past Medical History:   Diagnosis Date    Acute hypoxemic respiratory failure due to COVID-19 12/30/2020    Anticoagulant long-term use     BPH (benign prostatic hypertrophy)     Cancer     vocal cords    Claudication of right lower extremity 3/10/2016    COVID-19 virus detected 12/30/2020    Dependence on renal dialysis 4/18/2023    Disorder of kidney and ureter     Hyperkalemia 1/1/2021    Hyperlipidemia     Hypertension     Hypokalemia 6/23/2021    Immunosuppression 6/26/2021    Immunosuppression due to chronic steroid use 1/30/2020    Microcytic anemia 9/16/2016    Obesity (BMI 30-39.9) 1/23/2019    Oropharyngeal cancer     Pterygium     Squamous cell carcinoma 4/25/2018    Thromboembolic disorder     Unspecified disorder of kidney and ureter        Past Surgical History:   Procedure Laterality Date    CYSTOSCOPY W/ RETROGRADES Bilateral 6/15/2022    Procedure: Cystoscopy, bilateral retrograde pyelogram, and all indicated procedures;  Surgeon: Veronica Bacon MD;  Location: [facility] OR;  Service: Urology;  Laterality: Bilateral;    FRACTURE SURGERY Left     "lower leg" 40 years ago    KIDNEY TRANSPLANT  2007    followed by [facility] Transplant    LARYNX SURGERY  11/2014    Oropharyngeal Cancer x3    microlaryngoscopy      PHLEBOGRAPHY Bilateral 8/2/2022    Procedure: Venogram;  Surgeon: KENIA Mueller II, MD;  Location: [facility] CATH LAB;  Service: Vascular;  Laterality: Bilateral;    SURGICAL REMOVAL OF LESION OF ORBIT Bilateral 8/26/2020    Procedure: EXCISION, LESION, ORBIT;  Surgeon: Linda Tee MD;  " Location: [facility] OR [address] [place] [address];  Service: Ophthalmology;  Laterality: Bilateral;    TIBIAL STAPLING Left              Objective:     Vitals  Wt Readings from Last 1 Encounters:   24 108.6 kg (239 lb 6.7 oz)     Temp Readings from Last 1 Encounters:   24 98.5 °F (36.9 °C)     BP Readings from Last 1 Encounters:   24 (!) 168/92     Pulse Readings from Last 1 Encounters:   24 87       Dermatological Exam    Skin:  Pedal hair growth diminished and Pedal skin thin and shiny on right; right 4th toe subungual ulcer to fat, scant serous drainage, malodor, cellulitis  Pedal hair growth diminished and Pedal skin thin and shiny on left    Nails:  10 nail(s) elongated, 10 nail(s) thickened, and 10 nail(s) discolored    Vascular Exam    Arteries:  Posterior tibial artery palpable on right  Dorsalis pedis artery palpable on right  Posterior tibial artery palpable on left  Dorsalis pedis artery palpable on left    Veins:  Superficial veins unremarkable on right  Superficial veins unremarkable on left    Swellin+ nonpitting on right  1+ nonpitting on left    Neurological Exam    [place] touch test:  6/6 sites sensed, light touch intact      Musculoskeletal Exam    Footwear:  Casual on right  Casual on left    Gait Exam:   Ambulatory Status: Ambulatory  Gait: Antalgic  Assistive Devices: None    Foot Progression Angle:  Normal on right  Normal on left    Right Lower Extremity Additional Findings:  Right foot and ankle function, strength, and range of motion unremarkable except as noted above.     Left Lower Extremity Additional Findings:  Left foot and ankle function, strength, and range of motion unremarkable except as noted above.    Imaging and Other Tests    Imaging:  Independently reviewed and interpreted imaging, findings are as follows: N/A     Assessment:     Encounter Diagnoses   Name Primary?    Peripheral neuropathy due to chemotherapy Yes    Tinea unguium     Ulcer of right foot with fat layer  exposed         Plan:     I counseled the patient on his conditions, their implications and medical management.    Peripheral neuropathy: chronic stable  -No urgent concerns, will monitor.    Tinea unguium: chronic stable  -Recommended wearing comfortable well fitting shoes; discarding old footwear or disinfecting old footwear with naphthalene mothballs; avoiding trauma to the nails; avoiding sharing nail clippers with family or friends; avoiding nail salons that do not employ sterile techniques; and wearing flip flops or shower shoes at public pools, showers, locker rooms, and hotels.  -Discussed treatment options including (1) monitoring, (2) debridement, (3) topical antifungals, (4) oral antifungals. Discussed potential risks, benefits, alternatives to each. Patient opted for debridement only.  -Nails debrided x10, thickness and length reduced using sterile nail nippers, see procedure note.       Ulcer right 4th toe: chronic stable  -Caused by onychogryphotic nail growing into skin, also with mild infection.  -Cleansed, applied iodosorb and adhesive bandage.   -Cleanse daily with soap and water, apply iodosorb and adhesive bandage.   -Doxycycline.   -Physical activity as tolerated.       Return to clinic in 2 weeks for wound care, call sooner PRN.

## 2024-03-28 ENCOUNTER — LAB VISIT (OUTPATIENT)
Dept: LAB | Facility: HOSPITAL | Age: 74
End: 2024-03-28
Attending: STUDENT IN AN ORGANIZED HEALTH CARE EDUCATION/TRAINING PROGRAM
Payer: MEDICARE

## 2024-03-28 DIAGNOSIS — N18.4 STAGE 4 CHRONIC KIDNEY DISEASE: ICD-10-CM

## 2024-03-28 LAB
ALBUMIN SERPL BCP-MCNC: 4 G/DL (ref 3.5–5.2)
ANION GAP SERPL CALC-SCNC: 7 MMOL/L (ref 8–16)
CALCIUM SERPL-MCNC: 10.2 MG/DL (ref 8.7–10.5)
CHLORIDE SERPL-SCNC: 111 MMOL/L (ref 95–110)
CO2 SERPL-SCNC: 26 MMOL/L (ref 23–29)
CREAT SERPL-MCNC: 3.22 MG/DL (ref 0.5–1.4)
EST. GFR  (NO RACE VARIABLE): 19.4 ML/MIN/1.73 M^2
GLUCOSE SERPL-MCNC: 93 MG/DL (ref 70–110)
PHOSPHATE SERPL-MCNC: 3.4 MG/DL (ref 2.7–4.5)
POTASSIUM SERPL-SCNC: 3.7 MMOL/L (ref 3.5–5.1)
SODIUM SERPL-SCNC: 144 MMOL/L (ref 136–145)
UUN UR-MCNC: 36 MG/DL (ref 2–20)

## 2024-03-28 PROCEDURE — 80069 RENAL FUNCTION PANEL: CPT | Mod: PN | Performed by: STUDENT IN AN ORGANIZED HEALTH CARE EDUCATION/TRAINING PROGRAM

## 2024-03-28 PROCEDURE — 36415 COLL VENOUS BLD VENIPUNCTURE: CPT | Mod: PN | Performed by: STUDENT IN AN ORGANIZED HEALTH CARE EDUCATION/TRAINING PROGRAM

## 2024-04-09 ENCOUNTER — LAB VISIT (OUTPATIENT)
Dept: LAB | Facility: HOSPITAL | Age: 74
End: 2024-04-09
Attending: STUDENT IN AN ORGANIZED HEALTH CARE EDUCATION/TRAINING PROGRAM

## 2024-04-09 DIAGNOSIS — N18.4 STAGE 4 CHRONIC KIDNEY DISEASE: ICD-10-CM

## 2024-04-09 DIAGNOSIS — Z79.899 IMMUNOSUPPRESSIVE MANAGEMENT ENCOUNTER FOLLOWING KIDNEY TRANSPLANT: ICD-10-CM

## 2024-04-09 DIAGNOSIS — Z94.0 IMMUNOSUPPRESSIVE MANAGEMENT ENCOUNTER FOLLOWING KIDNEY TRANSPLANT: ICD-10-CM

## 2024-04-09 LAB
ALBUMIN SERPL BCP-MCNC: 3.9 G/DL (ref 3.5–5.2)
ANION GAP SERPL CALC-SCNC: 11 MMOL/L (ref 8–16)
BASOPHILS # BLD AUTO: 0.03 K/UL (ref 0–0.2)
BASOPHILS NFR BLD: 0.5 % (ref 0–1.9)
CALCIUM SERPL-MCNC: 10.7 MG/DL (ref 8.7–10.5)
CHLORIDE SERPL-SCNC: 110 MMOL/L (ref 95–110)
CO2 SERPL-SCNC: 26 MMOL/L (ref 23–29)
CREAT SERPL-MCNC: 3.3 MG/DL (ref 0.5–1.4)
DIFFERENTIAL METHOD BLD: ABNORMAL
EOSINOPHIL # BLD AUTO: 0.1 K/UL (ref 0–0.5)
EOSINOPHIL NFR BLD: 1.3 % (ref 0–8)
ERYTHROCYTE [DISTWIDTH] IN BLOOD BY AUTOMATED COUNT: 16.8 % (ref 11.5–14.5)
EST. GFR  (NO RACE VARIABLE): 18.8 ML/MIN/1.73 M^2
GLUCOSE SERPL-MCNC: 103 MG/DL (ref 70–110)
HCT VFR BLD AUTO: 40.9 % (ref 40–54)
HGB BLD-MCNC: 12.1 G/DL (ref 14–18)
IMM GRANULOCYTES # BLD AUTO: 0.06 K/UL (ref 0–0.04)
IMM GRANULOCYTES NFR BLD AUTO: 1 % (ref 0–0.5)
LYMPHOCYTES # BLD AUTO: 1.7 K/UL (ref 1–4.8)
LYMPHOCYTES NFR BLD: 27.4 % (ref 18–48)
MCH RBC QN AUTO: 25.4 PG (ref 27–31)
MCHC RBC AUTO-ENTMCNC: 29.6 G/DL (ref 32–36)
MCV RBC AUTO: 86 FL (ref 82–98)
MONOCYTES # BLD AUTO: 0.7 K/UL (ref 0.3–1)
MONOCYTES NFR BLD: 11.9 % (ref 4–15)
NEUTROPHILS # BLD AUTO: 3.5 K/UL (ref 1.8–7.7)
NEUTROPHILS NFR BLD: 57.9 % (ref 38–73)
NRBC BLD-RTO: 0 /100 WBC
PHOSPHATE SERPL-MCNC: 3.5 MG/DL (ref 2.7–4.5)
PLATELET # BLD AUTO: 137 K/UL (ref 150–450)
PMV BLD AUTO: 11 FL (ref 9.2–12.9)
POTASSIUM SERPL-SCNC: 4.6 MMOL/L (ref 3.5–5.1)
RBC # BLD AUTO: 4.76 M/UL (ref 4.6–6.2)
SODIUM SERPL-SCNC: 147 MMOL/L (ref 136–145)
UUN UR-MCNC: 41 MG/DL (ref 2–20)
WBC # BLD AUTO: 6.05 K/UL (ref 3.9–12.7)

## 2024-04-09 PROCEDURE — 80069 RENAL FUNCTION PANEL: CPT | Mod: PN | Performed by: STUDENT IN AN ORGANIZED HEALTH CARE EDUCATION/TRAINING PROGRAM

## 2024-04-09 PROCEDURE — 85025 COMPLETE CBC W/AUTO DIFF WBC: CPT | Mod: PN | Performed by: STUDENT IN AN ORGANIZED HEALTH CARE EDUCATION/TRAINING PROGRAM

## 2024-04-09 PROCEDURE — 80195 ASSAY OF SIROLIMUS: CPT | Mod: PN | Performed by: STUDENT IN AN ORGANIZED HEALTH CARE EDUCATION/TRAINING PROGRAM

## 2024-04-09 PROCEDURE — 36415 COLL VENOUS BLD VENIPUNCTURE: CPT | Mod: PN | Performed by: STUDENT IN AN ORGANIZED HEALTH CARE EDUCATION/TRAINING PROGRAM

## 2024-04-10 LAB — SIROLIMUS BLD-MCNC: 5.1 NG/ML (ref 4–20)

## 2024-04-11 ENCOUNTER — TELEPHONE (OUTPATIENT)
Dept: NEPHROLOGY | Facility: CLINIC | Age: 74
End: 2024-04-11

## 2024-04-11 DIAGNOSIS — Z79.899 IMMUNOSUPPRESSIVE MANAGEMENT ENCOUNTER FOLLOWING KIDNEY TRANSPLANT: ICD-10-CM

## 2024-04-11 DIAGNOSIS — I10 ESSENTIAL HYPERTENSION: Primary | ICD-10-CM

## 2024-04-11 DIAGNOSIS — I10 HYPERTENSION, UNSPECIFIED TYPE: ICD-10-CM

## 2024-04-11 DIAGNOSIS — Z94.0 IMMUNOSUPPRESSIVE MANAGEMENT ENCOUNTER FOLLOWING KIDNEY TRANSPLANT: ICD-10-CM

## 2024-04-11 RX ORDER — LOSARTAN POTASSIUM 25 MG/1
25 TABLET ORAL DAILY
Qty: 90 TABLET | Refills: 3 | Status: ON HOLD | OUTPATIENT
Start: 2024-04-11 | End: 2024-05-02 | Stop reason: HOSPADM

## 2024-04-11 RX ORDER — SIROLIMUS 1 MG/1
3 TABLET, FILM COATED ORAL DAILY
Qty: 90 TABLET | Refills: 11
Start: 2024-04-11 | End: 2024-05-23 | Stop reason: SDUPTHER

## 2024-04-11 NOTE — TELEPHONE ENCOUNTER
Received a phone call from the patient, [facility] pharmacy is no longer accepting his insurance and is unable to get his sirolimus or losartan. He is requesting that these medications me sent to [facility] on [address]. Medications were sent into the requested pharmacy.     Essential hypertension  -     losartan (COZAAR) 25 MG tablet; Take 1 tablet (25 mg total) by mouth once daily.  Dispense: 90 tablet; Refill: 3    Immunosuppressive management encounter following kidney transplant  -     sirolimus (RAPAMUNE) 1 MG Tab; Take 3 tablets (3 mg total) by mouth once daily.  Dispense: 90 tablet; Refill: 11    Hypertension, unspecified type

## 2024-04-19 ENCOUNTER — OFFICE VISIT (OUTPATIENT)
Dept: PODIATRY | Facility: CLINIC | Age: 74
End: 2024-04-19
Payer: COMMERCIAL

## 2024-04-19 VITALS
HEART RATE: 81 BPM | HEIGHT: 70 IN | DIASTOLIC BLOOD PRESSURE: 86 MMHG | WEIGHT: 239.44 LBS | SYSTOLIC BLOOD PRESSURE: 145 MMHG | TEMPERATURE: 98 F | BODY MASS INDEX: 34.28 KG/M2

## 2024-04-19 DIAGNOSIS — Z87.2 HISTORY OF SKIN ULCER: Primary | ICD-10-CM

## 2024-04-19 PROCEDURE — 99999 PR PBB SHADOW E&M-EST. PATIENT-LVL IV: CPT | Mod: PBBFAC,,, | Performed by: STUDENT IN AN ORGANIZED HEALTH CARE EDUCATION/TRAINING PROGRAM

## 2024-04-19 PROCEDURE — 99213 OFFICE O/P EST LOW 20 MIN: CPT | Mod: S$PBB,,, | Performed by: STUDENT IN AN ORGANIZED HEALTH CARE EDUCATION/TRAINING PROGRAM

## 2024-04-19 PROCEDURE — 99214 OFFICE O/P EST MOD 30 MIN: CPT | Mod: PBBFAC | Performed by: STUDENT IN AN ORGANIZED HEALTH CARE EDUCATION/TRAINING PROGRAM

## 2024-04-19 NOTE — PROGRESS NOTES
"      Subjective:     Patient    Jose Luis Manzo is a 74 y.o. male.    Problem    03/27/24: New to [facility] pod[facility]. Presents for thick discolored toenails x10, growing into the skin and causing pain; worst at right 4th toe. Right 4th toe swollen and discolored with dark drainage underneath nail. Admits to numbness, tingling, burning, shooting.      04/19/24: Returns for wound care right 4th toe. No issues since last visit, pain, swelling, drainage have improved drastically.     History    History obtained from patient and review of medical records.     Past Medical History:   Diagnosis Date    Acute hypoxemic respiratory failure due to COVID-19 12/30/2020    Anticoagulant long-term use     BPH (benign prostatic hypertrophy)     Cancer     vocal cords    Claudication of right lower extremity 3/10/2016    COVID-19 virus detected 12/30/2020    Dependence on renal dialysis 4/18/2023    Disorder of kidney and ureter     Hyperkalemia 1/1/2021    Hyperlipidemia     Hypertension     Hypokalemia 6/23/2021    Immunosuppression 6/26/2021    Immunosuppression due to chronic steroid use 1/30/2020    Microcytic anemia 9/16/2016    Obesity (BMI 30-39.9) 1/23/2019    Oropharyngeal cancer     Pterygium     Squamous cell carcinoma 4/25/2018    Thromboembolic disorder     Unspecified disorder of kidney and ureter        Past Surgical History:   Procedure Laterality Date    CYSTOSCOPY W/ RETROGRADES Bilateral 6/15/2022    Procedure: Cystoscopy, bilateral retrograde pyelogram, and all indicated procedures;  Surgeon: Veronica Bacon MD;  Location: [facility];  Service: Urology;  Laterality: Bilateral;    FRACTURE SURGERY Left     "lower leg" 40 years ago    KIDNEY TRANSPLANT  2007    followed by [facility] Transplant    LARYNX SURGERY  11/2014    Oropharyngeal Cancer x3    microlaryngoscopy      PHLEBOGRAPHY Bilateral 8/2/2022    Procedure: Venogram;  Surgeon: KENIA Mueller II, MD;  Location: [facility] CATH LAB;  Service: Vascular;  Laterality: " Bilateral;    SURGICAL REMOVAL OF LESION OF ORBIT Bilateral 2020    Procedure: EXCISION, LESION, ORBIT;  Surgeon: Linda Tee MD;  Location: [facility] OR [address] [place] [address];  Service: Ophthalmology;  Laterality: Bilateral;    TIBIAL STAPLING Left              Objective:     Vitals  Wt Readings from Last 1 Encounters:   24 108.6 kg (239 lb 6.7 oz)     Temp Readings from Last 1 Encounters:   24 98.4 °F (36.9 °C) (Oral)     BP Readings from Last 1 Encounters:   24 (!) 145/86     Pulse Readings from Last 1 Encounters:   24 81       Dermatological Exam    Skin:  Pedal hair growth diminished and Pedal skin thin and shiny on right; right 4th toe subungual ulcer now healed   Pedal hair growth diminished and Pedal skin thin and shiny on left    Nails:  10 nail(s) elongated, 10 nail(s) thickened, and 10 nail(s) discolored    Vascular Exam    Arteries:  Posterior tibial artery palpable on right  Dorsalis pedis artery palpable on right  Posterior tibial artery palpable on left  Dorsalis pedis artery palpable on left    Veins:  Superficial veins unremarkable on right  Superficial veins unremarkable on left    Swellin+ nonpitting on right  1+ nonpitting on left    Neurological Exam    [place] touch test:  6/6 sites sensed, light touch intact      Musculoskeletal Exam    Footwear:  Casual on right  Casual on left    Gait Exam:   Ambulatory Status: Ambulatory  Gait: Antalgic  Assistive Devices: None    Foot Progression Angle:  Normal on right  Normal on left    Right Lower Extremity Additional Findings:  Right foot and ankle function, strength, and range of motion unremarkable except as noted above.     Left Lower Extremity Additional Findings:  Left foot and ankle function, strength, and range of motion unremarkable except as noted above.    Imaging and Other Tests    Imaging:  Independently reviewed and interpreted imaging, findings are as follows: N/A     Assessment:     Encounter Diagnosis   Name  Primary?    History of skin ulcer Yes          Plan:     I counseled the patient on his conditions, their implications and medical management.    Peripheral neuropathy: chronic stable  -No urgent concerns, will monitor.    Tinea unguium: chronic stable  -Recommended wearing comfortable well fitting shoes; discarding old footwear or disinfecting old footwear with naphthalene mothballs; avoiding trauma to the nails; avoiding sharing nail clippers with family or friends; avoiding nail salons that do not employ sterile techniques; and wearing flip flops or shower shoes at public pools, showers, locker rooms, and hotels.  -Discussed treatment options including (1) monitoring, (2) debridement, (3) topical antifungals, (4) oral antifungals. Discussed potential risks, benefits, alternatives to each. Patient opted for debridement only.  -Will return for routine foot care.      Ulcer right 4th toe: healed  -Treat as normal skin. Monitor daily.       Return to clinic in 2 months for routine foot care, call sooner PRN.

## 2024-04-26 ENCOUNTER — HOSPITAL ENCOUNTER (INPATIENT)
Facility: HOSPITAL | Age: 74
LOS: 8 days | Discharge: HOME HEALTH | DRG: 177 | End: 2024-05-04
Attending: EMERGENCY MEDICINE | Admitting: INTERNAL MEDICINE
Payer: COMMERCIAL

## 2024-04-26 DIAGNOSIS — I50.9 CHF (CONGESTIVE HEART FAILURE): ICD-10-CM

## 2024-04-26 DIAGNOSIS — J15.1 PNEUMONIA DUE TO PSEUDOMONAS SPECIES, UNSPECIFIED LATERALITY, UNSPECIFIED PART OF LUNG: Primary | ICD-10-CM

## 2024-04-26 DIAGNOSIS — E87.20 METABOLIC ACIDOSIS: ICD-10-CM

## 2024-04-26 DIAGNOSIS — J96.01 ACUTE RESPIRATORY FAILURE WITH HYPOXIA: ICD-10-CM

## 2024-04-26 DIAGNOSIS — N18.4 STAGE 4 CHRONIC KIDNEY DISEASE: ICD-10-CM

## 2024-04-26 DIAGNOSIS — J18.9 PNEUMONIA OF RIGHT LUNG DUE TO INFECTIOUS ORGANISM, UNSPECIFIED PART OF LUNG: ICD-10-CM

## 2024-04-26 DIAGNOSIS — R07.9 CHEST PAIN: ICD-10-CM

## 2024-04-26 DIAGNOSIS — R06.02 SOB (SHORTNESS OF BREATH): ICD-10-CM

## 2024-04-26 DIAGNOSIS — R09.02 HYPOXIA: ICD-10-CM

## 2024-04-26 PROBLEM — J15.9 BACTERIAL PNEUMONIA: Status: ACTIVE | Noted: 2024-04-26

## 2024-04-26 LAB
ALBUMIN SERPL BCP-MCNC: 3.3 G/DL (ref 3.5–5.2)
ALLENS TEST: ABNORMAL
ALP SERPL-CCNC: 110 U/L (ref 55–135)
ALT SERPL W/O P-5'-P-CCNC: 28 U/L (ref 10–44)
ANION GAP SERPL CALC-SCNC: 14 MMOL/L (ref 8–16)
AST SERPL-CCNC: 29 U/L (ref 10–40)
AV INDEX (PROSTH): 0.87
AV MEAN GRADIENT: 2 MMHG
AV PEAK GRADIENT: 5 MMHG
AV VALVE AREA BY VELOCITY RATIO: 2.81 CM²
AV VALVE AREA: 2.88 CM²
AV VELOCITY RATIO: 0.84
BASOPHILS # BLD AUTO: 0.03 K/UL (ref 0–0.2)
BASOPHILS NFR BLD: 0.3 % (ref 0–1.9)
BILIRUB SERPL-MCNC: 0.4 MG/DL (ref 0.1–1)
BNP SERPL-MCNC: 160 PG/ML (ref 0–99)
BSA FOR ECHO PROCEDURE: 2.37 M2
BUN SERPL-MCNC: 38 MG/DL (ref 8–23)
CALCIUM SERPL-MCNC: 9.9 MG/DL (ref 8.7–10.5)
CHLORIDE SERPL-SCNC: 111 MMOL/L (ref 95–110)
CO2 SERPL-SCNC: 18 MMOL/L (ref 23–29)
CREAT SERPL-MCNC: 3.6 MG/DL (ref 0.5–1.4)
CTP QC/QA: YES
CTP QC/QA: YES
CV ECHO LV RWT: 0.73 CM
DIFFERENTIAL METHOD BLD: ABNORMAL
DOP CALC AO PEAK VEL: 1.09 M/S
DOP CALC AO VTI: 20.9 CM
DOP CALC LVOT AREA: 3.3 CM2
DOP CALC LVOT DIAMETER: 2.06 CM
DOP CALC LVOT PEAK VEL: 0.92 M/S
DOP CALC LVOT STROKE VOLUME: 60.3 CM3
DOP CALC MV VTI: 22.1 CM
DOP CALCLVOT PEAK VEL VTI: 18.1 CM
E WAVE DECELERATION TIME: 171.4 MSEC
E/A RATIO: 0.66
E/E' RATIO: 12 M/S
ECHO LV POSTERIOR WALL: 1.57 CM (ref 0.6–1.1)
EOSINOPHIL # BLD AUTO: 0 K/UL (ref 0–0.5)
EOSINOPHIL NFR BLD: 0.2 % (ref 0–8)
ERYTHROCYTE [DISTWIDTH] IN BLOOD BY AUTOMATED COUNT: 16.8 % (ref 11.5–14.5)
EST. GFR  (NO RACE VARIABLE): 17 ML/MIN/1.73 M^2
FIO2: 70 %
FRACTIONAL SHORTENING: 31 % (ref 28–44)
GLUCOSE SERPL-MCNC: 164 MG/DL (ref 70–110)
HCT VFR BLD AUTO: 43 % (ref 40–54)
HGB BLD-MCNC: 13.3 G/DL (ref 14–18)
IMM GRANULOCYTES # BLD AUTO: 0.08 K/UL (ref 0–0.04)
IMM GRANULOCYTES NFR BLD AUTO: 0.8 % (ref 0–0.5)
INTERVENTRICULAR SEPTUM: 1.55 CM (ref 0.6–1.1)
IVC DIAMETER: 1.5 CM
LA MAJOR: 5.04 CM
LA MINOR: 4.65 CM
LA WIDTH: 4.1 CM
LACTATE SERPL-SCNC: 3.5 MMOL/L (ref 0.5–2.2)
LACTATE SERPL-SCNC: 4.1 MMOL/L (ref 0.5–2.2)
LACTATE SERPL-SCNC: 4.3 MMOL/L (ref 0.5–2.2)
LEFT ATRIUM SIZE: 3.27 CM
LEFT ATRIUM VOLUME INDEX MOD: 13.5 ML/M2
LEFT ATRIUM VOLUME INDEX: 24.1 ML/M2
LEFT ATRIUM VOLUME MOD: 30.92 CM3
LEFT ATRIUM VOLUME: 55.12 CM3
LEFT INTERNAL DIMENSION IN SYSTOLE: 3 CM (ref 2.1–4)
LEFT VENTRICLE DIASTOLIC VOLUME INDEX: 36.79 ML/M2
LEFT VENTRICLE DIASTOLIC VOLUME: 84.26 ML
LEFT VENTRICLE MASS INDEX: 121 G/M2
LEFT VENTRICLE SYSTOLIC VOLUME INDEX: 15.2 ML/M2
LEFT VENTRICLE SYSTOLIC VOLUME: 34.86 ML
LEFT VENTRICULAR INTERNAL DIMENSION IN DIASTOLE: 4.33 CM (ref 3.5–6)
LEFT VENTRICULAR MASS: 277.08 G
LV LATERAL E/E' RATIO: 10.8 M/S
LV SEPTAL E/E' RATIO: 13.5 M/S
LVOT MG: 1.67 MMHG
LVOT MV: 0.63 CM/S
LYMPHOCYTES # BLD AUTO: 1.1 K/UL (ref 1–4.8)
LYMPHOCYTES NFR BLD: 10.4 % (ref 18–48)
MCH RBC QN AUTO: 26 PG (ref 27–31)
MCHC RBC AUTO-ENTMCNC: 30.9 G/DL (ref 32–36)
MCV RBC AUTO: 84 FL (ref 82–98)
MONOCYTES # BLD AUTO: 0.6 K/UL (ref 0.3–1)
MONOCYTES NFR BLD: 5.7 % (ref 4–15)
MV MEAN GRADIENT: 1 MMHG
MV PEAK A VEL: 0.82 M/S
MV PEAK E VEL: 0.54 M/S
MV PEAK GRADIENT: 2 MMHG
MV STENOSIS PRESSURE HALF TIME: 49.71 MS
MV VALVE AREA BY CONTINUITY EQUATION: 2.73 CM2
MV VALVE AREA P 1/2 METHOD: 4.43 CM2
NEUTROPHILS # BLD AUTO: 8.7 K/UL (ref 1.8–7.7)
NEUTROPHILS NFR BLD: 82.6 % (ref 38–73)
NRBC BLD-RTO: 0 /100 WBC
OHS CV RV/LV RATIO: 0.7 CM
OHS LV EJECTION FRACTION SIMPSONS BIPLANE MOD: 65 %
OHS QRS DURATION: 124 MS
OHS QTC CALCULATION: 484 MS
PCO2 BLDA: 37 MMHG (ref 35–45)
PEEP: 6
PH SMN: 7.35 [PH] (ref 7.35–7.45)
PISA TR MAX VEL: 2.52 M/S
PLATELET # BLD AUTO: 154 K/UL (ref 150–450)
PMV BLD AUTO: 12.2 FL (ref 9.2–12.9)
PO2 BLDA: 64.5 MMHG (ref 80–100)
POC BASE DEFICIT: -4.8 MMOL/L (ref -2–2)
POC HCO3: 20.3 MMOL/L (ref 24–28)
POC MOLECULAR INFLUENZA A AGN: NEGATIVE
POC MOLECULAR INFLUENZA B AGN: NEGATIVE
POC PERFORMED BY: ABNORMAL
POC SATURATED O2: 92.6 % (ref 95–100)
POC SET RR: 18
POTASSIUM SERPL-SCNC: 3 MMOL/L (ref 3.5–5.1)
PROCALCITONIN SERPL IA-MCNC: 20.98 NG/ML
PROT SERPL-MCNC: 7.5 G/DL (ref 6–8.4)
PV MV: 0.64 M/S
PV PEAK GRADIENT: 3 MMHG
PV PEAK VELOCITY: 0.89 M/S
RA MAJOR: 6 CM
RA PRESSURE ESTIMATED: 3 MMHG
RA WIDTH: 3.44 CM
RBC # BLD AUTO: 5.11 M/UL (ref 4.6–6.2)
RIGHT VENTRICULAR END-DIASTOLIC DIMENSION: 3.01 CM
RV TB RVSP: 6 MMHG
RV TISSUE DOPPLER FREE WALL SYSTOLIC VELOCITY 1 (APICAL 4 CHAMBER VIEW): 8.31 CM/S
SARS-COV-2 RDRP RESP QL NAA+PROBE: NEGATIVE
SINUS: 3.51 CM
SODIUM SERPL-SCNC: 143 MMOL/L (ref 136–145)
SPECIMEN SOURCE: ABNORMAL
STJ: 2.71 CM
TDI LATERAL: 0.05 M/S
TDI SEPTAL: 0.04 M/S
TDI: 0.05 M/S
TR MAX PG: 25 MMHG
TRICUSPID ANNULAR PLANE SYSTOLIC EXCURSION: 1.72 CM
TROPONIN I SERPL DL<=0.01 NG/ML-MCNC: 0.35 NG/ML (ref 0–0.03)
TV REST PULMONARY ARTERY PRESSURE: 28 MMHG
WBC # BLD AUTO: 10.56 K/UL (ref 3.9–12.7)
Z-SCORE OF LEFT VENTRICULAR DIMENSION IN END DIASTOLE: -7.03
Z-SCORE OF LEFT VENTRICULAR DIMENSION IN END SYSTOLE: -4.47

## 2024-04-26 PROCEDURE — 80053 COMPREHEN METABOLIC PANEL: CPT | Performed by: EMERGENCY MEDICINE

## 2024-04-26 PROCEDURE — 83880 ASSAY OF NATRIURETIC PEPTIDE: CPT | Performed by: EMERGENCY MEDICINE

## 2024-04-26 PROCEDURE — 5A09357 ASSISTANCE WITH RESPIRATORY VENTILATION, LESS THAN 24 CONSECUTIVE HOURS, CONTINUOUS POSITIVE AIRWAY PRESSURE: ICD-10-PCS | Performed by: INTERNAL MEDICINE

## 2024-04-26 PROCEDURE — 25000003 PHARM REV CODE 250: Performed by: EMERGENCY MEDICINE

## 2024-04-26 PROCEDURE — 87205 SMEAR GRAM STAIN: CPT | Performed by: STUDENT IN AN ORGANIZED HEALTH CARE EDUCATION/TRAINING PROGRAM

## 2024-04-26 PROCEDURE — 27000190 HC CPAP FULL FACE MASK W/VALVE

## 2024-04-26 PROCEDURE — 94761 N-INVAS EAR/PLS OXIMETRY MLT: CPT | Mod: XB

## 2024-04-26 PROCEDURE — 87899 AGENT NOS ASSAY W/OPTIC: CPT | Performed by: INTERNAL MEDICINE

## 2024-04-26 PROCEDURE — 93010 ELECTROCARDIOGRAM REPORT: CPT | Mod: ,,, | Performed by: INTERNAL MEDICINE

## 2024-04-26 PROCEDURE — 36600 WITHDRAWAL OF ARTERIAL BLOOD: CPT

## 2024-04-26 PROCEDURE — 87040 BLOOD CULTURE FOR BACTERIA: CPT | Performed by: EMERGENCY MEDICINE

## 2024-04-26 PROCEDURE — 96365 THER/PROPH/DIAG IV INF INIT: CPT

## 2024-04-26 PROCEDURE — 25000003 PHARM REV CODE 250: Performed by: INTERNAL MEDICINE

## 2024-04-26 PROCEDURE — 87077 CULTURE AEROBIC IDENTIFY: CPT | Performed by: STUDENT IN AN ORGANIZED HEALTH CARE EDUCATION/TRAINING PROGRAM

## 2024-04-26 PROCEDURE — 63600175 PHARM REV CODE 636 W HCPCS

## 2024-04-26 PROCEDURE — 94660 CPAP INITIATION&MGMT: CPT

## 2024-04-26 PROCEDURE — 94640 AIRWAY INHALATION TREATMENT: CPT

## 2024-04-26 PROCEDURE — 96361 HYDRATE IV INFUSION ADD-ON: CPT

## 2024-04-26 PROCEDURE — 84145 PROCALCITONIN (PCT): CPT | Performed by: EMERGENCY MEDICINE

## 2024-04-26 PROCEDURE — 85025 COMPLETE CBC W/AUTO DIFF WBC: CPT | Performed by: EMERGENCY MEDICINE

## 2024-04-26 PROCEDURE — 36415 COLL VENOUS BLD VENIPUNCTURE: CPT | Performed by: NURSE PRACTITIONER

## 2024-04-26 PROCEDURE — 83605 ASSAY OF LACTIC ACID: CPT | Performed by: EMERGENCY MEDICINE

## 2024-04-26 PROCEDURE — 93005 ELECTROCARDIOGRAM TRACING: CPT

## 2024-04-26 PROCEDURE — 87070 CULTURE OTHR SPECIMN AEROBIC: CPT | Performed by: STUDENT IN AN ORGANIZED HEALTH CARE EDUCATION/TRAINING PROGRAM

## 2024-04-26 PROCEDURE — 25000242 PHARM REV CODE 250 ALT 637 W/ HCPCS: Performed by: INTERNAL MEDICINE

## 2024-04-26 PROCEDURE — 99285 EMERGENCY DEPT VISIT HI MDM: CPT | Mod: 25

## 2024-04-26 PROCEDURE — 84484 ASSAY OF TROPONIN QUANT: CPT | Performed by: EMERGENCY MEDICINE

## 2024-04-26 PROCEDURE — 87502 INFLUENZA DNA AMP PROBE: CPT

## 2024-04-26 PROCEDURE — 87081 CULTURE SCREEN ONLY: CPT

## 2024-04-26 PROCEDURE — 27100171 HC OXYGEN HIGH FLOW UP TO 24 HOURS

## 2024-04-26 PROCEDURE — 20000000 HC ICU ROOM

## 2024-04-26 PROCEDURE — 82803 BLOOD GASES ANY COMBINATION: CPT

## 2024-04-26 PROCEDURE — 99900035 HC TECH TIME PER 15 MIN (STAT)

## 2024-04-26 PROCEDURE — 25000003 PHARM REV CODE 250

## 2024-04-26 PROCEDURE — 87186 SC STD MICRODIL/AGAR DIL: CPT | Performed by: STUDENT IN AN ORGANIZED HEALTH CARE EDUCATION/TRAINING PROGRAM

## 2024-04-26 PROCEDURE — 63600175 PHARM REV CODE 636 W HCPCS: Performed by: INTERNAL MEDICINE

## 2024-04-26 PROCEDURE — 83605 ASSAY OF LACTIC ACID: CPT | Mod: 91 | Performed by: NURSE PRACTITIONER

## 2024-04-26 PROCEDURE — 87635 SARS-COV-2 COVID-19 AMP PRB: CPT | Performed by: EMERGENCY MEDICINE

## 2024-04-26 PROCEDURE — 87449 NOS EACH ORGANISM AG IA: CPT | Performed by: INTERNAL MEDICINE

## 2024-04-26 PROCEDURE — 63600175 PHARM REV CODE 636 W HCPCS: Performed by: EMERGENCY MEDICINE

## 2024-04-26 RX ORDER — POTASSIUM CHLORIDE 20 MEQ/1
40 TABLET, EXTENDED RELEASE ORAL
Status: COMPLETED | OUTPATIENT
Start: 2024-04-26 | End: 2024-04-26

## 2024-04-26 RX ORDER — SODIUM CHLORIDE 0.9 % (FLUSH) 0.9 %
10 SYRINGE (ML) INJECTION EVERY 12 HOURS PRN
Status: DISCONTINUED | OUTPATIENT
Start: 2024-04-26 | End: 2024-05-04 | Stop reason: HOSPADM

## 2024-04-26 RX ORDER — THIAMINE HYDROCHLORIDE 100 MG/ML
100 INJECTION, SOLUTION INTRAMUSCULAR; INTRAVENOUS ONCE
Status: DISCONTINUED | OUTPATIENT
Start: 2024-04-26 | End: 2024-04-26

## 2024-04-26 RX ORDER — IBUPROFEN 200 MG
16 TABLET ORAL
Status: DISCONTINUED | OUTPATIENT
Start: 2024-04-26 | End: 2024-05-04 | Stop reason: HOSPADM

## 2024-04-26 RX ORDER — SIMETHICONE 80 MG
1 TABLET,CHEWABLE ORAL 4 TIMES DAILY PRN
Status: DISCONTINUED | OUTPATIENT
Start: 2024-04-26 | End: 2024-05-04 | Stop reason: HOSPADM

## 2024-04-26 RX ORDER — IBUPROFEN 200 MG
24 TABLET ORAL
Status: DISCONTINUED | OUTPATIENT
Start: 2024-04-26 | End: 2024-05-04 | Stop reason: HOSPADM

## 2024-04-26 RX ORDER — ATORVASTATIN CALCIUM 20 MG/1
20 TABLET, FILM COATED ORAL NIGHTLY
Status: DISCONTINUED | OUTPATIENT
Start: 2024-04-26 | End: 2024-05-04 | Stop reason: HOSPADM

## 2024-04-26 RX ORDER — SODIUM CHLORIDE 9 MG/ML
INJECTION, SOLUTION INTRAVENOUS
Status: DISCONTINUED | OUTPATIENT
Start: 2024-04-26 | End: 2024-05-04 | Stop reason: HOSPADM

## 2024-04-26 RX ORDER — NALOXONE HCL 0.4 MG/ML
0.02 VIAL (ML) INJECTION
Status: DISCONTINUED | OUTPATIENT
Start: 2024-04-26 | End: 2024-05-04 | Stop reason: HOSPADM

## 2024-04-26 RX ORDER — ACETAMINOPHEN 325 MG/1
650 TABLET ORAL EVERY 8 HOURS PRN
Status: DISCONTINUED | OUTPATIENT
Start: 2024-04-26 | End: 2024-05-04 | Stop reason: HOSPADM

## 2024-04-26 RX ORDER — GLUCAGON 1 MG
1 KIT INJECTION
Status: DISCONTINUED | OUTPATIENT
Start: 2024-04-26 | End: 2024-05-04 | Stop reason: HOSPADM

## 2024-04-26 RX ORDER — SIROLIMUS 1 MG/1
3 TABLET, FILM COATED ORAL DAILY
Status: DISCONTINUED | OUTPATIENT
Start: 2024-04-26 | End: 2024-05-04 | Stop reason: HOSPADM

## 2024-04-26 RX ORDER — MUPIROCIN 20 MG/G
OINTMENT TOPICAL 2 TIMES DAILY
Status: DISCONTINUED | OUTPATIENT
Start: 2024-04-26 | End: 2024-04-30

## 2024-04-26 RX ORDER — ONDANSETRON HYDROCHLORIDE 2 MG/ML
4 INJECTION, SOLUTION INTRAVENOUS EVERY 8 HOURS PRN
Status: DISCONTINUED | OUTPATIENT
Start: 2024-04-26 | End: 2024-05-04 | Stop reason: HOSPADM

## 2024-04-26 RX ORDER — SODIUM BICARBONATE 650 MG/1
650 TABLET ORAL 2 TIMES DAILY
Status: DISCONTINUED | OUTPATIENT
Start: 2024-04-26 | End: 2024-04-27

## 2024-04-26 RX ORDER — TALC
6 POWDER (GRAM) TOPICAL NIGHTLY PRN
Status: DISCONTINUED | OUTPATIENT
Start: 2024-04-26 | End: 2024-05-04 | Stop reason: HOSPADM

## 2024-04-26 RX ORDER — FLUTICASONE FUROATE AND VILANTEROL 100; 25 UG/1; UG/1
1 POWDER RESPIRATORY (INHALATION) DAILY
Status: DISCONTINUED | OUTPATIENT
Start: 2024-04-26 | End: 2024-05-04 | Stop reason: HOSPADM

## 2024-04-26 RX ORDER — IPRATROPIUM BROMIDE AND ALBUTEROL SULFATE 2.5; .5 MG/3ML; MG/3ML
3 SOLUTION RESPIRATORY (INHALATION)
Status: DISCONTINUED | OUTPATIENT
Start: 2024-04-26 | End: 2024-05-04 | Stop reason: HOSPADM

## 2024-04-26 RX ORDER — METOPROLOL SUCCINATE 50 MG/1
50 TABLET, EXTENDED RELEASE ORAL DAILY
Status: DISCONTINUED | OUTPATIENT
Start: 2024-04-27 | End: 2024-04-28

## 2024-04-26 RX ORDER — PREDNISONE 5 MG/1
5 TABLET ORAL DAILY
Status: DISCONTINUED | OUTPATIENT
Start: 2024-04-27 | End: 2024-04-28

## 2024-04-26 RX ADMIN — SODIUM CHLORIDE: 9 INJECTION, SOLUTION INTRAVENOUS at 03:04

## 2024-04-26 RX ADMIN — IPRATROPIUM BROMIDE AND ALBUTEROL SULFATE 3 ML: 2.5; .5 SOLUTION RESPIRATORY (INHALATION) at 07:04

## 2024-04-26 RX ADMIN — CEFTRIAXONE SODIUM 2 G: 2 INJECTION, POWDER, FOR SOLUTION INTRAMUSCULAR; INTRAVENOUS at 10:04

## 2024-04-26 RX ADMIN — SODIUM BICARBONATE 650 MG: 650 TABLET ORAL at 03:04

## 2024-04-26 RX ADMIN — MUPIROCIN: 20 OINTMENT TOPICAL at 08:04

## 2024-04-26 RX ADMIN — APIXABAN 2.5 MG: 2.5 TABLET, FILM COATED ORAL at 03:04

## 2024-04-26 RX ADMIN — IPRATROPIUM BROMIDE AND ALBUTEROL SULFATE 3 ML: 2.5; .5 SOLUTION RESPIRATORY (INHALATION) at 12:04

## 2024-04-26 RX ADMIN — SODIUM BICARBONATE 650 MG: 650 TABLET ORAL at 08:04

## 2024-04-26 RX ADMIN — SODIUM CHLORIDE 250 ML: 9 INJECTION, SOLUTION INTRAVENOUS at 10:04

## 2024-04-26 RX ADMIN — APIXABAN 2.5 MG: 2.5 TABLET, FILM COATED ORAL at 08:04

## 2024-04-26 RX ADMIN — HYDROCORTISONE SODIUM SUCCINATE 50 MG: 100 INJECTION, POWDER, FOR SOLUTION INTRAMUSCULAR; INTRAVENOUS at 05:04

## 2024-04-26 RX ADMIN — AZITHROMYCIN MONOHYDRATE 500 MG: 500 INJECTION, POWDER, LYOPHILIZED, FOR SOLUTION INTRAVENOUS at 11:04

## 2024-04-26 RX ADMIN — PIPERACILLIN AND TAZOBACTAM 4.5 G: 4; .5 INJECTION, POWDER, LYOPHILIZED, FOR SOLUTION INTRAVENOUS; PARENTERAL at 10:04

## 2024-04-26 RX ADMIN — POTASSIUM CHLORIDE 40 MEQ: 1500 TABLET, EXTENDED RELEASE ORAL at 10:04

## 2024-04-26 RX ADMIN — ATORVASTATIN CALCIUM 20 MG: 20 TABLET, FILM COATED ORAL at 08:04

## 2024-04-26 RX ADMIN — SODIUM CHLORIDE, POTASSIUM CHLORIDE, SODIUM LACTATE AND CALCIUM CHLORIDE 3402 ML: 600; 310; 30; 20 INJECTION, SOLUTION INTRAVENOUS at 12:04

## 2024-04-26 RX ADMIN — SODIUM CHLORIDE, POTASSIUM CHLORIDE, SODIUM LACTATE AND CALCIUM CHLORIDE 3402 ML: 600; 310; 30; 20 INJECTION, SOLUTION INTRAVENOUS at 10:04

## 2024-04-26 RX ADMIN — VANCOMYCIN HYDROCHLORIDE 2000 MG: 500 INJECTION, POWDER, LYOPHILIZED, FOR SOLUTION INTRAVENOUS at 05:04

## 2024-04-26 RX ADMIN — SIROLIMUS 3 MG: 1 TABLET ORAL at 05:04

## 2024-04-26 RX ADMIN — PIPERACILLIN AND TAZOBACTAM 4.5 G: 4; .5 INJECTION, POWDER, LYOPHILIZED, FOR SOLUTION INTRAVENOUS; PARENTERAL at 03:04

## 2024-04-26 RX ADMIN — IPRATROPIUM BROMIDE AND ALBUTEROL SULFATE 3 ML: 2.5; .5 SOLUTION RESPIRATORY (INHALATION) at 03:04

## 2024-04-26 RX ADMIN — SODIUM CHLORIDE, POTASSIUM CHLORIDE, SODIUM LACTATE AND CALCIUM CHLORIDE 3402 ML: 600; 310; 30; 20 INJECTION, SOLUTION INTRAVENOUS at 11:04

## 2024-04-26 NOTE — SUBJECTIVE & OBJECTIVE
"Past Medical History:   Diagnosis Date    Acute hypoxemic respiratory failure due to COVID-19 12/30/2020    Anticoagulant long-term use     BPH (benign prostatic hypertrophy)     Cancer     vocal cords    Claudication of right lower extremity 3/10/2016    COVID-19 virus detected 12/30/2020    Dependence on renal dialysis 4/18/2023    Disorder of kidney and ureter     Hyperkalemia 1/1/2021    Hyperlipidemia     Hypertension     Hypokalemia 6/23/2021    Immunosuppression 6/26/2021    Immunosuppression due to chronic steroid use 1/30/2020    Microcytic anemia 9/16/2016    Obesity (BMI 30-39.9) 1/23/2019    Oropharyngeal cancer     Pterygium     Squamous cell carcinoma 4/25/2018    Thromboembolic disorder     Unspecified disorder of kidney and ureter        Past Surgical History:   Procedure Laterality Date    CYSTOSCOPY W/ RETROGRADES Bilateral 6/15/2022    Procedure: Cystoscopy, bilateral retrograde pyelogram, and all indicated procedures;  Surgeon: Veronica Bacon MD;  Location: [facility];  Service: Urology;  Laterality: Bilateral;    FRACTURE SURGERY Left     "lower leg" 40 years ago    KIDNEY TRANSPLANT  2007    followed by [facility]plant    LARYNX SURGERY  11/2014    Oropharyngeal Cancer x3    microlaryngoscopy      PHLEBOGRAPHY Bilateral 8/2/2022    Procedure: Venogram;  Surgeon: KENIA Mueller II, MD;  Location: [facility] CATH LAB;  Service: Vascular;  Laterality: Bilateral;    SURGICAL REMOVAL OF LESION OF ORBIT Bilateral 8/26/2020    Procedure: EXCISION, LESION, ORBIT;  Surgeon: Linda Tee MD;  Location: [address];  Service: Ophthalmology;  Laterality: Bilateral;    TIBIAL STAPLING Left 1980            Review of patient's allergies indicates:  No Known Allergies    Current Facility-Administered Medications   Medication Dose Route Frequency Provider Last Rate Last Admin    acetaminophen tablet 650 mg  650 mg Oral Q8H PRN Milton Caldwell MD        albuterol-ipratropium 2.5 mg-0.5 mg/3 mL nebulizer " solution 3 mL  3 mL Nebulization Q4H WAKE Milton Caldwell MD   3 mL at 04/26/24 1229    ampicillin-sulbactam 1.5 g in sodium chloride 0.9 % 100 mL IVPB (MB+)  1.5 g Intravenous Q8H Milton Caldwell MD        apixaban tablet 2.5 mg  2.5 mg Oral BID Milton Caldwell MD        atorvastatin tablet 20 mg  20 mg Oral QHS Milton Caldwell MD        azithromycin (ZITHROMAX) 500 mg in dextrose 5 % (D5W) 250 mL IVPB (Vial-Mate)  500 mg Intravenous Q24H Milton Caldwell MD        dextrose 10% bolus 125 mL 125 mL  12.5 g Intravenous PRN Milton Caldwell MD        dextrose 10% bolus 250 mL 250 mL  25 g Intravenous PRN Milton Caldwell MD        fluticasone furoate-vilanteroL 100-25 mcg/dose diskus inhaler 1 puff  1 puff Inhalation Daily Milton Caldwell MD        glucagon (human recombinant) injection 1 mg  1 mg Intramuscular PRN Milton Caldwell MD        glucose chewable tablet 16 g  16 g Oral PRN Milton Caldwell MD        glucose chewable tablet 24 g  24 g Oral PRN Milton Caldwell MD        [COMPLETED] lactated ringers bolus 3,402 mL  30 mL/kg Intravenous Once Víctor Rhodes MD 3,402 mL/hr at 04/26/24 1238 3,402 mL at 04/26/24 1238    melatonin tablet 6 mg  6 mg Oral Nightly PRN Milton Caldwell MD        [START ON 4/27/2024] metoprolol succinate (TOPROL-XL) 24 hr tablet 50 mg  50 mg Oral Daily Milton Caldwell MD        naloxone 0.4 mg/mL injection 0.02 mg  0.02 mg Intravenous PRN Milton Caldwell MD        ondansetron injection 4 mg  4 mg Intravenous Q8H PRN Milton Caldwell MD        [START ON 4/27/2024] predniSONE tablet 5 mg  5 mg Oral Daily Milton Caldwell MD        simethicone chewable tablet 80 mg  1 tablet Oral QID PRN Milton Caldwell MD        sirolimus tablet 3 mg  3 mg Oral Daily Milton Caldwell MD        sodium bicarbonate tablet 650 mg  650 mg Oral BID Milton Caldwell MD        sodium chloride 0.9% flush 10 mL  10 mL Intravenous Q12H PRN Milton Caldwell,  MD         Current Outpatient Medications   Medication Sig Dispense Refill    amoxicillin-clavulanate 875-125mg (AUGMENTIN) 875-125 mg per tablet Take 1 tablet by mouth 2 (two) times daily. 14 tablet 0    atorvastatin (LIPITOR) 20 MG tablet TAKE 1 TABLET EVERY EVENING 90 tablet 3    cloNIDine 0.3 mg/24 hr td ptwk (CATAPRES) 0.3 mg/24 hr PLACE 1 PATCH ONTO THE SKIN ONCE A WEEK. 12 patch 10    doxycycline (VIBRAMYCIN) 100 MG Cap Take 1 capsule (100 mg total) by mouth 2 (two) times daily. 20 capsule 0    ELIQUIS 2.5 mg Tab TAKE 1 TABLET TWICE DAILY 180 tablet 3    ergocalciferol (ERGOCALCIFEROL) 50,000 unit Cap Take one tablet once a week      ergocalciferol (ERGOCALCIFEROL) 50,000 unit Cap TAKE 1 CAPSULE BY MOUTH EVERY 7 DAYS. 12 capsule 3    erythromycin (ROMYCIN) ophthalmic ointment Place a 1/2 inch ribbon of ointment into the left lower eyelid twice a day for 7 days 15 g 0    ferrous sulfate 325 mg (65 mg iron) Tab tablet Take 325 mg by mouth once daily.      finasteride (PROSCAR) 5 mg tablet TAKE 1 TABLET EVERY DAY 90 tablet 3    fluticasone propionate (FLONASE) 50 mcg/actuation nasal spray 1 spray (50 mcg total) by Each Nostril route nightly. 10 mL 5    furosemide (LASIX) 40 MG tablet TAKE 1 TABLET EVERY DAY 90 tablet 3    losartan (COZAAR) 25 MG tablet Take 1 tablet (25 mg total) by mouth once daily. 90 tablet 3    metoprolol succinate (TOPROL-XL) 50 MG 24 hr tablet TAKE 1 TABLET EVERY DAY 90 tablet 3    nitroGLYCERIN (NITROSTAT) 0.4 MG SL tablet Place 1 tablet (0.4 mg total) under the tongue every 5 (five) minutes as needed for Chest pain. 30 tablet 2    predniSONE (DELTASONE) 5 MG tablet TAKE 1 TABLET EVERY DAY 90 tablet 3    sirolimus (RAPAMUNE) 1 MG Tab Take 3 tablets (3 mg total) by mouth once daily. 90 tablet 11    sodium bicarbonate 650 MG tablet TAKE 1 TABLET TWICE DAILY 180 tablet 3    traMADoL (ULTRAM) 50 mg tablet Take 1 tablet (50 mg total) by mouth every 6 (six) hours as needed for Pain. 28 tablet 0      Family History       Problem Relation (Age of Onset)    Diabetes Mother    Hypertension Mother    No Known Problems Sister, Brother, Daughter, Brother    Stroke Mother, Father          Tobacco Use    Smoking status: Former     Current packs/day: 0.00     Average packs/day: 1 pack/day for 20.0 years (20.0 ttl pk-yrs)     Types: Cigarettes     Start date: 1989     Quit date: 2009     Years since quitting: 15.3    Smokeless tobacco: Never   Substance and Sexual Activity    Alcohol use: No     Alcohol/week: 0.0 standard drinks of alcohol    Drug use: No     Types: Marijuana     Comment: Occ.    Sexual activity: Not Currently     Partners: Female     Review of Systems   Constitutional:  Positive for activity change, appetite change and fatigue.   HENT:  Positive for congestion.    Eyes: Negative.    Respiratory:  Positive for cough and shortness of breath.    Cardiovascular: Negative.    Gastrointestinal: Negative.    Endocrine: Negative.    Genitourinary: Negative.    Allergic/Immunologic: Negative.    Neurological: Negative.    Hematological: Negative.    Psychiatric/Behavioral: Negative.       Objective:     Vital Signs (Most Recent):  Temp: 98.5 °F (36.9 °C) (04/26/24 0900)  Pulse: 79 (04/26/24 1300)  Resp: (!) 29 (04/26/24 1305)  BP: 99/64 (04/26/24 1300)  SpO2: 97 % (04/26/24 1300) Vital Signs (24h Range):  Temp:  [98.5 °F (36.9 °C)] 98.5 °F (36.9 °C)  Pulse:  [] 79  Resp:  [20-42] 29  SpO2:  [60 %-100 %] 97 %  BP: ()/(52-65) 99/64     Weight: 113.4 kg (250 lb)  Body mass index is 35.87 kg/m².     Physical Exam  Constitutional:       General: He is in acute distress.      Appearance: He is ill-appearing.   HENT:      Head: Normocephalic.      Nose: Nose normal.      Mouth/Throat:      Mouth: Mucous membranes are moist.   Cardiovascular:      Rate and Rhythm: Normal rate.      Pulses: Normal pulses.   Pulmonary:      Effort: Respiratory distress present.      Breath sounds: Rhonchi and rales present.  "  Abdominal:      General: Abdomen is flat. Bowel sounds are normal.      Palpations: Abdomen is soft.   Musculoskeletal:         General: Normal range of motion.   Skin:     General: Skin is warm.      Capillary Refill: Capillary refill takes less than 2 seconds.   Neurological:      General: No focal deficit present.      Mental Status: He is alert.   Psychiatric:         Mood and Affect: Mood normal.                Significant Labs: All pertinent labs within the past 24 hours have been reviewed.  BMP:   Recent Labs   Lab 04/26/24 0925   *      K 3.0*   *   CO2 18*   BUN 38*   CREATININE 3.6*   CALCIUM 9.9     CBC:   Recent Labs   Lab 04/26/24 0925   WBC 10.56   HGB 13.3*   HCT 43.0        CMP:   Recent Labs   Lab 04/26/24 0925      K 3.0*   *   CO2 18*   *   BUN 38*   CREATININE 3.6*   CALCIUM 9.9   PROT 7.5   ALBUMIN 3.3*   BILITOT 0.4   ALKPHOS 110   AST 29   ALT 28   ANIONGAP 14     Cardiac Markers:   Recent Labs   Lab 04/26/24 0925   *     Lipid Panel: No results for input(s): "CHOL", "HDL", "LDLCALC", "TRIG", "CHOLHDL" in the last 48 hours.  Magnesium: No results for input(s): "MG" in the last 48 hours.  Prealbumin: No results for input(s): "PREALBUMIN" in the last 48 hours.    Significant Imaging: I have reviewed all pertinent imaging results/findings within the past 24 hours.  I have reviewed and interpreted all pertinent imaging results/findings within the past 24 hours.  "

## 2024-04-26 NOTE — ED NOTES
Patient made aware of urine order. States unable to void at this time. IVF infusing without difficulty. Provided with urinal and call light. Instructed to call out when able to void. VU.  Will check back shortly.

## 2024-04-26 NOTE — PLAN OF CARE
Pt remains in ICU .  Pt AAO X 4 and in no apparent distress.  NSR HR 70-80's.  SpO2 greater than 95% on continuous BIPAP. Denies CP and SOB.  Total  mls.  Vancomycin and Zosyn IV administered, see MAR.  Pt safety precautions maintained.     Problem: Adult Inpatient Plan of Care  Goal: Plan of Care Review  Outcome: Progressing  Problem: Pneumonia  Goal: Fluid Balance  Outcome: Progressing  Goal: Resolution of Infection Signs and Symptoms  Outcome: Progressing  Goal: Effective Oxygenation and Ventilation  Outcome: Progressing

## 2024-04-26 NOTE — HPI
Very pleasant 75 yo F presents with acute on chronic progressive dyspnea , hacking cough , afebrile , denied being on home oxygen , no sick contacts or travel, but is a former smoker.  Denied chest pain wheezing intermittent claudication. Recent abx use , lung disease prior. 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

## 2024-04-26 NOTE — ASSESSMENT & PLAN NOTE
IP admission meeting benchmarks, will cover for aspiration and atypical organisms. Increased work of breathing , placed on NIPPV - given unable to wean off BIPAP - transfer to ICU. Case discussed with fellow on rounds   Infectious work up running. CT CHEST WO Contrast ordered  Admission orders in .

## 2024-04-26 NOTE — ASSESSMENT & PLAN NOTE
Creatine stable for now. BMP reviewed- noted Estimated Creatinine Clearance: 22.7 mL/min (A) (based on SCr of 3.6 mg/dL (H)). according to latest data. Based on current GFR, CKD stage is stage 4 - GFR 15-29.  Monitor UOP and serial BMP and adjust therapy as needed. Renally dose meds. Avoid nephrotoxic medications and procedures.  At baseline, consulted nephrology , am labs, avoid nephrotoxins

## 2024-04-26 NOTE — PROGRESS NOTES
"Pharmacokinetic Initial Assessment: IV Vancomycin    Assessment/Plan:    Initiate intravenous vancomycin with loading dose of 2000 mg once with subsequent doses when random concentrations are less than 20 mcg/mL  Desired empiric serum trough concentration is 15 to 20 mcg/mL  Draw vancomycin random level on 04/27/24 at 1600.  Pharmacy will continue to follow and monitor vancomycin.      Please contact pharmacy at extension [phone] with any questions regarding this assessment.     Thank you for the consult,   Damian Riley       Patient brief summary:  Jose Luis Manzo is a 74 y.o. male initiated on antimicrobial therapy with IV Vancomycin for treatment of suspected  Pneumonia    Drug Allergies:   Review of patient's allergies indicates:  No Known Allergies    Actual Body Weight:   113.4 kg    Renal Function:   Estimated Creatinine Clearance: 22.7 mL/min (A) (based on SCr of 3.6 mg/dL (H)).,     Dialysis Method (if applicable):  intermittent HD    CBC (last 72 hours):  Recent Labs   Lab Result Units 04/26/24  0925   WBC K/uL 10.56   Hemoglobin g/dL 13.3*   Hematocrit % 43.0   Platelets K/uL 154   Gran % % 82.6*   Lymph % % 10.4*   Mono % % 5.7   Eosinophil % % 0.2   Basophil % % 0.3   Differential Method  Automated       Metabolic Panel (last 72 hours):  Recent Labs   Lab Result Units 04/26/24  0925   Sodium mmol/L 143   Potassium mmol/L 3.0*   Chloride mmol/L 111*   CO2 mmol/L 18*   Glucose mg/dL 164*   BUN mg/dL 38*   Creatinine mg/dL 3.6*   Albumin g/dL 3.3*   Total Bilirubin mg/dL 0.4   Alkaline Phosphatase U/L 110   AST U/L 29   ALT U/L 28       Drug levels (last 3 results):  No results for input(s): "VANCOMYCINRA", "VANCORANDOM", "VANCOMYCINPE", "VANCOPEAK", "VANCOMYCINTR", "VANCOTROUGH" in the last 72 hours.    Microbiologic Results:  Microbiology Results (last 7 days)       Procedure Component Value Units Date/Time    Blood culture x two cultures. Draw prior to antibiotics. [[id number]] Collected: 04/26/24 0905    " Order Status: Sent Specimen: Blood from Peripheral, Upper Arm, Right Updated: 04/26/24 1334    Blood culture x two cultures. Draw prior to antibiotics. [[id number]] Collected: 04/26/24 1100    Order Status: Sent Specimen: Blood from Peripheral, Lower Arm, Right Updated: 04/26/24 1334    Culture, MRSA [[id number]] Collected: 04/26/24 1315    Order Status: Sent Specimen: MRSA source from Nares, Left Updated: 04/26/24 1315    Influenza A & B by Molecular [[id number]]     Order Status: Canceled Specimen: Nasopharyngeal Swab

## 2024-04-26 NOTE — ASSESSMENT & PLAN NOTE
Chronic, controlled. Latest blood pressure and vitals reviewed-     Temp:  [98.5 °F (36.9 °C)]   Pulse:  []   Resp:  [20-42]   BP: ()/(52-65)   SpO2:  [60 %-100 %] .   Home meds for hypertension were reviewed and noted below.   Hypertension Medications               cloNIDine 0.3 mg/24 hr td ptwk (CATAPRES) 0.3 mg/24 hr PLACE 1 PATCH ONTO THE SKIN ONCE A WEEK.    furosemide (LASIX) 40 MG tablet TAKE 1 TABLET EVERY DAY    losartan (COZAAR) 25 MG tablet Take 1 tablet (25 mg total) by mouth once daily.    metoprolol succinate (TOPROL-XL) 50 MG 24 hr tablet TAKE 1 TABLET EVERY DAY    nitroGLYCERIN (NITROSTAT) 0.4 MG SL tablet Place 1 tablet (0.4 mg total) under the tongue every 5 (five) minutes as needed for Chest pain.            While in the hospital, will manage blood pressure as follows; Continue home antihypertensive regimen    Will utilize p.r.n. blood pressure medication only if patient's blood pressure greater than 180/110 and he develops symptoms such as worsening chest pain or shortness of breath.

## 2024-04-26 NOTE — CONSULTS
Consult Note  [facility]    Attending: Dr. Woodson  Fellow: Dr. Woodson  Admit Date: 4/26/2024  Today's Date: 04/26/2024      SUBJECTIVE:     HPI: Jose Luis Manzo is a 74 y.o. male with PMH of ESRD s/p renal transplant in 2007 now with CKD IV, Squamous Cell Carcinoma of the Larynx, Former smoker who presented to [facility] on 4/26/2024 with shortness of breath that began this morning. He states that he woke up and was struggling to breathe while in bed this morning. He also reports an episode of vomiting and diarrhea this morning. He says that he was in his normal state of health prior to today and denies any sick contacts. He reports compliance with sirolimus, prednisone, lasix. He states that his leg swelling is chronic. He denies any fever, chills, cough, cold, congestion, or chest pain.    Patient hypotensive upon arrival to ED. Afebrile. Hypoxic to 60% on room air, required Bipap. Chest xray with RLL opacities suggestive of pneumonia. Initiated on vanc/zosyn/azithro, will also do course of hydrocortisone. Cultures pending. Admitted to ICU for continuous bipap requirement.    Review of patient's allergies indicates:  No Known Allergies    Past Medical History:   Diagnosis Date    Acute hypoxemic respiratory failure due to COVID-19 12/30/2020    Anticoagulant long-term use     BPH (benign prostatic hypertrophy)     Cancer     vocal cords    Claudication of right lower extremity 3/10/2016    COVID-19 virus detected 12/30/2020    Dependence on renal dialysis 4/18/2023    Disorder of kidney and ureter     Hyperkalemia 1/1/2021    Hyperlipidemia     Hypertension     Hypokalemia 6/23/2021    Immunosuppression 6/26/2021    Immunosuppression due to chronic steroid use 1/30/2020    Microcytic anemia 9/16/2016    Obesity (BMI 30-39.9) 1/23/2019    Oropharyngeal cancer     Pterygium     Squamous cell carcinoma 4/25/2018    Thromboembolic disorder     Unspecified disorder of kidney and  "ureter      Past Surgical History:   Procedure Laterality Date    CYSTOSCOPY W/ RETROGRADES Bilateral 6/15/2022    Procedure: Cystoscopy, bilateral retrograde pyelogram, and all indicated procedures;  Surgeon: Veronica Bacon MD;  Location: [facility];  Service: Urology;  Laterality: Bilateral;    FRACTURE SURGERY Left     "lower leg" 40 years ago    KIDNEY TRANSPLANT  2007    followed by [facility] Transplant    LARYNX SURGERY  11/2014    Oropharyngeal Cancer x3    microlaryngoscopy      PHLEBOGRAPHY Bilateral 8/2/2022    Procedure: Venogram;  Surgeon: KENIA Mueller II, MD;  Location: [facility] CATH LAB;  Service: Vascular;  Laterality: Bilateral;    SURGICAL REMOVAL OF LESION OF ORBIT Bilateral 8/26/2020    Procedure: EXCISION, LESION, ORBIT;  Surgeon: Linda Tee MD;  Location: [address];  Service: Ophthalmology;  Laterality: Bilateral;    TIBIAL STAPLING Left 1980          Family History   Problem Relation Name Age of Onset    Stroke Mother      Diabetes Mother      Hypertension Mother      Stroke Father      No Known Problems Sister      No Known Problems Brother x1     No Known Problems Daughter      No Known Problems Brother x1      Social History     Tobacco Use    Smoking status: Former     Current packs/day: 0.00     Average packs/day: 1 pack/day for 20.0 years (20.0 ttl pk-yrs)     Types: Cigarettes     Start date: 1989     Quit date: 2009     Years since quitting: 15.3    Smokeless tobacco: Never   Substance Use Topics    Alcohol use: No     Alcohol/week: 0.0 standard drinks of alcohol    Drug use: No     Types: Marijuana     Comment: Occ.       All medications reviewed.    OBJECTIVE:     Vital Signs Trends/Hx Reviewed  Vitals:    04/26/24 1200 04/26/24 1215 04/26/24 1229 04/26/24 1233   BP: 90/61   (!) 98/55   BP Location:       Patient Position:       Pulse: 79  80 78   Resp: (!) 21  20 (!) 34   Temp:       TempSrc:       SpO2: 100%  98% 96%   Weight:  113.4 kg (250 lb)     Height:  5' 10" (1.778 m)   "       Physical Exam:  General: NAD, cooperative & interactive.  HEENT: PERRL, EOMI, oral and nasal mucosa moist.    Voice hoarse 2/2 resection of BL true vocal cords  Cardiac: normal rate, regular rhythm  Respiratory: Bipap on,  increased work of breathing, Auscultation clear bilaterally. No increased work of breathing noted.   Abdomen: Soft, NT/ND. +BS.   Extremities: BL edema.   Neuro: Grossly intact to brief exam. Oriented x3 with appropriate mood/affect to situation.       Laboratory:  Recent Labs   Lab 04/26/24  1008   PH 7.349*   PCO2 37.0   PO2 64.5*   HCO3 20.3*   POCSATURATED 92.6*     Recent Labs   Lab 04/26/24  0925   WBC 10.56   RBC 5.11   HGB 13.3*   HCT 43.0      MCV 84   MCH 26.0*   MCHC 30.9*     Recent Labs   Lab 04/26/24  0925      K 3.0*   *   CO2 18*   BUN 38*   CREATININE 3.6*   CALCIUM 9.9       Microbiology Data:   Microbiology Results (last 7 days)       Procedure Component Value Units Date/Time    Blood culture x two cultures. Draw prior to antibiotics. [[id number]] Collected: 04/26/24 1100    Order Status: Sent Specimen: Blood from Peripheral, Lower Arm, Right     Influenza A & B by Molecular [[id number]]     Order Status: Canceled Specimen: Nasopharyngeal Swab     Blood culture x two cultures. Draw prior to antibiotics. [[id number]] Collected: 04/26/24 0905    Order Status: Sent Specimen: Blood from Peripheral, Upper Arm, Right              Chest Imaging:   No new imaging.     Infusions:    Current Facility-Administered Medications   Medication Dose Route Frequency Last Rate Last Admin       Scheduled Medications:   Current Facility-Administered Medications   Medication Dose Route Frequency    albuterol-ipratropium  3 mL Nebulization Q4H WAKE    ampicillin-sulbactam  1.5 g Intravenous Q8H    apixaban  2.5 mg Oral BID    atorvastatin  20 mg Oral QHS    azithromycin  500 mg Intravenous Once    fluticasone furoate-vilanteroL  1 puff Inhalation Daily    [START ON 4/27/2024]  metoprolol succinate  50 mg Oral Daily    [START ON 4/27/2024] predniSONE  5 mg Oral Daily    sirolimus  3 mg Oral Daily    sodium bicarbonate  650 mg Oral BID       PRN Medications:     Current Facility-Administered Medications:     acetaminophen, 650 mg, Oral, Q8H PRN    dextrose 10%, 12.5 g, Intravenous, PRN    dextrose 10%, 25 g, Intravenous, PRN    glucagon (human recombinant), 1 mg, Intramuscular, PRN    glucose, 16 g, Oral, PRN    glucose, 24 g, Oral, PRN    melatonin, 6 mg, Oral, Nightly PRN    naloxone, 0.02 mg, Intravenous, PRN    ondansetron, 4 mg, Intravenous, Q8H PRN    simethicone, 1 tablet, Oral, QID PRN    sodium chloride 0.9%, 10 mL, Intravenous, Q12H PRN    Assessment & Plan:   Patient Active Problem List   Diagnosis    Stage 4 chronic kidney disease    Essential hypertension    Mixed hyperlipidemia    Benign prostatic hyperplasia with nocturia    Claudication of right lower extremity    Eye swelling, bilateral    Diminished night vision    Renal transplant recipient    Dermolipoma    History of DVT (deep vein thrombosis)    Iron deficiency anemia    Pulmonary nodules    Larynx cancer    Voice disturbance    Atherosclerosis of aorta    Carcinoma in situ of vocal cord    Dysphagia, pharyngoesophageal    Chondronecrosis of larynx    Squamous cell carcinoma    Obesity (BMI 30-39.9)    Pterygium of both eyes    History of cancer of larynx    Nodule of left lung    Granulomatous lung disease    Transplanted kidney - 2007    Hypertensive kidney disease with stage 4 chronic kidney disease    Hypercalcemia    Secondary hyperparathyroidism of renal origin    Benign tumor of orbit    Metabolic bone disease    Immunosuppressive management encounter following kidney transplant    Hyperphosphatemia    Vitamin D deficiency disease    History of COVID-19    SOB (shortness of breath)    Metabolic acidosis    Proteinuria    Anticoagulant long-term use    Pre-op evaluation    Benign prostatic hyperplasia with lower  urinary tract symptoms    Centrilobular emphysema    Dependence on renal dialysis    Class 1 obesity due to excess calories with serious comorbidity and body mass index (BMI) of 34.0 to 34.9 in adult    Former cigarette smoker    Deformity of toenail       ASSESSMENT & RECOMMENDATIONS     CNS/Neuro:  No acute issues    Cardiovascular:  Hypertension  - hold clonidine, losartan at this time    Respiratory:  Acute Hypoxic Respiratory Failure  - SOB that began this AM at home, pt hypoxic on admission  - ABG with pH 7.34, pCO2 37, PO2 64, HCO3 20  - now on Bipap 12/6, Fio2 50%  - Chest xray concerning for pneumonia  - Initiated On CTX/Azithro  - will transition to Vanc/Azithro/Zosyn for broad coverage given immunocompromised state, recent antibiotic use  - will initiate hydrocortisone     GI/Metabolic:  Hypokalemia  - K of 3 on admission, replaced    GI Ppx: None  Diet: NPO    Renal:  CKD Stage IV  - Cr baseline of 3.5  - pt takes lasix at home  - no evidence of fluid overload  - nephrology consulted    Heme:  DVT Ppx: Lovenox    Endo:   Diabetes Type II  - A1c 6.6   - SSI  Strict Glucose control with goal 140-180    ID:  Pneumonia  - Chest xray with lower right side opacities suggestive of infectious process  - Pt on immunosuppressant therapy Sirolimus s/p kidney transplant  - Afebrile, no leukocytosis  - Covid/Flu negative  - MRSA nares pending  - BC pending  - Initiated Vanc/Zosyn/Azithro   - Hydrocortisone 50 mg q6h for 5 days    Code Status: Full    F: NPO  A: Tylenol  S: NA  T: Lovenox  H: Elevated   U: None  G: maintain 140-180  S: NA  B: None  I: PIV  D: initiate Vanc/Azithro/Zosyn    Plan: Initiate broad spectrum antibiotic coverage and hydrocortisone, cultures pending    Javier Russell MD  [employer] [facility], PGY-1

## 2024-04-26 NOTE — ED NOTES
"Patient presents to the ED via EMS with c/o SOB. Upon arrival, patient in obvious respiratory distress. RR in the 40's with RA oxygen saturation of 60%. EMS inform that patient started to c/o SOB last night that lead into this morning. Roommate called 911 due to obvious distress. Inform that patient was 80% on arrival so pt was placed onto Cpap. 4 baby asa and 3 sprays of nitro administered per EMS. Respiratory arrived to ED for immediate placement of Bipap. Pt has course breath sounds with crackles upon auscultation. Edema noted to bilateral lower extremities. Patient explains he has had this swelling for over a year. Denies known hx of CHF and denies taking diuretic. Patient does endorse kidney transplant recipient in 2007. Denies CP. Does endorse cough and congestion over the last week. States he has had thick mucus production. Pt denies being around anyone known to be ill. Denies use of oxygen daily. States he did have to use supplemental oxygen in 2020 when he was dx with Covid; however, states "I really didn't use it though." Patient placed onto continuous cardiac, BP, and O2 monitoring. Patient is tachycardic, tachypneic, hypoxic, normotensive. Skin dry. Mucus membranes moist. Pt alert but somnolent. Answers all questions appropriately. Updated on care plan. IV established and blood work at bedside. Awaiting orders. Safety intact. Call light in reach. Care ongoing.   "

## 2024-04-26 NOTE — Clinical Note
Diagnosis: Pneumonia of right lung due to infectious organism, unspecified part of lung [[id number]]   Future Attending Provider: OREN RUBIO [[id number]]   Reason for IP Medical Treatment  (Clinical interventions that can only be accomplished in the IP setting? ) :: PNA   I certify that Inpatient services for greater than or equal to 2 midnights are medically necessary:: Yes   Plans for Post-Acute care--if anticipated (pick the single best option):: A. No post acute care anticipated at this time

## 2024-04-26 NOTE — CONSULTS
Nephrology Consult  H&P      Consult Requested By: Víctor Rhodes,*  Reason for Consult: CKD4 Kidney Transplant      SUBJECTIVE:     History of Present Illness:  Jose Luis Manzo is a 74 y.o.   male who  has a past medical history of Acute hypoxemic respiratory failure due to COVID-19 (12/30/2020), Anticoagulant long-term use, BPH (benign prostatic hypertrophy), Cancer, Claudication of right lower extremity (3/10/2016), COVID-19 virus detected (12/30/2020), Dependence on renal dialysis (4/18/2023), Disorder of kidney and ureter, Hyperkalemia (1/1/2021), Hyperlipidemia, Hypertension, Hypokalemia (6/23/2021), Immunosuppression (6/26/2021), Immunosuppression due to chronic steroid use (1/30/2020), Microcytic anemia (9/16/2016), Obesity (BMI 30-39.9) (1/23/2019), Oropharyngeal cancer, Pterygium, Squamous cell carcinoma (4/25/2018), Thromboembolic disorder, and Unspecified disorder of kidney and ureter.. The patient presented to the [facility] on 4/26/2024 with a primary complaint of SOB Cough started last night now respiratory distress required BiPAP CXR suggesting PNA.   ?    Review of Systems   Constitutional:  Positive for malaise/fatigue. Negative for chills and fever.   HENT:  Negative for congestion and sore throat.    Eyes:  Negative for blurred vision, double vision and photophobia.   Respiratory:  Positive for cough and shortness of breath.    Cardiovascular:  Negative for chest pain, palpitations and leg swelling.   Gastrointestinal:  Negative for abdominal pain, diarrhea, nausea and vomiting.   Genitourinary:  Negative for dysuria and urgency.   Musculoskeletal:  Negative for joint pain and myalgias.   Skin:  Negative for itching and rash.   Neurological:  Positive for weakness. Negative for dizziness, sensory change and headaches.   Endo/Heme/Allergies:  Negative for polydipsia. Does not bruise/bleed easily.   Psychiatric/Behavioral:  Negative for depression.        Past Medical History:  "  Diagnosis Date    Acute hypoxemic respiratory failure due to COVID-19 12/30/2020    Anticoagulant long-term use     BPH (benign prostatic hypertrophy)     Cancer     vocal cords    Claudication of right lower extremity 3/10/2016    COVID-19 virus detected 12/30/2020    Dependence on renal dialysis 4/18/2023    Disorder of kidney and ureter     Hyperkalemia 1/1/2021    Hyperlipidemia     Hypertension     Hypokalemia 6/23/2021    Immunosuppression 6/26/2021    Immunosuppression due to chronic steroid use 1/30/2020    Microcytic anemia 9/16/2016    Obesity (BMI 30-39.9) 1/23/2019    Oropharyngeal cancer     Pterygium     Squamous cell carcinoma 4/25/2018    Thromboembolic disorder     Unspecified disorder of kidney and ureter      Past Surgical History:   Procedure Laterality Date    CYSTOSCOPY W/ RETROGRADES Bilateral 6/15/2022    Procedure: Cystoscopy, bilateral retrograde pyelogram, and all indicated procedures;  Surgeon: Veronica Bacon MD;  Location: [facility];  Service: Urology;  Laterality: Bilateral;    FRACTURE SURGERY Left     "lower leg" 40 years ago    KIDNEY TRANSPLANT  2007    followed by [facility] Transplant    LARYNX SURGERY  11/2014    Oropharyngeal Cancer x3    microlaryngoscopy      PHLEBOGRAPHY Bilateral 8/2/2022    Procedure: Venogram;  Surgeon: KENIA Mueller II, MD;  Location: [facility] CATH LAB;  Service: Vascular;  Laterality: Bilateral;    SURGICAL REMOVAL OF LESION OF ORBIT Bilateral 8/26/2020    Procedure: EXCISION, LESION, ORBIT;  Surgeon: Linda Tee MD;  [address];  Service: Ophthalmology;  Laterality: Bilateral;    TIBIAL STAPLING Left 1980          Family History   Problem Relation Name Age of Onset    Stroke Mother      Diabetes Mother      Hypertension Mother      Stroke Father      No Known Problems Sister      No Known Problems Brother x1     No Known Problems Daughter      No Known Problems Brother x1      Social History     Tobacco Use    Smoking status: Former     " Current packs/day: 0.00     Average packs/day: 1 pack/day for 20.0 years (20.0 ttl pk-yrs)     Types: Cigarettes     Start date: 1989     Quit date: 2009     Years since quitting: 15.3    Smokeless tobacco: Never   Substance Use Topics    Alcohol use: No     Alcohol/week: 0.0 standard drinks of alcohol    Drug use: No     Types: Marijuana     Comment: Occ.       Review of patient's allergies indicates:  No Known Allergies         OBJECTIVE:     Vital Signs (Most Recent)  Vitals:    04/26/24 0943 04/26/24 1013 04/26/24 1045 04/26/24 1105   BP: (!) 88/52 108/62 (!) 94/56 (!) 98/59   BP Location:       Patient Position:       Pulse: 107 83 84 83   Resp: (!) 34 (!) 36 (!) 23 (!) 29   Temp:       TempSrc:       SpO2: (!) 73% 95% 100% 99%   Weight:       Height:                     Medications:  Current Facility-Administered Medications   Medication Dose Route Frequency    albuterol-ipratropium  3 mL Nebulization Q4H WAKE    ampicillin-sulbactam  1.5 g Intravenous Q8H    apixaban  2.5 mg Oral BID    atorvastatin  20 mg Oral QHS    azithromycin  500 mg Intravenous Once    cefTRIAXone (Rocephin) IV (PEDS and ADULTS)  2 g Intravenous Once    lactated ringers  30 mL/kg Intravenous Once    [START ON 4/27/2024] metoprolol succinate  50 mg Oral Daily    [START ON 4/27/2024] predniSONE  5 mg Oral Daily    sirolimus  3 mg Oral Daily    sodium bicarbonate  650 mg Oral BID           Physical Exam  Vitals and nursing note reviewed.   Constitutional:       General: He is not in acute distress.     Appearance: He is ill-appearing. He is not diaphoretic.   HENT:      Head: Normocephalic and atraumatic.      Mouth/Throat:      Pharynx: No oropharyngeal exudate.   Eyes:      General: No scleral icterus.     Conjunctiva/sclera: Conjunctivae normal.      Pupils: Pupils are equal, round, and reactive to light.   Cardiovascular:      Rate and Rhythm: Normal rate and regular rhythm.      Heart sounds: Normal heart sounds. No murmur  heard.  Pulmonary:      Effort: Respiratory distress present.      Breath sounds: Examination of the right-middle field reveals rales. Examination of the right-lower field reveals rales. Rales present.   Abdominal:      General: Bowel sounds are normal. There is no distension.      Palpations: Abdomen is soft.      Tenderness: There is no abdominal tenderness.   Musculoskeletal:         General: Normal range of motion.      Cervical back: Normal range of motion and neck supple.   Skin:     General: Skin is warm and dry.      Findings: No erythema.   Neurological:      Mental Status: He is alert and oriented to person, place, and time.      Cranial Nerves: No cranial nerve deficit.   Psychiatric:         Mood and Affect: Affect normal.         Cognition and Memory: Memory normal.         Judgment: Judgment normal.         Laboratory:  Recent Labs   Lab 04/26/24  0925   WBC 10.56   HGB 13.3*   HCT 43.0      MONO 5.7  0.6   EOSINOPHIL 0.2     Recent Labs   Lab 04/26/24  0925      K 3.0*   *   CO2 18*   BUN 38*   CREATININE 3.6*   CALCIUM 9.9       Diagnostic Results:  X-Ray: Reviewed  US: Reviewed  Echo: Reviewed  ASSESSMENT/PLAN:     1. 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  -- Renally dose all meds  -- Please avoid nephrotoxins, including NSAIDs, aminoglycosides, IV contrast (unless absolutely necessary), gadolinium, fleets and other phosphorous-based laxatives. Caution with antibiotics.    Hypoxic Respiratory failure - Sepsis   CXR patchy infiltrate CT Chest pending, procal 20.9  -- IV abx for atipical PNA coverage - high risk group     Chronic Immunosuppression Therapy management   -- Check  levels   -- Sirolimus 3mg once a day     -- Prednisone 5 daily     2. HTN  - now Hypotension    Sepsis Hold BP meds      3. Anemia  "of chronic kidney disease    -- On PO iron   Recent Labs   Lab 04/26/24  0925   HGB 13.3*   HCT 43.0          Iron - check levels   Lab Results   Component Value Date    IRON 60 12/15/2022    TIBC 195 (L) 12/15/2022    FERRITIN 753 (H) 12/15/2022       4. MBD   - PTH elevated   Recent Labs   Lab 04/26/24  0925   CALCIUM 9.9     No results for input(s): "MG" in the last 168 hours.    Lab Results   Component Value Date    .5 (H) 01/18/2024    CALCIUM 9.9 04/26/2024    PHOS 3.5 04/09/2024     Lab Results   Component Value Date    [id number] 33 01/18/2024     Acidosis   -- Po bicarbonate   Lab Results   Component Value Date    CO2 18 (L) 04/26/2024       5. Nutrition/Hypoalbuminemia   Recent Labs   Lab 04/26/24  0925   ALBUMIN 3.3*     Nepro with meals TID.       Thank you for allowing me to participate in care of your patient  With any question please call   Ronda Hamm MD     [facility]  LESLY Sanchez MD,   OM[facility] KIMBERLYN Colunga MD E. V. Harmon, NP  [address]   [place], [address] [phone]  After hours answering service: [phone]   "

## 2024-04-26 NOTE — NURSING TRANSFER
Nursing Transfer Note      4/26/2024   2:45 PM    Nurse giving handoff:lEaina PANCHAL  Nurse receiving handoff:Jayla PANCHAL    Reason patient is being transferred: Transfer to ICU     Transfer From: ED    Transfer via stretcher    Transfer with O2, cardiac monitoring    Transported by ED RN X 2    Transfer Vital Signs:  Blood Pressure:146/83   Heart Rate:87  O2:40% non re breather mask  Temperature:98  Respirations:30    Medicines sent: No    Patient belongings transferred with patient: Yes    Chart send with patient: No    Notified: Gene chakraborty    Upon arrival to floor: cardiac monitor applied, patient oriented to room, call bell in reach, and bed in lowest position.  RT at beside for continuous BIPAP.

## 2024-04-26 NOTE — ASSESSMENT & PLAN NOTE
Patient with Hypoxic Respiratory failure which is Acute.  he is not on home oxygen. Supplemental oxygen was provided and noted-      .   Signs/symptoms of respiratory failure include- tachypnea, increased work of breathing, and respiratory distress. Contributing diagnoses includes - Aspiration and Pneumonia Labs and images were reviewed. Patient Has recent ABG, which has been reviewed. Will treat underlying causes and adjust management of respiratory failure as follows- BIPAP/DUONEB/IS/Breo ellipta

## 2024-04-26 NOTE — H&P
[facility] Medicine  History & Physical    Patient Name: Jose Luis Manzo  MRN: [id number]  Patient Class: IP- Inpatient  Admission Date: 4/26/2024  Attending Physician: Milton Caldwell MD   Primary Care Provider: Home Alexis MD         Patient information was obtained from patient and ER records.     Subjective:     Principal Problem:Bacterial pneumonia    Chief Complaint:   Chief Complaint   Patient presents with    Shortness of Breath     SOB since last night. EMS reports initial RA sat of 80%, improved with CPAP. On arrival, awake, alert with continued c/o SOB. Denies pain.        HPI: Very pleasant 73 yo F presents with acute on chronic progressive dyspnea , hacking cough , afebrile , denied being on home oxygen , no sick contacts or travel, but is a former smoker.  Denied chest pain wheezing intermittent claudication.No Recent abx use or lung disease prior. 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     Past Medical History:   Diagnosis Date    Acute hypoxemic respiratory failure due to COVID-19 12/30/2020    Anticoagulant long-term use     BPH (benign prostatic hypertrophy)     Cancer     vocal cords    Claudication of right lower extremity 3/10/2016    COVID-19 virus detected 12/30/2020    Dependence on renal dialysis 4/18/2023    Disorder of kidney and ureter     Hyperkalemia 1/1/2021    Hyperlipidemia     Hypertension     Hypokalemia 6/23/2021    Immunosuppression 6/26/2021    Immunosuppression due to chronic steroid use 1/30/2020    Microcytic anemia 9/16/2016    Obesity (BMI 30-39.9) 1/23/2019    Oropharyngeal cancer     Pterygium     Squamous cell carcinoma 4/25/2018    Thromboembolic disorder     Unspecified disorder of kidney and ureter        Past Surgical History:   Procedure Laterality Date    CYSTOSCOPY W/ RETROGRADES Bilateral 6/15/2022    Procedure: Cystoscopy, bilateral retrograde  "pyelogram, and all indicated procedures;  Surgeon: Veronica Bacon MD;  Location: [facility] OR;  Service: Urology;  Laterality: Bilateral;    FRACTURE SURGERY Left     "lower leg" 40 years ago    KIDNEY TRANSPLANT  2007    followed by [facility]    LARYNX SURGERY  11/2014    Oropharyngeal Cancer x3    microlaryngoscopy      PHLEBOGRAPHY Bilateral 8/2/2022    Procedure: Venogram;  Surgeon: KENIA Mueller II, MD;  Location: [facility] CATH LAB;  Service: Vascular;  Laterality: Bilateral;    SURGICAL REMOVAL OF LESION OF ORBIT Bilateral 8/26/2020    Procedure: EXCISION, LESION, ORBIT;  Surgeon: Linda Tee MD;  Location: [facility] OR 2ND FLR;  Service: Ophthalmology;  Laterality: Bilateral;    TIBIAL STAPLING Left 1980            Review of patient's allergies indicates:  No Known Allergies    Current Facility-Administered Medications   Medication Dose Route Frequency Provider Last Rate Last Admin    acetaminophen tablet 650 mg  650 mg Oral Q8H PRN Milton Caldwell MD        albuterol-ipratropium 2.5 mg-0.5 mg/3 mL nebulizer solution 3 mL  3 mL Nebulization Q4H WAKE Milton Caldwell MD   3 mL at 04/26/24 1229    ampicillin-sulbactam 1.5 g in sodium chloride 0.9 % 100 mL IVPB (MB+)  1.5 g Intravenous Q8H Milton Caldwell MD        apixaban tablet 2.5 mg  2.5 mg Oral BID Milton Caldwell MD        atorvastatin tablet 20 mg  20 mg Oral QHS Milton Caldwell MD        azithromycin (ZITHROMAX) 500 mg in dextrose 5 % (D5W) 250 mL IVPB (Vial-Mate)  500 mg Intravenous Q24H Milton Caldwell MD        dextrose 10% bolus 125 mL 125 mL  12.5 g Intravenous PRN Milton Caldwell MD        dextrose 10% bolus 250 mL 250 mL  25 g Intravenous PRN Milton Caldwell MD        fluticasone furoate-vilanteroL 100-25 mcg/dose diskus inhaler 1 puff  1 puff Inhalation Daily Milton Caldwell MD        glucagon (human recombinant) injection 1 mg  1 mg Intramuscular PRN Milton Caldwell MD        glucose chewable tablet 16 g  16 g Oral " PRN Milton Caldwell MD        glucose chewable tablet 24 g  24 g Oral PRN Milton Caldwell MD        [COMPLETED] lactated ringers bolus 3,402 mL  30 mL/kg Intravenous Once Víctor Rhodes MD 3,402 mL/hr at 04/26/24 1238 3,402 mL at 04/26/24 1238    melatonin tablet 6 mg  6 mg Oral Nightly PRN Milton Caldwell MD        [START ON 4/27/2024] metoprolol succinate (TOPROL-XL) 24 hr tablet 50 mg  50 mg Oral Daily Milton Caldwell MD        naloxone 0.4 mg/mL injection 0.02 mg  0.02 mg Intravenous PRN Milton Caldwell MD        ondansetron injection 4 mg  4 mg Intravenous Q8H PRN Milton Caldwell MD        [START ON 4/27/2024] predniSONE tablet 5 mg  5 mg Oral Daily Milton Caldwell MD        simethicone chewable tablet 80 mg  1 tablet Oral QID PRN Milton Caldwell MD        sirolimus tablet 3 mg  3 mg Oral Daily Milton Caldwell MD        sodium bicarbonate tablet 650 mg  650 mg Oral BID Milton Caldwell MD        sodium chloride 0.9% flush 10 mL  10 mL Intravenous Q12H PRN Milton Caldwell MD         Current Outpatient Medications   Medication Sig Dispense Refill    amoxicillin-clavulanate 875-125mg (AUGMENTIN) 875-125 mg per tablet Take 1 tablet by mouth 2 (two) times daily. 14 tablet 0    atorvastatin (LIPITOR) 20 MG tablet TAKE 1 TABLET EVERY EVENING 90 tablet 3    cloNIDine 0.3 mg/24 hr td ptwk (CATAPRES) 0.3 mg/24 hr PLACE 1 PATCH ONTO THE SKIN ONCE A WEEK. 12 patch 10    doxycycline (VIBRAMYCIN) 100 MG Cap Take 1 capsule (100 mg total) by mouth 2 (two) times daily. 20 capsule 0    ELIQUIS 2.5 mg Tab TAKE 1 TABLET TWICE DAILY 180 tablet 3    ergocalciferol (ERGOCALCIFEROL) 50,000 unit Cap Take one tablet once a week      ergocalciferol (ERGOCALCIFEROL) 50,000 unit Cap TAKE 1 CAPSULE BY MOUTH EVERY 7 DAYS. 12 capsule 3    erythromycin (ROMYCIN) ophthalmic ointment Place a 1/2 inch ribbon of ointment into the left lower eyelid twice a day for 7 days 15 g 0    ferrous sulfate 325 mg (65  mg iron) Tab tablet Take 325 mg by mouth once daily.      finasteride (PROSCAR) 5 mg tablet TAKE 1 TABLET EVERY DAY 90 tablet 3    fluticasone propionate (FLONASE) 50 mcg/actuation nasal spray 1 spray (50 mcg total) by Each Nostril route nightly. 10 mL 5    furosemide (LASIX) 40 MG tablet TAKE 1 TABLET EVERY DAY 90 tablet 3    losartan (COZAAR) 25 MG tablet Take 1 tablet (25 mg total) by mouth once daily. 90 tablet 3    metoprolol succinate (TOPROL-XL) 50 MG 24 hr tablet TAKE 1 TABLET EVERY DAY 90 tablet 3    nitroGLYCERIN (NITROSTAT) 0.4 MG SL tablet Place 1 tablet (0.4 mg total) under the tongue every 5 (five) minutes as needed for Chest pain. 30 tablet 2    predniSONE (DELTASONE) 5 MG tablet TAKE 1 TABLET EVERY DAY 90 tablet 3    sirolimus (RAPAMUNE) 1 MG Tab Take 3 tablets (3 mg total) by mouth once daily. 90 tablet 11    sodium bicarbonate 650 MG tablet TAKE 1 TABLET TWICE DAILY 180 tablet 3    traMADoL (ULTRAM) 50 mg tablet Take 1 tablet (50 mg total) by mouth every 6 (six) hours as needed for Pain. 28 tablet 0     Family History       Problem Relation (Age of Onset)    Diabetes Mother    Hypertension Mother    No Known Problems Sister, Brother, Daughter, Brother    Stroke Mother, Father          Tobacco Use    Smoking status: Former     Current packs/day: 0.00     Average packs/day: 1 pack/day for 20.0 years (20.0 ttl pk-yrs)     Types: Cigarettes     Start date: 1989     Quit date: 2009     Years since quitting: 15.3    Smokeless tobacco: Never   Substance and Sexual Activity    Alcohol use: No     Alcohol/week: 0.0 standard drinks of alcohol    Drug use: No     Types: Marijuana     Comment: Occ.    Sexual activity: Not Currently     Partners: Female     Review of Systems   Constitutional:  Positive for activity change, appetite change and fatigue.   HENT:  Positive for congestion.    Eyes: Negative.    Respiratory:  Positive for cough and shortness of breath.    Cardiovascular: Negative.     Gastrointestinal: Negative.    Endocrine: Negative.    Genitourinary: Negative.    Allergic/Immunologic: Negative.    Neurological: Negative.    Hematological: Negative.    Psychiatric/Behavioral: Negative.       Objective:     Vital Signs (Most Recent):  Temp: 98.5 °F (36.9 °C) (04/26/24 0900)  Pulse: 79 (04/26/24 1300)  Resp: (!) 29 (04/26/24 1305)  BP: 99/64 (04/26/24 1300)  SpO2: 97 % (04/26/24 1300) Vital Signs (24h Range):  Temp:  [98.5 °F (36.9 °C)] 98.5 °F (36.9 °C)  Pulse:  [] 79  Resp:  [20-42] 29  SpO2:  [60 %-100 %] 97 %  BP: ()/(52-65) 99/64     Weight: 113.4 kg (250 lb)  Body mass index is 35.87 kg/m².     Physical Exam  Constitutional:       General: He is in acute distress.      Appearance: He is ill-appearing.   HENT:      Head: Normocephalic.      Nose: Nose normal.      Mouth/Throat:      Mouth: Mucous membranes are moist.   Cardiovascular:      Rate and Rhythm: Normal rate.      Pulses: Normal pulses.   Pulmonary:      Effort: Respiratory distress present.      Breath sounds: Rhonchi and rales present.   Abdominal:      General: Abdomen is flat. Bowel sounds are normal.      Palpations: Abdomen is soft.   Musculoskeletal:         General: Normal range of motion.   Skin:     General: Skin is warm.      Capillary Refill: Capillary refill takes less than 2 seconds.   Neurological:      General: No focal deficit present.      Mental Status: He is alert.   Psychiatric:         Mood and Affect: Mood normal.                Significant Labs: All pertinent labs within the past 24 hours have been reviewed.  BMP:   Recent Labs   Lab 04/26/24 0925   *      K 3.0*   *   CO2 18*   BUN 38*   CREATININE 3.6*   CALCIUM 9.9     CBC:   Recent Labs   Lab 04/26/24 0925   WBC 10.56   HGB 13.3*   HCT 43.0        CMP:   Recent Labs   Lab 04/26/24 0925      K 3.0*   *   CO2 18*   *   BUN 38*   CREATININE 3.6*   CALCIUM 9.9   PROT 7.5   ALBUMIN 3.3*   BILITOT  "0.4   ALKPHOS 110   AST 29   ALT 28   ANIONGAP 14     Cardiac Markers:   Recent Labs   Lab 04/26/24  0925   *     Lipid Panel: No results for input(s): "CHOL", "HDL", "LDLCALC", "TRIG", "CHOLHDL" in the last 48 hours.  Magnesium: No results for input(s): "MG" in the last 48 hours.  Prealbumin: No results for input(s): "PREALBUMIN" in the last 48 hours.    Significant Imaging: I have reviewed all pertinent imaging results/findings within the past 24 hours.  I have reviewed and interpreted all pertinent imaging results/findings within the past 24 hours.  Assessment/Plan:     * Bacterial pneumonia  IP admission meeting benchmarks, will cover for aspiration and atypical organisms. Increased work of breathing , placed on NIPPV - given unable to wean off BIPAP - transfer to ICU. Case discussed with fellow on rounds   Infectious work up running. CT CHEST WO Contrast ordered  Admission orders in .       Acute respiratory failure with hypoxia  Patient with Hypoxic Respiratory failure which is Acute.  he is not on home oxygen. Supplemental oxygen was provided and noted-      .   Signs/symptoms of respiratory failure include- tachypnea, increased work of breathing, and respiratory distress. Contributing diagnoses includes - Aspiration and Pneumonia Labs and images were reviewed. Patient Has recent ABG, which has been reviewed. Will treat underlying causes and adjust management of respiratory failure as follows- BIPAP/DUONEB/IS/Breo ellipta    History of DVT (deep vein thrombosis)    Restart renal dosed Eliquis , although for DVT/PE no need to renal dose    Renal transplant recipient  Restart Sirolimus and Prednisone , check [place] levels      Mixed hyperlipidemia  Statin therapy       Essential hypertension  Chronic, controlled. Latest blood pressure and vitals reviewed-     Temp:  [98.5 °F (36.9 °C)]   Pulse:  []   Resp:  [20-42]   BP: ()/(52-65)   SpO2:  [60 %-100 %] .   Home meds for hypertension were " reviewed and noted below.   Hypertension Medications               cloNIDine 0.3 mg/24 hr td ptwk (CATAPRES) 0.3 mg/24 hr PLACE 1 PATCH ONTO THE SKIN ONCE A WEEK.    furosemide (LASIX) 40 MG tablet TAKE 1 TABLET EVERY DAY    losartan (COZAAR) 25 MG tablet Take 1 tablet (25 mg total) by mouth once daily.    metoprolol succinate (TOPROL-XL) 50 MG 24 hr tablet TAKE 1 TABLET EVERY DAY    nitroGLYCERIN (NITROSTAT) 0.4 MG SL tablet Place 1 tablet (0.4 mg total) under the tongue every 5 (five) minutes as needed for Chest pain.            While in the hospital, will manage blood pressure as follows; Continue home antihypertensive regimen    Will utilize p.r.n. blood pressure medication only if patient's blood pressure greater than 180/110 and he develops symptoms such as worsening chest pain or shortness of breath.    Stage 4 chronic kidney disease  Creatine stable for now. BMP reviewed- noted Estimated Creatinine Clearance: 22.7 mL/min (A) (based on SCr of 3.6 mg/dL (H)). according to latest data. Based on current GFR, CKD stage is stage 4 - GFR 15-29.  Monitor UOP and serial BMP and adjust therapy as needed. Renally dose meds. Avoid nephrotoxic medications and procedures.  At baseline, consulted nephrology , am labs, avoid nephrotoxins      VTE Risk Mitigation (From admission, onward)           Ordered     apixaban tablet 2.5 mg  2 times daily         04/26/24 6945                                    Milton Caldwell MD  Department of Hospital Medicine  [facility] - Emergency Dept

## 2024-04-26 NOTE — ED PROVIDER NOTES
"Encounter Date: 4/26/2024       History     Chief Complaint   Patient presents with    Shortness of Breath     SOB since last night. EMS reports initial RA sat of 80%, improved with CPAP. On arrival, awake, alert with continued c/o SOB. Denies pain.     Patient is a 74-year-old male brought in by EMS for shortness of breath.  Patient says this started last night.  He has also had a dry cough.  No known fever.  No chest pain.  EMS reports an oxygen saturation of 80% upon their arrival which improved after placement on CPAP.      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    Disorder of kidney and ureter     Hyperkalemia 1/1/2021    Hyperlipidemia     Hypertension     Hypokalemia 6/23/2021    Immunosuppression 6/26/2021    Immunosuppression due to chronic steroid use 1/30/2020    Microcytic anemia 9/16/2016    Obesity (BMI 30-39.9) 1/23/2019    Oropharyngeal cancer     Pterygium     Squamous cell carcinoma 4/25/2018    Thromboembolic disorder     Unspecified disorder of kidney and ureter      Past Surgical History:   Procedure Laterality Date    CYSTOSCOPY W/ RETROGRADES Bilateral 6/15/2022    Procedure: Cystoscopy, bilateral retrograde pyelogram, and all indicated procedures;  Surgeon: Veronica Bacon MD;  Location: [facility] OR;  Service: Urology;  Laterality: Bilateral;    FRACTURE SURGERY Left     "lower leg" 40 years ago    KIDNEY TRANSPLANT  2007    followed by [facility] Transplant    LARYNX SURGERY  11/2014    Oropharyngeal Cancer x3    microlaryngoscopy      PHLEBOGRAPHY Bilateral 8/2/2022    Procedure: Venogram;  Surgeon: KENIA Mueller II, MD;  Location: [facility] CATH LAB; "  Service: Vascular;  Laterality: Bilateral;    SURGICAL REMOVAL OF LESION OF ORBIT Bilateral 8/26/2020    Procedure: EXCISION, LESION, ORBIT;  Surgeon: Linda Tee MD;  Location: [facility] OR [address] [place] [address];  Service: Ophthalmology;  Laterality: Bilateral;    TIBIAL STAPLING Left 1980          Family History   Problem Relation Name Age of Onset    Stroke Mother      Diabetes Mother      Hypertension Mother      Stroke Father      No Known Problems Sister      No Known Problems Brother x1     No Known Problems Daughter      No Known Problems Brother x1      Social History     Tobacco Use    Smoking status: Former     Current packs/day: 0.00     Average packs/day: 1 pack/day for 20.0 years (20.0 ttl pk-yrs)     Types: Cigarettes     Start date: 1989     Quit date: 2009     Years since quitting: 15.3    Smokeless tobacco: Never   Substance Use Topics    Alcohol use: No     Alcohol/week: 0.0 standard drinks of alcohol    Drug use: No     Types: Marijuana     Comment: Occ.     Review of Systems   Constitutional:  Negative for fever.   Respiratory:  Positive for cough and shortness of breath.    Cardiovascular:  Negative for chest pain.   Gastrointestinal:  Negative for vomiting.   Neurological:  Negative for syncope.   All other systems reviewed and are negative.      Physical Exam     Initial Vitals [04/26/24 0900]   BP Pulse Resp Temp SpO2   111/60 108 (!) 42 98.5 °F (36.9 °C) (!) 60 %      MAP       --         Physical Exam    Nursing note and vitals reviewed.  Constitutional: He appears distressed.   Neck: Neck supple.   Cardiovascular:            Tachycardic.   Pulmonary/Chest:   Diminished breath sounds on right.   Abdominal: Abdomen is soft. There is no abdominal tenderness.   Musculoskeletal:         General: Normal range of motion.      Cervical back: Neck supple.     Neurological: He is alert.   Skin: Skin is warm and dry.   Psychiatric: Thought content normal.         ED Course   Critical Care    Date/Time: 4/26/2024  2:18 PM    Performed by: Víctor Rhodes MD  Authorized by: Milton Caldwell MD  Direct patient critical care time: 90 minutes  Additional history critical care time: 5 minutes  Ordering / reviewing critical care time: 15 minutes  Documentation critical care time: 15 minutes  Consulting other physicians critical care time: 5 minutes  Total critical care time (exclusive of procedural time) : 130 minutes  Critical care was time spent personally by me on the following activities: examination of patient, discussions with primary provider, obtaining history from patient or surrogate, ordering and performing treatments and interventions, ordering and review of laboratory studies, ordering and review of radiographic studies, pulse oximetry, re-evaluation of patient's condition and review of old charts.        Labs Reviewed   CBC W/ AUTO DIFFERENTIAL - Abnormal; Notable for the following components:       Result Value    Hemoglobin 13.3 (*)     MCH 26.0 (*)     MCHC 30.9 (*)     RDW 16.8 (*)     Immature Granulocytes 0.8 (*)     Gran # (ANC) 8.7 (*)     Immature Grans (Abs) 0.08 (*)     Gran % 82.6 (*)     Lymph % 10.4 (*)     All other components within normal limits   COMPREHENSIVE METABOLIC PANEL - Abnormal; Notable for the following components:    Potassium 3.0 (*)     Chloride 111 (*)     CO2 18 (*)     Glucose 164 (*)     BUN 38 (*)     Creatinine 3.6 (*)     Albumin 3.3 (*)     eGFR 17 (*)     All other components within normal limits   TROPONIN I - Abnormal; Notable for the following components:    Troponin I 0.353 (*)     All other components within normal limits   B-TYPE NATRIURETIC PEPTIDE - Abnormal; Notable for the following components:     (*)     All other components within normal limits   LACTIC ACID, PLASMA - Abnormal; Notable for the following components:    Lactate (Lactic Acid) 3.5 (*)     All other components within normal limits    Narrative:     LA critical result(s) called and  verbal readback obtained from   Lore Lyn RN. by HVB1 04/26/2024 11:38   PROCALCITONIN - Abnormal; Notable for the following components:    Procalcitonin 20.98 (*)     All other components within normal limits   CULTURE, BLOOD   CULTURE, BLOOD   CULTURE, METHICILLIN-RESISTANT STAPHYLOCOCCUS AUREUS   LACTIC ACID, PLASMA   URINALYSIS, REFLEX TO URINE CULTURE   STREP PNEUMO AG URINE   LEGIONELLA ANTIGEN, URINE RANDOM   SARS-COV-2 RDRP GENE   POCT INFLUENZA A/B MOLECULAR     EKG Readings: (Independently Interpreted)   Initial Reading: No STEMI. Rhythm: Sinus Tachycardia. Heart Rate: 128. Axis: Left Axis Deviation.     ECG Results              EKG 12-lead (In process)        Collection Time Result Time QRS Duration OHS QTC Calculation    04/26/24 09:03:10 04/26/24 11:46:50 124 484                     In process by Interface, Lab In [facility] (04/26/24 11:46:58)                   Narrative:    Test Reason : R06.02,    Vent. Rate : 128 BPM     Atrial Rate : 128 BPM     P-R Int : 138 ms          QRS Dur : 124 ms      QT Int : 332 ms       P-R-T Axes : 043 -71 090 degrees     QTc Int : 484 ms    Sinus tachycardia  Left axis deviation  Nonspecific intraventricular conduction delay  ST and T wave abnormality, consider lateral ischemia  Abnormal ECG  When compared with ECG of 08-JUN-2022 11:21,  Vent. rate has increased BY  73 BPM  ST no longer depressed in Inferior leads  Nonspecific T wave abnormality no longer evident in Inferior leads  T wave inversion less evident in Lateral leads    Referred By: [email or web address]   SELF           Confirmed By:                                   Imaging Results              X-Ray Chest 1 View (Final result)  Result time 04/26/24 10:07:16      Final result by Dae Serrano MD (04/26/24 10:07:16)                   Impression:      As above.      Electronically signed by: Dae Serrano  Date:    04/26/2024  Time:    10:07               Narrative:    EXAMINATION:  XR CHEST 1 VIEW    CLINICAL  HISTORY:  Shortness of breath;    TECHNIQUE:  Single frontal view of the chest was performed.    COMPARISON:  Chest radiograph 12/30/2020    FINDINGS:  Lines and tubes: None.    Heart and mediastinum: Normal in size.  Calcifications of the aortic arch.    Pleura: No pleural effusion or pneumothorax.    Lungs: Lung volumes are adequate.  There are patchy and confluent airspace opacities throughout the right lung, sparing the apex.  Patchy opacities are also seen in the left lower lung zone.  Findings are concerning for aspiration or pneumonia.    Soft tissue/bone: Surgical clips in the right axilla.  Scattered degenerative changes.                                       Medications   atorvastatin tablet 20 mg (has no administration in time range)   apixaban tablet 2.5 mg (has no administration in time range)   metoprolol succinate (TOPROL-XL) 24 hr tablet 50 mg (has no administration in time range)   predniSONE tablet 5 mg (has no administration in time range)   sirolimus tablet 3 mg (has no administration in time range)   sodium bicarbonate tablet 650 mg (has no administration in time range)   albuterol-ipratropium 2.5 mg-0.5 mg/3 mL nebulizer solution 3 mL (3 mLs Nebulization Given 4/26/24 1229)   fluticasone furoate-vilanteroL 100-25 mcg/dose diskus inhaler 1 puff (0 puffs Inhalation Hold 4/26/24 1330)   sodium chloride 0.9% flush 10 mL (has no administration in time range)   naloxone 0.4 mg/mL injection 0.02 mg (has no administration in time range)   glucose chewable tablet 16 g (has no administration in time range)   glucose chewable tablet 24 g (has no administration in time range)   glucagon (human recombinant) injection 1 mg (has no administration in time range)   ondansetron injection 4 mg (has no administration in time range)   acetaminophen tablet 650 mg (has no administration in time range)   simethicone chewable tablet 80 mg (has no administration in time range)   melatonin tablet 6 mg (has no  administration in time range)   dextrose 10% bolus 125 mL 125 mL (has no administration in time range)   dextrose 10% bolus 250 mL 250 mL (has no administration in time range)   piperacillin-tazobactam (ZOSYN) 4.5 g in dextrose 5 % in water (D5W) 100 mL IVPB (MB+) (has no administration in time range)   vancomycin - pharmacy to dose (has no administration in time range)   piperacillin-tazobactam (ZOSYN) 4.5 g in dextrose 5 % in water (D5W) 100 mL IVPB (MB+) (has no administration in time range)   vancomycin 2 g in dextrose 5 % 500 mL IVPB (has no administration in time range)   azithromycin (ZITHROMAX) 500 mg in dextrose 5 % (D5W) 250 mL IVPB (Vial-Mate) (has no administration in time range)   sodium chloride 0.9% bolus 250 mL 250 mL (0 mLs Intravenous Stopped 4/26/24 1038)   potassium chloride SA CR tablet 40 mEq (40 mEq Oral Given 4/26/24 1037)   lactated ringers bolus 3,402 mL (0 mLs Intravenous Stopped 4/26/24 1413)   cefTRIAXone (ROCEPHIN) 2 g in dextrose 5 % in water (D5W) 100 mL IVPB (MB+) (0 g Intravenous Stopped 4/26/24 1125)   azithromycin (ZITHROMAX) 500 mg in dextrose 5 % (D5W) 250 mL IVPB (Vial-Mate) (0 mg Intravenous Stopped 4/26/24 1251)     Medical Decision Making  DDx :  Including but not limited to :  Upper respiratory infection, pleural effusion, pneumonia, pulmonary embolism, volume overload.      Emergent evaluation of a 74-year-old male presenting with shortness of breath.  Chest x-ray shows almost complete opacification of the right lung suggestive of pneumonia.  Blood cultures have been drawn and antibiotics given here in the ED. I feel the patient will require admission and will be admitted by the [facility] hospitalist.    Amount and/or Complexity of Data Reviewed  Labs: ordered.     Details: CBC unremarkable.  CMP with a potassium of 3.0, glucose of 164, BUN of 38 and creatinine of 3.6.  Troponin is 0.353.  Lactic acid is 3.5.  Radiology: ordered.     Details: Chest x-ray with patchy  opacities of the right lung.  Discussion of management or test interpretation with external provider(s): I have discussed the patient's symptoms as well as all pertinent lab work and chest x-ray results with the [facility] hospitalist.    Risk  Prescription drug management.  Decision regarding hospitalization.                                      Clinical Impression:  Final diagnoses:  [R06.02] SOB (shortness of breath)  [J18.9] Pneumonia of right lung due to infectious organism, unspecified part of lung (Primary)  [R09.02] Hypoxia          ED Disposition Condition    Admit Stable                Víctor Rhodes MD  04/26/24 1417       Víctor Rhodes MD  04/26/24 1414

## 2024-04-27 LAB
ALBUMIN SERPL BCP-MCNC: 2.5 G/DL (ref 3.5–5.2)
ANION GAP SERPL CALC-SCNC: 10 MMOL/L (ref 8–16)
ANION GAP SERPL CALC-SCNC: 10 MMOL/L (ref 8–16)
BACTERIA #/AREA URNS HPF: ABNORMAL /HPF
BASOPHILS # BLD AUTO: 0.04 K/UL (ref 0–0.2)
BASOPHILS NFR BLD: 0.3 % (ref 0–1.9)
BILIRUB UR QL STRIP: NEGATIVE
BUN SERPL-MCNC: 38 MG/DL (ref 8–23)
BUN SERPL-MCNC: 38 MG/DL (ref 8–23)
CALCIUM SERPL-MCNC: 9.1 MG/DL (ref 8.7–10.5)
CALCIUM SERPL-MCNC: 9.1 MG/DL (ref 8.7–10.5)
CHLORIDE SERPL-SCNC: 110 MMOL/L (ref 95–110)
CHLORIDE SERPL-SCNC: 110 MMOL/L (ref 95–110)
CLARITY UR: CLEAR
CO2 SERPL-SCNC: 18 MMOL/L (ref 23–29)
CO2 SERPL-SCNC: 18 MMOL/L (ref 23–29)
COLOR UR: YELLOW
CREAT SERPL-MCNC: 3.6 MG/DL (ref 0.5–1.4)
CREAT SERPL-MCNC: 3.6 MG/DL (ref 0.5–1.4)
DIFFERENTIAL METHOD BLD: ABNORMAL
EOSINOPHIL # BLD AUTO: 0.2 K/UL (ref 0–0.5)
EOSINOPHIL NFR BLD: 1.7 % (ref 0–8)
ERYTHROCYTE [DISTWIDTH] IN BLOOD BY AUTOMATED COUNT: 16.7 % (ref 11.5–14.5)
EST. GFR  (NO RACE VARIABLE): 17 ML/MIN/1.73 M^2
EST. GFR  (NO RACE VARIABLE): 17 ML/MIN/1.73 M^2
FERRITIN SERPL-MCNC: 979 NG/ML (ref 20–300)
GLUCOSE SERPL-MCNC: 109 MG/DL (ref 70–110)
GLUCOSE SERPL-MCNC: 109 MG/DL (ref 70–110)
GLUCOSE UR QL STRIP: NEGATIVE
HCT VFR BLD AUTO: 37.2 % (ref 40–54)
HGB BLD-MCNC: 11.5 G/DL (ref 14–18)
HGB UR QL STRIP: ABNORMAL
HYALINE CASTS #/AREA URNS LPF: 0 /LPF
IMM GRANULOCYTES # BLD AUTO: 0.27 K/UL (ref 0–0.04)
IMM GRANULOCYTES NFR BLD AUTO: 2.1 % (ref 0–0.5)
IRON SERPL-MCNC: 15 UG/DL (ref 45–160)
KETONES UR QL STRIP: NEGATIVE
LACTATE SERPL-SCNC: 2.8 MMOL/L (ref 0.5–2.2)
LEUKOCYTE ESTERASE UR QL STRIP: NEGATIVE
LYMPHOCYTES # BLD AUTO: 0.4 K/UL (ref 1–4.8)
LYMPHOCYTES NFR BLD: 3.4 % (ref 18–48)
MAGNESIUM SERPL-MCNC: 1.6 MG/DL (ref 1.6–2.6)
MCH RBC QN AUTO: 25.7 PG (ref 27–31)
MCHC RBC AUTO-ENTMCNC: 30.9 G/DL (ref 32–36)
MCV RBC AUTO: 83 FL (ref 82–98)
MICROSCOPIC COMMENT: ABNORMAL
MONOCYTES # BLD AUTO: 0.5 K/UL (ref 0.3–1)
MONOCYTES NFR BLD: 3.9 % (ref 4–15)
NEUTROPHILS # BLD AUTO: 11.5 K/UL (ref 1.8–7.7)
NEUTROPHILS NFR BLD: 88.6 % (ref 38–73)
NITRITE UR QL STRIP: NEGATIVE
NRBC BLD-RTO: 0 /100 WBC
PH UR STRIP: 7 [PH] (ref 5–8)
PHOSPHATE SERPL-MCNC: 3.8 MG/DL (ref 2.7–4.5)
PLATELET # BLD AUTO: 122 K/UL (ref 150–450)
PMV BLD AUTO: 12.5 FL (ref 9.2–12.9)
POTASSIUM SERPL-SCNC: 4.7 MMOL/L (ref 3.5–5.1)
POTASSIUM SERPL-SCNC: 4.7 MMOL/L (ref 3.5–5.1)
PROT UR QL STRIP: ABNORMAL
RBC # BLD AUTO: 4.48 M/UL (ref 4.6–6.2)
RBC #/AREA URNS HPF: 10 /HPF (ref 0–4)
SATURATED IRON: 11 % (ref 20–50)
SODIUM SERPL-SCNC: 138 MMOL/L (ref 136–145)
SODIUM SERPL-SCNC: 138 MMOL/L (ref 136–145)
SP GR UR STRIP: 1.01 (ref 1–1.03)
SQUAMOUS #/AREA URNS HPF: 0 /HPF
T4 FREE SERPL-MCNC: 0.81 NG/DL (ref 0.71–1.51)
TACROLIMUS BLD-MCNC: <2 NG/ML (ref 5–15)
TOTAL IRON BINDING CAPACITY: 138 UG/DL (ref 250–450)
TRANSFERRIN SERPL-MCNC: 93 MG/DL (ref 200–375)
TSH SERPL DL<=0.005 MIU/L-ACNC: 0.84 UIU/ML (ref 0.4–4)
URN SPEC COLLECT METH UR: ABNORMAL
UROBILINOGEN UR STRIP-ACNC: NEGATIVE EU/DL
VANCOMYCIN SERPL-MCNC: 13.8 UG/ML
WBC # BLD AUTO: 12.99 K/UL (ref 3.9–12.7)
WBC #/AREA URNS HPF: 0 /HPF (ref 0–5)

## 2024-04-27 PROCEDURE — 97161 PT EVAL LOW COMPLEX 20 MIN: CPT

## 2024-04-27 PROCEDURE — 36415 COLL VENOUS BLD VENIPUNCTURE: CPT | Performed by: INTERNAL MEDICINE

## 2024-04-27 PROCEDURE — 94660 CPAP INITIATION&MGMT: CPT

## 2024-04-27 PROCEDURE — 25000003 PHARM REV CODE 250: Performed by: FAMILY MEDICINE

## 2024-04-27 PROCEDURE — 99900035 HC TECH TIME PER 15 MIN (STAT)

## 2024-04-27 PROCEDURE — 80197 ASSAY OF TACROLIMUS: CPT | Performed by: STUDENT IN AN ORGANIZED HEALTH CARE EDUCATION/TRAINING PROGRAM

## 2024-04-27 PROCEDURE — 80069 RENAL FUNCTION PANEL: CPT | Performed by: STUDENT IN AN ORGANIZED HEALTH CARE EDUCATION/TRAINING PROGRAM

## 2024-04-27 PROCEDURE — 25000003 PHARM REV CODE 250: Performed by: INTERNAL MEDICINE

## 2024-04-27 PROCEDURE — 97165 OT EVAL LOW COMPLEX 30 MIN: CPT

## 2024-04-27 PROCEDURE — 27000221 HC OXYGEN, UP TO 24 HOURS

## 2024-04-27 PROCEDURE — 27000207 HC ISOLATION

## 2024-04-27 PROCEDURE — 81000 URINALYSIS NONAUTO W/SCOPE: CPT | Performed by: EMERGENCY MEDICINE

## 2024-04-27 PROCEDURE — 25000242 PHARM REV CODE 250 ALT 637 W/ HCPCS: Performed by: INTERNAL MEDICINE

## 2024-04-27 PROCEDURE — 80202 ASSAY OF VANCOMYCIN: CPT | Performed by: INTERNAL MEDICINE

## 2024-04-27 PROCEDURE — 25000003 PHARM REV CODE 250

## 2024-04-27 PROCEDURE — 94761 N-INVAS EAR/PLS OXIMETRY MLT: CPT

## 2024-04-27 PROCEDURE — 82728 ASSAY OF FERRITIN: CPT | Performed by: STUDENT IN AN ORGANIZED HEALTH CARE EDUCATION/TRAINING PROGRAM

## 2024-04-27 PROCEDURE — 84443 ASSAY THYROID STIM HORMONE: CPT | Performed by: INTERNAL MEDICINE

## 2024-04-27 PROCEDURE — 83735 ASSAY OF MAGNESIUM: CPT | Performed by: INTERNAL MEDICINE

## 2024-04-27 PROCEDURE — 94640 AIRWAY INHALATION TREATMENT: CPT

## 2024-04-27 PROCEDURE — 63600175 PHARM REV CODE 636 W HCPCS

## 2024-04-27 PROCEDURE — 63600175 PHARM REV CODE 636 W HCPCS: Performed by: INTERNAL MEDICINE

## 2024-04-27 PROCEDURE — 63600175 PHARM REV CODE 636 W HCPCS: Performed by: FAMILY MEDICINE

## 2024-04-27 PROCEDURE — 83605 ASSAY OF LACTIC ACID: CPT | Performed by: INTERNAL MEDICINE

## 2024-04-27 PROCEDURE — 21400001 HC TELEMETRY ROOM

## 2024-04-27 PROCEDURE — 97530 THERAPEUTIC ACTIVITIES: CPT

## 2024-04-27 PROCEDURE — 84439 ASSAY OF FREE THYROXINE: CPT | Performed by: INTERNAL MEDICINE

## 2024-04-27 PROCEDURE — 27100171 HC OXYGEN HIGH FLOW UP TO 24 HOURS

## 2024-04-27 PROCEDURE — 83540 ASSAY OF IRON: CPT | Performed by: STUDENT IN AN ORGANIZED HEALTH CARE EDUCATION/TRAINING PROGRAM

## 2024-04-27 PROCEDURE — 94799 UNLISTED PULMONARY SVC/PX: CPT

## 2024-04-27 PROCEDURE — 85025 COMPLETE CBC W/AUTO DIFF WBC: CPT | Performed by: INTERNAL MEDICINE

## 2024-04-27 RX ORDER — SODIUM BICARBONATE 650 MG/1
650 TABLET ORAL 3 TIMES DAILY
Status: DISCONTINUED | OUTPATIENT
Start: 2024-04-27 | End: 2024-04-28

## 2024-04-27 RX ORDER — LANOLIN ALCOHOL/MO/W.PET/CERES
1 CREAM (GRAM) TOPICAL DAILY
Status: DISCONTINUED | OUTPATIENT
Start: 2024-04-27 | End: 2024-04-27

## 2024-04-27 RX ORDER — LANOLIN ALCOHOL/MO/W.PET/CERES
400 CREAM (GRAM) TOPICAL DAILY
Status: COMPLETED | OUTPATIENT
Start: 2024-04-27 | End: 2024-04-29

## 2024-04-27 RX ORDER — LANOLIN ALCOHOL/MO/W.PET/CERES
1 CREAM (GRAM) TOPICAL 2 TIMES DAILY
Status: DISCONTINUED | OUTPATIENT
Start: 2024-04-27 | End: 2024-05-04 | Stop reason: HOSPADM

## 2024-04-27 RX ORDER — HYDRALAZINE HYDROCHLORIDE 25 MG/1
50 TABLET, FILM COATED ORAL EVERY 8 HOURS
Status: DISCONTINUED | OUTPATIENT
Start: 2024-04-27 | End: 2024-04-28

## 2024-04-27 RX ADMIN — HYDROCORTISONE SODIUM SUCCINATE 50 MG: 100 INJECTION, POWDER, FOR SOLUTION INTRAMUSCULAR; INTRAVENOUS at 06:04

## 2024-04-27 RX ADMIN — IPRATROPIUM BROMIDE AND ALBUTEROL SULFATE 3 ML: 2.5; .5 SOLUTION RESPIRATORY (INHALATION) at 07:04

## 2024-04-27 RX ADMIN — HYDROCORTISONE SODIUM SUCCINATE 50 MG: 100 INJECTION, POWDER, FOR SOLUTION INTRAMUSCULAR; INTRAVENOUS at 12:04

## 2024-04-27 RX ADMIN — METOPROLOL SUCCINATE 50 MG: 50 TABLET, EXTENDED RELEASE ORAL at 08:04

## 2024-04-27 RX ADMIN — APIXABAN 2.5 MG: 2.5 TABLET, FILM COATED ORAL at 08:04

## 2024-04-27 RX ADMIN — HYDROCORTISONE SODIUM SUCCINATE 50 MG: 100 INJECTION, POWDER, FOR SOLUTION INTRAMUSCULAR; INTRAVENOUS at 05:04

## 2024-04-27 RX ADMIN — MUPIROCIN: 20 OINTMENT TOPICAL at 08:04

## 2024-04-27 RX ADMIN — SIROLIMUS 3 MG: 1 TABLET ORAL at 08:04

## 2024-04-27 RX ADMIN — FERROUS SULFATE TAB 325 MG (65 MG ELEMENTAL FE) 1 EACH: 325 (65 FE) TAB at 08:04

## 2024-04-27 RX ADMIN — SODIUM BICARBONATE 650 MG: 650 TABLET ORAL at 08:04

## 2024-04-27 RX ADMIN — VANCOMYCIN HYDROCHLORIDE 1250 MG: 1.25 INJECTION, POWDER, LYOPHILIZED, FOR SOLUTION INTRAVENOUS at 06:04

## 2024-04-27 RX ADMIN — AZITHROMYCIN MONOHYDRATE 500 MG: 500 INJECTION, POWDER, LYOPHILIZED, FOR SOLUTION INTRAVENOUS at 12:04

## 2024-04-27 RX ADMIN — SODIUM CHLORIDE: 9 INJECTION, SOLUTION INTRAVENOUS at 12:04

## 2024-04-27 RX ADMIN — IPRATROPIUM BROMIDE AND ALBUTEROL SULFATE 3 ML: 2.5; .5 SOLUTION RESPIRATORY (INHALATION) at 08:04

## 2024-04-27 RX ADMIN — HYDROCORTISONE SODIUM SUCCINATE 50 MG: 100 INJECTION, POWDER, FOR SOLUTION INTRAMUSCULAR; INTRAVENOUS at 11:04

## 2024-04-27 RX ADMIN — PIPERACILLIN AND TAZOBACTAM 4.5 G: 4; .5 INJECTION, POWDER, LYOPHILIZED, FOR SOLUTION INTRAVENOUS; PARENTERAL at 04:04

## 2024-04-27 RX ADMIN — SODIUM CHLORIDE 500 ML: 9 INJECTION, SOLUTION INTRAVENOUS at 12:04

## 2024-04-27 RX ADMIN — IPRATROPIUM BROMIDE AND ALBUTEROL SULFATE 3 ML: 2.5; .5 SOLUTION RESPIRATORY (INHALATION) at 03:04

## 2024-04-27 RX ADMIN — SODIUM BICARBONATE 650 MG: 650 TABLET ORAL at 03:04

## 2024-04-27 RX ADMIN — IPRATROPIUM BROMIDE AND ALBUTEROL SULFATE 3 ML: 2.5; .5 SOLUTION RESPIRATORY (INHALATION) at 11:04

## 2024-04-27 RX ADMIN — ATORVASTATIN CALCIUM 20 MG: 20 TABLET, FILM COATED ORAL at 08:04

## 2024-04-27 RX ADMIN — SODIUM CHLORIDE: 9 INJECTION, SOLUTION INTRAVENOUS at 06:04

## 2024-04-27 RX ADMIN — HYDRALAZINE HYDROCHLORIDE 50 MG: 25 TABLET, FILM COATED ORAL at 09:04

## 2024-04-27 RX ADMIN — FLUTICASONE FUROATE AND VILANTEROL TRIFENATATE 1 PUFF: 100; 25 POWDER RESPIRATORY (INHALATION) at 09:04

## 2024-04-27 RX ADMIN — MAGNESIUM OXIDE TAB 400 MG (241.3 MG ELEMENTAL MG) 400 MG: 400 (241.3 MG) TAB at 12:04

## 2024-04-27 RX ADMIN — THIAMINE HYDROCHLORIDE 100 MG: 100 INJECTION, SOLUTION INTRAMUSCULAR; INTRAVENOUS at 01:04

## 2024-04-27 RX ADMIN — FERROUS SULFATE TAB 325 MG (65 MG ELEMENTAL FE) 1 EACH: 325 (65 FE) TAB at 12:04

## 2024-04-27 RX ADMIN — SODIUM CHLORIDE: 9 INJECTION, SOLUTION INTRAVENOUS at 04:04

## 2024-04-27 RX ADMIN — PIPERACILLIN AND TAZOBACTAM 4.5 G: 4; .5 INJECTION, POWDER, LYOPHILIZED, FOR SOLUTION INTRAVENOUS; PARENTERAL at 06:04

## 2024-04-27 RX ADMIN — PREDNISONE 5 MG: 5 TABLET ORAL at 09:04

## 2024-04-27 RX ADMIN — HYDRALAZINE HYDROCHLORIDE 50 MG: 25 TABLET, FILM COATED ORAL at 03:04

## 2024-04-27 NOTE — PLAN OF CARE
Problem: Occupational Therapy  Goal: Occupational Therapy Goal  Description: Goals to be met by: 05/27     Patient will increase functional independence with ADLs by performing:    UE Dressing with Supervision.  LE Dressing with Supervision.  Grooming while standing at sink with Supervision.  Toileting from toilet with Supervision for hygiene and clothing management.   Toilet transfer to toilet with Supervision.  Increased functional strength to WFL for ADLs.    Outcome: Progressing   Pt found in SF & agreeable to OT eval/tx at bed level per nsg request 2/2 HTN.  Pt on 3.5L O2 & perf the following:  -scooting up to HOB via bed rail & BU/LEs w/ SBA for vc's; pt required ~30 sec rest break b/t the 2 attempts  -instruct/demo w/ return demo of UB TE w/ emph on deep/PLBing  Edu/tx re: deep/PLBing, general safety techs & HEP. Pt verbalized understanding.    Pt presents w/ decreased overall endurance/conditioning, balance/mobility & coordination w/ subsequent decline in (I)/safety w/ BADLs, fxnl mobility & fxnl t/f's.  OT 5x/wk to increase phys/fxnl status & maximize potential to achieve established goals for d/c-->low intensity tx w/ rec shower  chair.

## 2024-04-27 NOTE — PLAN OF CARE
Problem: Physical Therapy  Goal: Physical Therapy Goal  Description: Goals to be met by: 2024     Patient will increase functional independence with mobility by performin. Sit to stand transfer with Kyle  2. Bed to chair transfer with Kyle using No Assistive Device  3. Gait  x 150 feet with Kyle using No Assistive Device.   4. Stand for 20 minutes with Kyle using No Assistive Device    Outcome: Progressing     Patient moved well in the ICU and was able to ambulate around the room with good control without the AD. He continues to get quite SOB and his oxygen dropped to 84% but quickly recovered to 95% within 60 seconds of reapplication of oxygen. He was able to perform all bed mobility and transfers with SPV and will benefit from continued therapy while in acute care to build tolerance to movement without oxygen dropping. He will likely be safe to return home and may benefit from LIT

## 2024-04-27 NOTE — PLAN OF CARE
"  Care Plan    Pt AAOx4. Afebrile overnight. VSS.  Sats remained >95 on Bipap with sats maintaining with 3.5L NC when doing assessment. UOP minimal with urinal. B/L extremities edematous elevated overnight. No report of pain during shift. LA down trending with AM lab. Frequent checks for safety done during shift. Report given to AM RN.        Neuro:  [place] Coma Scale  Best Eye Response: 4-->(E4) spontaneous  Best Motor Response: 6-->(M6) obeys commands  Best Verbal Response: 5-->(V5) oriented  [place] Coma Scale Score: 15  Assessment Qualifiers: patient not sedated/intubated  Pupil PERRLA: yes  24 hr Temp:  [97.8 °F (36.6 °C)-98.9 °F (37.2 °C)]      CV:  Rhythm: normal sinus rhythm  DVT prophylaxis: VTE Required Core Measure: Pharmacological prophylaxis initiated/maintained    Resp:     Oxygen Concentration (%): 40    GI/:  GI prophylaxis: no  Diet/Nutrition Received: regular  Last Bowel Movement: 04/23/24  Voiding Characteristics: voids spontaneously without difficulty   Intake/Output Summary (Last 24 hours) at 4/27/2024 0721  Last data filed at 4/27/2024 0645  Gross per 24 hour   Intake 1311.22 ml   Output 800 ml   Net 511.22 ml       Labs/Accuchecks:  Recent Labs   Lab 04/27/24  0433   WBC 12.99*   RBC 4.48*   HGB 11.5*   HCT 37.2*   *      Recent Labs   Lab 04/26/24  0925 04/27/24  0432    138  138   K 3.0* 4.7  4.7   CO2 18* 18*  18*   * 110  110   BUN 38* 38*  38*   CREATININE 3.6* 3.6*  3.6*   ALKPHOS 110  --    ALT 28  --    AST 29  --    BILITOT 0.4  --     No results for input(s): "PROTIME", "INR", "APTT", "HEPANTIXA" in the last 168 hours.   Recent Labs   Lab 04/26/24  0925   TROPONINI 0.353*       Electrolytes: No replacement orders  Accuchecks: none    Gtts/LDAs:  Current Facility-Administered Medications   Medication Dose Route Frequency Last Rate Last Admin       Lines/Drains/Airways       Peripheral Intravenous Line  Duration                  Peripheral IV - Single Lumen " "04/26/24 0905 18 G Right Antecubital <1 day         Peripheral IV - Single Lumen 04/26/24 1616 22 G Right Forearm <1 day                    Skin/Wounds     Wounds: No  Wound care consulted: No    Consults  Consults (From admission, onward)          Status Ordering Provider     Pharmacy to dose Vancomycin consult  Once        Provider:  (Not yet assigned)   Placed in "And" Linked Group    Acknowledged ZOHREH BEATTY     Inpatient consult to Pulmonology  Once        Provider:  (Not yet assigned)    Completed OREN RUBIO     Inpatient consult to [facility] (Daniel Kelly Nimkevych)  Once        Provider:  (Not yet assigned)    Completed OREN RUBIO            "

## 2024-04-27 NOTE — ASSESSMENT & PLAN NOTE
Body mass index is 34.48 kg/m². Obesity complicates all aspects of disease management from diagnostic modalities to treatment.

## 2024-04-27 NOTE — ASSESSMENT & PLAN NOTE
Chronic, uncontrolled. Latest blood pressure and vitals reviewed-     Temp:  [97.8 °F (36.6 °C)-98.9 °F (37.2 °C)]   Pulse:  [65-89]   Resp:  [18-31]   BP: (113-204)/()   SpO2:  [92 %-100 %] .   Home meds for hypertension were reviewed and noted below.   Hypertension Medications               cloNIDine 0.3 mg/24 hr td ptwk (CATAPRES) 0.3 mg/24 hr PLACE 1 PATCH ONTO THE SKIN ONCE A WEEK.    furosemide (LASIX) 40 MG tablet TAKE 1 TABLET EVERY DAY    losartan (COZAAR) 25 MG tablet Take 1 tablet (25 mg total) by mouth once daily.    metoprolol succinate (TOPROL-XL) 50 MG 24 hr tablet TAKE 1 TABLET EVERY DAY    nitroGLYCERIN (NITROSTAT) 0.4 MG SL tablet Place 1 tablet (0.4 mg total) under the tongue every 5 (five) minutes as needed for Chest pain.            While in the hospital, will manage blood pressure as follows; Adjust home antihypertensive regimen as follows- continue metoprolol, start hydralazine. Trend.     Will utilize p.r.n. blood pressure medication only if patient's blood pressure greater than 180/110 and he develops symptoms such as worsening chest pain or shortness of breath.

## 2024-04-27 NOTE — PT/OT/SLP EVAL
Occupational Therapy   Evaluation/tx    Name: Jose Luis Manzo  MRN: [id number]  Admitting Diagnosis: Bacterial pneumonia  Recent Surgery: * No surgery found *      Recommendations:     Discharge Recommendations: Low Intensity Therapy  Discharge Equipment Recommendations:  shower chair  Barriers to discharge:  None    Assessment:     Pt presents w/ decreased overall endurance/conditioning, balance/mobility & coordination w/ subsequent decline in (I)/safety w/ BADLs, fxnl mobility & fxnl t/f's.  OT 5x/wk to increase phys/fxnl status & maximize potential to achieve established goals for d/c-->low intensity tx w/ rec shower  chair.    Jose Luis Manzo is a 74 y.o. male with a medical diagnosis of Bacterial pneumonia.  He presents with performance deficits affecting function: weakness, impaired endurance, impaired self care skills, impaired functional mobility, gait instability, impaired balance, decreased safety awareness, edema, impaired cardiopulmonary response to activity.      Rehab Prognosis: Good; patient would benefit from acute skilled OT services to address these deficits and reach maximum level of function.       Plan:     Patient to be seen 5 x/week to address the above listed problems via self-care/home management, therapeutic activities, therapeutic exercises  Plan of Care Expires: 05/27/24  Plan of Care Reviewed with: patient    Subjective     Chief Complaint: SOB  Patient/Family Comments/goals: return to PLOF    Occupational Profile:  Living Environment: w/ brother in [facility] w/ 0STE; t/s  Previous level of function: (I)  Roles and Routines: cares for live in brother w/ impaired mental status s/p Covid  Equipment Used at Home: none  Assistance upon Discharge: neighbor    Pain/Comfort:  Pain Rating 1: 0/10  Pain Rating Post-Intervention 1: 0/10    Patients cultural, spiritual, Orthodox conflicts given the current situation:      Objective:     Communicated with: estela prior to session.  Patient found HOB  elevated with blood pressure cuff, pulse ox (continuous), PureWick, telemetry, oxygen upon OT entry to room.    General Precautions: Standard, fall, respiratory  Orthopedic Precautions: N/A  Braces: N/A  Respiratory Status: Nasal cannula, flow 3.5 L/min    Occupational Performance:    Bed Mobility:    Patient completed Scooting/Bridging with stand by assistance    Functional Mobility/Transfers:    Functional Mobility:     Activities of Daily Living:      Cognitive/Visual Perceptual:  AO4    No deficits noted    Physical Exam:  BUEs WFL at 4+/5    [facility] 6 Click ADL:  [facility] Total Score: 16    Treatment & Education:  Pt found in SF & agreeable to OT eval/tx at bed level per nsg request 2/2 HTN.  Pt on 3.5L O2 & perf the following:  -scooting up to HOB via bed rail & BU/LEs w/ SBA for vc's; pt required ~30 sec rest break b/t the 2 attempts  -instruct/demo w/ return demo of UB TE w/ emph on deep/PLBing  Edu/tx re: deep/PLBing, general safety techs & HEP. Pt verbalized understanding.      Patient left with bed in chair position with all lines intact, call button in reach, and nsg notified    GOALS:   Multidisciplinary Problems       Occupational Therapy Goals          Problem: Occupational Therapy    Goal Priority Disciplines Outcome Interventions   Occupational Therapy Goal     OT, PT/OT Progressing    Description: Goals to be met by: 05/27     Patient will increase functional independence with ADLs by performing:    UE Dressing with Supervision.  LE Dressing with Supervision.  Grooming while standing at sink with Supervision.  Toileting from toilet with Supervision for hygiene and clothing management.   Toilet transfer to toilet with Supervision.  Increased functional strength to WFL for ADLs.                         History:     Past Medical History:   Diagnosis Date    Acute hypoxemic respiratory failure due to COVID-19 12/30/2020    Anticoagulant long-term use     BPH (benign prostatic hypertrophy)     Cancer      "vocal cords    Claudication of right lower extremity 3/10/2016    COVID-19 virus detected 12/30/2020    Dependence on renal dialysis 4/18/2023    Disorder of kidney and ureter     Hyperkalemia 1/1/2021    Hyperlipidemia     Hypertension     Hypokalemia 6/23/2021    Immunosuppression 6/26/2021    Immunosuppression due to chronic steroid use 1/30/2020    Microcytic anemia 9/16/2016    Obesity (BMI 30-39.9) 1/23/2019    Oropharyngeal cancer     Pterygium     Squamous cell carcinoma 4/25/2018    Thromboembolic disorder     Unspecified disorder of kidney and ureter          Past Surgical History:   Procedure Laterality Date    CYSTOSCOPY W/ RETROGRADES Bilateral 6/15/2022    Procedure: Cystoscopy, bilateral retrograde pyelogram, and all indicated procedures;  Surgeon: Veronica Bacon MD;  Location: [facility];  Service: Urology;  Laterality: Bilateral;    FRACTURE SURGERY Left     "lower leg" 40 years ago    KIDNEY TRANSPLANT  2007    followed by [facility] Transplant    LARYNX SURGERY  11/2014    Oropharyngeal Cancer x3    microlaryngoscopy      PHLEBOGRAPHY Bilateral 8/2/2022    Procedure: Venogram;  Surgeon: KENIA Mueller II, MD;  Location: [facility] CATH LAB;  Service: Vascular;  Laterality: Bilateral;    SURGICAL REMOVAL OF LESION OF ORBIT Bilateral 8/26/2020    Procedure: EXCISION, LESION, ORBIT;  Surgeon: Linda Tee MD;  Location: [address];  Service: Ophthalmology;  Laterality: Bilateral;    TIBIAL STAPLING Left 1980            Time Tracking:     OT Date of Treatment: 04/27/24  OT Start Time: 1033  OT Stop Time: 1052  OT Total Time (min): 19 min    Billable Minutes:Evaluation 10  Therapeutic Activity 9  Total Time 19    4/27/2024  "

## 2024-04-27 NOTE — ASSESSMENT & PLAN NOTE
Creatine stable for now. BMP reviewed- noted Estimated Creatinine Clearance: 22.3 mL/min (A) (based on SCr of 3.6 mg/dL (H)). according to latest data. Based on current GFR, CKD stage is stage 4 - GFR 15-29.  Monitor UOP and serial BMP and adjust therapy as needed. Renally dose meds. Avoid nephrotoxic medications and procedures.  At baseline, consulted nephrology , am labs, avoid nephrotoxins

## 2024-04-27 NOTE — PROGRESS NOTES
Progress Note  Nephrology      Consult Requested By: Tara Young MD      SUBJECTIVE:     Overnight events  Patient is a 74 y.o. male     Patient Active Problem List   Diagnosis    Stage 4 chronic kidney disease    Essential hypertension    Mixed hyperlipidemia    Benign prostatic hyperplasia with nocturia    Claudication of right lower extremity    Eye swelling, bilateral    Diminished night vision    Renal transplant recipient    Dermolipoma    History of DVT (deep vein thrombosis)    Iron deficiency anemia    Pulmonary nodules    Larynx cancer    Voice disturbance    Atherosclerosis of aorta    Carcinoma in situ of vocal cord    Dysphagia, pharyngoesophageal    Chondronecrosis of larynx    Squamous cell carcinoma    Obesity (BMI 30-39.9)    Pterygium of both eyes    History of cancer of larynx    Nodule of left lung    Granulomatous lung disease    Transplanted kidney - 2007    Hypertensive kidney disease with stage 4 chronic kidney disease    Hypercalcemia    Secondary hyperparathyroidism of renal origin    Benign tumor of orbit    Metabolic bone disease    Immunosuppressive management encounter following kidney transplant    Hyperphosphatemia    Vitamin D deficiency disease    History of COVID-19    SOB (shortness of breath)    Metabolic acidosis    Proteinuria    Anticoagulant long-term use    Pre-op evaluation    Benign prostatic hyperplasia with lower urinary tract symptoms    Centrilobular emphysema    Dependence on renal dialysis    Class 1 obesity due to excess calories with serious comorbidity and body mass index (BMI) of 34.0 to 34.9 in adult    Former cigarette smoker    Deformity of toenail    Bacterial pneumonia    Acute respiratory failure with hypoxia     Past Medical History:   Diagnosis Date    Acute hypoxemic respiratory failure due to COVID-19 12/30/2020    Anticoagulant long-term use     BPH (benign prostatic hypertrophy)     Cancer     vocal cords    Claudication of right lower  extremity 3/10/2016    COVID-19 virus detected 12/30/2020    Dependence on renal dialysis 4/18/2023    Disorder of kidney and ureter     Hyperkalemia 1/1/2021    Hyperlipidemia     Hypertension     Hypokalemia 6/23/2021    Immunosuppression 6/26/2021    Immunosuppression due to chronic steroid use 1/30/2020    Microcytic anemia 9/16/2016    Obesity (BMI 30-39.9) 1/23/2019    Oropharyngeal cancer     Pterygium     Squamous cell carcinoma 4/25/2018    Thromboembolic disorder     Unspecified disorder of kidney and ureter               OBJECTIVE:     Vitals:    04/27/24 0800 04/27/24 0823 04/27/24 0900 04/27/24 0930   BP: (!) 185/113 (!) 184/94 (!) 153/84    BP Location: Left arm      Patient Position: Sitting      Pulse: 84 89 85 77   Resp: (!) 29  20 (!) 21   Temp:       TempSrc:       SpO2: 98%  96% 95%   Weight:       Height:           Temp: 98 °F (36.7 °C) (04/27/24 0730)  Pulse: 77 (04/27/24 0930)  Resp: (!) 21 (04/27/24 0930)  BP: (!) 153/84 (04/27/24 0900)  SpO2: 95 % (04/27/24 0930)    Date 04/27/24 0700 - 04/28/24 0659   Shift 7953-2651 0792-0188 9202-7661 24 Hour Total   INTAKE   IV Piggyback 56.1   56.1   Shift Total(mL/kg) 56.1(0.5)   56.1(0.5)   OUTPUT   Shift Total(mL/kg)       Weight (kg) 109 109 109 109             Medications:  Current Facility-Administered Medications   Medication Dose Route Frequency    albuterol-ipratropium  3 mL Nebulization Q4H WAKE    apixaban  2.5 mg Oral BID    atorvastatin  20 mg Oral QHS    azithromycin  500 mg Intravenous Q24H    fluticasone furoate-vilanteroL  1 puff Inhalation Daily    hydrocortisone sodium succinate  50 mg Intravenous Q6H    metoprolol succinate  50 mg Oral Daily    mupirocin   Nasal BID    piperacillin-tazobactam (Zosyn) IV (PEDS and ADULTS) (extended infusion is not appropriate)  4.5 g Intravenous Q8H    predniSONE  5 mg Oral Daily    sirolimus  3 mg Oral Daily    sodium bicarbonate  650 mg Oral BID     Current Facility-Administered Medications    Medication Dose Route Frequency Last Rate Last Admin     Physical Exam:  General appearance:  Weakness  Fatigue  Lungs: diminished breath sounds  Pulse oximeter 95 % O2 on 2 liters O2  Heart: pulse 82  Abdomen: soft  Extremities: edema  Skin: dry  BM yesterday  Laboratory:  ABG  Labs reviewed  Recent Results (from the past 336 hour(s))   Basic Metabolic Panel (BMP)    Collection Time: 04/27/24  4:32 AM   Result Value Ref Range    Sodium 138 136 - 145 mmol/L    Potassium 4.7 3.5 - 5.1 mmol/L    Chloride 110 95 - 110 mmol/L    CO2 18 (L) 23 - 29 mmol/L    BUN 38 (H) 8 - 23 mg/dL    Creatinine 3.6 (H) 0.5 - 1.4 mg/dL    Calcium 9.1 8.7 - 10.5 mg/dL    Anion Gap 10 8 - 16 mmol/L     Recent Results (from the past 336 hour(s))   CBC Auto Differential    Collection Time: 04/27/24  4:33 AM   Result Value Ref Range    WBC 12.99 (H) 3.90 - 12.70 K/uL    Hemoglobin 11.5 (L) 14.0 - 18.0 g/dL    Hematocrit 37.2 (L) 40.0 - 54.0 %    Platelets 122 (L) 150 - 450 K/uL   CBC auto differential    Collection Time: 04/26/24  9:25 AM   Result Value Ref Range    WBC 10.56 3.90 - 12.70 K/uL    Hemoglobin 13.3 (L) 14.0 - 18.0 g/dL    Hematocrit 43.0 40.0 - 54.0 %    Platelets 154 150 - 450 K/uL     Urinalysis  Recent Labs   Lab 04/27/24  0108   COLORU Yellow   SPECGRAV 1.015   PHUR 7.0   PROTEINUA 2+*   BACTERIA None   NITRITE Negative   LEUKOCYTESUR Negative   UROBILINOGEN Negative   HYALINECASTS 0       Diagnostic Results:  X-Ray: Reviewed  US: Reviewed  Echo: Reviewed  ACCESS    ASSESSMENT/PLAN:     CKD 4  Kidney transplant 2007  Prednisone 5  Sirolimus 3  UA protein 2+, blood 2+, RBC 10   Creatinine 3.6  GFR 17 cc/min  BUN 38  Metabolic acidosis  Sodium bicarbonate  Lactic acid 4.3 - 2.8  Metabolic bone disease  Magnesium 1.6   Replace prn  Anemia multifactorial  Hb 11.5  Iron 15  Sat iron 11  Renal cap  Poor nutrition   Albumin 2.5  Hypertension   Blood pressure 153/84  Weight daily  I and O  Avoid nephrotoxic agents, hypotension,  hypovolemia  Renal diet as tolerated

## 2024-04-27 NOTE — PROGRESS NOTES
Progress Note  [facility]    Attending: Dr. Woodson  Fellow: Dr. Peterson  Admit Date: 4/26/2024  Today's Date: 04/27/2024    SUBJECTIVE:     Pt remained on Bipap overnight, now weaned to 2L O2 NC. Cough productive of blood tinged mucus. Patient feels much better, vitals stable. Plan for stepdown from ICU. Resp cx with gram negative, gram positive organisms growing, cont broad coverage pending speciation. Plan to discontinue hydrocortisone tonight.     OBJECTIVE:     Vital Signs Trends/Hx Reviewed  Vitals:    04/27/24 0800 04/27/24 0823 04/27/24 0900 04/27/24 0930   BP: (!) 185/113 (!) 184/94 (!) 153/84    BP Location: Left arm      Patient Position: Sitting      Pulse: 84 89 85 77   Resp: (!) 29  20 (!) 21   Temp:       TempSrc:       SpO2: 98%  96% 95%   Weight:       Height:           Ventilator settings:   No data recorded  Oxygen Concentration (%):  [3.5-40] 3.5      Physical Exam:  Physical Exam  Constitutional:       General: He is not in acute distress.     Appearance: Normal appearance.   HENT:      Head: Normocephalic and atraumatic.   Cardiovascular:      Rate and Rhythm: Normal rate and regular rhythm.   Pulmonary:      Effort: No respiratory distress.      Comments: Decreased breath sounds BL, crackles heard in RLL  Abdominal:      General: Abdomen is flat. Bowel sounds are normal.      Palpations: Abdomen is soft.   Musculoskeletal:      Right lower leg: Edema present.      Left lower leg: Edema present.   Skin:     General: Skin is dry.   Neurological:      Mental Status: He is alert.         Laboratory:    Recent Labs   Lab 04/27/24  0433   WBC 12.99*   RBC 4.48*   HGB 11.5*   HCT 37.2*   *   MCV 83   MCH 25.7*   MCHC 30.9*     Recent Labs   Lab 04/27/24  0432     138   K 4.7  4.7     110   CO2 18*  18*   BUN 38*  38*   CREATININE 3.6*  3.6*   CALCIUM 9.1  9.1   MG 1.6       Microbiology Data:   Microbiology Results (last 7 days)       Procedure  Component Value Units Date/Time    Culture, Respiratory with Gram Stain [[id number]] Collected: 04/26/24 1738    Order Status: Completed Specimen: Respiratory from Sputum, Expectorated Updated: 04/27/24 0310     Gram Stain (Respiratory) <10 epithelial cells per low power field.     Gram Stain (Respiratory) Rare WBC's     Gram Stain (Respiratory) Moderate Gram negative diplococci     Gram Stain (Respiratory) Few Gram positive cocci     Gram Stain (Respiratory) Few Gram negative rods    Blood culture x two cultures. Draw prior to antibiotics. [[id number]] Collected: 04/26/24 0905    Order Status: Completed Specimen: Blood from Peripheral, Upper Arm, Right Updated: 04/27/24 0115     Blood Culture, Routine No Growth to date    Narrative:      Aerobic and anaerobic    Blood culture x two cultures. Draw prior to antibiotics. [[id number]] Collected: 04/26/24 1100    Order Status: Completed Specimen: Blood from Peripheral, Lower Arm, Right Updated: 04/27/24 0115     Blood Culture, Routine No Growth to date    Narrative:      Aerobic and anaerobic    Culture, MRSA [[id number]] Collected: 04/26/24 1315    Order Status: Sent Specimen: MRSA source from Nares, Left Updated: 04/26/24 2352    Influenza A & B by Molecular [[id number]]     Order Status: Canceled Specimen: Nasopharyngeal Swab              Chest Imaging:   No results found in the last 24 hours.      Infusions:    Current Facility-Administered Medications   Medication Dose Route Frequency Last Rate Last Admin       Scheduled Medications:   Current Facility-Administered Medications   Medication Dose Route Frequency    albuterol-ipratropium  3 mL Nebulization Q4H WAKE    apixaban  2.5 mg Oral BID    atorvastatin  20 mg Oral QHS    azithromycin  500 mg Intravenous Q24H    fluticasone furoate-vilanteroL  1 puff Inhalation Daily    hydrocortisone sodium succinate  50 mg Intravenous Q6H    metoprolol succinate  50 mg Oral Daily    mupirocin   Nasal BID     piperacillin-tazobactam (Zosyn) IV (PEDS and ADULTS) (extended infusion is not appropriate)  4.5 g Intravenous Q8H    predniSONE  5 mg Oral Daily    sirolimus  3 mg Oral Daily    sodium bicarbonate  650 mg Oral BID       PRN Medications:     Current Facility-Administered Medications:     sodium chloride 0.9%, , Intravenous, PRN    acetaminophen, 650 mg, Oral, Q8H PRN    dextrose 10%, 12.5 g, Intravenous, PRN    dextrose 10%, 25 g, Intravenous, PRN    glucagon (human recombinant), 1 mg, Intramuscular, PRN    glucose, 16 g, Oral, PRN    glucose, 24 g, Oral, PRN    melatonin, 6 mg, Oral, Nightly PRN    naloxone, 0.02 mg, Intravenous, PRN    ondansetron, 4 mg, Intravenous, Q8H PRN    simethicone, 1 tablet, Oral, QID PRN    sodium chloride 0.9%, 10 mL, Intravenous, Q12H PRN    Pharmacy to dose Vancomycin consult, , , Once **AND** vancomycin - pharmacy to dose, , Intravenous, pharmacy to manage frequency    Problem List:   Patient Active Problem List   Diagnosis    Stage 4 chronic kidney disease    Essential hypertension    Mixed hyperlipidemia    Benign prostatic hyperplasia with nocturia    Claudication of right lower extremity    Eye swelling, bilateral    Diminished night vision    Renal transplant recipient    Dermolipoma    History of DVT (deep vein thrombosis)    Iron deficiency anemia    Pulmonary nodules    Larynx cancer    Voice disturbance    Atherosclerosis of aorta    Carcinoma in situ of vocal cord    Dysphagia, pharyngoesophageal    Chondronecrosis of larynx    Squamous cell carcinoma    Obesity (BMI 30-39.9)    Pterygium of both eyes    History of cancer of larynx    Nodule of left lung    Granulomatous lung disease    Transplanted kidney - 2007    Hypertensive kidney disease with stage 4 chronic kidney disease    Hypercalcemia    Secondary hyperparathyroidism of renal origin    Benign tumor of orbit    Metabolic bone disease    Immunosuppressive management encounter following kidney transplant     Hyperphosphatemia    Vitamin D deficiency disease    History of COVID-19    SOB (shortness of breath)    Metabolic acidosis    Proteinuria    Anticoagulant long-term use    Pre-op evaluation    Benign prostatic hyperplasia with lower urinary tract symptoms    Centrilobular emphysema    Dependence on renal dialysis    Class 1 obesity due to excess calories with serious comorbidity and body mass index (BMI) of 34.0 to 34.9 in adult    Former cigarette smoker    Deformity of toenail    Bacterial pneumonia    Acute respiratory failure with hypoxia       ASSESSMENT & RECOMMENDATIONS      Jose Luis Manzo is a 74 y.o. male with PMH of ESRD s/p renal transplant in 2007 now with CKD IV, Squamous Cell Carcinoma of the Larynx, Former smoker who presented to [facility] on 4/26/2024 with shortness of breath that began this morning. He states that he woke up and was struggling to breathe while in bed this morning. He also reports an episode of vomiting and diarrhea this morning. He says that he was in his normal state of health prior to today and denies any sick contacts. He reports compliance with sirolimus, prednisone, lasix. He states that his bl leg swelling is chronic. He denies any fever, cold, congestion, or chest pain.   Patient hypotensive upon arrival to ED. Afebrile. Hypoxic to 60% on room air, required Bipap. Chest xray with RLL opacities suggestive of pneumonia. Initiated on vanc/zosyn/azithro, also initiated hydrocortisone. Resp cx, MRSA pending. Admitted to ICU for continuous bipap requirement. Now on 2L O2 NC.    CNS/Neuro:  No acute issues     Cardiovascular:  Hypertension  - held home clonidine, losartan on admission 2/2 hypotension  - may resume given SBP ~180 this AM  - resumed Toprol 50 mg daily     Respiratory:  Acute Hypoxic Respiratory Failure  - SOB that began this AM at home, pt hypoxic on admission  - ABG with pH 7.34, pCO2 37, PO2 64, HCO3 20  - now on Bipap 12/6, Fio2 50%  - Chest xray concerning  for pneumonia  - Initiated On CTX/Azithro  - on Vanc/Azithro/Zosyn for broad coverage given immunocompromised state, recent antibiotic use  - on hydrocortisone 50 mg q6h, plan to discontinue tonight     GI/Metabolic:  Hypokalemia - resolved  - K of 3 on admission, replaced     GI Ppx: None  Diet: Renal     Renal:  CKD Stage IV  - Cr baseline of 3.5  - pt takes lasix at home  - no evidence of fluid overload  - nephrology consulted    ESRD S/P Renal transplant  - on sirolimus and pred 5 daily     Heme:  DVT Ppx: Eliquis     Endo:   Diabetes Type II  - A1c 6.6   - SSI  Strict Glucose control with goal 140-180     ID:  Pneumonia  - Chest xray with lower right side opacities suggestive of infectious process  - Pt on immunosuppressant therapy Sirolimus s/p kidney transplant  - Afebrile, no leukocytosis  - Covid/Flu negative  - MRSA nares pending  - BC pending  - Initiated Vanc/Zosyn/Azithro   - Hydrocortisone 50 mg q6h, will discontinue based off clinical improvement/ oxygen requirement weaning      Code Status: Full     F: NPO  A: Tylenol  S: NA  T: Eliquis  H: Elevated at 45 degrees  U: None  G: maintain 140-180  S: NA  B: None  I: PIV  D: continue Vanc/Azithro/Zosyn     Plan: Wean oxygen, PT/OT,stepdown from ICU      Javier Russell MD  [facility], PGY-1

## 2024-04-27 NOTE — EICU
RN discussed about LA increasing slowly up to 4.3.  Not on pressors. Tolerating NIV. CKD, UOP low.  Got so far 3 lit fluids bolus. On abx. For CAP/AHRF. Immunosuppressed. CxR reviewed, right sided multilobar densities- airspace, reticular > left. No effusion. On hydrocortisone. BNP normal, EF 55%.    - Saline 500 ml bolus, Thiamine 100 mg IV once. Follow LA at 2 AM

## 2024-04-27 NOTE — SUBJECTIVE & OBJECTIVE
Interval History: NAEON. Transitioned to NC. Doing well- feeling much better.     Review of Systems   Respiratory:  Negative for shortness of breath.    Cardiovascular:  Negative for chest pain.   Gastrointestinal:  Negative for abdominal pain.   Genitourinary:  Negative for dysuria.   Neurological:  Negative for headaches.     Objective:     Vital Signs (Most Recent):  Temp: (P) 98.4 °F (36.9 °C) (04/27/24 1130)  Pulse: 88 (04/27/24 1400)  Resp: (!) 26 (04/27/24 1400)  BP: (!) 164/92 (04/27/24 1400)  SpO2: (!) 93 % (04/27/24 1400) Vital Signs (24h Range):  Temp:  [97.8 °F (36.6 °C)-98.9 °F (37.2 °C)] (P) 98.4 °F (36.9 °C)  Pulse:  [65-89] 88  Resp:  [18-31] 26  SpO2:  [92 %-100 %] 93 %  BP: (113-204)/() 164/92     Weight: 109 kg (240 lb 4.8 oz)  Body mass index is 34.48 kg/m².    Intake/Output Summary (Last 24 hours) at 4/27/2024 1523  Last data filed at 4/27/2024 1130  Gross per 24 hour   Intake 1645.2 ml   Output 1185 ml   Net 460.2 ml         Physical Exam  Vitals and nursing note reviewed.   Constitutional:       General: He is not in acute distress.     Appearance: He is well-developed. He is obese.   HENT:      Head: Normocephalic and atraumatic.   Eyes:      Conjunctiva/sclera: Conjunctivae normal.   Neck:      Vascular: No JVD.   Cardiovascular:      Rate and Rhythm: Normal rate and regular rhythm.      Heart sounds: Normal heart sounds.   Pulmonary:      Effort: Pulmonary effort is normal.      Breath sounds: Normal breath sounds.   Abdominal:      General: Bowel sounds are normal. There is no distension.      Palpations: Abdomen is soft.      Tenderness: There is no abdominal tenderness.   Musculoskeletal:      Cervical back: Neck supple.      Right lower leg: No edema.      Left lower leg: No edema.   Neurological:      Mental Status: He is alert.   Psychiatric:         Behavior: Behavior normal.             Significant Labs: All pertinent labs within the past 24 hours have been reviewed.  CBC:    Recent Labs   Lab 04/26/24  0925 04/27/24  0433   WBC 10.56 12.99*   HGB 13.3* 11.5*   HCT 43.0 37.2*    122*     CMP:   Recent Labs   Lab 04/26/24 0925 04/27/24  0432    138  138   K 3.0* 4.7  4.7   * 110  110   CO2 18* 18*  18*   * 109  109   BUN 38* 38*  38*   CREATININE 3.6* 3.6*  3.6*   CALCIUM 9.9 9.1  9.1   PROT 7.5  --    ALBUMIN 3.3* 2.5*   BILITOT 0.4  --    ALKPHOS 110  --    AST 29  --    ALT 28  --    ANIONGAP 14 10  10       Significant Imaging: I have reviewed all pertinent imaging results/findings within the past 24 hours.

## 2024-04-27 NOTE — PLAN OF CARE
Patient on oxygen with documented flow.  Will attempt to wean per O2 order protocol. The proper method of use, as well as anticipated side effects, of this metered-dose inhaler are discussed and demonstrated to the patient. The proper method of use, as well as anticipated side effects, of this aerosol treatment are discussed and demonstrated to the patient.  Lung expansion therapy. Will continue to monitor.

## 2024-04-27 NOTE — PT/OT/SLP EVAL
Physical Therapy Evaluation    Patient Name:  Jose Luis Manzo   MRN:  [id number]    Recommendations:     Discharge Recommendations: Low Intensity Therapy   Discharge Equipment Recommendations: none   Barriers to discharge: None    Assessment:     Jose Luis Manzo is a 74 y.o. male admitted with a medical diagnosis of Bacterial pneumonia.  He presents with the following impairments/functional limitations: weakness, impaired endurance, impaired functional mobility, impaired balance, gait instability, impaired cardiopulmonary response to activity. Patient moved well in the ICU and was able to ambulate around the room with good control without the AD. He continues to get quite SOB and his oxygen dropped to 84% but quickly recovered to 95% within 60 seconds of reapplication of oxygen. He was able to perform all bed mobility and transfers with SPV and will benefit from continued therapy while in acute care to build tolerance to movement without oxygen dropping. He will likely be safe to return home and may benefit from LIT    Rehab Prognosis: Good; patient would benefit from acute skilled PT services to address these deficits and reach maximum level of function.    Recent Surgery: * No surgery found *      Plan:     During this hospitalization, patient to be seen 3 x/week to address the identified rehab impairments via gait training, therapeutic activities, therapeutic exercises, neuromuscular re-education and progress toward the following goals:    Plan of Care Expires:  05/25/24    Subjective     Chief Complaint: still short of breath   Patient/Family Comments/goals: none present  Pain/Comfort:  Pain Rating 1: 0/10    Patients cultural, spiritual, Jehovah's witness conflicts given the current situation: no    Living Environment:  Lives with brother in single story home with no steps to enter - pt is his brothers caretaker  Prior to admission, patients level of function was indep.  Equipment used at home: none.  DME owned (not  currently used): rolling walker.  Upon discharge, patient will have assistance from family and friends.    Objective:     Communicated with nsg prior to session.  Patient found HOB elevated with blood pressure cuff, oxygen, peripheral IV, telemetry, pulse ox (continuous)  upon PT entry to room.    General Precautions: Standard, fall, respiratory  Orthopedic Precautions:N/A   Braces: N/A  Respiratory Status: Room air    Exams:  Gross Motor Coordination:  WFL  Skin Integrity/Edema:  -       Skin integrity: Visible skin intact  RLE ROM: WFL  RLE Strength: WFL  LLE ROM: WFL  LLE Strength: WFL    Patient donned non slip socks and gait belt for OOB mobility    Functional Mobility:  Bed Mobility:  Supine to Sit: stand by assistance  Sit to Supine: stand by assistance  Transfers:  Sit to Stand:  modified independence with no AD  Gait: amb 20 feet around the room without AD or HHA with gait belt. He had control but fatigued quickly and became SOB      AM-PAC 6 CLICK MOBILITY  Total Score:22       Treatment & Education:   PT educated patient:  PT plan of care/role of PT  Safety with OOB mobility  Use of RW for household and community ambulation.   Energy conservation techniques   Discharge disposition    Pt  verbalized understanding       Patient left supine with call button in reach and RN notified.    GOALS:   Multidisciplinary Problems       Physical Therapy Goals          Problem: Physical Therapy    Goal Priority Disciplines Outcome Goal Variances Interventions   Physical Therapy Goal     PT, PT/OT Progressing     Description: Goals to be met by: 2024     Patient will increase functional independence with mobility by performin. Sit to stand transfer with [place]  2. Bed to chair transfer with [place] using No Assistive Device  3. Gait  x 150 feet with [place] using No Assistive Device.   4. Stand for 20 minutes with [place] using No Assistive Device                         History:  "    Past Medical History:   Diagnosis Date    Acute hypoxemic respiratory failure due to COVID-19 12/30/2020    Anticoagulant long-term use     BPH (benign prostatic hypertrophy)     Cancer     vocal cords    Claudication of right lower extremity 3/10/2016    COVID-19 virus detected 12/30/2020    Dependence on renal dialysis 4/18/2023    Disorder of kidney and ureter     Hyperkalemia 1/1/2021    Hyperlipidemia     Hypertension     Hypokalemia 6/23/2021    Immunosuppression 6/26/2021    Immunosuppression due to chronic steroid use 1/30/2020    Microcytic anemia 9/16/2016    Obesity (BMI 30-39.9) 1/23/2019    Oropharyngeal cancer     Pterygium     Squamous cell carcinoma 4/25/2018    Thromboembolic disorder     Unspecified disorder of kidney and ureter        Past Surgical History:   Procedure Laterality Date    CYSTOSCOPY W/ RETROGRADES Bilateral 6/15/2022    Procedure: Cystoscopy, bilateral retrograde pyelogram, and all indicated procedures;  Surgeon: Veronica Bacon MD;  Location: [facility];  Service: Urology;  Laterality: Bilateral;    FRACTURE SURGERY Left     "lower leg" 40 years ago    KIDNEY TRANSPLANT  2007    followed by [facility]plant    LARYNX SURGERY  11/2014    Oropharyngeal Cancer x3    microlaryngoscopy      PHLEBOGRAPHY Bilateral 8/2/2022    Procedure: Venogram;  Surgeon: KENIA Mueller II, MD;  Location: [facility] CATH LAB;  Service: Vascular;  Laterality: Bilateral;    SURGICAL REMOVAL OF LESION OF ORBIT Bilateral 8/26/2020    Procedure: EXCISION, LESION, ORBIT;  Surgeon: Linda Tee MD;  Location: [address];  Service: Ophthalmology;  Laterality: Bilateral;    TIBIAL STAPLING Left 1980            Time Tracking:     PT Received On: 04/27/24  PT Start Time: 1427     PT Stop Time: 1440  PT Total Time (min): 13 min     Billable Minutes: Evaluation 13      04/27/2024  "

## 2024-04-27 NOTE — PROGRESS NOTES
Pharmacokinetic Assessment Follow Up: IV Vancomycin    Vancomycin serum concentration assessment(s):    The random level was drawn incorrectly and cannot be used to guide therapy at this time.    Vancomycin Regimen Plan:    Give Vancomycin 1250 mg IV once next random concentration measured at 18:00 dose on 4-27    Drug levels (last 3 results):  Recent Labs   Lab Result Units 04/27/24  1141   Vancomycin, Random ug/mL 13.8       Pharmacy will continue to follow and monitor vancomycin.    Please contact pharmacy at extension [phone] for questions regarding this assessment.    Thank you for the consult,   Uriel Smith       Patient brief summary:  Jose Luis Manzo is a 74 y.o. male initiated on antimicrobial therapy with IV Vancomycin for treatment of lower respiratory infection      Drug Allergies:   Review of patient's allergies indicates:  No Known Allergies    Actual Body Weight:   109 kg    Renal Function:   Estimated Creatinine Clearance: 22.3 mL/min (A) (based on SCr of 3.6 mg/dL (H)).,     Dialysis Method (if applicable):  N/A    CBC (last 72 hours):  Recent Labs   Lab Result Units 04/26/24  0925 04/27/24  0433   WBC K/uL 10.56 12.99*   Hemoglobin g/dL 13.3* 11.5*   Hematocrit % 43.0 37.2*   Platelets K/uL 154 122*   Gran % % 82.6* 88.6*   Lymph % % 10.4* 3.4*   Mono % % 5.7 3.9*   Eosinophil % % 0.2 1.7   Basophil % % 0.3 0.3   Differential Method  Automated Automated       Metabolic Panel (last 72 hours):  Recent Labs   Lab Result Units 04/26/24  0925 04/27/24  0108 04/27/24  0432   Sodium mmol/L 143  --  138  138   Potassium mmol/L 3.0*  --  4.7  4.7   Chloride mmol/L 111*  --  110  110   CO2 mmol/L 18*  --  18*  18*   Glucose mg/dL 164*  --  109  109   Glucose, UA   --  Negative  --    BUN mg/dL 38*  --  38*  38*   Creatinine mg/dL 3.6*  --  3.6*  3.6*   Albumin g/dL 3.3*  --  2.5*   Total Bilirubin mg/dL 0.4  --   --    Alkaline Phosphatase U/L 110  --   --    AST U/L 29  --   --    ALT U/L 28  --    --    Magnesium mg/dL  --   --  1.6   Phosphorus mg/dL  --   --  3.8       Vancomycin Administrations:  vancomycin given in the last 96 hours                     vancomycin 2 g in dextrose 5 % 500 mL IVPB ()  Restarted 04/26/24 1756     2,000 mg New Bag  1700                    Microbiologic Results:  Microbiology Results (last 7 days)       Procedure Component Value Units Date/Time    Culture, Respiratory with Gram Stain [[id number]] Collected: 04/26/24 1738    Order Status: Completed Specimen: Respiratory from Sputum, Expectorated Updated: 04/27/24 0310     Gram Stain (Respiratory) <10 epithelial cells per low power field.     Gram Stain (Respiratory) Rare WBC's     Gram Stain (Respiratory) Moderate Gram negative diplococci     Gram Stain (Respiratory) Few Gram positive cocci     Gram Stain (Respiratory) Few Gram negative rods    Blood culture x two cultures. Draw prior to antibiotics. [[id number]] Collected: 04/26/24 0905    Order Status: Completed Specimen: Blood from Peripheral, Upper Arm, Right Updated: 04/27/24 0115     Blood Culture, Routine No Growth to date    Narrative:      Aerobic and anaerobic    Blood culture x two cultures. Draw prior to antibiotics. [[id number]] Collected: 04/26/24 1100    Order Status: Completed Specimen: Blood from Peripheral, Lower Arm, Right Updated: 04/27/24 0115     Blood Culture, Routine No Growth to date    Narrative:      Aerobic and anaerobic    Culture, MRSA [[id number]] Collected: 04/26/24 1315    Order Status: Sent Specimen: MRSA source from Nares, Left Updated: 04/26/24 2352    Influenza A & B by Molecular [[id number]]     Order Status: Canceled Specimen: Nasopharyngeal Swab

## 2024-04-27 NOTE — ASSESSMENT & PLAN NOTE
Chest xray with RLL opacities suggestive of pneumonia. Initiated on vanc/zosyn/azithro, hydrocortisone. Cultures pending. Continue supplemental O2    Of note, leukocytosis likely from steroid use.

## 2024-04-27 NOTE — PROGRESS NOTES
[facility] Medicine  Progress Note    Patient Name: Jose Luis Manzo  MRN: [id number]  Patient Class: IP- Inpatient   Admission Date: 4/26/2024  Length of Stay: 1 days  Attending Physician: Tara Young MD  Primary Care Provider: Home Alexis MD      Subjective:     Principal Problem:Bacterial pneumonia      HPI:  Very pleasant 73 yo F presents with acute on chronic progressive dyspnea , hacking cough , afebrile , denied being on home oxygen , no sick contacts or travel, but is a former smoker.  Denied chest pain wheezing intermittent claudication. Recent abx use , lung disease prior. 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     Overview/Hospital Course:  No notes on file    Interval History: NAEON. Transitioned to NC. Doing well- feeling much better.     Review of Systems   Respiratory:  Negative for shortness of breath.    Cardiovascular:  Negative for chest pain.   Gastrointestinal:  Negative for abdominal pain.   Genitourinary:  Negative for dysuria.   Neurological:  Negative for headaches.     Objective:     Vital Signs (Most Recent):  Temp: (P) 98.4 °F (36.9 °C) (04/27/24 1130)  Pulse: 88 (04/27/24 1400)  Resp: (!) 26 (04/27/24 1400)  BP: (!) 164/92 (04/27/24 1400)  SpO2: (!) 93 % (04/27/24 1400) Vital Signs (24h Range):  Temp:  [97.8 °F (36.6 °C)-98.9 °F (37.2 °C)] (P) 98.4 °F (36.9 °C)  Pulse:  [65-89] 88  Resp:  [18-31] 26  SpO2:  [92 %-100 %] 93 %  BP: (113-204)/() 164/92     Weight: 109 kg (240 lb 4.8 oz)  Body mass index is 34.48 kg/m².    Intake/Output Summary (Last 24 hours) at 4/27/2024 1523  Last data filed at 4/27/2024 1130  Gross per 24 hour   Intake 1645.2 ml   Output 1185 ml   Net 460.2 ml         Physical Exam  Vitals and nursing note reviewed.   Constitutional:       General: He is not in acute distress.     Appearance: He is well-developed. He is obese.   HENT:      Head:  Normocephalic and atraumatic.   Eyes:      Conjunctiva/sclera: Conjunctivae normal.   Neck:      Vascular: No JVD.   Cardiovascular:      Rate and Rhythm: Normal rate and regular rhythm.      Heart sounds: Normal heart sounds.   Pulmonary:      Effort: Pulmonary effort is normal.      Breath sounds: Normal breath sounds.   Abdominal:      General: Bowel sounds are normal. There is no distension.      Palpations: Abdomen is soft.      Tenderness: There is no abdominal tenderness.   Musculoskeletal:      Cervical back: Neck supple.      Right lower leg: No edema.      Left lower leg: No edema.   Neurological:      Mental Status: He is alert.   Psychiatric:         Behavior: Behavior normal.             Significant Labs: All pertinent labs within the past 24 hours have been reviewed.  CBC:   Recent Labs   Lab 04/26/24  0925 04/27/24  0433   WBC 10.56 12.99*   HGB 13.3* 11.5*   HCT 43.0 37.2*    122*     CMP:   Recent Labs   Lab 04/26/24  0925 04/27/24  0432    138  138   K 3.0* 4.7  4.7   * 110  110   CO2 18* 18*  18*   * 109  109   BUN 38* 38*  38*   CREATININE 3.6* 3.6*  3.6*   CALCIUM 9.9 9.1  9.1   PROT 7.5  --    ALBUMIN 3.3* 2.5*   BILITOT 0.4  --    ALKPHOS 110  --    AST 29  --    ALT 28  --    ANIONGAP 14 10  10       Significant Imaging: I have reviewed all pertinent imaging results/findings within the past 24 hours.    Assessment/Plan:      * Bacterial pneumonia  Chest xray with RLL opacities suggestive of pneumonia. Initiated on vanc/zosyn/azithro, hydrocortisone. Cultures pending. Continue supplemental O2    Of note, leukocytosis likely from steroid use.    Acute respiratory failure with hypoxia  Patient with Hypoxic Respiratory failure which is Acute.  he is not on home oxygen. Supplemental oxygen was provided and noted- Oxygen Concentration (%):  [3.5-40] 28    Signs/symptoms of respiratory failure include-  continued O2 requirement . Contributing diagnoses includes -  Aspiration and Pneumonia Labs and images were reviewed. Patient Has recent ABG, which has been reviewed. Will treat underlying causes and adjust management of respiratory failure as follows- abx/ DUONEB/ IS/ Breo ellipta/ steroids     Class 1 obesity due to excess calories with serious comorbidity and body mass index (BMI) of 34.0 to 34.9 in adult  Body mass index is 34.48 kg/m². Obesity complicates all aspects of disease management from diagnostic modalities to treatment.          Immunosuppressive management encounter following kidney transplant        History of DVT (deep vein thrombosis)  On Eliquis      Renal transplant recipient  Continue Sirolimus and Prednisone        Mixed hyperlipidemia  Continue statin       Essential hypertension  Chronic, uncontrolled. Latest blood pressure and vitals reviewed-     Temp:  [97.8 °F (36.6 °C)-98.9 °F (37.2 °C)]   Pulse:  [65-89]   Resp:  [18-31]   BP: (113-204)/()   SpO2:  [92 %-100 %] .   Home meds for hypertension were reviewed and noted below.   Hypertension Medications               cloNIDine 0.3 mg/24 hr td ptwk (CATAPRES) 0.3 mg/24 hr PLACE 1 PATCH ONTO THE SKIN ONCE A WEEK.    furosemide (LASIX) 40 MG tablet TAKE 1 TABLET EVERY DAY    losartan (COZAAR) 25 MG tablet Take 1 tablet (25 mg total) by mouth once daily.    metoprolol succinate (TOPROL-XL) 50 MG 24 hr tablet TAKE 1 TABLET EVERY DAY    nitroGLYCERIN (NITROSTAT) 0.4 MG SL tablet Place 1 tablet (0.4 mg total) under the tongue every 5 (five) minutes as needed for Chest pain.            While in the hospital, will manage blood pressure as follows; Adjust home antihypertensive regimen as follows- continue metoprolol, start hydralazine. Trend.     Will utilize p.r.n. blood pressure medication only if patient's blood pressure greater than 180/110 and he develops symptoms such as worsening chest pain or shortness of breath.    Stage 4 chronic kidney disease  Creatine stable for now. BMP reviewed- noted  Estimated Creatinine Clearance: 22.3 mL/min (A) (based on SCr of 3.6 mg/dL (H)). according to latest data. Based on current GFR, CKD stage is stage 4 - GFR 15-29.  Monitor UOP and serial BMP and adjust therapy as needed. Renally dose meds. Avoid nephrotoxic medications and procedures.  At baseline, consulted nephrology , am labs, avoid nephrotoxins      VTE Risk Mitigation (From admission, onward)           Ordered     apixaban tablet 2.5 mg  2 times daily         04/26/24 1115                    Discharge Planning   LAVERNE:      Code Status: Full Code   Is the patient medically ready for discharge?:     Reason for patient still in hospital (select all that apply): Patient trending condition, Laboratory test, and Treatment. TTF.             Tara Young MD  Department of Hospital Medicine   [place] - Intensive [facility]

## 2024-04-27 NOTE — ASSESSMENT & PLAN NOTE
Patient with Hypoxic Respiratory failure which is Acute.  he is not on home oxygen. Supplemental oxygen was provided and noted- Oxygen Concentration (%):  [3.5-40] 28    Signs/symptoms of respiratory failure include-  continued O2 requirement . Contributing diagnoses includes - Aspiration and Pneumonia Labs and images were reviewed. Patient Has recent ABG, which has been reviewed. Will treat underlying causes and adjust management of respiratory failure as follows- abx/ DUONEB/ IS/ Breo ellipta/ steroids

## 2024-04-28 LAB
ALBUMIN SERPL BCP-MCNC: 2.4 G/DL (ref 3.5–5.2)
ALBUMIN/CREAT UR: 630.3 UG/MG (ref 0–30)
ANION GAP SERPL CALC-SCNC: 14 MMOL/L (ref 8–16)
ANION GAP SERPL CALC-SCNC: 14 MMOL/L (ref 8–16)
BUN SERPL-MCNC: 49 MG/DL (ref 8–23)
BUN SERPL-MCNC: 49 MG/DL (ref 8–23)
CALCIUM SERPL-MCNC: 9.7 MG/DL (ref 8.7–10.5)
CALCIUM SERPL-MCNC: 9.7 MG/DL (ref 8.7–10.5)
CHLORIDE SERPL-SCNC: 113 MMOL/L (ref 95–110)
CHLORIDE SERPL-SCNC: 113 MMOL/L (ref 95–110)
CO2 SERPL-SCNC: 13 MMOL/L (ref 23–29)
CO2 SERPL-SCNC: 13 MMOL/L (ref 23–29)
CREAT SERPL-MCNC: 3.6 MG/DL (ref 0.5–1.4)
CREAT SERPL-MCNC: 3.6 MG/DL (ref 0.5–1.4)
CREAT UR-MCNC: 79.8 MG/DL (ref 23–375)
CREAT UR-MCNC: 79.8 MG/DL (ref 23–375)
EST. GFR  (NO RACE VARIABLE): 17 ML/MIN/1.73 M^2
EST. GFR  (NO RACE VARIABLE): 17 ML/MIN/1.73 M^2
GLUCOSE SERPL-MCNC: 124 MG/DL (ref 70–110)
GLUCOSE SERPL-MCNC: 124 MG/DL (ref 70–110)
MICROALBUMIN UR DL<=1MG/L-MCNC: 503 UG/ML
MRSA SPEC QL CULT: NORMAL
PHOSPHATE SERPL-MCNC: 3.4 MG/DL (ref 2.7–4.5)
POTASSIUM SERPL-SCNC: 5 MMOL/L (ref 3.5–5.1)
POTASSIUM SERPL-SCNC: 5 MMOL/L (ref 3.5–5.1)
PROT UR-MCNC: 103 MG/DL (ref 0–15)
PROT/CREAT UR: 1.29 MG/G{CREAT} (ref 0–0.2)
SODIUM SERPL-SCNC: 140 MMOL/L (ref 136–145)
SODIUM SERPL-SCNC: 140 MMOL/L (ref 136–145)
VANCOMYCIN SERPL-MCNC: 19.6 UG/ML

## 2024-04-28 PROCEDURE — 94640 AIRWAY INHALATION TREATMENT: CPT

## 2024-04-28 PROCEDURE — 21400001 HC TELEMETRY ROOM

## 2024-04-28 PROCEDURE — 25000242 PHARM REV CODE 250 ALT 637 W/ HCPCS: Performed by: INTERNAL MEDICINE

## 2024-04-28 PROCEDURE — 25000003 PHARM REV CODE 250: Performed by: FAMILY MEDICINE

## 2024-04-28 PROCEDURE — 63600175 PHARM REV CODE 636 W HCPCS

## 2024-04-28 PROCEDURE — 86334 IMMUNOFIX E-PHORESIS SERUM: CPT | Mod: 26,,, | Performed by: PATHOLOGY

## 2024-04-28 PROCEDURE — 87449 NOS EACH ORGANISM AG IA: CPT | Performed by: FAMILY MEDICINE

## 2024-04-28 PROCEDURE — 25000003 PHARM REV CODE 250

## 2024-04-28 PROCEDURE — 80069 RENAL FUNCTION PANEL: CPT | Performed by: STUDENT IN AN ORGANIZED HEALTH CARE EDUCATION/TRAINING PROGRAM

## 2024-04-28 PROCEDURE — 86334 IMMUNOFIX E-PHORESIS SERUM: CPT | Performed by: INTERNAL MEDICINE

## 2024-04-28 PROCEDURE — 36415 COLL VENOUS BLD VENIPUNCTURE: CPT | Performed by: FAMILY MEDICINE

## 2024-04-28 PROCEDURE — 82570 ASSAY OF URINE CREATININE: CPT | Performed by: INTERNAL MEDICINE

## 2024-04-28 PROCEDURE — 63600175 PHARM REV CODE 636 W HCPCS: Performed by: FAMILY MEDICINE

## 2024-04-28 PROCEDURE — 82043 UR ALBUMIN QUANTITATIVE: CPT | Performed by: INTERNAL MEDICINE

## 2024-04-28 PROCEDURE — 27000207 HC ISOLATION

## 2024-04-28 PROCEDURE — 27000221 HC OXYGEN, UP TO 24 HOURS

## 2024-04-28 PROCEDURE — 94761 N-INVAS EAR/PLS OXIMETRY MLT: CPT

## 2024-04-28 PROCEDURE — 87324 CLOSTRIDIUM AG IA: CPT | Performed by: FAMILY MEDICINE

## 2024-04-28 PROCEDURE — 36415 COLL VENOUS BLD VENIPUNCTURE: CPT | Performed by: STUDENT IN AN ORGANIZED HEALTH CARE EDUCATION/TRAINING PROGRAM

## 2024-04-28 PROCEDURE — 25000003 PHARM REV CODE 250: Performed by: INTERNAL MEDICINE

## 2024-04-28 PROCEDURE — 94799 UNLISTED PULMONARY SVC/PX: CPT | Mod: XB

## 2024-04-28 PROCEDURE — 80202 ASSAY OF VANCOMYCIN: CPT | Performed by: FAMILY MEDICINE

## 2024-04-28 PROCEDURE — 11000001 HC ACUTE MED/SURG PRIVATE ROOM

## 2024-04-28 PROCEDURE — 63600175 PHARM REV CODE 636 W HCPCS: Performed by: INTERNAL MEDICINE

## 2024-04-28 PROCEDURE — 99900035 HC TECH TIME PER 15 MIN (STAT)

## 2024-04-28 RX ORDER — PREDNISONE 5 MG/1
5 TABLET ORAL ONCE
Status: COMPLETED | OUTPATIENT
Start: 2024-04-28 | End: 2024-04-28

## 2024-04-28 RX ORDER — SODIUM BICARBONATE 650 MG/1
1300 TABLET ORAL 3 TIMES DAILY
Status: DISCONTINUED | OUTPATIENT
Start: 2024-04-28 | End: 2024-05-01

## 2024-04-28 RX ORDER — PREDNISONE 10 MG/1
10 TABLET ORAL DAILY
Status: DISCONTINUED | OUTPATIENT
Start: 2024-04-29 | End: 2024-05-04 | Stop reason: HOSPADM

## 2024-04-28 RX ORDER — HYDRALAZINE HYDROCHLORIDE 25 MG/1
100 TABLET, FILM COATED ORAL EVERY 8 HOURS
Status: DISCONTINUED | OUTPATIENT
Start: 2024-04-28 | End: 2024-05-04 | Stop reason: HOSPADM

## 2024-04-28 RX ORDER — METOPROLOL SUCCINATE 50 MG/1
100 TABLET, EXTENDED RELEASE ORAL DAILY
Status: DISCONTINUED | OUTPATIENT
Start: 2024-04-29 | End: 2024-05-04 | Stop reason: HOSPADM

## 2024-04-28 RX ORDER — METOPROLOL SUCCINATE 50 MG/1
50 TABLET, EXTENDED RELEASE ORAL ONCE
Status: COMPLETED | OUTPATIENT
Start: 2024-04-28 | End: 2024-04-28

## 2024-04-28 RX ORDER — IPRATROPIUM BROMIDE AND ALBUTEROL SULFATE 2.5; .5 MG/3ML; MG/3ML
3 SOLUTION RESPIRATORY (INHALATION) EVERY 6 HOURS PRN
Status: DISCONTINUED | OUTPATIENT
Start: 2024-04-28 | End: 2024-05-04 | Stop reason: HOSPADM

## 2024-04-28 RX ADMIN — MUPIROCIN: 20 OINTMENT TOPICAL at 08:04

## 2024-04-28 RX ADMIN — FLUTICASONE FUROATE AND VILANTEROL TRIFENATATE 1 PUFF: 100; 25 POWDER RESPIRATORY (INHALATION) at 07:04

## 2024-04-28 RX ADMIN — METOPROLOL SUCCINATE 50 MG: 50 TABLET, EXTENDED RELEASE ORAL at 08:04

## 2024-04-28 RX ADMIN — HYDRALAZINE HYDROCHLORIDE 100 MG: 25 TABLET, FILM COATED ORAL at 03:04

## 2024-04-28 RX ADMIN — APIXABAN 2.5 MG: 2.5 TABLET, FILM COATED ORAL at 08:04

## 2024-04-28 RX ADMIN — PIPERACILLIN AND TAZOBACTAM 4.5 G: 4; .5 INJECTION, POWDER, LYOPHILIZED, FOR SOLUTION INTRAVENOUS; PARENTERAL at 05:04

## 2024-04-28 RX ADMIN — SODIUM BICARBONATE 650 MG TABLET 1300 MG: at 08:04

## 2024-04-28 RX ADMIN — SIROLIMUS 3 MG: 1 TABLET ORAL at 08:04

## 2024-04-28 RX ADMIN — IPRATROPIUM BROMIDE AND ALBUTEROL SULFATE 3 ML: 2.5; .5 SOLUTION RESPIRATORY (INHALATION) at 11:04

## 2024-04-28 RX ADMIN — SODIUM BICARBONATE 650 MG TABLET 1300 MG: at 03:04

## 2024-04-28 RX ADMIN — PIPERACILLIN AND TAZOBACTAM 4.5 G: 4; .5 INJECTION, POWDER, LYOPHILIZED, FOR SOLUTION INTRAVENOUS; PARENTERAL at 08:04

## 2024-04-28 RX ADMIN — AZITHROMYCIN MONOHYDRATE 500 MG: 500 INJECTION, POWDER, LYOPHILIZED, FOR SOLUTION INTRAVENOUS at 01:04

## 2024-04-28 RX ADMIN — PREDNISONE 5 MG: 5 TABLET ORAL at 09:04

## 2024-04-28 RX ADMIN — MAGNESIUM OXIDE TAB 400 MG (241.3 MG ELEMENTAL MG) 400 MG: 400 (241.3 MG) TAB at 08:04

## 2024-04-28 RX ADMIN — IPRATROPIUM BROMIDE AND ALBUTEROL SULFATE 3 ML: 2.5; .5 SOLUTION RESPIRATORY (INHALATION) at 07:04

## 2024-04-28 RX ADMIN — HYDRALAZINE HYDROCHLORIDE 50 MG: 25 TABLET, FILM COATED ORAL at 07:04

## 2024-04-28 RX ADMIN — ATORVASTATIN CALCIUM 20 MG: 20 TABLET, FILM COATED ORAL at 08:04

## 2024-04-28 RX ADMIN — FERROUS SULFATE TAB 325 MG (65 MG ELEMENTAL FE) 1 EACH: 325 (65 FE) TAB at 08:04

## 2024-04-28 RX ADMIN — HYDRALAZINE HYDROCHLORIDE 100 MG: 25 TABLET, FILM COATED ORAL at 09:04

## 2024-04-28 RX ADMIN — IPRATROPIUM BROMIDE AND ALBUTEROL SULFATE 3 ML: 2.5; .5 SOLUTION RESPIRATORY (INHALATION) at 04:04

## 2024-04-28 RX ADMIN — METOPROLOL SUCCINATE 50 MG: 50 TABLET, EXTENDED RELEASE ORAL at 09:04

## 2024-04-28 RX ADMIN — PREDNISONE 5 MG: 5 TABLET ORAL at 08:04

## 2024-04-28 RX ADMIN — SODIUM ZIRCONIUM CYCLOSILICATE 10 G: 5 POWDER, FOR SUSPENSION ORAL at 08:04

## 2024-04-28 RX ADMIN — SODIUM ZIRCONIUM CYCLOSILICATE 10 G: 5 POWDER, FOR SUSPENSION ORAL at 03:04

## 2024-04-28 RX ADMIN — PIPERACILLIN AND TAZOBACTAM 4.5 G: 4; .5 INJECTION, POWDER, LYOPHILIZED, FOR SOLUTION INTRAVENOUS; PARENTERAL at 01:04

## 2024-04-28 RX ADMIN — LACTOBACILLUS TAB 1 TABLET: TAB at 08:04

## 2024-04-28 NOTE — PLAN OF CARE
Problem: Adult Inpatient Plan of Care  Goal: Plan of Care Review  Outcome: Progressing  Goal: Patient-Specific Goal (Individualized)  Outcome: Progressing  Goal: Absence of Hospital-Acquired Illness or Injury  Outcome: Progressing  Goal: Optimal Comfort and Wellbeing  Outcome: Progressing  Goal: Readiness for Transition of Care  Outcome: Progressing     Problem: Pneumonia  Goal: Fluid Balance  Outcome: Progressing  Goal: Resolution of Infection Signs and Symptoms  Outcome: Progressing  Goal: Effective Oxygenation and Ventilation  Outcome: Progressing     Problem: Infection  Goal: Absence of Infection Signs and Symptoms  Outcome: Progressing     Problem: Pulmonary Impairment  Goal: Improved Activity Tolerance  Outcome: Progressing  Goal: Effective Airway Clearance  Outcome: Progressing  Goal: Optimal Gas Exchange  Outcome: Progressing     Problem: Comorbidity Management  Goal: Blood Pressure in Desired Range  Outcome: Progressing  Goal: Bariatric Home Regimen Maintained  Outcome: Progressing     Problem: Functional Deficit  Goal: Improved Balance and Postural Control  Outcome: Progressing  Goal: Optimal Coordination  Outcome: Progressing  Goal: Improved Muscle Strength  Outcome: Progressing  Goal: Improved Muscle Tone  Outcome: Progressing  Goal: Optimal Range of Motion  Outcome: Progressing     Problem: Pain Acute  Goal: Optimal Pain Control and Function  Outcome: Progressing     Problem: Fall Injury Risk  Goal: Absence of Fall and Fall-Related Injury  Outcome: Progressing     Problem: Skin Injury Risk Increased  Goal: Skin Health and Integrity  Outcome: Progressing

## 2024-04-28 NOTE — NURSING
Assumed care of patient from sri, ICU, RN, patient is AAOX4, 2LNC, no c/o pain or discomfort, vitals WNL, except BP elevated, will recheck, urinal and BSC near patient, suction at bedside for infrequent cough and mucous, all safety precautions are in place, call bell and tray table are within reach of the patient and no additional questions or concerns from the patient at this time, patient care ongoing.

## 2024-04-28 NOTE — NURSING TRANSFER
Nursing Transfer Note      4/28/2024   5:15 AM    Nurse giving handoff:ANSLEY Farrell  Nurse receiving handoff:ANSLEY Hunt    Reason patient is being transferred: D/C cont Bipap/stepdown    Transfer To: 528    Transfer via wheelchair    Transfer with 2L NC to O2(Pt with RA but increase WOB w/ activity) , cardiac monitoring    Transported by GERONIMO Dobbins RN    Transfer Vital Signs:  Blood Pressure:154/75  Heart Rate:70  O2:98%  Respirations:23    Telemetry:   Order for Tele Monitor? Yes    Additional Lines: Oxygen    Medicines sent: bactroban, prednisone, fluticasone furoate-vilanterol inhaler    Any special needs or follow-up needed: Bipap hs    Patient belongings transferred with patient: Yes(wallet, phone, , clothes[shirt, pants, shoes])    Chart send with patient: Yes    Notified: Pt stated he will notify brother      Upon arrival to floor: cardiac monitor applied, patient oriented to room, call bell in reach, and bed in lowest position

## 2024-04-28 NOTE — PROGRESS NOTES
[facility] Medicine  Progress Note    Patient Name: Jose Luis Manzo  MRN: [id number]  Patient Class: IP- Inpatient   Admission Date: 4/26/2024  Length of Stay: 2 days  Attending Physician: Kyree Ramos MD  Primary Care Provider: Home Alexis MD        Subjective:     Principal Problem:Bacterial pneumonia    HPI:  Very pleasant 73 yo F presents with acute on chronic progressive dyspnea , hacking cough , afebrile , denied being on home oxygen , no sick contacts or travel, but is a former smoker.  Denied chest pain wheezing intermittent claudication. Recent abx use , lung disease prior. 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       Interval History: no acute event overnight. Labs and vitals reviewed. BP fluctuating, titrated hydralazine. Audible wheezing, increase prednisone to 10 mg QD and neb tx.     Review of Systems   Constitutional:  Positive for fatigue. Negative for activity change, diaphoresis and unexpected weight change.   HENT: Negative.  Negative for congestion, ear pain, mouth sores, rhinorrhea and voice change.    Eyes: Negative.  Negative for pain, discharge and visual disturbance.   Respiratory:  Positive for shortness of breath and wheezing. Negative for apnea and cough.    Cardiovascular: Negative.  Negative for chest pain and palpitations.   Gastrointestinal: Negative.  Negative for abdominal distention, anal bleeding, diarrhea and vomiting.   Endocrine: Negative.  Negative for cold intolerance and polyuria.   Genitourinary: Negative.  Negative for decreased urine volume, difficulty urinating, frequency, penile discharge and scrotal swelling.   Musculoskeletal: Negative.  Negative for back pain, myalgias and neck stiffness.   Skin: Negative.  Negative for color change and rash.   Allergic/Immunologic: Negative.  Negative for environmental allergies and immunocompromised state.    Neurological: Negative.  Negative for dizziness, speech difficulty, weakness and light-headedness.   Hematological: Negative.    Psychiatric/Behavioral: Negative.  Negative for agitation, dysphoric mood and suicidal ideas. The patient is not nervous/anxious.      Objective:     Vital Signs (Most Recent):  Temp: 96.3 °F (35.7 °C) (04/28/24 0758)  Pulse: 72 (04/28/24 0758)  Resp: 18 (04/28/24 0758)  BP: (!) 171/92 (04/28/24 0758)  SpO2: 97 % (04/28/24 0758) Vital Signs (24h Range):  Temp:  [96.3 °F (35.7 °C)-98.7 °F (37.1 °C)] 96.3 °F (35.7 °C)  Pulse:  [63-90] 72  Resp:  [16-37] 18  SpO2:  [90 %-100 %] 97 %  BP: (135-204)/() 171/92     Weight: 107.4 kg (236 lb 12.4 oz)  Body mass index is 33.97 kg/m².    Intake/Output Summary (Last 24 hours) at 4/28/2024 0836  Last data filed at 4/28/2024 0827  Gross per 24 hour   Intake 1618.31 ml   Output 1285 ml   Net 333.31 ml      Physical Exam  Constitutional:       Appearance: He is well-developed.   HENT:      Head: Normocephalic and atraumatic.      Right Ear: External ear normal.      Left Ear: External ear normal.      Nose: Nose normal.      Mouth/Throat:      Pharynx: No oropharyngeal exudate.   Eyes:      General: No scleral icterus.        Right eye: No discharge.         Left eye: No discharge.      Conjunctiva/sclera: Conjunctivae normal.      Pupils: Pupils are equal, round, and reactive to light.   Neck:      Thyroid: No thyromegaly.      Vascular: No JVD.      Trachea: No tracheal deviation.   Cardiovascular:      Rate and Rhythm: Normal rate and regular rhythm.      Heart sounds: Normal heart sounds. No murmur heard.     No friction rub. No gallop.   Pulmonary:      Effort: Pulmonary effort is normal. No respiratory distress.      Breath sounds: No stridor. Wheezing present. No rales.   Chest:      Chest wall: No tenderness.   Abdominal:      General: Bowel sounds are normal. There is no distension.      Palpations: Abdomen is soft. There is no mass.       Tenderness: There is no abdominal tenderness. There is no guarding or rebound.      Hernia: No hernia is present.   Musculoskeletal:         General: No tenderness. Normal range of motion.      Cervical back: Normal range of motion and neck supple.   Lymphadenopathy:      Cervical: No cervical adenopathy.   Skin:     General: Skin is warm and dry.      Coloration: Skin is not pale.      Findings: No erythema or rash.   Neurological:      Mental Status: He is alert and oriented to person, place, and time.      Cranial Nerves: No cranial nerve deficit.      Motor: No abnormal muscle tone.      Coordination: Coordination normal.      Deep Tendon Reflexes: Reflexes are normal and symmetric. Reflexes normal.   Psychiatric:         Behavior: Behavior normal.         Thought Content: Thought content normal.         Judgment: Judgment normal.             Significant Labs: All pertinent labs within the past 24 hours have been reviewed.  CBC:   Recent Labs   Lab 04/26/24 0925 04/27/24 0433   WBC 10.56 12.99*   HGB 13.3* 11.5*   HCT 43.0 37.2*    122*     CMP:   Recent Labs   Lab 04/26/24 0925 04/27/24  0432 04/28/24  0433    138  138 140  140   K 3.0* 4.7  4.7 5.0  5.0   * 110  110 113*  113*   CO2 18* 18*  18* 13*  13*   * 109  109 124*  124*   BUN 38* 38*  38* 49*  49*   CREATININE 3.6* 3.6*  3.6* 3.6*  3.6*   CALCIUM 9.9 9.1  9.1 9.7  9.7   PROT 7.5  --   --    ALBUMIN 3.3* 2.5* 2.4*   BILITOT 0.4  --   --    ALKPHOS 110  --   --    AST 29  --   --    ALT 28  --   --    ANIONGAP 14 10  10 14  14       Significant Imaging: I have reviewed all pertinent imaging results/findings within the past 24 hours.  I have reviewed and interpreted all pertinent imaging results/findings within the past 24 hours.    Assessment/Plan:      Active Diagnoses:    Diagnosis Date Noted POA    PRINCIPAL PROBLEM:  Bacterial pneumonia [J15.9] 04/26/2024 Yes    Acute respiratory failure with hypoxia  [J96.01] 04/26/2024 Yes    Class 1 obesity due to excess calories with serious comorbidity and body mass index (BMI) of 34.0 to 34.9 in adult [E66.09, Z68.34] 04/18/2023 Not Applicable    Immunosuppressive management encounter following kidney transplant [Z79.899, Z94.0] 01/04/2021 Not Applicable    History of DVT (deep vein thrombosis) [Z86.718] 04/13/2016 Not Applicable    Renal transplant recipient [Z94.0] 03/10/2016 Not Applicable    Essential hypertension [I10] 03/13/2015 Yes    Mixed hyperlipidemia [E78.2] 03/13/2015 Yes    Stage 4 chronic kidney disease [N18.4] 10/04/2011 Yes      Problems Resolved During this Admission:     VTE Risk Mitigation (From admission, onward)           Ordered     apixaban tablet 2.5 mg  2 times daily         04/26/24 1115                   * Bacterial pneumonia  Chest xray with RLL opacities suggestive of pneumonia. Initiated on vanc/zosyn/azithro, hydrocortisone. Cultures pending. Continue supplemental O2     Of note, leukocytosis likely from steroid use.     Acute respiratory failure with hypoxia  Patient with Hypoxic Respiratory failure which is Acute.  he is not on home oxygen. Supplemental oxygen was provided and noted- Oxygen Concentration (%):  [3.5-40] 28     Signs/symptoms of respiratory failure include-  continued O2 requirement . Contributing diagnoses includes - Aspiration and Pneumonia Labs and images were reviewed. Patient Has recent ABG, which has been reviewed. Will treat underlying causes and adjust management of respiratory failure as follows- abx/ DUONEB/ IS/ Breo ellipta/ steroids      Class 1 obesity due to excess calories with serious comorbidity and body mass index (BMI) of 34.0 to 34.9 in adult  Body mass index is 34.48 kg/m². Obesity complicates all aspects of disease management from diagnostic modalities to treatment.               Immunosuppressive management encounter following kidney transplant           History of DVT (deep vein thrombosis)  On Eliquis        Renal transplant recipient  Continue Sirolimus and Prednisone          Mixed hyperlipidemia  Continue statin         Essential hypertension  Chronic, uncontrolled. Latest blood pressure and vitals reviewed-      Temp:  [97.8 °F (36.6 °C)-98.9 °F (37.2 °C)]   Pulse:  [65-89]   Resp:  [18-31]   BP: (113-204)/()   SpO2:  [92 %-100 %] .   Home meds for hypertension were reviewed and noted below.   Hypertension Medications                    cloNIDine 0.3 mg/24 hr td ptwk (CATAPRES) 0.3 mg/24 hr PLACE 1 PATCH ONTO THE SKIN ONCE A WEEK.     furosemide (LASIX) 40 MG tablet TAKE 1 TABLET EVERY DAY     losartan (COZAAR) 25 MG tablet Take 1 tablet (25 mg total) by mouth once daily.     metoprolol succinate (TOPROL-XL) 50 MG 24 hr tablet TAKE 1 TABLET EVERY DAY     nitroGLYCERIN (NITROSTAT) 0.4 MG SL tablet Place 1 tablet (0.4 mg total) under the tongue every 5 (five) minutes as needed for Chest pain.                While in the hospital, will manage blood pressure as follows; Adjust home antihypertensive regimen as follows- continue metoprolol, start hydralazine. Trend.      Will utilize p.r.n. blood pressure medication only if patient's blood pressure greater than 180/110 and he develops symptoms such as worsening chest pain or shortness of breath.     Stage 4 chronic kidney disease  Creatine stable for now. BMP reviewed- noted Estimated Creatinine Clearance: 22.3 mL/min (A) (based on SCr of 3.6 mg/dL (H)). according to latest data. Based on current GFR, CKD stage is stage 4 - GFR 15-29.  Monitor UOP and serial BMP and adjust therapy as needed. Renally dose meds. Avoid nephrotoxic medications and procedures.  At baseline, consulted nephrology , am labs, avoid nephrotoxins       Kyree Ramos MD  Department of Hospital Medicine   [facility]

## 2024-04-28 NOTE — PLAN OF CARE
Problem: Adult Inpatient Plan of Care  Goal: Plan of Care Review  Outcome: Progressing     VIRTUAL NURSE:  Labs, notes, orders, and careplan reviewed.

## 2024-04-28 NOTE — PROGRESS NOTES
Progress Note  Nephrology      Consult Requested By: Kyree Ramos MD      SUBJECTIVE:     Overnight events  Patient is a 74 y.o. male     Patient Active Problem List   Diagnosis    Stage 4 chronic kidney disease    Essential hypertension    Mixed hyperlipidemia    Benign prostatic hyperplasia with nocturia    Claudication of right lower extremity    Eye swelling, bilateral    Diminished night vision    Renal transplant recipient    Dermolipoma    History of DVT (deep vein thrombosis)    Iron deficiency anemia    Pulmonary nodules    Larynx cancer    Voice disturbance    Atherosclerosis of aorta    Carcinoma in situ of vocal cord    Dysphagia, pharyngoesophageal    Chondronecrosis of larynx    Squamous cell carcinoma    Obesity (BMI 30-39.9)    Pterygium of both eyes    History of cancer of larynx    Nodule of left lung    Granulomatous lung disease    Transplanted kidney - 2007    Hypertensive kidney disease with stage 4 chronic kidney disease    Hypercalcemia    Secondary hyperparathyroidism of renal origin    Benign tumor of orbit    Metabolic bone disease    Immunosuppressive management encounter following kidney transplant    Hyperphosphatemia    Vitamin D deficiency disease    History of COVID-19    SOB (shortness of breath)    Metabolic acidosis    Proteinuria    Anticoagulant long-term use    Pre-op evaluation    Benign prostatic hyperplasia with lower urinary tract symptoms    Centrilobular emphysema    Dependence on renal dialysis    Class 1 obesity due to excess calories with serious comorbidity and body mass index (BMI) of 34.0 to 34.9 in adult    Former cigarette smoker    Deformity of toenail    Bacterial pneumonia    Acute respiratory failure with hypoxia     Past Medical History:   Diagnosis Date    Acute hypoxemic respiratory failure due to COVID-19 12/30/2020    Anticoagulant long-term use     BPH (benign prostatic hypertrophy)     Cancer     vocal cords    Claudication of right lower extremity  3/10/2016    COVID-19 virus detected 12/30/2020    Dependence on renal dialysis 4/18/2023    Disorder of kidney and ureter     Hyperkalemia 1/1/2021    Hyperlipidemia     Hypertension     Hypokalemia 6/23/2021    Immunosuppression 6/26/2021    Immunosuppression due to chronic steroid use 1/30/2020    Microcytic anemia 9/16/2016    Obesity (BMI 30-39.9) 1/23/2019    Oropharyngeal cancer     Pterygium     Squamous cell carcinoma 4/25/2018    Thromboembolic disorder     Unspecified disorder of kidney and ureter               OBJECTIVE:     Vitals:    04/28/24 0758 04/28/24 1109 04/28/24 1145 04/28/24 1310   BP: (!) 171/92 (!) 159/86     BP Location:       Patient Position:       Pulse: 72 69 83 69   Resp: 18 18 20    Temp: 96.3 °F (35.7 °C) 97.2 °F (36.2 °C)     TempSrc:       SpO2: 97% 97% 95%    Weight:       Height:           Temp: 97.2 °F (36.2 °C) (04/28/24 1109)  Pulse: 69 (04/28/24 1310)  Resp: 20 (04/28/24 1145)  BP: (!) 159/86 (04/28/24 1109)  SpO2: 95 % (04/28/24 1145)    Date 04/28/24 0700 - 04/29/24 0659   Shift 7237-5756 5936-7459 1028-3399 24 Hour Total   INTAKE   P.O. 360   360   Shift Total(mL/kg) 360(3.4)   360(3.4)   OUTPUT   Urine(mL/kg/hr) 200(0.2)   200   Shift Total(mL/kg) 200(1.9)   200(1.9)   Weight (kg) 107.4 107.4 107.4 107.4             Medications:  Current Facility-Administered Medications   Medication Dose Route Frequency    albuterol-ipratropium  3 mL Nebulization Q4H WAKE    apixaban  2.5 mg Oral BID    atorvastatin  20 mg Oral QHS    azithromycin  500 mg Intravenous Q24H    ferrous sulfate  1 tablet Oral BID    fluticasone furoate-vilanteroL  1 puff Inhalation Daily    hydrALAZINE  100 mg Oral Q8H    Lactobacillus acidoph-L.bulgar  1 tablet Oral Daily    magnesium oxide  400 mg Oral Daily    [START ON 4/29/2024] metoprolol succinate  100 mg Oral Daily    mupirocin   Nasal BID    piperacillin-tazobactam (Zosyn) IV (PEDS and ADULTS) (extended infusion is not appropriate)  4.5 g  "Intravenous Q8H    [START ON 4/29/2024] predniSONE  10 mg Oral Daily    sirolimus  3 mg Oral Daily    sodium bicarbonate  1,300 mg Oral TID    sodium zirconium cyclosilicate  10 g Oral TID     Current Facility-Administered Medications   Medication Dose Route Frequency Last Rate Last Admin               Physical Exam:  General appearance:  SOB with exertion  Cough  Diarrhea  Weakness  Lungs: RR 21  Pulse oximeter 98 % O2  Heart: pulse 70  Abdomen: soft  Extremities: edema  Skin: dry  Laboratory:  ABG  Labs reviewed  Recent Results (from the past 336 hour(s))   Basic Metabolic Panel (BMP)    Collection Time: 04/28/24  4:33 AM   Result Value Ref Range    Sodium 140 136 - 145 mmol/L    Potassium 5.0 3.5 - 5.1 mmol/L    Chloride 113 (H) 95 - 110 mmol/L    CO2 13 (L) 23 - 29 mmol/L    BUN 49 (H) 8 - 23 mg/dL    Creatinine 3.6 (H) 0.5 - 1.4 mg/dL    Calcium 9.7 8.7 - 10.5 mg/dL    Anion Gap 14 8 - 16 mmol/L   Basic Metabolic Panel (BMP)    Collection Time: 04/27/24  4:32 AM   Result Value Ref Range    Sodium 138 136 - 145 mmol/L    Potassium 4.7 3.5 - 5.1 mmol/L    Chloride 110 95 - 110 mmol/L    CO2 18 (L) 23 - 29 mmol/L    BUN 38 (H) 8 - 23 mg/dL    Creatinine 3.6 (H) 0.5 - 1.4 mg/dL    Calcium 9.1 8.7 - 10.5 mg/dL    Anion Gap 10 8 - 16 mmol/L     Recent Results (from the past 336 hour(s))   CBC Auto Differential    Collection Time: 04/27/24  4:33 AM   Result Value Ref Range    WBC 12.99 (H) 3.90 - 12.70 K/uL    Hemoglobin 11.5 (L) 14.0 - 18.0 g/dL    Hematocrit 37.2 (L) 40.0 - 54.0 %    Platelets 122 (L) 150 - 450 K/uL   CBC auto differential    Collection Time: 04/26/24  9:25 AM   Result Value Ref Range    WBC 10.56 3.90 - 12.70 K/uL    Hemoglobin 13.3 (L) 14.0 - 18.0 g/dL    Hematocrit 43.0 40.0 - 54.0 %    Platelets 154 150 - 450 K/uL     Urinalysis  No results for input(s): "COLORU", "CLARITYU", "SPECGRAV", "PHUR", "PROTEINUA", "GLUCOSEU", "BILIRUBINCON", "BLOODU", "WBCU", "RBCU", "BACTERIA", "MUCUS", "NITRITE", " ""LEUKOCYTESUR", "UROBILINOGEN", "HYALINECASTS" in the last 24 hours.    Diagnostic Results:  X-Ray: Reviewed  US: Reviewed  Echo: Reviewed  ACCESS    ASSESSMENT/PLAN:       CKD 4  Kidney transplant 2007  Prednisone 5  Sirolimus 3  UA protein 2+, blood 2+, RBC 10   UA protein/creatinine 1.29  Creatinine 3.6  GFR 17 cc/min  BUN 38- 49  K 5  Metabolic acidosis  Sodium bicarbonate  Lactic acid 4.3 - 2.8  Metabolic bone disease  Magnesium 1.6   Replace prn  Anemia multifactorial  Hb 11.5  Iron 15  Sat iron 11  Renal cap  Poor nutrition   Albumin 2.5- 2.4  Hypertension   Blood pressure 153/84, 159/86  Weight daily  I and O  Avoid nephrotoxic agents, hypotension, hypovolemia  Renal diet as tolerated             "

## 2024-04-28 NOTE — PLAN OF CARE
The proper method of use, as well as anticipated side effects, of this metered-dose inhaler are discussed and demonstrated to the patient. Patient on oxygen with documented flow.  Will attempt to wean per O2 order protocol. The proper method of use, as well as anticipated side effects, of this aerosol treatment are discussed and demonstrated to the patient.  Lung expansion therapy. Will continue to monitor.

## 2024-04-28 NOTE — PLAN OF CARE
Pt remains in ICU  with anticipated step down to telemetry when bed become available.  Pt is AAO X 4 and in no apparent distress.  NSR with HR 80-90's. O2 titrated to maintain SpO2 > 92%.  Hypertension noted, hydralazine administered, see MAR. Total  mls. PT/OT complete. Safety precautions maintained.     Problem: Adult Inpatient Plan of Care  Goal: Plan of Care Review  Outcome: Progressing  -POC discussed with pt at bedside.   -Plans to step down to telemetry.     Problem: Pneumonia  Goal: Fluid Balance  Outcome: Progressing  -Zosyn, Azithromycin, and Vancomycin IVPB administered, see MAR.   -Electrolytes replaced.  -Total  mls.     Goal: Resolution of Infection Signs and Symptoms  Outcome: Progressing  -Pt remains afebrile.     Goal: Effective Oxygenation and Ventilation  Outcome: Progressing  -O2 titrated to maintain SpO2 > 92%.

## 2024-04-29 LAB
ALBUMIN SERPL BCP-MCNC: 2.4 G/DL (ref 3.5–5.2)
ANION GAP SERPL CALC-SCNC: 13 MMOL/L (ref 8–16)
ANION GAP SERPL CALC-SCNC: 13 MMOL/L (ref 8–16)
BACTERIA SPEC AEROBE CULT: ABNORMAL
BACTERIA SPEC AEROBE CULT: ABNORMAL
BASOPHILS # BLD AUTO: 0.02 K/UL (ref 0–0.2)
BASOPHILS NFR BLD: 0.1 % (ref 0–1.9)
BUN SERPL-MCNC: 47 MG/DL (ref 8–23)
BUN SERPL-MCNC: 47 MG/DL (ref 8–23)
CALCIUM SERPL-MCNC: 10.1 MG/DL (ref 8.7–10.5)
CALCIUM SERPL-MCNC: 10.1 MG/DL (ref 8.7–10.5)
CHLORIDE SERPL-SCNC: 110 MMOL/L (ref 95–110)
CHLORIDE SERPL-SCNC: 110 MMOL/L (ref 95–110)
CO2 SERPL-SCNC: 18 MMOL/L (ref 23–29)
CO2 SERPL-SCNC: 18 MMOL/L (ref 23–29)
CREAT SERPL-MCNC: 3.4 MG/DL (ref 0.5–1.4)
CREAT SERPL-MCNC: 3.4 MG/DL (ref 0.5–1.4)
DIFFERENTIAL METHOD BLD: ABNORMAL
EOSINOPHIL # BLD AUTO: 0 K/UL (ref 0–0.5)
EOSINOPHIL NFR BLD: 0.1 % (ref 0–8)
ERYTHROCYTE [DISTWIDTH] IN BLOOD BY AUTOMATED COUNT: 16.5 % (ref 11.5–14.5)
EST. GFR  (NO RACE VARIABLE): 18 ML/MIN/1.73 M^2
EST. GFR  (NO RACE VARIABLE): 18 ML/MIN/1.73 M^2
GLUCOSE SERPL-MCNC: 99 MG/DL (ref 70–110)
GLUCOSE SERPL-MCNC: 99 MG/DL (ref 70–110)
GRAM STN SPEC: ABNORMAL
HCT VFR BLD AUTO: 37.4 % (ref 40–54)
HGB BLD-MCNC: 12 G/DL (ref 14–18)
IMM GRANULOCYTES # BLD AUTO: 0.2 K/UL (ref 0–0.04)
IMM GRANULOCYTES NFR BLD AUTO: 1.1 % (ref 0–0.5)
INTERPRETATION SERPL IFE-IMP: NORMAL
L PNEUMO AG UR QL IA: NEGATIVE
LYMPHOCYTES # BLD AUTO: 0.8 K/UL (ref 1–4.8)
LYMPHOCYTES NFR BLD: 4.3 % (ref 18–48)
MAGNESIUM SERPL-MCNC: 2.3 MG/DL (ref 1.6–2.6)
MCH RBC QN AUTO: 25.9 PG (ref 27–31)
MCHC RBC AUTO-ENTMCNC: 32.1 G/DL (ref 32–36)
MCV RBC AUTO: 81 FL (ref 82–98)
MONOCYTES # BLD AUTO: 0.7 K/UL (ref 0.3–1)
MONOCYTES NFR BLD: 3.8 % (ref 4–15)
NEUTROPHILS # BLD AUTO: 16.9 K/UL (ref 1.8–7.7)
NEUTROPHILS NFR BLD: 90.6 % (ref 38–73)
NRBC BLD-RTO: 0 /100 WBC
PATHOLOGIST INTERPRETATION IFE: NORMAL
PHOSPHATE SERPL-MCNC: 3.3 MG/DL (ref 2.7–4.5)
PHOSPHATE SERPL-MCNC: 3.3 MG/DL (ref 2.7–4.5)
PLATELET # BLD AUTO: 102 K/UL (ref 150–450)
PMV BLD AUTO: 12 FL (ref 9.2–12.9)
POTASSIUM SERPL-SCNC: 3.2 MMOL/L (ref 3.5–5.1)
POTASSIUM SERPL-SCNC: 3.2 MMOL/L (ref 3.5–5.1)
RBC # BLD AUTO: 4.63 M/UL (ref 4.6–6.2)
SODIUM SERPL-SCNC: 141 MMOL/L (ref 136–145)
SODIUM SERPL-SCNC: 141 MMOL/L (ref 136–145)
WBC # BLD AUTO: 18.62 K/UL (ref 3.9–12.7)

## 2024-04-29 PROCEDURE — 63600175 PHARM REV CODE 636 W HCPCS

## 2024-04-29 PROCEDURE — 83735 ASSAY OF MAGNESIUM: CPT | Performed by: INTERNAL MEDICINE

## 2024-04-29 PROCEDURE — 25000242 PHARM REV CODE 250 ALT 637 W/ HCPCS: Performed by: INTERNAL MEDICINE

## 2024-04-29 PROCEDURE — 27000207 HC ISOLATION

## 2024-04-29 PROCEDURE — 94660 CPAP INITIATION&MGMT: CPT

## 2024-04-29 PROCEDURE — 85025 COMPLETE CBC W/AUTO DIFF WBC: CPT | Performed by: FAMILY MEDICINE

## 2024-04-29 PROCEDURE — 97110 THERAPEUTIC EXERCISES: CPT | Mod: CQ

## 2024-04-29 PROCEDURE — 25000003 PHARM REV CODE 250: Performed by: INTERNAL MEDICINE

## 2024-04-29 PROCEDURE — 94761 N-INVAS EAR/PLS OXIMETRY MLT: CPT

## 2024-04-29 PROCEDURE — 94640 AIRWAY INHALATION TREATMENT: CPT

## 2024-04-29 PROCEDURE — 25000003 PHARM REV CODE 250

## 2024-04-29 PROCEDURE — 27000221 HC OXYGEN, UP TO 24 HOURS

## 2024-04-29 PROCEDURE — 63600175 PHARM REV CODE 636 W HCPCS: Performed by: INTERNAL MEDICINE

## 2024-04-29 PROCEDURE — 36415 COLL VENOUS BLD VENIPUNCTURE: CPT | Performed by: FAMILY MEDICINE

## 2024-04-29 PROCEDURE — 25000003 PHARM REV CODE 250: Performed by: FAMILY MEDICINE

## 2024-04-29 PROCEDURE — 21400001 HC TELEMETRY ROOM

## 2024-04-29 PROCEDURE — 94799 UNLISTED PULMONARY SVC/PX: CPT

## 2024-04-29 PROCEDURE — 36415 COLL VENOUS BLD VENIPUNCTURE: CPT | Performed by: STUDENT IN AN ORGANIZED HEALTH CARE EDUCATION/TRAINING PROGRAM

## 2024-04-29 PROCEDURE — 63600175 PHARM REV CODE 636 W HCPCS: Performed by: FAMILY MEDICINE

## 2024-04-29 PROCEDURE — 99900035 HC TECH TIME PER 15 MIN (STAT)

## 2024-04-29 PROCEDURE — 80069 RENAL FUNCTION PANEL: CPT | Performed by: STUDENT IN AN ORGANIZED HEALTH CARE EDUCATION/TRAINING PROGRAM

## 2024-04-29 PROCEDURE — 27100171 HC OXYGEN HIGH FLOW UP TO 24 HOURS

## 2024-04-29 RX ADMIN — SIROLIMUS 3 MG: 1 TABLET ORAL at 08:04

## 2024-04-29 RX ADMIN — FERROUS SULFATE TAB 325 MG (65 MG ELEMENTAL FE) 1 EACH: 325 (65 FE) TAB at 09:04

## 2024-04-29 RX ADMIN — IPRATROPIUM BROMIDE AND ALBUTEROL SULFATE 3 ML: 2.5; .5 SOLUTION RESPIRATORY (INHALATION) at 08:04

## 2024-04-29 RX ADMIN — PREDNISONE 10 MG: 10 TABLET ORAL at 08:04

## 2024-04-29 RX ADMIN — SODIUM BICARBONATE 650 MG TABLET 1300 MG: at 02:04

## 2024-04-29 RX ADMIN — PIPERACILLIN AND TAZOBACTAM 4.5 G: 4; .5 INJECTION, POWDER, LYOPHILIZED, FOR SOLUTION INTRAVENOUS; PARENTERAL at 04:04

## 2024-04-29 RX ADMIN — FERROUS SULFATE TAB 325 MG (65 MG ELEMENTAL FE) 1 EACH: 325 (65 FE) TAB at 08:04

## 2024-04-29 RX ADMIN — SODIUM BICARBONATE 650 MG TABLET 1300 MG: at 09:04

## 2024-04-29 RX ADMIN — HYDRALAZINE HYDROCHLORIDE 100 MG: 25 TABLET, FILM COATED ORAL at 09:04

## 2024-04-29 RX ADMIN — IPRATROPIUM BROMIDE AND ALBUTEROL SULFATE 3 ML: 2.5; .5 SOLUTION RESPIRATORY (INHALATION) at 07:04

## 2024-04-29 RX ADMIN — ATORVASTATIN CALCIUM 20 MG: 20 TABLET, FILM COATED ORAL at 09:04

## 2024-04-29 RX ADMIN — HYDRALAZINE HYDROCHLORIDE 100 MG: 25 TABLET, FILM COATED ORAL at 01:04

## 2024-04-29 RX ADMIN — PIPERACILLIN AND TAZOBACTAM 4.5 G: 4; .5 INJECTION, POWDER, LYOPHILIZED, FOR SOLUTION INTRAVENOUS; PARENTERAL at 02:04

## 2024-04-29 RX ADMIN — MAGNESIUM OXIDE TAB 400 MG (241.3 MG ELEMENTAL MG) 400 MG: 400 (241.3 MG) TAB at 08:04

## 2024-04-29 RX ADMIN — METOPROLOL SUCCINATE 100 MG: 50 TABLET, EXTENDED RELEASE ORAL at 08:04

## 2024-04-29 RX ADMIN — LACTOBACILLUS TAB 1 TABLET: TAB at 08:04

## 2024-04-29 RX ADMIN — APIXABAN 2.5 MG: 2.5 TABLET, FILM COATED ORAL at 09:04

## 2024-04-29 RX ADMIN — MUPIROCIN: 20 OINTMENT TOPICAL at 08:04

## 2024-04-29 RX ADMIN — APIXABAN 2.5 MG: 2.5 TABLET, FILM COATED ORAL at 08:04

## 2024-04-29 RX ADMIN — IPRATROPIUM BROMIDE AND ALBUTEROL SULFATE 3 ML: 2.5; .5 SOLUTION RESPIRATORY (INHALATION) at 03:04

## 2024-04-29 RX ADMIN — PIPERACILLIN AND TAZOBACTAM 4.5 G: 4; .5 INJECTION, POWDER, LYOPHILIZED, FOR SOLUTION INTRAVENOUS; PARENTERAL at 08:04

## 2024-04-29 RX ADMIN — HYDRALAZINE HYDROCHLORIDE 100 MG: 25 TABLET, FILM COATED ORAL at 05:04

## 2024-04-29 RX ADMIN — SODIUM BICARBONATE 650 MG TABLET 1300 MG: at 08:04

## 2024-04-29 RX ADMIN — POTASSIUM BICARBONATE 25 MEQ: 978 TABLET, EFFERVESCENT ORAL at 08:04

## 2024-04-29 RX ADMIN — POTASSIUM BICARBONATE 10 MEQ: 391 TABLET, EFFERVESCENT ORAL at 02:04

## 2024-04-29 RX ADMIN — FLUTICASONE FUROATE AND VILANTEROL TRIFENATATE 1 PUFF: 100; 25 POWDER RESPIRATORY (INHALATION) at 08:04

## 2024-04-29 RX ADMIN — AZITHROMYCIN MONOHYDRATE 500 MG: 500 INJECTION, POWDER, LYOPHILIZED, FOR SOLUTION INTRAVENOUS at 01:04

## 2024-04-29 NOTE — ASSESSMENT & PLAN NOTE
Chronic, uncontrolled. Latest blood pressure and vitals reviewed-     Temp:  [96 °F (35.6 °C)-98.7 °F (37.1 °C)]   Pulse:  []   Resp:  [16-22]   BP: (138-168)/(68-95)   SpO2:  [93 %-100 %] .   Home meds for hypertension were reviewed and noted below.   Hypertension Medications               cloNIDine 0.3 mg/24 hr td ptwk (CATAPRES) 0.3 mg/24 hr PLACE 1 PATCH ONTO THE SKIN ONCE A WEEK.    furosemide (LASIX) 40 MG tablet TAKE 1 TABLET EVERY DAY    losartan (COZAAR) 25 MG tablet Take 1 tablet (25 mg total) by mouth once daily.    metoprolol succinate (TOPROL-XL) 50 MG 24 hr tablet TAKE 1 TABLET EVERY DAY    nitroGLYCERIN (NITROSTAT) 0.4 MG SL tablet Place 1 tablet (0.4 mg total) under the tongue every 5 (five) minutes as needed for Chest pain.            While in the hospital, will manage blood pressure as follows; Adjust home antihypertensive regimen as follows- continue metoprolol, start hydralazine. Trend.     Will utilize p.r.n. blood pressure medication only if patient's blood pressure greater than 180/110 and he develops symptoms such as worsening chest pain or shortness of breath.

## 2024-04-29 NOTE — NURSING
Assumed care of patient from ANSLEY Jacques, patient is AAOX4, 1LNC, no c/o pain or discomfort, vitals WNL, urinal and BSC near patient, suction at bedside for infrequent cough and mucous, all safety precautions are in place, call bell and tray table are within reach of the patient and no additional questions or concerns from the patient at this time, patient care ongoing.

## 2024-04-29 NOTE — PROGRESS NOTES
Therapy with vancomycin complete and/or consult discontinued by provider.  Pharmacy will sign off, please re-consult as needed.    Beryl Parish, PharmD, BCPS, BCCCP  Clinical Pharmacist  [phone]

## 2024-04-29 NOTE — PLAN OF CARE
Patient received on   1 Lpm NC with SpO2    99%. Pt with no apparent distress noted. Will continue to monitor.

## 2024-04-29 NOTE — PT/OT/SLP PROGRESS
Occupational Therapy      Patient Name:  Jose Luis Manzo   MRN:  [id number]    Attempts made 1:32 PM and 4:58 PM Patient not seen today secondary to Patient fatigue upon first attempt, upon second attempt pt willing to participate but dinner arrived as OT gathered chair alarm. Pt asked to finish eating before transferring to bed side chair. Will follow-up as able.    4/29/2024

## 2024-04-29 NOTE — NURSING
Home Oxygen Evaluation    Date Performed: 2024    1) Patient's Home O2 Sat on room air, while at rest: 94%        If O2 sats on room air at rest are 88% or below, patient qualifies. No additional testing needed. Document N/A in steps 2 and 3. If 89% or above, complete steps 2.      2) Patient's O2 Sat on room air while exercisin%        If O2 sats on room air while exercising remain 89% or above patient does not qualify, no further testing needed Document N/A in step 3. If O2 sats on room air while exercising are 88% or below, continue to step 3.      3) Patient's O2 Sat while exercising on O2: n/a at n/a LPM         (Must show improvement from #2 for patients to qualify)    If O2 sats improve on oxygen, patient qualifies for portable oxygen. If not, the patient does not qualify.

## 2024-04-29 NOTE — PLAN OF CARE
Virtual Nurse note: Patient chart, labs, and vitals reviewed. Care plan and goals updated as needed. VN to continue to be available as needed.     Problem: Adult Inpatient Plan of Care  Goal: Plan of Care Review  Outcome: Progressing  Goal: Patient-Specific Goal (Individualized)  Outcome: Progressing

## 2024-04-29 NOTE — NURSING
New Results - Micro    Updated   Order  04/28/24 1812  Blood culture x two cultures. Draw prior to antibiotics.  Collected: 04/26/24 0905  Preliminary result  Specimen: Blood from Peripheral, Upper Arm, Right     Blood Culture, Routine No Growth to date P Blood Culture, Routine No Growth to date P  Blood Culture, Routine No Growth to date P         04/28/24 1812  Blood culture x two cultures. Draw prior to antibiotics.  Collected: 04/26/24 1100  Preliminary result  Specimen: Blood from Peripheral, Lower Arm, Right       Blood Culture, Routine No Growth to date P Blood Culture, Routine No Growth to date P  Blood Culture, Routine No Growth to date P

## 2024-04-29 NOTE — PT/OT/SLP PROGRESS
Physical Therapy Treatment    Patient Name:  Jose Luis Manzo   MRN:  [id number]    Recommendations:     Discharge Recommendations: Low Intensity Therapy  Discharge Equipment Recommendations: none  Barriers to discharge: None    Assessment:     Jose Luis Manzo is a 74 y.o. male admitted with a medical diagnosis of Bacterial pneumonia.  He presents with the following impairments/functional limitations: weakness, impaired endurance, impaired functional mobility, impaired balance, gait instability, impaired cardiopulmonary response to activity, decreased lower extremity function, decreased ROM Pt would continue to benefit from P.T. To address impairments listed above.  .    Rehab Prognosis: Fair; patient would benefit from acute skilled PT services to address these deficits and reach maximum level of function.    Recent Surgery: * No surgery found *      Plan:     During this hospitalization, patient to be seen 3 x/week to address the identified rehab impairments via gait training, therapeutic activities, therapeutic exercises, neuromuscular re-education and progress toward the following goals:    Plan of Care Expires:  05/25/24    Subjective     Patient/Family Comments/goals: Pt agreed to tx.  Pain/Comfort:  Pain Rating 1: 0/10  Pain Rating Post-Intervention 1: 0/10      Objective:     Communicated with RN prior to session.  Patient found HOB elevated with blood pressure cuff, peripheral IV, telemetry upon PT entry to room.     General Precautions: Standard, fall, respiratory  Orthopedic Precautions: N/A  Braces: N/A  Respiratory Status: Room air     Functional Mobility:  Bed Mobility:     Scooting: stand by assistance and pt pushing up to HOB using BLEs       AM-PAC 6 CLICK MOBILITY  Turning over in bed (including adjusting bedclothes, sheets and blankets)?: 4  Sitting down on and standing up from a chair with arms (e.g., wheelchair, bedside commode, etc.): 3  Moving from lying on back to sitting on the side of the  bed?: 3  Moving to and from a bed to a chair (including a wheelchair)?: 3  Need to walk in hospital room?: 3  Climbing 3-5 steps with a railing?: 3  Basic Mobility Total Score: 19       Treatment & Education:  Pt declined EOB or OOB activities at this time secondary to sitting on b/s commode for a while and recently returning to bed.  Pt agreed to BLE therex in bed: AP, SAQs, hip ABD/ADD, heelslides, glut sets, and QS 12 x 2 reps with Tu for alleviating LE weight for ease of movement and gentle stretch as able.  BLE elevated on pillows with heels floating off for pressure relief at end of tx.    Patient left HOB elevated with all lines intact, call button in reach, bed alarm on, and RN notified..    GOALS:   Multidisciplinary Problems       Physical Therapy Goals          Problem: Physical Therapy    Goal Priority Disciplines Outcome Goal Variances Interventions   Physical Therapy Goal     PT, PT/OT Progressing     Description: Goals to be met by: 2024     Patient will increase functional independence with mobility by performin. Sit to stand transfer with [place]  2. Bed to chair transfer with [place] using No Assistive Device  3. Gait  x 150 feet with [place] using No Assistive Device.   4. Stand for 20 minutes with [place] using No Assistive Device                         Time Tracking:     PT Received On: 24  PT Start Time: 1556     PT Stop Time: 1610  PT Total Time (min): 14 min     Billable Minutes: Therapeutic Exercise 14    Treatment Type: Treatment  PT/PTA: PTA     Number of PTA visits since last PT visit: 2024

## 2024-04-29 NOTE — SUBJECTIVE & OBJECTIVE
Interval History:  Awake, alert,feeling,   Replace potassium  Supplemental oxygen-wean as tolerated-ambulatory pulse ox   Respiratory culture with presumptive Pseudomonas-continue azithromycin and Zosyn.  DC vancomycin  Possible discharge today    Review of Systems   Constitutional:  Positive for activity change. Negative for fatigue.   Respiratory:  Negative for shortness of breath.    Gastrointestinal:  Negative for abdominal pain.   Genitourinary:  Negative for dysuria.   Neurological:  Negative for headaches.     Objective:     Vital Signs (Most Recent):  Temp: 97.7 °F (36.5 °C) (04/29/24 0725)  Pulse: 79 (04/29/24 0725)  Resp: 20 (04/29/24 0725)  BP: 138/68 (04/29/24 0725)  SpO2: 95 % (04/29/24 0725) Vital Signs (24h Range):  Temp:  [96 °F (35.6 °C)-98.7 °F (37.1 °C)] 97.7 °F (36.5 °C)  Pulse:  [69-88] 79  Resp:  [18-22] 20  SpO2:  [94 %-98 %] 95 %  BP: (138-171)/(68-95) 138/68     Weight: 106.6 kg (235 lb 0.2 oz)  Body mass index is 33.72 kg/m².    Intake/Output Summary (Last 24 hours) at 4/29/2024 0729  Last data filed at 4/29/2024 0417  Gross per 24 hour   Intake 660 ml   Output 1700 ml   Net -1040 ml         Physical Exam  Vitals and nursing note reviewed.   Constitutional:       General: He is not in acute distress.     Appearance: He is well-developed. He is obese.   HENT:      Head: Normocephalic and atraumatic.   Neck:      Vascular: No JVD.   Cardiovascular:      Rate and Rhythm: Normal rate and regular rhythm.      Heart sounds: Normal heart sounds.   Pulmonary:      Effort: Pulmonary effort is normal.      Breath sounds: Normal breath sounds.   Abdominal:      General: Bowel sounds are normal. There is no distension.      Palpations: Abdomen is soft.      Tenderness: There is no abdominal tenderness.   Musculoskeletal:      Cervical back: Neck supple.      Right lower leg: No edema.      Left lower leg: No edema.   Neurological:      Mental Status: He is alert.   Psychiatric:         Behavior:  "Behavior normal.             Significant Labs: A1C:   Recent Labs   Lab 02/07/24  0829   HGBA1C 6.6*     ABGs: No results for input(s): "PH", "PCO2", "HCO3", "POCSATURATED", "BE", "TOTALHB", "COHB", "METHB", "O2HB", "POCFIO2", "PO2" in the last 48 hours.  Blood Culture: No results for input(s): "LABBLOO" in the last 48 hours.  CBC:   Recent Labs   Lab 04/29/24  0448   WBC 18.62*   HGB 12.0*   HCT 37.4*   *     CMP:   Recent Labs   Lab 04/28/24  0433 04/29/24  0448     140 141  141   K 5.0  5.0 3.2*  3.2*   *  113* 110  110   CO2 13*  13* 18*  18*   *  124* 99  99   BUN 49*  49* 47*  47*   CREATININE 3.6*  3.6* 3.4*  3.4*   CALCIUM 9.7  9.7 10.1  10.1   ALBUMIN 2.4* 2.4*   ANIONGAP 14  14 13  13     Cardiac Markers: No results for input(s): "CKMB", "MYOGLOBIN", "BNP", "TROPISTAT" in the last 48 hours.  Lipase: No results for input(s): "LIPASE" in the last 48 hours.  Lipid Panel: No results for input(s): "CHOL", "HDL", "LDLCALC", "TRIG", "CHOLHDL" in the last 48 hours.  Magnesium:   Recent Labs   Lab 04/29/24  0448   MG 2.3     Troponin: No results for input(s): "TROPONINI", "TROPONINIHS" in the last 48 hours.  TSH:   Recent Labs   Lab 04/27/24  0433   TSH 0.840     Urine Culture: No results for input(s): "LABURIN" in the last 48 hours.  Urine Studies: No results for input(s): "COLORU", "APPEARANCEUA", "PHUR", "SPECGRAV", "PROTEINUA", "GLUCUA", "KETONESU", "BILIRUBINUA", "OCCULTUA", "NITRITE", "UROBILINOGEN", "LEUKOCYTESUR", "RBCUA", "WBCUA", "BACTERIA", "SQUAMEPITHEL", "HYALINECASTS" in the last 48 hours.    Invalid input(s): "JOSHSUR"    Significant Imaging: I have reviewed all pertinent imaging results/findings within the past 24 hours.  "

## 2024-04-29 NOTE — PLAN OF CARE
Patient A&Ox4. On contact isolation to r/o CDIFF. On Iv antibiotics. Potassium bicarb ordered for replacement today. Up to bedside commode for BM. Home O2 eval done. No c/o pain or discomfort. Call light within reach. Bed alarm on.

## 2024-04-29 NOTE — ASSESSMENT & PLAN NOTE
Creatine stable for now. BMP reviewed- noted Estimated Creatinine Clearance: 23.3 mL/min (A) (based on SCr of 3.4 mg/dL (H)). according to latest data. Based on current GFR, CKD stage is stage 4 - GFR 15-29.  Monitor UOP and serial BMP and adjust therapy as needed. Renally dose meds. Avoid nephrotoxic medications and procedures.  At baseline, consulted nephrology , am labs, avoid nephrotoxins

## 2024-04-29 NOTE — PROGRESS NOTES
[facility] Medicine  Progress Note    Patient Name: Jose Luis Manzo  MRN: [id number]  Patient Class: IP- Inpatient   Admission Date: 4/26/2024  Length of Stay: 3 days  Attending Physician: Doris Patton*  Primary Care Provider: Home Alexis MD        Subjective:     Principal Problem:Bacterial pneumonia      HPI:  Very pleasant 73 yo F presents with acute on chronic progressive dyspnea , hacking cough , afebrile , denied being on home oxygen , no sick contacts or travel, but is a former smoker.  Denied chest pain wheezing intermittent claudication. Recent abx use , lung disease prior. 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     Overview/Hospital Course:  No notes on file    Interval History:  Awake, alert,feeling,   Replace potassium  Supplemental oxygen-wean as tolerated-ambulatory pulse ox   Respiratory culture with presumptive Pseudomonas-continue azithromycin and Zosyn.  DC vancomycin  Possible discharge today    Review of Systems   Constitutional:  Positive for activity change. Negative for fatigue.   Respiratory:  Negative for shortness of breath.    Gastrointestinal:  Negative for abdominal pain.   Genitourinary:  Negative for dysuria.   Neurological:  Negative for headaches.     Objective:     Vital Signs (Most Recent):  Temp: 97.7 °F (36.5 °C) (04/29/24 0725)  Pulse: 79 (04/29/24 0725)  Resp: 20 (04/29/24 0725)  BP: 138/68 (04/29/24 0725)  SpO2: 95 % (04/29/24 0725) Vital Signs (24h Range):  Temp:  [96 °F (35.6 °C)-98.7 °F (37.1 °C)] 97.7 °F (36.5 °C)  Pulse:  [69-88] 79  Resp:  [18-22] 20  SpO2:  [94 %-98 %] 95 %  BP: (138-171)/(68-95) 138/68     Weight: 106.6 kg (235 lb 0.2 oz)  Body mass index is 33.72 kg/m².    Intake/Output Summary (Last 24 hours) at 4/29/2024 8865  Last data filed at 4/29/2024 2325  Gross per 24 hour   Intake 660 ml   Output 1700 ml   Net -1040 ml         Physical  "Exam  Vitals and nursing note reviewed.   Constitutional:       General: He is not in acute distress.     Appearance: He is well-developed. He is obese.   HENT:      Head: Normocephalic and atraumatic.   Neck:      Vascular: No JVD.   Cardiovascular:      Rate and Rhythm: Normal rate and regular rhythm.      Heart sounds: Normal heart sounds.   Pulmonary:      Effort: Pulmonary effort is normal.      Breath sounds: Normal breath sounds.   Abdominal:      General: Bowel sounds are normal. There is no distension.      Palpations: Abdomen is soft.      Tenderness: There is no abdominal tenderness.   Musculoskeletal:      Cervical back: Neck supple.      Right lower leg: No edema.      Left lower leg: No edema.   Neurological:      Mental Status: He is alert.   Psychiatric:         Behavior: Behavior normal.             Significant Labs: A1C:   Recent Labs   Lab 02/07/24  0829   HGBA1C 6.6*     ABGs: No results for input(s): "PH", "PCO2", "HCO3", "POCSATURATED", "BE", "TOTALHB", "COHB", "METHB", "O2HB", "POCFIO2", "PO2" in the last 48 hours.  Blood Culture: No results for input(s): "LABBLOO" in the last 48 hours.  CBC:   Recent Labs   Lab 04/29/24  0448   WBC 18.62*   HGB 12.0*   HCT 37.4*   *     CMP:   Recent Labs   Lab 04/28/24  0433 04/29/24  0448     140 141  141   K 5.0  5.0 3.2*  3.2*   *  113* 110  110   CO2 13*  13* 18*  18*   *  124* 99  99   BUN 49*  49* 47*  47*   CREATININE 3.6*  3.6* 3.4*  3.4*   CALCIUM 9.7  9.7 10.1  10.1   ALBUMIN 2.4* 2.4*   ANIONGAP 14  14 13  13     Cardiac Markers: No results for input(s): "CKMB", "MYOGLOBIN", "BNP", "TROPISTAT" in the last 48 hours.  Lipase: No results for input(s): "LIPASE" in the last 48 hours.  Lipid Panel: No results for input(s): "CHOL", "HDL", "LDLCALC", "TRIG", "CHOLHDL" in the last 48 hours.  Magnesium:   Recent Labs   Lab 04/29/24  0448   MG 2.3     Troponin: No results for input(s): "TROPONINI", "TROPONINIHS" in " "the last 48 hours.  TSH:   Recent Labs   Lab 04/27/24  0433   TSH 0.840     Urine Culture: No results for input(s): "LABURIN" in the last 48 hours.  Urine Studies: No results for input(s): "COLORU", "APPEARANCEUA", "PHUR", "SPECGRAV", "PROTEINUA", "GLUCUA", "KETONESU", "BILIRUBINUA", "OCCULTUA", "NITRITE", "UROBILINOGEN", "LEUKOCYTESUR", "RBCUA", "WBCUA", "BACTERIA", "SQUAMEPITHEL", "HYALINECASTS" in the last 48 hours.    Invalid input(s): "WRIGHTSUR"    Significant Imaging: I have reviewed all pertinent imaging results/findings within the past 24 hours.    Assessment/Plan:      * Bacterial pneumonia  Chest xray with RLL opacities suggestive of pneumonia. Initiated on vanc/zosyn/azithro, hydrocortisone. Cultures pending. Continue supplemental O2    Of note, leukocytosis likely from steroid use.    Acute respiratory failure with hypoxia  Patient with Hypoxic Respiratory failure which is Acute.  he is not on home oxygen. Supplemental oxygen was provided and noted- Oxygen Concentration (%):  [3.5-40] 28    Signs/symptoms of respiratory failure include-  continued O2 requirement . Contributing diagnoses includes - Aspiration and Pneumonia Labs and images were reviewed. Patient Has recent ABG, which has been reviewed. Will treat underlying causes and adjust management of respiratory failure as follows- abx/ DUONEB/ IS/ Breo ellipta/ steroids     Class 1 obesity due to excess calories with serious comorbidity and body mass index (BMI) of 34.0 to 34.9 in adult  Body mass index is 34.48 kg/m². Obesity complicates all aspects of disease management from diagnostic modalities to treatment.          Immunosuppressive management encounter following kidney transplant        History of DVT (deep vein thrombosis)  On Eliquis      Renal transplant recipient  Continue Sirolimus and Prednisone        Mixed hyperlipidemia  Continue statin       Essential hypertension  Chronic, uncontrolled. Latest blood pressure and vitals reviewed- "     Temp:  [96 °F (35.6 °C)-98.7 °F (37.1 °C)]   Pulse:  []   Resp:  [16-22]   BP: (138-168)/(68-95)   SpO2:  [93 %-100 %] .   Home meds for hypertension were reviewed and noted below.   Hypertension Medications               cloNIDine 0.3 mg/24 hr td ptwk (CATAPRES) 0.3 mg/24 hr PLACE 1 PATCH ONTO THE SKIN ONCE A WEEK.    furosemide (LASIX) 40 MG tablet TAKE 1 TABLET EVERY DAY    losartan (COZAAR) 25 MG tablet Take 1 tablet (25 mg total) by mouth once daily.    metoprolol succinate (TOPROL-XL) 50 MG 24 hr tablet TAKE 1 TABLET EVERY DAY    nitroGLYCERIN (NITROSTAT) 0.4 MG SL tablet Place 1 tablet (0.4 mg total) under the tongue every 5 (five) minutes as needed for Chest pain.            While in the hospital, will manage blood pressure as follows; Adjust home antihypertensive regimen as follows- continue metoprolol, start hydralazine. Trend.     Will utilize p.r.n. blood pressure medication only if patient's blood pressure greater than 180/110 and he develops symptoms such as worsening chest pain or shortness of breath.    Stage 4 chronic kidney disease  Creatine stable for now. BMP reviewed- noted Estimated Creatinine Clearance: 23.3 mL/min (A) (based on SCr of 3.4 mg/dL (H)). according to latest data. Based on current GFR, CKD stage is stage 4 - GFR 15-29.  Monitor UOP and serial BMP and adjust therapy as needed. Renally dose meds. Avoid nephrotoxic medications and procedures.  At baseline, consulted nephrology , am labs, avoid nephrotoxins      VTE Risk Mitigation (From admission, onward)           Ordered     apixaban tablet 2.5 mg  2 times daily         04/26/24 1115                    Discharge Planning   LAVERNE:      Code Status: Full Code   Is the patient medically ready for discharge?:     Reason for patient still in hospital (select all that apply): Pending disposition                     Doris Patton MD  Department of Hospital Medicine   [facility]

## 2024-04-29 NOTE — PROGRESS NOTES
Progress Note  Nephrology      Consult Requested By: Doris Patton N*      SUBJECTIVE:     Overnight events  Patient is a 74 y.o. male     Patient Active Problem List   Diagnosis    Stage 4 chronic kidney disease    Essential hypertension    Mixed hyperlipidemia    Benign prostatic hyperplasia with nocturia    Claudication of right lower extremity    Eye swelling, bilateral    Diminished night vision    Renal transplant recipient    Dermolipoma    History of DVT (deep vein thrombosis)    Iron deficiency anemia    Pulmonary nodules    Larynx cancer    Voice disturbance    Atherosclerosis of aorta    Carcinoma in situ of vocal cord    Dysphagia, pharyngoesophageal    Chondronecrosis of larynx    Squamous cell carcinoma    Obesity (BMI 30-39.9)    Pterygium of both eyes    History of cancer of larynx    Nodule of left lung    Granulomatous lung disease    Transplanted kidney - 2007    Hypertensive kidney disease with stage 4 chronic kidney disease    Hypercalcemia    Secondary hyperparathyroidism of renal origin    Benign tumor of orbit    Metabolic bone disease    Immunosuppressive management encounter following kidney transplant    Hyperphosphatemia    Vitamin D deficiency disease    History of COVID-19    SOB (shortness of breath)    Metabolic acidosis    Proteinuria    Anticoagulant long-term use    Pre-op evaluation    Benign prostatic hyperplasia with lower urinary tract symptoms    Centrilobular emphysema    Dependence on renal dialysis    Class 1 obesity due to excess calories with serious comorbidity and body mass index (BMI) of 34.0 to 34.9 in adult    Former cigarette smoker    Deformity of toenail    Bacterial pneumonia    Acute respiratory failure with hypoxia     Past Medical History:   Diagnosis Date    Acute hypoxemic respiratory failure due to COVID-19 12/30/2020    Anticoagulant long-term use     BPH (benign prostatic hypertrophy)     Cancer     vocal cords    Claudication of right lower  extremity 3/10/2016    COVID-19 virus detected 12/30/2020    Dependence on renal dialysis 4/18/2023    Disorder of kidney and ureter     Hyperkalemia 1/1/2021    Hyperlipidemia     Hypertension     Hypokalemia 6/23/2021    Immunosuppression 6/26/2021    Immunosuppression due to chronic steroid use 1/30/2020    Microcytic anemia 9/16/2016    Obesity (BMI 30-39.9) 1/23/2019    Oropharyngeal cancer     Pterygium     Squamous cell carcinoma 4/25/2018    Thromboembolic disorder     Unspecified disorder of kidney and ureter               OBJECTIVE:     Vitals:    04/29/24 0800 04/29/24 0824 04/29/24 1130 04/29/24 1155   BP:  (!) 147/73  (!) 154/87   BP Location:       Patient Position:       Pulse: 96 81  73   Resp: 18 20 18 20   Temp:  97.4 °F (36.3 °C)  98.3 °F (36.8 °C)   TempSrc:       SpO2:  96% (!) 94% (!) 93%   Weight:       Height:           Temp: 98.3 °F (36.8 °C) (04/29/24 1155)  Pulse: 73 (04/29/24 1155)  Resp: 20 (04/29/24 1155)  BP: (!) 154/87 (04/29/24 1155)  SpO2: (!) 93 % (04/29/24 1155)              Medications:  Current Facility-Administered Medications   Medication Dose Route Frequency    albuterol-ipratropium  3 mL Nebulization Q4H WAKE    apixaban  2.5 mg Oral BID    atorvastatin  20 mg Oral QHS    azithromycin  500 mg Intravenous Q24H    ferrous sulfate  1 tablet Oral BID    fluticasone furoate-vilanteroL  1 puff Inhalation Daily    hydrALAZINE  100 mg Oral Q8H    Lactobacillus acidoph-L.bulgar  1 tablet Oral Daily    metoprolol succinate  100 mg Oral Daily    mupirocin   Nasal BID    piperacillin-tazobactam (Zosyn) IV (PEDS and ADULTS) (extended infusion is not appropriate)  4.5 g Intravenous Q8H    predniSONE  10 mg Oral Daily    sirolimus  3 mg Oral Daily    sodium bicarbonate  1,300 mg Oral TID     Current Facility-Administered Medications   Medication Dose Route Frequency Last Rate Last Admin               Physical Exam:  General appearance:NAD  Lungs: RR 20  Pulse oximeter 93 % O2  Heart: Pulse  73  Abdomen: soft  Extremities:edema   Skin:dry    Laboratory:  ABG  Labs reviewed  Recent Results (from the past 336 hour(s))   Basic Metabolic Panel (BMP)    Collection Time: 04/29/24  4:48 AM   Result Value Ref Range    Sodium 141 136 - 145 mmol/L    Potassium 3.2 (L) 3.5 - 5.1 mmol/L    Chloride 110 95 - 110 mmol/L    CO2 18 (L) 23 - 29 mmol/L    BUN 47 (H) 8 - 23 mg/dL    Creatinine 3.4 (H) 0.5 - 1.4 mg/dL    Calcium 10.1 8.7 - 10.5 mg/dL    Anion Gap 13 8 - 16 mmol/L   Basic Metabolic Panel (BMP)    Collection Time: 04/28/24  4:33 AM   Result Value Ref Range    Sodium 140 136 - 145 mmol/L    Potassium 5.0 3.5 - 5.1 mmol/L    Chloride 113 (H) 95 - 110 mmol/L    CO2 13 (L) 23 - 29 mmol/L    BUN 49 (H) 8 - 23 mg/dL    Creatinine 3.6 (H) 0.5 - 1.4 mg/dL    Calcium 9.7 8.7 - 10.5 mg/dL    Anion Gap 14 8 - 16 mmol/L   Basic Metabolic Panel (BMP)    Collection Time: 04/27/24  4:32 AM   Result Value Ref Range    Sodium 138 136 - 145 mmol/L    Potassium 4.7 3.5 - 5.1 mmol/L    Chloride 110 95 - 110 mmol/L    CO2 18 (L) 23 - 29 mmol/L    BUN 38 (H) 8 - 23 mg/dL    Creatinine 3.6 (H) 0.5 - 1.4 mg/dL    Calcium 9.1 8.7 - 10.5 mg/dL    Anion Gap 10 8 - 16 mmol/L     Recent Results (from the past 336 hour(s))   CBC auto differential    Collection Time: 04/29/24  4:48 AM   Result Value Ref Range    WBC 18.62 (H) 3.90 - 12.70 K/uL    Hemoglobin 12.0 (L) 14.0 - 18.0 g/dL    Hematocrit 37.4 (L) 40.0 - 54.0 %    Platelets 102 (L) 150 - 450 K/uL   CBC Auto Differential    Collection Time: 04/27/24  4:33 AM   Result Value Ref Range    WBC 12.99 (H) 3.90 - 12.70 K/uL    Hemoglobin 11.5 (L) 14.0 - 18.0 g/dL    Hematocrit 37.2 (L) 40.0 - 54.0 %    Platelets 122 (L) 150 - 450 K/uL   CBC auto differential    Collection Time: 04/26/24  9:25 AM   Result Value Ref Range    WBC 10.56 3.90 - 12.70 K/uL    Hemoglobin 13.3 (L) 14.0 - 18.0 g/dL    Hematocrit 43.0 40.0 - 54.0 %    Platelets 154 150 - 450 K/uL     Urinalysis  No results for  "input(s): "COLORU", "CLARITYU", "SPECGRAV", "PHUR", "PROTEINUA", "GLUCOSEU", "BILIRUBINCON", "BLOODU", "WBCU", "RBCU", "BACTERIA", "MUCUS", "NITRITE", "LEUKOCYTESUR", "UROBILINOGEN", "HYALINECASTS" in the last 24 hours.    Diagnostic Results:  X-Ray: Reviewed  US: Reviewed  Echo: Reviewed  ACCESS    ASSESSMENT/PLAN:     CKD 4  Kidney transplant 2007  Prednisone 5  Sirolimus 3  UA protein 2+, blood 2+, RBC 10   UA protein/creatinine 1.29  Creatinine 3.6- 3.4  GFR 17 - 18 cc/min  BUN 38- 49- 47  K 5- 3.2  Metabolic acidosis  Sodium bicarbonate  Lactic acid 4.3 - 2.8  Metabolic bone disease  Magnesium 1.6 - 2.3  Replace prn  Anemia multifactorial  Hb 11.5- 12  Iron 15  Sat iron 11  Renal cap  Poor nutrition   Albumin 2.5- 2.4  Hypertension   Blood pressure 153/84, 159/86, 154/87  Weight daily  I and O  Avoid nephrotoxic agents, hypotension, hypovolemia  Renal diet as tolerated                   "

## 2024-04-30 ENCOUNTER — TELEPHONE (OUTPATIENT)
Dept: FAMILY MEDICINE | Facility: CLINIC | Age: 74
End: 2024-04-30

## 2024-04-30 LAB
ALBUMIN SERPL BCP-MCNC: 2.4 G/DL (ref 3.5–5.2)
ANION GAP SERPL CALC-SCNC: 12 MMOL/L (ref 8–16)
BUN SERPL-MCNC: 42 MG/DL (ref 8–23)
CALCIUM SERPL-MCNC: 10 MG/DL (ref 8.7–10.5)
CHLORIDE SERPL-SCNC: 109 MMOL/L (ref 95–110)
CO2 SERPL-SCNC: 19 MMOL/L (ref 23–29)
CREAT SERPL-MCNC: 3.4 MG/DL (ref 0.5–1.4)
EST. GFR  (NO RACE VARIABLE): 18 ML/MIN/1.73 M^2
GLUCOSE SERPL-MCNC: 100 MG/DL (ref 70–110)
PHOSPHATE SERPL-MCNC: 2.3 MG/DL (ref 2.7–4.5)
POTASSIUM SERPL-SCNC: 3.1 MMOL/L (ref 3.5–5.1)
PTH-INTACT SERPL-MCNC: 318.8 PG/ML (ref 9–77)
SODIUM SERPL-SCNC: 140 MMOL/L (ref 136–145)

## 2024-04-30 PROCEDURE — 63600175 PHARM REV CODE 636 W HCPCS

## 2024-04-30 PROCEDURE — 80069 RENAL FUNCTION PANEL: CPT | Performed by: STUDENT IN AN ORGANIZED HEALTH CARE EDUCATION/TRAINING PROGRAM

## 2024-04-30 PROCEDURE — 36415 COLL VENOUS BLD VENIPUNCTURE: CPT | Performed by: STUDENT IN AN ORGANIZED HEALTH CARE EDUCATION/TRAINING PROGRAM

## 2024-04-30 PROCEDURE — 97116 GAIT TRAINING THERAPY: CPT

## 2024-04-30 PROCEDURE — 99900035 HC TECH TIME PER 15 MIN (STAT)

## 2024-04-30 PROCEDURE — 25000242 PHARM REV CODE 250 ALT 637 W/ HCPCS: Performed by: INTERNAL MEDICINE

## 2024-04-30 PROCEDURE — 25000003 PHARM REV CODE 250: Performed by: INTERNAL MEDICINE

## 2024-04-30 PROCEDURE — 94799 UNLISTED PULMONARY SVC/PX: CPT

## 2024-04-30 PROCEDURE — 94761 N-INVAS EAR/PLS OXIMETRY MLT: CPT

## 2024-04-30 PROCEDURE — 25000003 PHARM REV CODE 250: Performed by: FAMILY MEDICINE

## 2024-04-30 PROCEDURE — 94640 AIRWAY INHALATION TREATMENT: CPT

## 2024-04-30 PROCEDURE — 97110 THERAPEUTIC EXERCISES: CPT

## 2024-04-30 PROCEDURE — 83970 ASSAY OF PARATHORMONE: CPT | Performed by: INTERNAL MEDICINE

## 2024-04-30 PROCEDURE — 63600175 PHARM REV CODE 636 W HCPCS: Performed by: INTERNAL MEDICINE

## 2024-04-30 PROCEDURE — 63600175 PHARM REV CODE 636 W HCPCS: Performed by: FAMILY MEDICINE

## 2024-04-30 PROCEDURE — 21400001 HC TELEMETRY ROOM

## 2024-04-30 PROCEDURE — 97530 THERAPEUTIC ACTIVITIES: CPT

## 2024-04-30 PROCEDURE — 25000003 PHARM REV CODE 250

## 2024-04-30 RX ORDER — LANOLIN ALCOHOL/MO/W.PET/CERES
400 CREAM (GRAM) TOPICAL ONCE
Status: COMPLETED | OUTPATIENT
Start: 2024-04-30 | End: 2024-04-30

## 2024-04-30 RX ORDER — LOPERAMIDE HYDROCHLORIDE 2 MG/1
2 CAPSULE ORAL 4 TIMES DAILY PRN
Status: DISCONTINUED | OUTPATIENT
Start: 2024-04-30 | End: 2024-05-04 | Stop reason: HOSPADM

## 2024-04-30 RX ORDER — ERGOCALCIFEROL 1.25 MG/1
50000 CAPSULE ORAL
Status: DISCONTINUED | OUTPATIENT
Start: 2024-04-30 | End: 2024-04-30

## 2024-04-30 RX ORDER — MUPIROCIN 20 MG/G
OINTMENT TOPICAL 3 TIMES DAILY
Status: DISCONTINUED | OUTPATIENT
Start: 2024-04-30 | End: 2024-05-04 | Stop reason: HOSPADM

## 2024-04-30 RX ADMIN — POTASSIUM BICARBONATE 25 MEQ: 978 TABLET, EFFERVESCENT ORAL at 08:04

## 2024-04-30 RX ADMIN — LACTOBACILLUS TAB 1 TABLET: TAB at 08:04

## 2024-04-30 RX ADMIN — PIPERACILLIN AND TAZOBACTAM 4.5 G: 4; .5 INJECTION, POWDER, LYOPHILIZED, FOR SOLUTION INTRAVENOUS; PARENTERAL at 05:04

## 2024-04-30 RX ADMIN — SODIUM BICARBONATE 650 MG TABLET 1300 MG: at 02:04

## 2024-04-30 RX ADMIN — IPRATROPIUM BROMIDE AND ALBUTEROL SULFATE 3 ML: 2.5; .5 SOLUTION RESPIRATORY (INHALATION) at 12:04

## 2024-04-30 RX ADMIN — PIPERACILLIN AND TAZOBACTAM 4.5 G: 4; .5 INJECTION, POWDER, LYOPHILIZED, FOR SOLUTION INTRAVENOUS; PARENTERAL at 09:04

## 2024-04-30 RX ADMIN — FERROUS SULFATE TAB 325 MG (65 MG ELEMENTAL FE) 1 EACH: 325 (65 FE) TAB at 08:04

## 2024-04-30 RX ADMIN — HYDRALAZINE HYDROCHLORIDE 100 MG: 25 TABLET, FILM COATED ORAL at 02:04

## 2024-04-30 RX ADMIN — APIXABAN 2.5 MG: 2.5 TABLET, FILM COATED ORAL at 09:04

## 2024-04-30 RX ADMIN — MAGNESIUM OXIDE TAB 400 MG (241.3 MG ELEMENTAL MG) 400 MG: 400 (241.3 MG) TAB at 09:04

## 2024-04-30 RX ADMIN — METOPROLOL SUCCINATE 100 MG: 50 TABLET, EXTENDED RELEASE ORAL at 08:04

## 2024-04-30 RX ADMIN — SODIUM BICARBONATE 650 MG TABLET 1300 MG: at 09:04

## 2024-04-30 RX ADMIN — POTASSIUM BICARBONATE 25 MEQ: 978 TABLET, EFFERVESCENT ORAL at 09:04

## 2024-04-30 RX ADMIN — IPRATROPIUM BROMIDE AND ALBUTEROL SULFATE 3 ML: 2.5; .5 SOLUTION RESPIRATORY (INHALATION) at 08:04

## 2024-04-30 RX ADMIN — IPRATROPIUM BROMIDE AND ALBUTEROL SULFATE 3 ML: 2.5; .5 SOLUTION RESPIRATORY (INHALATION) at 07:04

## 2024-04-30 RX ADMIN — FLUTICASONE FUROATE AND VILANTEROL TRIFENATATE 1 PUFF: 100; 25 POWDER RESPIRATORY (INHALATION) at 08:04

## 2024-04-30 RX ADMIN — SODIUM BICARBONATE 650 MG TABLET 1300 MG: at 08:04

## 2024-04-30 RX ADMIN — POTASSIUM PHOSPHATE, MONOBASIC 500 MG: 500 TABLET, SOLUBLE ORAL at 09:04

## 2024-04-30 RX ADMIN — PREDNISONE 10 MG: 10 TABLET ORAL at 08:04

## 2024-04-30 RX ADMIN — APIXABAN 2.5 MG: 2.5 TABLET, FILM COATED ORAL at 08:04

## 2024-04-30 RX ADMIN — SODIUM CHLORIDE: 9 INJECTION, SOLUTION INTRAVENOUS at 09:04

## 2024-04-30 RX ADMIN — HYDRALAZINE HYDROCHLORIDE 100 MG: 25 TABLET, FILM COATED ORAL at 05:04

## 2024-04-30 RX ADMIN — PIPERACILLIN AND TAZOBACTAM 4.5 G: 4; .5 INJECTION, POWDER, LYOPHILIZED, FOR SOLUTION INTRAVENOUS; PARENTERAL at 01:04

## 2024-04-30 RX ADMIN — MUPIROCIN: 20 OINTMENT TOPICAL at 10:04

## 2024-04-30 RX ADMIN — FERROUS SULFATE TAB 325 MG (65 MG ELEMENTAL FE) 1 EACH: 325 (65 FE) TAB at 09:04

## 2024-04-30 RX ADMIN — MUPIROCIN: 20 OINTMENT TOPICAL at 02:04

## 2024-04-30 RX ADMIN — MUPIROCIN: 20 OINTMENT TOPICAL at 08:04

## 2024-04-30 RX ADMIN — LOPERAMIDE HYDROCHLORIDE 2 MG: 2 CAPSULE ORAL at 05:04

## 2024-04-30 RX ADMIN — POTASSIUM BICARBONATE 25 MEQ: 978 TABLET, EFFERVESCENT ORAL at 11:04

## 2024-04-30 RX ADMIN — ATORVASTATIN CALCIUM 20 MG: 20 TABLET, FILM COATED ORAL at 09:04

## 2024-04-30 RX ADMIN — HYDRALAZINE HYDROCHLORIDE 100 MG: 25 TABLET, FILM COATED ORAL at 09:04

## 2024-04-30 RX ADMIN — IPRATROPIUM BROMIDE AND ALBUTEROL SULFATE 3 ML: 2.5; .5 SOLUTION RESPIRATORY (INHALATION) at 03:04

## 2024-04-30 RX ADMIN — SIROLIMUS 3 MG: 1 TABLET ORAL at 08:04

## 2024-04-30 NOTE — PROGRESS NOTES
Progress Note  Nephrology      Consult Requested By: Doris Patton N*      SUBJECTIVE:     Overnight events  Patient is a 74 y.o. male     Patient Active Problem List   Diagnosis    Stage 4 chronic kidney disease    Essential hypertension    Mixed hyperlipidemia    Benign prostatic hyperplasia with nocturia    Claudication of right lower extremity    Eye swelling, bilateral    Diminished night vision    Renal transplant recipient    Dermolipoma    History of DVT (deep vein thrombosis)    Iron deficiency anemia    Pulmonary nodules    Larynx cancer    Voice disturbance    Atherosclerosis of aorta    Carcinoma in situ of vocal cord    Dysphagia, pharyngoesophageal    Chondronecrosis of larynx    Squamous cell carcinoma    Obesity (BMI 30-39.9)    Pterygium of both eyes    History of cancer of larynx    Nodule of left lung    Granulomatous lung disease    Transplanted kidney - 2007    Hypertensive kidney disease with stage 4 chronic kidney disease    Hypercalcemia    Secondary hyperparathyroidism of renal origin    Benign tumor of orbit    Metabolic bone disease    Immunosuppressive management encounter following kidney transplant    Hyperphosphatemia    Vitamin D deficiency disease    History of COVID-19    SOB (shortness of breath)    Metabolic acidosis    Proteinuria    Anticoagulant long-term use    Pre-op evaluation    Benign prostatic hyperplasia with lower urinary tract symptoms    Centrilobular emphysema    Dependence on renal dialysis    Class 1 obesity due to excess calories with serious comorbidity and body mass index (BMI) of 34.0 to 34.9 in adult    Former cigarette smoker    Deformity of toenail    Bacterial pneumonia    Acute respiratory failure with hypoxia     Past Medical History:   Diagnosis Date    Acute hypoxemic respiratory failure due to COVID-19 12/30/2020    Anticoagulant long-term use     BPH (benign prostatic hypertrophy)     Cancer     vocal cords    Claudication of right lower  extremity 3/10/2016    COVID-19 virus detected 12/30/2020    Dependence on renal dialysis 4/18/2023    Disorder of kidney and ureter     Hyperkalemia 1/1/2021    Hyperlipidemia     Hypertension     Hypokalemia 6/23/2021    Immunosuppression 6/26/2021    Immunosuppression due to chronic steroid use 1/30/2020    Microcytic anemia 9/16/2016    Obesity (BMI 30-39.9) 1/23/2019    Oropharyngeal cancer     Pterygium     Squamous cell carcinoma 4/25/2018    Thromboembolic disorder     Unspecified disorder of kidney and ureter               OBJECTIVE:     Vitals:    04/30/24 1153 04/30/24 1218 04/30/24 1537 04/30/24 1605   BP: (!) 172/84   (!) 171/90   BP Location:       Patient Position:       Pulse: 84 80 88 80   Resp: 20 20 (!) 22 20   Temp: 98.6 °F (37 °C)   99.2 °F (37.3 °C)   TempSrc:       SpO2: 95% 95% (!) 93% 99%   Weight:       Height:           Temp: 99.2 °F (37.3 °C) (04/30/24 1605)  Pulse: 80 (04/30/24 1605)  Resp: 20 (04/30/24 1605)  BP: (!) 171/90 (04/30/24 1605)  SpO2: 99 % (04/30/24 1605)    Date 04/30/24 0700 - 05/01/24 0659   Shift 2079-4173 1490-4421 8150-3130 24 Hour Total   INTAKE   P.O. 240   240   Shift Total(mL/kg) 240(2.2)   240(2.2)   OUTPUT   Urine(mL/kg/hr) 400(0.5) 350  750   Shift Total(mL/kg) 400(3.7) 350(3.2)  750(6.9)   Weight (kg) 109.4 109.4 109.4 109.4             Medications:  Current Facility-Administered Medications   Medication Dose Route Frequency    albuterol-ipratropium  3 mL Nebulization Q4H WAKE    apixaban  2.5 mg Oral BID    atorvastatin  20 mg Oral QHS    ferrous sulfate  1 tablet Oral BID    fluticasone furoate-vilanteroL  1 puff Inhalation Daily    hydrALAZINE  100 mg Oral Q8H    Lactobacillus acidoph-L.bulgar  1 tablet Oral Daily    metoprolol succinate  100 mg Oral Daily    mupirocin   Nasal TID    piperacillin-tazobactam (Zosyn) IV (PEDS and ADULTS) (extended infusion is not appropriate)  4.5 g Intravenous Q8H    predniSONE  10 mg Oral Daily    sirolimus  3 mg Oral Daily     sodium bicarbonate  1,300 mg Oral TID     Current Facility-Administered Medications   Medication Dose Route Frequency Last Rate Last Admin               Physical Exam:  General appearance:NAD  Lungs: RR 20  Pulse oximeter 99  Heart: Pulse 80  Abdomen: soft  Extremities: edema    Laboratory:  ABG  Labs reviewed  Recent Results (from the past 336 hour(s))   Basic Metabolic Panel (BMP)    Collection Time: 04/29/24  4:48 AM   Result Value Ref Range    Sodium 141 136 - 145 mmol/L    Potassium 3.2 (L) 3.5 - 5.1 mmol/L    Chloride 110 95 - 110 mmol/L    CO2 18 (L) 23 - 29 mmol/L    BUN 47 (H) 8 - 23 mg/dL    Creatinine 3.4 (H) 0.5 - 1.4 mg/dL    Calcium 10.1 8.7 - 10.5 mg/dL    Anion Gap 13 8 - 16 mmol/L   Basic Metabolic Panel (BMP)    Collection Time: 04/28/24  4:33 AM   Result Value Ref Range    Sodium 140 136 - 145 mmol/L    Potassium 5.0 3.5 - 5.1 mmol/L    Chloride 113 (H) 95 - 110 mmol/L    CO2 13 (L) 23 - 29 mmol/L    BUN 49 (H) 8 - 23 mg/dL    Creatinine 3.6 (H) 0.5 - 1.4 mg/dL    Calcium 9.7 8.7 - 10.5 mg/dL    Anion Gap 14 8 - 16 mmol/L   Basic Metabolic Panel (BMP)    Collection Time: 04/27/24  4:32 AM   Result Value Ref Range    Sodium 138 136 - 145 mmol/L    Potassium 4.7 3.5 - 5.1 mmol/L    Chloride 110 95 - 110 mmol/L    CO2 18 (L) 23 - 29 mmol/L    BUN 38 (H) 8 - 23 mg/dL    Creatinine 3.6 (H) 0.5 - 1.4 mg/dL    Calcium 9.1 8.7 - 10.5 mg/dL    Anion Gap 10 8 - 16 mmol/L     Recent Results (from the past 336 hour(s))   CBC auto differential    Collection Time: 04/29/24  4:48 AM   Result Value Ref Range    WBC 18.62 (H) 3.90 - 12.70 K/uL    Hemoglobin 12.0 (L) 14.0 - 18.0 g/dL    Hematocrit 37.4 (L) 40.0 - 54.0 %    Platelets 102 (L) 150 - 450 K/uL   CBC Auto Differential    Collection Time: 04/27/24  4:33 AM   Result Value Ref Range    WBC 12.99 (H) 3.90 - 12.70 K/uL    Hemoglobin 11.5 (L) 14.0 - 18.0 g/dL    Hematocrit 37.2 (L) 40.0 - 54.0 %    Platelets 122 (L) 150 - 450 K/uL   CBC auto differential     "Collection Time: 04/26/24  9:25 AM   Result Value Ref Range    WBC 10.56 3.90 - 12.70 K/uL    Hemoglobin 13.3 (L) 14.0 - 18.0 g/dL    Hematocrit 43.0 40.0 - 54.0 %    Platelets 154 150 - 450 K/uL     Urinalysis  No results for input(s): "COLORU", "CLARITYU", "SPECGRAV", "PHUR", "PROTEINUA", "GLUCOSEU", "BILIRUBINCON", "BLOODU", "WBCU", "RBCU", "BACTERIA", "MUCUS", "NITRITE", "LEUKOCYTESUR", "UROBILINOGEN", "HYALINECASTS" in the last 24 hours.    Diagnostic Results:  X-Ray: Reviewed  US: Reviewed  Echo: Reviewed  ACCESS    ASSESSMENT/PLAN:     CKD 4  Kidney transplant 2007  Prednisone 5  Sirolimus 3  UA protein 2+, blood 2+, RBC 10   UA protein/creatinine 1.29  Creatinine 3.6- 3.4  GFR 17 - 18 cc/min  BUN 38- 49- 47- 42  K 5- 3.2- 3.1  Metabolic acidosis  Sodium bicarbonate  Metabolic bone disease    Phos 2.3  Magnesium 1.6 - 2.3  Replace prn  Anemia multifactorial  Hb 11.5- 12  Iron 15  Sat iron 11  Renal cap  Poor nutrition   Albumin 2.5- 2.4  Hypertension   Blood pressure 153/84, 159/86, 154/87, 171/90  Weight daily  I and O  Avoid nephrotoxic agents, hypotension, hypovolemia  Renal diet as tolerated    "

## 2024-04-30 NOTE — PT/OT/SLP PROGRESS
Occupational Therapy  Missed Visit    Patient Name:  Jose Luis Manzo   MRN:  [id number]    Patient not seen today secondary to Patient fatigue, Patient unwilling to participate. Will follow-up .    4/30/2024

## 2024-04-30 NOTE — PROGRESS NOTES
[facility] Medicine  Progress Note    Patient Name: Jose Luis Manzo  MRN: [id number]  Patient Class: IP- Inpatient   Admission Date: 4/26/2024  Length of Stay: 4 days  Attending Physician: Doris Patton*  Primary Care Provider: Home Alexis MD        Subjective:     Principal Problem:Bacterial pneumonia        HPI:  Very pleasant 75 yo F presents with acute on chronic progressive dyspnea , hacking cough , afebrile , denied being on home oxygen , no sick contacts or travel, but is a former smoker.  Denied chest pain wheezing intermittent claudication. Recent abx use , lung disease prior. 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     Overview/Hospital Course:  No notes on file    Interval History:  Awake, alert, tolerating breathing treatment  Replace potassium  Supplemental oxygen-wean as tolerated-ambulatory pulse ox   Respiratory culture with presumptive Pseudomonas-on Zosyn-continue plan to switch to p.o. at discharge  Await PT/OT recs- Possible discharge today or tomorrow    Review of Systems   Constitutional:  Positive for activity change. Negative for fatigue.   Respiratory:  Negative for shortness of breath.    Gastrointestinal:  Negative for abdominal pain.   Genitourinary:  Negative for dysuria.   Neurological:  Negative for headaches.     Objective:     Vital Signs (Most Recent):  Temp: 98.6 °F (37 °C) (04/30/24 1153)  Pulse: 80 (04/30/24 1218)  Resp: 20 (04/30/24 1218)  BP: (!) 172/84 (04/30/24 1153)  SpO2: 95 % (04/30/24 1218) Vital Signs (24h Range):  Temp:  [97.6 °F (36.4 °C)-98.7 °F (37.1 °C)] 98.6 °F (37 °C)  Pulse:  [60-86] 80  Resp:  [14-24] 20  SpO2:  [91 %-97 %] 95 %  BP: (158-178)/(83-91) 172/84     Weight: 109.4 kg (241 lb 2.9 oz)  Body mass index is 34.61 kg/m².    Intake/Output Summary (Last 24 hours) at 4/30/2024 1507  Last data filed at 4/30/2024 1053  Gross per 24 hour   Intake  "1195.25 ml   Output 1675 ml   Net -479.75 ml         Physical Exam  Vitals and nursing note reviewed.   Constitutional:       General: He is not in acute distress.     Appearance: He is well-developed. He is obese.   HENT:      Head: Normocephalic and atraumatic.   Neck:      Vascular: No JVD.   Cardiovascular:      Rate and Rhythm: Normal rate and regular rhythm.      Heart sounds: Normal heart sounds.   Pulmonary:      Effort: Pulmonary effort is normal.      Breath sounds: Normal breath sounds.   Abdominal:      General: Bowel sounds are normal. There is no distension.      Palpations: Abdomen is soft.      Tenderness: There is no abdominal tenderness.   Musculoskeletal:      Cervical back: Neck supple.      Right lower leg: No edema.      Left lower leg: No edema.   Neurological:      Mental Status: He is alert.   Psychiatric:         Behavior: Behavior normal.             Significant Labs: A1C:   Recent Labs   Lab 02/07/24  0829   HGBA1C 6.6*     ABGs: No results for input(s): "PH", "PCO2", "HCO3", "POCSATURATED", "BE", "TOTALHB", "COHB", "METHB", "O2HB", "POCFIO2", "PO2" in the last 48 hours.  Blood Culture: No results for input(s): "LABBLOO" in the last 48 hours.  CBC:   Recent Labs   Lab 04/29/24  0448   WBC 18.62*   HGB 12.0*   HCT 37.4*   *     CMP:   Recent Labs   Lab 04/29/24  0448 04/30/24  0443     141 140   K 3.2*  3.2* 3.1*     110 109   CO2 18*  18* 19*   GLU 99  99 100   BUN 47*  47* 42*   CREATININE 3.4*  3.4* 3.4*   CALCIUM 10.1  10.1 10.0   ALBUMIN 2.4* 2.4*   ANIONGAP 13  13 12     Cardiac Markers: No results for input(s): "CKMB", "MYOGLOBIN", "BNP", "TROPISTAT" in the last 48 hours.  Lipase: No results for input(s): "LIPASE" in the last 48 hours.  Lipid Panel: No results for input(s): "CHOL", "HDL", "LDLCALC", "TRIG", "CHOLHDL" in the last 48 hours.  Magnesium:   Recent Labs   Lab 04/29/24  0448   MG 2.3     Troponin: No results for input(s): "TROPONINI", " ""TROPONINIHS" in the last 48 hours.  TSH:   Recent Labs   Lab 04/27/24  0433   TSH 0.840     Urine Culture: No results for input(s): "LABURIN" in the last 48 hours.  Urine Studies: No results for input(s): "COLORU", "APPEARANCEUA", "PHUR", "SPECGRAV", "PROTEINUA", "GLUCUA", "KETONESU", "BILIRUBINUA", "OCCULTUA", "NITRITE", "UROBILINOGEN", "LEUKOCYTESUR", "RBCUA", "WBCUA", "BACTERIA", "SQUAMEPITHEL", "HYALINECASTS" in the last 48 hours.    Invalid input(s): "WRIGHTSUR"    Significant Imaging: I have reviewed all pertinent imaging results/findings within the past 24 hours.    Assessment/Plan:      * Bacterial pneumonia  Chest xray with RLL opacities suggestive of pneumonia. Initiated on vanc/zosyn/azithro, hydrocortisone. Cultures pending. Continue supplemental O2    Of note, leukocytosis likely from steroid use.    Acute respiratory failure with hypoxia  Patient with Hypoxic Respiratory failure which is Acute.  he is not on home oxygen. Supplemental oxygen was provided and noted- Oxygen Concentration (%):  [3.5-40] 28    Signs/symptoms of respiratory failure include-  continued O2 requirement . Contributing diagnoses includes - Aspiration and Pneumonia Labs and images were reviewed. Patient Has recent ABG, which has been reviewed. Will treat underlying causes and adjust management of respiratory failure as follows- abx/ DUONEB/ IS/ Breo ellipta/ steroids     Class 1 obesity due to excess calories with serious comorbidity and body mass index (BMI) of 34.0 to 34.9 in adult  Body mass index is 34.48 kg/m². Obesity complicates all aspects of disease management from diagnostic modalities to treatment.          Immunosuppressive management encounter following kidney transplant        History of DVT (deep vein thrombosis)  On Eliquis      Renal transplant recipient  Continue Sirolimus and Prednisone        Mixed hyperlipidemia  Continue statin       Essential hypertension  Chronic, uncontrolled. Latest blood pressure and " vitals reviewed-     Temp:  [96 °F (35.6 °C)-98.7 °F (37.1 °C)]   Pulse:  []   Resp:  [16-22]   BP: (138-168)/(68-95)   SpO2:  [93 %-100 %] .   Home meds for hypertension were reviewed and noted below.   Hypertension Medications               cloNIDine 0.3 mg/24 hr td ptwk (CATAPRES) 0.3 mg/24 hr PLACE 1 PATCH ONTO THE SKIN ONCE A WEEK.    furosemide (LASIX) 40 MG tablet TAKE 1 TABLET EVERY DAY    losartan (COZAAR) 25 MG tablet Take 1 tablet (25 mg total) by mouth once daily.    metoprolol succinate (TOPROL-XL) 50 MG 24 hr tablet TAKE 1 TABLET EVERY DAY    nitroGLYCERIN (NITROSTAT) 0.4 MG SL tablet Place 1 tablet (0.4 mg total) under the tongue every 5 (five) minutes as needed for Chest pain.            While in the hospital, will manage blood pressure as follows; Adjust home antihypertensive regimen as follows- continue metoprolol, start hydralazine. Trend.     Will utilize p.r.n. blood pressure medication only if patient's blood pressure greater than 180/110 and he develops symptoms such as worsening chest pain or shortness of breath.    Stage 4 chronic kidney disease  Creatine stable for now. BMP reviewed- noted Estimated Creatinine Clearance: 23.3 mL/min (A) (based on SCr of 3.4 mg/dL (H)). according to latest data. Based on current GFR, CKD stage is stage 4 - GFR 15-29.  Monitor UOP and serial BMP and adjust therapy as needed. Renally dose meds. Avoid nephrotoxic medications and procedures.  At baseline, consulted nephrology , am labs, avoid nephrotoxins      VTE Risk Mitigation (From admission, onward)           Ordered     apixaban tablet 2.5 mg  2 times daily         04/26/24 1115                    Discharge Planning   LAVERNE: 4/30/2024     Code Status: Full Code   Is the patient medically ready for discharge?:     Reason for patient still in hospital (select all that apply): Patient trending condition                     Doris Patton MD  Department of [facility] Medicine   [facility]  Surg

## 2024-04-30 NOTE — PLAN OF CARE
Patient A&Ox4. No c/o pain or discomfort. Up in chair with PT, up to bedside commode for BM. Dr Campuzano informed about pt's frequent BM and CDIFF isolation getting discontinued, PRN Immodium ordered and was given. Call light within reach. Bed alarm on.

## 2024-04-30 NOTE — TELEPHONE ENCOUNTER
Per [facility], pt has [employer]. Unable to schedule at this time due to insurance.  made aware by PRS.

## 2024-04-30 NOTE — SUBJECTIVE & OBJECTIVE
Interval History:  Awake, alert, tolerating breathing treatment  Replace potassium  Supplemental oxygen-wean as tolerated-ambulatory pulse ox   Respiratory culture with presumptive Pseudomonas-on Zosyn-continue plan to switch to p.o. at discharge  Await PT/OT recs- Possible discharge today or tomorrow    Review of Systems   Constitutional:  Positive for activity change. Negative for fatigue.   Respiratory:  Negative for shortness of breath.    Gastrointestinal:  Negative for abdominal pain.   Genitourinary:  Negative for dysuria.   Neurological:  Negative for headaches.     Objective:     Vital Signs (Most Recent):  Temp: 98.6 °F (37 °C) (04/30/24 1153)  Pulse: 80 (04/30/24 1218)  Resp: 20 (04/30/24 1218)  BP: (!) 172/84 (04/30/24 1153)  SpO2: 95 % (04/30/24 1218) Vital Signs (24h Range):  Temp:  [97.6 °F (36.4 °C)-98.7 °F (37.1 °C)] 98.6 °F (37 °C)  Pulse:  [60-86] 80  Resp:  [14-24] 20  SpO2:  [91 %-97 %] 95 %  BP: (158-178)/(83-91) 172/84     Weight: 109.4 kg (241 lb 2.9 oz)  Body mass index is 34.61 kg/m².    Intake/Output Summary (Last 24 hours) at 4/30/2024 1507  Last data filed at 4/30/2024 1053  Gross per 24 hour   Intake 1195.25 ml   Output 1675 ml   Net -479.75 ml         Physical Exam  Vitals and nursing note reviewed.   Constitutional:       General: He is not in acute distress.     Appearance: He is well-developed. He is obese.   HENT:      Head: Normocephalic and atraumatic.   Neck:      Vascular: No JVD.   Cardiovascular:      Rate and Rhythm: Normal rate and regular rhythm.      Heart sounds: Normal heart sounds.   Pulmonary:      Effort: Pulmonary effort is normal.      Breath sounds: Normal breath sounds.   Abdominal:      General: Bowel sounds are normal. There is no distension.      Palpations: Abdomen is soft.      Tenderness: There is no abdominal tenderness.   Musculoskeletal:      Cervical back: Neck supple.      Right lower leg: No edema.      Left lower leg: No edema.   Neurological:       "Mental Status: He is alert.   Psychiatric:         Behavior: Behavior normal.             Significant Labs: A1C:   Recent Labs   Lab 02/07/24  0829   HGBA1C 6.6*     ABGs: No results for input(s): "PH", "PCO2", "HCO3", "POCSATURATED", "BE", "TOTALHB", "COHB", "METHB", "O2HB", "POCFIO2", "PO2" in the last 48 hours.  Blood Culture: No results for input(s): "LABBLOO" in the last 48 hours.  CBC:   Recent Labs   Lab 04/29/24 0448   WBC 18.62*   HGB 12.0*   HCT 37.4*   *     CMP:   Recent Labs   Lab 04/29/24 0448 04/30/24  0443     141 140   K 3.2*  3.2* 3.1*     110 109   CO2 18*  18* 19*   GLU 99  99 100   BUN 47*  47* 42*   CREATININE 3.4*  3.4* 3.4*   CALCIUM 10.1  10.1 10.0   ALBUMIN 2.4* 2.4*   ANIONGAP 13  13 12     Cardiac Markers: No results for input(s): "CKMB", "MYOGLOBIN", "BNP", "TROPISTAT" in the last 48 hours.  Lipase: No results for input(s): "LIPASE" in the last 48 hours.  Lipid Panel: No results for input(s): "CHOL", "HDL", "LDLCALC", "TRIG", "CHOLHDL" in the last 48 hours.  Magnesium:   Recent Labs   Lab 04/29/24 0448   MG 2.3     Troponin: No results for input(s): "TROPONINI", "TROPONINIHS" in the last 48 hours.  TSH:   Recent Labs   Lab 04/27/24  0433   TSH 0.840     Urine Culture: No results for input(s): "LABURIN" in the last 48 hours.  Urine Studies: No results for input(s): "COLORU", "APPEARANCEUA", "PHUR", "SPECGRAV", "PROTEINUA", "GLUCUA", "KETONESU", "BILIRUBINUA", "OCCULTUA", "NITRITE", "UROBILINOGEN", "LEUKOCYTESUR", "RBCUA", "WBCUA", "BACTERIA", "SQUAMEPITHEL", "HYALINECASTS" in the last 48 hours.    Invalid input(s): "WRIGHTSUR"    Significant Imaging: I have reviewed all pertinent imaging results/findings within the past 24 hours.  "

## 2024-04-30 NOTE — PLAN OF CARE
Patient on room air with documented SpO2 and in no apparent distress. Tx as scheduled, no c/o dyspnea @ this time. Will continue to monitor.

## 2024-04-30 NOTE — NURSING
Assumed care of patient from ANSLEY Dexter, patient is AAOX4, room air, CPAP at bed time, no c/o pain or discomfort, vitals WNL, urinal and BSC near patient, suction at bedside for infrequent cough and mucous, all safety precautions are in place, call bell and tray table are within reach of the patient and no additional questions or concerns from the patient at this time, patient care ongoing.

## 2024-04-30 NOTE — TELEPHONE ENCOUNTER
----- Message from Diamond Juarez sent at 4/30/2024  4:57 PM CDT -----  Regarding: HFU  Patient is being discharged from [facility] and is requiring a hospital follow up appointment with their Primary Care Provider in 7 days. Patient is established. I am unable to schedule an appointment in that time frame. Please schedule patient a sooner appointment and message me back so Discharge Nurse can advise patient prior to discharge.    Expected discharge on 5/1    DX:bacterial pneumonia      Thank you, Evelin  Physician Referral Specialist/Discharge

## 2024-04-30 NOTE — PLAN OF CARE
Problem: Physical Therapy  Goal: Physical Therapy Goal  Description: Goals to be met by: 2024     Patient will increase functional independence with mobility by performin. Sit to stand transfer with [place]  2. Bed to chair transfer with [place] using No Assistive Device  3. Gait  x 150 feet with [place] using No Assistive Device.   4. Stand for 20 minutes with [place] using No Assistive Device    Outcome: Progressing     Pt participates in transfers to standing and ambulation with CGA/SBA and no AD. Therapy will continue to progress pt as able.

## 2024-04-30 NOTE — NURSING
Patient called the desk about his nose bleeding.  Upon checking nose bled  minimal bright red blood but is now controlled. Bleeding was noted when he was on the phone talking. No pain or discomfort reported. Vitals taken, BP elevated. Md informed, no new orders at this time.

## 2024-04-30 NOTE — PT/OT/SLP PROGRESS
Occupational Therapy   Treatment    Name: Jose Luis Manzo  MRN: [id number]  Admitting Diagnosis:  Bacterial pneumonia       Recommendations:     Discharge Recommendations: Low Intensity Therapy  Discharge Equipment Recommendations:  shower chair  Barriers to discharge:  None    Assessment:   Pt cont w/ respiratory issues impairing overall endurance, however cont w/ good progress towards established goals w/ cont OT per POC.    Jose Luis Manzo is a 74 y.o. male with a medical diagnosis of Bacterial pneumonia.  He presents with performance deficits affecting function are weakness, impaired endurance, impaired self care skills, impaired functional mobility, gait instability, impaired balance, impaired cardiopulmonary response to activity.     Rehab Prognosis:  Good; patient would benefit from acute skilled OT services to address these deficits and reach maximum level of function.       Plan:     Patient to be seen 5 x/week to address the above listed problems via self-care/home management, therapeutic activities, therapeutic exercises  Plan of Care Expires: 05/27/24  Plan of Care Reviewed with: patient    Subjective     Chief Complaint: SOB  Patient/Family Comments/goals: return to PLOF  Pain/Comfort:  Pain Rating 1: 0/10  Pain Rating Post-Intervention 1: 0/10    Objective:     Communicated with: nsblaire prior to session.  Patient found sitting edge of bed with telemetry, SCD, peripheral IV upon OT entry to room.    General Precautions: Standard, fall, respiratory    Orthopedic Precautions:N/A  Braces: N/A  Respiratory Status: Room air     Occupational Performance:     Bed Mobility:         Functional Mobility/Transfers:  Patient completed Sit <> Stand Transfer with supervision  with  no assistive device   Patient completed Bed <> Chair Transfer using Step Transfer technique with supervision and stand by assistance with rolling walker  Functional Mobility:     Activities of Daily Living:        [facility] 6 Click ADL:  17    Treatment & Education:   Pt found in sitting EOB after having completed BSC-->EOB t/f w/ Sup & perf the following:  -standing w/o DME for t/f-->b/s chair w/ S-SBA  -instruct/demo w/ return demo of red theraband UB HEP w/ emph on deep/PLBing 1x 5-10 reps ea all major jts/planes.  Edu/tx re: general safety techs & HEP. Pt verbalized understanding.  Pt left UIC w/ alarm & nsg made aware.      Patient left up in chair with all lines intact, call button in reach, chair alarm on, and nsg notified    GOALS:   Multidisciplinary Problems       Occupational Therapy Goals          Problem: Occupational Therapy    Goal Priority Disciplines Outcome Interventions   Occupational Therapy Goal     OT, PT/OT Progressing    Description: Goals to be met by: 05/27     Patient will increase functional independence with ADLs by performing:    UE Dressing with Supervision.  LE Dressing with Supervision.  Grooming while standing at sink with Supervision.  Toileting from toilet with Supervision for hygiene and clothing management.   Toilet transfer to toilet with Supervision.  Increased functional strength to WFL for ADLs.                         Time Tracking:     OT Date of Treatment: 04/30/24  OT Start Time: 1232  OT Stop Time: 1256  OT Total Time (min): 24 min    Billable Minutes:Therapeutic Activity 24  Total Time 24    OT/RENEE: OT          4/30/2024

## 2024-04-30 NOTE — PLAN OF CARE
04/30/24 1100   Rounds   Attendance Nurse ;Provider   Discharge Plan A Home Health   Why the patient remains in the hospital Requires continued medical care       1100  CM was informed by Dr Campuzano that the pt is not medically stable to discharge today & might need placement. Awaiting PT/OT recs.     [facility] - Med Surg  Initial Discharge Assessment       Primary Care Provider: Home Alexis MD    Admission Diagnosis: CHF (congestive heart failure) [I50.9]  SOB (shortness of breath) [R06.02]  Hypoxia [R09.02]  Chest pain [R07.9]  Pneumonia of right lung due to infectious organism, unspecified part of lung [J18.9]    Admission Date: 4/26/2024  Expected Discharge Date: 4/30/2024         Payor: Fleck / Plan: [employer] MEDICARE PPO / Product Type: Medicare Advantage /     Extended Emergency Contact Information  Primary Emergency Contact: Gene Manzo  Address: [address] [place] [address] [place]  Work Phone: [phone]  Mobile Phone: [phone]  Relation: Brother    Discharge Plan A: (P) Home Health  Discharge Plan B: (P) Skilled Nursing Facility      [facility] [place] - [address]  [address] [facility] [place] [address]  Phone: [phone] Fax: [phone]    [facility] - [facility] [address]  [address] [address]  Phone: [phone] Fax: [phone]      Initial Assessment (most recent)       Adult Discharge Assessment - 04/30/24 1130          Discharge Assessment    Assessment Type Discharge Planning Assessment     Confirmed/corrected address, phone number and insurance Yes     Confirmed Demographics Correct on Facesheet     Source of Information patient     Communicated LAVERNE with patient/caregiver Date not available/Unable to determine     People in Home sibling(s)   Gene chakraborty ([phone])    Do you expect to return to your current living situation? Yes      Do you have help at home or someone to help you manage your care at home? Yes     Prior to hospitilization cognitive status: Alert/Oriented     Current cognitive status: Alert/Oriented     Equipment Currently Used at Home blood pressure machine     Readmission within 30 days? No     Patient currently being followed by outpatient case management? No     Do you currently have service(s) that help you manage your care at home? No     Do you take prescription medications? Yes     Do you have prescription coverage? Yes     Do you have any problems affording any of your prescribed medications? No     Is the patient taking medications as prescribed? yes     How do you get to doctors appointments? car, drives self     Are you on dialysis? No     Do you take coumadin? No     Discharge Plan A Home Health (P)      Discharge Plan B Skilled Nursing Facility (P)      DME Needed Upon Discharge  other (see comments) (P)    tbd    Discharge Plan discussed with: Patient (P)         Physical Activity    On average, how many days per week do you engage in moderate to strenuous exercise (like a brisk walk)? 0 days (P)      On average, how many minutes do you engage in exercise at this level? 0 min (P)         Financial Resource Strain    How hard is it for you to pay for the very basics like food, housing, medical care, and heating? Not hard at all (P)         Housing Stability    In the last 12 months, was there a time when you were not able to pay the mortgage or rent on time? No (P)      At any time in the past 12 months, were you homeless or living in a shelter (including now)? No (P)         Transportation Needs    In the past 12 months, has lack of transportation kept you from medical appointments or from getting medications? No (P)      In the past 12 months, has lack of transportation kept you from meetings, work, or from getting things needed for daily living? No (P)         Food Insecurity    Within the past 12 months, you  worried that your food would run out before you got the money to buy more. Never true (P)      Within the past 12 months, the food you bought just didn't last and you didn't have money to get more. Never true (P)         Stress    Do you feel stress - tense, restless, nervous, or anxious, or unable to sleep at night because your mind is troubled all the time - these days? Not at all (P)         Alcohol Use    Q1: How often do you have a drink containing alcohol? Never (P)      Q2: How many drinks containing alcohol do you have on a typical day when you are drinking? Patient does not drink (P)      Q3: How often do you have six or more drinks on one occasion? Never (P)                       1130  Patient resting quietly in bed when CM rounded. No family present. Patient was admitted with bacterial pneumonia & is being followed by neph, pulm, & PT/OT. CM encouraged the pt to work with PT/OT today. Pt verbalized understanding.     Patient lives with his brother, Gene Manzo,  is independent of all ADLs, & denied the need for assistance with transportation at time of discharge.     CM updated patient's whiteboard with CM name & contact information.     3171  PT/OT rec HH services following discharge.     Message sent to the schedulers requesting a hospfu appt with Dr Home Alexis (PCP). Awaiting response.       Will continue to follow.

## 2024-04-30 NOTE — PLAN OF CARE
Problem: Occupational Therapy  Goal: Occupational Therapy Goal  Description: Goals to be met by: 05/27     Patient will increase functional independence with ADLs by performing:    UE Dressing with Supervision.  LE Dressing with Supervision.  Grooming while standing at sink with Supervision.  Toileting from toilet with Supervision for hygiene and clothing management.   Toilet transfer to toilet with Supervision.  Increased functional strength to WFL for ADLs.    Outcome: Progressing   Pt found in sitting EOB after having completed BSC-->EOB t/f w/ Sup & perf the following:  -standing w/o DME for t/f-->b/s chair w/ S-SBA  -instruct/demo w/ return demo of red theraband UB HEP w/ emph on deep/PLBing 1x 5-10 reps ea all major jts/planes.  Edu/tx re: general safety techs & HEP. Pt verbalized understanding.  Pt left UIC w/ alarm & nsg made aware.    Pt cont w/ respiratory issues impairing overall endurance, however cont w/ good progress towards established goals w/ cont OT per POC.

## 2024-04-30 NOTE — NURSING
Per lab patient's stool sample was formed and does not meet CDIFF criteria. Special contact isolation  removed.

## 2024-04-30 NOTE — PT/OT/SLP PROGRESS
Physical Therapy Treatment    Patient Name:  Jose Luis Manzo   MRN:  [id number]    Recommendations:     Discharge Recommendations: Low Intensity Therapy  Discharge Equipment Recommendations: shower chair  Barriers to discharge: None    Assessment:     Jose Luis Manzo is a 74 y.o. male admitted with a medical diagnosis of Bacterial pneumonia.  He presents with the following impairments/functional limitations: weakness, impaired endurance, impaired functional mobility, gait instability, impaired balance, pain.    Pt participates in transfers to standing and ambulation with CGA/SBA and no AD. Therapy will continue to progress pt as able.     Rehab Prognosis: Good; patient would benefit from acute skilled PT services to address these deficits and reach maximum level of function.    Recent Surgery: * No surgery found *      Plan:     During this hospitalization, patient to be seen 3 x/week to address the identified rehab impairments via gait training, therapeutic activities, therapeutic exercises, neuromuscular re-education and progress toward the following goals:    Plan of Care Expires:  05/25/24    Subjective     Chief Complaint: soreness to L hip from being in bed  Patient/Family Comments/goals: to return home  Pain/Comfort:  Pain Rating 1:  (not rated; L sided hip soreness)  Pain Addressed 1: Reposition, Cessation of Activity, Nurse notified  Pain Rating Post-Intervention 1:  (not rated)      Objective:     Communicated with Nurse prior to session.  Patient found up in chair with telemetry upon PT entry to room.     General Precautions: Standard, fall, respiratory  Orthopedic Precautions: N/A  Braces: N/A  Respiratory Status: Room air     Functional Mobility:  Transfers:     Sit to Stand:  stand by assistance with no AD  Gait: 30ft total in room with no AD and CGA/SBA; seated rest break half way due to increased labored breathing; however, SpO2 97%      AM-PAC 6 CLICK MOBILITY  Turning over in bed (including  adjusting bedclothes, sheets and blankets)?: 4  Sitting down on and standing up from a chair with arms (e.g., wheelchair, bedside commode, etc.): 3  Moving from lying on back to sitting on the side of the bed?: 3  Moving to and from a bed to a chair (including a wheelchair)?: 3  Need to walk in hospital room?: 3  Climbing 3-5 steps with a railing?: 3  Basic Mobility Total Score: 19       Treatment & Education:  Pt requires CGA/SBA with mobility with no AD at this time. Pt able to ambulate around bed x2 trials with seated rest break between due to limited functional endurance; however, SpO2 remains 97%.   Pt request to continue sitting up in bedside reclining chair.     Patient left up in chair with all lines intact, call button in reach, chair alarm on, and Nurse notified.    GOALS:   Multidisciplinary Problems       Physical Therapy Goals          Problem: Physical Therapy    Goal Priority Disciplines Outcome Goal Variances Interventions   Physical Therapy Goal     PT, PT/OT Progressing     Description: Goals to be met by: 2024     Patient will increase functional independence with mobility by performin. Sit to stand transfer with [place]  2. Bed to chair transfer with [place] using No Assistive Device  3. Gait  x 150 feet with [place] using No Assistive Device.   4. Stand for 20 minutes with [place] using No Assistive Device                         Time Tracking:     PT Received On: 24  PT Start Time: 1405     PT Stop Time: 1420  PT Total Time (min): 15 min     Billable Minutes: Gait Training 15    Treatment Type: Treatment  PT/PTA: PT     Number of PTA visits since last PT visit: 0     2024

## 2024-05-01 ENCOUNTER — TELEPHONE (OUTPATIENT)
Dept: FAMILY MEDICINE | Facility: CLINIC | Age: 74
End: 2024-05-01
Payer: MEDICARE

## 2024-05-01 LAB
25(OH)D3+25(OH)D2 SERPL-MCNC: 25 NG/ML (ref 30–96)
ALBUMIN SERPL BCP-MCNC: 2.4 G/DL (ref 3.5–5.2)
ANION GAP SERPL CALC-SCNC: 11 MMOL/L (ref 8–16)
ANION GAP SERPL CALC-SCNC: 13 MMOL/L (ref 8–16)
BACTERIA BLD CULT: NORMAL
BACTERIA BLD CULT: NORMAL
BASOPHILS # BLD AUTO: 0.02 K/UL (ref 0–0.2)
BASOPHILS NFR BLD: 0.2 % (ref 0–1.9)
BUN SERPL-MCNC: 36 MG/DL (ref 8–23)
BUN SERPL-MCNC: 38 MG/DL (ref 8–23)
CALCIUM SERPL-MCNC: 10 MG/DL (ref 8.7–10.5)
CALCIUM SERPL-MCNC: 10.2 MG/DL (ref 8.7–10.5)
CHLORIDE SERPL-SCNC: 110 MMOL/L (ref 95–110)
CHLORIDE SERPL-SCNC: 111 MMOL/L (ref 95–110)
CO2 SERPL-SCNC: 17 MMOL/L (ref 23–29)
CO2 SERPL-SCNC: 18 MMOL/L (ref 23–29)
CREAT SERPL-MCNC: 3.6 MG/DL (ref 0.5–1.4)
CREAT SERPL-MCNC: 3.6 MG/DL (ref 0.5–1.4)
DIFFERENTIAL METHOD BLD: ABNORMAL
EOSINOPHIL # BLD AUTO: 0 K/UL (ref 0–0.5)
EOSINOPHIL NFR BLD: 0.4 % (ref 0–8)
ERYTHROCYTE [DISTWIDTH] IN BLOOD BY AUTOMATED COUNT: 16.2 % (ref 11.5–14.5)
EST. GFR  (NO RACE VARIABLE): 17 ML/MIN/1.73 M^2
EST. GFR  (NO RACE VARIABLE): 17 ML/MIN/1.73 M^2
GLUCOSE SERPL-MCNC: 128 MG/DL (ref 70–110)
GLUCOSE SERPL-MCNC: 92 MG/DL (ref 70–110)
HCT VFR BLD AUTO: 35 % (ref 40–54)
HGB BLD-MCNC: 11.3 G/DL (ref 14–18)
IMM GRANULOCYTES # BLD AUTO: 0.13 K/UL (ref 0–0.04)
IMM GRANULOCYTES NFR BLD AUTO: 1.3 % (ref 0–0.5)
LYMPHOCYTES # BLD AUTO: 0.5 K/UL (ref 1–4.8)
LYMPHOCYTES NFR BLD: 5.2 % (ref 18–48)
MCH RBC QN AUTO: 25.9 PG (ref 27–31)
MCHC RBC AUTO-ENTMCNC: 32.3 G/DL (ref 32–36)
MCV RBC AUTO: 80 FL (ref 82–98)
MONOCYTES # BLD AUTO: 0.9 K/UL (ref 0.3–1)
MONOCYTES NFR BLD: 8.6 % (ref 4–15)
NEUTROPHILS # BLD AUTO: 8.6 K/UL (ref 1.8–7.7)
NEUTROPHILS NFR BLD: 84.3 % (ref 38–73)
NRBC BLD-RTO: 0 /100 WBC
PHOSPHATE SERPL-MCNC: 2.5 MG/DL (ref 2.7–4.5)
PLATELET # BLD AUTO: 112 K/UL (ref 150–450)
PMV BLD AUTO: 11.4 FL (ref 9.2–12.9)
POTASSIUM SERPL-SCNC: 3.6 MMOL/L (ref 3.5–5.1)
POTASSIUM SERPL-SCNC: 3.7 MMOL/L (ref 3.5–5.1)
RBC # BLD AUTO: 4.36 M/UL (ref 4.6–6.2)
S PNEUM AG UR QL: NOT DETECTED
SODIUM SERPL-SCNC: 139 MMOL/L (ref 136–145)
SODIUM SERPL-SCNC: 141 MMOL/L (ref 136–145)
WBC # BLD AUTO: 10.19 K/UL (ref 3.9–12.7)

## 2024-05-01 PROCEDURE — 25000003 PHARM REV CODE 250: Performed by: FAMILY MEDICINE

## 2024-05-01 PROCEDURE — 80069 RENAL FUNCTION PANEL: CPT | Performed by: STUDENT IN AN ORGANIZED HEALTH CARE EDUCATION/TRAINING PROGRAM

## 2024-05-01 PROCEDURE — 25000003 PHARM REV CODE 250: Performed by: INTERNAL MEDICINE

## 2024-05-01 PROCEDURE — 94761 N-INVAS EAR/PLS OXIMETRY MLT: CPT

## 2024-05-01 PROCEDURE — 97530 THERAPEUTIC ACTIVITIES: CPT | Mod: CO

## 2024-05-01 PROCEDURE — 94660 CPAP INITIATION&MGMT: CPT

## 2024-05-01 PROCEDURE — 25000003 PHARM REV CODE 250

## 2024-05-01 PROCEDURE — 21400001 HC TELEMETRY ROOM

## 2024-05-01 PROCEDURE — 63600175 PHARM REV CODE 636 W HCPCS: Performed by: FAMILY MEDICINE

## 2024-05-01 PROCEDURE — 94640 AIRWAY INHALATION TREATMENT: CPT

## 2024-05-01 PROCEDURE — 63600175 PHARM REV CODE 636 W HCPCS

## 2024-05-01 PROCEDURE — 36415 COLL VENOUS BLD VENIPUNCTURE: CPT | Performed by: FAMILY MEDICINE

## 2024-05-01 PROCEDURE — 36415 COLL VENOUS BLD VENIPUNCTURE: CPT | Performed by: INTERNAL MEDICINE

## 2024-05-01 PROCEDURE — 85025 COMPLETE CBC W/AUTO DIFF WBC: CPT | Performed by: FAMILY MEDICINE

## 2024-05-01 PROCEDURE — 94799 UNLISTED PULMONARY SVC/PX: CPT

## 2024-05-01 PROCEDURE — 80048 BASIC METABOLIC PNL TOTAL CA: CPT | Performed by: FAMILY MEDICINE

## 2024-05-01 PROCEDURE — 99900035 HC TECH TIME PER 15 MIN (STAT)

## 2024-05-01 PROCEDURE — 25000242 PHARM REV CODE 250 ALT 637 W/ HCPCS: Performed by: INTERNAL MEDICINE

## 2024-05-01 PROCEDURE — 82306 VITAMIN D 25 HYDROXY: CPT | Performed by: INTERNAL MEDICINE

## 2024-05-01 PROCEDURE — 63600175 PHARM REV CODE 636 W HCPCS: Performed by: INTERNAL MEDICINE

## 2024-05-01 PROCEDURE — 97530 THERAPEUTIC ACTIVITIES: CPT

## 2024-05-01 PROCEDURE — 27000221 HC OXYGEN, UP TO 24 HOURS

## 2024-05-01 RX ORDER — SODIUM BICARBONATE 650 MG/1
1300 TABLET ORAL 4 TIMES DAILY
Status: DISCONTINUED | OUTPATIENT
Start: 2024-05-01 | End: 2024-05-04

## 2024-05-01 RX ORDER — CIPROFLOXACIN 500 MG/1
500 TABLET ORAL EVERY 24 HOURS
Status: DISCONTINUED | OUTPATIENT
Start: 2024-05-01 | End: 2024-05-04 | Stop reason: HOSPADM

## 2024-05-01 RX ADMIN — CIPROFLOXACIN 500 MG: 500 TABLET ORAL at 12:05

## 2024-05-01 RX ADMIN — LOPERAMIDE HYDROCHLORIDE 2 MG: 2 CAPSULE ORAL at 04:05

## 2024-05-01 RX ADMIN — METOPROLOL SUCCINATE 100 MG: 50 TABLET, EXTENDED RELEASE ORAL at 09:05

## 2024-05-01 RX ADMIN — MUPIROCIN: 20 OINTMENT TOPICAL at 10:05

## 2024-05-01 RX ADMIN — PREDNISONE 10 MG: 10 TABLET ORAL at 09:05

## 2024-05-01 RX ADMIN — APIXABAN 2.5 MG: 2.5 TABLET, FILM COATED ORAL at 09:05

## 2024-05-01 RX ADMIN — IPRATROPIUM BROMIDE AND ALBUTEROL SULFATE 3 ML: 2.5; .5 SOLUTION RESPIRATORY (INHALATION) at 03:05

## 2024-05-01 RX ADMIN — FERROUS SULFATE TAB 325 MG (65 MG ELEMENTAL FE) 1 EACH: 325 (65 FE) TAB at 10:05

## 2024-05-01 RX ADMIN — MUPIROCIN: 20 OINTMENT TOPICAL at 02:05

## 2024-05-01 RX ADMIN — HYDRALAZINE HYDROCHLORIDE 100 MG: 25 TABLET, FILM COATED ORAL at 02:05

## 2024-05-01 RX ADMIN — SODIUM BICARBONATE 650 MG TABLET 1300 MG: at 09:05

## 2024-05-01 RX ADMIN — MUPIROCIN: 20 OINTMENT TOPICAL at 09:05

## 2024-05-01 RX ADMIN — HYDRALAZINE HYDROCHLORIDE 100 MG: 25 TABLET, FILM COATED ORAL at 10:05

## 2024-05-01 RX ADMIN — APIXABAN 2.5 MG: 2.5 TABLET, FILM COATED ORAL at 10:05

## 2024-05-01 RX ADMIN — LACTOBACILLUS TAB 1 TABLET: TAB at 09:05

## 2024-05-01 RX ADMIN — PIPERACILLIN AND TAZOBACTAM 4.5 G: 4; .5 INJECTION, POWDER, LYOPHILIZED, FOR SOLUTION INTRAVENOUS; PARENTERAL at 12:05

## 2024-05-01 RX ADMIN — HYDRALAZINE HYDROCHLORIDE 100 MG: 25 TABLET, FILM COATED ORAL at 05:05

## 2024-05-01 RX ADMIN — SIROLIMUS 3 MG: 1 TABLET ORAL at 09:05

## 2024-05-01 RX ADMIN — ATORVASTATIN CALCIUM 20 MG: 20 TABLET, FILM COATED ORAL at 10:05

## 2024-05-01 RX ADMIN — SODIUM BICARBONATE 650 MG TABLET 1300 MG: at 12:05

## 2024-05-01 RX ADMIN — IPRATROPIUM BROMIDE AND ALBUTEROL SULFATE 3 ML: 2.5; .5 SOLUTION RESPIRATORY (INHALATION) at 07:05

## 2024-05-01 RX ADMIN — PIPERACILLIN AND TAZOBACTAM 4.5 G: 4; .5 INJECTION, POWDER, LYOPHILIZED, FOR SOLUTION INTRAVENOUS; PARENTERAL at 09:05

## 2024-05-01 RX ADMIN — IPRATROPIUM BROMIDE AND ALBUTEROL SULFATE 3 ML: 2.5; .5 SOLUTION RESPIRATORY (INHALATION) at 11:05

## 2024-05-01 RX ADMIN — SODIUM BICARBONATE 650 MG TABLET 1300 MG: at 10:05

## 2024-05-01 RX ADMIN — FLUTICASONE FUROATE AND VILANTEROL TRIFENATATE 1 PUFF: 100; 25 POWDER RESPIRATORY (INHALATION) at 07:05

## 2024-05-01 RX ADMIN — SODIUM BICARBONATE 650 MG TABLET 1300 MG: at 04:05

## 2024-05-01 RX ADMIN — IPRATROPIUM BROMIDE AND ALBUTEROL SULFATE 3 ML: 2.5; .5 SOLUTION RESPIRATORY (INHALATION) at 08:05

## 2024-05-01 RX ADMIN — FERROUS SULFATE TAB 325 MG (65 MG ELEMENTAL FE) 1 EACH: 325 (65 FE) TAB at 09:05

## 2024-05-01 RX ADMIN — Medication 1 CAPSULE: at 02:05

## 2024-05-01 NOTE — PT/OT/SLP PROGRESS
"Occupational Therapy   Treatment    Name: Jose Luis Manzo  MRN: [id number]  Admitting Diagnosis:  Bacterial pneumonia       Recommendations:     Discharge Recommendations: Low Intensity Therapy  Discharge Equipment Recommendations:  shower chair  Barriers to discharge:  None    Assessment:     Jose Luis Manzo is a 74 y.o. male with a medical diagnosis of Bacterial pneumonia. Performance deficits affecting function are weakness, impaired endurance, impaired self care skills, impaired functional mobility, gait instability, impaired balance, decreased coordination, decreased upper extremity function, decreased lower extremity function, decreased safety awareness.     Rehab Prognosis:  Fair; patient would benefit from acute skilled OT services to address these deficits and reach maximum level of function.       Plan:     Patient to be seen 5 x/week to address the above listed problems via self-care/home management, therapeutic activities, therapeutic exercises  Plan of Care Expires: 05/27/24  Plan of Care Reviewed with: patient    Subjective     Chief Complaint: "My left side hurts"  Patient/Family Comments/goals: return to PLOF  Pain/Comfort:  Pain Rating 1:  (did not rate)  Location - Side 1: Left  Location 1: flank    Objective:     Communicated with: nurseGabrielle prior to session.  Patient found right sidelying with telemetry upon OT entry to room.    General Precautions: Standard, fall, respiratory      AMPAC 6 Click ADL: 18    Treatment & Education:  Educated on purpose/role of OT  Endorsed fatigue and L sided flank pain  Reported frequent BMs yesterday and overnight, though has not been the case today  Declined EOB/OOB at this time, but reported he was planning to t/f to bedside chair "around 5" today"  Educated on:  Importance of OOB to chair for meals  Utilizing BSC with staff assist; possible progression of ambulation to bathroom (again with staff assist)  Completing UB theraband exercises -- handout and red " (med resistance) band noted on table; patient reports he will complete  Inquiring about D/C; deferred to nsg  Reports yankeur suction not working; tubing and yankeur tip changed    Patient left right sidelying with all lines intact, call button in reach, and nsg notified    GOALS:   Multidisciplinary Problems       Occupational Therapy Goals          Problem: Occupational Therapy    Goal Priority Disciplines Outcome Interventions   Occupational Therapy Goal     OT, PT/OT Progressing    Description: Goals to be met by: 05/27     Patient will increase functional independence with ADLs by performing:    UE Dressing with Supervision.  LE Dressing with Supervision.  Grooming while standing at sink with Supervision.  Toileting from toilet with Supervision for hygiene and clothing management.   Toilet transfer to toilet with Supervision.  Increased functional strength to WFL for ADLs.                         Time Tracking:     OT Date of Treatment: 05/01/24  OT Start Time: 1436  OT Stop Time: 1446  OT Total Time (min): 10 min    Billable Minutes:Therapeutic Activity 10    OT/RENEE: RENEE     Number of RENEE visits since last OT visit: 1    5/1/2024

## 2024-05-01 NOTE — PLAN OF CARE
Problem: Physical Therapy  Goal: Physical Therapy Goal  Description: Goals to be met by: 2024     Patient will increase functional independence with mobility by performin. Sit to stand transfer with [place]  2. Bed to chair transfer with [place] using No Assistive Device  3. Gait  x 150 feet with [place] using No Assistive Device.   4. Stand for 20 minutes with [place] using No Assistive Device    Outcome: Progressing     Pt participates in bed mobility, seated BLE therapeutic exercises, transfers to standing with short distance ambulation to bedside chair with CGA and no AD. Therapy will continue to progress pt as able.

## 2024-05-01 NOTE — PLAN OF CARE
"1020  DC order noted. HH referral sent to [facility]- via [employer]. Awaiting response.        05/01/24 1035   Rounds   Attendance Nurse ;Provider   Discharge Plan A Home Health     1035  CM was informed by Dr Campuzano that the pt is medically stable to discharge home with HH today.     1110  Patient accepted by [facility]-. CM requested HH orders.     1125  CM was informed by Dr Campuzano that the pt's discharge has been cancelled due to diarrhea this AM. Message sent to [facility]- informing of above.     1220  Pt resting quietly in bed when CM rounded. No family present. CM informed the pt that [place]- will provide HH services following discharge. Pt verbalized understanding & agreement. Signed "Pt Choice" form placed in the pt's chart.       Will continue to follow.   " Dupixent Counseling: I discussed with the patient the risks of dupilumab including but not limited to eye infection and irritation, cold sores, injection site reactions, worsening of asthma, allergic reactions and increased risk of parasitic infection.  Live vaccines should be avoided while taking dupilumab. Dupilumab will also interact with certain medications such as warfarin and cyclosporine. The patient understands that monitoring is required and they must alert us or the primary physician if symptoms of infection or other concerning signs are noted.

## 2024-05-01 NOTE — PLAN OF CARE
Pt on RA with documented sats. The proper method of use, as well as anticipated side effects, of this metered-dose inhaler are discussed and demonstrated to the patient. Plan of care on going.

## 2024-05-01 NOTE — PLAN OF CARE
Pt on RA. Pt getting 1250mL on incentive spirometry. Pt encouraged to continue deep breathing and coughing. No apparent distress noted. Will continue to monitor

## 2024-05-01 NOTE — PT/OT/SLP PROGRESS
Occupational Therapy  Visit Attempt    Patient Name:  Jose Luis Manzo   MRN:  [id number]    Patient not seen today secondary to Other (Comment) (11:26 - Patient beginning breathing treatment.).     5/1/2024

## 2024-05-01 NOTE — PLAN OF CARE
Patient resting in bed, AAOX4. Medications administered as ordered. No complaints of pain overnight. Asleep on and off through the shift. Patient with loose stools, loperamide administered. Encouraged to call with needs or concerns. Will continue to monitor.

## 2024-05-01 NOTE — PLAN OF CARE
Pt. AAOx4. [place] [facility] [place]. PT. Worked well with therapy. C/O diarrhea, PRN administered. Bed alarm on and call light in reach. Pt. Instructed to call for assistance. Plan of care continued.   Problem: Adult Inpatient Plan of Care  Goal: Plan of Care Review  Outcome: Progressing     Problem: Adult Inpatient Plan of Care  Goal: Patient-Specific Goal (Individualized)  Outcome: Progressing     Problem: Adult Inpatient Plan of Care  Goal: Absence of Hospital-Acquired Illness or Injury  Outcome: Progressing     Problem: Adult Inpatient Plan of Care  Goal: Optimal Comfort and Wellbeing  Outcome: Progressing

## 2024-05-01 NOTE — PT/OT/SLP PROGRESS
Physical Therapy Treatment    Patient Name:  Jose Luis Manzo   MRN:  [id number]    Recommendations:     Discharge Recommendations: Moderate Intensity Therapy  Discharge Equipment Recommendations: shower chair  Barriers to discharge:  limited functional endurance    Assessment:     Jose Luis Manzo is a 74 y.o. male admitted with a medical diagnosis of Bacterial pneumonia.  He presents with the following impairments/functional limitations: weakness, impaired endurance, impaired functional mobility, gait instability, impaired balance, pain.    Pt participates in bed mobility, seated BLE therapeutic exercises, transfers to standing with short distance ambulation to bedside chair with CGA and no AD. Therapy will continue to progress pt as able.     Rehab Prognosis: Good; patient would benefit from acute skilled PT services to address these deficits and reach maximum level of function.    Recent Surgery: * No surgery found *      Plan:     During this hospitalization, patient to be seen 3 x/week to address the identified rehab impairments via gait training, therapeutic activities, therapeutic exercises, neuromuscular re-education and progress toward the following goals:    Plan of Care Expires:  05/25/24    Subjective     Chief Complaint: L hip soreness from being in bed  Patient/Family Comments/goals: not stated  Pain/Comfort:         Objective:     Communicated with Nurse prior to session.  Patient found HOB elevated with RT present finishing breathing treatment with telemetry upon PT entry to room.     General Precautions: Standard, fall, respiratory  Orthopedic Precautions: N/A  Braces: N/A  Respiratory Status: Room air     Functional Mobility:  Bed Mobility:     Supine to Sit: stand by assistance  Transfers:     Sit to Stand:  CGA/SBA with no AD  Bed to Chair: CGA/SBA with  no AD  using  Step Transfer  Gait: ~5ft from bed to bedside chair with no AD and CGA/SBA      AM-PAC 6 CLICK MOBILITY          Treatment &  Education:  Pt finishing breathing treatment with RT present upon PT entry.   Pt participates in seated BLE knee extension kicks x20 and hip flexion marches x10; with rest breaks as needed.   Pt declines ambulating out of room at this time but agreeable to transfer to bedside chair.   Pt educated to call for staff assistance with mobility in room; pt understanding of education provided.       Patient left up in chair with all lines intact, call button in reach, chair alarm on, and Nurse notified.    GOALS:   Multidisciplinary Problems       Physical Therapy Goals          Problem: Physical Therapy    Goal Priority Disciplines Outcome Goal Variances Interventions   Physical Therapy Goal     PT, PT/OT Progressing     Description: Goals to be met by: 2024     Patient will increase functional independence with mobility by performin. Sit to stand transfer with [place]  2. Bed to chair transfer with [place] using No Assistive Device  3. Gait  x 150 feet with [place] using No Assistive Device.   4. Stand for 20 minutes with [place] using No Assistive Device                         Time Tracking:     PT Received On: 24  PT Start Time: 1541     PT Stop Time: 1556  PT Total Time (min): 15 min     Billable Minutes: Therapeutic Activity 15    Treatment Type: Treatment  PT/PTA: PT     Number of PTA visits since last PT visit: 0     2024

## 2024-05-01 NOTE — SUBJECTIVE & OBJECTIVE
Interval History:  Awake, alert,   Reports frequent bowel movement-check electrolytes and replace    Hold discharge for now       Review of Systems   Constitutional:  Positive for activity change. Negative for fatigue.   Respiratory:  Negative for shortness of breath.    Gastrointestinal:  Negative for abdominal pain.   Genitourinary:  Negative for dysuria.   Neurological:  Negative for headaches.     Objective:     Vital Signs (Most Recent):  Temp: 98.3 °F (36.8 °C) (05/01/24 0723)  Pulse: 82 (05/01/24 0903)  Resp: 20 (05/01/24 0749)  BP: (!) 165/69 (05/01/24 0903)  SpO2: 95 % (05/01/24 0749) Vital Signs (24h Range):  Temp:  [98.3 °F (36.8 °C)-99.2 °F (37.3 °C)] 98.3 °F (36.8 °C)  Pulse:  [72-88] 82  Resp:  [17-22] 20  SpO2:  [92 %-99 %] 95 %  BP: (159-174)/(69-90) 165/69     Weight: 109.4 kg (241 lb 2.9 oz)  Body mass index is 34.61 kg/m².    Intake/Output Summary (Last 24 hours) at 5/1/2024 1044  Last data filed at 5/1/2024 0830  Gross per 24 hour   Intake 697.96 ml   Output 1050 ml   Net -352.04 ml         Physical Exam  Vitals and nursing note reviewed.   Constitutional:       General: He is not in acute distress.     Appearance: He is well-developed. He is obese.   HENT:      Head: Normocephalic and atraumatic.   Neck:      Vascular: No JVD.   Cardiovascular:      Rate and Rhythm: Normal rate and regular rhythm.      Heart sounds: Normal heart sounds.   Pulmonary:      Effort: Pulmonary effort is normal.      Breath sounds: Normal breath sounds.   Abdominal:      General: Bowel sounds are normal. There is no distension.      Palpations: Abdomen is soft.      Tenderness: There is no abdominal tenderness.   Musculoskeletal:      Cervical back: Neck supple.      Right lower leg: No edema.      Left lower leg: No edema.   Neurological:      Mental Status: He is alert.   Psychiatric:         Behavior: Behavior normal.           Significant Labs: A1C:   Recent Labs   Lab 02/07/24  0829   HGBA1C 6.6*     ABGs: No  "results for input(s): "PH", "PCO2", "HCO3", "POCSATURATED", "BE", "TOTALHB", "COHB", "METHB", "O2HB", "POCFIO2", "PO2" in the last 48 hours.  Blood Culture: No results for input(s): "LABBLOO" in the last 48 hours.  CBC:   No results for input(s): "WBC", "HGB", "HCT", "PLT" in the last 48 hours.    CMP:   Recent Labs   Lab 04/30/24  0443 05/01/24  0455    141   K 3.1* 3.6    110   CO2 19* 18*    92   BUN 42* 38*   CREATININE 3.4* 3.6*   CALCIUM 10.0 10.2   ALBUMIN 2.4* 2.4*   ANIONGAP 12 13     Cardiac Markers: No results for input(s): "CKMB", "MYOGLOBIN", "BNP", "TROPISTAT" in the last 48 hours.  Lipase: No results for input(s): "LIPASE" in the last 48 hours.  Lipid Panel: No results for input(s): "CHOL", "HDL", "LDLCALC", "TRIG", "CHOLHDL" in the last 48 hours.  Magnesium:   No results for input(s): "MG" in the last 48 hours.    Troponin: No results for input(s): "TROPONINI", "TROPONINIHS" in the last 48 hours.  TSH:   Recent Labs   Lab 04/27/24  0433   TSH 0.840     Urine Culture: No results for input(s): "LABURIN" in the last 48 hours.  Urine Studies: No results for input(s): "COLORU", "APPEARANCEUA", "PHUR", "SPECGRAV", "PROTEINUA", "GLUCUA", "KETONESU", "BILIRUBINUA", "OCCULTUA", "NITRITE", "UROBILINOGEN", "LEUKOCYTESUR", "RBCUA", "WBCUA", "BACTERIA", "SQUAMEPITHEL", "HYALINECASTS" in the last 48 hours.    Invalid input(s): "WRIGHTSUR"    Significant Imaging: I have reviewed all pertinent imaging results/findings within the past 24 hours.  "

## 2024-05-01 NOTE — PROGRESS NOTES
[facility]  Progress Note    Patient Name: Jose Luis Manzo  MRN: [id number]  Patient Class: IP- Inpatient   Admission Date: 4/26/2024  Length of Stay: 5 days  Attending Physician: Doris Patton*  Primary Care Provider: Home Alexis MD        Subjective:     Principal Problem:Bacterial pneumonia        HPI:  Very pleasant 73 yo F presents with acute on chronic progressive dyspnea , hacking cough , afebrile , denied being on home oxygen , no sick contacts or travel, but is a former smoker.  Denied chest pain wheezing intermittent claudication. Recent abx use , lung disease prior. 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     Overview/Hospital Course:  No notes on file    Interval History:  Awake, alert,   Reports frequent bowel movement-check electrolytes and replace    Hold discharge for now       Review of Systems   Constitutional:  Positive for activity change. Negative for fatigue.   Respiratory:  Negative for shortness of breath.    Gastrointestinal:  Negative for abdominal pain.   Genitourinary:  Negative for dysuria.   Neurological:  Negative for headaches.     Objective:     Vital Signs (Most Recent):  Temp: 98.3 °F (36.8 °C) (05/01/24 0723)  Pulse: 82 (05/01/24 0903)  Resp: 20 (05/01/24 0749)  BP: (!) 165/69 (05/01/24 0903)  SpO2: 95 % (05/01/24 0749) Vital Signs (24h Range):  Temp:  [98.3 °F (36.8 °C)-99.2 °F (37.3 °C)] 98.3 °F (36.8 °C)  Pulse:  [72-88] 82  Resp:  [17-22] 20  SpO2:  [92 %-99 %] 95 %  BP: (159-174)/(69-90) 165/69     Weight: 109.4 kg (241 lb 2.9 oz)  Body mass index is 34.61 kg/m².    Intake/Output Summary (Last 24 hours) at 5/1/2024 1044  Last data filed at 5/1/2024 0830  Gross per 24 hour   Intake 697.96 ml   Output 1050 ml   Net -352.04 ml         Physical Exam  Vitals and nursing note reviewed.   Constitutional:       General: He is not in acute distress.      "Appearance: He is well-developed. He is obese.   HENT:      Head: Normocephalic and atraumatic.   Neck:      Vascular: No JVD.   Cardiovascular:      Rate and Rhythm: Normal rate and regular rhythm.      Heart sounds: Normal heart sounds.   Pulmonary:      Effort: Pulmonary effort is normal.      Breath sounds: Normal breath sounds.   Abdominal:      General: Bowel sounds are normal. There is no distension.      Palpations: Abdomen is soft.      Tenderness: There is no abdominal tenderness.   Musculoskeletal:      Cervical back: Neck supple.      Right lower leg: No edema.      Left lower leg: No edema.   Neurological:      Mental Status: He is alert.   Psychiatric:         Behavior: Behavior normal.           Significant Labs: A1C:   Recent Labs   Lab 02/07/24  0829   HGBA1C 6.6*     ABGs: No results for input(s): "PH", "PCO2", "HCO3", "POCSATURATED", "BE", "TOTALHB", "COHB", "METHB", "O2HB", "POCFIO2", "PO2" in the last 48 hours.  Blood Culture: No results for input(s): "LABBLOO" in the last 48 hours.  CBC:   No results for input(s): "WBC", "HGB", "HCT", "PLT" in the last 48 hours.    CMP:   Recent Labs   Lab 04/30/24  0443 05/01/24  0455    141   K 3.1* 3.6    110   CO2 19* 18*    92   BUN 42* 38*   CREATININE 3.4* 3.6*   CALCIUM 10.0 10.2   ALBUMIN 2.4* 2.4*   ANIONGAP 12 13     Cardiac Markers: No results for input(s): "CKMB", "MYOGLOBIN", "BNP", "TROPISTAT" in the last 48 hours.  Lipase: No results for input(s): "LIPASE" in the last 48 hours.  Lipid Panel: No results for input(s): "CHOL", "HDL", "LDLCALC", "TRIG", "CHOLHDL" in the last 48 hours.  Magnesium:   No results for input(s): "MG" in the last 48 hours.    Troponin: No results for input(s): "TROPONINI", "TROPONINIHS" in the last 48 hours.  TSH:   Recent Labs   Lab 04/27/24  0433   TSH 0.840     Urine Culture: No results for input(s): "LABURIN" in the last 48 hours.  Urine Studies: No results for input(s): "COLORU", "APPEARANCEUA", " ""PHUR", "SPECGRAV", "PROTEINUA", "GLUCUA", "KETONESU", "BILIRUBINUA", "OCCULTUA", "NITRITE", "UROBILINOGEN", "LEUKOCYTESUR", "RBCUA", "WBCUA", "BACTERIA", "SQUAMEPITHEL", "HYALINECASTS" in the last 48 hours.    Invalid input(s): "WRIGHTSUR"    Significant Imaging: I have reviewed all pertinent imaging results/findings within the past 24 hours.    Assessment/Plan:      * Bacterial pneumonia  Chest xray with RLL opacities suggestive of pneumonia. Initiated on vanc/zosyn/azithro, hydrocortisone. Cultures pending. Continue supplemental O2    Of note, leukocytosis likely from steroid use.    Acute respiratory failure with hypoxia  Patient with Hypoxic Respiratory failure which is Acute.  he is not on home oxygen. Supplemental oxygen was provided and noted- Oxygen Concentration (%):  [3.5-40] 28    Signs/symptoms of respiratory failure include-  continued O2 requirement . Contributing diagnoses includes - Aspiration and Pneumonia Labs and images were reviewed. Patient Has recent ABG, which has been reviewed. Will treat underlying causes and adjust management of respiratory failure as follows- abx/ DUONEB/ IS/ Breo ellipta/ steroids     Class 1 obesity due to excess calories with serious comorbidity and body mass index (BMI) of 34.0 to 34.9 in adult  Body mass index is 34.48 kg/m². Obesity complicates all aspects of disease management from diagnostic modalities to treatment.          Immunosuppressive management encounter following kidney transplant        History of DVT (deep vein thrombosis)  On Eliquis      Renal transplant recipient  Continue Sirolimus and Prednisone        Mixed hyperlipidemia  Continue statin       Essential hypertension  Chronic, uncontrolled. Latest blood pressure and vitals reviewed-     Temp:  [96 °F (35.6 °C)-98.7 °F (37.1 °C)]   Pulse:  []   Resp:  [16-22]   BP: (138-168)/(68-95)   SpO2:  [93 %-100 %] .   Home meds for hypertension were reviewed and noted below.   Hypertension Medications "               cloNIDine 0.3 mg/24 hr td ptwk (CATAPRES) 0.3 mg/24 hr PLACE 1 PATCH ONTO THE SKIN ONCE A WEEK.    furosemide (LASIX) 40 MG tablet TAKE 1 TABLET EVERY DAY    losartan (COZAAR) 25 MG tablet Take 1 tablet (25 mg total) by mouth once daily.    metoprolol succinate (TOPROL-XL) 50 MG 24 hr tablet TAKE 1 TABLET EVERY DAY    nitroGLYCERIN (NITROSTAT) 0.4 MG SL tablet Place 1 tablet (0.4 mg total) under the tongue every 5 (five) minutes as needed for Chest pain.            While in the hospital, will manage blood pressure as follows; Adjust home antihypertensive regimen as follows- continue metoprolol, start hydralazine. Trend.     Will utilize p.r.n. blood pressure medication only if patient's blood pressure greater than 180/110 and he develops symptoms such as worsening chest pain or shortness of breath.    Stage 4 chronic kidney disease  Creatine stable for now. BMP reviewed- noted Estimated Creatinine Clearance: 23.3 mL/min (A) (based on SCr of 3.4 mg/dL (H)). according to latest data. Based on current GFR, CKD stage is stage 4 - GFR 15-29.  Monitor UOP and serial BMP and adjust therapy as needed. Renally dose meds. Avoid nephrotoxic medications and procedures.  At baseline, consulted nephrology , am labs, avoid nephrotoxins      VTE Risk Mitigation (From admission, onward)           Ordered     apixaban tablet 2.5 mg  2 times daily         04/26/24 1115                    Discharge Planning   LAVERNE: 5/2/2024     Code Status: Full Code   Is the patient medically ready for discharge?:     Reason for patient still in hospital (select all that apply): Patient trending condition  Discharge Plan A: Home Health                  Doris Patton MD  Department of Hospital Medicine   [facility]

## 2024-05-01 NOTE — RESPIRATORY THERAPY
Pt. on BiPAP with documented settings. Alarms on and functioning properly. Pt appears to be in no respiratory distress. Will continue to monitor

## 2024-05-01 NOTE — PLAN OF CARE
Problem: Adult Inpatient Plan of Care  Goal: Plan of Care Review  Outcome: Progressing  Chart check complete. Vitals, orders, labs, and progress notes reviewed. Care plan updated. Will monitor.

## 2024-05-01 NOTE — PROGRESS NOTES
Progress Note  Nephrology      Consult Requested By: Doris Patton N*      SUBJECTIVE:     Overnight events  Patient is a 74 y.o. male     Patient Active Problem List   Diagnosis    Stage 4 chronic kidney disease    Essential hypertension    Mixed hyperlipidemia    Benign prostatic hyperplasia with nocturia    Claudication of right lower extremity    Eye swelling, bilateral    Diminished night vision    Renal transplant recipient    Dermolipoma    History of DVT (deep vein thrombosis)    Iron deficiency anemia    Pulmonary nodules    Larynx cancer    Voice disturbance    Atherosclerosis of aorta    Carcinoma in situ of vocal cord    Dysphagia, pharyngoesophageal    Chondronecrosis of larynx    Squamous cell carcinoma    Obesity (BMI 30-39.9)    Pterygium of both eyes    History of cancer of larynx    Nodule of left lung    Granulomatous lung disease    Transplanted kidney - 2007    Hypertensive kidney disease with stage 4 chronic kidney disease    Hypercalcemia    Secondary hyperparathyroidism of renal origin    Benign tumor of orbit    Metabolic bone disease    Immunosuppressive management encounter following kidney transplant    Hyperphosphatemia    Vitamin D deficiency disease    History of COVID-19    SOB (shortness of breath)    Metabolic acidosis    Proteinuria    Anticoagulant long-term use    Pre-op evaluation    Benign prostatic hyperplasia with lower urinary tract symptoms    Centrilobular emphysema    Dependence on renal dialysis    Class 1 obesity due to excess calories with serious comorbidity and body mass index (BMI) of 34.0 to 34.9 in adult    Former cigarette smoker    Deformity of toenail    Bacterial pneumonia    Acute respiratory failure with hypoxia     Past Medical History:   Diagnosis Date    Acute hypoxemic respiratory failure due to COVID-19 12/30/2020    Anticoagulant long-term use     BPH (benign prostatic hypertrophy)     Cancer     vocal cords    Claudication of right lower  extremity 3/10/2016    COVID-19 virus detected 12/30/2020    Dependence on renal dialysis 4/18/2023    Disorder of kidney and ureter     Hyperkalemia 1/1/2021    Hyperlipidemia     Hypertension     Hypokalemia 6/23/2021    Immunosuppression 6/26/2021    Immunosuppression due to chronic steroid use 1/30/2020    Microcytic anemia 9/16/2016    Obesity (BMI 30-39.9) 1/23/2019    Oropharyngeal cancer     Pterygium     Squamous cell carcinoma 4/25/2018    Thromboembolic disorder     Unspecified disorder of kidney and ureter               OBJECTIVE:     Vitals:    05/01/24 0439 05/01/24 0723 05/01/24 0749 05/01/24 0903   BP:  (!) 165/69  (!) 165/69   BP Location:       Patient Position:       Pulse: 80 72 82 82   Resp:  20 20    Temp:  98.3 °F (36.8 °C)     TempSrc:       SpO2:  96% 95%    Weight:       Height:           Temp: 98.3 °F (36.8 °C) (05/01/24 0723)  Pulse: 82 (05/01/24 0903)  Resp: 20 (05/01/24 0749)  BP: (!) 165/69 (05/01/24 0903)  SpO2: 95 % (05/01/24 0749)    Date 05/01/24 0700 - 05/02/24 0659   Shift 7121-7552 2317-6219 6716-1166 24 Hour Total   INTAKE   Shift Total(mL/kg)       OUTPUT   Urine(mL/kg/hr) 200   200   Shift Total(mL/kg) 200(1.8)   200(1.8)   Weight (kg) 109.4 109.4 109.4 109.4             Medications:  Current Facility-Administered Medications   Medication Dose Route Frequency    albuterol-ipratropium  3 mL Nebulization Q4H WAKE    apixaban  2.5 mg Oral BID    atorvastatin  20 mg Oral QHS    ferrous sulfate  1 tablet Oral BID    fluticasone furoate-vilanteroL  1 puff Inhalation Daily    hydrALAZINE  100 mg Oral Q8H    Lactobacillus acidoph-L.bulgar  1 tablet Oral Daily    metoprolol succinate  100 mg Oral Daily    mupirocin   Nasal TID    piperacillin-tazobactam (Zosyn) IV (PEDS and ADULTS) (extended infusion is not appropriate)  4.5 g Intravenous Q8H    predniSONE  10 mg Oral Daily    sirolimus  3 mg Oral Daily    sodium bicarbonate  1,300 mg Oral QID     Current Facility-Administered  Medications   Medication Dose Route Frequency Last Rate Last Admin     Physical Exam:  General appearance:NAD  Weakness  Poor intake  Lungs: RR 20  Pulse oximeter 95 % O2  Heart: Pulse 82  Abdomen: soft  Extremities: no edema   Skin: dry    Laboratory:  ABG  Labs reviewed  Recent Results (from the past 336 hour(s))   Basic Metabolic Panel (BMP)    Collection Time: 04/29/24  4:48 AM   Result Value Ref Range    Sodium 141 136 - 145 mmol/L    Potassium 3.2 (L) 3.5 - 5.1 mmol/L    Chloride 110 95 - 110 mmol/L    CO2 18 (L) 23 - 29 mmol/L    BUN 47 (H) 8 - 23 mg/dL    Creatinine 3.4 (H) 0.5 - 1.4 mg/dL    Calcium 10.1 8.7 - 10.5 mg/dL    Anion Gap 13 8 - 16 mmol/L   Basic Metabolic Panel (BMP)    Collection Time: 04/28/24  4:33 AM   Result Value Ref Range    Sodium 140 136 - 145 mmol/L    Potassium 5.0 3.5 - 5.1 mmol/L    Chloride 113 (H) 95 - 110 mmol/L    CO2 13 (L) 23 - 29 mmol/L    BUN 49 (H) 8 - 23 mg/dL    Creatinine 3.6 (H) 0.5 - 1.4 mg/dL    Calcium 9.7 8.7 - 10.5 mg/dL    Anion Gap 14 8 - 16 mmol/L   Basic Metabolic Panel (BMP)    Collection Time: 04/27/24  4:32 AM   Result Value Ref Range    Sodium 138 136 - 145 mmol/L    Potassium 4.7 3.5 - 5.1 mmol/L    Chloride 110 95 - 110 mmol/L    CO2 18 (L) 23 - 29 mmol/L    BUN 38 (H) 8 - 23 mg/dL    Creatinine 3.6 (H) 0.5 - 1.4 mg/dL    Calcium 9.1 8.7 - 10.5 mg/dL    Anion Gap 10 8 - 16 mmol/L     Recent Results (from the past 336 hour(s))   CBC auto differential    Collection Time: 04/29/24  4:48 AM   Result Value Ref Range    WBC 18.62 (H) 3.90 - 12.70 K/uL    Hemoglobin 12.0 (L) 14.0 - 18.0 g/dL    Hematocrit 37.4 (L) 40.0 - 54.0 %    Platelets 102 (L) 150 - 450 K/uL   CBC Auto Differential    Collection Time: 04/27/24  4:33 AM   Result Value Ref Range    WBC 12.99 (H) 3.90 - 12.70 K/uL    Hemoglobin 11.5 (L) 14.0 - 18.0 g/dL    Hematocrit 37.2 (L) 40.0 - 54.0 %    Platelets 122 (L) 150 - 450 K/uL   CBC auto differential    Collection Time: 04/26/24  9:25 AM  "  Result Value Ref Range    WBC 10.56 3.90 - 12.70 K/uL    Hemoglobin 13.3 (L) 14.0 - 18.0 g/dL    Hematocrit 43.0 40.0 - 54.0 %    Platelets 154 150 - 450 K/uL     Urinalysis  No results for input(s): "COLORU", "CLARITYU", "SPECGRAV", "PHUR", "PROTEINUA", "GLUCOSEU", "BILIRUBINCON", "BLOODU", "WBCU", "RBCU", "BACTERIA", "MUCUS", "NITRITE", "LEUKOCYTESUR", "UROBILINOGEN", "HYALINECASTS" in the last 24 hours.    Diagnostic Results:  X-Ray: Reviewed  US: Reviewed  Echo: Reviewed  ACCESS    ASSESSMENT/PLAN:     CKD 4  Kidney transplant 2007  Prednisone 5  Sirolimus 3  UA protein 2+, blood 2+, RBC 10   UA protein/creatinine 1.29  Creatinine 3.6- 3.4- 3.6  GFR 17 - 18 cc/min  BUN 38- 49- 47- 42- 38  K 5- 3.2- 3.1- 3.6  Metabolic acidosis  Sodium bicarbonate  Metabolic bone disease    Phos 2.3- 2.5  Magnesium 1.6 - 2.3  Replace prn  Anemia multifactorial  Hb 11.5- 12  Iron 15  Sat iron 11  Renal cap  Poor nutrition   Albumin 2.5- 2.4  Hypertension   Blood pressure 153/84, 159/86, 154/87, 171/90, 165/69  Weight daily  I and O  Avoid nephrotoxic agents, hypotension, hypovolemia  Renal diet as tolerated  PT  "

## 2024-05-02 ENCOUNTER — TELEPHONE (OUTPATIENT)
Dept: FAMILY MEDICINE | Facility: CLINIC | Age: 74
End: 2024-05-02
Payer: MEDICARE

## 2024-05-02 LAB
ALBUMIN SERPL BCP-MCNC: 2.3 G/DL (ref 3.5–5.2)
ANION GAP SERPL CALC-SCNC: 13 MMOL/L (ref 8–16)
BUN SERPL-MCNC: 37 MG/DL (ref 8–23)
CALCIUM SERPL-MCNC: 10 MG/DL (ref 8.7–10.5)
CHLORIDE SERPL-SCNC: 111 MMOL/L (ref 95–110)
CO2 SERPL-SCNC: 17 MMOL/L (ref 23–29)
CREAT SERPL-MCNC: 3.6 MG/DL (ref 0.5–1.4)
EST. GFR  (NO RACE VARIABLE): 17 ML/MIN/1.73 M^2
GLUCOSE SERPL-MCNC: 104 MG/DL (ref 70–110)
PHOSPHATE SERPL-MCNC: 3.1 MG/DL (ref 2.7–4.5)
POTASSIUM SERPL-SCNC: 3.5 MMOL/L (ref 3.5–5.1)
SODIUM SERPL-SCNC: 141 MMOL/L (ref 136–145)

## 2024-05-02 PROCEDURE — 97530 THERAPEUTIC ACTIVITIES: CPT

## 2024-05-02 PROCEDURE — 94799 UNLISTED PULMONARY SVC/PX: CPT

## 2024-05-02 PROCEDURE — 63600175 PHARM REV CODE 636 W HCPCS: Performed by: FAMILY MEDICINE

## 2024-05-02 PROCEDURE — 25000003 PHARM REV CODE 250: Performed by: FAMILY MEDICINE

## 2024-05-02 PROCEDURE — 25000003 PHARM REV CODE 250: Performed by: INTERNAL MEDICINE

## 2024-05-02 PROCEDURE — 63600175 PHARM REV CODE 636 W HCPCS: Performed by: INTERNAL MEDICINE

## 2024-05-02 PROCEDURE — 94761 N-INVAS EAR/PLS OXIMETRY MLT: CPT

## 2024-05-02 PROCEDURE — 94660 CPAP INITIATION&MGMT: CPT

## 2024-05-02 PROCEDURE — 97535 SELF CARE MNGMENT TRAINING: CPT

## 2024-05-02 PROCEDURE — 99900035 HC TECH TIME PER 15 MIN (STAT)

## 2024-05-02 PROCEDURE — 36415 COLL VENOUS BLD VENIPUNCTURE: CPT | Performed by: STUDENT IN AN ORGANIZED HEALTH CARE EDUCATION/TRAINING PROGRAM

## 2024-05-02 PROCEDURE — 80069 RENAL FUNCTION PANEL: CPT | Performed by: STUDENT IN AN ORGANIZED HEALTH CARE EDUCATION/TRAINING PROGRAM

## 2024-05-02 PROCEDURE — 25000242 PHARM REV CODE 250 ALT 637 W/ HCPCS: Performed by: FAMILY MEDICINE

## 2024-05-02 PROCEDURE — 94640 AIRWAY INHALATION TREATMENT: CPT

## 2024-05-02 PROCEDURE — 25000242 PHARM REV CODE 250 ALT 637 W/ HCPCS: Performed by: INTERNAL MEDICINE

## 2024-05-02 PROCEDURE — 21400001 HC TELEMETRY ROOM

## 2024-05-02 RX ORDER — METOPROLOL SUCCINATE 100 MG/1
100 TABLET, EXTENDED RELEASE ORAL DAILY
Qty: 90 TABLET | Refills: 3 | Status: SHIPPED | OUTPATIENT
Start: 2024-05-03 | End: 2024-05-04

## 2024-05-02 RX ORDER — HYDRALAZINE HYDROCHLORIDE 100 MG/1
100 TABLET, FILM COATED ORAL EVERY 8 HOURS
Qty: 90 TABLET | Refills: 11 | Status: SHIPPED | OUTPATIENT
Start: 2024-05-02 | End: 2024-05-04

## 2024-05-02 RX ORDER — CLONIDINE 0.3 MG/24H
1 PATCH, EXTENDED RELEASE TRANSDERMAL
Status: DISCONTINUED | OUTPATIENT
Start: 2024-05-02 | End: 2024-05-04 | Stop reason: HOSPADM

## 2024-05-02 RX ORDER — FLUTICASONE FUROATE AND VILANTEROL 100; 25 UG/1; UG/1
1 POWDER RESPIRATORY (INHALATION) DAILY
Qty: 60 EACH | Refills: 5 | Status: SHIPPED | OUTPATIENT
Start: 2024-05-02 | End: 2024-05-04

## 2024-05-02 RX ORDER — LOPERAMIDE HYDROCHLORIDE 2 MG/1
2 CAPSULE ORAL 4 TIMES DAILY PRN
Qty: 10 CAPSULE | Refills: 0 | Status: SHIPPED | OUTPATIENT
Start: 2024-05-02 | End: 2024-05-04

## 2024-05-02 RX ORDER — CIPROFLOXACIN 500 MG/1
500 TABLET ORAL DAILY
Qty: 10 TABLET | Refills: 0 | Status: SHIPPED | OUTPATIENT
Start: 2024-05-02 | End: 2024-05-04

## 2024-05-02 RX ADMIN — MUPIROCIN: 20 OINTMENT TOPICAL at 05:05

## 2024-05-02 RX ADMIN — MUPIROCIN: 20 OINTMENT TOPICAL at 08:05

## 2024-05-02 RX ADMIN — LACTOBACILLUS TAB 1 TABLET: TAB at 09:05

## 2024-05-02 RX ADMIN — POTASSIUM BICARBONATE 10 MEQ: 391 TABLET, EFFERVESCENT ORAL at 08:05

## 2024-05-02 RX ADMIN — IPRATROPIUM BROMIDE AND ALBUTEROL SULFATE 3 ML: .5; 3 SOLUTION RESPIRATORY (INHALATION) at 01:05

## 2024-05-02 RX ADMIN — IPRATROPIUM BROMIDE AND ALBUTEROL SULFATE 3 ML: 2.5; .5 SOLUTION RESPIRATORY (INHALATION) at 03:05

## 2024-05-02 RX ADMIN — FERROUS SULFATE TAB 325 MG (65 MG ELEMENTAL FE) 1 EACH: 325 (65 FE) TAB at 09:05

## 2024-05-02 RX ADMIN — FERROUS SULFATE TAB 325 MG (65 MG ELEMENTAL FE) 1 EACH: 325 (65 FE) TAB at 08:05

## 2024-05-02 RX ADMIN — SIROLIMUS 3 MG: 1 TABLET ORAL at 09:05

## 2024-05-02 RX ADMIN — SODIUM BICARBONATE 650 MG TABLET 1300 MG: at 08:05

## 2024-05-02 RX ADMIN — FLUTICASONE FUROATE AND VILANTEROL TRIFENATATE 1 PUFF: 100; 25 POWDER RESPIRATORY (INHALATION) at 07:05

## 2024-05-02 RX ADMIN — SODIUM BICARBONATE 650 MG TABLET 1300 MG: at 09:05

## 2024-05-02 RX ADMIN — SODIUM BICARBONATE 650 MG TABLET 1300 MG: at 05:05

## 2024-05-02 RX ADMIN — CIPROFLOXACIN 500 MG: 500 TABLET ORAL at 09:05

## 2024-05-02 RX ADMIN — Medication 1 CAPSULE: at 09:05

## 2024-05-02 RX ADMIN — IPRATROPIUM BROMIDE AND ALBUTEROL SULFATE 3 ML: 2.5; .5 SOLUTION RESPIRATORY (INHALATION) at 12:05

## 2024-05-02 RX ADMIN — ATORVASTATIN CALCIUM 20 MG: 20 TABLET, FILM COATED ORAL at 08:05

## 2024-05-02 RX ADMIN — IPRATROPIUM BROMIDE AND ALBUTEROL SULFATE 3 ML: 2.5; .5 SOLUTION RESPIRATORY (INHALATION) at 08:05

## 2024-05-02 RX ADMIN — HYDRALAZINE HYDROCHLORIDE 100 MG: 25 TABLET, FILM COATED ORAL at 09:05

## 2024-05-02 RX ADMIN — SODIUM BICARBONATE 650 MG TABLET 1300 MG: at 01:05

## 2024-05-02 RX ADMIN — HYDRALAZINE HYDROCHLORIDE 100 MG: 25 TABLET, FILM COATED ORAL at 01:05

## 2024-05-02 RX ADMIN — METOPROLOL SUCCINATE 100 MG: 50 TABLET, EXTENDED RELEASE ORAL at 09:05

## 2024-05-02 RX ADMIN — HYDRALAZINE HYDROCHLORIDE 100 MG: 25 TABLET, FILM COATED ORAL at 06:05

## 2024-05-02 RX ADMIN — CLONIDINE 1 PATCH: 0.3 PATCH TRANSDERMAL at 01:05

## 2024-05-02 RX ADMIN — IPRATROPIUM BROMIDE AND ALBUTEROL SULFATE 3 ML: 2.5; .5 SOLUTION RESPIRATORY (INHALATION) at 07:05

## 2024-05-02 RX ADMIN — APIXABAN 2.5 MG: 2.5 TABLET, FILM COATED ORAL at 09:05

## 2024-05-02 RX ADMIN — MUPIROCIN: 20 OINTMENT TOPICAL at 09:05

## 2024-05-02 RX ADMIN — PREDNISONE 10 MG: 10 TABLET ORAL at 09:05

## 2024-05-02 RX ADMIN — APIXABAN 2.5 MG: 2.5 TABLET, FILM COATED ORAL at 08:05

## 2024-05-02 NOTE — PLAN OF CARE
Problem: Occupational Therapy  Goal: Occupational Therapy Goal  Description: Goals to be met by: 05/27     Patient will increase functional independence with ADLs by performing:    UE Dressing with Supervision.  LE Dressing with Supervision.  Grooming while standing at sink with Supervision.  Toileting from toilet with Supervision for hygiene and clothing management.   Toilet transfer to toilet with Supervision.  Increased functional strength to WFL for ADLs.    Outcome: Progressing   Pt w/ audible mouth breathing at rest in SF & agreeable to OT this date.  Pt c/o 5/10 L hip pain & perf the following:  -sup-->EOB via bed rail w/ MI  -standing w/o DME w/ S-SBA for short distance fxnl mobility w/in room w/ S-SBA  -G/H standing at sink w/ S-SBA, but only able to tolerate ~1.5-2 min static standing before requiring sitting rest break (x ~1min w/ deep/PLBing) 2/2 SPEARS & impaired standing tolerance/overall endurance; G/H tasks completed at EOB w/ Sup  -t/f w/o DME-->b/s chair w/ SBA  Edu/tx re: deep/PLBing, general safety techs & HEP. Pt verbalized understanding.  Pt left [facility] w/ alarm & nsg made aware.    Pt cont w/ respiratory, overall endurance deficits & would greatly benefit from cont OT svcs at a post acute facility. Pt w/ good potential for cont improvements w/ cont OT per POC.

## 2024-05-02 NOTE — PLAN OF CARE
Problem: Adult Inpatient Plan of Care  Goal: Plan of Care Review  Outcome: Progressing     Problem: Adult Inpatient Plan of Care  Goal: Optimal Comfort and Wellbeing  Outcome: Progressing     Problem: Adult Inpatient Plan of Care  Goal: Readiness for Transition of Care  Outcome: Progressing     Problem: Pneumonia  Goal: Fluid Balance  Outcome: Progressing     Problem: Pneumonia  Goal: Resolution of Infection Signs and Symptoms  Outcome: Progressing     Problem: Pneumonia  Goal: Effective Oxygenation and Ventilation  Outcome: Progressing     Problem: Pulmonary Impairment  Goal: Improved Activity Tolerance  Outcome: Progressing     Problem: Fall Injury Risk  Goal: Absence of Fall and Fall-Related Injury  Outcome: Progressing

## 2024-05-02 NOTE — SUBJECTIVE & OBJECTIVE
Interval History:  Awake, alert, sitting up on the chair  Patient is still struggling with physical therapy-discuss placement options is presently open to it  Will let cm no for placement options      Hold discharge for now pending placement      Review of Systems   Constitutional:  Positive for activity change. Negative for fatigue.   Respiratory:  Negative for shortness of breath.    Gastrointestinal:  Negative for abdominal pain.   Genitourinary:  Negative for dysuria.   Neurological:  Negative for headaches.     Objective:     Vital Signs (Most Recent):  Temp: 98.5 °F (36.9 °C) (05/02/24 1134)  Pulse: 84 (05/02/24 1200)  Resp: 18 (05/02/24 1200)  BP: (!) 174/86 (05/02/24 1134)  SpO2: 95 % (05/02/24 1200) Vital Signs (24h Range):  Temp:  [97.6 °F (36.4 °C)-98.7 °F (37.1 °C)] 98.5 °F (36.9 °C)  Pulse:  [75-86] 84  Resp:  [18-24] 18  SpO2:  [95 %-98 %] 95 %  BP: (136-174)/(73-93) 174/86     Weight: 106.7 kg (235 lb 3.7 oz)  Body mass index is 33.75 kg/m².    Intake/Output Summary (Last 24 hours) at 5/2/2024 1216  Last data filed at 5/2/2024 0653  Gross per 24 hour   Intake 538.11 ml   Output 575 ml   Net -36.89 ml         Physical Exam  Vitals and nursing note reviewed.   Constitutional:       General: He is not in acute distress.     Appearance: He is well-developed. He is obese.   HENT:      Head: Normocephalic and atraumatic.   Neck:      Vascular: No JVD.   Cardiovascular:      Rate and Rhythm: Normal rate and regular rhythm.      Heart sounds: Normal heart sounds.   Pulmonary:      Effort: Pulmonary effort is normal.      Breath sounds: Normal breath sounds.   Abdominal:      General: Bowel sounds are normal. There is no distension.      Palpations: Abdomen is soft.      Tenderness: There is no abdominal tenderness.   Musculoskeletal:      Cervical back: Neck supple.      Right lower leg: No edema.      Left lower leg: No edema.   Neurological:      Mental Status: He is alert.   Psychiatric:         Behavior:  "Behavior normal.             Significant Labs: A1C:   Recent Labs   Lab 02/07/24  0829   HGBA1C 6.6*     ABGs: No results for input(s): "PH", "PCO2", "HCO3", "POCSATURATED", "BE", "TOTALHB", "COHB", "METHB", "O2HB", "POCFIO2", "PO2" in the last 48 hours.  Blood Culture: No results for input(s): "LABBLOO" in the last 48 hours.  CBC:   Recent Labs   Lab 05/01/24  1141   WBC 10.19   HGB 11.3*   HCT 35.0*   *       CMP:   Recent Labs   Lab 05/01/24  0455 05/01/24  1141 05/02/24  0515    139 141   K 3.6 3.7 3.5    111* 111*   CO2 18* 17* 17*   GLU 92 128* 104   BUN 38* 36* 37*   CREATININE 3.6* 3.6* 3.6*   CALCIUM 10.2 10.0 10.0   ALBUMIN 2.4*  --  2.3*   ANIONGAP 13 11 13     Cardiac Markers: No results for input(s): "CKMB", "MYOGLOBIN", "BNP", "TROPISTAT" in the last 48 hours.  Lipase: No results for input(s): "LIPASE" in the last 48 hours.  Lipid Panel: No results for input(s): "CHOL", "HDL", "LDLCALC", "TRIG", "CHOLHDL" in the last 48 hours.  Magnesium:   No results for input(s): "MG" in the last 48 hours.    Troponin: No results for input(s): "TROPONINI", "TROPONINIHS" in the last 48 hours.  TSH:   Recent Labs   Lab 04/27/24  0433   TSH 0.840     Urine Culture: No results for input(s): "LABURIN" in the last 48 hours.  Urine Studies: No results for input(s): "COLORU", "APPEARANCEUA", "PHUR", "SPECGRAV", "PROTEINUA", "GLUCUA", "KETONESU", "BILIRUBINUA", "OCCULTUA", "NITRITE", "UROBILINOGEN", "LEUKOCYTESUR", "RBCUA", "WBCUA", "BACTERIA", "SQUAMEPITHEL", "HYALINECASTS" in the last 48 hours.    Invalid input(s): "WRIGHTSUR"    Significant Imaging: I have reviewed all pertinent imaging results/findings within the past 24 hours.  "

## 2024-05-02 NOTE — PT/OT/SLP PROGRESS
Occupational Therapy   Treatment    Name: Jose Luis Manzo  MRN: [id number]  Admitting Diagnosis:  Bacterial pneumonia       Recommendations:     Discharge Recommendations: Moderate Intensity Therapy  Discharge Equipment Recommendations:  shower chair  Barriers to discharge:  Decreased caregiver support, Other (Comment) (increased burden of care)    Assessment:      Pt cont w/ respiratory, overall endurance deficits & would greatly benefit from cont OT svcs at a post acute facility. Pt w/ good potential for cont improvements w/ cont OT per POC.    Jose Luis Manzo is a 74 y.o. male with a medical diagnosis of Bacterial pneumonia.  He presents with performance deficits affecting function are weakness, impaired endurance, impaired self care skills, gait instability, pain, impaired cardiopulmonary response to activity.     Rehab Prognosis:  Fair; patient would benefit from acute skilled OT services to address these deficits and reach maximum level of function.       Plan:     Patient to be seen 5 x/week to address the above listed problems via self-care/home management, therapeutic activities, therapeutic exercises  Plan of Care Expires: 05/27/24  Plan of Care Reviewed with: patient    Subjective     Chief Complaint: SPEARS  Patient/Family Comments/goals: return to PLOF  Pain/Comfort:  Pain Rating 1: 5/10  Location - Side 1: Left  Location - Orientation 1: generalized  Location 1: hip  Pain Addressed 1: Reposition, Distraction  Pain Rating Post-Intervention 1: 5/10    Objective:     Communicated with: nsg prior to session.  Patient found HOB elevated with bed alarm, telemetry, SCD, peripheral IV upon OT entry to room.    General Precautions: Standard, fall, respiratory    Orthopedic Precautions:N/A  Braces: N/A  Respiratory Status: Room air     Occupational Performance:     Bed Mobility:    Patient completed Supine to Sit with with side rail at MI    Functional Mobility/Transfers:  Patient completed Sit <> Stand Transfer  with supervision and stand by assistance  with  no assistive device   Patient completed Bed <> Chair Transfer using Step Transfer technique with supervision and stand by assistance with no assistive device  Functional Mobility: see tx note below    Activities of Daily Living:  Grooming: supervision and stand by assistance standing for 1st half of task/sitting EOB for 2nd half of task      AMPAC 6 Click ADL: 17    Treatment & Education:   Pt w/ audible mouth breathing at rest in SF & agreeable to OT this date.  Pt c/o 5/10 L hip pain & perf the following:  -sup-->EOB via bed rail w/ MI  -standing w/o DME w/ S-SBA for short distance fxnl mobility w/in room w/ S-SBA  -G/H standing at sink w/ S-SBA, but only able to tolerate ~1.5-2 min static standing before requiring sitting rest break (x ~1min w/ deep/PLBing) 2/2 SPEARS & impaired standing tolerance/overall endurance; G/H tasks completed at EOB w/ Sup  -t/f w/o DME-->b/s chair w/ SBA  Edu/tx re: deep/PLBing, general safety techs & HEP. Pt verbalized understanding.  Pt left UIC w/ alarm & nsg made aware.      Patient left up in chair with all lines intact, call button in reach, chair alarm on, and nsg notified    GOALS:   Multidisciplinary Problems       Occupational Therapy Goals          Problem: Occupational Therapy    Goal Priority Disciplines Outcome Interventions   Occupational Therapy Goal     OT, PT/OT Progressing    Description: Goals to be met by: 05/27     Patient will increase functional independence with ADLs by performing:    UE Dressing with Supervision.  LE Dressing with Supervision.  Grooming while standing at sink with Supervision.  Toileting from toilet with Supervision for hygiene and clothing management.   Toilet transfer to toilet with Supervision.  Increased functional strength to WFL for ADLs.                         Time Tracking:     OT Date of Treatment: 05/02/24  OT Start Time: 1000  OT Stop Time: 1028  OT Total Time (min): 28 min    Billable  Minutes:Self Care/Home Management 14  Therapeutic Activity 14  Total Time 28    OT/RENEE: OT     Number of RENEE visits since last OT visit: 1    5/2/2024

## 2024-05-02 NOTE — TELEPHONE ENCOUNTER
----- Message from Diamond Juarez sent at 5/2/2024  4:35 PM CDT -----  Regarding: FW: HFU  Good afternoon.  THOMAS sent me a request again to schedule this patient.  Patient made CM aware that he changed his insurance on 5/1 to [employer].  It was verified and his chart has been updated.  Please schedule hospital follow up.  If any issues, let me know.     Patient expected discharge is tomorrow.     Thanks,Evelin  ----- Message -----  From: Diamond Juarez  Sent: 4/30/2024   4:58 PM CDT  To: Vanesa Bhat Staff  Subject: HFU                                              Patient is being discharged from [facility] and is requiring a hospital follow up appointment with their Primary Care Provider in 7 days. Patient is established. I am unable to schedule an appointment in that time frame. Please schedule patient a sooner appointment and message me back so Discharge Nurse can advise patient prior to discharge.    Expected discharge on 5/1    DX:bacterial pneumonia      Thank you, Evelin  Physician Referral Specialist/Discharge

## 2024-05-02 NOTE — PLAN OF CARE
0945  DC order noted. Message sent to Cedric/[facility] via "[employer] informing of the pt's discharge status & reminding that HH orders were sent on 5/1/2024.     Message sent to  Diamond questioning status of PCP hospfu appt & informing of the pt's discharge status. Awaiting response.        05/02/24 1045   Rounds   Attendance Nurse ;Provider   Discharge Plan A Skilled Nursing Facility   Transition of Care Barriers Transportation     1045  CM was informed by Dr Campuzano that the pt is medically stable to discharge today but is now agreeable to SNF placement.     1135  Patient awake & alert sitting in recliner when CM rounded. No family present. Pt in agreement to dc to a SNF but did not have a SNF preference.     1150  SNF referrals sent via "[employer]. Awaiting response.     1200  Message sent to [facility]- informing of change in discharge plan.     1235  CM was informed by Chris kidd/[facility] that the pt's medical insurance changed to [employer] Medicare as of 5/1/2024. CM received confirmation regarding above from the pt but pt unable to provide ID #.     1300  CM contacted the [facility] admission office regarding above. Active [employer] Medicare policy w/ID # added to the pt's chart. Updated facesheet sent to SNFs via "[employer]. Awaiting response.     CM informed the pt of above & will provide printed face sheet with ID #. Pt verbalized understanding.     1310  CM was informed by damaris Fields of a hospfu appt scheduled for the patient with RICO Ashley at the [facility] in [place] on 5/15/2024 at 0800. Information added to the patient's discharge paperwork.    Message sent to Diamond informed of the pt's new medical insurance & questioned if the pt could be seen by Dr Home Alexis with [employer]. Awaiting response.      1550  PASRR/142 uploaded into "[employer]. Pt received multiple SNF denials. CM was informed Jose kidd/Coretta Moreno that the referral is  under review.       Will continue to follow.

## 2024-05-02 NOTE — NURSING
Medications given as ordered. Telemetry in progress. Patient got a nebulizer treatment once for wheezing but on other problems overnight. Safety maintained, call light in reach, bed alarm in use.

## 2024-05-02 NOTE — TELEPHONE ENCOUNTER
I have LM for Madeline asking when is pt planning to be D/C from hospital.     ----- Message from Magnolia Martínez sent at 5/1/2024  1:25 PM CDT -----  Type:  HFU Appointment Request      Name of Caller:[facility] madeline  When is the first available appointment?n/a  Symptoms:HFU  Best Call Back Number:   Additional Information:  
Lili communicated via Teams that pt will be discharged to SNF for a few weeks.       
normal...

## 2024-05-02 NOTE — NURSING
Patient removed his CPAP stated it makes his chest hurt. Patient denies having chest pains needing an EKG he stated it was just the CPAP and did not want it back on. Noted some wheezing and notified respiratory for a prn breathing treatment.

## 2024-05-02 NOTE — PROGRESS NOTES
[facility] Medicine  Progress Note    Patient Name: Jose Luis Manzo  MRN: [id number]  Patient Class: IP- Inpatient   Admission Date: 4/26/2024  Length of Stay: 6 days  Attending Physician: Doris Patton*  Primary Care Provider: Home Alexis MD        Subjective:     Principal Problem:Bacterial pneumonia        HPI:  Very pleasant 73 yo F presents with acute on chronic progressive dyspnea , hacking cough , afebrile , denied being on home oxygen , no sick contacts or travel, but is a former smoker.  Denied chest pain wheezing intermittent claudication. Recent abx use , lung disease prior. 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     Overview/Hospital Course:  No notes on file    Interval History:  Awake, alert, sitting up on the chair  Patient is still struggling with physical therapy-discuss placement options is presently open to it  Will let cm no for placement options      Hold discharge for now pending placement      Review of Systems   Constitutional:  Positive for activity change. Negative for fatigue.   Respiratory:  Negative for shortness of breath.    Gastrointestinal:  Negative for abdominal pain.   Genitourinary:  Negative for dysuria.   Neurological:  Negative for headaches.     Objective:     Vital Signs (Most Recent):  Temp: 98.5 °F (36.9 °C) (05/02/24 1134)  Pulse: 84 (05/02/24 1200)  Resp: 18 (05/02/24 1200)  BP: (!) 174/86 (05/02/24 1134)  SpO2: 95 % (05/02/24 1200) Vital Signs (24h Range):  Temp:  [97.6 °F (36.4 °C)-98.7 °F (37.1 °C)] 98.5 °F (36.9 °C)  Pulse:  [75-86] 84  Resp:  [18-24] 18  SpO2:  [95 %-98 %] 95 %  BP: (136-174)/(73-93) 174/86     Weight: 106.7 kg (235 lb 3.7 oz)  Body mass index is 33.75 kg/m².    Intake/Output Summary (Last 24 hours) at 5/2/2024 1216  Last data filed at 5/2/2024 0653  Gross per 24 hour   Intake 538.11 ml   Output 575 ml   Net -36.89 ml         Physical  "Exam  Vitals and nursing note reviewed.   Constitutional:       General: He is not in acute distress.     Appearance: He is well-developed. He is obese.   HENT:      Head: Normocephalic and atraumatic.   Neck:      Vascular: No JVD.   Cardiovascular:      Rate and Rhythm: Normal rate and regular rhythm.      Heart sounds: Normal heart sounds.   Pulmonary:      Effort: Pulmonary effort is normal.      Breath sounds: Normal breath sounds.   Abdominal:      General: Bowel sounds are normal. There is no distension.      Palpations: Abdomen is soft.      Tenderness: There is no abdominal tenderness.   Musculoskeletal:      Cervical back: Neck supple.      Right lower leg: No edema.      Left lower leg: No edema.   Neurological:      Mental Status: He is alert.   Psychiatric:         Behavior: Behavior normal.             Significant Labs: A1C:   Recent Labs   Lab 02/07/24  0829   HGBA1C 6.6*     ABGs: No results for input(s): "PH", "PCO2", "HCO3", "POCSATURATED", "BE", "TOTALHB", "COHB", "METHB", "O2HB", "POCFIO2", "PO2" in the last 48 hours.  Blood Culture: No results for input(s): "LABBLOO" in the last 48 hours.  CBC:   Recent Labs   Lab 05/01/24  1141   WBC 10.19   HGB 11.3*   HCT 35.0*   *       CMP:   Recent Labs   Lab 05/01/24  0455 05/01/24  1141 05/02/24  0515    139 141   K 3.6 3.7 3.5    111* 111*   CO2 18* 17* 17*   GLU 92 128* 104   BUN 38* 36* 37*   CREATININE 3.6* 3.6* 3.6*   CALCIUM 10.2 10.0 10.0   ALBUMIN 2.4*  --  2.3*   ANIONGAP 13 11 13     Cardiac Markers: No results for input(s): "CKMB", "MYOGLOBIN", "BNP", "TROPISTAT" in the last 48 hours.  Lipase: No results for input(s): "LIPASE" in the last 48 hours.  Lipid Panel: No results for input(s): "CHOL", "HDL", "LDLCALC", "TRIG", "CHOLHDL" in the last 48 hours.  Magnesium:   No results for input(s): "MG" in the last 48 hours.    Troponin: No results for input(s): "TROPONINI", "TROPONINIHS" in the last 48 hours.  TSH:   Recent Labs " "  Lab 04/27/24  0433   TSH 0.840     Urine Culture: No results for input(s): "LABURIN" in the last 48 hours.  Urine Studies: No results for input(s): "COLORU", "APPEARANCEUA", "PHUR", "SPECGRAV", "PROTEINUA", "GLUCUA", "KETONESU", "BILIRUBINUA", "OCCULTUA", "NITRITE", "UROBILINOGEN", "LEUKOCYTESUR", "RBCUA", "WBCUA", "BACTERIA", "SQUAMEPITHEL", "HYALINECASTS" in the last 48 hours.    Invalid input(s): "WRIGHTSUR"    Significant Imaging: I have reviewed all pertinent imaging results/findings within the past 24 hours.    Assessment/Plan:      * Bacterial pneumonia  Chest xray with RLL opacities suggestive of pneumonia. Initiated on vanc/zosyn/azithro, hydrocortisone. Cultures pending. Continue supplemental O2    Of note, leukocytosis likely from steroid use.    Acute respiratory failure with hypoxia  Patient with Hypoxic Respiratory failure which is Acute.  he is not on home oxygen. Supplemental oxygen was provided and noted- Oxygen Concentration (%):  [3.5-40] 28    Signs/symptoms of respiratory failure include-  continued O2 requirement . Contributing diagnoses includes - Aspiration and Pneumonia Labs and images were reviewed. Patient Has recent ABG, which has been reviewed. Will treat underlying causes and adjust management of respiratory failure as follows- abx/ DUONEB/ IS/ Breo ellipta/ steroids     Class 1 obesity due to excess calories with serious comorbidity and body mass index (BMI) of 34.0 to 34.9 in adult  Body mass index is 34.48 kg/m². Obesity complicates all aspects of disease management from diagnostic modalities to treatment.          Immunosuppressive management encounter following kidney transplant        History of DVT (deep vein thrombosis)  On Eliquis      Renal transplant recipient  Continue Sirolimus and Prednisone        Mixed hyperlipidemia  Continue statin       Essential hypertension  Chronic, uncontrolled. Latest blood pressure and vitals reviewed-     Temp:  [96 °F (35.6 °C)-98.7 °F (37.1 " °C)]   Pulse:  []   Resp:  [16-22]   BP: (138-168)/(68-95)   SpO2:  [93 %-100 %] .   Home meds for hypertension were reviewed and noted below.   Hypertension Medications               cloNIDine 0.3 mg/24 hr td ptwk (CATAPRES) 0.3 mg/24 hr PLACE 1 PATCH ONTO THE SKIN ONCE A WEEK.    furosemide (LASIX) 40 MG tablet TAKE 1 TABLET EVERY DAY    losartan (COZAAR) 25 MG tablet Take 1 tablet (25 mg total) by mouth once daily.    metoprolol succinate (TOPROL-XL) 50 MG 24 hr tablet TAKE 1 TABLET EVERY DAY    nitroGLYCERIN (NITROSTAT) 0.4 MG SL tablet Place 1 tablet (0.4 mg total) under the tongue every 5 (five) minutes as needed for Chest pain.            While in the hospital, will manage blood pressure as follows; Adjust home antihypertensive regimen as follows- continue metoprolol, start hydralazine. Trend.     Will utilize p.r.n. blood pressure medication only if patient's blood pressure greater than 180/110 and he develops symptoms such as worsening chest pain or shortness of breath.    Stage 4 chronic kidney disease  Creatine stable for now. BMP reviewed- noted Estimated Creatinine Clearance: 23.3 mL/min (A) (based on SCr of 3.4 mg/dL (H)). according to latest data. Based on current GFR, CKD stage is stage 4 - GFR 15-29.  Monitor UOP and serial BMP and adjust therapy as needed. Renally dose meds. Avoid nephrotoxic medications and procedures.  At baseline, consulted nephrology , am labs, avoid nephrotoxins      VTE Risk Mitigation (From admission, onward)           Ordered     apixaban tablet 2.5 mg  2 times daily         04/26/24 1115                    Discharge Planning   LAVERNE: 5/2/2024     Code Status: Full Code   Is the patient medically ready for discharge?:     Reason for patient still in hospital (select all that apply): Pending disposition  Discharge Plan A: Skilled Nursing Facility                  Doris Patton MD  Department of Hospital Medicine   [facility]

## 2024-05-03 PROBLEM — J15.1 PSEUDOMONAS PNEUMONIA: Status: ACTIVE | Noted: 2024-04-26

## 2024-05-03 LAB
ALBUMIN SERPL BCP-MCNC: 2.4 G/DL (ref 3.5–5.2)
ANION GAP SERPL CALC-SCNC: 10 MMOL/L (ref 8–16)
BACTERIA #/AREA URNS HPF: NORMAL /HPF
BILIRUB UR QL STRIP: NEGATIVE
BUN SERPL-MCNC: 41 MG/DL (ref 8–23)
CALCIUM SERPL-MCNC: 10 MG/DL (ref 8.7–10.5)
CHLORIDE SERPL-SCNC: 110 MMOL/L (ref 95–110)
CLARITY UR: CLEAR
CO2 SERPL-SCNC: 19 MMOL/L (ref 23–29)
COLOR UR: YELLOW
CREAT SERPL-MCNC: 3.8 MG/DL (ref 0.5–1.4)
EST. GFR  (NO RACE VARIABLE): 16 ML/MIN/1.73 M^2
GLUCOSE SERPL-MCNC: 108 MG/DL (ref 70–110)
GLUCOSE UR QL STRIP: ABNORMAL
HGB UR QL STRIP: ABNORMAL
HYALINE CASTS #/AREA URNS LPF: 0 /LPF
KETONES UR QL STRIP: NEGATIVE
LEUKOCYTE ESTERASE UR QL STRIP: NEGATIVE
MICROSCOPIC COMMENT: NORMAL
NITRITE UR QL STRIP: NEGATIVE
PH UR STRIP: 7 [PH] (ref 5–8)
PHOSPHATE SERPL-MCNC: 3.6 MG/DL (ref 2.7–4.5)
POTASSIUM SERPL-SCNC: 3.7 MMOL/L (ref 3.5–5.1)
PROT UR QL STRIP: ABNORMAL
RBC #/AREA URNS HPF: 2 /HPF (ref 0–4)
SODIUM SERPL-SCNC: 139 MMOL/L (ref 136–145)
SODIUM UR-SCNC: 101 MMOL/L (ref 20–250)
SP GR UR STRIP: 1.02 (ref 1–1.03)
SQUAMOUS #/AREA URNS HPF: 0 /HPF
URN SPEC COLLECT METH UR: ABNORMAL
UROBILINOGEN UR STRIP-ACNC: NEGATIVE EU/DL
WBC #/AREA URNS HPF: 1 /HPF (ref 0–5)

## 2024-05-03 PROCEDURE — 21400001 HC TELEMETRY ROOM

## 2024-05-03 PROCEDURE — 99900035 HC TECH TIME PER 15 MIN (STAT)

## 2024-05-03 PROCEDURE — 94761 N-INVAS EAR/PLS OXIMETRY MLT: CPT

## 2024-05-03 PROCEDURE — 25000242 PHARM REV CODE 250 ALT 637 W/ HCPCS: Performed by: INTERNAL MEDICINE

## 2024-05-03 PROCEDURE — 94799 UNLISTED PULMONARY SVC/PX: CPT | Mod: XB

## 2024-05-03 PROCEDURE — 25000003 PHARM REV CODE 250: Performed by: INTERNAL MEDICINE

## 2024-05-03 PROCEDURE — 94660 CPAP INITIATION&MGMT: CPT

## 2024-05-03 PROCEDURE — 63600175 PHARM REV CODE 636 W HCPCS: Performed by: FAMILY MEDICINE

## 2024-05-03 PROCEDURE — 63600175 PHARM REV CODE 636 W HCPCS: Performed by: INTERNAL MEDICINE

## 2024-05-03 PROCEDURE — 94640 AIRWAY INHALATION TREATMENT: CPT

## 2024-05-03 PROCEDURE — 25000003 PHARM REV CODE 250: Performed by: FAMILY MEDICINE

## 2024-05-03 PROCEDURE — 81000 URINALYSIS NONAUTO W/SCOPE: CPT | Performed by: INTERNAL MEDICINE

## 2024-05-03 PROCEDURE — 80069 RENAL FUNCTION PANEL: CPT | Performed by: STUDENT IN AN ORGANIZED HEALTH CARE EDUCATION/TRAINING PROGRAM

## 2024-05-03 PROCEDURE — 36415 COLL VENOUS BLD VENIPUNCTURE: CPT | Performed by: STUDENT IN AN ORGANIZED HEALTH CARE EDUCATION/TRAINING PROGRAM

## 2024-05-03 PROCEDURE — 27000221 HC OXYGEN, UP TO 24 HOURS

## 2024-05-03 PROCEDURE — 84300 ASSAY OF URINE SODIUM: CPT | Performed by: INTERNAL MEDICINE

## 2024-05-03 RX ORDER — ERGOCALCIFEROL 1.25 MG/1
50000 CAPSULE ORAL
Status: DISCONTINUED | OUTPATIENT
Start: 2024-05-03 | End: 2024-05-04 | Stop reason: HOSPADM

## 2024-05-03 RX ORDER — SODIUM CHLORIDE, SODIUM LACTATE, POTASSIUM CHLORIDE, CALCIUM CHLORIDE 600; 310; 30; 20 MG/100ML; MG/100ML; MG/100ML; MG/100ML
INJECTION, SOLUTION INTRAVENOUS CONTINUOUS
Status: ACTIVE | OUTPATIENT
Start: 2024-05-03 | End: 2024-05-03

## 2024-05-03 RX ADMIN — LACTOBACILLUS TAB 1 TABLET: TAB at 08:05

## 2024-05-03 RX ADMIN — MUPIROCIN: 20 OINTMENT TOPICAL at 09:05

## 2024-05-03 RX ADMIN — SODIUM BICARBONATE 650 MG TABLET 1300 MG: at 12:05

## 2024-05-03 RX ADMIN — APIXABAN 2.5 MG: 2.5 TABLET, FILM COATED ORAL at 08:05

## 2024-05-03 RX ADMIN — HYDRALAZINE HYDROCHLORIDE 100 MG: 25 TABLET, FILM COATED ORAL at 02:05

## 2024-05-03 RX ADMIN — CIPROFLOXACIN 500 MG: 500 TABLET ORAL at 08:05

## 2024-05-03 RX ADMIN — HYDRALAZINE HYDROCHLORIDE 100 MG: 25 TABLET, FILM COATED ORAL at 05:05

## 2024-05-03 RX ADMIN — FERROUS SULFATE TAB 325 MG (65 MG ELEMENTAL FE) 1 EACH: 325 (65 FE) TAB at 08:05

## 2024-05-03 RX ADMIN — IPRATROPIUM BROMIDE AND ALBUTEROL SULFATE 3 ML: 2.5; .5 SOLUTION RESPIRATORY (INHALATION) at 07:05

## 2024-05-03 RX ADMIN — FLUTICASONE FUROATE AND VILANTEROL TRIFENATATE 1 PUFF: 100; 25 POWDER RESPIRATORY (INHALATION) at 07:05

## 2024-05-03 RX ADMIN — ERGOCALCIFEROL 50000 UNITS: 1.25 CAPSULE ORAL at 02:05

## 2024-05-03 RX ADMIN — PREDNISONE 10 MG: 10 TABLET ORAL at 08:05

## 2024-05-03 RX ADMIN — MUPIROCIN: 20 OINTMENT TOPICAL at 02:05

## 2024-05-03 RX ADMIN — Medication 1 CAPSULE: at 08:05

## 2024-05-03 RX ADMIN — SODIUM CHLORIDE, POTASSIUM CHLORIDE, SODIUM LACTATE AND CALCIUM CHLORIDE: 600; 310; 30; 20 INJECTION, SOLUTION INTRAVENOUS at 11:05

## 2024-05-03 RX ADMIN — IPRATROPIUM BROMIDE AND ALBUTEROL SULFATE 3 ML: 2.5; .5 SOLUTION RESPIRATORY (INHALATION) at 04:05

## 2024-05-03 RX ADMIN — IPRATROPIUM BROMIDE AND ALBUTEROL SULFATE 3 ML: 2.5; .5 SOLUTION RESPIRATORY (INHALATION) at 08:05

## 2024-05-03 RX ADMIN — APIXABAN 2.5 MG: 2.5 TABLET, FILM COATED ORAL at 09:05

## 2024-05-03 RX ADMIN — POTASSIUM BICARBONATE 10 MEQ: 391 TABLET, EFFERVESCENT ORAL at 11:05

## 2024-05-03 RX ADMIN — HYDRALAZINE HYDROCHLORIDE 100 MG: 25 TABLET, FILM COATED ORAL at 09:05

## 2024-05-03 RX ADMIN — ATORVASTATIN CALCIUM 20 MG: 20 TABLET, FILM COATED ORAL at 08:05

## 2024-05-03 RX ADMIN — IPRATROPIUM BROMIDE AND ALBUTEROL SULFATE 3 ML: 2.5; .5 SOLUTION RESPIRATORY (INHALATION) at 11:05

## 2024-05-03 RX ADMIN — MUPIROCIN: 20 OINTMENT TOPICAL at 08:05

## 2024-05-03 RX ADMIN — SODIUM BICARBONATE 650 MG TABLET 1300 MG: at 04:05

## 2024-05-03 RX ADMIN — SIROLIMUS 3 MG: 1 TABLET ORAL at 08:05

## 2024-05-03 RX ADMIN — SODIUM BICARBONATE 650 MG TABLET 1300 MG: at 08:05

## 2024-05-03 RX ADMIN — METOPROLOL SUCCINATE 100 MG: 50 TABLET, EXTENDED RELEASE ORAL at 08:05

## 2024-05-03 NOTE — PLAN OF CARE
Problem: Adult Inpatient Plan of Care  Goal: Plan of Care Review  Outcome: Progressing  Goal: Patient-Specific Goal (Individualized)  Outcome: Progressing  Goal: Absence of Hospital-Acquired Illness or Injury  Outcome: Progressing  Goal: Optimal Comfort and Wellbeing  Outcome: Progressing  Goal: Readiness for Transition of Care  Outcome: Progressing     Problem: Pneumonia  Goal: Fluid Balance  Outcome: Progressing  Goal: Resolution of Infection Signs and Symptoms  Outcome: Progressing  Goal: Effective Oxygenation and Ventilation  Outcome: Progressing     Problem: Infection  Goal: Absence of Infection Signs and Symptoms  Outcome: Progressing     Problem: Pulmonary Impairment  Goal: Improved Activity Tolerance  Outcome: Progressing

## 2024-05-03 NOTE — PLAN OF CARE
AAOX4. Pt up to chair most of the day. No c/o pain this shift. Rounds made every 2 hrs. Meds given per MAR. Pending SNF acceptance. Safety protocols in place/bed in lowest position.

## 2024-05-03 NOTE — PROGRESS NOTES
Progress Note  Nephrology      Consult Requested By: Jael Barnhart MD      SUBJECTIVE:     Overnight events  Patient is a 74 y.o. male     Patient Active Problem List   Diagnosis    Stage 4 chronic kidney disease    Essential hypertension    Mixed hyperlipidemia    Benign prostatic hyperplasia with nocturia    Claudication of right lower extremity    Eye swelling, bilateral    Diminished night vision    Renal transplant recipient    Dermolipoma    History of DVT (deep vein thrombosis)    Iron deficiency anemia    Pulmonary nodules    Larynx cancer    Voice disturbance    Atherosclerosis of aorta    Carcinoma in situ of vocal cord    Dysphagia, pharyngoesophageal    Chondronecrosis of larynx    Squamous cell carcinoma    Obesity (BMI 30-39.9)    Pterygium of both eyes    History of cancer of larynx    Nodule of left lung    Granulomatous lung disease    Transplanted kidney - 2007    Hypertensive kidney disease with stage 4 chronic kidney disease    Hypercalcemia    Secondary hyperparathyroidism of renal origin    Benign tumor of orbit    Metabolic bone disease    Immunosuppressive management encounter following kidney transplant    Hyperphosphatemia    Vitamin D deficiency disease    History of COVID-19    SOB (shortness of breath)    Metabolic acidosis    Proteinuria    Anticoagulant long-term use    Pre-op evaluation    Benign prostatic hyperplasia with lower urinary tract symptoms    Centrilobular emphysema    Dependence on renal dialysis    Class 1 obesity due to excess calories with serious comorbidity and body mass index (BMI) of 34.0 to 34.9 in adult    Former cigarette smoker    Deformity of toenail    Bacterial pneumonia    Acute respiratory failure with hypoxia     Past Medical History:   Diagnosis Date    Acute hypoxemic respiratory failure due to COVID-19 12/30/2020    Anticoagulant long-term use     BPH (benign prostatic hypertrophy)     Cancer     vocal cords    Claudication of right lower extremity  3/10/2016    COVID-19 virus detected 12/30/2020    Dependence on renal dialysis 4/18/2023    Disorder of kidney and ureter     Hyperkalemia 1/1/2021    Hyperlipidemia     Hypertension     Hypokalemia 6/23/2021    Immunosuppression 6/26/2021    Immunosuppression due to chronic steroid use 1/30/2020    Microcytic anemia 9/16/2016    Obesity (BMI 30-39.9) 1/23/2019    Oropharyngeal cancer     Pterygium     Squamous cell carcinoma 4/25/2018    Thromboembolic disorder     Unspecified disorder of kidney and ureter               OBJECTIVE:     Vitals:    05/03/24 0356 05/03/24 0716 05/03/24 0747 05/03/24 0752   BP: (!) 158/86 (!) 144/79     BP Location: Left arm Right arm     Patient Position: Lying Lying     Pulse: 71 68 73 75   Resp: 18 20 20 (!) 21   Temp: 98.1 °F (36.7 °C) 97.7 °F (36.5 °C)     TempSrc: Oral Oral     SpO2: 95% (!) 94% 97% 97%   Weight:       Height:           Temp: 97.7 °F (36.5 °C) (05/03/24 0716)  Pulse: 75 (05/03/24 0752)  Resp: (!) 21 (05/03/24 0752)  BP: (!) 144/79 (05/03/24 0716)  SpO2: 97 % (05/03/24 0752)              Medications:  Current Facility-Administered Medications   Medication Dose Route Frequency    albuterol-ipratropium  3 mL Nebulization Q4H WAKE    apixaban  2.5 mg Oral BID    atorvastatin  20 mg Oral QHS    ciprofloxacin HCl  500 mg Oral Daily    cloNIDine 0.3 mg/24 hr td ptwk  1 patch Transdermal Q7 Days    ferrous sulfate  1 tablet Oral BID    fluticasone furoate-vilanteroL  1 puff Inhalation Daily    hydrALAZINE  100 mg Oral Q8H    Lactobacillus acidoph-L.bulgar  1 tablet Oral Daily    metoprolol succinate  100 mg Oral Daily    mupirocin   Nasal TID    predniSONE  10 mg Oral Daily    sirolimus  3 mg Oral Daily    sodium bicarbonate  1,300 mg Oral QID    vitamin renal formula (B-complex-vitamin c-folic acid)  1 capsule Oral Daily     Current Facility-Administered Medications   Medication Dose Route Frequency Last Rate Last Admin               Physical Exam:  General  appearance:NAD  Feeling batter  Weak  Lungs: diminished breath sounds  RR 21  Pulse oximeter 97% O2  Heart: Pulse 76  Abdomen: soft  Extremities: no edema  Skin: dry    Laboratory:  ABG  Labs reviewed  Recent Results (from the past 336 hour(s))   Basic metabolic panel    Collection Time: 05/01/24 11:41 AM   Result Value Ref Range    Sodium 139 136 - 145 mmol/L    Potassium 3.7 3.5 - 5.1 mmol/L    Chloride 111 (H) 95 - 110 mmol/L    CO2 17 (L) 23 - 29 mmol/L    BUN 36 (H) 8 - 23 mg/dL    Creatinine 3.6 (H) 0.5 - 1.4 mg/dL    Calcium 10.0 8.7 - 10.5 mg/dL    Anion Gap 11 8 - 16 mmol/L   Basic Metabolic Panel (BMP)    Collection Time: 04/29/24  4:48 AM   Result Value Ref Range    Sodium 141 136 - 145 mmol/L    Potassium 3.2 (L) 3.5 - 5.1 mmol/L    Chloride 110 95 - 110 mmol/L    CO2 18 (L) 23 - 29 mmol/L    BUN 47 (H) 8 - 23 mg/dL    Creatinine 3.4 (H) 0.5 - 1.4 mg/dL    Calcium 10.1 8.7 - 10.5 mg/dL    Anion Gap 13 8 - 16 mmol/L   Basic Metabolic Panel (BMP)    Collection Time: 04/28/24  4:33 AM   Result Value Ref Range    Sodium 140 136 - 145 mmol/L    Potassium 5.0 3.5 - 5.1 mmol/L    Chloride 113 (H) 95 - 110 mmol/L    CO2 13 (L) 23 - 29 mmol/L    BUN 49 (H) 8 - 23 mg/dL    Creatinine 3.6 (H) 0.5 - 1.4 mg/dL    Calcium 9.7 8.7 - 10.5 mg/dL    Anion Gap 14 8 - 16 mmol/L     Recent Results (from the past 336 hour(s))   CBC auto differential    Collection Time: 05/01/24 11:41 AM   Result Value Ref Range    WBC 10.19 3.90 - 12.70 K/uL    Hemoglobin 11.3 (L) 14.0 - 18.0 g/dL    Hematocrit 35.0 (L) 40.0 - 54.0 %    Platelets 112 (L) 150 - 450 K/uL   CBC auto differential    Collection Time: 04/29/24  4:48 AM   Result Value Ref Range    WBC 18.62 (H) 3.90 - 12.70 K/uL    Hemoglobin 12.0 (L) 14.0 - 18.0 g/dL    Hematocrit 37.4 (L) 40.0 - 54.0 %    Platelets 102 (L) 150 - 450 K/uL   CBC Auto Differential    Collection Time: 04/27/24  4:33 AM   Result Value Ref Range    WBC 12.99 (H) 3.90 - 12.70 K/uL    Hemoglobin 11.5 (L)  "14.0 - 18.0 g/dL    Hematocrit 37.2 (L) 40.0 - 54.0 %    Platelets 122 (L) 150 - 450 K/uL     Urinalysis  No results for input(s): "COLORU", "CLARITYU", "SPECGRAV", "PHUR", "PROTEINUA", "GLUCOSEU", "BILIRUBINCON", "BLOODU", "WBCU", "RBCU", "BACTERIA", "MUCUS", "NITRITE", "LEUKOCYTESUR", "UROBILINOGEN", "HYALINECASTS" in the last 24 hours.    Diagnostic Results:  X-Ray: Reviewed  US: Reviewed  Echo: Reviewed  ACCESS    ASSESSMENT/PLAN:     CKD 4  Kidney transplant 2007  Prednisone 5  Sirolimus 3  UA protein 2+, blood 2+, RBC 10   UA protein/creatinine 1.29  Creatinine 3.6- 3.4- 3.6- 3.8  GFR 17 - 18- 17 - 16 cc/min  BUN 38- 49- 47- 42- 38- 41  K 5- 3.2- 3.1- 3.6- 3.7  Metabolic acidosis  Sodium bicarbonate  Metabolic bone disease  Tertiary hyperparathyroidism    Phos 2.3- 2.5- 3.1- 3.6  Magnesium 1.6 - 2.3  Replace prn  Anemia multifactorial  Hb 11.5- 12- 11.3  Iron 15  Sat iron 11  Renal cap  Poor nutrition   Albumin 2.5- 2.4  Hypertension   Blood pressure 153/84, 159/86, 154/87, 171/90, 165/69, 144/79  Weight daily  I and O  Avoid nephrotoxic agents, hypotension, hypovolemia  Renal diet as tolerated  PT          "

## 2024-05-03 NOTE — PT/OT/SLP PROGRESS
Occupational Therapy  Visit Attempt    Patient Name:  Jose Luis Manzo   MRN:  [id number]    Patient not seen today secondary to Testing/imaging (xray/CT/MRI) (14:41 - Patient KAT in [place]     5/3/2024

## 2024-05-03 NOTE — CONSULTS
Thank you for your consult to [facility]. We have reviewed the patient chart. This patient does meet criteria for [facility] service at this time.  Will assume care on 05/03/24 at 7AM.    Jael Barhnart MD

## 2024-05-03 NOTE — PLAN OF CARE
Problem: Adult Inpatient Plan of Care  Goal: Plan of Care Review  Outcome: Progressing  Goal: Absence of Hospital-Acquired Illness or Injury  Outcome: Progressing  Goal: Optimal Comfort and Wellbeing  Outcome: Progressing  Goal: Readiness for Transition of Care  Outcome: Progressing     Problem: Pneumonia  Goal: Effective Oxygenation and Ventilation  Outcome: Progressing     Problem: Infection  Goal: Absence of Infection Signs and Symptoms  Outcome: Progressing     Problem: Fall Injury Risk  Goal: Absence of Fall and Fall-Related Injury  Outcome: Progressing

## 2024-05-03 NOTE — PROGRESS NOTES
Progress Note  Nephrology      Consult Requested By: Doris Patton N*      SUBJECTIVE:     Overnight events  Patient is a 74 y.o. male     Patient Active Problem List   Diagnosis    Stage 4 chronic kidney disease    Essential hypertension    Mixed hyperlipidemia    Benign prostatic hyperplasia with nocturia    Claudication of right lower extremity    Eye swelling, bilateral    Diminished night vision    Renal transplant recipient    Dermolipoma    History of DVT (deep vein thrombosis)    Iron deficiency anemia    Pulmonary nodules    Larynx cancer    Voice disturbance    Atherosclerosis of aorta    Carcinoma in situ of vocal cord    Dysphagia, pharyngoesophageal    Chondronecrosis of larynx    Squamous cell carcinoma    Obesity (BMI 30-39.9)    Pterygium of both eyes    History of cancer of larynx    Nodule of left lung    Granulomatous lung disease    Transplanted kidney - 2007    Hypertensive kidney disease with stage 4 chronic kidney disease    Hypercalcemia    Secondary hyperparathyroidism of renal origin    Benign tumor of orbit    Metabolic bone disease    Immunosuppressive management encounter following kidney transplant    Hyperphosphatemia    Vitamin D deficiency disease    History of COVID-19    SOB (shortness of breath)    Metabolic acidosis    Proteinuria    Anticoagulant long-term use    Pre-op evaluation    Benign prostatic hyperplasia with lower urinary tract symptoms    Centrilobular emphysema    Dependence on renal dialysis    Class 1 obesity due to excess calories with serious comorbidity and body mass index (BMI) of 34.0 to 34.9 in adult    Former cigarette smoker    Deformity of toenail    Bacterial pneumonia    Acute respiratory failure with hypoxia     Past Medical History:   Diagnosis Date    Acute hypoxemic respiratory failure due to COVID-19 12/30/2020    Anticoagulant long-term use     BPH (benign prostatic hypertrophy)     Cancer     vocal cords    Claudication of right lower  extremity 3/10/2016    COVID-19 virus detected 12/30/2020    Dependence on renal dialysis 4/18/2023    Disorder of kidney and ureter     Hyperkalemia 1/1/2021    Hyperlipidemia     Hypertension     Hypokalemia 6/23/2021    Immunosuppression 6/26/2021    Immunosuppression due to chronic steroid use 1/30/2020    Microcytic anemia 9/16/2016    Obesity (BMI 30-39.9) 1/23/2019    Oropharyngeal cancer     Pterygium     Squamous cell carcinoma 4/25/2018    Thromboembolic disorder     Unspecified disorder of kidney and ureter               OBJECTIVE:     Vitals:    05/02/24 1134 05/02/24 1200 05/02/24 1547 05/02/24 1635   BP: (!) 174/86   (!) 165/84   Patient Position:       Pulse: 76 84 71 77   Resp: 20 18 18 20   Temp: 98.5 °F (36.9 °C)   98.5 °F (36.9 °C)   TempSrc:       SpO2: 97% 95% 96% 96%   Weight:       Height:           Temp: 98.5 °F (36.9 °C) (05/02/24 1635)  Pulse: 77 (05/02/24 1635)  Resp: 20 (05/02/24 1635)  BP: (!) 165/84 (05/02/24 1635)  SpO2: 96 % (05/02/24 1635)    Date 05/02/24 0700 - 05/03/24 0659   Shift 6322-0133 2786-8839 9007-8281 24 Hour Total   INTAKE   P.O. 120   120   Shift Total(mL/kg) 120(1.1)   120(1.1)   OUTPUT   Urine(mL/kg/hr) 100(0.1)   100   Shift Total(mL/kg) 100(0.9)   100(0.9)   Weight (kg) 106.7 106.7 106.7 106.7             Medications:  Current Facility-Administered Medications   Medication Dose Route Frequency    albuterol-ipratropium  3 mL Nebulization Q4H WAKE    apixaban  2.5 mg Oral BID    atorvastatin  20 mg Oral QHS    ciprofloxacin HCl  500 mg Oral Daily    cloNIDine 0.3 mg/24 hr td ptwk  1 patch Transdermal Q7 Days    ferrous sulfate  1 tablet Oral BID    fluticasone furoate-vilanteroL  1 puff Inhalation Daily    hydrALAZINE  100 mg Oral Q8H    Lactobacillus acidoph-L.jessicagar  1 tablet Oral Daily    metoprolol succinate  100 mg Oral Daily    mupirocin   Nasal TID    potassium bicarbonate  10 mEq Oral Once    predniSONE  10 mg Oral Daily    sirolimus  3 mg Oral Daily     sodium bicarbonate  1,300 mg Oral QID    vitamin renal formula (B-complex-vitamin c-folic acid)  1 capsule Oral Daily     Current Facility-Administered Medications   Medication Dose Route Frequency Last Rate Last Admin               Physical Exam:  General appearance:NAD  Feeling better  Lungs: RR 21  Pulse oximeter 97 % O2  Heart: pulse 75  Abdomen: soft  Extremities: no edema  Skin: dry  Laboratory:  ABG  Labs reviewed  Recent Results (from the past 336 hour(s))   Basic metabolic panel    Collection Time: 05/01/24 11:41 AM   Result Value Ref Range    Sodium 139 136 - 145 mmol/L    Potassium 3.7 3.5 - 5.1 mmol/L    Chloride 111 (H) 95 - 110 mmol/L    CO2 17 (L) 23 - 29 mmol/L    BUN 36 (H) 8 - 23 mg/dL    Creatinine 3.6 (H) 0.5 - 1.4 mg/dL    Calcium 10.0 8.7 - 10.5 mg/dL    Anion Gap 11 8 - 16 mmol/L   Basic Metabolic Panel (BMP)    Collection Time: 04/29/24  4:48 AM   Result Value Ref Range    Sodium 141 136 - 145 mmol/L    Potassium 3.2 (L) 3.5 - 5.1 mmol/L    Chloride 110 95 - 110 mmol/L    CO2 18 (L) 23 - 29 mmol/L    BUN 47 (H) 8 - 23 mg/dL    Creatinine 3.4 (H) 0.5 - 1.4 mg/dL    Calcium 10.1 8.7 - 10.5 mg/dL    Anion Gap 13 8 - 16 mmol/L   Basic Metabolic Panel (BMP)    Collection Time: 04/28/24  4:33 AM   Result Value Ref Range    Sodium 140 136 - 145 mmol/L    Potassium 5.0 3.5 - 5.1 mmol/L    Chloride 113 (H) 95 - 110 mmol/L    CO2 13 (L) 23 - 29 mmol/L    BUN 49 (H) 8 - 23 mg/dL    Creatinine 3.6 (H) 0.5 - 1.4 mg/dL    Calcium 9.7 8.7 - 10.5 mg/dL    Anion Gap 14 8 - 16 mmol/L     Recent Results (from the past 336 hour(s))   CBC auto differential    Collection Time: 05/01/24 11:41 AM   Result Value Ref Range    WBC 10.19 3.90 - 12.70 K/uL    Hemoglobin 11.3 (L) 14.0 - 18.0 g/dL    Hematocrit 35.0 (L) 40.0 - 54.0 %    Platelets 112 (L) 150 - 450 K/uL   CBC auto differential    Collection Time: 04/29/24  4:48 AM   Result Value Ref Range    WBC 18.62 (H) 3.90 - 12.70 K/uL    Hemoglobin 12.0 (L) 14.0 - 18.0  "g/dL    Hematocrit 37.4 (L) 40.0 - 54.0 %    Platelets 102 (L) 150 - 450 K/uL   CBC Auto Differential    Collection Time: 04/27/24  4:33 AM   Result Value Ref Range    WBC 12.99 (H) 3.90 - 12.70 K/uL    Hemoglobin 11.5 (L) 14.0 - 18.0 g/dL    Hematocrit 37.2 (L) 40.0 - 54.0 %    Platelets 122 (L) 150 - 450 K/uL     Urinalysis  No results for input(s): "COLORU", "CLARITYU", "SPECGRAV", "PHUR", "PROTEINUA", "GLUCOSEU", "BILIRUBINCON", "BLOODU", "WBCU", "RBCU", "BACTERIA", "MUCUS", "NITRITE", "LEUKOCYTESUR", "UROBILINOGEN", "HYALINECASTS" in the last 24 hours.    Diagnostic Results:  X-Ray: Reviewed  US: Reviewed  Echo: Reviewed  ACCESS    ASSESSMENT/PLAN:     CKD 4  Kidney transplant 2007  Prednisone 5  Sirolimus 3  UA protein 2+, blood 2+, RBC 10   UA protein/creatinine 1.29  Creatinine 3.6- 3.4- 3.6  GFR 17 - 18- 17 cc/min  BUN 38- 49- 47- 42- 38  K 5- 3.2- 3.1- 3.6- 3.7  Metabolic acidosis  Sodium bicarbonate  Metabolic bone disease    Phos 2.3- 2.5- 3.1- 3.6  Magnesium 1.6 - 2.3  Replace prn  Anemia multifactorial  Hb 11.5- 12  Iron 15  Sat iron 11  Renal cap  Poor nutrition   Albumin 2.5- 2.4  Hypertension   Blood pressure 153/84, 159/86, 154/87, 171/90, 165/69  Weight daily  I and O  Avoid nephrotoxic agents, hypotension, hypovolemia  Renal diet as tolerated  PT      "

## 2024-05-03 NOTE — PLAN OF CARE
4757  CM was informed by  Diamond of a hospfu appt scheduled for the patient with Dr Home Alexis (PCP) on 5/31/2024 at 1340. Information added to the patient's discharge paperwork.     1220  Multiple SNF denials noted. CM was informed by Felicita ([phone]) w/[facility] that the referral is under review.     1225  Pt awake & alert sitting in the recliner when CM rounded. No family present. CM provided pt with printed information about his [employer] Medicare & ID # and informing the pt of SNF referral status. Pt verbalized understanding.     3468  Message left for Felicita [facility]idd[facility] questioning referral status. Awaiting response.       Will continue to follow.

## 2024-05-04 VITALS
HEART RATE: 66 BPM | SYSTOLIC BLOOD PRESSURE: 142 MMHG | WEIGHT: 235.25 LBS | OXYGEN SATURATION: 98 % | BODY MASS INDEX: 33.68 KG/M2 | HEIGHT: 70 IN | TEMPERATURE: 97 F | RESPIRATION RATE: 20 BRPM | DIASTOLIC BLOOD PRESSURE: 73 MMHG

## 2024-05-04 LAB
ALBUMIN SERPL BCP-MCNC: 2.6 G/DL (ref 3.5–5.2)
ANION GAP SERPL CALC-SCNC: 11 MMOL/L (ref 8–16)
BASOPHILS # BLD AUTO: 0.03 K/UL (ref 0–0.2)
BASOPHILS NFR BLD: 0.3 % (ref 0–1.9)
BNP SERPL-MCNC: 157 PG/ML (ref 0–99)
BUN SERPL-MCNC: 37 MG/DL (ref 8–23)
CALCIUM SERPL-MCNC: 10 MG/DL (ref 8.7–10.5)
CHLORIDE SERPL-SCNC: 112 MMOL/L (ref 95–110)
CO2 SERPL-SCNC: 16 MMOL/L (ref 23–29)
CREAT SERPL-MCNC: 3.4 MG/DL (ref 0.5–1.4)
DIFFERENTIAL METHOD BLD: ABNORMAL
EOSINOPHIL # BLD AUTO: 0.3 K/UL (ref 0–0.5)
EOSINOPHIL NFR BLD: 3.6 % (ref 0–8)
ERYTHROCYTE [DISTWIDTH] IN BLOOD BY AUTOMATED COUNT: 16.4 % (ref 11.5–14.5)
EST. GFR  (NO RACE VARIABLE): 18 ML/MIN/1.73 M^2
GLUCOSE SERPL-MCNC: 106 MG/DL (ref 70–110)
HCT VFR BLD AUTO: 37.5 % (ref 40–54)
HGB BLD-MCNC: 11.7 G/DL (ref 14–18)
IMM GRANULOCYTES # BLD AUTO: 0.19 K/UL (ref 0–0.04)
IMM GRANULOCYTES NFR BLD AUTO: 2.2 % (ref 0–0.5)
LYMPHOCYTES # BLD AUTO: 1.5 K/UL (ref 1–4.8)
LYMPHOCYTES NFR BLD: 17 % (ref 18–48)
MCH RBC QN AUTO: 25.6 PG (ref 27–31)
MCHC RBC AUTO-ENTMCNC: 31.2 G/DL (ref 32–36)
MCV RBC AUTO: 82 FL (ref 82–98)
MONOCYTES # BLD AUTO: 0.6 K/UL (ref 0.3–1)
MONOCYTES NFR BLD: 7.1 % (ref 4–15)
NEUTROPHILS # BLD AUTO: 6.1 K/UL (ref 1.8–7.7)
NEUTROPHILS NFR BLD: 69.8 % (ref 38–73)
NRBC BLD-RTO: 0 /100 WBC
PHOSPHATE SERPL-MCNC: 3.2 MG/DL (ref 2.7–4.5)
PLATELET # BLD AUTO: 186 K/UL (ref 150–450)
PMV BLD AUTO: 10.9 FL (ref 9.2–12.9)
POTASSIUM SERPL-SCNC: 4.4 MMOL/L (ref 3.5–5.1)
RBC # BLD AUTO: 4.57 M/UL (ref 4.6–6.2)
SIROLIMUS BLD-MCNC: 8 NG/ML (ref 4–20)
SODIUM SERPL-SCNC: 139 MMOL/L (ref 136–145)
WBC # BLD AUTO: 8.69 K/UL (ref 3.9–12.7)

## 2024-05-04 PROCEDURE — 25000003 PHARM REV CODE 250: Performed by: INTERNAL MEDICINE

## 2024-05-04 PROCEDURE — 94640 AIRWAY INHALATION TREATMENT: CPT

## 2024-05-04 PROCEDURE — 83880 ASSAY OF NATRIURETIC PEPTIDE: CPT | Performed by: INTERNAL MEDICINE

## 2024-05-04 PROCEDURE — 85025 COMPLETE CBC W/AUTO DIFF WBC: CPT | Performed by: INTERNAL MEDICINE

## 2024-05-04 PROCEDURE — 94660 CPAP INITIATION&MGMT: CPT

## 2024-05-04 PROCEDURE — 25000242 PHARM REV CODE 250 ALT 637 W/ HCPCS: Performed by: INTERNAL MEDICINE

## 2024-05-04 PROCEDURE — 99900035 HC TECH TIME PER 15 MIN (STAT)

## 2024-05-04 PROCEDURE — 80195 ASSAY OF SIROLIMUS: CPT | Performed by: INTERNAL MEDICINE

## 2024-05-04 PROCEDURE — 94761 N-INVAS EAR/PLS OXIMETRY MLT: CPT

## 2024-05-04 PROCEDURE — 25000003 PHARM REV CODE 250: Performed by: FAMILY MEDICINE

## 2024-05-04 PROCEDURE — 80069 RENAL FUNCTION PANEL: CPT | Performed by: INTERNAL MEDICINE

## 2024-05-04 PROCEDURE — 63600175 PHARM REV CODE 636 W HCPCS: Performed by: FAMILY MEDICINE

## 2024-05-04 PROCEDURE — 36415 COLL VENOUS BLD VENIPUNCTURE: CPT | Performed by: INTERNAL MEDICINE

## 2024-05-04 PROCEDURE — 63600175 PHARM REV CODE 636 W HCPCS: Performed by: INTERNAL MEDICINE

## 2024-05-04 PROCEDURE — 94799 UNLISTED PULMONARY SVC/PX: CPT

## 2024-05-04 RX ORDER — SODIUM BICARBONATE 650 MG/1
1950 TABLET ORAL 4 TIMES DAILY
Status: DISCONTINUED | OUTPATIENT
Start: 2024-05-04 | End: 2024-05-04 | Stop reason: HOSPADM

## 2024-05-04 RX ORDER — CIPROFLOXACIN 500 MG/1
500 TABLET ORAL DAILY
Qty: 3 TABLET | Refills: 0 | Status: SHIPPED | OUTPATIENT
Start: 2024-05-05 | End: 2024-05-08

## 2024-05-04 RX ORDER — SODIUM BICARBONATE 650 MG/1
1300 TABLET ORAL 3 TIMES DAILY
Qty: 180 TABLET | Refills: 3 | Status: SHIPPED | OUTPATIENT
Start: 2024-05-04 | End: 2024-05-04

## 2024-05-04 RX ORDER — FLUTICASONE FUROATE AND VILANTEROL 200; 25 UG/1; UG/1
1 POWDER RESPIRATORY (INHALATION) DAILY
Qty: 60 EACH | Refills: 3 | Status: SHIPPED | OUTPATIENT
Start: 2024-05-04

## 2024-05-04 RX ORDER — METOPROLOL SUCCINATE 100 MG/1
100 TABLET, EXTENDED RELEASE ORAL DAILY
Qty: 90 TABLET | Refills: 3 | Status: SHIPPED | OUTPATIENT
Start: 2024-05-04 | End: 2025-05-04

## 2024-05-04 RX ORDER — LOPERAMIDE HYDROCHLORIDE 2 MG/1
2 CAPSULE ORAL 4 TIMES DAILY PRN
Qty: 10 CAPSULE | Refills: 0 | Status: SHIPPED | OUTPATIENT
Start: 2024-05-04 | End: 2024-05-14

## 2024-05-04 RX ORDER — NIFEDIPINE 30 MG/1
30 TABLET, EXTENDED RELEASE ORAL DAILY
Qty: 30 TABLET | Refills: 3 | Status: SHIPPED | OUTPATIENT
Start: 2024-05-04 | End: 2024-05-31 | Stop reason: SDUPTHER

## 2024-05-04 RX ORDER — HYDRALAZINE HYDROCHLORIDE 100 MG/1
100 TABLET, FILM COATED ORAL EVERY 8 HOURS
Qty: 90 TABLET | Refills: 11 | Status: SHIPPED | OUTPATIENT
Start: 2024-05-04 | End: 2025-05-04

## 2024-05-04 RX ORDER — CINACALCET 30 MG/1
30 TABLET, FILM COATED ORAL
Status: DISCONTINUED | OUTPATIENT
Start: 2024-05-04 | End: 2024-05-04 | Stop reason: HOSPADM

## 2024-05-04 RX ORDER — NIFEDIPINE 30 MG/1
30 TABLET, EXTENDED RELEASE ORAL DAILY
Status: DISCONTINUED | OUTPATIENT
Start: 2024-05-04 | End: 2024-05-04 | Stop reason: HOSPADM

## 2024-05-04 RX ORDER — SODIUM BICARBONATE 650 MG/1
1950 TABLET ORAL 4 TIMES DAILY
Qty: 360 TABLET | Refills: 3 | Status: SHIPPED | OUTPATIENT
Start: 2024-05-04 | End: 2024-05-31 | Stop reason: SDUPTHER

## 2024-05-04 RX ORDER — CINACALCET 30 MG/1
30 TABLET, FILM COATED ORAL
Qty: 30 TABLET | Refills: 3 | Status: SHIPPED | OUTPATIENT
Start: 2024-05-04 | End: 2024-05-31 | Stop reason: SDUPTHER

## 2024-05-04 RX ADMIN — CIPROFLOXACIN 500 MG: 500 TABLET ORAL at 08:05

## 2024-05-04 RX ADMIN — HYDRALAZINE HYDROCHLORIDE 100 MG: 25 TABLET, FILM COATED ORAL at 01:05

## 2024-05-04 RX ADMIN — FERROUS SULFATE TAB 325 MG (65 MG ELEMENTAL FE) 1 EACH: 325 (65 FE) TAB at 08:05

## 2024-05-04 RX ADMIN — METOPROLOL SUCCINATE 100 MG: 50 TABLET, EXTENDED RELEASE ORAL at 08:05

## 2024-05-04 RX ADMIN — LOPERAMIDE HYDROCHLORIDE 2 MG: 2 CAPSULE ORAL at 03:05

## 2024-05-04 RX ADMIN — SODIUM BICARBONATE 650 MG TABLET 1300 MG: at 08:05

## 2024-05-04 RX ADMIN — NIFEDIPINE 30 MG: 30 TABLET, FILM COATED, EXTENDED RELEASE ORAL at 03:05

## 2024-05-04 RX ADMIN — IPRATROPIUM BROMIDE AND ALBUTEROL SULFATE 3 ML: 2.5; .5 SOLUTION RESPIRATORY (INHALATION) at 11:05

## 2024-05-04 RX ADMIN — LACTOBACILLUS TAB 1 TABLET: TAB at 08:05

## 2024-05-04 RX ADMIN — MUPIROCIN: 20 OINTMENT TOPICAL at 08:05

## 2024-05-04 RX ADMIN — MUPIROCIN: 20 OINTMENT TOPICAL at 02:05

## 2024-05-04 RX ADMIN — SODIUM BICARBONATE 650 MG TABLET 1300 MG: at 01:05

## 2024-05-04 RX ADMIN — PREDNISONE 10 MG: 10 TABLET ORAL at 08:05

## 2024-05-04 RX ADMIN — FLUTICASONE FUROATE AND VILANTEROL TRIFENATATE 1 PUFF: 100; 25 POWDER RESPIRATORY (INHALATION) at 07:05

## 2024-05-04 RX ADMIN — IPRATROPIUM BROMIDE AND ALBUTEROL SULFATE 3 ML: 2.5; .5 SOLUTION RESPIRATORY (INHALATION) at 03:05

## 2024-05-04 RX ADMIN — APIXABAN 2.5 MG: 2.5 TABLET, FILM COATED ORAL at 08:05

## 2024-05-04 RX ADMIN — CINACALCET HYDROCHLORIDE 30 MG: 30 TABLET, FILM COATED ORAL at 03:05

## 2024-05-04 RX ADMIN — HYDRALAZINE HYDROCHLORIDE 100 MG: 25 TABLET, FILM COATED ORAL at 06:05

## 2024-05-04 RX ADMIN — SIROLIMUS 3 MG: 1 TABLET ORAL at 08:05

## 2024-05-04 RX ADMIN — Medication 1 CAPSULE: at 08:05

## 2024-05-04 RX ADMIN — IPRATROPIUM BROMIDE AND ALBUTEROL SULFATE 3 ML: 2.5; .5 SOLUTION RESPIRATORY (INHALATION) at 07:05

## 2024-05-04 RX ADMIN — SODIUM BICARBONATE 650 MG TABLET 1950 MG: at 04:05

## 2024-05-04 NOTE — ASSESSMENT & PLAN NOTE
Body mass index is 33.75 kg/m². Obesity complicates all aspects of disease management from diagnostic modalities to treatment.

## 2024-05-04 NOTE — NURSING
AVS reviewed with patient by virtual nurse. Verbalized understanding of upcoming appointments and home medications. IV's removed. Voice no concerns. W/c transport to escort patient and belongings to main lobby.

## 2024-05-04 NOTE — PLAN OF CARE
"1400  Patient awake & alert sitting in the recliner when CM rounded via [employer]. No family present. Pt stated that he feels "back to normal" & doesn't think he needs SNF placement any longer. CM informed the pt that he has been accepted by [facility]     Message sent to Dr Barnhart informing of above & requesting HH orders.     1420  DC & HH orders noted. Patient in agreement with plan to discharge home with HH today, denied the need for assistance with transportation at time of discharge, & verbalized understanding regarding the hospital follow up appointments with [facility] & Dr Alexis.    CM informed Clint ([phone]) with [facility]- of [facility] & pt's discharge status.     Message sent to nurse Alejo & virtual nurse Bess informing that the pt is cleared to discharge.      Will continue to follow.     "

## 2024-05-04 NOTE — PLAN OF CARE
Patient resting in bed, AAOx4. IVF infusing as ordered. Medications administered as ordered. No complaints of pain or discomfort. Patient asleep on and off through the night. Encouraged to call with needs or concerns. Will continue to monitor.

## 2024-05-04 NOTE — ASSESSMENT & PLAN NOTE
Chest xray with RLL opacities suggestive of pneumonia. Initiated on vanc/zosyn/azithro, hydrocortisone.  Sputum Cultures with pseudomonas  Continue supplemental O2 - wean as tolerated.  Antibiotics weaned to cipro.

## 2024-05-04 NOTE — PROGRESS NOTES
Progress Note  Nephrology      Consult Requested By: Jael Barnhart MD  Reason for Consult: CHANG    SUBJECTIVE:     Review of Systems   Constitutional:  Negative for chills and fever.   Respiratory:  Negative for cough and shortness of breath.    Cardiovascular:  Negative for chest pain and leg swelling.   Gastrointestinal:  Negative for nausea.           OBJECTIVE:     Medications:  Current Facility-Administered Medications   Medication Dose Route Frequency    albuterol-ipratropium  3 mL Nebulization Q4H WAKE    apixaban  2.5 mg Oral BID    atorvastatin  20 mg Oral QHS    ciprofloxacin HCl  500 mg Oral Daily    cloNIDine 0.3 mg/24 hr td ptwk  1 patch Transdermal Q7 Days    ergocalciferol  50,000 Units Oral Q7 Days    ferrous sulfate  1 tablet Oral BID    fluticasone furoate-vilanteroL  1 puff Inhalation Daily    hydrALAZINE  100 mg Oral Q8H    Lactobacillus acidoph-L.bulgar  1 tablet Oral Daily    metoprolol succinate  100 mg Oral Daily    mupirocin   Nasal TID    predniSONE  10 mg Oral Daily    sirolimus  3 mg Oral Daily    sodium bicarbonate  1,300 mg Oral QID    vitamin renal formula (B-complex-vitamin c-folic acid)  1 capsule Oral Daily     Current Facility-Administered Medications   Medication Dose Route Frequency Last Rate Last Admin     Vitals:    05/04/24 1152   BP: (!) 166/77   Pulse: 70   Resp: 20   Temp: 98 °F (36.7 °C)     I/O last 3 completed shifts:  In: 1737.6 [I.V.:1737.6]  Out: 1200 [Urine:1200]  Physical Exam  Constitutional:       General: He is not in acute distress.     Appearance: He is well-developed. He is not diaphoretic.   HENT:      Head: Normocephalic and atraumatic.   Eyes:      General: No scleral icterus.  Neck:      Vascular: No JVD.   Cardiovascular:      Rate and Rhythm: Regular rhythm.      Heart sounds: No murmur heard.     No friction rub.   Pulmonary:      Effort: Pulmonary effort is normal. No respiratory distress.      Breath sounds: Normal breath sounds. No wheezing or  rales.   Abdominal:      General: Bowel sounds are normal. There is no distension.      Palpations: Abdomen is soft.      Tenderness: There is no abdominal tenderness.   Musculoskeletal:      Cervical back: Neck supple.   Skin:     General: Skin is warm and dry.      Findings: No erythema or rash.   Neurological:      Mental Status: He is alert and oriented to person, place, and time.       Laboratory:  Recent Labs   Lab 04/29/24  0448 05/01/24  1141 05/04/24  0828   WBC 18.62* 10.19 8.69   HGB 12.0* 11.3* 11.7*   HCT 37.4* 35.0* 37.5*   * 112* 186   MONO 3.8*  0.7 8.6  0.9 7.1  0.6   EOSINOPHIL 0.1 0.4 3.6     Recent Labs   Lab 05/02/24  0515 05/03/24  0434 05/04/24  0755    139 139   K 3.5 3.7 4.4   * 110 112*   CO2 17* 19* 16*   BUN 37* 41* 37*   CREATININE 3.6* 3.8* 3.4*   CALCIUM 10.0 10.0 10.0   PHOS 3.1 3.6 3.2     Labs reviewed  Diagnostic Results:  X-Ray: Reviewed  US: Reviewed  Echo: Reviewed      ASSESSMENT/PLAN:   1. 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 HTN (I10) - uncontrolled, patient on clonidine patch 0.3, hydralazine 100 t.i.d., metoprolol 100 daily    Add nifedipine 30 daily      Temp:  [97.9 °F (36.6 °C)-98.3 °F (36.8 °C)]   Pulse:  [64-78]   Resp:  [18-20]   BP: (148-166)/(77-82)   SpO2:  [93 %-98 %]     3. Anemia of chronic kidney disease at goal, not on Epogen   Recent Labs   Lab 04/29/24  0448 05/01/24  1141  "05/04/24  0828   HGB 12.0* 11.3* 11.7*   HCT 37.4* 35.0* 37.5*   * 112* 186     Lab Results   Component Value Date    IRON 15 (L) 04/27/2024    TIBC 138 (L) 04/27/2024    FERRITIN 979 (H) 04/27/2024       4. MBD (E88.9 M90.80) - phosphorus goal, not on binders, continue ergocalciferol 21589 units daily, hypercalcemia with secondary hyperparathyroidism of renal origin noted, add Sensipar 30 daily  Recent Labs   Lab 04/30/24  0443 05/01/24  0455 05/04/24  0755   .8*  --   --    CALCIUM 10.0   < > 10.0   PHOS 2.3*   < > 3.2    < > = values in this interval not displayed.     Recent Labs   Lab 04/29/24  0448   MG 2.3     Lab Results   Component Value Date    AQVAAIIL68FF 25 (L) 05/01/2024     5. Acidosis - uncontrolled, increase sodium bicarbonate to 1950 q.i.d.    Recent Labs   Lab 05/02/24  0515 05/03/24  0434 05/04/24  0755   * 110 112*   CO2 17* 19* 16*       6. Hyperuricemia - check uric acid  No results found for: "URICACID"    7. Nutrition/Hypoalbuminemia (E88.09) -   Recent Labs   Lab 05/03/24  0434 05/04/24  0755   ALBUMIN 2.4* 2.6*     Nepro with meals TID.     Thank you for allowing me to participate in the care of your patients  With any question please call [phone]  Ilene Mayers    [facility]  LEXIE Kelly MD, FACP,   LESLY Sanchez MD,   MD BIJAL Vernon, NP  [address]  [place], [address] [phone]        "

## 2024-05-04 NOTE — PLAN OF CARE
[facility]      HOME HEALTH ORDERS  FACE TO FACE ENCOUNTER    Patient Name: Jose Luis Manzo  YOB: 1950    PCP: Home Alexis MD   PCP Address: [address] [place] [address] / [place] [facility]  PCP Phone Number: [phone]  PCP Fax: [phone]    Encounter Date: 4/26/24    Admit to Home Health    Diagnoses:  Active Hospital Problems    Diagnosis  POA    *Pseudomonas pneumonia [J15.1]  Yes    Acute respiratory failure with hypoxia [J96.01]  Yes    Class 1 obesity due to excess calories with serious comorbidity and body mass index (BMI) of 34.0 to 34.9 in adult [E66.09, Z68.34]  Not Applicable    Immunosuppressive management encounter following kidney transplant [Z79.899, Z94.0]  Not Applicable    Vitamin D deficiency disease [E55.9]  Yes    History of DVT (deep vein thrombosis) [Z86.718]  Not Applicable     Previous DVT treated at [facility] (unknown leg)    R leg iliofemoral DVT with edema     Ultrasound 6/21/2016:   R CFV DVT   L CFV/femoral/POP non-occlusive chronic DVT      Findings are concerning bilateral iliac and caval stenosis      Renal transplant recipient [Z94.0]  Not Applicable     2007 at [facility]      Essential hypertension [I10]  Yes    Mixed hyperlipidemia [E78.2]  Yes    Stage 4 chronic kidney disease [N18.4]  Yes     Overview:   updated diagnosis code & description  dx update        Resolved Hospital Problems   No resolved problems to display.       Follow Up Appointments:  Future Appointments   Date Time Provider Department Center   5/9/2024  2:40 PM LAB, [facility] RVPH LAB [facility]   5/16/2024  3:00 PM Jonas Mtz MD [facility] NEPHRO [facility]   5/31/2024  1:40 [facility]   6/19/2024 11:45 AM Giuliano Bro, DPM [facility] PODIA [place]       Allergies:Review of patient's allergies indicates:  No Known Allergies    Medications: Review discharge medications with patient and family and provide education.    Current  Facility-Administered Medications   Medication Dose Route Frequency Provider Last Rate Last Admin    0.9%  NaCl infusion   Intravenous PRN Milton Caldwell MD   Stopped at 05/01/24 0907    acetaminophen tablet 650 mg  650 mg Oral Q8H PRN Milton Caldwell MD        albuterol-ipratropium 2.5 mg-0.5 mg/3 mL nebulizer solution 3 mL  3 mL Nebulization Q4H WAKE Milton Caldwell MD   3 mL at 05/04/24 1126    albuterol-ipratropium 2.5 mg-0.5 mg/3 mL nebulizer solution 3 mL  3 mL Nebulization Q6H PRN Kyree Ramos MD   3 mL at 05/02/24 0121    apixaban tablet 2.5 mg  2.5 mg Oral BID Milton Caldwell MD   2.5 mg at 05/04/24 0839    atorvastatin tablet 20 mg  20 mg Oral QHS Milton Caldwell MD   20 mg at 05/03/24 2023    ciprofloxacin HCl tablet 500 mg  500 mg Oral Daily Doris Patton MD   500 mg at 05/04/24 0839    cloNIDine 0.3 mg/24 hr td ptwk 1 patch  1 patch Transdermal Q7 Days Doris Patton MD   1 patch at 05/02/24 1334    dextrose 10% bolus 125 mL 125 mL  12.5 g Intravenous PRN Milton Caldwell MD        dextrose 10% bolus 250 mL 250 mL  25 g Intravenous PRN Milton Caldwell MD        ergocalciferol capsule 50,000 Units  50,000 Units Oral Q7 Days Jael Barnhart MD   50,000 Units at 05/03/24 1414    ferrous sulfate tablet 1 each  1 tablet Oral BID Tara Young MD   1 each at 05/04/24 0839    fluticasone furoate-vilanteroL 100-25 mcg/dose diskus inhaler 1 puff  1 puff Inhalation Daily Milton Caldwell MD   1 puff at 05/04/24 0714    glucagon (human recombinant) injection 1 mg  1 mg Intramuscular PRN Milton Caldwell MD        glucose chewable tablet 16 g  16 g Oral PRN Milton Caldwell MD        glucose chewable tablet 24 g  24 g Oral PRN Milton Caldwell MD        hydrALAZINE tablet 100 mg  100 mg Oral Q8H Kyree Ramos MD   100 mg at 05/04/24 1352    Lactobacillus acidoph-L.jessicagar 1 million cell tablet 1 tablet  1 tablet Oral Daily Tara Young MD   1  tablet at 05/04/24 0839    loperamide capsule 2 mg  2 mg Oral QID PRN Doris Patton MD   2 mg at 05/01/24 0403    melatonin tablet 6 mg  6 mg Oral Nightly PRN Milton Caldwell MD        metoprolol succinate (TOPROL-XL) 24 hr tablet 100 mg  100 mg Oral Daily Kyree Ramos MD   100 mg at 05/04/24 0839    mupirocin 2 % ointment   Nasal TID Doris Patton MD   Given at 05/04/24 0851    naloxone 0.4 mg/mL injection 0.02 mg  0.02 mg Intravenous PRN Milton Caldwell MD        ondansetron injection 4 mg  4 mg Intravenous Q8H PRN Milton Caldwell MD        predniSONE tablet 10 mg  10 mg Oral Daily Kyree Ramos MD   10 mg at 05/04/24 0840    simethicone chewable tablet 80 mg  1 tablet Oral QID PRN Milton Caldwell MD        sirolimus tablet 3 mg  3 mg Oral Daily Luz Sanchez MD   3 mg at 05/04/24 0839    sodium bicarbonate tablet 1,300 mg  1,300 mg Oral QID Luz Sanchez MD   1,300 mg at 05/04/24 1352    sodium chloride 0.9% flush 10 mL  10 mL Intravenous Q12H PRN Milton Caldwell MD        vitamin renal formula (B-complex-vitamin c-folic acid) 1 mg per capsule 1 capsule  1 capsule Oral Daily Luz Sanchez MD   1 capsule at 05/04/24 0839     Current Discharge Medication List        START taking these medications    Details   ciprofloxacin HCl (CIPRO) 500 MG tablet Take 1 tablet (500 mg total) by mouth Daily. for 3 days  Qty: 3 tablet, Refills: 0      fluticasone furoate-vilanteroL (BREO ELLIPTA) 200-25 mcg/dose DsDv diskus inhaler Inhale 1 puff into the lungs once daily. Controller  Qty: 60 each, Refills: 3      hydrALAZINE (APRESOLINE) 100 MG tablet Take 1 tablet (100 mg total) by mouth every 8 (eight) hours.  Qty: 90 tablet, Refills: 11    Comments: .      loperamide (IMODIUM) 2 mg capsule Take 1 capsule (2 mg total) by mouth 4 (four) times daily as needed for Diarrhea.  Qty: 10 capsule, Refills: 0      vitamin renal formula, B-complex-vitamin c-folic acid, (NEPHROCAP) 1  mg Cap Take 1 capsule by mouth once daily.  Qty: 30 each, Refills: 0           CONTINUE these medications which have CHANGED    Details   metoprolol succinate (TOPROL-XL) 100 MG 24 hr tablet Take 1 tablet (100 mg total) by mouth once daily.  Qty: 90 tablet, Refills: 3    Comments: .      sodium bicarbonate 650 MG tablet Take 2 tablets (1,300 mg total) by mouth 3 (three) times daily.  Qty: 180 tablet, Refills: 3    Associated Diagnoses: Metabolic acidosis           CONTINUE these medications which have NOT CHANGED    Details   atorvastatin (LIPITOR) 20 MG tablet TAKE 1 TABLET EVERY EVENING  Qty: 90 tablet, Refills: 3    Associated Diagnoses: Mixed hyperlipidemia      cloNIDine 0.3 mg/24 hr td ptwk (CATAPRES) 0.3 mg/24 hr PLACE 1 PATCH ONTO THE SKIN ONCE A WEEK.  Qty: 12 patch, Refills: 10    Comments: .  Associated Diagnoses: Chronic kidney disease, stage IV (severe)      ELIQUIS 2.5 mg Tab TAKE 1 TABLET TWICE DAILY  Qty: 180 tablet, Refills: 3    Associated Diagnoses: Acute deep vein thrombosis (DVT) of lower extremity, unspecified laterality, unspecified vein      ergocalciferol (ERGOCALCIFEROL) 50,000 unit Cap TAKE 1 CAPSULE BY MOUTH EVERY 7 DAYS.  Qty: 12 capsule, Refills: 3    Associated Diagnoses: Vitamin D deficiency      ferrous sulfate 325 mg (65 mg iron) Tab tablet Take 325 mg by mouth once daily.      finasteride (PROSCAR) 5 mg tablet TAKE 1 TABLET EVERY DAY  Qty: 90 tablet, Refills: 3    Associated Diagnoses: Benign prostatic hyperplasia, unspecified whether lower urinary tract symptoms present      fluticasone propionate (FLONASE) 50 mcg/actuation nasal spray 1 spray (50 mcg total) by Each Nostril route nightly.  Qty: 10 mL, Refills: 5    Associated Diagnoses: Post-nasal drip      nitroGLYCERIN (NITROSTAT) 0.4 MG SL tablet Place 1 tablet (0.4 mg total) under the tongue every 5 (five) minutes as needed for Chest pain.  Qty: 30 tablet, Refills: 2    Associated Diagnoses: Chest pain, unspecified type       predniSONE (DELTASONE) 5 MG tablet TAKE 1 TABLET EVERY DAY  Qty: 90 tablet, Refills: 3    Associated Diagnoses: Renal transplant recipient      sirolimus (RAPAMUNE) 1 MG Tab Take 3 tablets (3 mg total) by mouth once daily.  Qty: 90 tablet, Refills: 11    Associated Diagnoses: Immunosuppressive management encounter following kidney transplant      traMADoL (ULTRAM) 50 mg tablet Take 1 tablet (50 mg total) by mouth every 6 (six) hours as needed for Pain.  Qty: 28 tablet, Refills: 0    Comments: Quantity prescribed more than 7 day supply? No  Associated Diagnoses: Ulcer of right foot with fat layer exposed           STOP taking these medications       amoxicillin-clavulanate 875-125mg (AUGMENTIN) 875-125 mg per tablet Comments:   Reason for Stopping:         doxycycline (VIBRAMYCIN) 100 MG Cap Comments:   Reason for Stopping:         erythromycin (ROMYCIN) ophthalmic ointment Comments:   Reason for Stopping:         furosemide (LASIX) 40 MG tablet Comments:   Reason for Stopping:         losartan (COZAAR) 25 MG tablet Comments:   Reason for Stopping:                 I have seen and examined this patient within the last 30 days. My clinical findings that support the need for the home health skilled services and home bound status are the following:no   Weakness/numbness causing balance and gait disturbance due to Infection and Weakness/Debility making it taxing to leave home.     Diet:   renal diet    Labs:  SN to perform labs:    CBC, CMP every Monday for 2 weeks.  Fax results to PCP and Nephrology, Dr. Cristobal, Fax:[phone]    Referrals/ Consults  Physical Therapy to evaluate and treat. Evaluate for home safety and equipment needs; Establish/upgrade home exercise program. Perform / instruct on therapeutic exercises, gait training, transfer training, and Range of Motion.  Occupational Therapy to evaluate and treat. Evaluate home environment for safety and equipment needs. Perform/Instruct on transfers, ADL  training, ROM, and therapeutic exercises.  Aide to provide assistance with personal care, ADLs, and vital signs.    Activities:   activity as tolerated    Nursing:   Agency to admit patient within 24 hours of hospital discharge unless specified on physician order or at patient request    SN to complete comprehensive assessment including routine vital signs. Instruct on disease process and s/s of complications to report to MD. Review/verify medication list sent home with the patient at time of discharge  and instruct patient/caregiver as needed. Frequency may be adjusted depending on start of care date.     Skilled nurse to perform up to 3 visits PRN for symptoms related to diagnosis    Notify MD if SBP > 160 or < 90; DBP > 90 or < 50; HR > 120 or < 50; Temp > 101; O2 < 88%    Ok to schedule additional visits based on staff availability and patient request on consecutive days within the home health episode.    When multiple disciplines ordered:    Start of Care occurs on Sunday - Wednesday schedule remaining discipline evaluations as ordered on separate consecutive days following the start of care.    Thursday SOC -schedule subsequent evaluations Friday and Monday the following week.     Friday - Saturday SOC - schedule subsequent discipline evaluations on consecutive days starting Monday of the following week.    For all post-discharge communication and subsequent orders please contact patient's primary care physician. If unable to reach primary care physician or do not receive response within 30 minutes, please contact [phone] for clinical staff order clarification    Miscellaneous   Routine Skin for Bedridden Patients: Instruct patient/caregiver to apply moisture barrier cream to all skin folds and wet areas in perineal area daily and after baths and all bowel movements.    Home Health Frequency:  Nursing Three times weekly, Physical Therapy Three times weekly, Occupational Therapy Three times weekly, and  Home Health Aide Three times weekly    Wound Care Orders  no    I certify that this patient is confined to his home and needs intermittent skilled nursing care, physical therapy, and occupational therapy.    Jael Barnhart MD

## 2024-05-04 NOTE — PLAN OF CARE
Problem: Adult Inpatient Plan of Care  Goal: Plan of Care Review  2024 1615 by Bess Bond RN  Outcome: Met  2024 0836 by Bess Bond RN  Outcome: Progressing  Goal: Patient-Specific Goal (Individualized)  Outcome: Met  Goal: Absence of Hospital-Acquired Illness or Injury  Outcome: Met  Goal: Optimal Comfort and Wellbeing  Outcome: Met  Goal: Readiness for Transition of Care  Outcome: Met     Problem: Pneumonia  Goal: Fluid Balance  Outcome: Met  Goal: Resolution of Infection Signs and Symptoms  Outcome: Met  Goal: Effective Oxygenation and Ventilation  Outcome: Met     Problem: Occupational Therapy  Goal: Occupational Therapy Goal  Description: Goals to be met by:      Patient will increase functional independence with ADLs by performing:    UE Dressing with Supervision.  LE Dressing with Supervision.  Grooming while standing at sink with Supervision.  Toileting from toilet with Supervision for hygiene and clothing management.   Toilet transfer to toilet with Supervision.  Increased functional strength to WFL for ADLs.    Outcome: Met     Problem: Physical Therapy  Goal: Physical Therapy Goal  Description: Goals to be met by: 2024     Patient will increase functional independence with mobility by performin. Sit to stand transfer with [place]  2. Bed to chair transfer with [place] using No Assistive Device  3. Gait  x 150 feet with [place] using No Assistive Device.   4. Stand for 20 minutes with Winkler using No Assistive Device    Outcome: Met     Problem: Infection  Goal: Absence of Infection Signs and Symptoms  Outcome: Met     Problem: Pulmonary Impairment  Goal: Improved Activity Tolerance  Outcome: Met  Goal: Effective Airway Clearance  Outcome: Met  Goal: Optimal Gas Exchange  Outcome: Met     Problem: Comorbidity Management  Goal: Blood Pressure in Desired Range  Outcome: Met  Goal: Bariatric Home Regimen Maintained  Outcome: Met     Problem: Functional  Deficit  Goal: Improved Balance and Postural Control  Outcome: Met  Goal: Optimal Coordination  Outcome: Met  Goal: Improved Muscle Strength  Outcome: Met  Goal: Improved Muscle Tone  Outcome: Met  Goal: Optimal Range of Motion  Outcome: Met     Problem: Pain Acute  Goal: Optimal Pain Control and Function  Outcome: Met     Problem: Fall Injury Risk  Goal: Absence of Fall and Fall-Related Injury  Outcome: Met     Problem: Skin Injury Risk Increased  Goal: Skin Health and Integrity  Outcome: Met

## 2024-05-04 NOTE — ASSESSMENT & PLAN NOTE
Chronic, uncontrolled. Latest blood pressure and vitals reviewed-     Temp:  [97.2 °F (36.2 °C)-98.7 °F (37.1 °C)]   Pulse:  [67-83]   Resp:  [18-24]   BP: (142-165)/(77-86)   SpO2:  [93 %-99 %] .   Home meds for hypertension were reviewed and noted below.   Hypertension Medications               cloNIDine 0.3 mg/24 hr td ptwk (CATAPRES) 0.3 mg/24 hr PLACE 1 PATCH ONTO THE SKIN ONCE A WEEK.    furosemide (LASIX) 40 MG tablet TAKE 1 TABLET EVERY DAY    losartan (COZAAR) 25 MG tablet Take 1 tablet (25 mg total) by mouth once daily.    metoprolol succinate (TOPROL-XL) 50 MG 24 hr tablet TAKE 1 TABLET EVERY DAY    nitroGLYCERIN (NITROSTAT) 0.4 MG SL tablet Place 1 tablet (0.4 mg total) under the tongue every 5 (five) minutes as needed for Chest pain.            While in the hospital, will manage blood pressure as follows; Adjust home antihypertensive regimen as follows- continue metoprolol, start hydralazine. Trend.     Will utilize p.r.n. blood pressure medication only if patient's blood pressure greater than 180/110 and he develops symptoms such as worsening chest pain or shortness of breath.

## 2024-05-04 NOTE — PROGRESS NOTES
[facility] Medicine  Telemedicine Progress Note    Patient Name: Jose Luis Manzo  MRN: [id number]  Patient Class: IP- Inpatient   Admission Date: 4/26/2024  Length of Stay: 7 days  Attending Physician: Jael Barnhart MD  Primary Care Provider: Home Alexis MD          Subjective:     Principal Problem:Pseudomonas pneumonia        HPI:  Very pleasant 75 yo F presents with acute on chronic progressive dyspnea , hacking cough , afebrile , denied being on home oxygen , no sick contacts or travel, but is a former smoker.  Denied chest pain wheezing intermittent claudication. Recent abx use , lung disease prior. 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     Overview/Hospital Course:  No notes on file      This follow-up encounter was provided through telemedicine to address  Bacterial pneumonia present on admission.  Patient was transferred to the telemedicine service on:  05/03/2024   The [facility] location is: [facility] admitted 4/26/2024  8:56 AM.      Interval History/Overnight Events:   Clinical record since admit reviewed.    Patient is able to provide adequate history.    Patient denies dypsnea; occasional cough; he seated in the chair.  He remains with significant fatigue with out of bed activities.  He also had a poor appetite but is now eating more since yesterday.  Renal supplement ordered.  BM today.     Review of Systems   Constitutional:  Positive for activity change and fatigue. Negative for fever.   Respiratory:  Positive for cough. Negative for shortness of breath.    Gastrointestinal:  Negative for diarrhea and vomiting.          I have reviewed the following on 05/03/2024:    Data  Details     [x]   Lab results reviewed   Cr increased to 3.8; UA with prot, glucose and blood; glucoses 108; CO@ 19    [x]   Micro reports reviewed  Pseudomonas on sputum cx    []   Pathology reports reviewed      [x]    Imaging reports reviewed  US transplant kidney pending    []   Cardiology Procedure reports reviewed      []   Non- records/CareEverywhere notes reviewed      []  Tests/studies orders placed or verified       []  Independently viewed/assessed       []  05/03/2024 Discussion of:        Inpatient Medications reviewed and prescribed for management of current problems:  Scheduled Meds:  Current Facility-Administered Medications   Medication Dose Route Frequency    albuterol-ipratropium  3 mL Nebulization Q4H WAKE    apixaban  2.5 mg Oral BID    atorvastatin  20 mg Oral QHS    ciprofloxacin HCl  500 mg Oral Daily    cloNIDine 0.3 mg/24 hr td ptwk  1 patch Transdermal Q7 Days    ergocalciferol  50,000 Units Oral Q7 Days    ferrous sulfate  1 tablet Oral BID    fluticasone furoate-vilanteroL  1 puff Inhalation Daily    hydrALAZINE  100 mg Oral Q8H    Lactobacillus acidoph-L.bulgar  1 tablet Oral Daily    metoprolol succinate  100 mg Oral Daily    mupirocin   Nasal TID    predniSONE  10 mg Oral Daily    sirolimus  3 mg Oral Daily    sodium bicarbonate  1,300 mg Oral QID    vitamin renal formula (B-complex-vitamin c-folic acid)  1 capsule Oral Daily     Continuous Infusions:  Current Facility-Administered Medications   Medication Dose Route Frequency Last Rate Last Admin    lactated ringers   Intravenous Continuous 100 mL/hr at 05/03/24 1815 Rate Verify at 05/03/24 1815     PRN Meds:.  Current Facility-Administered Medications:     sodium chloride 0.9%, , Intravenous, PRN    acetaminophen, 650 mg, Oral, Q8H PRN    albuterol-ipratropium, 3 mL, Nebulization, Q6H PRN    dextrose 10%, 12.5 g, Intravenous, PRN    dextrose 10%, 25 g, Intravenous, PRN    glucagon (human recombinant), 1 mg, Intramuscular, PRN    glucose, 16 g, Oral, PRN    glucose, 24 g, Oral, PRN    loperamide, 2 mg, Oral, QID PRN    melatonin, 6 mg, Oral, Nightly PRN    naloxone, 0.02 mg, Intravenous, PRN    ondansetron, 4 mg, Intravenous, Q8H PRN     simethicone, 1 tablet, Oral, QID PRN    sodium chloride 0.9%, 10 mL, Intravenous, Q12H PRN      Objective:     Temp:  [97.2 °F (36.2 °C)-98.7 °F (37.1 °C)] 98 °F (36.7 °C)  Pulse:  [67-83] 77  Resp:  [18-24] 19  SpO2:  [93 %-99 %] 94 %  BP: (142-165)/(77-86) 161/80      Intake/Output Summary (Last 24 hours) at 5/3/2024 1945  Last data filed at 5/3/2024 1815  Gross per 24 hour   Intake 540.14 ml   Output 600 ml   Net -59.86 ml        Body mass index is 33.75 kg/m².    Physical Exam  Vitals and nursing note reviewed.   Constitutional:       General: He is not in acute distress.     Appearance: He is ill-appearing.   Cardiovascular:      Rate and Rhythm: Normal rate.   Pulmonary:      Effort: Pulmonary effort is normal. No accessory muscle usage or respiratory distress.   Neurological:      General: No focal deficit present.      Mental Status: He is alert. Mental status is at baseline.      Motor: Weakness (generalized) present.   Psychiatric:         Attention and Perception: Attention normal.         Speech: Speech normal.         Behavior: Behavior is cooperative.          Labs: All labs within the last 24 hours were reviewed.   Recent Results (from the past 24 hour(s))   Renal Function Panel    Collection Time: 05/03/24  4:34 AM   Result Value Ref Range    Glucose 108 70 - 110 mg/dL    Sodium 139 136 - 145 mmol/L    Potassium 3.7 3.5 - 5.1 mmol/L    Chloride 110 95 - 110 mmol/L    CO2 19 (L) 23 - 29 mmol/L    BUN 41 (H) 8 - 23 mg/dL    Calcium 10.0 8.7 - 10.5 mg/dL    Creatinine 3.8 (H) 0.5 - 1.4 mg/dL    Albumin 2.4 (L) 3.5 - 5.2 g/dL    Phosphorus 3.6 2.7 - 4.5 mg/dL    eGFR 16 (A) >60 mL/min/1.73 m^2    Anion Gap 10 8 - 16 mmol/L   Urinalysis    Collection Time: 05/03/24  2:47 PM   Result Value Ref Range    Specimen UA Urine, Clean Catch     Color, UA Yellow Yellow, Straw, Emperatriz    Appearance, UA Clear Clear    pH, UA 7.0 5.0 - 8.0    Specific Gravity, UA 1.020 1.005 - 1.030    Protein, UA 2+ (A) Negative     "Glucose, UA 1+ (A) Negative    Ketones, UA Negative Negative    Bilirubin (UA) Negative Negative    Occult Blood UA 1+ (A) Negative    Nitrite, UA Negative Negative    Urobilinogen, UA Negative <2.0 EU/dL    Leukocytes, UA Negative Negative   Sodium, urine, random    Collection Time: 05/03/24  2:47 PM   Result Value Ref Range    Sodium, Urine 101 20 - 250 mmol/L   Urinalysis Microscopic    Collection Time: 05/03/24  2:47 PM   Result Value Ref Range    RBC, UA 2 0 - 4 /hpf    WBC, UA 1 0 - 5 /hpf    Bacteria None None-Occ /hpf    Squam Epithel, UA 0 /hpf    Hyaline Casts, UA 0 0-1/lpf /lpf    Microscopic Comment SEE COMMENT         Lab Results   Component Value Date    [id number] Negative 04/26/2024       Recent Labs   Lab 04/27/24  0433 04/29/24  0448 05/01/24  1141   WBC 12.99* 18.62* 10.19   LYMPH 3.4*  0.4* 4.3*  0.8* 5.2*  0.5*   HGB 11.5* 12.0* 11.3*   HCT 37.2* 37.4* 35.0*   * 102* 112*     Recent Labs   Lab 04/27/24  0432 04/28/24  0433 04/29/24  0448 04/30/24  0443 05/01/24  0455 05/01/24  1141 05/02/24  0515 05/03/24  0434     138   < > 141  141   < > 141 139 141 139   K 4.7  4.7   < > 3.2*  3.2*   < > 3.6 3.7 3.5 3.7     110   < > 110  110   < > 110 111* 111* 110   CO2 18*  18*   < > 18*  18*   < > 18* 17* 17* 19*   BUN 38*  38*   < > 47*  47*   < > 38* 36* 37* 41*   CREATININE 3.6*  3.6*   < > 3.4*  3.4*   < > 3.6* 3.6* 3.6* 3.8*     109   < > 99  99   < > 92 128* 104 108   CALCIUM 9.1  9.1   < > 10.1  10.1   < > 10.2 10.0 10.0 10.0   MG 1.6  --  2.3  --   --   --   --   --    PHOS 3.8   < > 3.3  3.3   < > 2.5*  --  3.1 3.6    < > = values in this interval not displayed.     Recent Labs   Lab 05/01/24  0455 05/02/24  0515 05/03/24  0434   ALBUMIN 2.4* 2.3* 2.4*        No results for input(s): "DDIMER", "FERRITIN", "CRP", "LDH", "BNP", "TROPONINI", "CPK" in the last 72 hours.    Invalid input(s): "PROCALCITONIN"        Microbiology: All microbiology updates " for the past 24 hours have been reviewed.  Microbiology Results (last 7 days)       Procedure Component Value Units Date/Time    Blood culture x two cultures. Draw prior to antibiotics. [[id number]] Collected: 04/26/24 0905    Order Status: Completed Specimen: Blood from Peripheral, Upper Arm, Right Updated: 05/01/24 1812     Blood Culture, Routine No growth after 5 days.    Narrative:      Aerobic and anaerobic    Blood culture x two cultures. Draw prior to antibiotics. [[id number]] Collected: 04/26/24 1100    Order Status: Completed Specimen: Blood from Peripheral, Lower Arm, Right Updated: 05/01/24 1812     Blood Culture, Routine No growth after 5 days.    Narrative:      Aerobic and anaerobic    Culture, Respiratory with Gram Stain [[id number]]  (Abnormal)  (Susceptibility) Collected: 04/26/24 1738    Order Status: Completed Specimen: Respiratory from Sputum, Expectorated Updated: 04/29/24 1017     Respiratory Culture No S aureus isolated.      PSEUDOMONAS AERUGINOSA  Few  Normal respiratory ravindra also present       Gram Stain (Respiratory) <10 epithelial cells per low power field.     Gram Stain (Respiratory) Rare WBC's     Gram Stain (Respiratory) Moderate Gram negative diplococci     Gram Stain (Respiratory) Few Gram positive cocci     Gram Stain (Respiratory) Few Gram negative rods    Culture, MRSA [[id number]] Collected: 04/26/24 1315    Order Status: Completed Specimen: MRSA source from Nares, Left Updated: 04/28/24 0829     MRSA Surveillance Screen No MRSA isolated    Clostridium difficile EIA [[id number]] Collected: 04/28/24 0505    Order Status: Canceled Specimen: Stool               Imaging All imaging within the last 24 hours was reviewed.   ECG Results              EKG 12-lead (Final result)        Collection Time Result Time QRS Duration OHS QTC Calculation    04/26/24 09:03:10 04/26/24 17:59:30 124 484                     Final result by Interface, Lab In [facility] (04/26/24 17:59:40)                    Narrative:    Test Reason : R06.02,    Vent. Rate : 128 BPM     Atrial Rate : 128 BPM     P-R Int : 138 ms          QRS Dur : 124 ms      QT Int : 332 ms       P-R-T Axes : 043 -71 090 degrees     QTc Int : 484 ms    Sinus tachycardia  Left axis deviation  Nonspecific intraventricular conduction delay  ST and T wave abnormality, consider lateral ischemia  Abnormal ECG  When compared with ECG of 08-JUN-2022 11:21,  Vent. rate has increased BY  73 BPM  ST no longer depressed in Inferior leads  Nonspecific T wave abnormality no longer evident in Inferior leads  T wave inversion less evident in Lateral leads  Confirmed by Timothy Marie MD ([id number]) on 4/26/2024 5:59:28 PM    Referred By: [email or web address]   SELF           Confirmed By:Timothy Marie MD                                    Results for orders placed during the hospital encounter of 04/26/24    Echo    Interpretation Summary    Left Ventricle: The left ventricle is normal in size. Normal wall thickness. There is concentric hypertrophy. Unable to assess wall motion. There is normal systolic function. Biplane (2D) method of discs ejection fraction is 65%.    Right Ventricle: Right ventricle was not well visualized due to poor acoustic window. Normal right ventricular cavity size. Systolic function is normal.    Tricuspid Valve: There is mild regurgitation.    Pulmonary Artery: The estimated pulmonary artery systolic pressure is 28 mmHg.    IVC/SVC: Normal venous pressure at 3 mmHg.    Pericardium: There is a small effusion.    Overall the study quality was technically difficult.      Echo    Left Ventricle: The left ventricle is normal in size. Normal wall   thickness. There is concentric hypertrophy. Unable to assess wall motion.   There is normal systolic function. Biplane (2D) method of discs ejection   fraction is 65%.    Right Ventricle: Right ventricle was not well visualized due to poor   acoustic window. Normal right ventricular cavity size. Systolic  function   is normal.    Tricuspid Valve: There is mild regurgitation.    Pulmonary Artery: The estimated pulmonary artery systolic pressure is   28 mmHg.    IVC/SVC: Normal venous pressure at 3 mmHg.    Pericardium: There is a small effusion.    Overall the study quality was technically difficult.  CT Chest Without Contrast  Narrative: EXAMINATION:  CT CHEST WITHOUT CONTRAST    CLINICAL HISTORY:  Pleural effusion, malignancy suspected;Pneumonia, unresolved;    TECHNIQUE:  Low dose axial images, sagittal and coronal reformations were obtained from the thoracic inlet to the lung bases. Contrast was not administered.    COMPARISON:  Same day chest radiograph; CT chest 05/23/2024    FINDINGS:  LINES/TUBES:  None.    SOFT TISSUES: No axillary or subpectoral lymphadenopathy. The visualized thyroid gland is unremarkable.    HEART AND MEDIASTINUM: Diminutive appearance of the SVC, with venous collaterals in the chest wall and mediastinum and a prominent azygous vein.  No mediastinal or hilar lymphadenopathy.  Heart is normal in size.  Small volume pericardial fluid.  There are calcifications of the coronary arteries and thoracic aorta.  The main pulmonary artery is normal in size.    PLEURA: Small right pleural effusion.  No pneumothorax.    LUNGS AND AIRWAYS: Posterior bowing of the trachea suggesting expiratory phase imaging.  There are aerated secretions in the right mainstem and intermediate bronchus.  Motion artifact degrades evaluation of the lungs.  Paraseptal and centrilobular emphysema.  There are ground-glass and consolidative opacities with air bronchograms throughout the right lung, new from prior.  There are new ground-glass and solid nodular opacities in the left lung is well.  There are bandlike opacities in both lungs with traction bronchiectasis and architectural distortion, largest measuring 2.0 cm in the left upper lobe, compatible with scarring.    UPPER ABDOMEN: Multiple liver cysts measuring up to 3.1  cm.    BONES: No fracture or focal osseous lesion.  Impression: Motion degraded examination.    Aerated secretions in the right proximal airways.  New airspace opacities throughout the right lung.  New ground-glass and solid nodular opacities in the left lung.  These findings are concerning for aspiration or pneumonia.  Follow-up is recommended to ensure resolution.    Small right pleural effusion, likely reactive.    Signs of SVC syndrome.    Electronically signed by: Dae Serrano  Date:    04/26/2024  Time:    16:33  X-Ray Chest 1 View  Narrative: EXAMINATION:  XR CHEST 1 VIEW    CLINICAL HISTORY:  Shortness of breath;    TECHNIQUE:  Single frontal view of the chest was performed.    COMPARISON:  Chest radiograph 12/30/2020    FINDINGS:  Lines and tubes: None.    Heart and mediastinum: Normal in size.  Calcifications of the aortic arch.    Pleura: No pleural effusion or pneumothorax.    Lungs: Lung volumes are adequate.  There are patchy and confluent airspace opacities throughout the right lung, sparing the apex.  Patchy opacities are also seen in the left lower lung zone.  Findings are concerning for aspiration or pneumonia.    Soft tissue/bone: Surgical clips in the right axilla.  Scattered degenerative changes.  Impression: As above.    Electronically signed by: Dae Serrano  Date:    04/26/2024  Time:    10:07           Assessment/Plan:      * Pseudomonas pneumonia  Chest xray with RLL opacities suggestive of pneumonia. Initiated on vanc/zosyn/azithro, hydrocortisone.  Sputum Cultures with pseudomonas  Continue supplemental O2 - wean as tolerated.  Antibiotics weaned to cipro.         Acute respiratory failure with hypoxia  Patient with Hypoxic Respiratory failure which is Acute.  he is not on home oxygen. Supplemental oxygen was provided and noted-      Signs/symptoms of respiratory failure include-  continued O2 requirement . Contributing diagnoses includes - Aspiration and Pneumonia Labs and images were  reviewed. Patient Has recent ABG, which has been reviewed. Will treat underlying causes and adjust management of respiratory failure as follows- abx/ DUONEB/ IS/ Breo ellipta/ steroids   Wean oxygen as tolerated; 1L/min on 5/3/2024    Class 1 obesity due to excess calories with serious comorbidity and body mass index (BMI) of 34.0 to 34.9 in adult  Body mass index is 33.75 kg/m². Obesity complicates all aspects of disease management from diagnostic modalities to treatment.          Vitamin D deficiency disease  Continue ergocalciferol weekly.       Immunosuppressive management encounter following kidney transplant        History of DVT (deep vein thrombosis)  Continue Eliquis      Renal transplant recipient  Continue Sirolimus and Prednisone        Mixed hyperlipidemia  Continue statin       Essential hypertension  Chronic, uncontrolled. Latest blood pressure and vitals reviewed-     Temp:  [97.2 °F (36.2 °C)-98.7 °F (37.1 °C)]   Pulse:  [67-83]   Resp:  [18-24]   BP: (142-165)/(77-86)   SpO2:  [93 %-99 %] .   Home meds for hypertension were reviewed and noted below.   Hypertension Medications               cloNIDine 0.3 mg/24 hr td ptwk (CATAPRES) 0.3 mg/24 hr PLACE 1 PATCH ONTO THE SKIN ONCE A WEEK.    furosemide (LASIX) 40 MG tablet TAKE 1 TABLET EVERY DAY    losartan (COZAAR) 25 MG tablet Take 1 tablet (25 mg total) by mouth once daily.    metoprolol succinate (TOPROL-XL) 50 MG 24 hr tablet TAKE 1 TABLET EVERY DAY    nitroGLYCERIN (NITROSTAT) 0.4 MG SL tablet Place 1 tablet (0.4 mg total) under the tongue every 5 (five) minutes as needed for Chest pain.            While in the hospital, will manage blood pressure as follows; Adjust home antihypertensive regimen as follows- continue metoprolol, start hydralazine. Trend.     Will utilize p.r.n. blood pressure medication only if patient's blood pressure greater than 180/110 and he develops symptoms such as worsening chest pain or shortness of breath.    Stage 4  chronic kidney disease  Creatine stable for now. BMP reviewed- noted Estimated Creatinine Clearance: 20.9 mL/min (A) (based on SCr of 3.8 mg/dL (H)). according to latest data. Based on current GFR, CKD stage is stage 4 - GFR 15-29.  Monitor UOP and serial BMP and adjust therapy as needed. Renally dose meds. Avoid nephrotoxic medications and procedures.  Cr increasing, consulted nephrology who continue to follow (appreciated)  Continue am labs, avoid nephrotoxins      VTE Risk Mitigation (From admission, onward)           Ordered     apixaban tablet 2.5 mg  2 times daily         04/26/24 1115                      I have completed this tele-visit without the assistance of a telepresenter.    The attending portion of this evaluation, treatment, and documentation was performed per Jael Barnhart MD via Telemedicine AudioVisual using the secure [employer] software platform with 2 way audio/video. The provider was located off-site and the patient is located in the hospital. The aforementioned video software was utilized to document the relevant history and physical exam    Jael Barnhart MD  Department of Hospital Medicine   [facility]

## 2024-05-04 NOTE — PLAN OF CARE
[facility] - Med Surg  Discharge Final Note    Primary Care Provider: Home Alexis MD    Expected Discharge Date: 5/4/2024    Final Discharge Note (most recent)       Final Note - 05/04/24 1451          Final Note    Assessment Type Final Discharge Note (P)      Anticipated Discharge Disposition Home-Health Care Svc (P)    [facility] Resources/Appts/Education Provided Appointments scheduled and added to AVS (P)         Post-Acute Status    Post-Acute Authorization Home Health (P)      Home Health Status Set-up Complete/Auth obtained (P)    [facility]                    Contact Info       [facility]   Specialty: Home Health Services, Home Therapy Services, Home Living Aide Services    [address] [facility] [address]   Phone: [phone]       Next Steps: Follow up    Instructions: will provide home health services    [place] [address]  PH:  [phone]       Next Steps: Follow up on 5/15/2024    Instructions: at 8:00 AM; hospital follow up appointment. Patient needs to bring in photo ID, Ins Card, Hospital Discharge papers, and Medicine Bottles.    Home Alexis MD   Specialty: Internal Medicine   Relationship: PCP - General    [address] [address]   Phone: [phone]       Next Steps: Follow up on 5/31/2024    Instructions: at 1:40pm; PCP hospital follow up appointment

## 2024-05-04 NOTE — ASSESSMENT & PLAN NOTE
Patient with Hypoxic Respiratory failure which is Acute.  he is not on home oxygen. Supplemental oxygen was provided and noted-      Signs/symptoms of respiratory failure include-  continued O2 requirement . Contributing diagnoses includes - Aspiration and Pneumonia Labs and images were reviewed. Patient Has recent ABG, which has been reviewed. Will treat underlying causes and adjust management of respiratory failure as follows- abx/ DUONEB/ IS/ Breo ellipta/ steroids   Wean oxygen as tolerated; 1L/min on 5/3/2024

## 2024-05-04 NOTE — ASSESSMENT & PLAN NOTE
Creatine stable for now. BMP reviewed- noted Estimated Creatinine Clearance: 20.9 mL/min (A) (based on SCr of 3.8 mg/dL (H)). according to latest data. Based on current GFR, CKD stage is stage 4 - GFR 15-29.  Monitor UOP and serial BMP and adjust therapy as needed. Renally dose meds. Avoid nephrotoxic medications and procedures.  Cr increasing, consulted nephrology who continue to follow (appreciated)  Continue am labs, avoid nephrotoxins

## 2024-05-04 NOTE — SUBJECTIVE & OBJECTIVE
This follow-up encounter was provided through telemedicine to address  Bacterial pneumonia present on admission.  Patient was transferred to the telemedicine service on:  05/03/2024   The patient location is: K528/K528 A admitted 4/26/2024  8:56 AM.      Interval History/Overnight Events:   Clinical record since admit reviewed.    Patient is able to provide adequate history.    Patient denies dypsnea; occasional cough; he seated in the chair.  He remains with significant fatigue with out of bed activities.  He also had a poor appetite but is now eating more since yesterday.  Renal supplement ordered.  BM today.     Review of Systems   Constitutional:  Positive for activity change and fatigue. Negative for fever.   Respiratory:  Positive for cough. Negative for shortness of breath.    Gastrointestinal:  Negative for diarrhea and vomiting.          I have reviewed the following on 05/03/2024:    Data  Details     [x]   Lab results reviewed   Cr increased to 3.8; UA with prot, glucose and blood; glucoses 108; CO@ 19    [x]   Micro reports reviewed  Pseudomonas on sputum cx    []   Pathology reports reviewed      [x]   Imaging reports reviewed  US transplant kidney pending    []   Cardiology Procedure reports reviewed      []   Non- records/CareEverywhere notes reviewed      []  Tests/studies orders placed or verified       []  Independently viewed/assessed       []  05/03/2024 Discussion of:        Inpatient Medications reviewed and prescribed for management of current problems:  Scheduled Meds:  Current Facility-Administered Medications   Medication Dose Route Frequency    albuterol-ipratropium  3 mL Nebulization Q4H WAKE    apixaban  2.5 mg Oral BID    atorvastatin  20 mg Oral QHS    ciprofloxacin HCl  500 mg Oral Daily    cloNIDine 0.3 mg/24 hr td ptwk  1 patch Transdermal Q7 Days    ergocalciferol  50,000 Units Oral Q7 Days    ferrous sulfate  1 tablet Oral BID    fluticasone furoate-vilanteroL  1 puff  Inhalation Daily    hydrALAZINE  100 mg Oral Q8H    Lactobacillus acidoph-L.bulgar  1 tablet Oral Daily    metoprolol succinate  100 mg Oral Daily    mupirocin   Nasal TID    predniSONE  10 mg Oral Daily    sirolimus  3 mg Oral Daily    sodium bicarbonate  1,300 mg Oral QID    vitamin renal formula (B-complex-vitamin c-folic acid)  1 capsule Oral Daily     Continuous Infusions:  Current Facility-Administered Medications   Medication Dose Route Frequency Last Rate Last Admin    lactated ringers   Intravenous Continuous 100 mL/hr at 05/03/24 1815 Rate Verify at 05/03/24 1815     PRN Meds:.  Current Facility-Administered Medications:     sodium chloride 0.9%, , Intravenous, PRN    acetaminophen, 650 mg, Oral, Q8H PRN    albuterol-ipratropium, 3 mL, Nebulization, Q6H PRN    dextrose 10%, 12.5 g, Intravenous, PRN    dextrose 10%, 25 g, Intravenous, PRN    glucagon (human recombinant), 1 mg, Intramuscular, PRN    glucose, 16 g, Oral, PRN    glucose, 24 g, Oral, PRN    loperamide, 2 mg, Oral, QID PRN    melatonin, 6 mg, Oral, Nightly PRN    naloxone, 0.02 mg, Intravenous, PRN    ondansetron, 4 mg, Intravenous, Q8H PRN    simethicone, 1 tablet, Oral, QID PRN    sodium chloride 0.9%, 10 mL, Intravenous, Q12H PRN      Objective:     Temp:  [97.2 °F (36.2 °C)-98.7 °F (37.1 °C)] 98 °F (36.7 °C)  Pulse:  [67-83] 77  Resp:  [18-24] 19  SpO2:  [93 %-99 %] 94 %  BP: (142-165)/(77-86) 161/80      Intake/Output Summary (Last 24 hours) at 5/3/2024 1945  Last data filed at 5/3/2024 1815  Gross per 24 hour   Intake 540.14 ml   Output 600 ml   Net -59.86 ml        Body mass index is 33.75 kg/m².    Physical Exam  Vitals and nursing note reviewed.   Constitutional:       General: He is not in acute distress.     Appearance: He is ill-appearing.   Cardiovascular:      Rate and Rhythm: Normal rate.   Pulmonary:      Effort: Pulmonary effort is normal. No accessory muscle usage or respiratory distress.   Neurological:      General: No focal  deficit present.      Mental Status: He is alert. Mental status is at baseline.      Motor: Weakness (generalized) present.   Psychiatric:         Attention and Perception: Attention normal.         Speech: Speech normal.         Behavior: Behavior is cooperative.          Labs: All labs within the last 24 hours were reviewed.   Recent Results (from the past 24 hour(s))   Renal Function Panel    Collection Time: 05/03/24  4:34 AM   Result Value Ref Range    Glucose 108 70 - 110 mg/dL    Sodium 139 136 - 145 mmol/L    Potassium 3.7 3.5 - 5.1 mmol/L    Chloride 110 95 - 110 mmol/L    CO2 19 (L) 23 - 29 mmol/L    BUN 41 (H) 8 - 23 mg/dL    Calcium 10.0 8.7 - 10.5 mg/dL    Creatinine 3.8 (H) 0.5 - 1.4 mg/dL    Albumin 2.4 (L) 3.5 - 5.2 g/dL    Phosphorus 3.6 2.7 - 4.5 mg/dL    eGFR 16 (A) >60 mL/min/1.73 m^2    Anion Gap 10 8 - 16 mmol/L   Urinalysis    Collection Time: 05/03/24  2:47 PM   Result Value Ref Range    Specimen UA Urine, Clean Catch     Color, UA Yellow Yellow, Straw, Emperatriz    Appearance, UA Clear Clear    pH, UA 7.0 5.0 - 8.0    Specific Gravity, UA 1.020 1.005 - 1.030    Protein, UA 2+ (A) Negative    Glucose, UA 1+ (A) Negative    Ketones, UA Negative Negative    Bilirubin (UA) Negative Negative    Occult Blood UA 1+ (A) Negative    Nitrite, UA Negative Negative    Urobilinogen, UA Negative <2.0 EU/dL    Leukocytes, UA Negative Negative   Sodium, urine, random    Collection Time: 05/03/24  2:47 PM   Result Value Ref Range    Sodium, Urine 101 20 - 250 mmol/L   Urinalysis Microscopic    Collection Time: 05/03/24  2:47 PM   Result Value Ref Range    RBC, UA 2 0 - 4 /hpf    WBC, UA 1 0 - 5 /hpf    Bacteria None None-Occ /hpf    Squam Epithel, UA 0 /hpf    Hyaline Casts, UA 0 0-1/lpf /lpf    Microscopic Comment SEE COMMENT         Lab Results   Component Value Date    IBY45XLMOYPS Negative 04/26/2024       Recent Labs   Lab 04/27/24  0433 04/29/24  0448 05/01/24  1141   WBC 12.99* 18.62* 10.19   LYMPH 3.4*   "0.4* 4.3*  0.8* 5.2*  0.5*   HGB 11.5* 12.0* 11.3*   HCT 37.2* 37.4* 35.0*   * 102* 112*     Recent Labs   Lab 04/27/24 0432 04/28/24 0433 04/29/24 0448 04/30/24 0443 05/01/24 0455 05/01/24  1141 05/02/24 0515 05/03/24 0434     138   < > 141  141   < > 141 139 141 139   K 4.7  4.7   < > 3.2*  3.2*   < > 3.6 3.7 3.5 3.7     110   < > 110  110   < > 110 111* 111* 110   CO2 18*  18*   < > 18*  18*   < > 18* 17* 17* 19*   BUN 38*  38*   < > 47*  47*   < > 38* 36* 37* 41*   CREATININE 3.6*  3.6*   < > 3.4*  3.4*   < > 3.6* 3.6* 3.6* 3.8*     109   < > 99  99   < > 92 128* 104 108   CALCIUM 9.1  9.1   < > 10.1  10.1   < > 10.2 10.0 10.0 10.0   MG 1.6  --  2.3  --   --   --   --   --    PHOS 3.8   < > 3.3  3.3   < > 2.5*  --  3.1 3.6    < > = values in this interval not displayed.     Recent Labs   Lab 05/01/24 0455 05/02/24 0515 05/03/24 0434   ALBUMIN 2.4* 2.3* 2.4*        No results for input(s): "DDIMER", "FERRITIN", "CRP", "LDH", "BNP", "TROPONINI", "CPK" in the last 72 hours.    Invalid input(s): "PROCALCITONIN"        Microbiology: All microbiology updates for the past 24 hours have been reviewed.  Microbiology Results (last 7 days)       Procedure Component Value Units Date/Time    Blood culture x two cultures. Draw prior to antibiotics. [[id number]] Collected: 04/26/24 0905    Order Status: Completed Specimen: Blood from Peripheral, Upper Arm, Right Updated: 05/01/24 1812     Blood Culture, Routine No growth after 5 days.    Narrative:      Aerobic and anaerobic    Blood culture x two cultures. Draw prior to antibiotics. [[id number]] Collected: 04/26/24 1100    Order Status: Completed Specimen: Blood from Peripheral, Lower Arm, Right Updated: 05/01/24 1812     Blood Culture, Routine No growth after 5 days.    Narrative:      Aerobic and anaerobic    Culture, Respiratory with Gram Stain [[id number]]  (Abnormal)  (Susceptibility) Collected: 04/26/24 1738    " Order Status: Completed Specimen: Respiratory from Sputum, Expectorated Updated: 04/29/24 1017     Respiratory Culture No S aureus isolated.      PSEUDOMONAS AERUGINOSA  Few  Normal respiratory ravindra also present       Gram Stain (Respiratory) <10 epithelial cells per low power field.     Gram Stain (Respiratory) Rare WBC's     Gram Stain (Respiratory) Moderate Gram negative diplococci     Gram Stain (Respiratory) Few Gram positive cocci     Gram Stain (Respiratory) Few Gram negative rods    Culture, MRSA [[id number]] Collected: 04/26/24 1315    Order Status: Completed Specimen: MRSA source from Nares, Left Updated: 04/28/24 0829     MRSA Surveillance Screen No MRSA isolated    Clostridium difficile EIA [[id number]] Collected: 04/28/24 0505    Order Status: Canceled Specimen: Stool               Imaging All imaging within the last 24 hours was reviewed.   ECG Results              EKG 12-lead (Final result)        Collection Time Result Time QRS Duration OHS QTC Calculation    04/26/24 09:03:10 04/26/24 17:59:30 124 484                     Final result by Interface, Lab In [facility] (04/26/24 17:59:40)                   Narrative:    Test Reason : R06.02,    Vent. Rate : 128 BPM     Atrial Rate : 128 BPM     P-R Int : 138 ms          QRS Dur : 124 ms      QT Int : 332 ms       P-R-T Axes : 043 -71 090 degrees     QTc Int : 484 ms    Sinus tachycardia  Left axis deviation  Nonspecific intraventricular conduction delay  ST and T wave abnormality, consider lateral ischemia  Abnormal ECG  When compared with ECG of 08-JUN-2022 11:21,  Vent. rate has increased BY  73 BPM  ST no longer depressed in Inferior leads  Nonspecific T wave abnormality no longer evident in Inferior leads  T wave inversion less evident in Lateral leads  Confirmed by Cynthia PHILLIPS, Timothy GRACE ([id number]) on 4/26/2024 5:59:28 PM    Referred By: [email or web address]   SELF           Confirmed By:Timothy Marie MD                                    Results for orders placed  during the hospital encounter of 04/26/24    Echo    Interpretation Summary    Left Ventricle: The left ventricle is normal in size. Normal wall thickness. There is concentric hypertrophy. Unable to assess wall motion. There is normal systolic function. Biplane (2D) method of discs ejection fraction is 65%.    Right Ventricle: Right ventricle was not well visualized due to poor acoustic window. Normal right ventricular cavity size. Systolic function is normal.    Tricuspid Valve: There is mild regurgitation.    Pulmonary Artery: The estimated pulmonary artery systolic pressure is 28 mmHg.    IVC/SVC: Normal venous pressure at 3 mmHg.    Pericardium: There is a small effusion.    Overall the study quality was technically difficult.      Echo    Left Ventricle: The left ventricle is normal in size. Normal wall   thickness. There is concentric hypertrophy. Unable to assess wall motion.   There is normal systolic function. Biplane (2D) method of discs ejection   fraction is 65%.    Right Ventricle: Right ventricle was not well visualized due to poor   acoustic window. Normal right ventricular cavity size. Systolic function   is normal.    Tricuspid Valve: There is mild regurgitation.    Pulmonary Artery: The estimated pulmonary artery systolic pressure is   28 mmHg.    IVC/SVC: Normal venous pressure at 3 mmHg.    Pericardium: There is a small effusion.    Overall the study quality was technically difficult.  CT Chest Without Contrast  Narrative: EXAMINATION:  CT CHEST WITHOUT CONTRAST    CLINICAL HISTORY:  Pleural effusion, malignancy suspected;Pneumonia, unresolved;    TECHNIQUE:  Low dose axial images, sagittal and coronal reformations were obtained from the thoracic inlet to the lung bases. Contrast was not administered.    COMPARISON:  Same day chest radiograph; CT chest 05/23/2024    FINDINGS:  LINES/TUBES:  None.    SOFT TISSUES: No axillary or subpectoral lymphadenopathy. The visualized thyroid gland is  unremarkable.    HEART AND MEDIASTINUM: Diminutive appearance of the SVC, with venous collaterals in the chest wall and mediastinum and a prominent azygous vein.  No mediastinal or hilar lymphadenopathy.  Heart is normal in size.  Small volume pericardial fluid.  There are calcifications of the coronary arteries and thoracic aorta.  The main pulmonary artery is normal in size.    PLEURA: Small right pleural effusion.  No pneumothorax.    LUNGS AND AIRWAYS: Posterior bowing of the trachea suggesting expiratory phase imaging.  There are aerated secretions in the right mainstem and intermediate bronchus.  Motion artifact degrades evaluation of the lungs.  Paraseptal and centrilobular emphysema.  There are ground-glass and consolidative opacities with air bronchograms throughout the right lung, new from prior.  There are new ground-glass and solid nodular opacities in the left lung is well.  There are bandlike opacities in both lungs with traction bronchiectasis and architectural distortion, largest measuring 2.0 cm in the left upper lobe, compatible with scarring.    UPPER ABDOMEN: Multiple liver cysts measuring up to 3.1 cm.    BONES: No fracture or focal osseous lesion.  Impression: Motion degraded examination.    Aerated secretions in the right proximal airways.  New airspace opacities throughout the right lung.  New ground-glass and solid nodular opacities in the left lung.  These findings are concerning for aspiration or pneumonia.  Follow-up is recommended to ensure resolution.    Small right pleural effusion, likely reactive.    Signs of SVC syndrome.    Electronically signed by: Dae Serrano  Date:    04/26/2024  Time:    16:33  X-Ray Chest 1 View  Narrative: EXAMINATION:  XR CHEST 1 VIEW    CLINICAL HISTORY:  Shortness of breath;    TECHNIQUE:  Single frontal view of the chest was performed.    COMPARISON:  Chest radiograph 12/30/2020    FINDINGS:  Lines and tubes: None.    Heart and mediastinum: Normal in  size.  Calcifications of the aortic arch.    Pleura: No pleural effusion or pneumothorax.    Lungs: Lung volumes are adequate.  There are patchy and confluent airspace opacities throughout the right lung, sparing the apex.  Patchy opacities are also seen in the left lower lung zone.  Findings are concerning for aspiration or pneumonia.    Soft tissue/bone: Surgical clips in the right axilla.  Scattered degenerative changes.  Impression: As above.    Electronically signed by: Dae Serrano  Date:    04/26/2024  Time:    10:07

## 2024-05-04 NOTE — PROGRESS NOTES
Discharge orders noted. Additional clinical references attached. Patient's discharge instructions given by bedside RN and reviewed via this VN.  Education provided on new and previous medications, diagnosis, and follow-up appointments.  New medications were sent to patient's pharmacy. Patient verbalized understanding and teach back method was used. Patient's ride/transportation home at bedside. All questions answered. Transport to [facility] will be requested once his ride gets here. Floor nurse notified.                05/04/24 1610    Notification    Notified Of Discharge Status   Shift   Virtual Nurse - Rounding Complete   Virtual Nurse - Patient Verbalized Approval Of Camera Use   Safety/Activity   Patient Rounds visualized patient   Safety Promotion/Fall Prevention in recliner, wheels locked

## 2024-05-07 LAB
OHS QRS DURATION: 110 MS
OHS QTC CALCULATION: 465 MS

## 2024-05-08 ENCOUNTER — PATIENT OUTREACH (OUTPATIENT)
Dept: ADMINISTRATIVE | Facility: CLINIC | Age: 74
End: 2024-05-08
Payer: MEDICARE

## 2024-05-08 NOTE — PROGRESS NOTES
C3 nurse spoke with Jose Luis Manzo  for a TCC post hospital discharge follow up call. The patient has a scheduled HOSFU appointment with Home Alexis MD on 5/31/2024 at 1:40 PM.

## 2024-05-10 ENCOUNTER — LAB VISIT (OUTPATIENT)
Dept: LAB | Facility: HOSPITAL | Age: 74
End: 2024-05-10
Payer: MEDICARE

## 2024-05-10 DIAGNOSIS — I10 ESSENTIAL HYPERTENSION: ICD-10-CM

## 2024-05-10 DIAGNOSIS — N18.4 STAGE 4 CHRONIC KIDNEY DISEASE: ICD-10-CM

## 2024-05-10 LAB
ALBUMIN SERPL BCP-MCNC: 3.5 G/DL (ref 3.5–5.2)
ANION GAP SERPL CALC-SCNC: 11 MMOL/L (ref 8–16)
BACTERIA #/AREA URNS AUTO: NORMAL /HPF
BASOPHILS # BLD AUTO: 0.01 K/UL (ref 0–0.2)
BASOPHILS NFR BLD: 0.1 % (ref 0–1.9)
BILIRUB UR QL STRIP: NEGATIVE
CALCIUM SERPL-MCNC: 9.7 MG/DL (ref 8.7–10.5)
CHLORIDE SERPL-SCNC: 112 MMOL/L (ref 95–110)
CLARITY UR REFRACT.AUTO: CLEAR
CO2 SERPL-SCNC: 18 MMOL/L (ref 23–29)
COLOR UR AUTO: YELLOW
CREAT SERPL-MCNC: 3.39 MG/DL (ref 0.5–1.4)
CREAT UR-MCNC: 175.8 MG/DL (ref 23–375)
DIFFERENTIAL METHOD BLD: ABNORMAL
EOSINOPHIL # BLD AUTO: 0 K/UL (ref 0–0.5)
EOSINOPHIL NFR BLD: 0.4 % (ref 0–8)
ERYTHROCYTE [DISTWIDTH] IN BLOOD BY AUTOMATED COUNT: 16 % (ref 11.5–14.5)
EST. GFR  (NO RACE VARIABLE): 18.3 ML/MIN/1.73 M^2
GLUCOSE SERPL-MCNC: 152 MG/DL (ref 70–110)
GLUCOSE UR QL STRIP: ABNORMAL
HCT VFR BLD AUTO: 35.5 % (ref 40–54)
HGB BLD-MCNC: 11.1 G/DL (ref 14–18)
HGB UR QL STRIP: NEGATIVE
HYALINE CASTS UR QL AUTO: 0 /LPF
IMM GRANULOCYTES # BLD AUTO: 0.1 K/UL (ref 0–0.04)
IMM GRANULOCYTES NFR BLD AUTO: 1.2 % (ref 0–0.5)
KETONES UR QL STRIP: NEGATIVE
LEUKOCYTE ESTERASE UR QL STRIP: NEGATIVE
LYMPHOCYTES # BLD AUTO: 0.7 K/UL (ref 1–4.8)
LYMPHOCYTES NFR BLD: 8.5 % (ref 18–48)
MCH RBC QN AUTO: 25.8 PG (ref 27–31)
MCHC RBC AUTO-ENTMCNC: 31.3 G/DL (ref 32–36)
MCV RBC AUTO: 82 FL (ref 82–98)
MICROSCOPIC COMMENT: NORMAL
MONOCYTES # BLD AUTO: 0.6 K/UL (ref 0.3–1)
MONOCYTES NFR BLD: 6.5 % (ref 4–15)
NEUTROPHILS # BLD AUTO: 7.1 K/UL (ref 1.8–7.7)
NEUTROPHILS NFR BLD: 83.3 % (ref 38–73)
NITRITE UR QL STRIP: NEGATIVE
NRBC BLD-RTO: 0 /100 WBC
PH UR STRIP: 7 [PH] (ref 5–8)
PHOSPHATE SERPL-MCNC: 3.1 MG/DL (ref 2.7–4.5)
PLATELET # BLD AUTO: 261 K/UL (ref 150–450)
PMV BLD AUTO: 10.2 FL (ref 9.2–12.9)
POTASSIUM SERPL-SCNC: 4 MMOL/L (ref 3.5–5.1)
PROT UR QL STRIP: ABNORMAL
PROT UR-MCNC: 101 MG/DL (ref 0–15)
PROT/CREAT UR: 0.57 MG/G{CREAT} (ref 0–0.2)
RBC # BLD AUTO: 4.31 M/UL (ref 4.6–6.2)
RBC #/AREA URNS AUTO: 3 /HPF (ref 0–4)
SODIUM SERPL-SCNC: 141 MMOL/L (ref 136–145)
SP GR UR STRIP: 1.01 (ref 1–1.03)
URN SPEC COLLECT METH UR: ABNORMAL
UROBILINOGEN UR STRIP-ACNC: NEGATIVE EU/DL
UUN UR-MCNC: 31 MG/DL (ref 2–20)
WBC # BLD AUTO: 8.56 K/UL (ref 3.9–12.7)
WBC #/AREA URNS AUTO: 1 /HPF (ref 0–5)

## 2024-05-10 PROCEDURE — 85025 COMPLETE CBC W/AUTO DIFF WBC: CPT | Mod: PN | Performed by: STUDENT IN AN ORGANIZED HEALTH CARE EDUCATION/TRAINING PROGRAM

## 2024-05-10 PROCEDURE — 81000 URINALYSIS NONAUTO W/SCOPE: CPT | Mod: PN | Performed by: STUDENT IN AN ORGANIZED HEALTH CARE EDUCATION/TRAINING PROGRAM

## 2024-05-10 PROCEDURE — 36415 COLL VENOUS BLD VENIPUNCTURE: CPT | Mod: PN | Performed by: STUDENT IN AN ORGANIZED HEALTH CARE EDUCATION/TRAINING PROGRAM

## 2024-05-10 PROCEDURE — 84156 ASSAY OF PROTEIN URINE: CPT | Performed by: STUDENT IN AN ORGANIZED HEALTH CARE EDUCATION/TRAINING PROGRAM

## 2024-05-10 PROCEDURE — 80069 RENAL FUNCTION PANEL: CPT | Mod: PN | Performed by: STUDENT IN AN ORGANIZED HEALTH CARE EDUCATION/TRAINING PROGRAM

## 2024-05-13 ENCOUNTER — TELEPHONE (OUTPATIENT)
Dept: NEPHROLOGY | Facility: CLINIC | Age: 74
End: 2024-05-13
Payer: MEDICARE

## 2024-05-14 ENCOUNTER — NURSE TRIAGE (OUTPATIENT)
Dept: ADMINISTRATIVE | Facility: CLINIC | Age: 74
End: 2024-05-14
Payer: MEDICARE

## 2024-05-14 NOTE — TELEPHONE ENCOUNTER
Nurse [place] Sampson calling to report that pt refused lab draw from [facility] CAR[facility]. States that pt already had labs drawn on Friday. Advised per protocol. VU. Encounter routed to provider.       Reason for Disposition   [1] Caller requesting NON-URGENT health information AND [2] PCP's office is the best resource    Protocols used: Information Only Call - No Triage-A-

## 2024-05-23 ENCOUNTER — OFFICE VISIT (OUTPATIENT)
Dept: NEPHROLOGY | Facility: CLINIC | Age: 74
End: 2024-05-23
Payer: MEDICARE

## 2024-05-23 VITALS
DIASTOLIC BLOOD PRESSURE: 75 MMHG | OXYGEN SATURATION: 96 % | WEIGHT: 233.69 LBS | HEART RATE: 75 BPM | SYSTOLIC BLOOD PRESSURE: 130 MMHG | BODY MASS INDEX: 33.53 KG/M2

## 2024-05-23 DIAGNOSIS — N18.9 ANEMIA IN CHRONIC KIDNEY DISEASE, UNSPECIFIED CKD STAGE: ICD-10-CM

## 2024-05-23 DIAGNOSIS — D84.9 IMMUNOSUPPRESSION: ICD-10-CM

## 2024-05-23 DIAGNOSIS — Z94.0 IMMUNOSUPPRESSIVE MANAGEMENT ENCOUNTER FOLLOWING KIDNEY TRANSPLANT: ICD-10-CM

## 2024-05-23 DIAGNOSIS — Z85.21 HISTORY OF CANCER OF LARYNX: ICD-10-CM

## 2024-05-23 DIAGNOSIS — N18.4 STAGE 4 CHRONIC KIDNEY DISEASE: Primary | ICD-10-CM

## 2024-05-23 DIAGNOSIS — I10 HYPERTENSION, UNSPECIFIED TYPE: ICD-10-CM

## 2024-05-23 DIAGNOSIS — E87.20 METABOLIC ACIDOSIS: ICD-10-CM

## 2024-05-23 DIAGNOSIS — I10 ESSENTIAL HYPERTENSION: ICD-10-CM

## 2024-05-23 DIAGNOSIS — N25.81 SECONDARY HYPERPARATHYROIDISM OF RENAL ORIGIN: ICD-10-CM

## 2024-05-23 DIAGNOSIS — D50.8 OTHER IRON DEFICIENCY ANEMIA: ICD-10-CM

## 2024-05-23 DIAGNOSIS — Z79.899 IMMUNOSUPPRESSIVE MANAGEMENT ENCOUNTER FOLLOWING KIDNEY TRANSPLANT: ICD-10-CM

## 2024-05-23 DIAGNOSIS — Z94.0 RENAL TRANSPLANT RECIPIENT: ICD-10-CM

## 2024-05-23 DIAGNOSIS — R35.1 BENIGN PROSTATIC HYPERPLASIA WITH NOCTURIA: ICD-10-CM

## 2024-05-23 DIAGNOSIS — D63.1 ANEMIA IN CHRONIC KIDNEY DISEASE, UNSPECIFIED CKD STAGE: ICD-10-CM

## 2024-05-23 DIAGNOSIS — Z94.0 TRANSPLANTED KIDNEY: ICD-10-CM

## 2024-05-23 DIAGNOSIS — E78.2 MIXED HYPERLIPIDEMIA: ICD-10-CM

## 2024-05-23 DIAGNOSIS — N40.1 BENIGN PROSTATIC HYPERPLASIA WITH NOCTURIA: ICD-10-CM

## 2024-05-23 PROCEDURE — 4010F ACE/ARB THERAPY RXD/TAKEN: CPT | Mod: CPTII,GC,S$GLB, | Performed by: STUDENT IN AN ORGANIZED HEALTH CARE EDUCATION/TRAINING PROGRAM

## 2024-05-23 PROCEDURE — 3078F DIAST BP <80 MM HG: CPT | Mod: CPTII,GC,S$GLB, | Performed by: STUDENT IN AN ORGANIZED HEALTH CARE EDUCATION/TRAINING PROGRAM

## 2024-05-23 PROCEDURE — 3075F SYST BP GE 130 - 139MM HG: CPT | Mod: CPTII,GC,S$GLB, | Performed by: STUDENT IN AN ORGANIZED HEALTH CARE EDUCATION/TRAINING PROGRAM

## 2024-05-23 PROCEDURE — 1159F MED LIST DOCD IN RCRD: CPT | Mod: CPTII,GC,S$GLB, | Performed by: STUDENT IN AN ORGANIZED HEALTH CARE EDUCATION/TRAINING PROGRAM

## 2024-05-23 PROCEDURE — 3062F POS MACROALBUMINURIA REV: CPT | Mod: CPTII,GC,S$GLB, | Performed by: STUDENT IN AN ORGANIZED HEALTH CARE EDUCATION/TRAINING PROGRAM

## 2024-05-23 PROCEDURE — 1111F DSCHRG MED/CURRENT MED MERGE: CPT | Mod: CPTII,GC,S$GLB, | Performed by: STUDENT IN AN ORGANIZED HEALTH CARE EDUCATION/TRAINING PROGRAM

## 2024-05-23 PROCEDURE — 1126F AMNT PAIN NOTED NONE PRSNT: CPT | Mod: CPTII,GC,S$GLB, | Performed by: STUDENT IN AN ORGANIZED HEALTH CARE EDUCATION/TRAINING PROGRAM

## 2024-05-23 PROCEDURE — 1101F PT FALLS ASSESS-DOCD LE1/YR: CPT | Mod: CPTII,GC,S$GLB, | Performed by: STUDENT IN AN ORGANIZED HEALTH CARE EDUCATION/TRAINING PROGRAM

## 2024-05-23 PROCEDURE — 99214 OFFICE O/P EST MOD 30 MIN: CPT | Mod: GC,S$GLB,, | Performed by: STUDENT IN AN ORGANIZED HEALTH CARE EDUCATION/TRAINING PROGRAM

## 2024-05-23 PROCEDURE — 3288F FALL RISK ASSESSMENT DOCD: CPT | Mod: CPTII,GC,S$GLB, | Performed by: STUDENT IN AN ORGANIZED HEALTH CARE EDUCATION/TRAINING PROGRAM

## 2024-05-23 PROCEDURE — 99999 PR PBB SHADOW E&M-EST. PATIENT-LVL III: CPT | Mod: PBBFAC,GC,, | Performed by: STUDENT IN AN ORGANIZED HEALTH CARE EDUCATION/TRAINING PROGRAM

## 2024-05-23 PROCEDURE — 3066F NEPHROPATHY DOC TX: CPT | Mod: CPTII,GC,S$GLB, | Performed by: STUDENT IN AN ORGANIZED HEALTH CARE EDUCATION/TRAINING PROGRAM

## 2024-05-23 PROCEDURE — 3044F HG A1C LEVEL LT 7.0%: CPT | Mod: CPTII,GC,S$GLB, | Performed by: STUDENT IN AN ORGANIZED HEALTH CARE EDUCATION/TRAINING PROGRAM

## 2024-05-23 RX ORDER — SIROLIMUS 1 MG/1
3 TABLET, FILM COATED ORAL DAILY
Start: 2024-05-23 | End: 2025-05-23

## 2024-05-24 NOTE — PROGRESS NOTES
Nephrology Clinic Note   12/15/2022    No chief complaint on file.     History of present illness:  Patient is a 74 y.o. 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 Pt presents today for f/u on CKD 4 fllow up.    Previously he was receiving his Sirolimus via the [facility] drug program which was providing this medication at no cost to him. In Jan. 2023 he was no longer able to receive this medication and now it is costing him $300 for a 90 day supply.      Clinic visit 5/22/24; During his last office visit, we re-discussed his desires in regards to resuming dialysis if he requires it. At that time, he was  uncertain what his wishes would be, however now believes that he would want HD if he required it. He was previously referred for CKD education and dialysis education classes last visit since he admitted that he was unfamiliar with PD modality, however he had to cancel this class and is not interested in attending at this time. Pt denies: Fever, chills, shortness of breath, chest pain, palpitations, nausea, vomiting, diarrhea, abd pain, hematuria, dysuria, urinary frequency or urgency, headaches, visual disturbances or weakness. Since last office visit, he was hospitalized for pneumonia. During that hospitalization, his LASIX were discontinued, Hydralazine 100 mg Q8H, Nifedipine 30 QD, and sodium bicarbonate 1300 mg TID were added, and metoprolol was increased from 50 mg to 100 mg. . He states that since discharge, he has noted increased lower extremity edema and facial swelling and feels as if he is becoming more fluid overloaded. At his previous office visit, I had prescribed Lisinopril, however he did not have this  filled. He reports compliance with his Sirolimus 3 mg QD.          HTN, hyperlipidemia:   Pt monitors blood pressure at home and his SBPs are usually in 140s-150's  Meds: Clonidine patch weekly, atorvastatin, metoprolol 100mg daily, Hydralazine 100 mg Q8H  --He is currently not taking his Lisinopril.        CKD IV  2/2 hypertensive nephrosclerosis & chronic CNI exposure   Baseline sCr 3.3-3.6 (in 2022)   Lab Results   Component Value Date    CREATININE 3.39 (H) 05/10/2024       2021 pt had COVID related CHANG  CNI toxicity in the past   On Dr. 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 contributing to pt's CKD     CKD education class:   [x] Referred  [] Attended    CKD nutrition education  [x] Referred  [] Attended        Anemia  Iron   Date Value Ref Range Status   04/27/2024 15 (L) 45 - 160 ug/dL Final   12/15/2022 60 45 - 160 ug/dL Final     TIBC   Date Value Ref Range Status   04/27/2024 138 (L) 250 - 450 ug/dL Final   12/15/2022 195 (L) 250 - 450 ug/dL Final     Ferritin   Date Value Ref Range Status   04/27/2024 979 (H) 20.0 - 300.0 ng/mL Final   12/15/2022 753 (H) 20.0 - 300.0 ng/mL Final     Retic   Date Value Ref Range Status   12/15/2022 1.8 0.4 - 2.0 % Final     Hemoglobin   Date Value Ref Range Status   05/10/2024 11.1 (L) 14.0 - 18.0 g/dL Final   05/04/2024 11.7 (L) 14.0 - 18.0 g/dL Final       MBD: patient's insurance does not cover sensipar, he has had persistent hypercalcemia in the past.     Lab Results   Component Value Date    .8 (H) 04/30/2024    CALCIUM 9.7 05/10/2024    PHOS 3.1 05/10/2024   Meds: [place] 1300 mg Tid, vitamin D 50K weekly, ferrous sulfate 325mg daily      Kidney Transplant:   Done in [place]   CNI toxicity in the past   Meds: sirolimus 3mg daily, prednisone 5mg daily,   Does not follow with kidney transplant team       Oropharyngeal CA-follows with ENT, not on any current medications/treatments       Review of Systems   Constitutional:   "Negative for chills, diaphoresis, fever and weight loss.   Eyes:  Negative for blurred vision, double vision and pain.   Respiratory:  Negative for cough, hemoptysis, shortness of breath and wheezing.    Cardiovascular:  Negative for chest pain, orthopnea, claudication and leg swelling.   Gastrointestinal:  Negative for blood in stool, diarrhea, melena, nausea and vomiting.   Genitourinary:  Negative for dysuria, flank pain, frequency, hematuria and urgency.        Positive for nocturia   Musculoskeletal:  Negative for myalgias.   Skin:  Negative for rash.   Neurological:  Negative for dizziness, tremors, loss of consciousness and headaches.       History:  Past Medical History:   Diagnosis Date    Acute hypoxemic respiratory failure due to COVID-19 12/30/2020    Anticoagulant long-term use     BPH (benign prostatic hypertrophy)     Cancer     vocal cords    Claudication of right lower extremity 3/10/2016    COVID-19 virus detected 12/30/2020    Dependence on renal dialysis 4/18/2023    Disorder of kidney and ureter     Hyperkalemia 1/1/2021    Hyperlipidemia     Hypertension     Hypokalemia 6/23/2021    Immunosuppression 6/26/2021    Immunosuppression due to chronic steroid use 1/30/2020    Microcytic anemia 9/16/2016    Obesity (BMI 30-39.9) 1/23/2019    Oropharyngeal cancer     Pterygium     Squamous cell carcinoma 4/25/2018    Thromboembolic disorder     Unspecified disorder of kidney and ureter       Past Surgical History:   Procedure Laterality Date    CYSTOSCOPY W/ RETROGRADES Bilateral 6/15/2022    Procedure: Cystoscopy, bilateral retrograde pyelogram, and all indicated procedures;  Surgeon: Veronica Bacon MD;  Location: [facility];  Service: Urology;  Laterality: Bilateral;    FRACTURE SURGERY Left     "lower leg" 40 years ago    KIDNEY TRANSPLANT  2007    followed by [facility]plant    LARYNX SURGERY  11/2014    Oropharyngeal Cancer x3    microlaryngoscopy      PHLEBOGRAPHY Bilateral 8/2/2022    Procedure: " Venogram;  Surgeon: KENIA Mueller II, MD;  Location: [facility] CATH LAB;  Service: Vascular;  Laterality: Bilateral;    SURGICAL REMOVAL OF LESION OF ORBIT Bilateral 8/26/2020    Procedure: EXCISION, LESION, ORBIT;  Surgeon: Linda Tee MD;  Location: [facility] OR [address] [place] [address];  Service: Ophthalmology;  Laterality: Bilateral;    TIBIAL STAPLING Left 1980             Current Outpatient Medications:     atorvastatin (LIPITOR) 20 MG tablet, TAKE 1 TABLET EVERY EVENING, Disp: 90 tablet, Rfl: 3    cinacalcet (SENSIPAR) 30 MG Tab, Take 1 tablet (30 mg total) by mouth daily with breakfast., Disp: 30 tablet, Rfl: 3    cloNIDine 0.3 mg/24 hr td ptwk (CATAPRES) 0.3 mg/24 hr, PLACE 1 PATCH ONTO THE SKIN ONCE A WEEK., Disp: 12 patch, Rfl: 10    ELIQUIS 2.5 mg Tab, TAKE 1 TABLET TWICE DAILY, Disp: 180 tablet, Rfl: 3    ergocalciferol (ERGOCALCIFEROL) 50,000 unit Cap, TAKE 1 CAPSULE BY MOUTH EVERY 7 DAYS., Disp: 12 capsule, Rfl: 3    ferrous sulfate 325 mg (65 mg iron) Tab tablet, Take 325 mg by mouth once daily., Disp: , Rfl:     finasteride (PROSCAR) 5 mg tablet, TAKE 1 TABLET EVERY DAY, Disp: 90 tablet, Rfl: 3    fluticasone furoate-vilanteroL (BREO ELLIPTA) 200-25 mcg/dose DsDv diskus inhaler, Inhale 1 puff into the lungs once daily. Controller, Disp: 60 each, Rfl: 3    hydrALAZINE (APRESOLINE) 100 MG tablet, Take 1 tablet (100 mg total) by mouth every 8 (eight) hours., Disp: 90 tablet, Rfl: 11    metoprolol succinate (TOPROL-XL) 100 MG 24 hr tablet, Take 1 tablet (100 mg total) by mouth once daily., Disp: 90 tablet, Rfl: 3    NIFEdipine (PROCARDIA-XL) 30 MG (OSM) 24 hr tablet, Take 1 tablet (30 mg total) by mouth once daily., Disp: 30 tablet, Rfl: 3    predniSONE (DELTASONE) 5 MG tablet, TAKE 1 TABLET EVERY DAY, Disp: 90 tablet, Rfl: 3    sodium bicarbonate 650 MG tablet, Take 3 tablets (1,950 mg total) by mouth 4 (four) times daily., Disp: 360 tablet, Rfl: 3    fluticasone propionate (FLONASE) 50 mcg/actuation nasal spray, 1 spray  (50 mcg total) by Each Nostril route nightly. (Patient not taking: Reported on 5/8/2024), Disp: 10 mL, Rfl: 5    nitroGLYCERIN (NITROSTAT) 0.4 MG SL tablet, Place 1 tablet (0.4 mg total) under the tongue every 5 (five) minutes as needed for Chest pain. (Patient not taking: Reported on 5/8/2024), Disp: 30 tablet, Rfl: 2    sirolimus (RAPAMUNE) 1 MG Tab, Take 3 tablets (3 mg total) by mouth once daily., Disp: , Rfl:     traMADoL (ULTRAM) 50 mg tablet, Take 1 tablet (50 mg total) by mouth every 6 (six) hours as needed for Pain. (Patient not taking: Reported on 5/8/2024), Disp: 28 tablet, Rfl: 0    vitamin renal formula, B-complex-vitamin c-folic acid, (NEPHROCAP) 1 mg Cap, Take 1 capsule by mouth once daily. (Patient not taking: Reported on 5/23/2024), Disp: 30 each, Rfl: 3  Review of patient's allergies indicates:  No Known Allergies   Social History     Tobacco Use    Smoking status: Former     Current packs/day: 0.00     Average packs/day: 1 pack/day for 20.0 years (20.0 ttl pk-yrs)     Types: Cigarettes     Start date: 1989     Quit date: 2009     Years since quitting: 15.4    Smokeless tobacco: Never   Substance Use Topics    Alcohol use: No     Alcohol/week: 0.0 standard drinks of alcohol      Family History   Problem Relation Name Age of Onset    Stroke Mother      Diabetes Mother      Hypertension Mother      Stroke Father      No Known Problems Sister      No Known Problems Brother x1     No Known Problems Daughter      No Known Problems Brother x1         Physical Exam  Vitals and nursing note reviewed.   Constitutional:       General: He is not in acute distress.     Appearance: Normal appearance. He is obese. He is not ill-appearing, toxic-appearing or diaphoretic.   HENT:      Mouth/Throat:      Mouth: Mucous membranes are moist.   Cardiovascular:      Rate and Rhythm: Normal rate and regular rhythm.      Pulses: Normal pulses.      Heart sounds: Murmur heard.   Pulmonary:      Effort: Pulmonary effort is  normal. No respiratory distress.      Breath sounds: Normal breath sounds. No stridor. No wheezing, rhonchi or rales.   Musculoskeletal:         General: No swelling, tenderness, deformity or signs of injury.      Left lower leg: No edema.   Skin:     General: Skin is warm.      Capillary Refill: Capillary refill takes less than 2 seconds.      Coloration: Skin is not jaundiced or pale.      Findings: No bruising, erythema, lesion or rash.   Neurological:      Mental Status: He is alert and oriented to person, place, and time.   Psychiatric:         Mood and Affect: Mood normal.         Behavior: Behavior normal.         Thought Content: Thought content normal.         Judgment: Judgment normal.         Labs reviewed   Images Reviewed    ASSESSMENT & PLAN:   Mr. Jose Luis Manzo is a 74 y.o. male with past medical history of renal failure 2/2 hypertensive nephrosclerosis previously on HD who is now s/p kidney transplant in 2007, he has developed post-transplant CKD IV likely 2/2 HTN and chronic CNI use. Currently his renal function is stable with a eGFR around 16-18, and he is without uremic symptoms. He was offered and referred to CKD education classes as well as dialysis education classes which he declined at this time. He was referred to renal transplant medicine for evaluation of potentially receiving another transplant in Jan. 2024. He has opted to undergo dialysis if he progresses to ESRD.      He reports difficulty with the increased pill burden after his hospital discharge, specifically hydralazine 100 mg TID. I will plan to resume his Lasix with the hopes that this will allow for the gradual discontinuation of his Hydralazine. I have asked him to check his blood pressure twice a day after resuming his LASIX and to alert me if his blood pressure drops below 110/70 or if he develops any lightheadedness or dizziness.    I have provided him a referral to vascular surgery for AVF placement and evaluation. He  states understanding of all of this and will make a decision within the next month.    Since his sodium bicarbonate was recently doubled during his recent hospitalization, I will plan on rechecking his serum bicarb during office visit and adjusting medication as needed.     He should continue his current Immunosuppressive medications of Sirolimus and Prednisone.     Stage 4 chronic kidney disease  -     Renal Function Panel; Standing  -     CBC Auto Differential; Standing  -     SIROLIMUS LEVEL; Standing  -     Ambulatory referral/consult to Vascular Surgery; Future; Expected date: 05/30/2024    Essential hypertension    Immunosuppressive management encounter following kidney transplant  -     sirolimus (RAPAMUNE) 1 MG Tab; Take 3 tablets (3 mg total) by mouth once daily.    Hypertension, unspecified type    Transplanted kidney - 2007    Metabolic acidosis    Benign prostatic hyperplasia with nocturia    Secondary hyperparathyroidism of renal origin    Anemia in chronic kidney disease, unspecified CKD stage    Other iron deficiency anemia    Mixed hyperlipidemia    Immunosuppression    Renal transplant recipient    History of cancer of larynx           RTC in 1 month    Discussed with Dr. Courtney  - staff attestation to follow      Jonas Mtz MD  Nephrology PGY-4    [address] [place] [address]  [phone]

## 2024-05-24 NOTE — PROGRESS NOTES
I have reviewed the notes, assessments, and/or procedures performed by the fellow, I concur with her/his documentation of Jose Luis Manzo.  Date of Service: 5/23/2024

## 2024-05-28 ENCOUNTER — EXTERNAL HOME HEALTH (OUTPATIENT)
Dept: HOME HEALTH SERVICES | Facility: HOSPITAL | Age: 74
End: 2024-05-28
Payer: MEDICARE

## 2024-05-31 ENCOUNTER — TELEPHONE (OUTPATIENT)
Dept: FAMILY MEDICINE | Facility: CLINIC | Age: 74
End: 2024-05-31

## 2024-05-31 ENCOUNTER — OFFICE VISIT (OUTPATIENT)
Dept: FAMILY MEDICINE | Facility: CLINIC | Age: 74
End: 2024-05-31
Payer: MEDICARE

## 2024-05-31 VITALS
HEIGHT: 70 IN | DIASTOLIC BLOOD PRESSURE: 60 MMHG | SYSTOLIC BLOOD PRESSURE: 108 MMHG | HEART RATE: 72 BPM | OXYGEN SATURATION: 94 % | BODY MASS INDEX: 33.76 KG/M2 | WEIGHT: 235.81 LBS

## 2024-05-31 DIAGNOSIS — R60.9 EDEMA, UNSPECIFIED TYPE: Primary | ICD-10-CM

## 2024-05-31 DIAGNOSIS — E78.2 MIXED HYPERLIPIDEMIA: ICD-10-CM

## 2024-05-31 DIAGNOSIS — I10 ESSENTIAL HYPERTENSION: ICD-10-CM

## 2024-05-31 DIAGNOSIS — E87.20 METABOLIC ACIDOSIS: ICD-10-CM

## 2024-05-31 DIAGNOSIS — N18.4 STAGE 4 CHRONIC KIDNEY DISEASE: ICD-10-CM

## 2024-05-31 PROCEDURE — 3078F DIAST BP <80 MM HG: CPT | Mod: CPTII,S$GLB,, | Performed by: STUDENT IN AN ORGANIZED HEALTH CARE EDUCATION/TRAINING PROGRAM

## 2024-05-31 PROCEDURE — 99214 OFFICE O/P EST MOD 30 MIN: CPT | Mod: S$GLB,,, | Performed by: STUDENT IN AN ORGANIZED HEALTH CARE EDUCATION/TRAINING PROGRAM

## 2024-05-31 PROCEDURE — 3066F NEPHROPATHY DOC TX: CPT | Mod: CPTII,S$GLB,, | Performed by: STUDENT IN AN ORGANIZED HEALTH CARE EDUCATION/TRAINING PROGRAM

## 2024-05-31 PROCEDURE — 3288F FALL RISK ASSESSMENT DOCD: CPT | Mod: CPTII,S$GLB,, | Performed by: STUDENT IN AN ORGANIZED HEALTH CARE EDUCATION/TRAINING PROGRAM

## 2024-05-31 PROCEDURE — 1159F MED LIST DOCD IN RCRD: CPT | Mod: CPTII,S$GLB,, | Performed by: STUDENT IN AN ORGANIZED HEALTH CARE EDUCATION/TRAINING PROGRAM

## 2024-05-31 PROCEDURE — 4010F ACE/ARB THERAPY RXD/TAKEN: CPT | Mod: CPTII,S$GLB,, | Performed by: STUDENT IN AN ORGANIZED HEALTH CARE EDUCATION/TRAINING PROGRAM

## 2024-05-31 PROCEDURE — 3044F HG A1C LEVEL LT 7.0%: CPT | Mod: CPTII,S$GLB,, | Performed by: STUDENT IN AN ORGANIZED HEALTH CARE EDUCATION/TRAINING PROGRAM

## 2024-05-31 PROCEDURE — 1111F DSCHRG MED/CURRENT MED MERGE: CPT | Mod: CPTII,S$GLB,, | Performed by: STUDENT IN AN ORGANIZED HEALTH CARE EDUCATION/TRAINING PROGRAM

## 2024-05-31 PROCEDURE — 1101F PT FALLS ASSESS-DOCD LE1/YR: CPT | Mod: CPTII,S$GLB,, | Performed by: STUDENT IN AN ORGANIZED HEALTH CARE EDUCATION/TRAINING PROGRAM

## 2024-05-31 PROCEDURE — 1126F AMNT PAIN NOTED NONE PRSNT: CPT | Mod: CPTII,S$GLB,, | Performed by: STUDENT IN AN ORGANIZED HEALTH CARE EDUCATION/TRAINING PROGRAM

## 2024-05-31 PROCEDURE — G2211 COMPLEX E/M VISIT ADD ON: HCPCS | Mod: S$GLB,,, | Performed by: STUDENT IN AN ORGANIZED HEALTH CARE EDUCATION/TRAINING PROGRAM

## 2024-05-31 PROCEDURE — 3062F POS MACROALBUMINURIA REV: CPT | Mod: CPTII,S$GLB,, | Performed by: STUDENT IN AN ORGANIZED HEALTH CARE EDUCATION/TRAINING PROGRAM

## 2024-05-31 PROCEDURE — 1160F RVW MEDS BY RX/DR IN RCRD: CPT | Mod: CPTII,S$GLB,, | Performed by: STUDENT IN AN ORGANIZED HEALTH CARE EDUCATION/TRAINING PROGRAM

## 2024-05-31 PROCEDURE — 3074F SYST BP LT 130 MM HG: CPT | Mod: CPTII,S$GLB,, | Performed by: STUDENT IN AN ORGANIZED HEALTH CARE EDUCATION/TRAINING PROGRAM

## 2024-05-31 RX ORDER — CINACALCET 30 MG/1
30 TABLET, FILM COATED ORAL
Qty: 30 TABLET | Refills: 3 | Status: SHIPPED | OUTPATIENT
Start: 2024-05-31 | End: 2025-05-31

## 2024-05-31 RX ORDER — SODIUM BICARBONATE 650 MG/1
1950 TABLET ORAL 4 TIMES DAILY
Qty: 360 TABLET | Refills: 3 | Status: SHIPPED | OUTPATIENT
Start: 2024-05-31

## 2024-05-31 RX ORDER — NIFEDIPINE 30 MG/1
30 TABLET, EXTENDED RELEASE ORAL DAILY
Qty: 30 TABLET | Refills: 3 | Status: SHIPPED | OUTPATIENT
Start: 2024-05-31 | End: 2025-05-31

## 2024-05-31 RX ORDER — FUROSEMIDE 40 MG/1
40 TABLET ORAL DAILY
Qty: 90 TABLET | Refills: 3 | Status: SHIPPED | OUTPATIENT
Start: 2024-05-31

## 2024-05-31 NOTE — TELEPHONE ENCOUNTER
----- Message from Home Alexis MD sent at 5/31/2024  2:21 PM CDT -----  Please assist patient to get scheduled for follow up in renal clinic

## 2024-06-01 NOTE — PROGRESS NOTES
Patient ID: Jose Luis Manzo is a 74 y.o. male.     Chief Complaint: Follow-up    Edema  This is a recurrent problem. The current episode started in the past 7 days. The problem occurs constantly. The problem has been unchanged. Pertinent negatives include no arthralgias, chest pain, chills, congestion, coughing, fever, headaches, joint swelling, myalgias, nausea, neck pain, rash, swollen glands, urinary symptoms, vertigo, visual change, vomiting or weakness. Nothing aggravates the symptoms. He has tried nothing for the symptoms.          Review of Systems  Review of Systems   Constitutional:  Negative for chills and fever.   HENT:  Negative for congestion, ear pain and sinus pain.    Eyes:  Negative for discharge.   Respiratory:  Negative for cough and shortness of breath.    Cardiovascular:  Negative for chest pain and leg swelling.   Gastrointestinal:  Negative for diarrhea, nausea and vomiting.   Genitourinary:  Negative for urgency.   Musculoskeletal:  Negative for arthralgias, joint swelling, myalgias and neck pain.   Skin:  Negative for rash.   Neurological:  Negative for vertigo, weakness and headaches.   Psychiatric/Behavioral:  Negative for depression.    All other systems reviewed and are negative.      Currently Medications  Current Outpatient Medications on File Prior to Visit   Medication Sig Dispense Refill    atorvastatin (LIPITOR) 20 MG tablet TAKE 1 TABLET EVERY EVENING 90 tablet 3    cloNIDine 0.3 mg/24 hr td ptwk (CATAPRES) 0.3 mg/24 hr PLACE 1 PATCH ONTO THE SKIN ONCE A WEEK. 12 patch 10    ELIQUIS 2.5 mg Tab TAKE 1 TABLET TWICE DAILY 180 tablet 3    ergocalciferol (ERGOCALCIFEROL) 50,000 unit Cap TAKE 1 CAPSULE BY MOUTH EVERY 7 DAYS. 12 capsule 3    ferrous sulfate 325 mg (65 mg iron) Tab tablet Take 325 mg by mouth once daily.      finasteride (PROSCAR) 5 mg tablet TAKE 1 TABLET EVERY DAY 90 tablet 3    fluticasone furoate-vilanteroL (BREO ELLIPTA) 200-25 mcg/dose DsDv diskus inhaler Inhale 1  "puff into the lungs once daily. Controller 60 each 3    hydrALAZINE (APRESOLINE) 100 MG tablet Take 1 tablet (100 mg total) by mouth every 8 (eight) hours. 90 tablet 11    metoprolol succinate (TOPROL-XL) 100 MG 24 hr tablet Take 1 tablet (100 mg total) by mouth once daily. 90 tablet 3    predniSONE (DELTASONE) 5 MG tablet TAKE 1 TABLET EVERY DAY 90 tablet 3    sirolimus (RAPAMUNE) 1 MG Tab Take 3 tablets (3 mg total) by mouth once daily.      fluticasone propionate (FLONASE) 50 mcg/actuation nasal spray 1 spray (50 mcg total) by Each Nostril route nightly. (Patient not taking: Reported on 5/8/2024) 10 mL 5    nitroGLYCERIN (NITROSTAT) 0.4 MG SL tablet Place 1 tablet (0.4 mg total) under the tongue every 5 (five) minutes as needed for Chest pain. (Patient not taking: Reported on 5/8/2024) 30 tablet 2    traMADoL (ULTRAM) 50 mg tablet Take 1 tablet (50 mg total) by mouth every 6 (six) hours as needed for Pain. (Patient not taking: Reported on 5/8/2024) 28 tablet 0    [DISCONTINUED] sildenafiL (VIAGRA) 50 MG tablet Take 1 tablet (50 mg total) by mouth daily as needed for Erectile Dysfunction. 9 tablet 4     No current facility-administered medications on file prior to visit.       Physical  Exam  Vitals:    05/31/24 1400   BP: 108/60   BP Location: Right arm   Patient Position: Sitting   Pulse: 72   SpO2: (!) 94%   Weight: 106.9 kg (235 lb 12.5 oz)   Height: 5' 10" (1.778 m)      Body mass index is 33.83 kg/m².  Wt Readings from Last 3 Encounters:   05/31/24 106.9 kg (235 lb 12.5 oz)   05/23/24 106 kg (233 lb 11 oz)   05/01/24 106.7 kg (235 lb 3.7 oz)       Physical Exam  Vitals and nursing note reviewed.   Constitutional:       General: He is not in acute distress.     Appearance: He is not ill-appearing.   HENT:      Head: Normocephalic and atraumatic.      Right Ear: External ear normal.      Left Ear: External ear normal.      Nose: Nose normal.      Mouth/Throat:      Mouth: Mucous membranes are moist.   Eyes:    "   Extraocular Movements: Extraocular movements intact.      Conjunctiva/sclera: Conjunctivae normal.   Cardiovascular:      Rate and Rhythm: Normal rate and regular rhythm.      Pulses: Normal pulses.      Heart sounds: No murmur heard.  Pulmonary:      Effort: Pulmonary effort is normal. No respiratory distress.      Breath sounds: No wheezing.   Abdominal:      General: There is no distension.      Palpations: Abdomen is soft. There is no mass.      Tenderness: There is no abdominal tenderness.   Musculoskeletal:         General: Swelling (rLE) present.      Cervical back: Normal range of motion.      Right lower leg: Edema present.   Skin:     Coloration: Skin is not jaundiced.      Findings: No rash.   Neurological:      General: No focal deficit present.      Mental Status: He is alert and oriented to person, place, and time.   Psychiatric:         Mood and Affect: Mood normal.         Thought Content: Thought content normal.         Labs:    Complete Blood Count  Lab Results   Component Value Date    RBC 4.31 (L) 05/10/2024    HGB 11.1 (L) 05/10/2024    HCT 35.5 (L) 05/10/2024    MCV 82 05/10/2024    MCH 25.8 (L) 05/10/2024    MCHC 31.3 (L) 05/10/2024    RDW 16.0 (H) 05/10/2024     05/10/2024    MPV 10.2 05/10/2024    GRAN 7.1 05/10/2024    GRAN 83.3 (H) 05/10/2024    LYMPH 0.7 (L) 05/10/2024    LYMPH 8.5 (L) 05/10/2024    MONO 0.6 05/10/2024    MONO 6.5 05/10/2024    EOS 0.0 05/10/2024    BASO 0.01 05/10/2024    EOSINOPHIL 0.4 05/10/2024    BASOPHIL 0.1 05/10/2024    DIFFMETHOD Automated 05/10/2024       Comprehensive Metabolic Panel  Lab Results   Component Value Date     (H) 05/10/2024    BUN 31 (H) 05/10/2024    CREATININE 3.39 (H) 05/10/2024     05/10/2024    K 4.0 05/10/2024     (H) 05/10/2024    PROT 7.5 04/26/2024    ALBUMIN 3.5 05/10/2024    BILITOT 0.4 04/26/2024    AST 29 04/26/2024    ALKPHOS 110 04/26/2024    CO2 18 (L) 05/10/2024    ALT 28 04/26/2024    ANIONGAP 11  05/10/2024       TSH  Lab Results   Component Value Date    TSH 0.840 04/27/2024       Imaging:  US Transplant Kidney With Doppler  Narrative: EXAMINATION:  US TRANSPLANT KIDNEY WITH DOPPLER    CLINICAL HISTORY:  US kidney transplant;Chronic kidney disease, stage 4 (severe)    TECHNIQUE:  Real time gray scale and doppler ultrasound was performed over the patient's renal allograft.    COMPARISON:  Ultrasound 12/31/2020.    FINDINGS:  Renal allograft in the right lower quadrant.  The allograft measures 9.3 cm. Normal perfusion. No hydronephrosis.    No fluid collections.    Vasculature:    Resistive indices ranged from 0.64 to 0.75.    Main renal artery peak systolic velocity: 65cm/sec with normal waveform.    Renal artery/iliac ratio: 0.5.    The main renal vein is patent.  Impression: Satisfactory gray scale and doppler evaluation of renal allograft.    Electronically signed by: Trell Negrete MD  Date:    05/04/2024  Time:    06:06      Assessment/Plan:    1. Edema, unspecified type  -     US Lower Extremity Veins Right; Future; Expected date: 05/31/2024    2. Metabolic acidosis  -     sodium bicarbonate 650 MG tablet; Take 3 tablets (1,950 mg total) by mouth 4 (four) times daily.  Dispense: 360 tablet; Refill: 3    3. Stage 4 chronic kidney disease  Overview:  Overview:   updated diagnosis code & description  dx update    Orders:  -     cinacalcet (SENSIPAR) 30 MG Tab; Take 1 tablet (30 mg total) by mouth daily with breakfast.  Dispense: 30 tablet; Refill: 3  -     vitamin renal formula, B-complex-vitamin c-folic acid, (NEPHROCAP) 1 mg Cap; Take 1 capsule by mouth once daily.  Dispense: 30 each; Refill: 3  -     furosemide (LASIX) 40 MG tablet; Take 1 tablet (40 mg total) by mouth once daily.  Dispense: 90 tablet; Refill: 3    4. Essential hypertension  -     NIFEdipine (PROCARDIA-XL) 30 MG (OSM) 24 hr tablet; Take 1 tablet (30 mg total) by mouth once daily.  Dispense: 30 tablet; Refill: 3    5. Mixed  hyperlipidemia      Visit today included increased complexity associated with the care of the episodic problem CKD4, HTN addressed and managing the longitudinal care of the patient due to the serious and/or complex managed problem(s).       Discussed how to stay healthy including: diet, exercise, refraining from smoking and discussed screening exams / tests needed for age, sex and family Hx.    RTC 1 mo    Home Alexis MD

## 2024-06-03 ENCOUNTER — HOSPITAL ENCOUNTER (OUTPATIENT)
Dept: RADIOLOGY | Facility: HOSPITAL | Age: 74
Discharge: HOME | End: 2024-06-03
Attending: STUDENT IN AN ORGANIZED HEALTH CARE EDUCATION/TRAINING PROGRAM
Payer: MEDICARE

## 2024-06-03 DIAGNOSIS — R60.9 EDEMA, UNSPECIFIED TYPE: ICD-10-CM

## 2024-06-03 PROCEDURE — 93971 EXTREMITY STUDY: CPT | Mod: 26,RT,, | Performed by: RADIOLOGY

## 2024-06-03 PROCEDURE — 93971 EXTREMITY STUDY: CPT | Mod: TC,PN,RT

## 2024-06-06 NOTE — ASSESSMENT & PLAN NOTE
Creatine stable for now. BMP reviewed- noted CrCl cannot be calculated (Patient's most recent lab result is older than the maximum 7 days allowed.). according to latest data. Based on current GFR, CKD stage is stage 4 - GFR 15-29.  Monitor UOP and serial BMP and adjust therapy as needed. Renally dose meds. Avoid nephrotoxic medications and procedures.  Cr increasing with  nephrology consulted who continue to follow (appreciated)  Continued am labs, avoid nephrotoxins  Cr stable at discharge.

## 2024-06-06 NOTE — DISCHARGE SUMMARY
[facility]  Discharge Summary      Patient Name: Jose Luis Manzo  MRN: [id number]  Patient Class: IP- Inpatient  Admission Date: 4/26/2024  Hospital Length of Stay: 8 days  Discharge Date and Time: 5/4/2024  7:30 PM  Attending Physician: No att. providers found   Discharging Provider: Jael Barnhart MD  Primary Care Provider: Home Alexis MD      HPI:   Very pleasant 75 yo F presents with acute on chronic progressive dyspnea , hacking cough , afebrile , denied being on home oxygen , no sick contacts or travel, but is a former smoker.  Denied chest pain wheezing intermittent claudication. Recent abx use , lung disease prior. 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     * No surgery found *      Hospital Course:   See problem oriented hospital course.      Goals of Care Treatment Preferences:  Code Status: Full Code      Consults:   Consults (From admission, onward)          Status Ordering Provider     Inpatient virtual consult to Hospital Medicine  Once        Provider:  (Not yet assigned)    Completed INNOCENT-JIAN WASHBURN NBoaz     Inpatient consult to Pulmonology  Once        Provider:  (Not yet assigned)    Completed OREN RUBIO     Inpatient consult to Nephrology[facility] (Daniel Kelly Nimkevych)  Once        Provider:  Ronda Hamm MD    Completed OREN RUBIO            Pulmonary  * Pseudomonas pneumonia  Chest xray with RLL opacities suggestive of pneumonia. Initiated on vanc/zosyn/azithro, hydrocortisone.  Sputum Cultures with pseudomonas  Continue supplemental O2 - wean as tolerated.  Antibiotics weaned to cipro.         Acute respiratory failure with hypoxia  Patient with Hypoxic Respiratory failure which is Acute.  he is not on home oxygen. Supplemental oxygen was provided and noted-      Signs/symptoms of respiratory failure include-  continued O2 requirement .  Contributing diagnoses includes - Aspiration and Pneumonia Labs and images were reviewed. Patient Has recent ABG, which has been reviewed. Will treat underlying causes and adjust management of respiratory failure as follows- abx/ DUONEB/ IS/ Breo ellipta/ steroids   Wean oxygen as tolerated; 1L/min on 5/3/2024 and RA at discharge    Cardiac/Vascular  Mixed hyperlipidemia  Continue statin       Essential hypertension  Chronic, uncontrolled. Latest blood pressure and vitals reviewed-      .   Home meds for hypertension were reviewed and noted below.   Hypertension Medications               cloNIDine 0.3 mg/24 hr td ptwk (CATAPRES) 0.3 mg/24 hr PLACE 1 PATCH ONTO THE SKIN ONCE A WEEK.    furosemide (LASIX) 40 MG tablet TAKE 1 TABLET EVERY DAY    losartan (COZAAR) 25 MG tablet Take 1 tablet (25 mg total) by mouth once daily.    metoprolol succinate (TOPROL-XL) 50 MG 24 hr tablet TAKE 1 TABLET EVERY DAY    nitroGLYCERIN (NITROSTAT) 0.4 MG SL tablet Place 1 tablet (0.4 mg total) under the tongue every 5 (five) minutes as needed for Chest pain.            While in the hospital, will manage blood pressure as follows; Adjust home antihypertensive regimen as follows- continue metoprolol, start hydralazine. Trend.     Will utilize p.r.n. blood pressure medication only if patient's blood pressure greater than 180/110 and he develops symptoms such as worsening chest pain or shortness of breath.    Renal/  Renal transplant recipient  Continue Sirolimus and Prednisone        Stage 4 chronic kidney disease  Creatine stable for now. BMP reviewed- noted CrCl cannot be calculated (Patient's most recent lab result is older than the maximum 7 days allowed.). according to latest data. Based on current GFR, CKD stage is stage 4 - GFR 15-29.  Monitor UOP and serial BMP and adjust therapy as needed. Renally dose meds. Avoid nephrotoxic medications and procedures.  Cr increasing with  nephrology consulted who continue to follow  (appreciated)  Continued am labs, avoid nephrotoxins  Cr stable at discharge.     Hematology  History of DVT (deep vein thrombosis)  Continue Eliquis      Endocrine  Class 1 obesity due to excess calories with serious comorbidity and body mass index (BMI) of 34.0 to 34.9 in adult  Body mass index is 33.75 kg/m². Obesity complicates all aspects of disease management from diagnostic modalities to treatment.          Vitamin D deficiency disease  Continue ergocalciferol weekly.         Final Active Diagnoses:    Diagnosis Date Noted POA    PRINCIPAL PROBLEM:  Pseudomonas pneumonia [J15.1] 04/26/2024 Yes    Acute respiratory failure with hypoxia [J96.01] 04/26/2024 Yes    Class 1 obesity due to excess calories with serious comorbidity and body mass index (BMI) of 34.0 to 34.9 in adult [E66.09, Z68.34] 04/18/2023 Not Applicable    Immunosuppressive management encounter following kidney transplant [Z79.899, Z94.0] 01/04/2021 Not Applicable    Vitamin D deficiency disease [E55.9] 01/04/2021 Yes    History of DVT (deep vein thrombosis) [Z86.718] 04/13/2016 Not Applicable    Renal transplant recipient [Z94.0] 03/10/2016 Not Applicable    Essential hypertension [I10] 03/13/2015 Yes    Mixed hyperlipidemia [E78.2] 03/13/2015 Yes    Stage 4 chronic kidney disease [N18.4] 10/04/2011 Yes      Problems Resolved During this Admission:       Discharged Condition: stable    Disposition: Home-Health Care [facility] [place], [place]    Follow Up:   Follow-up Information       [facility] Follow up.    Specialties: Home Health Services, Home Therapy Services, Home Living Aide Services  Why: will provide home health services  Contact information:  [address] [facility] [id number] [phone]             [facility] Ashley Follow up on 5/15/2024.    Why: at 8:00 AM; hospital follow up appointment. Patient needs to bring in photo ID, Ins Card, Hospital Discharge papers, and Medicine Bottles.  Contact information:  Ankur  "[address] [address]  PH:  [phone]             Home Alexis MD Follow up on 5/31/2024.    Specialty: Internal Medicine  Why: at 1:40pm; PCP hospital follow up appointment  Contact information:  [address] [place] [id number] [phone]                           Patient Instructions:      BATH/SHOWER CHAIR FOR HOME USE     Order Specific Question Answer Comments   Height: 5' 10" (1.778 m)    Weight: 109.4 kg (241 lb 2.9 oz)    Does patient have medical equipment at home? none    Length of need (1-99 months): 99    Type: Without back      Diet renal     Notify your health care provider if you experience any of the following:  temperature >100.4     Notify your health care provider if you experience any of the following:  persistent nausea and vomiting or diarrhea     Notify your health care provider if you experience any of the following:  severe uncontrolled pain     Notify your health care provider if you experience any of the following:  difficulty breathing or increased cough     Notify your health care provider if you experience any of the following:  severe persistent headache     Notify your health care provider if you experience any of the following:  worsening rash     Notify your health care provider if you experience any of the following:  persistent dizziness, light-headedness, or visual disturbances     Notify your health care provider if you experience any of the following:  increased confusion or weakness     Activity as tolerated       Significant Diagnostic Studies: as above    Pending Diagnostic Studies:       None           Medications:  Reconciled Home Medications:      Medication List        START taking these medications      fluticasone furoate-vilanteroL 200-25 mcg/dose Dsdv diskus inhaler  Commonly known as: BREO ELLIPTA  Inhale 1 puff into the lungs once daily. Controller     hydrALAZINE 100 MG tablet  Commonly known as: APRESOLINE  Take " 1 tablet (100 mg total) by mouth every 8 (eight) hours.            CHANGE how you take these medications      ergocalciferol 50,000 unit Cap  Commonly known as: ERGOCALCIFEROL  TAKE 1 CAPSULE BY MOUTH EVERY 7 DAYS.  What changed: Another medication with the same name was removed. Continue taking this medication, and follow the directions you see here.     metoprolol succinate 100 MG 24 hr tablet  Commonly known as: TOPROL-XL  Take 1 tablet (100 mg total) by mouth once daily.  What changed:   medication strength  how much to take  when to take this            CONTINUE taking these medications      atorvastatin 20 MG tablet  Commonly known as: LIPITOR  TAKE 1 TABLET EVERY EVENING     cloNIDine 0.3 mg/24 hr td ptwk 0.3 mg/24 hr  Commonly known as: CATAPRES  PLACE 1 PATCH ONTO THE SKIN ONCE A WEEK.     ELIQUIS 2.5 mg Tab  Generic drug: apixaban  TAKE 1 TABLET TWICE DAILY     ferrous sulfate 325 mg (65 mg iron) Tab tablet  Commonly known as: FEOSOL  Take 325 mg by mouth once daily.     finasteride 5 mg tablet  Commonly known as: PROSCAR  TAKE 1 TABLET EVERY DAY     fluticasone propionate 50 mcg/actuation nasal spray  Commonly known as: FLONASE  1 spray (50 mcg total) by Each Nostril route nightly.     nitroGLYCERIN 0.4 MG SL tablet  Commonly known as: NITROSTAT  Place 1 tablet (0.4 mg total) under the tongue every 5 (five) minutes as needed for Chest pain.     predniSONE 5 MG tablet  Commonly known as: DELTASONE  TAKE 1 TABLET EVERY DAY     traMADoL 50 mg tablet  Commonly known as: ULTRAM  Take 1 tablet (50 mg total) by mouth every 6 (six) hours as needed for Pain.            STOP taking these medications      amoxicillin-clavulanate 875-125mg 875-125 mg per tablet  Commonly known as: AUGMENTIN     doxycycline 100 MG Cap  Commonly known as: VIBRAMYCIN     erythromycin ophthalmic ointment  Commonly known as: ROMYCIN     furosemide 40 MG tablet  Commonly known as: LASIX     losartan 25 MG tablet  Commonly known as:  COZAAR     sodium bicarbonate 650 MG tablet            ASK your doctor about these medications      ciprofloxacin HCl 500 MG tablet  Commonly known as: CIPRO  Take 1 tablet (500 mg total) by mouth Daily. for 3 days  Ask about: Should I take this medication?     loperamide 2 mg capsule  Commonly known as: IMODIUM  Take 1 capsule (2 mg total) by mouth 4 (four) times daily as needed for Diarrhea.  Ask about: Should I take this medication?              Indwelling Lines/Drains at time of discharge:   Lines/Drains/Airways       None                   Time spent on the discharge of patient: 40 minutes         The attending portion of this evaluation, treatment, and documentation was performed per Jael Barnhart MD via Telemedicine AudioVisual using the secure [employer] software platform with 2 way audio/video. The provider was located off-site and the patient is located in the hospital. The aforementioned video software was utilized to document the relevant history and physical exam    Jael Barnhart MD  Department of Hospital Medicine  [facility]

## 2024-06-06 NOTE — ASSESSMENT & PLAN NOTE
Patient with Hypoxic Respiratory failure which is Acute.  he is not on home oxygen. Supplemental oxygen was provided and noted-      Signs/symptoms of respiratory failure include-  continued O2 requirement . Contributing diagnoses includes - Aspiration and Pneumonia Labs and images were reviewed. Patient Has recent ABG, which has been reviewed. Will treat underlying causes and adjust management of respiratory failure as follows- abx/ DUONEB/ IS/ Breo ellipta/ steroids   Wean oxygen as tolerated; 1L/min on 5/3/2024 and RA at discharge

## 2024-06-06 NOTE — ASSESSMENT & PLAN NOTE
Chronic, uncontrolled. Latest blood pressure and vitals reviewed-      .   Home meds for hypertension were reviewed and noted below.   Hypertension Medications               cloNIDine 0.3 mg/24 hr td ptwk (CATAPRES) 0.3 mg/24 hr PLACE 1 PATCH ONTO THE SKIN ONCE A WEEK.    furosemide (LASIX) 40 MG tablet TAKE 1 TABLET EVERY DAY    losartan (COZAAR) 25 MG tablet Take 1 tablet (25 mg total) by mouth once daily.    metoprolol succinate (TOPROL-XL) 50 MG 24 hr tablet TAKE 1 TABLET EVERY DAY    nitroGLYCERIN (NITROSTAT) 0.4 MG SL tablet Place 1 tablet (0.4 mg total) under the tongue every 5 (five) minutes as needed for Chest pain.            While in the hospital, will manage blood pressure as follows; Adjust home antihypertensive regimen as follows- continue metoprolol, start hydralazine. Trend.     Will utilize p.r.n. blood pressure medication only if patient's blood pressure greater than 180/110 and he develops symptoms such as worsening chest pain or shortness of breath.

## 2024-06-27 ENCOUNTER — TELEPHONE (OUTPATIENT)
Dept: VASCULAR SURGERY | Facility: CLINIC | Age: 74
End: 2024-06-27
Payer: MEDICARE

## 2024-06-27 DIAGNOSIS — Z01.818 PRE-OP EVALUATION: Primary | ICD-10-CM

## 2024-06-27 NOTE — TELEPHONE ENCOUNTER
----- Message from Mikayla Mendoza RN sent at 6/27/2024  9:19 AM CDT -----  This new patient would prefer to be seen in [place]. He will need a vein mapping ultrasound for HD access placement. Can you contact him to schedule?  ----- Message -----  From: Roma Nagel  Sent: 6/27/2024   8:52 AM CDT  To: [facility]on [facility]RE[facility] Staff    Appointment Request    Name of Caller:Pt  Symptoms or reason for appointment:Surgical consult reschedule  Best Call Back Number:[phone]  Additional Information: Pt missed yesterdays appointment and would like to reschedule

## 2024-07-01 ENCOUNTER — OFFICE VISIT (OUTPATIENT)
Dept: FAMILY MEDICINE | Facility: CLINIC | Age: 74
End: 2024-07-01
Payer: MEDICARE

## 2024-07-01 VITALS
BODY MASS INDEX: 33.14 KG/M2 | HEIGHT: 70 IN | SYSTOLIC BLOOD PRESSURE: 102 MMHG | DIASTOLIC BLOOD PRESSURE: 50 MMHG | HEART RATE: 81 BPM | TEMPERATURE: 98 F | OXYGEN SATURATION: 98 % | WEIGHT: 231.5 LBS

## 2024-07-01 DIAGNOSIS — Z85.21 HISTORY OF CANCER OF LARYNX: ICD-10-CM

## 2024-07-01 DIAGNOSIS — N18.4 TYPE 2 DIABETES MELLITUS WITH STAGE 4 CHRONIC KIDNEY DISEASE, WITHOUT LONG-TERM CURRENT USE OF INSULIN: ICD-10-CM

## 2024-07-01 DIAGNOSIS — E78.2 MIXED HYPERLIPIDEMIA: ICD-10-CM

## 2024-07-01 DIAGNOSIS — I12.9 HYPERTENSIVE KIDNEY DISEASE WITH STAGE 4 CHRONIC KIDNEY DISEASE: ICD-10-CM

## 2024-07-01 DIAGNOSIS — I10 ESSENTIAL HYPERTENSION: Primary | ICD-10-CM

## 2024-07-01 DIAGNOSIS — N18.4 HYPERTENSIVE KIDNEY DISEASE WITH STAGE 4 CHRONIC KIDNEY DISEASE: ICD-10-CM

## 2024-07-01 DIAGNOSIS — Z94.0 TRANSPLANTED KIDNEY: ICD-10-CM

## 2024-07-01 DIAGNOSIS — Z79.01 ANTICOAGULANT LONG-TERM USE: ICD-10-CM

## 2024-07-01 DIAGNOSIS — E11.22 TYPE 2 DIABETES MELLITUS WITH STAGE 4 CHRONIC KIDNEY DISEASE, WITHOUT LONG-TERM CURRENT USE OF INSULIN: ICD-10-CM

## 2024-07-01 DIAGNOSIS — N18.4 STAGE 4 CHRONIC KIDNEY DISEASE: ICD-10-CM

## 2024-07-01 DIAGNOSIS — Z94.0 RENAL TRANSPLANT RECIPIENT: ICD-10-CM

## 2024-07-01 DIAGNOSIS — J43.9 PULMONARY EMPHYSEMA, UNSPECIFIED EMPHYSEMA TYPE: ICD-10-CM

## 2024-07-01 DIAGNOSIS — N25.81 SECONDARY HYPERPARATHYROIDISM OF RENAL ORIGIN: ICD-10-CM

## 2024-07-01 PROCEDURE — 1126F AMNT PAIN NOTED NONE PRSNT: CPT | Mod: CPTII,S$GLB,, | Performed by: STUDENT IN AN ORGANIZED HEALTH CARE EDUCATION/TRAINING PROGRAM

## 2024-07-01 PROCEDURE — 1160F RVW MEDS BY RX/DR IN RCRD: CPT | Mod: CPTII,S$GLB,, | Performed by: STUDENT IN AN ORGANIZED HEALTH CARE EDUCATION/TRAINING PROGRAM

## 2024-07-01 PROCEDURE — 3066F NEPHROPATHY DOC TX: CPT | Mod: CPTII,S$GLB,, | Performed by: STUDENT IN AN ORGANIZED HEALTH CARE EDUCATION/TRAINING PROGRAM

## 2024-07-01 PROCEDURE — 1101F PT FALLS ASSESS-DOCD LE1/YR: CPT | Mod: CPTII,S$GLB,, | Performed by: STUDENT IN AN ORGANIZED HEALTH CARE EDUCATION/TRAINING PROGRAM

## 2024-07-01 PROCEDURE — 3078F DIAST BP <80 MM HG: CPT | Mod: CPTII,S$GLB,, | Performed by: STUDENT IN AN ORGANIZED HEALTH CARE EDUCATION/TRAINING PROGRAM

## 2024-07-01 PROCEDURE — 3288F FALL RISK ASSESSMENT DOCD: CPT | Mod: CPTII,S$GLB,, | Performed by: STUDENT IN AN ORGANIZED HEALTH CARE EDUCATION/TRAINING PROGRAM

## 2024-07-01 PROCEDURE — 3062F POS MACROALBUMINURIA REV: CPT | Mod: CPTII,S$GLB,, | Performed by: STUDENT IN AN ORGANIZED HEALTH CARE EDUCATION/TRAINING PROGRAM

## 2024-07-01 PROCEDURE — 3044F HG A1C LEVEL LT 7.0%: CPT | Mod: CPTII,S$GLB,, | Performed by: STUDENT IN AN ORGANIZED HEALTH CARE EDUCATION/TRAINING PROGRAM

## 2024-07-01 PROCEDURE — 1159F MED LIST DOCD IN RCRD: CPT | Mod: CPTII,S$GLB,, | Performed by: STUDENT IN AN ORGANIZED HEALTH CARE EDUCATION/TRAINING PROGRAM

## 2024-07-01 PROCEDURE — 3074F SYST BP LT 130 MM HG: CPT | Mod: CPTII,S$GLB,, | Performed by: STUDENT IN AN ORGANIZED HEALTH CARE EDUCATION/TRAINING PROGRAM

## 2024-07-01 PROCEDURE — 3008F BODY MASS INDEX DOCD: CPT | Mod: CPTII,S$GLB,, | Performed by: STUDENT IN AN ORGANIZED HEALTH CARE EDUCATION/TRAINING PROGRAM

## 2024-07-01 PROCEDURE — 99214 OFFICE O/P EST MOD 30 MIN: CPT | Mod: S$GLB,,, | Performed by: STUDENT IN AN ORGANIZED HEALTH CARE EDUCATION/TRAINING PROGRAM

## 2024-07-01 PROCEDURE — 4010F ACE/ARB THERAPY RXD/TAKEN: CPT | Mod: CPTII,S$GLB,, | Performed by: STUDENT IN AN ORGANIZED HEALTH CARE EDUCATION/TRAINING PROGRAM

## 2024-07-01 RX ORDER — PREDNISONE 5 MG/1
5 TABLET ORAL DAILY
Qty: 10 TABLET | Refills: 0 | Status: SHIPPED | OUTPATIENT
Start: 2024-07-01 | End: 2024-07-11

## 2024-07-01 RX ORDER — PREDNISONE 5 MG/1
5 TABLET ORAL DAILY
Qty: 90 TABLET | Refills: 3 | Status: SHIPPED | OUTPATIENT
Start: 2024-07-01

## 2024-07-01 NOTE — PROGRESS NOTES
Patient ID: Jose Luis Manzo is a 74 y.o. male.     Chief Complaint: f/u chronic medical conditions    Follow-up  Pertinent negatives include no chest pain, coughing, fever, headaches, myalgias, nausea, rash, vomiting or weakness.      He is still having some swelling and pain in his RLE. He reports compliance with eliquis. He has upcoming appt scheduled for venous mapping in preparation for dialysis.     Otherwise, he is feeling well today. He denies recent chest pain and shortness of breath. He denies fevers and chills.     He needs refill of prednisone. He is no longer taking breo because he does not feel like it helps his breathing.     Review of Systems  Review of Systems   Constitutional:  Negative for fever.   HENT:  Negative for ear pain and sinus pain.    Eyes:  Negative for discharge.   Respiratory:  Negative for cough and shortness of breath.    Cardiovascular:  Negative for chest pain and leg swelling.   Gastrointestinal:  Negative for diarrhea, nausea and vomiting.   Genitourinary:  Negative for urgency.   Musculoskeletal:  Negative for myalgias.   Skin:  Negative for rash.   Neurological:  Negative for weakness and headaches.   Psychiatric/Behavioral:  Negative for depression.    All other systems reviewed and are negative.      Currently Medications  Current Outpatient Medications on File Prior to Visit   Medication Sig Dispense Refill    atorvastatin (LIPITOR) 20 MG tablet TAKE 1 TABLET EVERY EVENING 90 tablet 3    cinacalcet (SENSIPAR) 30 MG Tab Take 1 tablet (30 mg total) by mouth daily with breakfast. 30 tablet 3    cloNIDine 0.3 mg/24 hr td ptwk (CATAPRES) 0.3 mg/24 hr PLACE 1 PATCH ONTO THE SKIN ONCE A WEEK. 12 patch 10    ELIQUIS 2.5 mg Tab TAKE 1 TABLET TWICE DAILY 180 tablet 3    ergocalciferol (ERGOCALCIFEROL) 50,000 unit Cap TAKE 1 CAPSULE BY MOUTH EVERY 7 DAYS. 12 capsule 3    ferrous sulfate 325 mg (65 mg iron) Tab tablet Take 325 mg by mouth once daily.      finasteride (PROSCAR) 5 mg  tablet TAKE 1 TABLET EVERY DAY 90 tablet 3    fluticasone propionate (FLONASE) 50 mcg/actuation nasal spray 1 spray (50 mcg total) by Each Nostril route nightly. 10 mL 5    furosemide (LASIX) 40 MG tablet Take 1 tablet (40 mg total) by mouth once daily. 90 tablet 3    hydrALAZINE (APRESOLINE) 100 MG tablet Take 1 tablet (100 mg total) by mouth every 8 (eight) hours. 90 tablet 11    metoprolol succinate (TOPROL-XL) 100 MG 24 hr tablet Take 1 tablet (100 mg total) by mouth once daily. 90 tablet 3    NIFEdipine (PROCARDIA-XL) 30 MG (OSM) 24 hr tablet Take 1 tablet (30 mg total) by mouth once daily. 30 tablet 3    sirolimus (RAPAMUNE) 1 MG Tab Take 3 tablets (3 mg total) by mouth once daily.      sodium bicarbonate 650 MG tablet Take 3 tablets (1,950 mg total) by mouth 4 (four) times daily. 360 tablet 3    vitamin renal formula, B-complex-vitamin c-folic acid, (NEPHROCAP) 1 mg Cap Take 1 capsule by mouth once daily. 30 each 3    [DISCONTINUED] fluticasone furoate-vilanteroL (BREO ELLIPTA) 200-25 mcg/dose DsDv diskus inhaler Inhale 1 puff into the lungs once daily. Controller 60 each 3    [DISCONTINUED] predniSONE (DELTASONE) 5 MG tablet TAKE 1 TABLET EVERY DAY 90 tablet 3    [DISCONTINUED] nitroGLYCERIN (NITROSTAT) 0.4 MG SL tablet Place 1 tablet (0.4 mg total) under the tongue every 5 (five) minutes as needed for Chest pain. (Patient not taking: Reported on 5/8/2024) 30 tablet 2    [DISCONTINUED] sildenafiL (VIAGRA) 50 MG tablet Take 1 tablet (50 mg total) by mouth daily as needed for Erectile Dysfunction. 9 tablet 4    [DISCONTINUED] traMADoL (ULTRAM) 50 mg tablet Take 1 tablet (50 mg total) by mouth every 6 (six) hours as needed for Pain. (Patient not taking: Reported on 5/8/2024) 28 tablet 0     No current facility-administered medications on file prior to visit.       Physical  Exam  Vitals:    07/01/24 1442   BP: (!) 102/50   BP Location: Left arm   Patient Position: Sitting   Pulse: 81   Temp: 98.2 °F (36.8 °C)  "  TempSrc: Oral   SpO2: 98%   Weight: 105 kg (231 lb 7.7 oz)   Height: 5' 10" (1.778 m)      Body mass index is 33.21 kg/m².  Wt Readings from Last 3 Encounters:   07/01/24 105 kg (231 lb 7.7 oz)   05/31/24 106.9 kg (235 lb 12.5 oz)   05/23/24 106 kg (233 lb 11 oz)       Physical Exam  Vitals and nursing note reviewed.   Constitutional:       General: He is not in acute distress.     Appearance: He is not ill-appearing.   HENT:      Head: Normocephalic and atraumatic.      Right Ear: External ear normal.      Left Ear: External ear normal.      Nose: Nose normal.      Mouth/Throat:      Mouth: Mucous membranes are moist.   Eyes:      Extraocular Movements: Extraocular movements intact.      Conjunctiva/sclera: Conjunctivae normal.   Cardiovascular:      Rate and Rhythm: Normal rate and regular rhythm.      Pulses: Normal pulses.      Heart sounds: No murmur heard.  Pulmonary:      Effort: Pulmonary effort is normal. No respiratory distress.      Breath sounds: No wheezing.   Abdominal:      General: There is no distension.      Palpations: Abdomen is soft. There is no mass.      Tenderness: There is no abdominal tenderness.   Musculoskeletal:         General: No swelling.      Cervical back: Normal range of motion.   Skin:     Coloration: Skin is not jaundiced.      Findings: No rash.   Neurological:      General: No focal deficit present.      Mental Status: He is alert and oriented to person, place, and time.   Psychiatric:         Mood and Affect: Mood normal.         Thought Content: Thought content normal.         Labs:    Complete Blood Count  Lab Results   Component Value Date    RBC 4.31 (L) 05/10/2024    HGB 11.1 (L) 05/10/2024    HCT 35.5 (L) 05/10/2024    MCV 82 05/10/2024    MCH 25.8 (L) 05/10/2024    MCHC 31.3 (L) 05/10/2024    RDW 16.0 (H) 05/10/2024     05/10/2024    MPV 10.2 05/10/2024    GRAN 7.1 05/10/2024    GRAN 83.3 (H) 05/10/2024    LYMPH 0.7 (L) 05/10/2024    LYMPH 8.5 (L) 05/10/2024    " MONO 0.6 05/10/2024    MONO 6.5 05/10/2024    EOS 0.0 05/10/2024    BASO 0.01 05/10/2024    EOSINOPHIL 0.4 05/10/2024    BASOPHIL 0.1 05/10/2024    DIFFMETHOD Automated 05/10/2024       Comprehensive Metabolic Panel  Lab Results   Component Value Date     (H) 05/10/2024    BUN 31 (H) 05/10/2024    CREATININE 3.39 (H) 05/10/2024     05/10/2024    K 4.0 05/10/2024     (H) 05/10/2024    PROT 7.5 04/26/2024    ALBUMIN 3.5 05/10/2024    BILITOT 0.4 04/26/2024    AST 29 04/26/2024    ALKPHOS 110 04/26/2024    CO2 18 (L) 05/10/2024    ALT 28 04/26/2024    ANIONGAP 11 05/10/2024       TSH  Lab Results   Component Value Date    TSH 0.840 04/27/2024       Imaging:  US Lower Extremity Veins Right  Narrative: EXAMINATION:  US LOWER EXTREMITY VEINS RIGHT    CLINICAL HISTORY:  Edema, unspecified    FINDINGS:  Grayscale and color Doppler sonography was formed through the  right  lower extremity    The  right  lower extremity veins are patent with normal augmentation, and compressibility and respiratory variability.  Persistent echogenic chronic thrombus has decreased compared to the prior study in the right femoral and popliteal veins.  Impression: Decreased chronic deep venous thrombosis.    Electronically signed by: Zac Sotelo MD  Date:    06/03/2024  Time:    16:37      Assessment/Plan:    1. Essential hypertension  -     TSH; Future; Expected date: 07/01/2024    2. Renal transplant recipient  Overview:  2007 at [facility]    Orders:  -     predniSONE (DELTASONE) 5 MG tablet; Take 1 tablet (5 mg total) by mouth once daily.  Dispense: 90 tablet; Refill: 3    3. Mixed hyperlipidemia  -     LIPID PANEL; Future; Expected date: 07/01/2024    4. Stage 4 chronic kidney disease  Overview:  Overview:   updated diagnosis code & description  dx update    Orders:  -     CBC Auto Differential; Future  -     Comprehensive metabolic panel; Future; Expected date: 07/01/2024  -     TSH; Future; Expected date:  07/01/2024    5. Type 2 diabetes mellitus with stage 4 chronic kidney disease, without long-term current use of insulin  -     HEMOGLOBIN A1C; Future; Expected date: 07/01/2024    6. Hypertensive kidney disease with stage 4 chronic kidney disease  Overview:  Strongly encouraged patient follows up with Nephrology       7. Transplanted kidney - 2007    8. Anticoagulant long-term use    9. History of cancer of larynx    10. Secondary hyperparathyroidism of renal origin    11. Pulmonary emphysema, unspecified emphysema type  -     predniSONE (DELTASONE) 5 MG tablet; Take 1 tablet (5 mg total) by mouth once daily.  Dispense: 90 tablet; Refill: 3  -     predniSONE (DELTASONE) 5 MG tablet; Take 1 tablet (5 mg total) by mouth once daily. for 10 days  Dispense: 10 tablet; Refill: 0         Discussed how to stay healthy including: diet, exercise, refraining from smoking and discussed screening exams / tests needed for age, sex and family Hx.        Home Alexis MD

## 2024-07-10 ENCOUNTER — TELEPHONE (OUTPATIENT)
Dept: FAMILY MEDICINE | Facility: CLINIC | Age: 74
End: 2024-07-10
Payer: MEDICARE

## 2024-07-10 DIAGNOSIS — N18.4 STAGE 4 CHRONIC KIDNEY DISEASE: ICD-10-CM

## 2024-07-10 DIAGNOSIS — I82.409 ACUTE DEEP VEIN THROMBOSIS (DVT) OF LOWER EXTREMITY, UNSPECIFIED LATERALITY, UNSPECIFIED VEIN: ICD-10-CM

## 2024-07-10 DIAGNOSIS — E78.2 MIXED HYPERLIPIDEMIA: ICD-10-CM

## 2024-07-10 DIAGNOSIS — N40.0 BENIGN PROSTATIC HYPERPLASIA, UNSPECIFIED WHETHER LOWER URINARY TRACT SYMPTOMS PRESENT: ICD-10-CM

## 2024-07-10 DIAGNOSIS — N18.4 CHRONIC KIDNEY DISEASE, STAGE IV (SEVERE): ICD-10-CM

## 2024-07-10 DIAGNOSIS — E87.20 METABOLIC ACIDOSIS: ICD-10-CM

## 2024-07-10 DIAGNOSIS — I10 ESSENTIAL HYPERTENSION: ICD-10-CM

## 2024-07-10 DIAGNOSIS — J43.9 PULMONARY EMPHYSEMA, UNSPECIFIED EMPHYSEMA TYPE: ICD-10-CM

## 2024-07-10 DIAGNOSIS — Z94.0 RENAL TRANSPLANT RECIPIENT: ICD-10-CM

## 2024-07-10 DIAGNOSIS — E55.9 VITAMIN D DEFICIENCY: ICD-10-CM

## 2024-07-10 RX ORDER — CINACALCET 30 MG/1
30 TABLET, FILM COATED ORAL
Qty: 30 TABLET | Refills: 3 | Status: SHIPPED | OUTPATIENT
Start: 2024-07-10 | End: 2025-07-10

## 2024-07-10 RX ORDER — CLONIDINE 0.3 MG/24H
1 PATCH, EXTENDED RELEASE TRANSDERMAL WEEKLY
Qty: 12 PATCH | Refills: 10 | Status: SHIPPED | OUTPATIENT
Start: 2024-07-10

## 2024-07-10 RX ORDER — NIFEDIPINE 30 MG/1
30 TABLET, EXTENDED RELEASE ORAL DAILY
Qty: 30 TABLET | Refills: 3 | Status: SHIPPED | OUTPATIENT
Start: 2024-07-10 | End: 2025-07-10

## 2024-07-10 RX ORDER — HYDRALAZINE HYDROCHLORIDE 100 MG/1
100 TABLET, FILM COATED ORAL EVERY 8 HOURS
Qty: 90 TABLET | Refills: 11 | Status: SHIPPED | OUTPATIENT
Start: 2024-07-10 | End: 2025-07-10

## 2024-07-10 RX ORDER — PREDNISONE 5 MG/1
5 TABLET ORAL DAILY
Qty: 90 TABLET | Refills: 3 | Status: SHIPPED | OUTPATIENT
Start: 2024-07-10

## 2024-07-10 RX ORDER — FUROSEMIDE 40 MG/1
40 TABLET ORAL DAILY
Qty: 90 TABLET | Refills: 3 | Status: SHIPPED | OUTPATIENT
Start: 2024-07-10

## 2024-07-10 RX ORDER — ERGOCALCIFEROL 1.25 MG/1
50000 CAPSULE ORAL
Qty: 12 CAPSULE | Refills: 3 | Status: SHIPPED | OUTPATIENT
Start: 2024-07-10

## 2024-07-10 RX ORDER — ATORVASTATIN CALCIUM 20 MG/1
20 TABLET, FILM COATED ORAL NIGHTLY
Qty: 90 TABLET | Refills: 3 | Status: SHIPPED | OUTPATIENT
Start: 2024-07-10

## 2024-07-10 RX ORDER — FINASTERIDE 5 MG/1
5 TABLET, FILM COATED ORAL DAILY
Qty: 90 TABLET | Refills: 3 | Status: SHIPPED | OUTPATIENT
Start: 2024-07-10

## 2024-07-10 RX ORDER — SODIUM BICARBONATE 650 MG/1
1950 TABLET ORAL 4 TIMES DAILY
Qty: 360 TABLET | Refills: 3 | Status: SHIPPED | OUTPATIENT
Start: 2024-07-10

## 2024-07-10 RX ORDER — METOPROLOL SUCCINATE 100 MG/1
100 TABLET, EXTENDED RELEASE ORAL DAILY
Qty: 90 TABLET | Refills: 3 | Status: SHIPPED | OUTPATIENT
Start: 2024-07-10 | End: 2025-07-10

## 2024-07-10 NOTE — TELEPHONE ENCOUNTER
Please send all meds to [facility] pharmacy ( pharmacy updated)        I spoke with the pt but he did not have a list of meds  The above meds are all I see dr flor has prescribed this year      Pt ph [phone]

## 2024-07-11 ENCOUNTER — HOSPITAL ENCOUNTER (OUTPATIENT)
Dept: RADIOLOGY | Facility: HOSPITAL | Age: 74
Discharge: HOME | End: 2024-07-11
Attending: SURGERY
Payer: MEDICARE

## 2024-07-11 DIAGNOSIS — Z01.818 PRE-OP EVALUATION: ICD-10-CM

## 2024-07-11 PROCEDURE — 93985 DUP-SCAN HEMO COMPL BI STD: CPT | Mod: TC

## 2024-07-11 PROCEDURE — 93985 DUP-SCAN HEMO COMPL BI STD: CPT | Mod: 26,,, | Performed by: RADIOLOGY

## 2024-07-16 ENCOUNTER — TELEPHONE (OUTPATIENT)
Dept: VASCULAR SURGERY | Facility: CLINIC | Age: 74
End: 2024-07-16
Payer: MEDICARE

## 2024-07-16 NOTE — TELEPHONE ENCOUNTER
----- Message from Radha Rosenberg MA sent at 7/16/2024 11:36 AM CDT -----  Pt was schedule with Dr. Navarro, pt needs new appt. Please call pt to schedule ,thank you

## 2024-07-29 PROBLEM — J96.01 ACUTE RESPIRATORY FAILURE WITH HYPOXIA: Status: RESOLVED | Noted: 2024-04-26 | Resolved: 2024-07-29

## 2024-07-31 ENCOUNTER — OFFICE VISIT (OUTPATIENT)
Dept: VASCULAR SURGERY | Facility: CLINIC | Age: 74
End: 2024-07-31
Payer: MEDICARE

## 2024-07-31 VITALS
WEIGHT: 229 LBS | HEIGHT: 70 IN | DIASTOLIC BLOOD PRESSURE: 83 MMHG | OXYGEN SATURATION: 98 % | SYSTOLIC BLOOD PRESSURE: 132 MMHG | HEART RATE: 59 BPM | BODY MASS INDEX: 32.78 KG/M2

## 2024-07-31 DIAGNOSIS — N18.4 STAGE 4 CHRONIC KIDNEY DISEASE: ICD-10-CM

## 2024-07-31 PROCEDURE — 99999 PR PBB SHADOW E&M-EST. PATIENT-LVL III: CPT | Mod: PBBFAC,,, | Performed by: SURGERY

## 2024-07-31 NOTE — PROGRESS NOTES
Dr. Navarro dictating on Mr. Jose Luis forde.  He is referred in for potential need for AV access for dialysis.  Patient had a renal transplant in the early 2 thousands at the [facility].  It was starting to fail due to other issues especially hypertension.  Patient had multiple AV accesses.  His last 1 was in his left thigh.  He has had multiple in his left arm in his right arm.  At this point he is not on dialysis yet.  We can try to get him a fistula if we can get 1 that would function.  I reviewed his venous ultrasound in depth.  He does have what looks to be a good cephalic vein on the right side.  However it looks like he may have scarring at the antecubital fossa that is somewhat had attempted 1 before.  I am not sure that it is true surgical scarring or just the way he is arm bends in the antecubital fossa.  I would initially attempt an AV fistula in the right antecubital fossa.  If there was too much scarring that does not work on it we have a little more proximally up the arm.  If not we can place a graft from his brachial artery to his cephalic vein higher up.  The issue is that he does not have a good brachial or basilic vein in the right arm.  Left arm has had multiple access he is in it.  So I have explained to him the risks of the procedure.  He stays on immunosuppression so the risk of infection is increased.  In addition to that he is on Eliquis.  He had a DVT in his leg years ago.  We can hold the Eliquis for a few days prior to surgery.    I have reviewed his chart.  No known allergies.      .  Medications include Eliquis 2.5 mg a statin a host of anti tentative medications.  He is on clonidine and Apresoline.  He is on Lasix.  He is on prednisone and Rapamune.    I have discussed the risks benefits with him he understands the procedure we will contact him and arrange a date for surgery in the future.  All of his questions were answered

## 2024-08-05 PROBLEM — J15.1 PSEUDOMONAS PNEUMONIA: Status: RESOLVED | Noted: 2024-04-26 | Resolved: 2024-08-05

## 2024-08-15 ENCOUNTER — OFFICE VISIT (OUTPATIENT)
Dept: NEPHROLOGY | Facility: CLINIC | Age: 74
End: 2024-08-15
Payer: MEDICARE

## 2024-08-15 ENCOUNTER — LAB VISIT (OUTPATIENT)
Dept: LAB | Facility: HOSPITAL | Age: 74
End: 2024-08-15
Payer: MEDICARE

## 2024-08-15 VITALS
HEART RATE: 55 BPM | BODY MASS INDEX: 31.91 KG/M2 | OXYGEN SATURATION: 97 % | HEIGHT: 70 IN | SYSTOLIC BLOOD PRESSURE: 146 MMHG | DIASTOLIC BLOOD PRESSURE: 83 MMHG | WEIGHT: 222.88 LBS

## 2024-08-15 DIAGNOSIS — N25.81 SECONDARY HYPERPARATHYROIDISM OF RENAL ORIGIN: ICD-10-CM

## 2024-08-15 DIAGNOSIS — Z79.899 IMMUNOSUPPRESSIVE MANAGEMENT ENCOUNTER FOLLOWING KIDNEY TRANSPLANT: ICD-10-CM

## 2024-08-15 DIAGNOSIS — E83.9 CHRONIC KIDNEY DISEASE-MINERAL BONE DISORDER (CKD-MBD) WITH STAGE 4 CHRONIC KIDNEY DISEASE: ICD-10-CM

## 2024-08-15 DIAGNOSIS — Z94.0 TRANSPLANTED KIDNEY: ICD-10-CM

## 2024-08-15 DIAGNOSIS — Z94.0 IMMUNOSUPPRESSIVE MANAGEMENT ENCOUNTER FOLLOWING KIDNEY TRANSPLANT: ICD-10-CM

## 2024-08-15 DIAGNOSIS — D63.1 ANEMIA IN CHRONIC KIDNEY DISEASE, UNSPECIFIED CKD STAGE: ICD-10-CM

## 2024-08-15 DIAGNOSIS — Z85.21 HISTORY OF CANCER OF LARYNX: ICD-10-CM

## 2024-08-15 DIAGNOSIS — N18.9 ANEMIA IN CHRONIC KIDNEY DISEASE, UNSPECIFIED CKD STAGE: ICD-10-CM

## 2024-08-15 DIAGNOSIS — N18.4 STAGE 4 CHRONIC KIDNEY DISEASE: ICD-10-CM

## 2024-08-15 DIAGNOSIS — E87.20 METABOLIC ACIDOSIS: ICD-10-CM

## 2024-08-15 DIAGNOSIS — M89.9 CHRONIC KIDNEY DISEASE-MINERAL BONE DISORDER (CKD-MBD) WITH STAGE 4 CHRONIC KIDNEY DISEASE: ICD-10-CM

## 2024-08-15 DIAGNOSIS — N18.4 STAGE 4 CHRONIC KIDNEY DISEASE: Primary | ICD-10-CM

## 2024-08-15 DIAGNOSIS — I10 HYPERTENSION, UNSPECIFIED TYPE: ICD-10-CM

## 2024-08-15 DIAGNOSIS — N18.4 CHRONIC KIDNEY DISEASE-MINERAL BONE DISORDER (CKD-MBD) WITH STAGE 4 CHRONIC KIDNEY DISEASE: ICD-10-CM

## 2024-08-15 LAB
ALBUMIN SERPL BCP-MCNC: 3.6 G/DL (ref 3.5–5.2)
ANION GAP SERPL CALC-SCNC: 8 MMOL/L (ref 8–16)
BASOPHILS # BLD AUTO: 0.02 K/UL (ref 0–0.2)
BASOPHILS NFR BLD: 0.3 % (ref 0–1.9)
BUN SERPL-MCNC: 31 MG/DL (ref 8–23)
CALCIUM SERPL-MCNC: 10.3 MG/DL (ref 8.7–10.5)
CHLORIDE SERPL-SCNC: 113 MMOL/L (ref 95–110)
CO2 SERPL-SCNC: 19 MMOL/L (ref 23–29)
CREAT SERPL-MCNC: 3.2 MG/DL (ref 0.5–1.4)
DIFFERENTIAL METHOD BLD: ABNORMAL
EOSINOPHIL # BLD AUTO: 0 K/UL (ref 0–0.5)
EOSINOPHIL NFR BLD: 0.5 % (ref 0–8)
ERYTHROCYTE [DISTWIDTH] IN BLOOD BY AUTOMATED COUNT: 17 % (ref 11.5–14.5)
EST. GFR  (NO RACE VARIABLE): 19.6 ML/MIN/1.73 M^2
GLUCOSE SERPL-MCNC: 103 MG/DL (ref 70–110)
HCT VFR BLD AUTO: 38.9 % (ref 40–54)
HGB BLD-MCNC: 12 G/DL (ref 14–18)
IMM GRANULOCYTES # BLD AUTO: 0.03 K/UL (ref 0–0.04)
IMM GRANULOCYTES NFR BLD AUTO: 0.5 % (ref 0–0.5)
LYMPHOCYTES # BLD AUTO: 0.7 K/UL (ref 1–4.8)
LYMPHOCYTES NFR BLD: 11.5 % (ref 18–48)
MCH RBC QN AUTO: 26.4 PG (ref 27–31)
MCHC RBC AUTO-ENTMCNC: 30.8 G/DL (ref 32–36)
MCV RBC AUTO: 86 FL (ref 82–98)
MONOCYTES # BLD AUTO: 0.4 K/UL (ref 0.3–1)
MONOCYTES NFR BLD: 6.2 % (ref 4–15)
NEUTROPHILS # BLD AUTO: 4.9 K/UL (ref 1.8–7.7)
NEUTROPHILS NFR BLD: 81 % (ref 38–73)
NRBC BLD-RTO: 0 /100 WBC
PHOSPHATE SERPL-MCNC: 2.4 MG/DL (ref 2.7–4.5)
PHOSPHATE SERPL-MCNC: 2.4 MG/DL (ref 2.7–4.5)
PLATELET # BLD AUTO: 158 K/UL (ref 150–450)
PMV BLD AUTO: 12 FL (ref 9.2–12.9)
POTASSIUM SERPL-SCNC: 3.8 MMOL/L (ref 3.5–5.1)
RBC # BLD AUTO: 4.55 M/UL (ref 4.6–6.2)
SODIUM SERPL-SCNC: 140 MMOL/L (ref 136–145)
WBC # BLD AUTO: 6.1 K/UL (ref 3.9–12.7)

## 2024-08-15 PROCEDURE — 80069 RENAL FUNCTION PANEL: CPT | Performed by: STUDENT IN AN ORGANIZED HEALTH CARE EDUCATION/TRAINING PROGRAM

## 2024-08-15 PROCEDURE — 99999 PR PBB SHADOW E&M-EST. PATIENT-LVL III: CPT | Mod: PBBFAC,GC,, | Performed by: STUDENT IN AN ORGANIZED HEALTH CARE EDUCATION/TRAINING PROGRAM

## 2024-08-15 PROCEDURE — 85025 COMPLETE CBC W/AUTO DIFF WBC: CPT | Performed by: STUDENT IN AN ORGANIZED HEALTH CARE EDUCATION/TRAINING PROGRAM

## 2024-08-15 PROCEDURE — 36415 COLL VENOUS BLD VENIPUNCTURE: CPT | Performed by: STUDENT IN AN ORGANIZED HEALTH CARE EDUCATION/TRAINING PROGRAM

## 2024-08-15 NOTE — PROGRESS NOTES
Nephrology Clinic Note   12/15/2022    No chief complaint on file.     History of present illness:  Patient is a 74 y.o. 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      Clinic visit 8/15/24; During his last office visit, he was referred to vascular surgery after deciding on starting HD if his transplanted kidney fails. He saw Dr. Navarro on 7/31 who reviewed his ultrasounds and thinks that he may be able to place a right sided AVF, although does admit that his options for access placement is limited given his multiple access creations in the past. Pt denies: Fever, chills, shortness of breath, chest pain, palpitations, nausea, vomiting, diarrhea, abd pain, hematuria, dysuria, urinary frequency or urgency, headaches, visual disturbances or weakness. He reports compliance with his Sirolimus 3 mg QD. His insurance has stopped covering his vitamin D 50,000 units weekly, and he is paying out of pocket for his Sirolimus 3 mg daily which is costing his $150 per month. He has reduced his hydralazine and bicarb to twice daily due to difficulty taking his afternoon doses. His home blood pressures average 130's/80's        HTN, hyperlipidemia:   Pt monitors blood pressure at home and his SBPs are usually in 130s/80's  Meds: Clonidine patch weekly, atorvastatin, metoprolol 100mg daily, Hydralazine 100 mg BID  --He is currently not taking his Lisinopril.        CKD IV  2/2 hypertensive nephrosclerosis & chronic CNI exposure   Baseline sCr 3.3-3.6 (in 2022)   Lab Results   Component Value Date    CREATININE 3.39 (H) 05/10/2024       2021 pt had COVID related CHANG  CNI toxicity in the past   On Dr. 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 contributing to pt's CKD     CKD education class:   [x] Referred  [] Attended    CKD nutrition education  [x] Referred  [] Attended        Anemia  Iron   Date Value Ref Range Status   04/27/2024 15 (L) 45 - 160 ug/dL Final   12/15/2022 60 45 - 160 ug/dL Final     TIBC   Date Value Ref Range Status   04/27/2024 138 (L) 250 - 450 ug/dL Final   12/15/2022 195 (L) 250 - 450 ug/dL Final     Ferritin   Date Value Ref Range Status   04/27/2024 979 (H) 20.0 - 300.0 ng/mL Final   12/15/2022 753 (H) 20.0 - 300.0 ng/mL Final     Retic   Date Value Ref Range Status   12/15/2022 1.8 0.4 - 2.0 % Final     Hemoglobin   Date Value Ref Range Status   05/10/2024 11.1 (L) 14.0 - 18.0 g/dL Final   05/04/2024 11.7 (L) 14.0 - 18.0 g/dL Final   Medications; Ferrous Sulfate 325 mg daily       MBD: patient's insurance does not cover sensipar or vitamin D 50,000 units weekly he has had persistent hypercalcemia in the past.     Lab Results   Component Value Date    .8 (H) 04/30/2024    CALCIUM 9.7 05/10/2024    PHOS 3.1 05/10/2024   Meds: [place] 18810 mg Bid, ferrous sulfate 325mg daily      Kidney Transplant:   Done in [place]   CNI toxicity in the past   Meds: sirolimus 3mg daily, prednisone 5mg daily,   Does not follow with kidney transplant team       Oropharyngeal CA-follows with ENT, not on any current medications/treatments       Review of Systems   Constitutional:  Negative for chills, diaphoresis, fever and weight loss.   Eyes:  Negative for blurred vision, double vision and pain.   Respiratory:  Negative for cough, hemoptysis, shortness of breath and wheezing.    Cardiovascular:  Negative for chest pain, orthopnea, claudication and leg swelling.   Gastrointestinal:  Negative for blood in stool, diarrhea, melena, nausea and vomiting.   Genitourinary:  Negative for dysuria, flank pain, frequency, hematuria and urgency.        Positive for nocturia   Musculoskeletal:  Negative for myalgias.  "  Skin:  Negative for rash.   Neurological:  Negative for dizziness, tremors, loss of consciousness and headaches.       History:  Past Medical History:   Diagnosis Date    Acute hypoxemic respiratory failure due to COVID-19 12/30/2020    Anticoagulant long-term use     BPH (benign prostatic hypertrophy)     Cancer     vocal cords    Claudication of right lower extremity 3/10/2016    COVID-19 virus detected 12/30/2020    Dependence on renal dialysis 4/18/2023    Disorder of kidney and ureter     Hyperkalemia 1/1/2021    Hyperlipidemia     Hypertension     Hypokalemia 6/23/2021    Immunosuppression 6/26/2021    Immunosuppression due to chronic steroid use 1/30/2020    Microcytic anemia 9/16/2016    Obesity (BMI 30-39.9) 1/23/2019    Oropharyngeal cancer     Pterygium     Squamous cell carcinoma 4/25/2018    Thromboembolic disorder     Unspecified disorder of kidney and ureter       Past Surgical History:   Procedure Laterality Date    CYSTOSCOPY W/ RETROGRADES Bilateral 6/15/2022    Procedure: Cystoscopy, bilateral retrograde pyelogram, and all indicated procedures;  Surgeon: Veronica Bacon MD;  Location: [facility];  Service: Urology;  Laterality: Bilateral;    FRACTURE SURGERY Left     "lower leg" 40 years ago    KIDNEY TRANSPLANT  2007    followed by [facility] Transplant    LARYNX SURGERY  11/2014    Oropharyngeal Cancer x3    microlaryngoscopy      PHLEBOGRAPHY Bilateral 8/2/2022    Procedure: Venogram;  Surgeon: KENIA Mueller II, MD;  Location: [facility] CATH LAB;  Service: Vascular;  Laterality: Bilateral;    SURGICAL REMOVAL OF LESION OF ORBIT Bilateral 8/26/2020    Procedure: EXCISION, LESION, ORBIT;  Surgeon: Linda Tee MD;  Location: [address];  Service: Ophthalmology;  Laterality: Bilateral;    TIBIAL STAPLING Left 1980             Current Outpatient Medications:     apixaban (ELIQUIS) 2.5 mg Tab, Take 1 tablet (2.5 mg total) by mouth 2 (two) times daily., Disp: 180 tablet, Rfl: 3    atorvastatin " (LIPITOR) 20 MG tablet, Take 1 tablet (20 mg total) by mouth every evening., Disp: 90 tablet, Rfl: 3    cinacalcet (SENSIPAR) 30 MG Tab, Take 1 tablet (30 mg total) by mouth daily with breakfast., Disp: 30 tablet, Rfl: 3    cloNIDine 0.3 mg/24 hr td ptwk (CATAPRES) 0.3 mg/24 hr, Place 1 patch onto the skin once a week., Disp: 12 patch, Rfl: 10    ferrous sulfate 325 mg (65 mg iron) Tab tablet, Take 325 mg by mouth once daily., Disp: , Rfl:     finasteride (PROSCAR) 5 mg tablet, Take 1 tablet (5 mg total) by mouth once daily., Disp: 90 tablet, Rfl: 3    fluticasone propionate (FLONASE) 50 mcg/actuation nasal spray, 1 spray (50 mcg total) by Each Nostril route nightly., Disp: 10 mL, Rfl: 5    furosemide (LASIX) 40 MG tablet, Take 1 tablet (40 mg total) by mouth once daily., Disp: 90 tablet, Rfl: 3    hydrALAZINE (APRESOLINE) 100 MG tablet, Take 1 tablet (100 mg total) by mouth every 8 (eight) hours., Disp: 90 tablet, Rfl: 11    metoprolol succinate (TOPROL-XL) 100 MG 24 hr tablet, Take 1 tablet (100 mg total) by mouth once daily., Disp: 90 tablet, Rfl: 3    NIFEdipine (PROCARDIA-XL) 30 MG (OSM) 24 hr tablet, Take 1 tablet (30 mg total) by mouth once daily., Disp: 30 tablet, Rfl: 3    predniSONE (DELTASONE) 5 MG tablet, Take 1 tablet (5 mg total) by mouth once daily., Disp: 90 tablet, Rfl: 3    sirolimus (RAPAMUNE) 1 MG Tab, Take 3 tablets (3 mg total) by mouth once daily., Disp: , Rfl:     sodium bicarbonate 650 MG tablet, Take 3 tablets (1,950 mg total) by mouth 4 (four) times daily., Disp: 360 tablet, Rfl: 3    vitamin renal formula, B-complex-vitamin c-folic acid, (NEPHROCAP) 1 mg Cap, Take 1 capsule by mouth once daily., Disp: 30 each, Rfl: 3    ergocalciferol (ERGOCALCIFEROL) 50,000 unit Cap, Take 1 capsule (50,000 Units total) by mouth every 7 days. (Patient not taking: Reported on 8/15/2024), Disp: 12 capsule, Rfl: 3  Review of patient's allergies indicates:  No Known Allergies   Social History     Tobacco Use     Smoking status: Former     Current packs/day: 0.00     Average packs/day: 1 pack/day for 20.0 years (20.0 ttl pk-yrs)     Types: Cigarettes     Start date: 1989     Quit date: 2009     Years since quitting: 15.6     Passive exposure: Past    Smokeless tobacco: Never   Substance Use Topics    Alcohol use: No     Alcohol/week: 0.0 standard drinks of alcohol      Family History   Problem Relation Name Age of Onset    Stroke Mother      Diabetes Mother      Hypertension Mother      Stroke Father      No Known Problems Sister      No Known Problems Brother x1     No Known Problems Daughter      No Known Problems Brother x1         Physical Exam  Vitals and nursing note reviewed.   Constitutional:       General: He is not in acute distress.     Appearance: Normal appearance. He is obese. He is not ill-appearing, toxic-appearing or diaphoretic.   HENT:      Mouth/Throat:      Mouth: Mucous membranes are moist.   Cardiovascular:      Rate and Rhythm: Normal rate and regular rhythm.      Pulses: Normal pulses.      Heart sounds: Murmur heard.   Pulmonary:      Effort: Pulmonary effort is normal. No respiratory distress.      Breath sounds: Normal breath sounds. No stridor. No wheezing, rhonchi or rales.   Musculoskeletal:         General: No swelling, tenderness, deformity or signs of injury.      Left lower leg: No edema.   Skin:     General: Skin is warm.      Capillary Refill: Capillary refill takes less than 2 seconds.      Coloration: Skin is not jaundiced or pale.      Findings: No bruising, erythema, lesion or rash.   Neurological:      Mental Status: He is alert and oriented to person, place, and time.   Psychiatric:         Mood and Affect: Mood normal.         Behavior: Behavior normal.         Thought Content: Thought content normal.         Judgment: Judgment normal.         Labs reviewed   Images Reviewed    ASSESSMENT & PLAN:   Mr. Jose Luis Manzo is a 74 y.o. male with past medical history of renal failure  2/2 hypertensive nephrosclerosis previously on HD who is now s/p kidney transplant in 2007, he has developed post-transplant CKD IV likely 2/2 HTN and chronic CNI use. Currently his renal function is stable with a eGFR around 16-18, and he is without uremic symptoms. He was offered and referred to CKD education classes as well as dialysis education classes which he declined at this time. He was referred to renal transplant medicine for evaluation of potentially receiving another transplant in Jan. 2024. He has opted to undergo dialysis if he progresses to ESRD.      He reports difficulty with the increased pill burden after his hospital discharge, specifically hydralazine 100 mg TID and sodium bicarb 1950 TID which he has self reduced to twice daily. His edema has improved with the resumption of his lasix. I have asked him to check his blood pressure twice a day and to alert me if his blood pressure drops below 110/70 or if he develops any lightheadedness or dizziness.    I have asked to contact vascular surgery if he does not hear back from them by Monday about scheduling this AVF creation.     Will recheck his renal function panel and if serum bicarb is satisfactory then I will decrease his dose to 1300 mg BID in an effort to reduce his sodium intake to help with his hypertension.     He should continue his current Immunosuppressive medications of Sirolimus and Prednisone. Unfortunately we are unable to change back to CNI due to prior toxicity.     Stage 4 chronic kidney disease  -     Renal Function Panel; Standing  -     CBC Auto Differential; Standing  -     PHOSPHORUS; Future; Expected date: 08/15/2024    Hypertension, unspecified type    Immunosuppressive management encounter following kidney transplant    Transplanted kidney - 2007    Metabolic acidosis    Secondary hyperparathyroidism of renal origin    Anemia in chronic kidney disease, unspecified CKD stage    History of cancer of larynx    Chronic kidney  disease-mineral bone disorder (CKD-MBD) with stage 4 chronic kidney disease             RTC in 1 month    Discussed with Dr. Nunn  - staff attestation to follow      Jonas Mtz MD  Nephrology PGY-4    [address] [place] [address] [phone]

## 2024-08-15 NOTE — PROGRESS NOTES
I have reviewed the notes, assessments, and/or procedures performed by Dr. Mtz, I concur with her/his documentation of Jose Luis Manzo.  Date of Service: 8/15/2024    73 y/o male with hx of ESRD 2/2 HTN s/p txp in 2007, failing graft with CKD stage 4 (eGFR 16-18). Will repeat CKD profile. Saw vascular with pending AVF creation.

## 2024-09-02 ENCOUNTER — HOSPITAL ENCOUNTER (INPATIENT)
Facility: HOSPITAL | Age: 74
LOS: 3 days | Discharge: HOME | DRG: 699 | End: 2024-09-05
Attending: EMERGENCY MEDICINE | Admitting: INTERNAL MEDICINE
Payer: MEDICARE

## 2024-09-02 DIAGNOSIS — R07.9 CHEST PAIN: ICD-10-CM

## 2024-09-02 DIAGNOSIS — R53.1 WEAKNESS: ICD-10-CM

## 2024-09-02 DIAGNOSIS — N18.4 STAGE 4 CHRONIC KIDNEY DISEASE: ICD-10-CM

## 2024-09-02 DIAGNOSIS — N39.0 COMPLICATED UTI (URINARY TRACT INFECTION): Primary | ICD-10-CM

## 2024-09-02 DIAGNOSIS — N17.9 AKI (ACUTE KIDNEY INJURY): ICD-10-CM

## 2024-09-02 DIAGNOSIS — Z94.0 STATUS POST KIDNEY TRANSPLANT: ICD-10-CM

## 2024-09-02 DIAGNOSIS — E87.20 METABOLIC ACIDOSIS: ICD-10-CM

## 2024-09-02 LAB
ALBUMIN SERPL BCP-MCNC: 3.6 G/DL (ref 3.5–5.2)
ALP SERPL-CCNC: 97 U/L (ref 55–135)
ALT SERPL W/O P-5'-P-CCNC: 25 U/L (ref 10–44)
ANION GAP SERPL CALC-SCNC: 17 MMOL/L (ref 8–16)
AST SERPL-CCNC: 32 U/L (ref 10–40)
BACTERIA #/AREA URNS HPF: ABNORMAL /HPF
BASOPHILS # BLD AUTO: 0.02 K/UL (ref 0–0.2)
BASOPHILS NFR BLD: 0.2 % (ref 0–1.9)
BILIRUB SERPL-MCNC: 0.4 MG/DL (ref 0.1–1)
BILIRUB UR QL STRIP: NEGATIVE
BUN SERPL-MCNC: 42 MG/DL (ref 8–23)
CALCIUM SERPL-MCNC: 10.5 MG/DL (ref 8.7–10.5)
CHLORIDE SERPL-SCNC: 105 MMOL/L (ref 95–110)
CLARITY UR: ABNORMAL
CO2 SERPL-SCNC: 18 MMOL/L (ref 23–29)
COLOR UR: YELLOW
CREAT SERPL-MCNC: 4.8 MG/DL (ref 0.5–1.4)
DIFFERENTIAL METHOD BLD: ABNORMAL
EOSINOPHIL # BLD AUTO: 0 K/UL (ref 0–0.5)
EOSINOPHIL NFR BLD: 0.2 % (ref 0–8)
ERYTHROCYTE [DISTWIDTH] IN BLOOD BY AUTOMATED COUNT: 15.4 % (ref 11.5–14.5)
EST. GFR  (NO RACE VARIABLE): 12 ML/MIN/1.73 M^2
GLUCOSE SERPL-MCNC: 111 MG/DL (ref 70–110)
GLUCOSE UR QL STRIP: NEGATIVE
HCT VFR BLD AUTO: 40.8 % (ref 40–54)
HGB BLD-MCNC: 13 G/DL (ref 14–18)
HGB UR QL STRIP: ABNORMAL
HYALINE CASTS #/AREA URNS LPF: 0 /LPF
IMM GRANULOCYTES # BLD AUTO: 0.05 K/UL (ref 0–0.04)
IMM GRANULOCYTES NFR BLD AUTO: 0.5 % (ref 0–0.5)
KETONES UR QL STRIP: NEGATIVE
LACTATE SERPL-SCNC: 1.1 MMOL/L (ref 0.5–2.2)
LEUKOCYTE ESTERASE UR QL STRIP: ABNORMAL
LYMPHOCYTES # BLD AUTO: 1.1 K/UL (ref 1–4.8)
LYMPHOCYTES NFR BLD: 11.4 % (ref 18–48)
MAGNESIUM SERPL-MCNC: 2.3 MG/DL (ref 1.6–2.6)
MCH RBC QN AUTO: 25.7 PG (ref 27–31)
MCHC RBC AUTO-ENTMCNC: 31.9 G/DL (ref 32–36)
MCV RBC AUTO: 81 FL (ref 82–98)
MICROSCOPIC COMMENT: ABNORMAL
MONOCYTES # BLD AUTO: 0.8 K/UL (ref 0.3–1)
MONOCYTES NFR BLD: 7.7 % (ref 4–15)
NEUTROPHILS # BLD AUTO: 7.9 K/UL (ref 1.8–7.7)
NEUTROPHILS NFR BLD: 80 % (ref 38–73)
NITRITE UR QL STRIP: NEGATIVE
NRBC BLD-RTO: 0 /100 WBC
OB PNL STL: NEGATIVE
PH UR STRIP: 6 [PH] (ref 5–8)
PHOSPHATE SERPL-MCNC: 3.2 MG/DL (ref 2.7–4.5)
PLATELET # BLD AUTO: 251 K/UL (ref 150–450)
PMV BLD AUTO: 10.3 FL (ref 9.2–12.9)
POTASSIUM SERPL-SCNC: 4.1 MMOL/L (ref 3.5–5.1)
PROT SERPL-MCNC: 9.3 G/DL (ref 6–8.4)
PROT UR QL STRIP: ABNORMAL
RBC # BLD AUTO: 5.05 M/UL (ref 4.6–6.2)
RBC #/AREA URNS HPF: 52 /HPF (ref 0–4)
SODIUM SERPL-SCNC: 140 MMOL/L (ref 136–145)
SP GR UR STRIP: 1.01 (ref 1–1.03)
TSH SERPL DL<=0.005 MIU/L-ACNC: 3.36 UIU/ML (ref 0.4–4)
URN SPEC COLLECT METH UR: ABNORMAL
UROBILINOGEN UR STRIP-ACNC: NEGATIVE EU/DL
WBC # BLD AUTO: 9.82 K/UL (ref 3.9–12.7)
WBC #/AREA URNS HPF: >100 /HPF (ref 0–5)
WBC CLUMPS URNS QL MICRO: ABNORMAL

## 2024-09-02 PROCEDURE — 87086 URINE CULTURE/COLONY COUNT: CPT | Performed by: EMERGENCY MEDICINE

## 2024-09-02 PROCEDURE — 63600175 PHARM REV CODE 636 W HCPCS: Performed by: INTERNAL MEDICINE

## 2024-09-02 PROCEDURE — 83605 ASSAY OF LACTIC ACID: CPT | Performed by: EMERGENCY MEDICINE

## 2024-09-02 PROCEDURE — 87040 BLOOD CULTURE FOR BACTERIA: CPT | Mod: 59 | Performed by: EMERGENCY MEDICINE

## 2024-09-02 PROCEDURE — 85025 COMPLETE CBC W/AUTO DIFF WBC: CPT | Performed by: EMERGENCY MEDICINE

## 2024-09-02 PROCEDURE — 83735 ASSAY OF MAGNESIUM: CPT | Performed by: EMERGENCY MEDICINE

## 2024-09-02 PROCEDURE — 87147 CULTURE TYPE IMMUNOLOGIC: CPT | Performed by: EMERGENCY MEDICINE

## 2024-09-02 PROCEDURE — 93005 ELECTROCARDIOGRAM TRACING: CPT

## 2024-09-02 PROCEDURE — 96365 THER/PROPH/DIAG IV INF INIT: CPT

## 2024-09-02 PROCEDURE — 25000003 PHARM REV CODE 250: Performed by: INTERNAL MEDICINE

## 2024-09-02 PROCEDURE — 93010 ELECTROCARDIOGRAM REPORT: CPT | Mod: ,,, | Performed by: INTERNAL MEDICINE

## 2024-09-02 PROCEDURE — 84100 ASSAY OF PHOSPHORUS: CPT | Performed by: EMERGENCY MEDICINE

## 2024-09-02 PROCEDURE — 63600175 PHARM REV CODE 636 W HCPCS: Performed by: EMERGENCY MEDICINE

## 2024-09-02 PROCEDURE — 11000001 HC ACUTE MED/SURG PRIVATE ROOM

## 2024-09-02 PROCEDURE — 84443 ASSAY THYROID STIM HORMONE: CPT | Performed by: EMERGENCY MEDICINE

## 2024-09-02 PROCEDURE — 81000 URINALYSIS NONAUTO W/SCOPE: CPT | Performed by: EMERGENCY MEDICINE

## 2024-09-02 PROCEDURE — 82272 OCCULT BLD FECES 1-3 TESTS: CPT | Performed by: EMERGENCY MEDICINE

## 2024-09-02 PROCEDURE — 93010 ELECTROCARDIOGRAM REPORT: CPT | Mod: 76,,, | Performed by: INTERNAL MEDICINE

## 2024-09-02 PROCEDURE — 99285 EMERGENCY DEPT VISIT HI MDM: CPT | Mod: 25

## 2024-09-02 PROCEDURE — 80053 COMPREHEN METABOLIC PANEL: CPT | Performed by: EMERGENCY MEDICINE

## 2024-09-02 PROCEDURE — 25000003 PHARM REV CODE 250: Performed by: EMERGENCY MEDICINE

## 2024-09-02 PROCEDURE — 87088 URINE BACTERIA CULTURE: CPT | Performed by: EMERGENCY MEDICINE

## 2024-09-02 RX ORDER — IBUPROFEN 200 MG
24 TABLET ORAL
Status: DISCONTINUED | OUTPATIENT
Start: 2024-09-02 | End: 2024-09-05 | Stop reason: HOSPADM

## 2024-09-02 RX ORDER — FINASTERIDE 5 MG/1
5 TABLET, FILM COATED ORAL DAILY
Status: DISCONTINUED | OUTPATIENT
Start: 2024-09-03 | End: 2024-09-05 | Stop reason: HOSPADM

## 2024-09-02 RX ORDER — ONDANSETRON HYDROCHLORIDE 2 MG/ML
4 INJECTION, SOLUTION INTRAVENOUS EVERY 8 HOURS PRN
Status: DISCONTINUED | OUTPATIENT
Start: 2024-09-02 | End: 2024-09-05 | Stop reason: HOSPADM

## 2024-09-02 RX ORDER — HEPARIN SODIUM 5000 [USP'U]/ML
5000 INJECTION, SOLUTION INTRAVENOUS; SUBCUTANEOUS EVERY 8 HOURS
Status: DISCONTINUED | OUTPATIENT
Start: 2024-09-02 | End: 2024-09-02

## 2024-09-02 RX ORDER — SIROLIMUS 1 MG/1
3 TABLET, FILM COATED ORAL DAILY
Status: DISCONTINUED | OUTPATIENT
Start: 2024-09-03 | End: 2024-09-02

## 2024-09-02 RX ORDER — PREDNISONE 5 MG/1
5 TABLET ORAL DAILY
Status: DISCONTINUED | OUTPATIENT
Start: 2024-09-02 | End: 2024-09-05 | Stop reason: HOSPADM

## 2024-09-02 RX ORDER — NALOXONE HCL 0.4 MG/ML
0.02 VIAL (ML) INJECTION
Status: DISCONTINUED | OUTPATIENT
Start: 2024-09-02 | End: 2024-09-05 | Stop reason: HOSPADM

## 2024-09-02 RX ORDER — SIROLIMUS 1 MG/1
3 TABLET, FILM COATED ORAL DAILY
Status: DISCONTINUED | OUTPATIENT
Start: 2024-09-02 | End: 2024-09-04

## 2024-09-02 RX ORDER — ACETAMINOPHEN 325 MG/1
650 TABLET ORAL EVERY 4 HOURS PRN
Status: DISCONTINUED | OUTPATIENT
Start: 2024-09-02 | End: 2024-09-05 | Stop reason: HOSPADM

## 2024-09-02 RX ORDER — SODIUM CHLORIDE 0.9 % (FLUSH) 0.9 %
10 SYRINGE (ML) INJECTION EVERY 12 HOURS PRN
Status: DISCONTINUED | OUTPATIENT
Start: 2024-09-02 | End: 2024-09-05 | Stop reason: HOSPADM

## 2024-09-02 RX ORDER — GLUCAGON 1 MG
1 KIT INJECTION
Status: DISCONTINUED | OUTPATIENT
Start: 2024-09-02 | End: 2024-09-05 | Stop reason: HOSPADM

## 2024-09-02 RX ORDER — SODIUM CHLORIDE, SODIUM LACTATE, POTASSIUM CHLORIDE, CALCIUM CHLORIDE 600; 310; 30; 20 MG/100ML; MG/100ML; MG/100ML; MG/100ML
INJECTION, SOLUTION INTRAVENOUS CONTINUOUS
Status: DISCONTINUED | OUTPATIENT
Start: 2024-09-02 | End: 2024-09-04

## 2024-09-02 RX ORDER — SODIUM BICARBONATE 650 MG/1
1950 TABLET ORAL 4 TIMES DAILY
Status: DISCONTINUED | OUTPATIENT
Start: 2024-09-03 | End: 2024-09-05 | Stop reason: HOSPADM

## 2024-09-02 RX ORDER — PREDNISONE 5 MG/1
5 TABLET ORAL DAILY
Status: DISCONTINUED | OUTPATIENT
Start: 2024-09-03 | End: 2024-09-02

## 2024-09-02 RX ORDER — IBUPROFEN 200 MG
16 TABLET ORAL
Status: DISCONTINUED | OUTPATIENT
Start: 2024-09-02 | End: 2024-09-05 | Stop reason: HOSPADM

## 2024-09-02 RX ADMIN — CEFEPIME 2 G: 2 INJECTION, POWDER, FOR SOLUTION INTRAVENOUS at 09:09

## 2024-09-02 RX ADMIN — SODIUM CHLORIDE 500 ML: 9 INJECTION, SOLUTION INTRAVENOUS at 09:09

## 2024-09-02 RX ADMIN — APIXABAN 2.5 MG: 2.5 TABLET, FILM COATED ORAL at 11:09

## 2024-09-02 RX ADMIN — SODIUM CHLORIDE, POTASSIUM CHLORIDE, SODIUM LACTATE AND CALCIUM CHLORIDE: 600; 310; 30; 20 INJECTION, SOLUTION INTRAVENOUS at 11:09

## 2024-09-03 ENCOUNTER — TELEPHONE (OUTPATIENT)
Dept: FAMILY MEDICINE | Facility: CLINIC | Age: 74
End: 2024-09-03
Payer: MEDICARE

## 2024-09-03 PROBLEM — E44.0 MODERATE MALNUTRITION: Status: ACTIVE | Noted: 2024-09-03

## 2024-09-03 LAB
ANION GAP SERPL CALC-SCNC: 10 MMOL/L (ref 8–16)
BACTERIA UR CULT: ABNORMAL
BASOPHILS # BLD AUTO: 0.01 K/UL (ref 0–0.2)
BASOPHILS NFR BLD: 0.2 % (ref 0–1.9)
BUN SERPL-MCNC: 33 MG/DL (ref 8–23)
CALCIUM SERPL-MCNC: 8.4 MG/DL (ref 8.7–10.5)
CHLORIDE SERPL-SCNC: 110 MMOL/L (ref 95–110)
CO2 SERPL-SCNC: 18 MMOL/L (ref 23–29)
CREAT SERPL-MCNC: 3.5 MG/DL (ref 0.5–1.4)
DIFFERENTIAL METHOD BLD: ABNORMAL
EOSINOPHIL # BLD AUTO: 0 K/UL (ref 0–0.5)
EOSINOPHIL NFR BLD: 0 % (ref 0–8)
ERYTHROCYTE [DISTWIDTH] IN BLOOD BY AUTOMATED COUNT: 15.1 % (ref 11.5–14.5)
EST. GFR  (NO RACE VARIABLE): 18 ML/MIN/1.73 M^2
GLUCOSE SERPL-MCNC: 89 MG/DL (ref 70–110)
HCT VFR BLD AUTO: 32.4 % (ref 40–54)
HGB BLD-MCNC: 10.2 G/DL (ref 14–18)
IMM GRANULOCYTES # BLD AUTO: 0.04 K/UL (ref 0–0.04)
IMM GRANULOCYTES NFR BLD AUTO: 0.6 % (ref 0–0.5)
LYMPHOCYTES # BLD AUTO: 0.6 K/UL (ref 1–4.8)
LYMPHOCYTES NFR BLD: 10.2 % (ref 18–48)
MCH RBC QN AUTO: 25.7 PG (ref 27–31)
MCHC RBC AUTO-ENTMCNC: 31.5 G/DL (ref 32–36)
MCV RBC AUTO: 82 FL (ref 82–98)
MONOCYTES # BLD AUTO: 0.4 K/UL (ref 0.3–1)
MONOCYTES NFR BLD: 6.7 % (ref 4–15)
NEUTROPHILS # BLD AUTO: 5.2 K/UL (ref 1.8–7.7)
NEUTROPHILS NFR BLD: 82.3 % (ref 38–73)
NRBC BLD-RTO: 0 /100 WBC
OHS QRS DURATION: 120 MS
OHS QTC CALCULATION: 449 MS
PLATELET # BLD AUTO: 202 K/UL (ref 150–450)
PMV BLD AUTO: 9.9 FL (ref 9.2–12.9)
POTASSIUM SERPL-SCNC: 4.1 MMOL/L (ref 3.5–5.1)
RBC # BLD AUTO: 3.97 M/UL (ref 4.6–6.2)
SODIUM SERPL-SCNC: 138 MMOL/L (ref 136–145)
WBC # BLD AUTO: 6.28 K/UL (ref 3.9–12.7)

## 2024-09-03 PROCEDURE — 63600175 PHARM REV CODE 636 W HCPCS: Performed by: INTERNAL MEDICINE

## 2024-09-03 PROCEDURE — 25000003 PHARM REV CODE 250: Performed by: INTERNAL MEDICINE

## 2024-09-03 PROCEDURE — 36415 COLL VENOUS BLD VENIPUNCTURE: CPT | Performed by: INTERNAL MEDICINE

## 2024-09-03 PROCEDURE — 85025 COMPLETE CBC W/AUTO DIFF WBC: CPT | Performed by: FAMILY MEDICINE

## 2024-09-03 PROCEDURE — 36415 COLL VENOUS BLD VENIPUNCTURE: CPT | Performed by: FAMILY MEDICINE

## 2024-09-03 PROCEDURE — 11000001 HC ACUTE MED/SURG PRIVATE ROOM

## 2024-09-03 PROCEDURE — 80048 BASIC METABOLIC PNL TOTAL CA: CPT | Performed by: INTERNAL MEDICINE

## 2024-09-03 RX ADMIN — SODIUM BICARBONATE 1950 MG: 650 TABLET ORAL at 12:09

## 2024-09-03 RX ADMIN — SIROLIMUS 3 MG: 1 TABLET ORAL at 12:09

## 2024-09-03 RX ADMIN — PREDNISONE 5 MG: 5 TABLET ORAL at 12:09

## 2024-09-03 RX ADMIN — APIXABAN 2.5 MG: 2.5 TABLET, FILM COATED ORAL at 10:09

## 2024-09-03 RX ADMIN — CEFEPIME 2 G: 2 INJECTION, POWDER, FOR SOLUTION INTRAVENOUS at 11:09

## 2024-09-03 RX ADMIN — PREDNISONE 5 MG: 5 TABLET ORAL at 08:09

## 2024-09-03 RX ADMIN — SODIUM CHLORIDE, POTASSIUM CHLORIDE, SODIUM LACTATE AND CALCIUM CHLORIDE: 600; 310; 30; 20 INJECTION, SOLUTION INTRAVENOUS at 03:09

## 2024-09-03 RX ADMIN — SODIUM BICARBONATE 1950 MG: 650 TABLET ORAL at 08:09

## 2024-09-03 RX ADMIN — SODIUM BICARBONATE 1950 MG: 650 TABLET ORAL at 05:09

## 2024-09-03 RX ADMIN — SODIUM CHLORIDE, POTASSIUM CHLORIDE, SODIUM LACTATE AND CALCIUM CHLORIDE: 600; 310; 30; 20 INJECTION, SOLUTION INTRAVENOUS at 11:09

## 2024-09-03 RX ADMIN — APIXABAN 2.5 MG: 2.5 TABLET, FILM COATED ORAL at 08:09

## 2024-09-03 RX ADMIN — FINASTERIDE 5 MG: 5 TABLET, FILM COATED ORAL at 08:09

## 2024-09-03 RX ADMIN — SODIUM CHLORIDE, POTASSIUM CHLORIDE, SODIUM LACTATE AND CALCIUM CHLORIDE: 600; 310; 30; 20 INJECTION, SOLUTION INTRAVENOUS at 08:09

## 2024-09-03 RX ADMIN — SODIUM BICARBONATE 1950 MG: 650 TABLET ORAL at 10:09

## 2024-09-03 NOTE — PLAN OF CARE
09/03/24 1030   Rounds   Attendance Provider;Nurse    Discharge Plan A Home with family   Why the patient remains in the hospital Requires continued medical care       1030  CM was informed by Dr Lao that the pt is not medically stable to discharge home today due tp pending culture results.       [facility] - Med Surg  Initial Discharge Assessment       Primary Care Provider: Home Alexis MD    Admission Diagnosis: Weakness [R53.1]  CHANG (acute kidney injury) [N17.9]  Chest pain [R07.9]  Status post kidney transplant [Z94.0]  Complicated UTI (urinary tract infection) [N39.0]    Admission Date: 9/2/2024  Expected Discharge Date: 9/5/2024    Consult: ID & neph    Payor: [employer] MANAGED MEDICARE / Plan: [employer] MEDICARE PLAN PPO / Product Type: Medicare Advantage /     Extended Emergency Contact Information  Primary Emergency Contact: JovanyGene  Address: [address] [place] [address] [place]  Work Phone: [phone]  Mobile Phone: [phone]  Relation: Brother    Discharge Plan A: (P) Home with family  Discharge Plan B: (P) Home Health      [facility] [place] - [address]  [address]  [place] [address]  Phone: [phone] Fax: [phone]    [facility] - [facility] - [facility] AT Portal to Registered [facility] Sites  [facility]  [place] [address]  Phone: [phone] Fax: [phone]      Initial Assessment (most recent)       Adult Discharge Assessment - 09/03/24 1140          Discharge Assessment    Assessment Type Discharge Planning Assessment (P)      Confirmed/corrected address, phone number and insurance Yes (P)      Confirmed Demographics Correct on Facesheet (P)      Source of Information patient (P)      Communicated LAVERNE with patient/caregiver Date not available/Unable to determine (P)      People in Home sibling(s) (P)    Gene chakraborty ([phone])     Do you expect to return to your current living situation? Yes (P)      Do you have help at home or someone to help you manage your care at home? Yes (P)      Prior to hospitilization cognitive status: Alert/Oriented (P)      Current cognitive status: Alert/Oriented (P)      Equipment Currently Used at Home blood pressure machine (P)      Readmission within 30 days? No (P)      Patient currently being followed by outpatient case management? No (P)      Do you currently have service(s) that help you manage your care at home? No (P)      Do you take prescription medications? Yes (P)      Do you have prescription coverage? Yes (P)      Do you have any problems affording any of your prescribed medications? No (P)      Is the patient taking medications as prescribed? yes (P)      How do you get to doctors appointments? car, drives self (P)      Are you on dialysis? No (P)      Do you take coumadin? No (P)      Discharge Plan A Home with family (P)      Discharge Plan B Home Health (P)      DME Needed Upon Discharge  none (P)      Discharge Plan discussed with: Patient (P)         Physical Activity    On average, how many days per week do you engage in moderate to strenuous exercise (like a brisk walk)? 0 days (P)      On average, how many minutes do you engage in exercise at this level? 0 min (P)         Financial Resource Strain    How hard is it for you to pay for the very basics like food, housing, medical care, and heating? Not hard at all (P)         Housing Stability    In the last 12 months, was there a time when you were not able to pay the mortgage or rent on time? No (P)      At any time in the past 12 months, were you homeless or living in a shelter (including now)? No (P)         Transportation Needs    Has the lack of transportation kept you from medical appointments, meetings, work or from getting things needed for daily living? No (P)         Food Insecurity    Within the past 12 months, you worried that  your food would run out before you got the money to buy more. Never true (P)      Within the past 12 months, the food you bought just didn't last and you didn't have money to get more. Never true (P)         Stress    Do you feel stress - tense, restless, nervous, or anxious, or unable to sleep at night because your mind is troubled all the time - these days? Not at all (P)         Social Isolation    How often do you feel lonely or isolated from those around you?  Never (P)         Alcohol Use    Q1: How often do you have a drink containing alcohol? Never (P)      Q2: How many drinks containing alcohol do you have on a typical day when you are drinking? Patient does not drink (P)      Q3: How often do you have six or more drinks on one occasion? Never (P)         [employer]    In the past 12 months has the electric, gas, oil, or water company threatened to shut off services in your home? No (P)         Health Literacy    How often do you need to have someone help you when you read instructions, pamphlets, or other written material from your doctor or pharmacy? Rarely (P)    pt wears glasses                     1140  Patient resting quietly in bed when CM rounded. No family present. Patient was admitted with CKD4 and UTI & is being followed by ID and neph. BUN 42, cr 4.8, & GFR 12 at time of admit. BUN 33, cr 3.5, & GFR 18 this AM.     Patient lives with his brother, Gene Manzo, is independent of all ADLs, & denied the need for assistance with transportation at time of discharge.     CM updated patient's whiteboard with CM name & contact information.     2341  Message sent to the schedulers requesting a hospital follow up appointment with Home Alexis (PCP). Awaiting response.       Will continue to follow.

## 2024-09-03 NOTE — ED PROVIDER NOTES
"Encounter Date: 9/2/2024       History     Chief Complaint   Patient presents with    Multiple Complaints     Pt c/o multiple issues. States when he urinates, it burns. Denies discharge, denies unknown sexual contacts, also endorses poor appetite, weakness, and black stools. Also states" over the last week and a half, I lost 30 pounds" States he recently restarted his iron pill approximately two weeks ago.      HPI    This is a 74-year-old male multiple medical problems including hypertension hyperlipidemia hypokalemia status post kidney transplant 2007 who presents the ER for evaluation of multiple complaints.  Endorsing weakness fatigue poor appetite loss.  Also endorsing black stools but resting started on supplementation.  He came to the ER for further evaluation.    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    Disorder of kidney and ureter     Hyperkalemia 1/1/2021    Hyperlipidemia     Hypertension     Hypokalemia 6/23/2021    Immunosuppression 6/26/2021    Immunosuppression due to chronic steroid use 1/30/2020    Microcytic anemia 9/16/2016    Obesity (BMI 30-39.9) 1/23/2019    Oropharyngeal cancer     Pterygium     Squamous cell carcinoma 4/25/2018    Thromboembolic disorder     Unspecified disorder of kidney and ureter      Past Surgical History:   Procedure Laterality Date    CYSTOSCOPY W/ RETROGRADES Bilateral 6/15/2022    Procedure: Cystoscopy, bilateral retrograde pyelogram, and all indicated procedures;  Surgeon: Veronica Bacon MD;  Location: [facility];  Service: Urology;  Laterality: Bilateral;    FRACTURE SURGERY Left     "lower leg" 40 years ago    KIDNEY " TRANSPLANT  2007    followed by [facility] Transplant    LARYNX SURGERY  11/2014    Oropharyngeal Cancer x3    microlaryngoscopy      PHLEBOGRAPHY Bilateral 8/2/2022    Procedure: Venogram;  Surgeon: KENIA Mueller II, MD;  Location: [facility] CATH LAB;  Service: Vascular;  Laterality: Bilateral;    SURGICAL REMOVAL OF LESION OF ORBIT Bilateral 8/26/2020    Procedure: EXCISION, LESION, ORBIT;  Surgeon: Linda Tee MD;  [facility];  Service: Ophthalmology;  Laterality: Bilateral;    TIBIAL STAPLING Left 1980          Family History   Problem Relation Name Age of Onset    Stroke Mother      Diabetes Mother      Hypertension Mother      Stroke Father      No Known Problems Sister      No Known Problems Brother x1     No Known Problems Daughter      No Known Problems Brother x1      Social History     Tobacco Use    Smoking status: Former     Current packs/day: 0.00     Average packs/day: 1 pack/day for 20.0 years (20.0 ttl pk-yrs)     Types: Cigarettes     Start date: 1989     Quit date: 2009     Years since quitting: 15.6     Passive exposure: Past    Smokeless tobacco: Never   Substance Use Topics    Alcohol use: No     Alcohol/week: 0.0 standard drinks of alcohol    Drug use: No     Types: Marijuana     Comment: Occ.     Review of Systems   Constitutional:  Positive for fatigue. Negative for fever.   Genitourinary:  Positive for dysuria.   All other systems reviewed and are negative.      Physical Exam     Initial Vitals [09/02/24 1959]   BP Pulse Resp Temp SpO2   (!) 159/87 95 16 98.5 °F (36.9 °C) 100 %      MAP       --         Physical Exam    Nursing note and vitals reviewed.  Constitutional:   Elderly, chronically ill-appearing no distress   HENT:   Head: Normocephalic and atraumatic.   Eyes: Pupils are equal, round, and reactive to light.   Neck:   Normal range of motion.  Cardiovascular:  Normal rate, regular rhythm and normal heart sounds.           Pulmonary/Chest: Breath sounds normal. No  respiratory distress.   Abdominal: Abdomen is soft.   Distended soft no tenderness   Musculoskeletal:         General: Normal range of motion.      Cervical back: Normal range of motion.     Neurological: He is alert and oriented to person, place, and time. He has normal strength.   Skin: Skin is warm and dry. Capillary refill takes less than 2 seconds.   Psychiatric: He has a normal mood and affect. Thought content normal.         ED Course   Procedures  Labs Reviewed   CBC W/ AUTO DIFFERENTIAL - Abnormal       Result Value    WBC 9.82      RBC 5.05      Hemoglobin 13.0 (*)     Hematocrit 40.8      MCV 81 (*)     MCH 25.7 (*)     MCHC 31.9 (*)     RDW 15.4 (*)     Platelets 251      MPV 10.3      Immature Granulocytes 0.5      Gran # (ANC) 7.9 (*)     Immature Grans (Abs) 0.05 (*)     Lymph # 1.1      Mono # 0.8      Eos # 0.0      Baso # 0.02      nRBC 0      Gran % 80.0 (*)     Lymph % 11.4 (*)     Mono % 7.7      Eosinophil % 0.2      Basophil % 0.2      Differential Method Automated     COMPREHENSIVE METABOLIC PANEL - Abnormal    Sodium 140      Potassium 4.1      Chloride 105      CO2 18 (*)     Glucose 111 (*)     BUN 42 (*)     Creatinine 4.8 (*)     Calcium 10.5      Total Protein 9.3 (*)     Albumin 3.6      Total Bilirubin 0.4      Alkaline Phosphatase 97      AST 32      ALT 25      eGFR 12 (*)     Anion Gap 17 (*)    URINALYSIS, REFLEX TO URINE CULTURE - Abnormal    Specimen UA Urine, Clean Catch      Color, UA Yellow      Appearance, UA Cloudy (*)     pH, UA 6.0      Specific Gravity, UA 1.010      Protein, UA 3+ (*)     Glucose, UA Negative      Ketones, UA Negative      Bilirubin (UA) Negative      Occult Blood UA 2+ (*)     Nitrite, UA Negative      Urobilinogen, UA Negative      Leukocytes, UA 3+ (*)     Narrative:     Specimen Source->Urine   URINALYSIS MICROSCOPIC - Abnormal    RBC, UA 52 (*)     WBC, UA >100 (*)     WBC Clumps, UA Many (*)     Bacteria Occasional      Hyaline Casts, UA 0       Microscopic Comment SEE COMMENT      Narrative:     Specimen Source->Urine   CULTURE, URINE   CULTURE, BLOOD   CULTURE, BLOOD   MAGNESIUM    Magnesium 2.3     PHOSPHORUS    Phosphorus 3.2     TSH    TSH 3.362     OCCULT BLOOD X 1, STOOL    Occult Blood Negative     LACTIC ACID, PLASMA    Lactate (Lactic Acid) 1.1            Imaging Results    None          Medications   sodium chloride 0.9% flush 10 mL (has no administration in time range)   naloxone 0.4 mg/mL injection 0.02 mg (has no administration in time range)   glucose chewable tablet 16 g (has no administration in time range)   glucose chewable tablet 24 g (has no administration in time range)   glucagon (human recombinant) injection 1 mg (has no administration in time range)   acetaminophen tablet 650 mg (has no administration in time range)   ondansetron injection 4 mg (has no administration in time range)   dextrose 10% bolus 125 mL 125 mL (has no administration in time range)   dextrose 10% bolus 250 mL 250 mL (has no administration in time range)   ceFEPIme (MAXIPIME) 2 g in D5W 100 mL IVPB (MB+) (has no administration in time range)   lactated ringers infusion (has no administration in time range)   apixaban tablet 2.5 mg (has no administration in time range)   sodium chloride 0.9% bolus 500 mL 500 mL (500 mLs Intravenous New Bag 9/2/24 2145)   ceFEPIme (MAXIPIME) 2 g in D5W 100 mL IVPB (MB+) (0 g Intravenous Stopped 9/2/24 2221)     Medical Decision Making  74-year-old male with extensive medical history including status post kidney transplant presents the ER for evaluation weight loss fatigue dysuria bloody stools poor appetite.  Overall he appears weak but generally well.  Physical exam grossly reassuring.  Differential includes UTI, cystitis, electrolyte abnormality, infection upper GI bleed other cause.  Will plan blood work symptomatic support observation low threshold for admission.    Amount and/or Complexity of Data Reviewed  External Data  Reviewed: notes.  Labs: ordered. Decision-making details documented in ED Course.  Radiology: ordered and independent interpretation performed. Decision-making details documented in ED Course.  ECG/medicine tests: ordered and independent interpretation performed. Decision-making details documented in ED Course.    Risk  Decision regarding hospitalization.               ED Course as of 09/02/24 2304   Mon Sep 02, 2024   2055 Urinalysis, Reflex to Urine Culture Urine, Clean Catch(!) [SE]   2055 Urinalysis Microscopic(!)  UA concerning for UTI.  High-risk given status post kidney transplant on immunosuppression will start cefepime.  Awaiting rest of blood work anticipate admission [SE]   2124 Occult Blood: Negative [SE]   2125 Occult blood x 1, stool [SE]   2125 Phosphorus [SE]   2125 Comprehensive metabolic panel(!) [SE]   2125 TSH [SE]   2125 Magnesium [SE]   2125 CBC auto differential(!) [SE]   2126 Resting comfortably in bed no acute distress labs obtained and reviewed.  Urine concerning for UTI.  CMP concerning for CHANG with creatinine 4.8 from baseline around 3-3 4.  Hemoccult is negative hemoglobin is stable.  Patient will be admitted for complicated UTI and CHANG in status post kidney transplant patient.  Discussed with [facility] medicine team who accepted patient to their service. [SE]      ED Course User Index  [SE] Geo Tony MD                           Clinical Impression:  Final diagnoses:  [R53.1] Weakness  [N39.0] Complicated UTI (urinary tract infection) (Primary)  [N17.9] CHANG (acute kidney injury)  [Z94.0] Status post kidney transplant          ED Disposition Condition    Admit Stable                Geo Tony MD  09/02/24 2304

## 2024-09-03 NOTE — CONSULTS
Nephrology Consult  H&P      Consult Requested By: Kim Lao MD  Reason for Consult: CKD4 Kidney Transplant      SUBJECTIVE:     History of Present Illness:  Jose Luis Manzo is a 74 y.o.   male who  has a past medical history of Acute hypoxemic respiratory failure due to COVID-19 (12/30/2020), Anticoagulant long-term use, BPH (benign prostatic hypertrophy), Cancer, Claudication of right lower extremity (3/10/2016), COVID-19 virus detected (12/30/2020), Dependence on renal dialysis (4/18/2023), Disorder of kidney and ureter, Hyperkalemia (1/1/2021), Hyperlipidemia, Hypertension, Hypokalemia (6/23/2021), Immunosuppression (6/26/2021), Immunosuppression due to chronic steroid use (1/30/2020), Microcytic anemia (9/16/2016), Obesity (BMI 30-39.9) (1/23/2019), Oropharyngeal cancer, Pterygium, Squamous cell carcinoma (4/25/2018), Thromboembolic disorder, and Unspecified disorder of kidney and ureter.. The patient presented to the [facility] on 9/2/2024 with a primary complaint of SOB Cough started last night now respiratory distress required BiPAP CXR suggesting PNA.   ?    Review of Systems   Constitutional:  Positive for malaise/fatigue and weight loss. Negative for chills and fever.   HENT:  Negative for congestion and sore throat.    Eyes:  Negative for blurred vision, double vision and photophobia.   Respiratory:  Negative for cough and shortness of breath.    Cardiovascular:  Negative for chest pain, palpitations and leg swelling.   Gastrointestinal:  Negative for abdominal pain, diarrhea, nausea and vomiting.   Genitourinary:  Negative for dysuria and urgency.   Musculoskeletal:  Negative for joint pain and myalgias.   Skin:  Negative for itching and rash.   Neurological:  Positive for weakness. Negative for dizziness, sensory change and headaches.   Endo/Heme/Allergies:  Negative for polydipsia. Does not bruise/bleed easily.   Psychiatric/Behavioral:  Negative for depression.        Past Medical  "History:   Diagnosis Date    Acute hypoxemic respiratory failure due to COVID-19 12/30/2020    Anticoagulant long-term use     BPH (benign prostatic hypertrophy)     Cancer     vocal cords    Claudication of right lower extremity 3/10/2016    COVID-19 virus detected 12/30/2020    Dependence on renal dialysis 4/18/2023    Disorder of kidney and ureter     Hyperkalemia 1/1/2021    Hyperlipidemia     Hypertension     Hypokalemia 6/23/2021    Immunosuppression 6/26/2021    Immunosuppression due to chronic steroid use 1/30/2020    Microcytic anemia 9/16/2016    Obesity (BMI 30-39.9) 1/23/2019    Oropharyngeal cancer     Pterygium     Squamous cell carcinoma 4/25/2018    Thromboembolic disorder     Unspecified disorder of kidney and ureter      Past Surgical History:   Procedure Laterality Date    CYSTOSCOPY W/ RETROGRADES Bilateral 6/15/2022    Procedure: Cystoscopy, bilateral retrograde pyelogram, and all indicated procedures;  Surgeon: Veronica Bacon MD;  Location: [facility];  Service: Urology;  Laterality: Bilateral;    FRACTURE SURGERY Left     "lower leg" 40 years ago    KIDNEY TRANSPLANT  2007    followed by [facility] Transplant    LARYNX SURGERY  11/2014    Oropharyngeal Cancer x3    microlaryngoscopy      PHLEBOGRAPHY Bilateral 8/2/2022    Procedure: Venogram;  Surgeon: KENIA Mueller II, MD;  Location: [facility] CATH LAB;  Service: Vascular;  Laterality: Bilateral;    SURGICAL REMOVAL OF LESION OF ORBIT Bilateral 8/26/2020    Procedure: EXCISION, LESION, ORBIT;  Surgeon: Linda Tee MD;  Location: [address];  Service: Ophthalmology;  Laterality: Bilateral;    TIBIAL STAPLING Left 1980          Family History   Problem Relation Name Age of Onset    Stroke Mother      Diabetes Mother      Hypertension Mother      Stroke Father      No Known Problems Sister      No Known Problems Brother x1     No Known Problems Daughter      No Known Problems Brother x1      Social History     Tobacco Use    Smoking status: " "Former     Current packs/day: 0.00     Average packs/day: 1 pack/day for 20.0 years (20.0 ttl pk-yrs)     Types: Cigarettes     Start date: 1989     Quit date: 2009     Years since quitting: 15.6     Passive exposure: Past    Smokeless tobacco: Never   Substance Use Topics    Alcohol use: No     Alcohol/week: 0.0 standard drinks of alcohol    Drug use: No     Types: Marijuana     Comment: Occ.       Review of patient's allergies indicates:  No Known Allergies         OBJECTIVE:     Vital Signs (Most Recent)  Vitals:    09/02/24 2105 09/02/24 2311 09/03/24 0327 09/03/24 0740   BP: 139/77 (!) 142/80 135/69 (!) 111/59   BP Location:  Left arm Left arm    Patient Position:   Lying    Pulse: 88 80 74 68   Resp: (!) 23 18 18 18   Temp:  97.9 °F (36.6 °C) 98.1 °F (36.7 °C) 98 °F (36.7 °C)   TempSrc:   Oral    SpO2: 100% 98% 98% 99%   Weight:  96 kg (211 lb 10.3 oz)     Height:  5' 10" (1.778 m)                   Medications:   apixaban  2.5 mg Oral BID    ceFEPime IV (PEDS and ADULTS)  2 g Intravenous Q24H    finasteride  5 mg Oral Daily    predniSONE  5 mg Oral Daily    sirolimus  3 mg Oral Daily    sodium bicarbonate  1,950 mg Oral QID           Physical Exam  Vitals and nursing note reviewed.   Constitutional:       General: He is not in acute distress.     Appearance: He is not ill-appearing or diaphoretic.   HENT:      Head: Normocephalic and atraumatic.      Mouth/Throat:      Pharynx: No oropharyngeal exudate.   Eyes:      General: No scleral icterus.     Conjunctiva/sclera: Conjunctivae normal.      Pupils: Pupils are equal, round, and reactive to light.   Cardiovascular:      Rate and Rhythm: Normal rate and regular rhythm.      Heart sounds: Normal heart sounds. No murmur heard.  Pulmonary:      Effort: No respiratory distress.      Breath sounds: No rales.   Abdominal:      General: Bowel sounds are normal. There is no distension.      Palpations: Abdomen is soft.      Tenderness: There is no abdominal " tenderness.   Musculoskeletal:         General: Normal range of motion.      Cervical back: Normal range of motion and neck supple.      Right lower leg: No edema.      Left lower leg: No edema.   Skin:     General: Skin is warm and dry.      Findings: No erythema.   Neurological:      Mental Status: He is alert and oriented to person, place, and time.      Cranial Nerves: No cranial nerve deficit.   Psychiatric:         Mood and Affect: Affect normal.         Cognition and Memory: Memory normal.         Judgment: Judgment normal.         Laboratory:  Recent Labs   Lab 09/02/24 2037 09/03/24  0701   WBC 9.82 6.28   HGB 13.0* 10.2*   HCT 40.8 32.4*    202   MONO 7.7  0.8 6.7  0.4   EOSINOPHIL 0.2 0.0     Recent Labs   Lab 09/02/24 2037 09/03/24  0526    138   K 4.1 4.1    110   CO2 18* 18*   BUN 42* 33*   CREATININE 4.8* 3.5*   CALCIUM 10.5 8.4*   PHOS 3.2  --        Diagnostic Results:  X-Ray: Reviewed  US: Reviewed  Echo: Reviewed  ASSESSMENT/PLAN:     1. 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  -- Renally dose all meds  -- Please avoid nephrotoxins, including NSAIDs, aminoglycosides, IV contrast (unless absolutely necessary), gadolinium, fleets and other phosphorous-based laxatives. Caution with antibiotics.    Chronic Immunosuppression Therapy management   -- Sirolimus 3mg once a day     -- Prednisone 5 daily     2. HTN  - now Hypotension    Hold BP meds      3. Anemia of chronic kidney disease    -- On PO iron   Recent Labs   Lab 09/02/24 2037 09/03/24  0701   HGB 13.0* 10.2*   HCT 40.8 32.4*    202       Had low Iron - check levels again   Lab Results   Component Value Date    IRON 15 (L)  04/27/2024    TIBC 138 (L) 04/27/2024    FERRITIN 979 (H) 04/27/2024       4. MBD   - PTH elevated   Recent Labs   Lab 09/02/24 2037 09/03/24  0526   CALCIUM 10.5 8.4*   PHOS 3.2  --      Recent Labs   Lab 09/02/24 2037   MG 2.3       Lab Results   Component Value Date    .8 (H) 04/30/2024    CALCIUM 8.4 (L) 09/03/2024    PHOS 3.2 09/02/2024     Lab Results   Component Value Date    [id number] 25 (L) 05/01/2024     Acidosis   -- Po bicarbonate   Lab Results   Component Value Date    CO2 18 (L) 09/03/2024       5. Nutrition/Hypoalbuminemia   Recent Labs   Lab 09/02/24 2037   ALBUMIN 3.6     Nepro with meals TID.       Thank you for allowing me to participate in care of your patient  With any question please call   Ronda Hamm MD     [facility]  LESLY Sanchez MD,   OMD KIMBERLYN Colunga MD E. V. Harmon, NP  [address]   [place], [address]  [phone]  After hours answering service: [phone]

## 2024-09-03 NOTE — PLAN OF CARE
Scheduled meds given, IV fluids in progress. Patient remains AAO x4 without complaints. Safety maintained, call light in reach, bed alarm in use.     Problem: Adult Inpatient Plan of Care  Goal: Plan of Care Review  Outcome: Progressing     Problem: Fall Injury Risk  Goal: Absence of Fall and Fall-Related Injury  Outcome: Progressing     Problem: UTI (Urinary Tract Infection)  Goal: Improved Infection Symptoms  Outcome: Progressing     Problem: Chronic Kidney Disease  Goal: Electrolyte Balance  Outcome: Progressing     Problem: Chronic Kidney Disease  Goal: Fluid Balance  Outcome: Progressing

## 2024-09-03 NOTE — ED TRIAGE NOTES
Pt c/o 4 days dysuria and cloudy urine. Denying fever or hematuria. Also endorsing poor appetite, fatigue and unintentional weight loss over the last 2-3 weeks. States his stools are dark, but he restarted his iron and it appears similar to when he took iron before. Pt is on eliquis.

## 2024-09-03 NOTE — NURSING
Laura in lab called said patient's H & H dropped significantly low and ask for a recollect. CBC order placed for a recollect.

## 2024-09-03 NOTE — PROGRESS NOTES
[facility] Medicine  Progress Note    Patient Name: Jose Luis Manzo  MRN: [id number]  Patient Class: IP- Inpatient   Admission Date: 9/2/2024  Length of Stay: 1 days  Attending Physician: Kim Lao MD  Primary Care Provider: Home Alexis MD        Subjective:     Principal Problem:Stage 4 chronic kidney disease        HPI:  75 Y/O M Renal transplant on immunosuppresive medications presents with poor appetite, N/V poor oral intake, afebrile, yet has dysuria, lower abdominal pain 7/10 acute on chronic , aching, without localization. Progressive over past 2 days   No hx of sick contact or travel     Overview/Hospital Course:  9/3/24 UCx Pending, ID rec cefepime 2g q24    Interval History: Feels a little better, has an appetite today. UCx pending    Review of Systems   Constitutional:  Positive for activity change. Negative for appetite change and fatigue.   HENT: Negative.     Eyes: Negative.    Respiratory: Negative.     Cardiovascular: Negative.    Gastrointestinal:  Negative for nausea and vomiting.   Musculoskeletal: Negative.    Skin: Negative.    Neurological: Negative.    Psychiatric/Behavioral: Negative.       Objective:     Vital Signs (Most Recent):  Temp: 97.8 °F (36.6 °C) (09/03/24 1526)  Pulse: 70 (09/03/24 1143)  Resp: 20 (09/03/24 1143)  BP: 133/77 (09/03/24 1143)  SpO2: 99 % (09/03/24 1143) Vital Signs (24h Range):  Temp:  [97.8 °F (36.6 °C)-98.5 °F (36.9 °C)] 97.8 °F (36.6 °C)  Pulse:  [68-95] 70  Resp:  [16-23] 20  SpO2:  [98 %-100 %] 99 %  BP: (111-159)/(59-92) 133/77     Weight: 96 kg (211 lb 10.3 oz)  Body mass index is 30.37 kg/m².    Intake/Output Summary (Last 24 hours) at 9/3/2024 1536  Last data filed at 9/3/2024 0820  Gross per 24 hour   Intake 1613.79 ml   Output 400 ml   Net 1213.79 ml         Physical Exam  HENT:      Head: Normocephalic.      Nose: Nose normal.      Mouth/Throat:      Mouth: Mucous membranes are dry.   Eyes:      Conjunctiva/sclera:  Conjunctivae normal.      Pupils: Pupils are equal, round, and reactive to light.   Cardiovascular:      Rate and Rhythm: Normal rate and regular rhythm.      Pulses: Normal pulses.   Pulmonary:      Effort: Pulmonary effort is normal.   Abdominal:      General: Abdomen is flat. Bowel sounds are normal.      Palpations: Abdomen is soft.   Musculoskeletal:         General: Normal range of motion.   Skin:     General: Skin is warm.      Capillary Refill: Capillary refill takes less than 2 seconds.   Neurological:      General: No focal deficit present.      Mental Status: He is alert.   Psychiatric:         Mood and Affect: Mood normal.             Significant Labs: All pertinent labs within the past 24 hours have been reviewed.  CBC:   Recent Labs   Lab 09/02/24 2037 09/03/24  0701   WBC 9.82 6.28   HGB 13.0* 10.2*   HCT 40.8 32.4*    202     CMP:   Recent Labs   Lab 09/02/24 2037 09/03/24  0526    138   K 4.1 4.1    110   CO2 18* 18*   * 89   BUN 42* 33*   CREATININE 4.8* 3.5*   CALCIUM 10.5 8.4*   PROT 9.3*  --    ALBUMIN 3.6  --    BILITOT 0.4  --    ALKPHOS 97  --    AST 32  --    ALT 25  --    ANIONGAP 17* 10       Significant Imaging: I have reviewed all pertinent imaging results/findings within the past 24 hours.    Assessment/Plan:      * Stage 4 chronic kidney disease  Creatine stable for now. BMP reviewed- noted Estimated Creatinine Clearance: 15.6 mL/min (A) (based on SCr of 4.8 mg/dL (H)). according to latest data. Based on current GFR, CKD stage is stage 4 - GFR 15-29.  Monitor UOP and serial BMP and adjust therapy as needed. Renally dose meds. Avoid nephrotoxic medications and procedures.  IVF , Monitor urine output , am labs, nephrology consult , restart prednisone and sirolimus       Complicated UTI (urinary tract infection)    Renal dosed Cefepime , follow up urine and blood cultures     Class 1 obesity due to excess calories with serious comorbidity and body mass index  (BMI) of 34.0 to 34.9 in adult  Body mass index is 30.37 kg/m². Morbid obesity complicates all aspects of disease management from diagnostic modalities to treatment. Weight loss encouraged and health benefits explained to patient.         Immunosuppressive management encounter following kidney transplant  Prednisone and Sirolimus         VTE Risk Mitigation (From admission, onward)           Ordered     apixaban tablet 2.5 mg  2 times daily         09/02/24 2140     IP VTE HIGH RISK PATIENT  Once         09/02/24 2139     Place sequential compression device  Until discontinued         09/02/24 2139                    Discharge Planning   LAVERNE: 9/5/2024     Code Status: Full Code   Is the patient medically ready for discharge?:     Reason for patient still in hospital (select all that apply): Patient trending condition, Laboratory test, and Consult recommendations  Discharge Plan A: Home with family                  Kim Lao MD  Department of Hospital Medicine   [facility] Surg

## 2024-09-03 NOTE — PROGRESS NOTES
[facility] - Med Surg  Adult Nutrition  Progress Note    SUMMARY       Recommendations    1. Disease specific oral diet    2. Commercial beverage    3. Collaboration with medical providers    Goals: Patient to consume >75% of EEN prior to RD follow up.  Nutrition Goal Status: new  Communication of RD Recs: other (comment) (poc)    Assessment and Plan    Endocrine  Moderate malnutrition  Malnutrition Type:  Context: acute illness or injury  Level: moderate    Related to (etiology):   Decreased appetite   GI intolerance    Signs and Symptoms (as evidenced by):   Weight loss    Malnutrition Characteristic Summary:  Weight Loss (Malnutrition): 5% in 1 month  Energy Intake (Malnutrition): less than 75% for greater than 7 days      Interventions/Recommendations (treatment strategy):  1. Disease specific oral diet  2. Commercial beverage  3. Collaboration with medical providers    Nutrition Diagnosis Status:   New         Malnutrition Assessment  Malnutrition Context: acute illness or injury  Malnutrition Level: moderate      Micronutrient Evaluation Summary: suspected deficiency   Weight Loss (Malnutrition): 5% in 1 month  Energy Intake (Malnutrition): less than 75% for greater than 7 days                         Reason for Assessment    Reason For Assessment: identified at risk by screening criteria    Diagnosis: renal disease  Patient Active Problem List   Diagnosis    Stage 4 chronic kidney disease    Essential hypertension    Mixed hyperlipidemia    Benign prostatic hyperplasia with nocturia    Claudication of right lower extremity    Eye swelling, bilateral    Diminished night vision    Renal transplant recipient    Dermolipoma    History of DVT (deep vein thrombosis)    Iron deficiency anemia    Pulmonary nodules    Larynx cancer    Voice disturbance    Atherosclerosis of aorta    Carcinoma in situ of vocal cord    Dysphagia, pharyngoesophageal    Chondronecrosis of larynx    Squamous cell carcinoma    Obesity (BMI  30-39.9)    Pterygium of both eyes    History of cancer of larynx    Nodule of left lung    Granulomatous lung disease    Transplanted kidney - 2007    Hypertensive kidney disease with stage 4 chronic kidney disease    Hypercalcemia    Secondary hyperparathyroidism of renal origin    Benign tumor of orbit    Metabolic bone disease    Immunosuppressive management encounter following kidney transplant    Hyperphosphatemia    Vitamin D deficiency disease    History of COVID-19    SOB (shortness of breath)    Metabolic acidosis    Proteinuria    Anticoagulant long-term use    Pre-op evaluation    Benign prostatic hyperplasia with lower urinary tract symptoms    Centrilobular emphysema    Dependence on renal dialysis    Class 1 obesity due to excess calories with serious comorbidity and body mass index (BMI) of 34.0 to 34.9 in adult    Former cigarette smoker    Deformity of toenail    Complicated UTI (urinary tract infection)    Moderate malnutrition       Relevant Medical History:   Past Medical History:   Diagnosis Date    Acute hypoxemic respiratory failure due to COVID-19 12/30/2020    Anticoagulant long-term use     BPH (benign prostatic hypertrophy)     Cancer     vocal cords    Claudication of right lower extremity 3/10/2016    COVID-19 virus detected 12/30/2020    Dependence on renal dialysis 4/18/2023    Disorder of kidney and ureter     Hyperkalemia 1/1/2021    Hyperlipidemia     Hypertension     Hypokalemia 6/23/2021    Immunosuppression 6/26/2021    Immunosuppression due to chronic steroid use 1/30/2020    Microcytic anemia 9/16/2016    Obesity (BMI 30-39.9) 1/23/2019    Oropharyngeal cancer     Pterygium     Squamous cell carcinoma 4/25/2018    Thromboembolic disorder     Unspecified disorder of kidney and ureter        Interdisciplinary Rounds: did not attend (rd remote)    General Information Comments:   9/3/2024: Patient is on a renal diet with reported decreased appetite, nausea and vomiting.  Patient  "reported that he has lost 30 lbs  within the last month.  Medical chart weight history shows 11lbs weight loss which is significant.  Patient with CKD stage 4.  Mild edema in BLE noted.  No wounds noted at this time.  75% po intake documented x 1 meal.  Patient is positive for acute malnutriton at this time.  Labs reviewed.  NKFA.  LBM: 2024. RD to continue to monitor po intake and monitor tolerance.    Nutrition Discharge Planning: Patient to continue recommended renal oral diet    Nutrition Risk Screen    Nutrition Risk Screen: unintentional loss of 10 lbs or more in the past 2 months  Nutrition Related Social Determinants of Health: SDOH: None Identified        Nutrition/Diet History    Spiritual, Cultural Beliefs, Yazidi Practices, Values that Affect Care: no  Food Allergies: NKFA  Factors Affecting Nutritional Intake: decreased appetite, nausea/vomiting    Anthropometrics    Temp: 97.8 °F (36.6 °C)  Height Method: Stated  Height: 5' 10" (177.8 cm)  Height (inches): 70 in  Weight Method: Bed Scale  Weight: 96 kg (211 lb 10.3 oz)  Weight (lb): 211.64 lb  Ideal Body Weight (IBW), Male: 166 lb  % Ideal Body Weight, Male (lb): 127.49 %  BMI (Calculated): 30.4  BMI Grade: 30 - 34.9- obesity - grade I  Weight Loss: unintentional (within 1 month)  Usual Body Weight (UBW), k.1 kg  % Usual Body Weight: 95.15  % Weight Change From Usual Weight: -5.05 %     Wt Readings from Last 5 Encounters:   24 96 kg (211 lb 10.3 oz)   08/15/24 101.1 kg (222 lb 14.2 oz)   24 103.9 kg (229 lb)   24 105 kg (231 lb 7.7 oz)   24 106.9 kg (235 lb 12.5 oz)       Lab/Procedures/Meds    Pertinent Labs Reviewed: reviewed  BMP  Lab Results   Component Value Date     2024    K 4.1 2024     2024    CO2 18 (L) 2024    BUN 33 (H) 2024    CREATININE 3.5 (H) 2024    CALCIUM 8.4 (L) 2024    ANIONGAP 10 2024    EGFRNORACEVR 18 (A) 2024     Lab Results "   Component Value Date    HGBA1C 6.6 (H) 02/07/2024     Lab Results   Component Value Date    CALCIUM 8.4 (L) 09/03/2024    PHOS 3.2 09/02/2024     Glucose   Date Value Ref Range Status   09/03/2024 89 70 - 110 mg/dL Final       Pertinent Medications Reviewed: reviewed  Scheduled Meds:   apixaban  2.5 mg Oral BID    ceFEPime IV (PEDS and ADULTS)  2 g Intravenous Q24H    finasteride  5 mg Oral Daily    predniSONE  5 mg Oral Daily    sirolimus  3 mg Oral Daily    sodium bicarbonate  1,950 mg Oral QID     Continuous Infusions:   lactated ringers   Intravenous Continuous 125 mL/hr at 09/03/24 1521 New Bag at 09/03/24 1521     PRN Meds:.  Current Facility-Administered Medications:     acetaminophen, 650 mg, Oral, Q4H PRN    dextrose 10%, 12.5 g, Intravenous, PRN    dextrose 10%, 25 g, Intravenous, PRN    glucagon (human recombinant), 1 mg, Intramuscular, PRN    glucose, 16 g, Oral, PRN    glucose, 24 g, Oral, PRN    naloxone, 0.02 mg, Intravenous, PRN    ondansetron, 4 mg, Intravenous, Q8H PRN    sodium chloride 0.9%, 10 mL, Intravenous, Q12H PRN    Physical Findings/Assessment         Estimated/Assessed Needs    Weight Used For Calorie Calculations: 96 kg (211 lb 10.3 oz)  Energy Calorie Requirements (kcal): 20-25kcalskg (1900-2400kcals/day)  Energy Need Method: Kcal/kg  Protein Requirements: 0.8g/kg (77g/day)  Weight Used For Protein Calculations: 96 kg (211 lb 10.3 oz)  Fluid Requirements (mL): 1000+output or per md order  Estimated Fluid Requirement Method: RDA Method  RDA Method (mL): 20  CHO Requirement: 250      Nutrition Prescription Ordered    Current Diet Order: Renal  Oral Nutrition Supplement: Commercial beverages    Evaluation of Received Nutrient/Fluid Intake    % Kcal Needs: <50%  % Protein Needs: <50%  I/O: 9/3/2024: +240mL since admit  Energy Calories Required: not meeting needs  Protein Required: not meeting needs  Fluid Required: meeting needs  Comments: LBM: 9/2/2024  Tolerance: not tolerating  %  Intake of Estimated Energy Needs: 25 - 50 %  % Meal Intake: 25 - 50 %    Nutrition Risk    Level of Risk/Frequency of Follow-up: moderate (Follow up: 1-2x per week)     Monitor and Evaluation    Food and Nutrient Intake: energy intake, food and beverage intake  Food and Nutrient Adminstration: diet order  Knowledge/Beliefs/Attitudes: beliefs and attitudes  Physical Activity and Function: nutrition-related ADLs and IADLs, factors affecting access to physical activity  Anthropometric Measurements: body mass index, weight change, weight, height/length  Biochemical Data, Medical Tests and Procedures: electrolyte and renal panel, gastrointestinal profile, glucose/endocrine profile, inflammatory profile, lipid profile     Nutrition Follow-Up    RD Follow-up?: Yes  Georgie Rich, MS, RDN, LDN

## 2024-09-03 NOTE — TELEPHONE ENCOUNTER
----- Message from Diamond Juarez sent at 9/3/2024  2:23 PM CDT -----  Regarding: HFU  Patient is being discharged from [facility] and is requiring a hospital follow up appointment with their Primary Care Provider in 7 days. Patient is established. I am unable to schedule an appointment in that time frame. Please schedule patient a sooner appointment and message me back so Discharge Nurse can advise patient prior to discharge.    Patient expected discharge is on 9/5    DX:CKD4 & UTI      Thank you, Evelin  Physician Referral Specialist/Discharge

## 2024-09-03 NOTE — H&P
"  [facility] Medicine  History & Physical    Patient Name: Jose Luis Manzo  MRN: [id number]  Patient Class: IP- Inpatient  Admission Date: 9/2/2024  Attending Physician: No att. providers found   Primary Care Provider: Home Alexis MD         Patient information was obtained from patient and ER records.     Subjective:     Principal Problem:Stage 4 chronic kidney disease    Chief Complaint:   Chief Complaint   Patient presents with    Multiple Complaints     Pt c/o multiple issues. States when he urinates, it burns. Denies discharge, denies unknown sexual contacts, also endorses poor appetite, weakness, and black stools. Also states" over the last week and a half, I lost 30 pounds" States he recently restarted his iron pill approximately two weeks ago.         HPI: 73 Y/O M Renal transplant on immunosuppresive medications presents with poor appetite, N/V poor oral intake, afebrile, yet has dysuria, lower abdominal pain 7/10 acute on chronic , aching, without localization. Progressive over past 2 days   No hx of sick contact or travel     Past Medical History:   Diagnosis Date    Acute hypoxemic respiratory failure due to COVID-19 12/30/2020    Anticoagulant long-term use     BPH (benign prostatic hypertrophy)     Cancer     vocal cords    Claudication of right lower extremity 3/10/2016    COVID-19 virus detected 12/30/2020    Dependence on renal dialysis 4/18/2023    Disorder of kidney and ureter     Hyperkalemia 1/1/2021    Hyperlipidemia     Hypertension     Hypokalemia 6/23/2021    Immunosuppression 6/26/2021    Immunosuppression due to chronic steroid use 1/30/2020    Microcytic anemia 9/16/2016    Obesity (BMI 30-39.9) 1/23/2019    Oropharyngeal cancer     Pterygium     Squamous cell carcinoma 4/25/2018    Thromboembolic disorder     Unspecified disorder of kidney and ureter        Past Surgical History:   Procedure Laterality Date    CYSTOSCOPY W/ RETROGRADES Bilateral 6/15/2022    Procedure: " "Cystoscopy, bilateral retrograde pyelogram, and all indicated procedures;  Surgeon: Veronica Bacon MD;  Location: [facility] OR;  Service: Urology;  Laterality: Bilateral;    FRACTURE SURGERY Left     "lower leg" 40 years ago    KIDNEY TRANSPLANT  2007    followed by [facility] Transplant    LARYNX SURGERY  11/2014    Oropharyngeal Cancer x3    microlaryngoscopy      PHLEBOGRAPHY Bilateral 8/2/2022    Procedure: Venogram;  Surgeon: KENIA Mueller II, MD;  Location: [facility] CATH LAB;  Service: Vascular;  Laterality: Bilateral;    SURGICAL REMOVAL OF LESION OF ORBIT Bilateral 8/26/2020    Procedure: EXCISION, LESION, ORBIT;  Surgeon: Linda Tee MD;  Location: [facility] OR [facility] FLR;  Service: Ophthalmology;  Laterality: Bilateral;    TIBIAL STAPLING Left 1980            Review of patient's allergies indicates:  No Known Allergies    No current facility-administered medications on file prior to encounter.     Current Outpatient Medications on File Prior to Encounter   Medication Sig    apixaban (ELIQUIS) 2.5 mg Tab Take 1 tablet (2.5 mg total) by mouth 2 (two) times daily.    atorvastatin (LIPITOR) 20 MG tablet Take 1 tablet (20 mg total) by mouth every evening.    cinacalcet (SENSIPAR) 30 MG Tab Take 1 tablet (30 mg total) by mouth daily with breakfast.    cloNIDine 0.3 mg/24 hr td ptwk (CATAPRES) 0.3 mg/24 hr Place 1 patch onto the skin once a week.    ergocalciferol (ERGOCALCIFEROL) 50,000 unit Cap Take 1 capsule (50,000 Units total) by mouth every 7 days. (Patient not taking: Reported on 8/15/2024)    ferrous sulfate 325 mg (65 mg iron) Tab tablet Take 325 mg by mouth once daily.    finasteride (PROSCAR) 5 mg tablet Take 1 tablet (5 mg total) by mouth once daily.    fluticasone propionate (FLONASE) 50 mcg/actuation nasal spray 1 spray (50 mcg total) by Each Nostril route nightly.    furosemide (LASIX) 40 MG tablet Take 1 tablet (40 mg total) by mouth once daily.    hydrALAZINE (APRESOLINE) 100 MG tablet Take 1 tablet (100 mg " total) by mouth every 8 (eight) hours.    metoprolol succinate (TOPROL-XL) 100 MG 24 hr tablet Take 1 tablet (100 mg total) by mouth once daily.    NIFEdipine (PROCARDIA-XL) 30 MG (OSM) 24 hr tablet Take 1 tablet (30 mg total) by mouth once daily.    predniSONE (DELTASONE) 5 MG tablet Take 1 tablet (5 mg total) by mouth once daily.    sirolimus (RAPAMUNE) 1 MG Tab Take 3 tablets (3 mg total) by mouth once daily.    sodium bicarbonate 650 MG tablet Take 3 tablets (1,950 mg total) by mouth 4 (four) times daily.    vitamin renal formula, B-complex-vitamin c-folic acid, (NEPHROCAP) 1 mg Cap Take 1 capsule by mouth once daily.    [DISCONTINUED] sildenafiL (VIAGRA) 50 MG tablet Take 1 tablet (50 mg total) by mouth daily as needed for Erectile Dysfunction.     Family History       Problem Relation (Age of Onset)    Diabetes Mother    Hypertension Mother    No Known Problems Sister, Brother, Daughter, Brother    Stroke Mother, Father          Tobacco Use    Smoking status: Former     Current packs/day: 0.00     Average packs/day: 1 pack/day for 20.0 years (20.0 ttl pk-yrs)     Types: Cigarettes     Start date: 1989     Quit date: 2009     Years since quitting: 15.6     Passive exposure: Past    Smokeless tobacco: Never   Substance and Sexual Activity    Alcohol use: No     Alcohol/week: 0.0 standard drinks of alcohol    Drug use: No     Types: Marijuana     Comment: Occ.    Sexual activity: Not Currently     Partners: Female     Review of Systems   Constitutional:  Positive for activity change, appetite change and fatigue.   HENT: Negative.     Eyes: Negative.    Respiratory: Negative.     Cardiovascular: Negative.    Gastrointestinal:  Positive for nausea and vomiting.   Musculoskeletal: Negative.    Skin: Negative.    Neurological: Negative.    Psychiatric/Behavioral: Negative.       Objective:     Vital Signs (Most Recent):  Temp: 98.5 °F (36.9 °C) (09/02/24 1959)  Pulse: 88 (09/02/24 2105)  Resp: (!) 23 (09/02/24  2105)  BP: 139/77 (09/02/24 2105)  SpO2: 100 % (09/02/24 2105) Vital Signs (24h Range):  Temp:  [98.5 °F (36.9 °C)] 98.5 °F (36.9 °C)  Pulse:  [88-95] 88  Resp:  [16-23] 23  SpO2:  [98 %-100 %] 100 %  BP: (139-159)/(77-92) 139/77     Weight: 94.2 kg (207 lb 10.8 oz)  Body mass index is 29.8 kg/m².     Physical Exam  HENT:      Head: Normocephalic.      Nose: Nose normal.      Mouth/Throat:      Mouth: Mucous membranes are dry.   Eyes:      Conjunctiva/sclera: Conjunctivae normal.      Pupils: Pupils are equal, round, and reactive to light.   Cardiovascular:      Rate and Rhythm: Normal rate and regular rhythm.      Pulses: Normal pulses.   Pulmonary:      Effort: Pulmonary effort is normal.   Abdominal:      General: Abdomen is flat. Bowel sounds are normal.      Palpations: Abdomen is soft.   Musculoskeletal:         General: Normal range of motion.   Skin:     General: Skin is warm.      Capillary Refill: Capillary refill takes less than 2 seconds.   Neurological:      General: No focal deficit present.      Mental Status: He is alert.   Psychiatric:         Mood and Affect: Mood normal.              CRANIAL NERVES     CN III, IV, VI   Pupils are equal, round, and reactive to light.       Significant Labs: All pertinent labs within the past 24 hours have been reviewed.  BMP:   Recent Labs   Lab 09/02/24 2037   *      K 4.1      CO2 18*   BUN 42*   CREATININE 4.8*   CALCIUM 10.5   MG 2.3     CBC:   Recent Labs   Lab 09/02/24 2037   WBC 9.82   HGB 13.0*   HCT 40.8        CMP:   Recent Labs   Lab 09/02/24 2037      K 4.1      CO2 18*   *   BUN 42*   CREATININE 4.8*   CALCIUM 10.5   PROT 9.3*   ALBUMIN 3.6   BILITOT 0.4   ALKPHOS 97   AST 32   ALT 25   ANIONGAP 17*       Significant Imaging: I have reviewed all pertinent imaging results/findings within the past 24 hours.  Assessment/Plan:     * Stage 4 chronic kidney disease  Creatine stable for now. BMP reviewed- noted  Estimated Creatinine Clearance: 15.6 mL/min (A) (based on SCr of 4.8 mg/dL (H)). according to latest data. Based on current GFR, CKD stage is stage 4 - GFR 15-29.  Monitor UOP and serial BMP and adjust therapy as needed. Renally dose meds. Avoid nephrotoxic medications and procedures.  IVF , Monitor urine output , am labs, nephrology consult , restart prednisone and sirolimus       Complicated UTI (urinary tract infection)    Renal dosed Cefepime , follow up urine and blood cultures     Immunosuppressive management encounter following kidney transplant  Prednisone and Sirolimus         VTE Risk Mitigation (From admission, onward)           Ordered     apixaban tablet 2.5 mg  2 times daily         09/02/24 2140     IP VTE HIGH RISK PATIENT  Once         09/02/24 2139     Place sequential compression device  Until discontinued         09/02/24 2139                                    Milton Caldwell MD  Department of Hospital Medicine  [facility]

## 2024-09-03 NOTE — PLAN OF CARE
Patient A&Ox4. On IV fluids and IV antibiotics. No c/o pain or discomfort. Call light within reach. Bed alarm on.

## 2024-09-03 NOTE — ASSESSMENT & PLAN NOTE
Creatine stable for now. BMP reviewed- noted Estimated Creatinine Clearance: 15.6 mL/min (A) (based on SCr of 4.8 mg/dL (H)). according to latest data. Based on current GFR, CKD stage is stage 4 - GFR 15-29.  Monitor UOP and serial BMP and adjust therapy as needed. Renally dose meds. Avoid nephrotoxic medications and procedures.  IVF , Monitor urine output , am labs, nephrology consult , restart prednisone and sirolimus

## 2024-09-03 NOTE — SUBJECTIVE & OBJECTIVE
"Past Medical History:   Diagnosis Date    Acute hypoxemic respiratory failure due to COVID-19 12/30/2020    Anticoagulant long-term use     BPH (benign prostatic hypertrophy)     Cancer     vocal cords    Claudication of right lower extremity 3/10/2016    COVID-19 virus detected 12/30/2020    Dependence on renal dialysis 4/18/2023    Disorder of kidney and ureter     Hyperkalemia 1/1/2021    Hyperlipidemia     Hypertension     Hypokalemia 6/23/2021    Immunosuppression 6/26/2021    Immunosuppression due to chronic steroid use 1/30/2020    Microcytic anemia 9/16/2016    Obesity (BMI 30-39.9) 1/23/2019    Oropharyngeal cancer     Pterygium     Squamous cell carcinoma 4/25/2018    Thromboembolic disorder     Unspecified disorder of kidney and ureter        Past Surgical History:   Procedure Laterality Date    CYSTOSCOPY W/ RETROGRADES Bilateral 6/15/2022    Procedure: Cystoscopy, bilateral retrograde pyelogram, and all indicated procedures;  Surgeon: Veronica Bacon MD;  Location: [facility];  Service: Urology;  Laterality: Bilateral;    FRACTURE SURGERY Left     "lower leg" 40 years ago    KIDNEY TRANSPLANT  2007    followed by [facility] Transplant    LARYNX SURGERY  11/2014    Oropharyngeal Cancer x3    microlaryngoscopy      PHLEBOGRAPHY Bilateral 8/2/2022    Procedure: Venogram;  Surgeon: KENIA Mueller II, MD;  Location: [facility] CATH LAB;  Service: Vascular;  Laterality: Bilateral;    SURGICAL REMOVAL OF LESION OF ORBIT Bilateral 8/26/2020    Procedure: EXCISION, LESION, ORBIT;  Surgeon: Linda Tee MD;  Location: [address];  Service: Ophthalmology;  Laterality: Bilateral;    TIBIAL STAPLING Left 1980            Review of patient's allergies indicates:  No Known Allergies    No current facility-administered medications on file prior to encounter.     Current Outpatient Medications on File Prior to Encounter   Medication Sig    apixaban (ELIQUIS) 2.5 mg Tab Take 1 tablet (2.5 mg total) by mouth 2 (two) times " daily.    atorvastatin (LIPITOR) 20 MG tablet Take 1 tablet (20 mg total) by mouth every evening.    cinacalcet (SENSIPAR) 30 MG Tab Take 1 tablet (30 mg total) by mouth daily with breakfast.    cloNIDine 0.3 mg/24 hr td ptwk (CATAPRES) 0.3 mg/24 hr Place 1 patch onto the skin once a week.    ergocalciferol (ERGOCALCIFEROL) 50,000 unit Cap Take 1 capsule (50,000 Units total) by mouth every 7 days. (Patient not taking: Reported on 8/15/2024)    ferrous sulfate 325 mg (65 mg iron) Tab tablet Take 325 mg by mouth once daily.    finasteride (PROSCAR) 5 mg tablet Take 1 tablet (5 mg total) by mouth once daily.    fluticasone propionate (FLONASE) 50 mcg/actuation nasal spray 1 spray (50 mcg total) by Each Nostril route nightly.    furosemide (LASIX) 40 MG tablet Take 1 tablet (40 mg total) by mouth once daily.    hydrALAZINE (APRESOLINE) 100 MG tablet Take 1 tablet (100 mg total) by mouth every 8 (eight) hours.    metoprolol succinate (TOPROL-XL) 100 MG 24 hr tablet Take 1 tablet (100 mg total) by mouth once daily.    NIFEdipine (PROCARDIA-XL) 30 MG (OSM) 24 hr tablet Take 1 tablet (30 mg total) by mouth once daily.    predniSONE (DELTASONE) 5 MG tablet Take 1 tablet (5 mg total) by mouth once daily.    sirolimus (RAPAMUNE) 1 MG Tab Take 3 tablets (3 mg total) by mouth once daily.    sodium bicarbonate 650 MG tablet Take 3 tablets (1,950 mg total) by mouth 4 (four) times daily.    vitamin renal formula, B-complex-vitamin c-folic acid, (NEPHROCAP) 1 mg Cap Take 1 capsule by mouth once daily.    [DISCONTINUED] sildenafiL (VIAGRA) 50 MG tablet Take 1 tablet (50 mg total) by mouth daily as needed for Erectile Dysfunction.     Family History       Problem Relation (Age of Onset)    Diabetes Mother    Hypertension Mother    No Known Problems Sister, Brother, Daughter, Brother    Stroke Mother, Father          Tobacco Use    Smoking status: Former     Current packs/day: 0.00     Average packs/day: 1 pack/day for 20.0 years (20.0  ttl pk-yrs)     Types: Cigarettes     Start date: 1989     Quit date: 2009     Years since quitting: 15.6     Passive exposure: Past    Smokeless tobacco: Never   Substance and Sexual Activity    Alcohol use: No     Alcohol/week: 0.0 standard drinks of alcohol    Drug use: No     Types: Marijuana     Comment: Occ.    Sexual activity: Not Currently     Partners: Female     Review of Systems   Constitutional:  Positive for activity change, appetite change and fatigue.   HENT: Negative.     Eyes: Negative.    Respiratory: Negative.     Cardiovascular: Negative.    Gastrointestinal:  Positive for nausea and vomiting.   Musculoskeletal: Negative.    Skin: Negative.    Neurological: Negative.    Psychiatric/Behavioral: Negative.       Objective:     Vital Signs (Most Recent):  Temp: 98.5 °F (36.9 °C) (09/02/24 1959)  Pulse: 88 (09/02/24 2105)  Resp: (!) 23 (09/02/24 2105)  BP: 139/77 (09/02/24 2105)  SpO2: 100 % (09/02/24 2105) Vital Signs (24h Range):  Temp:  [98.5 °F (36.9 °C)] 98.5 °F (36.9 °C)  Pulse:  [88-95] 88  Resp:  [16-23] 23  SpO2:  [98 %-100 %] 100 %  BP: (139-159)/(77-92) 139/77     Weight: 94.2 kg (207 lb 10.8 oz)  Body mass index is 29.8 kg/m².     Physical Exam  HENT:      Head: Normocephalic.      Nose: Nose normal.      Mouth/Throat:      Mouth: Mucous membranes are dry.   Eyes:      Conjunctiva/sclera: Conjunctivae normal.      Pupils: Pupils are equal, round, and reactive to light.   Cardiovascular:      Rate and Rhythm: Normal rate and regular rhythm.      Pulses: Normal pulses.   Pulmonary:      Effort: Pulmonary effort is normal.   Abdominal:      General: Abdomen is flat. Bowel sounds are normal.      Palpations: Abdomen is soft.   Musculoskeletal:         General: Normal range of motion.   Skin:     General: Skin is warm.      Capillary Refill: Capillary refill takes less than 2 seconds.   Neurological:      General: No focal deficit present.      Mental Status: He is alert.   Psychiatric:          Mood and Affect: Mood normal.              CRANIAL NERVES     CN III, IV, VI   Pupils are equal, round, and reactive to light.       Significant Labs: All pertinent labs within the past 24 hours have been reviewed.  BMP:   Recent Labs   Lab 09/02/24 2037   *      K 4.1      CO2 18*   BUN 42*   CREATININE 4.8*   CALCIUM 10.5   MG 2.3     CBC:   Recent Labs   Lab 09/02/24 2037   WBC 9.82   HGB 13.0*   HCT 40.8        CMP:   Recent Labs   Lab 09/02/24 2037      K 4.1      CO2 18*   *   BUN 42*   CREATININE 4.8*   CALCIUM 10.5   PROT 9.3*   ALBUMIN 3.6   BILITOT 0.4   ALKPHOS 97   AST 32   ALT 25   ANIONGAP 17*       Significant Imaging: I have reviewed all pertinent imaging results/findings within the past 24 hours.

## 2024-09-03 NOTE — PLAN OF CARE
Nutrition Plan of Care:      Recommendations     1. Disease specific oral diet    2. Commercial beverage    3. Collaboration with medical providers     Goals: Patient to consume >75% of EEN prior to RD follow up.  Nutrition Goal Status: new  Communication of RD Recs: other (comment) (poc)     Assessment and Plan     Endocrine  Moderate malnutrition  Malnutrition Type:  Context: acute illness or injury  Level: moderate     Related to (etiology):   Decreased appetite   GI intolerance     Signs and Symptoms (as evidenced by):   Weight loss     Malnutrition Characteristic Summary:  Weight Loss (Malnutrition): 5% in 1 month  Energy Intake (Malnutrition): less than 75% for greater than 7 days        Interventions/Recommendations (treatment strategy):  1. Disease specific oral diet  2. Commercial beverage  3. Collaboration with medical providers     Nutrition Diagnosis Status:   New           Malnutrition Assessment  Malnutrition Context: acute illness or injury  Malnutrition Level: moderate      Micronutrient Evaluation Summary: suspected deficiency   Weight Loss (Malnutrition): 5% in 1 month  Energy Intake (Malnutrition): less than 75% for greater than 7 days    Georgie Rich MS, RDN, LDN

## 2024-09-03 NOTE — HOSPITAL COURSE
9/3/24 UCx Pending, ID rec cefepime 2g q24  9/4/24 UCx GBS, switch abx to cefazolin per ID, CHANG worsening

## 2024-09-03 NOTE — HPI
75 Y/O M Renal transplant on immunosuppresive medications presents with poor appetite, N/V poor oral intake, afebrile, yet has dysuria, lower abdominal pain 7/10 acute on chronic , aching, without localization. Progressive over past 2 days   No hx of sick contact or travel

## 2024-09-03 NOTE — PROGRESS NOTES
09/02/24 2338   Admission   Initial VN Admission Questions Complete   Shift   Virtual Nurse - Patient Verbalized Approval Of Camera Use;VN Rounding   Safety/Activity   Patient Rounds bed in low position;call light in patient/parent reach;clutter free environment maintained;visualized patient;placement of personal items at bedside   Safety Promotion/Fall Prevention assistive device/personal item within reach;bed alarm set;Fall Risk reviewed with patient/family;side rails raised x 2   Positioning   Body Position supine   Head of Bed (HOB) Positioning HOB at 30-45 degrees     VN cued in to pt's room with permission. Admission questions completed. Plan of care reviewed with pt. Pt denies any questions or needs at this time. Call bell w/in reach. Instructed to call for needs/assist oob.

## 2024-09-03 NOTE — SUBJECTIVE & OBJECTIVE
Interval History: Feels a little better, has an appetite today. UCx pending    Review of Systems   Constitutional:  Positive for activity change. Negative for appetite change and fatigue.   HENT: Negative.     Eyes: Negative.    Respiratory: Negative.     Cardiovascular: Negative.    Gastrointestinal:  Negative for nausea and vomiting.   Musculoskeletal: Negative.    Skin: Negative.    Neurological: Negative.    Psychiatric/Behavioral: Negative.       Objective:     Vital Signs (Most Recent):  Temp: 97.8 °F (36.6 °C) (09/03/24 1526)  Pulse: 70 (09/03/24 1143)  Resp: 20 (09/03/24 1143)  BP: 133/77 (09/03/24 1143)  SpO2: 99 % (09/03/24 1143) Vital Signs (24h Range):  Temp:  [97.8 °F (36.6 °C)-98.5 °F (36.9 °C)] 97.8 °F (36.6 °C)  Pulse:  [68-95] 70  Resp:  [16-23] 20  SpO2:  [98 %-100 %] 99 %  BP: (111-159)/(59-92) 133/77     Weight: 96 kg (211 lb 10.3 oz)  Body mass index is 30.37 kg/m².    Intake/Output Summary (Last 24 hours) at 9/3/2024 1536  Last data filed at 9/3/2024 0820  Gross per 24 hour   Intake 1613.79 ml   Output 400 ml   Net 1213.79 ml         Physical Exam  HENT:      Head: Normocephalic.      Nose: Nose normal.      Mouth/Throat:      Mouth: Mucous membranes are dry.   Eyes:      Conjunctiva/sclera: Conjunctivae normal.      Pupils: Pupils are equal, round, and reactive to light.   Cardiovascular:      Rate and Rhythm: Normal rate and regular rhythm.      Pulses: Normal pulses.   Pulmonary:      Effort: Pulmonary effort is normal.   Abdominal:      General: Abdomen is flat. Bowel sounds are normal.      Palpations: Abdomen is soft.   Musculoskeletal:         General: Normal range of motion.   Skin:     General: Skin is warm.      Capillary Refill: Capillary refill takes less than 2 seconds.   Neurological:      General: No focal deficit present.      Mental Status: He is alert.   Psychiatric:         Mood and Affect: Mood normal.             Significant Labs: All pertinent labs within the past 24 hours  have been reviewed.  CBC:   Recent Labs   Lab 09/02/24 2037 09/03/24  0701   WBC 9.82 6.28   HGB 13.0* 10.2*   HCT 40.8 32.4*    202     CMP:   Recent Labs   Lab 09/02/24 2037 09/03/24  0526    138   K 4.1 4.1    110   CO2 18* 18*   * 89   BUN 42* 33*   CREATININE 4.8* 3.5*   CALCIUM 10.5 8.4*   PROT 9.3*  --    ALBUMIN 3.6  --    BILITOT 0.4  --    ALKPHOS 97  --    AST 32  --    ALT 25  --    ANIONGAP 17* 10       Significant Imaging: I have reviewed all pertinent imaging results/findings within the past 24 hours.

## 2024-09-03 NOTE — NURSING
Phlebotomy attempted to draw morning labs but was unsuccessful. She said she would send another phlebotomist up to try.

## 2024-09-03 NOTE — CONSULTS
General Infectious Diseases: Consult Note    Consult Requested By: Kim Lao MD    Reason for Consult: complicated UTI in renal txpt patient      IMPRESSION:  73yo man w/a history of HTN, HLD, ESRD (s/p iHD through DDRT in 2007; c/b graft CKD-IV; on maintenance sirolimus/pred), oropharyngeal carcinoma (s/p resection + XRT in 2015), VTE (on eliquis), hyperparathyroidism, and BPH who was admitted on 9/3/2024 with 2 days of dysuria, frequency, urgency, lower abdominal pain, poor appetite, and weakness over the past week due to a complicated UTI (pyuria per UA; cx pending). He notes feeling much improved already on empiric cefepime this morning with return of his appetite.    - would continue cefepime 2g IV q24h (cor CrCl ~20) for complicated UTI  - await pending blood/urine cx to tailor therapy  - management of immunosuppression per nephrology team    Thanks for the consult. ID will follow the patient with you.    Rose Burris MD  ID Attending  [phone]    SUBJECTIVE:     History of Present Illness:  Mr. Manzo is a 73yo man w/a history of HTN, HLD, ESRD (s/p iHD through DDRT in 2007; c/b graft CKD-IV; on maintenance sirolimus/pred), oropharyngeal carcinoma (s/p resection + XRT in 2015), VTE (on eliquis), hyperparathyroidism, and BPH who was admitted on 9/3/2024 with 2 days of dysuria, frequency, urgency, lower abdominal pain, poor appetite, and weakness over the past week due to a complicated UTI (pyuria per UA; cx pending). He notes feeling much improved already on empiric cefepime this morning with return of his appetite.     Past Medical History:  Past Medical History:   Diagnosis Date    Acute hypoxemic respiratory failure due to COVID-19 12/30/2020    Anticoagulant long-term use     BPH (benign prostatic hypertrophy)     Cancer     vocal cords    Claudication of right lower extremity 3/10/2016    COVID-19 virus detected 12/30/2020    Dependence on renal dialysis 4/18/2023    Disorder of kidney and  "ureter     Hyperkalemia 1/1/2021    Hyperlipidemia     Hypertension     Hypokalemia 6/23/2021    Immunosuppression 6/26/2021    Immunosuppression due to chronic steroid use 1/30/2020    Microcytic anemia 9/16/2016    Obesity (BMI 30-39.9) 1/23/2019    Oropharyngeal cancer     Pterygium     Squamous cell carcinoma 4/25/2018    Thromboembolic disorder     Unspecified disorder of kidney and ureter        Past Surgical History:  Past Surgical History:   Procedure Laterality Date    CYSTOSCOPY W/ RETROGRADES Bilateral 6/15/2022    Procedure: Cystoscopy, bilateral retrograde pyelogram, and all indicated procedures;  Surgeon: Veronica Bacon MD;  Location: [facility] OR;  Service: Urology;  Laterality: Bilateral;    FRACTURE SURGERY Left     "lower leg" 40 years ago    KIDNEY TRANSPLANT  2007    followed by [facility] Transplant    LARYNX SURGERY  11/2014    Oropharyngeal Cancer x3    microlaryngoscopy      PHLEBOGRAPHY Bilateral 8/2/2022    Procedure: Venogram;  Surgeon: KENIA Mueller II, MD;  Location: [facility] CATH LAB;  Service: Vascular;  Laterality: Bilateral;    SURGICAL REMOVAL OF LESION OF ORBIT Bilateral 8/26/2020    Procedure: EXCISION, LESION, ORBIT;  Surgeon: Linda Tee MD;  [address];  Service: Ophthalmology;  Laterality: Bilateral;    TIBIAL STAPLING Left 1980            Family History:  Family History   Problem Relation Name Age of Onset    Stroke Mother      Diabetes Mother      Hypertension Mother      Stroke Father      No Known Problems Sister      No Known Problems Brother x1     No Known Problems Daughter      No Known Problems Brother x1        Social History:  Social History     Tobacco Use    Smoking status: Former     Current packs/day: 0.00     Average packs/day: 1 pack/day for 20.0 years (20.0 ttl pk-yrs)     Types: Cigarettes     Start date: 1989     Quit date: 2009     Years since quitting: 15.6     Passive exposure: Past    Smokeless tobacco: Never   Substance Use Topics    Alcohol " use: No     Alcohol/week: 0.0 standard drinks of alcohol    Drug use: No     Types: Marijuana     Comment: Occ.       Allergies:  Review of patient's allergies indicates:  No Known Allergies     Pertinent Medications:  Antibiotics (From admission, onward)      Start     Stop Route Frequency Ordered    09/03/24 2300  ceFEPIme (MAXIPIME) 2 g in D5W 100 mL IVPB (MB+)         -- IV Every 24 hours (non-standard times) 09/02/24 2139              Review of Symptoms:  Constitutional: Denies fevers, weight loss, chills, or weakness.  Eyes: Denies changes in vision.  ENT: Denies dysphagia, nasal discharge, ear pain or discharge.  Cardiovascular: Denies chest pain, palpitations, orthopnea, or claudication.  Respiratory: Denies shortness of breath, cough, hemoptysis, or wheezing.  GI: Denies nausea/vomiting, hematochezia, abd pain, + poor appetite. Notes dark stools but after starting iron pills.  : + dysuria, frequency, urgency, pelvic pain.  Musculoskeletal: Denies joint pain or myalgias.  Skin/breast: Denies rashes, lumps, lesions, or discharge.  Neurologic: Denies headache, dizziness, vertigo, or paresthesias.  Psychiatric: Denies changes in mood or hallucinations.  Endocrine: Denies polyuria, polydipsia, heat/cold intolerance.  Hematologic/Lymph: Denies lymphadenopathy, easy bruising or easy bleeding.  Allergic/Immunologic: Denies rash, rhinitis.     OBJECTIVE:     Vital Signs (Most Recent)  Temp: 97.8 °F (36.6 °C) (09/03/24 1143)  Pulse: 70 (09/03/24 1143)  Resp: 20 (09/03/24 1143)  BP: 133/77 (09/03/24 1143)  SpO2: 99 % (09/03/24 1143)    Temperature Range Min/Max (Last 24H):  Temp:  [97.8 °F (36.6 °C)-98.5 °F (36.9 °C)]     Physical Exam:  Gen: Nontoxic, comfortable, no acute distress.    HEENT: PERRL. No scleral icterus. OP clear.  Pulmonary: Non labored. Clear to auscultation. No wheezing, crackles, or rhonchi.   Cardiac: Normal S1 & S2 w/o rubs/murmurs/gallops.   Abdomen: Normal bowel sounds. Soft, nontender,  nondistended. No rebound or guarding. No hepatosplenomegaly.  Extremities: No clubbing, cyanosis, or peripheral edema. Distal pulses symmetric.  Lymphatics: no preauricular, cervical LAD.  Musculoskeletal: no joint deformities or effusions.  Neurological:  Alert and oriented.  CN II-XII grossly intact. Gross motor intact x4. Sensation grossly intact.  Skin: No jaundice, rashes, or visible lesions.      Laboratory:  CBC:   Lab Results   Component Value Date    WBC 6.28 09/03/2024    HGB 10.2 (L) 09/03/2024    HCT 32.4 (L) 09/03/2024    MCV 82 09/03/2024     09/03/2024       BMP:   Recent Labs   Lab 09/02/24 2037 09/03/24  0526   * 89    138   K 4.1 4.1    110   CO2 18* 18*   BUN 42* 33*   CREATININE 4.8* 3.5*   CALCIUM 10.5 8.4*   MG 2.3  --        LFT:   Lab Results   Component Value Date    ALT 25 09/02/2024    AST 32 09/02/2024    ALKPHOS 97 09/02/2024    BILITOT 0.4 09/02/2024       Microbiology:  9/2 blood cx NGTD  9/2 urine cx pending      Diagnostic Results:  None    ASSESSMENT/PLAN:     Active Hospital Problems    Diagnosis  POA    *Stage 4 chronic kidney disease [N18.4]  Yes     Overview:   updated diagnosis code & description  dx update      Complicated UTI (urinary tract infection) [N39.0]  Yes    Class 1 obesity due to excess calories with serious comorbidity and body mass index (BMI) of 34.0 to 34.9 in adult [E66.09, Z68.34]  Not Applicable    Metabolic acidosis [E87.20]  Yes    Proteinuria [R80.9]  Yes    Immunosuppressive management encounter following kidney transplant [Z79.899, Z94.0]  Not Applicable    Renal transplant recipient [Z94.0]  Not Applicable     2007 at [facility]      Essential hypertension [I10]  Yes      Resolved Hospital Problems   No resolved problems to display.       Plan: Please see top of page

## 2024-09-03 NOTE — ASSESSMENT & PLAN NOTE
Body mass index is 30.37 kg/m². Morbid obesity complicates all aspects of disease management from diagnostic modalities to treatment. Weight loss encouraged and health benefits explained to patient.

## 2024-09-03 NOTE — ASSESSMENT & PLAN NOTE
Malnutrition Type:  Context: acute illness or injury  Level: moderate    Related to (etiology):   Decreased appetite   GI intolerance    Signs and Symptoms (as evidenced by):   Weight loss    Malnutrition Characteristic Summary:  Weight Loss (Malnutrition): 5% in 1 month  Energy Intake (Malnutrition): less than 75% for greater than 7 days      Interventions/Recommendations (treatment strategy):  1. Disease specific oral diet  2. Commercial beverage  3. Collaboration with medical providers    Nutrition Diagnosis Status:   New

## 2024-09-04 LAB
ALBUMIN SERPL BCP-MCNC: 2.4 G/DL (ref 3.5–5.2)
ANION GAP SERPL CALC-SCNC: 8 MMOL/L (ref 8–16)
BASOPHILS # BLD AUTO: 0.02 K/UL (ref 0–0.2)
BASOPHILS NFR BLD: 0.4 % (ref 0–1.9)
BUN SERPL-MCNC: 41 MG/DL (ref 8–23)
CALCIUM SERPL-MCNC: 9.2 MG/DL (ref 8.7–10.5)
CHLORIDE SERPL-SCNC: 110 MMOL/L (ref 95–110)
CO2 SERPL-SCNC: 22 MMOL/L (ref 23–29)
CREAT SERPL-MCNC: 4.2 MG/DL (ref 0.5–1.4)
DIFFERENTIAL METHOD BLD: ABNORMAL
EOSINOPHIL # BLD AUTO: 0.1 K/UL (ref 0–0.5)
EOSINOPHIL NFR BLD: 0.9 % (ref 0–8)
ERYTHROCYTE [DISTWIDTH] IN BLOOD BY AUTOMATED COUNT: 15.1 % (ref 11.5–14.5)
EST. GFR  (NO RACE VARIABLE): 14 ML/MIN/1.73 M^2
FERRITIN SERPL-MCNC: 940 NG/ML (ref 20–300)
GLUCOSE SERPL-MCNC: 92 MG/DL (ref 70–110)
HCT VFR BLD AUTO: 30.5 % (ref 40–54)
HGB BLD-MCNC: 9.7 G/DL (ref 14–18)
IMM GRANULOCYTES # BLD AUTO: 0.04 K/UL (ref 0–0.04)
IMM GRANULOCYTES NFR BLD AUTO: 0.7 % (ref 0–0.5)
IRON SERPL-MCNC: 48 UG/DL (ref 45–160)
LYMPHOCYTES # BLD AUTO: 1.1 K/UL (ref 1–4.8)
LYMPHOCYTES NFR BLD: 20.7 % (ref 18–48)
MAGNESIUM SERPL-MCNC: 2.2 MG/DL (ref 1.6–2.6)
MCH RBC QN AUTO: 25.9 PG (ref 27–31)
MCHC RBC AUTO-ENTMCNC: 31.8 G/DL (ref 32–36)
MCV RBC AUTO: 81 FL (ref 82–98)
MONOCYTES # BLD AUTO: 0.5 K/UL (ref 0.3–1)
MONOCYTES NFR BLD: 9.8 % (ref 4–15)
NEUTROPHILS # BLD AUTO: 3.7 K/UL (ref 1.8–7.7)
NEUTROPHILS NFR BLD: 67.5 % (ref 38–73)
NRBC BLD-RTO: 0 /100 WBC
OHS QRS DURATION: 118 MS
OHS QTC CALCULATION: 456 MS
PHOSPHATE SERPL-MCNC: 3.3 MG/DL (ref 2.7–4.5)
PLATELET # BLD AUTO: 204 K/UL (ref 150–450)
PMV BLD AUTO: 10.6 FL (ref 9.2–12.9)
POTASSIUM SERPL-SCNC: 3.9 MMOL/L (ref 3.5–5.1)
RBC # BLD AUTO: 3.75 M/UL (ref 4.6–6.2)
SATURATED IRON: 48 % (ref 20–50)
SODIUM SERPL-SCNC: 140 MMOL/L (ref 136–145)
TOTAL IRON BINDING CAPACITY: 99 UG/DL (ref 250–450)
TRANSFERRIN SERPL-MCNC: 67 MG/DL (ref 200–375)
WBC # BLD AUTO: 5.52 K/UL (ref 3.9–12.7)

## 2024-09-04 PROCEDURE — 36415 COLL VENOUS BLD VENIPUNCTURE: CPT | Performed by: INTERNAL MEDICINE

## 2024-09-04 PROCEDURE — 63600175 PHARM REV CODE 636 W HCPCS: Performed by: STUDENT IN AN ORGANIZED HEALTH CARE EDUCATION/TRAINING PROGRAM

## 2024-09-04 PROCEDURE — 11000001 HC ACUTE MED/SURG PRIVATE ROOM

## 2024-09-04 PROCEDURE — 63600175 PHARM REV CODE 636 W HCPCS: Performed by: INTERNAL MEDICINE

## 2024-09-04 PROCEDURE — 83540 ASSAY OF IRON: CPT | Performed by: STUDENT IN AN ORGANIZED HEALTH CARE EDUCATION/TRAINING PROGRAM

## 2024-09-04 PROCEDURE — 25000003 PHARM REV CODE 250: Performed by: INTERNAL MEDICINE

## 2024-09-04 PROCEDURE — 85025 COMPLETE CBC W/AUTO DIFF WBC: CPT | Performed by: INTERNAL MEDICINE

## 2024-09-04 PROCEDURE — 80069 RENAL FUNCTION PANEL: CPT | Performed by: STUDENT IN AN ORGANIZED HEALTH CARE EDUCATION/TRAINING PROGRAM

## 2024-09-04 PROCEDURE — 82728 ASSAY OF FERRITIN: CPT | Performed by: STUDENT IN AN ORGANIZED HEALTH CARE EDUCATION/TRAINING PROGRAM

## 2024-09-04 PROCEDURE — 83735 ASSAY OF MAGNESIUM: CPT | Performed by: STUDENT IN AN ORGANIZED HEALTH CARE EDUCATION/TRAINING PROGRAM

## 2024-09-04 PROCEDURE — 51798 US URINE CAPACITY MEASURE: CPT

## 2024-09-04 RX ORDER — SIROLIMUS 1 MG/1
3 TABLET, FILM COATED ORAL DAILY
Status: DISCONTINUED | OUTPATIENT
Start: 2024-09-04 | End: 2024-09-05 | Stop reason: HOSPADM

## 2024-09-04 RX ADMIN — SODIUM CHLORIDE, POTASSIUM CHLORIDE, SODIUM LACTATE AND CALCIUM CHLORIDE: 600; 310; 30; 20 INJECTION, SOLUTION INTRAVENOUS at 08:09

## 2024-09-04 RX ADMIN — SODIUM BICARBONATE 1950 MG: 650 TABLET ORAL at 04:09

## 2024-09-04 RX ADMIN — APIXABAN 2.5 MG: 2.5 TABLET, FILM COATED ORAL at 08:09

## 2024-09-04 RX ADMIN — SODIUM BICARBONATE 1950 MG: 650 TABLET ORAL at 08:09

## 2024-09-04 RX ADMIN — SIROLIMUS 3 MG: 1 TABLET ORAL at 08:09

## 2024-09-04 RX ADMIN — SODIUM BICARBONATE 1950 MG: 650 TABLET ORAL at 12:09

## 2024-09-04 RX ADMIN — FINASTERIDE 5 MG: 5 TABLET, FILM COATED ORAL at 08:09

## 2024-09-04 RX ADMIN — DEXTROSE MONOHYDRATE 1 G: 50 INJECTION, SOLUTION INTRAVENOUS at 04:09

## 2024-09-04 RX ADMIN — PREDNISONE 5 MG: 5 TABLET ORAL at 08:09

## 2024-09-04 NOTE — PROGRESS NOTES
[facility] Medicine  Progress Note    Patient Name: Jose Luis Manzo  MRN: [id number]  Patient Class: IP- Inpatient   Admission Date: 9/2/2024  Length of Stay: 2 days  Attending Physician: Kim Lao MD  Primary Care Provider: Home Alexis MD        Subjective:     Principal Problem:Stage 4 chronic kidney disease        HPI:  75 Y/O M Renal transplant on immunosuppresive medications presents with poor appetite, N/V poor oral intake, afebrile, yet has dysuria, lower abdominal pain 7/10 acute on chronic , aching, without localization. Progressive over past 2 days   No hx of sick contact or travel     Overview/Hospital Course:  9/3/24 UCx Pending, ID rec cefepime 2g q24  9/4/24 UCx GBS, switch abx to cefazolin per ID, CHANG worsening    Interval History: Feeling better, concerned about his kidney function worsening and edema in his arms and legs    Review of Systems   Constitutional:  Positive for activity change. Negative for appetite change and fatigue.   HENT: Negative.     Eyes: Negative.    Respiratory: Negative.     Cardiovascular: Negative.    Gastrointestinal:  Negative for nausea and vomiting.   Musculoskeletal: Negative.    Skin: Negative.    Neurological: Negative.    Psychiatric/Behavioral: Negative.       Objective:     Vital Signs (Most Recent):  Temp: 97 °F (36.1 °C) (09/04/24 1558)  Pulse: 73 (09/04/24 1558)  Resp: 20 (09/04/24 1558)  BP: (!) 142/84 (09/04/24 1558)  SpO2: 100 % (09/04/24 1558) Vital Signs (24h Range):  Temp:  [97 °F (36.1 °C)-98 °F (36.7 °C)] 97 °F (36.1 °C)  Pulse:  [66-77] 73  Resp:  [18-20] 20  SpO2:  [97 %-100 %] 100 %  BP: (121-171)/(60-92) 142/84     Weight: 97.2 kg (214 lb 4.6 oz)  Body mass index is 30.75 kg/m².    Intake/Output Summary (Last 24 hours) at 9/4/2024 1634  Last data filed at 9/4/2024 0639  Gross per 24 hour   Intake 3252.17 ml   Output 1500 ml   Net 1752.17 ml         Physical Exam  HENT:      Head: Normocephalic.      Nose: Nose normal.       Mouth/Throat:      Mouth: Mucous membranes are moist.   Eyes:      Conjunctiva/sclera: Conjunctivae normal.      Pupils: Pupils are equal, round, and reactive to light.   Cardiovascular:      Rate and Rhythm: Normal rate and regular rhythm.      Pulses: Normal pulses.   Pulmonary:      Effort: Pulmonary effort is normal.   Abdominal:      General: Abdomen is flat. Bowel sounds are normal.      Palpations: Abdomen is soft.   Musculoskeletal:         General: Normal range of motion.   Skin:     General: Skin is warm.      Capillary Refill: Capillary refill takes less than 2 seconds.   Neurological:      General: No focal deficit present.      Mental Status: He is alert.   Psychiatric:         Mood and Affect: Mood normal.             Significant Labs: All pertinent labs within the past 24 hours have been reviewed.  Blood Culture:   Recent Labs   Lab 09/02/24 2139   LABBLOO No Growth to date  No Growth to date  No Growth to date  No Growth to date     CBC:   Recent Labs   Lab 09/02/24 2037 09/03/24  0701 09/04/24  0458   WBC 9.82 6.28 5.52   HGB 13.0* 10.2* 9.7*   HCT 40.8 32.4* 30.5*    202 204     CMP:   Recent Labs   Lab 09/02/24 2037 09/03/24  0526 09/04/24  0457    138 140   K 4.1 4.1 3.9    110 110   CO2 18* 18* 22*   * 89 92   BUN 42* 33* 41*   CREATININE 4.8* 3.5* 4.2*   CALCIUM 10.5 8.4* 9.2   PROT 9.3*  --   --    ALBUMIN 3.6  --  2.4*   BILITOT 0.4  --   --    ALKPHOS 97  --   --    AST 32  --   --    ALT 25  --   --    ANIONGAP 17* 10 8     Urine Culture:   Recent Labs   Lab 09/02/24 2014   LABURIN STREPTOCOCCUS AGALACTIAE (GROUP B)  > 100,000 cfu/ml  Beta-hemolytic streptococci are routinely susceptible to   penicillins,cephalosporins and carbapenems.  *     Urine Studies:   Recent Labs   Lab 09/02/24 2014   COLORU Yellow   APPEARANCEUA Cloudy*   PHUR 6.0   SPECGRAV 1.010   PROTEINUA 3+*   GLUCUA Negative   KETONESU Negative   BILIRUBINUA Negative   OCCULTUA 2+*   NITRITE  Negative   UROBILINOGEN Negative   LEUKOCYTESUR 3+*   RBCUA 52*   WBCUA >100*   BACTERIA Occasional   HYALINECASTS 0       Significant Imaging: I have reviewed all pertinent imaging results/findings within the past 24 hours.    Assessment/Plan:      * Stage 4 chronic kidney disease  Creatine stable for now. BMP reviewed- noted Estimated Creatinine Clearance: 15.6 mL/min (A) (based on SCr of 4.8 mg/dL (H)). according to latest data. Based on current GFR, CKD stage is stage 4 - GFR 15-29.  Monitor UOP and serial BMP and adjust therapy as needed. Renally dose meds. Avoid nephrotoxic medications and procedures.  IVF , Monitor urine output , am labs, nephrology consult , restart prednisone and sirolimus       Complicated UTI (urinary tract infection)  UCx +GBS  Renal dosed Cefepime-->cefazolin per ID recs    Class 1 obesity due to excess calories with serious comorbidity and body mass index (BMI) of 34.0 to 34.9 in adult  Body mass index is 30.75 kg/m². Morbid obesity complicates all aspects of disease management from diagnostic modalities to treatment. Weight loss encouraged and health benefits explained to patient.         Immunosuppressive management encounter following kidney transplant  Prednisone and Sirolimus       CHANG (acute kidney injury)    Recent Labs     09/02/24 2037 09/03/24  0526 09/04/24  0457   CREATININE 4.8* 3.5* 4.2*   EGFRNORACEVR 12* 18* 14*      Plan  - CHANG is worsening. Will adjust treatment as follows: stop fluids - Avoid nephrotoxins and renally dose meds for GFR listed above  - Monitor urine output, serial BMP, and adjust therapy as needed  - appreciate Nephrology recs      VTE Risk Mitigation (From admission, onward)           Ordered     apixaban tablet 2.5 mg  2 times daily         09/02/24 2140     IP VTE HIGH RISK PATIENT  Once         09/02/24 2139     Place sequential compression device  Until discontinued         09/02/24 2139                    Discharge Planning   LAVERNE: 9/5/2024      Code Status: Full Code   Is the patient medically ready for discharge?:     Reason for patient still in hospital (select all that apply): Patient new problem, Patient trending condition, and Laboratory test  Discharge Plan A: Home with family                  Kim Lao MD  Department of Hospital Medicine   [facility]

## 2024-09-04 NOTE — ASSESSMENT & PLAN NOTE
Body mass index is 30.75 kg/m². Morbid obesity complicates all aspects of disease management from diagnostic modalities to treatment. Weight loss encouraged and health benefits explained to patient.

## 2024-09-04 NOTE — ASSESSMENT & PLAN NOTE
Recent Labs     09/02/24 2037 09/03/24  0526 09/04/24  0457   CREATININE 4.8* 3.5* 4.2*   EGFRNORACEVR 12* 18* 14*      Plan  - CHANG is worsening. Will adjust treatment as follows: stop fluids - Avoid nephrotoxins and renally dose meds for GFR listed above  - Monitor urine output, serial BMP, and adjust therapy as needed  - appreciate Nephrology recs

## 2024-09-04 NOTE — SUBJECTIVE & OBJECTIVE
Interval History: Feeling better, concerned about his kidney function worsening and edema in his arms and legs    Review of Systems   Constitutional:  Positive for activity change. Negative for appetite change and fatigue.   HENT: Negative.     Eyes: Negative.    Respiratory: Negative.     Cardiovascular: Negative.    Gastrointestinal:  Negative for nausea and vomiting.   Musculoskeletal: Negative.    Skin: Negative.    Neurological: Negative.    Psychiatric/Behavioral: Negative.       Objective:     Vital Signs (Most Recent):  Temp: 97 °F (36.1 °C) (09/04/24 1558)  Pulse: 73 (09/04/24 1558)  Resp: 20 (09/04/24 1558)  BP: (!) 142/84 (09/04/24 1558)  SpO2: 100 % (09/04/24 1558) Vital Signs (24h Range):  Temp:  [97 °F (36.1 °C)-98 °F (36.7 °C)] 97 °F (36.1 °C)  Pulse:  [66-77] 73  Resp:  [18-20] 20  SpO2:  [97 %-100 %] 100 %  BP: (121-171)/(60-92) 142/84     Weight: 97.2 kg (214 lb 4.6 oz)  Body mass index is 30.75 kg/m².    Intake/Output Summary (Last 24 hours) at 9/4/2024 1634  Last data filed at 9/4/2024 0639  Gross per 24 hour   Intake 3252.17 ml   Output 1500 ml   Net 1752.17 ml         Physical Exam  HENT:      Head: Normocephalic.      Nose: Nose normal.      Mouth/Throat:      Mouth: Mucous membranes are moist.   Eyes:      Conjunctiva/sclera: Conjunctivae normal.      Pupils: Pupils are equal, round, and reactive to light.   Cardiovascular:      Rate and Rhythm: Normal rate and regular rhythm.      Pulses: Normal pulses.   Pulmonary:      Effort: Pulmonary effort is normal.   Abdominal:      General: Abdomen is flat. Bowel sounds are normal.      Palpations: Abdomen is soft.   Musculoskeletal:         General: Normal range of motion.   Skin:     General: Skin is warm.      Capillary Refill: Capillary refill takes less than 2 seconds.   Neurological:      General: No focal deficit present.      Mental Status: He is alert.   Psychiatric:         Mood and Affect: Mood normal.             Significant Labs: All  pertinent labs within the past 24 hours have been reviewed.  Blood Culture:   Recent Labs   Lab 09/02/24 2139   LABBLOO No Growth to date  No Growth to date  No Growth to date  No Growth to date     CBC:   Recent Labs   Lab 09/02/24 2037 09/03/24  0701 09/04/24  0458   WBC 9.82 6.28 5.52   HGB 13.0* 10.2* 9.7*   HCT 40.8 32.4* 30.5*    202 204     CMP:   Recent Labs   Lab 09/02/24 2037 09/03/24  0526 09/04/24 0457    138 140   K 4.1 4.1 3.9    110 110   CO2 18* 18* 22*   * 89 92   BUN 42* 33* 41*   CREATININE 4.8* 3.5* 4.2*   CALCIUM 10.5 8.4* 9.2   PROT 9.3*  --   --    ALBUMIN 3.6  --  2.4*   BILITOT 0.4  --   --    ALKPHOS 97  --   --    AST 32  --   --    ALT 25  --   --    ANIONGAP 17* 10 8     Urine Culture:   Recent Labs   Lab 09/02/24 2014   LABURIN STREPTOCOCCUS AGALACTIAE (GROUP B)  > 100,000 cfu/ml  Beta-hemolytic streptococci are routinely susceptible to   penicillins,cephalosporins and carbapenems.  *     Urine Studies:   Recent Labs   Lab 09/02/24 2014   COLORU Yellow   APPEARANCEUA Cloudy*   PHUR 6.0   SPECGRAV 1.010   PROTEINUA 3+*   GLUCUA Negative   KETONESU Negative   BILIRUBINUA Negative   OCCULTUA 2+*   NITRITE Negative   UROBILINOGEN Negative   LEUKOCYTESUR 3+*   RBCUA 52*   WBCUA >100*   BACTERIA Occasional   HYALINECASTS 0       Significant Imaging: I have reviewed all pertinent imaging results/findings within the past 24 hours.

## 2024-09-04 NOTE — PROGRESS NOTES
Nephrology Progress Note       Consult Requested By: Kim Lao MD  Reason for Consult: CKD4 Kidney Transplant      SUBJECTIVE:         ?    Review of Systems   Constitutional:  Positive for malaise/fatigue and weight loss. Negative for chills and fever.   HENT:  Negative for congestion and sore throat.    Eyes:  Negative for blurred vision, double vision and photophobia.   Respiratory:  Negative for cough and shortness of breath.    Cardiovascular:  Negative for chest pain, palpitations and leg swelling.   Gastrointestinal:  Negative for abdominal pain, diarrhea, nausea and vomiting.   Genitourinary:  Negative for dysuria and urgency.   Musculoskeletal:  Negative for joint pain and myalgias.   Skin:  Negative for itching and rash.   Neurological:  Positive for weakness. Negative for dizziness, sensory change and headaches.   Endo/Heme/Allergies:  Negative for polydipsia. Does not bruise/bleed easily.   Psychiatric/Behavioral:  Negative for depression.        Past Medical History:   Diagnosis Date    Acute hypoxemic respiratory failure due to COVID-19 12/30/2020    Anticoagulant long-term use     BPH (benign prostatic hypertrophy)     Cancer     vocal cords    Claudication of right lower extremity 3/10/2016    COVID-19 virus detected 12/30/2020    Dependence on renal dialysis 4/18/2023    Disorder of kidney and ureter     Hyperkalemia 1/1/2021    Hyperlipidemia     Hypertension     Hypokalemia 6/23/2021    Immunosuppression 6/26/2021    Immunosuppression due to chronic steroid use 1/30/2020    Microcytic anemia 9/16/2016    Obesity (BMI 30-39.9) 1/23/2019    Oropharyngeal cancer     Pterygium     Squamous cell carcinoma 4/25/2018    Thromboembolic disorder     Unspecified disorder of kidney and ureter             OBJECTIVE:     Vital Signs (Most Recent)  Vitals:    09/03/24 1910 09/03/24 2320 09/04/24 0309 09/04/24 0738   BP: 137/69 126/73 121/60 (!) 171/83   BP Location: Left arm Left arm Left arm     Patient Position: Lying Lying Lying    Pulse: 72 77 66 71   Resp: 20 20 18 18   Temp: 97.5 °F (36.4 °C) 97.4 °F (36.3 °C) 97.5 °F (36.4 °C) 97.8 °F (36.6 °C)   TempSrc: Oral Oral Oral    SpO2: 97% 98% 98% 97%   Weight: 97.2 kg (214 lb 4.6 oz)      Height:                     Medications:   apixaban  2.5 mg Oral BID    ceFEPime IV (PEDS and ADULTS)  2 g Intravenous Q24H    finasteride  5 mg Oral Daily    predniSONE  5 mg Oral Daily    sirolimus  3 mg Oral Daily    sodium bicarbonate  1,950 mg Oral QID           Physical Exam  Vitals and nursing note reviewed.   Constitutional:       General: He is not in acute distress.     Appearance: He is not ill-appearing or diaphoretic.   HENT:      Head: Normocephalic and atraumatic.      Mouth/Throat:      Pharynx: No oropharyngeal exudate.   Eyes:      General: No scleral icterus.     Conjunctiva/sclera: Conjunctivae normal.      Pupils: Pupils are equal, round, and reactive to light.   Cardiovascular:      Rate and Rhythm: Normal rate and regular rhythm.      Heart sounds: Normal heart sounds. No murmur heard.  Pulmonary:      Effort: No respiratory distress.      Breath sounds: No rales.   Abdominal:      General: Bowel sounds are normal. There is no distension.      Palpations: Abdomen is soft.      Tenderness: There is no abdominal tenderness.   Musculoskeletal:         General: Normal range of motion.      Cervical back: Normal range of motion and neck supple.      Right lower leg: No edema.      Left lower leg: No edema.   Skin:     General: Skin is warm and dry.      Findings: No erythema.   Neurological:      Mental Status: He is alert and oriented to person, place, and time.      Cranial Nerves: No cranial nerve deficit.   Psychiatric:         Mood and Affect: Affect normal.         Cognition and Memory: Memory normal.         Judgment: Judgment normal.         Laboratory:  Recent Labs   Lab 09/02/24 2037 09/03/24  0701 09/04/24  0458   WBC 9.82 6.28 5.52   HGB  13.0* 10.2* 9.7*   HCT 40.8 32.4* 30.5*    202 204   MONO 7.7  0.8 6.7  0.4 9.8  0.5   EOSINOPHIL 0.2 0.0 0.9     Recent Labs   Lab 09/02/24 2037 09/03/24  0526 09/04/24  0457    138 140   K 4.1 4.1 3.9    110 110   CO2 18* 18* 22*   BUN 42* 33* 41*   CREATININE 4.8* 3.5* 4.2*   CALCIUM 10.5 8.4* 9.2   PHOS 3.2  --  3.3       Diagnostic Results:  X-Ray: Reviewed  US: Reviewed  Echo: Reviewed  ASSESSMENT/PLAN:     1. 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  -- Renally dose all meds  -- Please avoid nephrotoxins, including NSAIDs, aminoglycosides, IV contrast (unless absolutely necessary), gadolinium, fleets and other phosphorous-based laxatives. Caution with antibiotics.    Chronic Immunosuppression Therapy management   -- Sirolimus 3mg once a day     -- Prednisone 5 daily   -- check level      2. HTN  - now Hypotension    Hold BP meds      3. Anemia of chronic kidney disease    -- On PO iron   Recent Labs   Lab 09/02/24 2037 09/03/24  0701 09/04/24  0458   HGB 13.0* 10.2* 9.7*   HCT 40.8 32.4* 30.5*    202 204       Had low Iron - check levels again   Lab Results   Component Value Date    IRON 15 (L) 04/27/2024    TIBC 138 (L) 04/27/2024    FERRITIN 940 (H) 09/04/2024       4. MBD   - PTH elevated   Recent Labs   Lab 09/04/24  0457   CALCIUM 9.2   PHOS 3.3     Recent Labs   Lab 09/02/24 2037 09/04/24  0457   MG 2.3 2.2       Lab Results   Component Value Date    .8 (H) 04/30/2024    CALCIUM 9.2 09/04/2024    PHOS 3.3 09/04/2024     Lab Results   Component Value Date    XXCITGVC18NK 25 (L) 05/01/2024     Acidosis   -- Po  bicarbonate   Lab Results   Component Value Date    CO2 22 (L) 09/04/2024       5. Nutrition/Hypoalbuminemia   Recent Labs   Lab 09/02/24 2037 09/04/24  0457   ALBUMIN 3.6 2.4*     Nepro with meals TID.       Thank you for allowing me to participate in care of your patient  With any question please call   Ronda Hamm MD     [facility]  LESLY Sanchez MD,   OMD KIMBERLYN Colunga MD E. V. Harmon, NP  [id number]   [place], [address]  [phone]  After hours answering service: [phone]

## 2024-09-04 NOTE — PROGRESS NOTES
General Infectious Diseases: Follow up Note        Impression/Plan:  75yo man w/a history of HTN, HLD, ESRD (s/p iHD through DDRT in 2007; c/b graft CKD-IV; on maintenance sirolimus/pred), oropharyngeal carcinoma (s/p resection + XRT in 2015), VTE (on eliquis), hyperparathyroidism, and BPH who was admitted on 9/3/2024 with 2 days of dysuria, frequency, urgency, lower abdominal pain, poor appetite, and weakness over the past week due to a complicated UTI (pyuria per UA; cx with GBS). He notes dramatic improvement on antibiotics that can be tailored. Course c/b mild CHANG beyond baseline (CrCl ~15-20 currently).     - would stop cefepime  - will narrow coverage to cefazolin 1g IV q12h for now for GBS UTI  - plan to complete course at discharge with renally dosed amoxicillin for 10 day total course (stop date: 9/11/2024)  - management of immunosuppression and CHANG per nephrology team -- would consider transplant U/S with dopplers given CHANG      ID will follow the patient with you.     Rose Burris MD  [employer] ID Attending  [phone]    Subjective and Interval History:  No acute events. Feels much better. Eating well. Denies F/C/S, pain, dysuria. CHANG noted. GBS in urine cx      Objective:    Medications:  Antibiotics:   Antibiotics (From admission, onward)      Start     Stop Route Frequency Ordered    09/03/24 2300  ceFEPIme (MAXIPIME) 2 g in D5W 100 mL IVPB (MB+)         -- IV Every 24 hours (non-standard times) 09/02/24 2139            Physical Exam:  VS (24h):   Vitals:    09/04/24 1216   BP: (!) 155/92   Pulse: 74   Resp: 20   Temp: 98 °F (36.7 °C)     Temp:  [97.4 °F (36.3 °C)-98 °F (36.7 °C)]       Gen: Nontoxic, comfortable, no acute distress.    HEENT: PERRL. No scleral icterus. OP clear.  Pulmonary: Non labored. Clear to auscultation. No wheezing, crackles, or rhonchi.   Cardiac: Normal S1 & S2 w/o rubs/murmurs/gallops.   Abdomen: Normal bowel sounds. Soft, nontender, nondistended. No rebound or guarding. No  hepatosplenomegaly.  Extremities: No clubbing, cyanosis, or peripheral edema. Distal pulses symmetric.  Lymphatics: no preauricular, cervical LAD.  Musculoskeletal: no joint deformities or effusions.  Neurological:  Alert and oriented.  CN II-XII grossly intact. Gross motor intact x4. Sensation grossly intact.  Skin: No jaundice, rashes, or visible lesions.    Labs:  CBC:   Lab Results   Component Value Date    WBC 5.52 09/04/2024    WBC 6.28 09/03/2024    WBC 9.82 09/02/2024    WBC 6.10 08/15/2024    WBC 8.56 05/10/2024    HCT 30.5 (L) 09/04/2024     09/04/2024       BMP:   Recent Labs   Lab 09/04/24  0457   GLU 92      K 3.9      CO2 22*   BUN 41*   CREATININE 4.2*   CALCIUM 9.2   MG 2.2       LFT:   Lab Results   Component Value Date    ALT 25 09/02/2024    AST 32 09/02/2024    ALKPHOS 97 09/02/2024    BILITOT 0.4 09/02/2024         Microbiology:  9/2 blood cx NGTD  9/2 urine cx GBS        Diagnostic Results:  None

## 2024-09-04 NOTE — PLAN OF CARE
0800  Scheduled hosp[facility] appt with Dr Home Alexis (PCP) on 9/10/2024 at 1000 noted. Information added to the pt's discharge paperwork.        09/04/24 1030   Rounds   Attendance Nurse ;Provider   Discharge Plan A Home with family     1030  CM was informed by Dr Lao that the pt might be medically stable to discharge home today. Patient was admitted with CKD4 and UTI & continues to be followed by ID, neph, & dty. BUN 41, cr 4.2, & GFR 14 this AM. Awaiting neph recs.     1315  Message sent to Dr Lao questioning the pt's discharge status. Awaiting response.     1325  Previously scheduled appt with Dr Casey Mtz ([facility] neph) on 10/31/2024 at 1500 noted. Message sent to Dr Mtz'  requesting a sooner appointment. Patient will be notified of appt date & time if available.       Will continue to follow.

## 2024-09-04 NOTE — PLAN OF CARE
Scheduled medications given, IV fluids in progress. No problems or complaints overnight. Safety maintained, call light in reach.    Problem: Adult Inpatient Plan of Care  Goal: Plan of Care Review  Outcome: Progressing     Problem: Fall Injury Risk  Goal: Absence of Fall and Fall-Related Injury  Outcome: Progressing     Problem: UTI (Urinary Tract Infection)  Goal: Improved Infection Symptoms  Outcome: Progressing     Problem: Chronic Kidney Disease  Goal: Electrolyte Balance  Outcome: Progressing     Problem: Chronic Kidney Disease  Goal: Fluid Balance  Outcome: Progressing

## 2024-09-04 NOTE — PLAN OF CARE
Patient A&Ox4. IVF discontinued, kidney ultrasound done today. No c/o pain or discomfort. Call light within reach.

## 2024-09-05 VITALS
TEMPERATURE: 98 F | WEIGHT: 214.31 LBS | SYSTOLIC BLOOD PRESSURE: 175 MMHG | RESPIRATION RATE: 18 BRPM | DIASTOLIC BLOOD PRESSURE: 92 MMHG | BODY MASS INDEX: 30.68 KG/M2 | HEART RATE: 72 BPM | OXYGEN SATURATION: 94 % | HEIGHT: 70 IN

## 2024-09-05 LAB
ALBUMIN SERPL BCP-MCNC: 2.7 G/DL (ref 3.5–5.2)
ANION GAP SERPL CALC-SCNC: 9 MMOL/L (ref 8–16)
BASOPHILS # BLD AUTO: 0.02 K/UL (ref 0–0.2)
BASOPHILS NFR BLD: 0.4 % (ref 0–1.9)
BUN SERPL-MCNC: 41 MG/DL (ref 8–23)
CALCIUM SERPL-MCNC: 10.2 MG/DL (ref 8.7–10.5)
CHLORIDE SERPL-SCNC: 109 MMOL/L (ref 95–110)
CO2 SERPL-SCNC: 23 MMOL/L (ref 23–29)
CREAT SERPL-MCNC: 3.9 MG/DL (ref 0.5–1.4)
DIFFERENTIAL METHOD BLD: ABNORMAL
EOSINOPHIL # BLD AUTO: 0.1 K/UL (ref 0–0.5)
EOSINOPHIL NFR BLD: 1.5 % (ref 0–8)
ERYTHROCYTE [DISTWIDTH] IN BLOOD BY AUTOMATED COUNT: 15.1 % (ref 11.5–14.5)
EST. GFR  (NO RACE VARIABLE): 15 ML/MIN/1.73 M^2
GLUCOSE SERPL-MCNC: 97 MG/DL (ref 70–110)
HCT VFR BLD AUTO: 34.2 % (ref 40–54)
HGB BLD-MCNC: 10.9 G/DL (ref 14–18)
IMM GRANULOCYTES # BLD AUTO: 0.04 K/UL (ref 0–0.04)
IMM GRANULOCYTES NFR BLD AUTO: 0.7 % (ref 0–0.5)
LYMPHOCYTES # BLD AUTO: 1.4 K/UL (ref 1–4.8)
LYMPHOCYTES NFR BLD: 24.8 % (ref 18–48)
MCH RBC QN AUTO: 25.9 PG (ref 27–31)
MCHC RBC AUTO-ENTMCNC: 31.9 G/DL (ref 32–36)
MCV RBC AUTO: 81 FL (ref 82–98)
MONOCYTES # BLD AUTO: 0.5 K/UL (ref 0.3–1)
MONOCYTES NFR BLD: 9 % (ref 4–15)
NEUTROPHILS # BLD AUTO: 3.5 K/UL (ref 1.8–7.7)
NEUTROPHILS NFR BLD: 63.6 % (ref 38–73)
NRBC BLD-RTO: 0 /100 WBC
PHOSPHATE SERPL-MCNC: 2.7 MG/DL (ref 2.7–4.5)
PLATELET # BLD AUTO: 237 K/UL (ref 150–450)
PMV BLD AUTO: 10.8 FL (ref 9.2–12.9)
POTASSIUM SERPL-SCNC: 3.5 MMOL/L (ref 3.5–5.1)
RBC # BLD AUTO: 4.21 M/UL (ref 4.6–6.2)
SODIUM SERPL-SCNC: 141 MMOL/L (ref 136–145)
WBC # BLD AUTO: 5.44 K/UL (ref 3.9–12.7)

## 2024-09-05 PROCEDURE — 25000003 PHARM REV CODE 250: Performed by: INTERNAL MEDICINE

## 2024-09-05 PROCEDURE — 36415 COLL VENOUS BLD VENIPUNCTURE: CPT | Performed by: STUDENT IN AN ORGANIZED HEALTH CARE EDUCATION/TRAINING PROGRAM

## 2024-09-05 PROCEDURE — 63600175 PHARM REV CODE 636 W HCPCS: Performed by: STUDENT IN AN ORGANIZED HEALTH CARE EDUCATION/TRAINING PROGRAM

## 2024-09-05 PROCEDURE — 63600175 PHARM REV CODE 636 W HCPCS: Performed by: INTERNAL MEDICINE

## 2024-09-05 PROCEDURE — 80069 RENAL FUNCTION PANEL: CPT | Performed by: STUDENT IN AN ORGANIZED HEALTH CARE EDUCATION/TRAINING PROGRAM

## 2024-09-05 PROCEDURE — 80195 ASSAY OF SIROLIMUS: CPT | Performed by: STUDENT IN AN ORGANIZED HEALTH CARE EDUCATION/TRAINING PROGRAM

## 2024-09-05 PROCEDURE — 85025 COMPLETE CBC W/AUTO DIFF WBC: CPT | Performed by: INTERNAL MEDICINE

## 2024-09-05 RX ORDER — AMOXICILLIN 250 MG/1
500 CAPSULE ORAL EVERY 12 HOURS
Status: DISCONTINUED | OUTPATIENT
Start: 2024-09-05 | End: 2024-09-05 | Stop reason: HOSPADM

## 2024-09-05 RX ORDER — SODIUM BICARBONATE 650 MG/1
1950 TABLET ORAL 2 TIMES DAILY
Start: 2024-09-05

## 2024-09-05 RX ORDER — AMOXICILLIN 500 MG/1
500 CAPSULE ORAL EVERY 12 HOURS
Qty: 12 CAPSULE | Refills: 0 | Status: SHIPPED | OUTPATIENT
Start: 2024-09-05 | End: 2024-09-11

## 2024-09-05 RX ADMIN — DEXTROSE MONOHYDRATE 1 G: 50 INJECTION, SOLUTION INTRAVENOUS at 05:09

## 2024-09-05 RX ADMIN — APIXABAN 2.5 MG: 2.5 TABLET, FILM COATED ORAL at 09:09

## 2024-09-05 RX ADMIN — PREDNISONE 5 MG: 5 TABLET ORAL at 09:09

## 2024-09-05 RX ADMIN — SODIUM BICARBONATE 1950 MG: 650 TABLET ORAL at 12:09

## 2024-09-05 RX ADMIN — FINASTERIDE 5 MG: 5 TABLET, FILM COATED ORAL at 09:09

## 2024-09-05 RX ADMIN — SODIUM BICARBONATE 1950 MG: 650 TABLET ORAL at 09:09

## 2024-09-05 RX ADMIN — AMOXICILLIN 500 MG: 250 CAPSULE ORAL at 12:09

## 2024-09-05 RX ADMIN — SIROLIMUS 3 MG: 1 TABLET ORAL at 09:09

## 2024-09-05 NOTE — PROGRESS NOTES
General Infectious Diseases: Follow up Note        Impression/Plan:  73yo man w/a history of HTN, HLD, ESRD (s/p iHD through DDRT in 2007; c/b graft CKD-IV; on maintenance sirolimus/pred), oropharyngeal carcinoma (s/p resection + XRT in 2015), VTE (on eliquis), hyperparathyroidism, and BPH who was admitted on 9/3/2024 with 2 days of dysuria, frequency, urgency, lower abdominal pain, poor appetite, and weakness over the past week due to a complicated GBS UTI (pyuria per UA; cx with GBS). He notes dramatic improvement on antibiotics that can be tailored to oral agents today. Course c/b mild CHANG beyond baseline (CrCl ~15-20 glenroy) that has resolved.     - would stop cefazolin IV  - would transition to amoxicillin 500mg PO q12h for 10 day total course for complicated UTI in KTx pt (stop date: 9/11/2024)  - management of immunosuppression per nephrology team   - follow-up per primary team     ID will sign off. Thanks for the consult.     Rose Burris MD  [employer] ID Attending  [phone]    Subjective and Interval History:  No acute events. Feels much better. Eating well. Denies F/C/S, pain, dysuria. CHANG improved. Txpt doppler reassuring. GBS in urine cx.      Objective:    Medications:  Antibiotics:   Antibiotics (From admission, onward)      Start     Stop Route Frequency Ordered    09/05/24 1245  amoxicillin capsule 500 mg         -- Oral Every 12 hours 09/05/24 1141            Physical Exam:  VS (24h):   Vitals:    09/05/24 1129   BP: (!) 175/92   Pulse: 72   Resp: 18   Temp: 97.9 °F (36.6 °C)     Temp:  [97 °F (36.1 °C)-98.2 °F (36.8 °C)]       Gen: Nontoxic, comfortable, no acute distress.    HEENT: PERRL. No scleral icterus. OP clear.  Pulmonary: Non labored. Clear to auscultation. No wheezing, crackles, or rhonchi.   Cardiac: Normal S1 & S2 w/o rubs/murmurs/gallops.   Abdomen: Normal bowel sounds. Soft, nontender, nondistended. No rebound or guarding. No hepatosplenomegaly.  Extremities: No clubbing, cyanosis, or  peripheral edema. Distal pulses symmetric.  Lymphatics: no preauricular, cervical LAD.  Musculoskeletal: no joint deformities or effusions.  Neurological:  Alert and oriented.  CN II-XII grossly intact. Gross motor intact x4. Sensation grossly intact.  Skin: No jaundice, rashes, or visible lesions.    Labs:  CBC:   Lab Results   Component Value Date    WBC 5.44 09/05/2024    WBC 5.52 09/04/2024    WBC 6.28 09/03/2024    WBC 9.82 09/02/2024    WBC 6.10 08/15/2024    HCT 34.2 (L) 09/05/2024     09/05/2024       BMP:   Recent Labs   Lab 09/05/24  0945   GLU 97      K 3.5      CO2 23   BUN 41*   CREATININE 3.9*   CALCIUM 10.2       LFT:   Lab Results   Component Value Date    ALT 25 09/02/2024    AST 32 09/02/2024    ALKPHOS 97 09/02/2024    BILITOT 0.4 09/02/2024         Microbiology:  9/2 blood cx NGTD  9/2 urine cx GBS        Diagnostic Results:  Transplant doppler U/S: no vascular compromise

## 2024-09-05 NOTE — NURSING
Rapid Response Nurse  Note     Called for assistance with lab draw. CBC, Renal panel, Sirolimus level collected, labeled  and sent to lab. Nurse notified.

## 2024-09-05 NOTE — PLAN OF CARE
Problem: Adult Inpatient Plan of Care  Goal: Absence of Hospital-Acquired Illness or Injury  Outcome: Progressing     Problem: Adult Inpatient Plan of Care  Goal: Optimal Comfort and Wellbeing  Outcome: Progressing     Problem: Fall Injury Risk  Goal: Absence of Fall and Fall-Related Injury  Outcome: Progressing         POC reviewed with pt, following- VSS, NADN, pt resting quietly this shift. No falls or injuries noted, CB in reach at all times. Instructed to call for needs not met on rounds, v/u.

## 2024-09-05 NOTE — PLAN OF CARE
Virtual Nurse note: Patient chart, labs, and vitals reviewed. Care plan and goals updated as needed.       Problem: Adult Inpatient Plan of Care  Goal: Plan of Care Review  Outcome: Progressing

## 2024-09-05 NOTE — NURSING
AAO x 4.  Renal diet in place.  ABX administered per MAR, no adverse reactions noted.  PIV removed intact.  Blood draw completed.  Safety maintained.  Friends at bedside to transport home.  AVS printed/highlighted/handed to patient for review.  No tele monitor to disconnect from.  Belongings gathered.  Patient ready for transport to [facility].  Confirmed medication delivered is in patient's bag.

## 2024-09-05 NOTE — PROGRESS NOTES
Nephrology Progress Note       Consult Requested By: Kim Lao MD  Reason for Consult: CKD4 Kidney Transplant      SUBJECTIVE:         ?    Review of Systems   Constitutional:  Positive for malaise/fatigue and weight loss. Negative for chills and fever.   HENT:  Negative for congestion and sore throat.    Eyes:  Negative for blurred vision, double vision and photophobia.   Respiratory:  Negative for cough and shortness of breath.    Cardiovascular:  Negative for chest pain, palpitations and leg swelling.   Gastrointestinal:  Negative for abdominal pain, diarrhea, nausea and vomiting.   Genitourinary:  Negative for dysuria and urgency.   Musculoskeletal:  Negative for joint pain and myalgias.   Skin:  Negative for itching and rash.   Neurological:  Positive for weakness. Negative for dizziness, sensory change and headaches.   Endo/Heme/Allergies:  Negative for polydipsia. Does not bruise/bleed easily.   Psychiatric/Behavioral:  Negative for depression.        Past Medical History:   Diagnosis Date    Acute hypoxemic respiratory failure due to COVID-19 12/30/2020    Anticoagulant long-term use     BPH (benign prostatic hypertrophy)     Cancer     vocal cords    Claudication of right lower extremity 3/10/2016    COVID-19 virus detected 12/30/2020    Dependence on renal dialysis 4/18/2023    Disorder of kidney and ureter     Hyperkalemia 1/1/2021    Hyperlipidemia     Hypertension     Hypokalemia 6/23/2021    Immunosuppression 6/26/2021    Immunosuppression due to chronic steroid use 1/30/2020    Microcytic anemia 9/16/2016    Obesity (BMI 30-39.9) 1/23/2019    Oropharyngeal cancer     Pterygium     Squamous cell carcinoma 4/25/2018    Thromboembolic disorder     Unspecified disorder of kidney and ureter             OBJECTIVE:     Vital Signs (Most Recent)  Vitals:    09/04/24 2321 09/05/24 0318 09/05/24 0735 09/05/24 1129   BP: 130/70 (!) 149/81 (!) 171/83 (!) 175/92   BP Location:   Right arm Right arm    Patient Position:   Lying Lying   Pulse: 67 64 72 72   Resp: 19 19 20 18   Temp: 97.9 °F (36.6 °C) 98.2 °F (36.8 °C) 97.8 °F (36.6 °C) 97.9 °F (36.6 °C)   TempSrc:   Oral Oral   SpO2: 100% 98% 97% (!) 94%   Weight:       Height:             Date 09/05/24 0700 - 09/06/24 0659   Shift 0604-0309 3097-2710 8370-4409 24 Hour Total   INTAKE   Shift Total(mL/kg)       OUTPUT   Urine(mL/kg/hr) 800   800   Shift Total(mL/kg) 800(8.2)   800(8.2)   Weight (kg) 97.2 97.2 97.2 97.2           Medications:   amoxicillin  500 mg Oral Q12H    apixaban  2.5 mg Oral BID    finasteride  5 mg Oral Daily    predniSONE  5 mg Oral Daily    sirolimus  3 mg Oral Daily    sodium bicarbonate  1,950 mg Oral QID           Physical Exam  Vitals and nursing note reviewed.   Constitutional:       General: He is not in acute distress.     Appearance: He is not ill-appearing or diaphoretic.   HENT:      Head: Normocephalic and atraumatic.      Mouth/Throat:      Pharynx: No oropharyngeal exudate.   Eyes:      General: No scleral icterus.     Conjunctiva/sclera: Conjunctivae normal.      Pupils: Pupils are equal, round, and reactive to light.   Cardiovascular:      Rate and Rhythm: Normal rate and regular rhythm.      Heart sounds: Normal heart sounds. No murmur heard.  Pulmonary:      Effort: No respiratory distress.      Breath sounds: No rales.   Abdominal:      General: Bowel sounds are normal. There is no distension.      Palpations: Abdomen is soft.      Tenderness: There is no abdominal tenderness.   Musculoskeletal:         General: Normal range of motion.      Cervical back: Normal range of motion and neck supple.      Right lower leg: No edema.      Left lower leg: No edema.   Skin:     General: Skin is warm and dry.      Findings: No erythema.   Neurological:      Mental Status: He is alert and oriented to person, place, and time.      Cranial Nerves: No cranial nerve deficit.   Psychiatric:         Mood and Affect: Affect normal.          Cognition and Memory: Memory normal.         Judgment: Judgment normal.         Laboratory:  Recent Labs   Lab 09/03/24  0701 09/04/24  0458 09/05/24  0945   WBC 6.28 5.52 5.44   HGB 10.2* 9.7* 10.9*   HCT 32.4* 30.5* 34.2*    204 237   MONO 6.7  0.4 9.8  0.5 9.0  0.5   EOSINOPHIL 0.0 0.9 1.5     Recent Labs   Lab 09/02/24 2037 09/03/24 0526 09/04/24 0457 09/05/24  0945    138 140 141   K 4.1 4.1 3.9 3.5    110 110 109   CO2 18* 18* 22* 23   BUN 42* 33* 41* 41*   CREATININE 4.8* 3.5* 4.2* 3.9*   CALCIUM 10.5 8.4* 9.2 10.2   PHOS 3.2  --  3.3 2.7       Diagnostic Results:  X-Ray: Reviewed  US: Reviewed  Echo: Reviewed  ASSESSMENT/PLAN:     1. 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  -- Renally dose all meds  -- Please avoid nephrotoxins, including NSAIDs, aminoglycosides, IV contrast (unless absolutely necessary), gadolinium, fleets and other phosphorous-based laxatives. Caution with antibiotics.    Chronic Immunosuppression Therapy management   -- Sirolimus 3mg once a day     -- Prednisone 5 daily   -- check level      2. HTN  - now Hypotension    Hold BP meds      3. Anemia of chronic kidney disease    -- On PO iron   Recent Labs   Lab 09/03/24 0701 09/04/24 0458 09/05/24  0945   HGB 10.2* 9.7* 10.9*   HCT 32.4* 30.5* 34.2*    204 237         Iron -  levels    Lab Results   Component Value Date    IRON 48 09/04/2024    TIBC 99 (L) 09/04/2024    FERRITIN 940 (H) 09/04/2024       4. MBD   - PTH elevated   Recent Labs   Lab 09/05/24  0945   CALCIUM 10.2   PHOS 2.7     Recent Labs   Lab  09/02/24 2037 09/04/24 0457   MG 2.3 2.2       Lab Results   Component Value Date    .8 (H) 04/30/2024    CALCIUM 10.2 09/05/2024    PHOS 2.7 09/05/2024     Lab Results   Component Value Date    [id number] 25 (L) 05/01/2024     Acidosis   -- Po bicarbonate   Lab Results   Component Value Date    CO2 23 09/05/2024       5. Nutrition/Hypoalbuminemia   Recent Labs   Lab 09/04/24 0457 09/05/24 0945   ALBUMIN 2.4* 2.7*     Nepro with meals TID.       Thank you for allowing me to participate in care of your patient  With any question please call   Ronda Hamm MD     [facility]  LESLY Sanchez MD,   MD KIMBERLYN Vernon MD E. V. Harmon, NP  [id number]   [place], [address]  [phone]  After hours answering service: [phone]

## 2024-09-05 NOTE — CONSULTS
[facility] Surg  Adult Nutrition  Consult Note    SUMMARY     Recommendations    Recommendation:  1. Continue to encourage intake at meals as tolerated.   2. Monitor weight/labs.   3. Monitor need for supplement.   4. RD to continue to follow to monitor po intake    Goals:  Pt will tolerate diet with at least 75% intake at meals by RD follow up  Nutrition Goal Status: new  Communication of RD Recs: other (comment) (poc)    Assessment and Plan  Nutrition Problem  Inadequate energy intake    Related to (etiology):   dx    Signs and Symptoms (as evidenced by):   Weight loss     Interventions:  Collaboration with other providers  Recent decreased appetite    Nutrition Diagnosis Status:   New      Malnutrition Assessment  Malnutrition Context: acute illness or injury  Malnutrition Level: moderate  Micronutrient Evaluation Summary: suspected deficiency   Weight Loss (Malnutrition):  (5% x 6 months)  Energy Intake (Malnutrition): less than 75% for greater than 7 days   Orbital Region (Subcutaneous Fat Loss): well nourished  Upper Arm Region (Subcutaneous Fat Loss): well nourished  Thoracic and Lumbar Region: well nourished   [place] Region (Muscle Loss): well nourished  Clavicle Bone Region (Muscle Loss): well nourished  Clavicle and Acromion Bone Region (Muscle Loss): well nourished  Scapular Bone Region (Muscle Loss): well nourished  Dorsal Hand (Muscle Loss): well nourished  Patellar Region (Muscle Loss): well nourished  Anterior Thigh Region (Muscle Loss): well nourished  Posterior Calf Region (Muscle Loss): well nourished       Reason for Assessment  Reason For Assessment: consult  Diagnosis: renal disease  Relevant Medical History: HLD, HTN, BPH, oropharyngeal cancer, kidney trasnplant 2007, larynx surgery  Interdisciplinary Rounds: did not attend  General Information Comments: Admitted with poor appetite, fatigue, & weight loss. Pt on Renal diet with % intake at meals. Pt reports recent poor appetite but  "says it is much better now. Fredy 19-skin intact. Noted 14lb weight loss x 6 months. NFPE completed today -nourished.  Nutrition Discharge Planning: pt to d/c on Renal diet    Nutrition Risk Screen  Nutrition Risk Screen: no indicators present    Nutrition/Diet History  Food Preferences: no Christianity or cultural food prefs identified  Spiritual, Cultural Beliefs, Jehovah's witness Practices, Values that Affect Care: no  Food Allergies: NKFA  Factors Affecting Nutritional Intake: decreased appetite, nausea/vomiting    Anthropometrics  Temp: 97.2 °F (36.2 °C)  Height Method: Stated  Height: 5' 10" (177.8 cm)  Height (inches): 70 in  Weight Method: Bed Scale  Weight: 97.2 kg (214 lb 4.6 oz)  Weight (lb): 214.29 lb  Ideal Body Weight (IBW), Male: 166 lb  % Ideal Body Weight, Male (lb): 129.09 %  BMI (Calculated): 30.7  BMI Grade: 30 - 34.9- obesity - grade I  Weight Loss: unintentional (within 1 month)  Usual Body Weight (UBW), k kg (3/21)  % Usual Body Weight: 94.57  % Weight Change From Usual Weight: -5.63 %     Lab/Procedures/Meds  Pertinent Labs Reviewed: reviewed  Pertinent Labs Comments: BUN 41H, Crea 4.2H, Alb 2.4L  Pertinent Medications Reviewed: reviewed  Pertinent Medications Comments: prednisone, lactated ringers    Estimated/Assessed Needs  Weight Used For Calorie Calculations: 97.2 kg (214 lb 4.6 oz)  Energy Calorie Requirements (kcal): 2233  Energy Need Method: [place]-St Jeor (x1.3)  Protein Requirements: 77g (0.8g/kg)  Weight Used For Protein Calculations: 97.2 kg (214 lb 4.6 oz)  Fluid Requirements (mL): 1000+output or per md order  Estimated Fluid Requirement Method: RDA Method  RDA Method (mL): 2233  CHO Requirement: 250    Nutrition Prescription Ordered  Current Diet Order: Renal    Evaluation of Received Nutrient/Fluid Intake  I/O: 3492/1800  Energy Calories Required: meeting needs  Protein Required: meeting needs  Fluid Required: meeting needs  Comments: LBM: 2024  Tolerance: not " tolerating  % Intake of Estimated Energy Needs: 75 - 100 %  % Meal Intake: 75 - 100 %    Nutrition Risk  Level of Risk/Frequency of Follow-up: moderate (Follow up: 1-2x per week)     Monitor and Evaluation  Food and Nutrient Intake: energy intake, food and beverage intake  Food and Nutrient Adminstration: diet order  Knowledge/Beliefs/Attitudes: beliefs and attitudes  Physical Activity and Function: nutrition-related ADLs and IADLs, factors affecting access to physical activity  Anthropometric Measurements: body mass index, weight change, weight, height/length  Biochemical Data, Medical Tests and Procedures: electrolyte and renal panel, gastrointestinal profile, glucose/endocrine profile, inflammatory profile, lipid profile     Nutrition Related Social Determinants of Health: SDOH: Adequate food in home environment     Nutrition Follow-Up  RD Follow-up?: Yes

## 2024-09-05 NOTE — PLAN OF CARE
Discharge orders noted. Additional clinical references attached. Patient's discharge instructions given by bedside RN and reviewed by this VN with patient. Education provided on home care instructions, new and previous medications, diagnosis, when to seek medical attention, and follow-up appointments. New medications delivered by pharmacy. Patient verbalized understanding. Patient's family to provide ride/transportation home. All questions answered. Transport to [facility] requested. Floor nurse notified.

## 2024-09-05 NOTE — PLAN OF CARE
1050  Patient resting quietly in bed when CM rounded. No family present. Patient was admitted with CKD4 and UTI & continues to be followed by ID, neph, & dty. AM lab results pending. Pt eager to discharge home.        09/05/24 1100   Rounds   Attendance Nurse ;Provider   Discharge Plan A Home with family   Why the patient remains in the hospital Requires continued medical care     1100  CM was informed by Dr Lao that the pt might be medically stable to discharge pending AM lab results.     1225   BUN 41, cr 3.9, & GFR 15 this AM.     1315  DC order noted. Patient resting quietly in bed with brother Gene Manzo ([phone]), & a friend at the bedisde when CM rounded via [employer]. Patient in agreement with plan to discharge home today, denied the need for assistance with transportation at time of discharge, & verbalized understanding regarding the hospital follow up appointments with Dr Alexis (PCP) & Dr Mtz (neph).     Message sent to nurse aIm, charge nurse Jackeline, & virtual nurse Melissa informing that the pt is cleared to discharge.       Will continue to follow.

## 2024-09-05 NOTE — PLAN OF CARE
Recommendation:  1. Continue to encourage intake at meals as tolerated.   2. Monitor weight/labs.   3. Monitor need for supplement.   4. RD to continue to follow to monitor po intake    Goals:  Pt will tolerate diet with at least 75% intake at meals by RD follow up  Nutrition Goal Status: new

## 2024-09-06 LAB — SIROLIMUS BLD-MCNC: 15.5 NG/ML (ref 4–20)

## 2024-09-06 NOTE — PLAN OF CARE
[facility] - Med Surg  Discharge Final Note    Primary Care Provider: Home Alexis MD    Expected Discharge Date: 9/5/2024    Final Discharge Note (most recent)       Final Note - 09/06/24 0734          Final Note    Assessment Type Final Discharge Note (P)      Anticipated Discharge Disposition Home or Self Care (P)      Hospital Resources/Appts/Education Provided Appointments scheduled and added to AVS (P)                      Contact Info       Home Alexis MD   Specialty: Internal Medicine   Relationship: PCP - General    [address] [place] [id number]   Phone: [phone]       Next Steps: Follow up on 9/10/2024    Instructions: at 10:00 AM; PCP hospital follow up appointment    [facility] - Nephrology [facility]   Specialty: Nephrology    [address]  [facility] [address]   Phone: [phone]       Next Steps: Follow up on 10/31/2024    Instructions: at 3:00pm; previously scheduled nephrology appointment; patient will be notified if a sooner appointment becomes availalbe

## 2024-09-08 LAB
BACTERIA BLD CULT: NORMAL
BACTERIA BLD CULT: NORMAL

## 2024-09-08 NOTE — DISCHARGE SUMMARY
[facility] Medicine  Discharge Summary      Patient Name: Jose Luis Manzo  MRN: [id number]  CANDIDA: [id number]  Patient Class: IP- Inpatient  Admission Date: 9/2/2024  Hospital Length of Stay: 3 days  Discharge Date and Time: 9/5/2024  2:56 PM  Attending Physician: No att. providers found   Discharging Provider: Silvino Gonsalez MD  Primary Care Provider: Home Alexis MD    Primary Care Team: Networked reference to record PCT     HPI:   75 Y/O M Renal transplant on immunosuppresive medications presents with poor appetite, N/V poor oral intake, afebrile, yet has dysuria, lower abdominal pain 7/10 acute on chronic , aching, without localization. Progressive over past 2 days   No hx of sick contact or travel     * No surgery found *      Hospital Course:   9/3/24 UCx Pending, ID rec cefepime 2g q24  9/4/24 UCx GBS, switch abx to cefazolin per ID, CHANG worsening     Goals of Care Treatment Preferences:  Code Status: Full Code      SDOH Screening:  The patient was screened for utility difficulties, food insecurity, transport difficulties, housing insecurity, and interpersonal safety and there were no concerns identified this admission.     Consults:   Consults (From admission, onward)          Status Ordering Provider     Inpatient consult to Registered Dietitian/Nutritionist  Once        Provider:  (Not yet assigned)    Completed SILVINO GONSALEZ     Infectious Diseases  Once        Provider:  (Not yet assigned)    Completed OREN RUBIO            Renal/  Complicated UTI (urinary tract infection)  On immunosuppressive therapy for renal transplant  UCx +GBS  Renal dosed Cefepime-->cefazolin-->renally-dosed amoxicillin to complete 10-day course per ID recs    CHANG (acute kidney injury)  History of renal transplant, on immunosuppressive therapy with prednisone and sirolimus  CHANG up to BUN/Cre 42/4.8-->41/3.9, improved but not at baseline  Close PCP and Nephrology follow up      Final Active  Diagnoses:    Diagnosis Date Noted POA    PRINCIPAL PROBLEM:  Stage 4 chronic kidney disease [N18.4] 10/04/2011 Yes    Moderate malnutrition [E44.0] 09/03/2024 Unknown    Complicated UTI (urinary tract infection) [N39.0] 09/02/2024 Yes    Class 1 obesity due to excess calories with serious comorbidity and body mass index (BMI) of 34.0 to 34.9 in adult [E66.09, Z68.34] 04/18/2023 Not Applicable    Metabolic acidosis [E87.20] 06/23/2021 Yes    Proteinuria [R80.9] 06/23/2021 Yes    Immunosuppressive management encounter following kidney transplant [Z79.899, Z94.0] 01/04/2021 Not Applicable    CHANG (acute kidney injury) [N17.9] 12/30/2020 Yes    Renal transplant recipient [Z94.0] 03/10/2016 Not Applicable    Essential hypertension [I10] 03/13/2015 Yes      Problems Resolved During this Admission:       Discharged Condition: stable    Disposition: Home or Self Care    Follow Up:   Follow-up Information       Home Alexis MD Follow up on 9/10/2024.    Specialty: Internal Medicine  Why: at 10:00 AM; PCP hospital follow up appointment  Contact information:  [address] [place] [id number] [phone]               [facility] - Nephrology [facility] Follow up on 10/31/2024.    Specialty: Nephrology  Why: at 3:00pm; previously scheduled nephrology appointment; patient will be notified if a sooner appointment becomes availalbe  Contact information:  [address]  [facility] [address] [phone]  Additional information:  Nephrology - Main Building, 5th Floor   Please park in [facility] and take Clinic elevator                         Patient Instructions:      Diet renal     Notify your health care provider if you experience any of the following:  increased confusion or weakness     Notify your health care provider if you experience any of the following:  persistent dizziness, light-headedness, or visual disturbances     Notify your health care provider if you experience any of the following:   "worsening rash     Notify your health care provider if you experience any of the following:  severe persistent headache     Notify your health care provider if you experience any of the following:  difficulty breathing or increased cough     Notify your health care provider if you experience any of the following:  severe uncontrolled pain     Notify your health care provider if you experience any of the following:  redness, tenderness, or signs of infection (pain, swelling, redness, odor or green/yellow discharge around incision site)     Notify your health care provider if you experience any of the following:  persistent nausea and vomiting or diarrhea     Notify your health care provider if you experience any of the following:  temperature >100.4     Activity as tolerated       Significant Diagnostic Studies: Labs: BMP: No results for input(s): "GLU", "NA", "K", "CL", "CO2", "BUN", "CREATININE", "CALCIUM", "MG" in the last 48 hours.    Pending Diagnostic Studies:       None           Medications:  Reconciled Home Medications:      Medication List        START taking these medications      amoxicillin 500 MG capsule  Commonly known as: AMOXIL  Take 1 capsule (500 mg total) by mouth every 12 (twelve) hours. for 6 days            CHANGE how you take these medications      sodium bicarbonate 650 MG tablet  Take 3 tablets (1,950 mg total) by mouth 2 (two) times daily.  What changed: when to take this            CONTINUE taking these medications      apixaban 2.5 mg Tab  Commonly known as: ELIQUIS  Take 1 tablet (2.5 mg total) by mouth 2 (two) times daily.     atorvastatin 20 MG tablet  Commonly known as: LIPITOR  Take 1 tablet (20 mg total) by mouth every evening.     cinacalcet 30 MG Tab  Commonly known as: SENSIPAR  Take 1 tablet (30 mg total) by mouth daily with breakfast.     cloNIDine 0.3 mg/24 hr td ptwk 0.3 mg/24 hr  Commonly known as: CATAPRES  Place 1 patch onto the skin once a week.     ferrous sulfate 325 mg " (65 mg iron) Tab tablet  Commonly known as: FEOSOL  Take 325 mg by mouth once daily.     finasteride 5 mg tablet  Commonly known as: PROSCAR  Take 1 tablet (5 mg total) by mouth once daily.     fluticasone propionate 50 mcg/actuation nasal spray  Commonly known as: FLONASE  1 spray (50 mcg total) by Each Nostril route nightly.     furosemide 40 MG tablet  Commonly known as: LASIX  Take 1 tablet (40 mg total) by mouth once daily.     hydrALAZINE 100 MG tablet  Commonly known as: APRESOLINE  Take 1 tablet (100 mg total) by mouth every 8 (eight) hours.     metoprolol succinate 100 MG 24 hr tablet  Commonly known as: TOPROL-XL  Take 1 tablet (100 mg total) by mouth once daily.     NIFEdipine 30 MG (OSM) 24 hr tablet  Commonly known as: PROCARDIA-XL  Take 1 tablet (30 mg total) by mouth once daily.     predniSONE 5 MG tablet  Commonly known as: DELTASONE  Take 1 tablet (5 mg total) by mouth once daily.     sirolimus 1 MG Tab  Commonly known as: RAPAMUNE  Take 3 tablets (3 mg total) by mouth once daily.     vitamin renal formula (B-complex-vitamin c-folic acid) 1 mg Cap  Commonly known as: NEPHROCAP  Take 1 capsule by mouth once daily.            ASK your doctor about these medications      ergocalciferol 50,000 unit Cap  Commonly known as: ERGOCALCIFEROL  Take 1 capsule (50,000 Units total) by mouth every 7 days.              Indwelling Lines/Drains at time of discharge:   Lines/Drains/Airways       None                   Time spent on the discharge of patient: 35 minutes         Kim Lao MD  Department of Hospital Medicine  [facility]

## 2024-09-08 NOTE — ASSESSMENT & PLAN NOTE
History of renal transplant, on immunosuppressive therapy with prednisone and sirolimus  CHANG up to BUN/Cre 42/4.8-->41/3.9, improved but not at baseline  Close PCP and Nephrology follow up

## 2024-09-08 NOTE — ASSESSMENT & PLAN NOTE
On immunosuppressive therapy for renal transplant  UCx +GBS  Renal dosed Cefepime-->cefazolin-->renally-dosed amoxicillin to complete 10-day course per ID recs

## 2024-09-10 ENCOUNTER — OFFICE VISIT (OUTPATIENT)
Dept: FAMILY MEDICINE | Facility: CLINIC | Age: 74
End: 2024-09-10
Payer: MEDICARE

## 2024-09-10 VITALS
SYSTOLIC BLOOD PRESSURE: 134 MMHG | OXYGEN SATURATION: 98 % | TEMPERATURE: 98 F | BODY MASS INDEX: 31.66 KG/M2 | DIASTOLIC BLOOD PRESSURE: 84 MMHG | HEART RATE: 78 BPM | WEIGHT: 221.13 LBS | HEIGHT: 70 IN

## 2024-09-10 DIAGNOSIS — N18.4 STAGE 4 CHRONIC KIDNEY DISEASE: ICD-10-CM

## 2024-09-10 DIAGNOSIS — N40.1 BENIGN PROSTATIC HYPERPLASIA WITH NOCTURIA: ICD-10-CM

## 2024-09-10 DIAGNOSIS — Z94.0 RENAL TRANSPLANT RECIPIENT: ICD-10-CM

## 2024-09-10 DIAGNOSIS — R35.1 BENIGN PROSTATIC HYPERPLASIA WITH NOCTURIA: ICD-10-CM

## 2024-09-10 DIAGNOSIS — E78.2 MIXED HYPERLIPIDEMIA: ICD-10-CM

## 2024-09-10 DIAGNOSIS — Z09 HOSPITAL DISCHARGE FOLLOW-UP: Primary | ICD-10-CM

## 2024-09-10 DIAGNOSIS — I10 ESSENTIAL HYPERTENSION: ICD-10-CM

## 2024-09-10 RX ORDER — HYDRALAZINE HYDROCHLORIDE 100 MG/1
100 TABLET, FILM COATED ORAL EVERY 8 HOURS
Qty: 270 TABLET | Refills: 3 | Status: SHIPPED | OUTPATIENT
Start: 2024-09-10

## 2024-09-10 NOTE — Clinical Note
Hi, I just saw this patient for hospital follow up. He was admitted with a UTI.  Just wanted you to be aware incase you wanted to see him before the end of October.

## 2024-09-11 NOTE — PROGRESS NOTES
Transitional Care Note  Subjective:       Patient ID: Jose Luis Manzo is a 74 y.o. male.  Chief Complaint: Hospital Follow Up    Family and/or Caretaker present at visit?  Yes.  Diagnostic tests reviewed/disposition: I have reviewed all completed as well as pending diagnostic tests at the time of discharge.  Disease/illness education: yes  Home health/community services discussion/referrals: Patient does not have home health established from hospital visit.  They do not need home health.  If needed, we will set up home health for the patient.   Establishment or re-establishment of referral orders for community resources: No other necessary community resources.   Discussion with other health care providers:  Message sent to nephrologist Dr. Mtz to make him aware of recent hospital admission.    HPI  Patient here accompanied by brother for hospital discharge follow-up.  He was admitted with complicated urinary tract infection.  He is currently taking antibiotics.  He denies problems with urination.  He denies burning with urination.  He is currently feeling well.  He reports having a good appetite.    Review of Systems   Constitutional:  Negative for fever.   HENT:  Negative for sneezing and sore throat.    Eyes:  Negative for photophobia.   Respiratory:  Negative for cough, shortness of breath and wheezing.    Cardiovascular:  Negative for chest pain.   Gastrointestinal:  Negative for diarrhea, nausea and vomiting.   Genitourinary:  Negative for frequency.   Musculoskeletal:  Negative for joint swelling.   Skin:  Negative for rash.   Neurological:  Negative for dizziness and headaches.   Psychiatric/Behavioral:  Negative for hallucinations.        Objective:      Physical Exam  Vitals and nursing note reviewed.   Constitutional:       General: He is not in acute distress.     Appearance: He is obese. He is not ill-appearing.   HENT:      Head: Normocephalic and atraumatic.      Right Ear: External ear normal.       Left Ear: External ear normal.      Nose: Nose normal.      Mouth/Throat:      Mouth: Mucous membranes are moist.   Eyes:      Extraocular Movements: Extraocular movements intact.      Conjunctiva/sclera: Conjunctivae normal.   Cardiovascular:      Rate and Rhythm: Normal rate and regular rhythm.      Pulses: Normal pulses.      Heart sounds: No murmur heard.  Pulmonary:      Effort: Pulmonary effort is normal. No respiratory distress.      Breath sounds: No wheezing.   Abdominal:      General: There is no distension.      Palpations: Abdomen is soft. There is no mass.      Tenderness: There is no abdominal tenderness.   Musculoskeletal:         General: No swelling.      Cervical back: Normal range of motion.   Skin:     Coloration: Skin is not jaundiced.      Findings: No rash.   Neurological:      General: No focal deficit present.      Mental Status: He is alert and oriented to person, place, and time.   Psychiatric:         Mood and Affect: Mood normal.         Thought Content: Thought content normal.         Assessment:       1. Hospital discharge follow-up    2. Stage 4 chronic kidney disease    3. Benign prostatic hyperplasia with nocturia    4. Renal transplant recipient    5. Essential hypertension    6. Mixed hyperlipidemia        Plan:       Medication refilled  Complete antibiotics   Follow up with nephrology

## 2024-09-30 ENCOUNTER — LAB VISIT (OUTPATIENT)
Dept: LAB | Facility: HOSPITAL | Age: 74
End: 2024-09-30
Attending: STUDENT IN AN ORGANIZED HEALTH CARE EDUCATION/TRAINING PROGRAM
Payer: MEDICARE

## 2024-09-30 DIAGNOSIS — N18.4 TYPE 2 DIABETES MELLITUS WITH STAGE 4 CHRONIC KIDNEY DISEASE, WITHOUT LONG-TERM CURRENT USE OF INSULIN: ICD-10-CM

## 2024-09-30 DIAGNOSIS — E11.22 TYPE 2 DIABETES MELLITUS WITH STAGE 4 CHRONIC KIDNEY DISEASE, WITHOUT LONG-TERM CURRENT USE OF INSULIN: ICD-10-CM

## 2024-09-30 DIAGNOSIS — N18.4 STAGE 4 CHRONIC KIDNEY DISEASE: ICD-10-CM

## 2024-09-30 DIAGNOSIS — E78.2 MIXED HYPERLIPIDEMIA: ICD-10-CM

## 2024-09-30 DIAGNOSIS — I10 ESSENTIAL HYPERTENSION: ICD-10-CM

## 2024-09-30 LAB
ALBUMIN SERPL BCP-MCNC: 3.9 G/DL (ref 3.5–5.2)
ALP SERPL-CCNC: 83 U/L (ref 38–126)
ALT SERPL W/O P-5'-P-CCNC: 15 U/L (ref 10–44)
ANION GAP SERPL CALC-SCNC: 10 MMOL/L (ref 8–16)
AST SERPL-CCNC: 20 U/L (ref 15–46)
BASOPHILS # BLD AUTO: 0.02 K/UL (ref 0–0.2)
BASOPHILS NFR BLD: 0.4 % (ref 0–1.9)
BILIRUB SERPL-MCNC: 0.3 MG/DL (ref 0.1–1)
CALCIUM SERPL-MCNC: 10.3 MG/DL (ref 8.7–10.5)
CHLORIDE SERPL-SCNC: 109 MMOL/L (ref 95–110)
CHOLEST SERPL-MCNC: 146 MG/DL (ref 120–199)
CHOLEST/HDLC SERPL: 3.2 {RATIO} (ref 2–5)
CO2 SERPL-SCNC: 22 MMOL/L (ref 23–29)
CREAT SERPL-MCNC: 3.65 MG/DL (ref 0.5–1.4)
DIFFERENTIAL METHOD BLD: ABNORMAL
EOSINOPHIL # BLD AUTO: 0.1 K/UL (ref 0–0.5)
EOSINOPHIL NFR BLD: 2.9 % (ref 0–8)
ERYTHROCYTE [DISTWIDTH] IN BLOOD BY AUTOMATED COUNT: 16.4 % (ref 11.5–14.5)
EST. GFR  (NO RACE VARIABLE): 16.7 ML/MIN/1.73 M^2
ESTIMATED AVG GLUCOSE: 126 MG/DL (ref 68–131)
GLUCOSE SERPL-MCNC: 103 MG/DL (ref 70–110)
HBA1C MFR BLD: 6 % (ref 4–5.6)
HCT VFR BLD AUTO: 34.9 % (ref 40–54)
HDLC SERPL-MCNC: 45 MG/DL (ref 40–75)
HDLC SERPL: 30.8 % (ref 20–50)
HGB BLD-MCNC: 10.6 G/DL (ref 14–18)
IMM GRANULOCYTES # BLD AUTO: 0.03 K/UL (ref 0–0.04)
IMM GRANULOCYTES NFR BLD AUTO: 0.6 % (ref 0–0.5)
LDLC SERPL CALC-MCNC: 82.6 MG/DL (ref 63–159)
LYMPHOCYTES # BLD AUTO: 1.5 K/UL (ref 1–4.8)
LYMPHOCYTES NFR BLD: 30.5 % (ref 18–48)
MCH RBC QN AUTO: 25.5 PG (ref 27–31)
MCHC RBC AUTO-ENTMCNC: 30.4 G/DL (ref 32–36)
MCV RBC AUTO: 84 FL (ref 82–98)
MONOCYTES # BLD AUTO: 0.8 K/UL (ref 0.3–1)
MONOCYTES NFR BLD: 16.6 % (ref 4–15)
NEUTROPHILS # BLD AUTO: 2.4 K/UL (ref 1.8–7.7)
NEUTROPHILS NFR BLD: 49 % (ref 38–73)
NONHDLC SERPL-MCNC: 101 MG/DL
NRBC BLD-RTO: 0 /100 WBC
PLATELET # BLD AUTO: 167 K/UL (ref 150–450)
PMV BLD AUTO: 10.8 FL (ref 9.2–12.9)
POTASSIUM SERPL-SCNC: 4.2 MMOL/L (ref 3.5–5.1)
PROT SERPL-MCNC: 7.4 G/DL (ref 6–8.4)
RBC # BLD AUTO: 4.15 M/UL (ref 4.6–6.2)
SODIUM SERPL-SCNC: 141 MMOL/L (ref 136–145)
T4 FREE SERPL-MCNC: 0.82 NG/DL (ref 0.71–1.51)
TRIGL SERPL-MCNC: 92 MG/DL (ref 30–150)
TSH SERPL DL<=0.005 MIU/L-ACNC: 6.56 UIU/ML (ref 0.4–4)
UUN UR-MCNC: 36 MG/DL (ref 2–20)
WBC # BLD AUTO: 4.88 K/UL (ref 3.9–12.7)

## 2024-09-30 PROCEDURE — 80061 LIPID PANEL: CPT | Performed by: STUDENT IN AN ORGANIZED HEALTH CARE EDUCATION/TRAINING PROGRAM

## 2024-09-30 PROCEDURE — 83036 HEMOGLOBIN GLYCOSYLATED A1C: CPT | Performed by: STUDENT IN AN ORGANIZED HEALTH CARE EDUCATION/TRAINING PROGRAM

## 2024-09-30 PROCEDURE — 84439 ASSAY OF FREE THYROXINE: CPT | Performed by: STUDENT IN AN ORGANIZED HEALTH CARE EDUCATION/TRAINING PROGRAM

## 2024-09-30 PROCEDURE — 36415 COLL VENOUS BLD VENIPUNCTURE: CPT | Mod: PN | Performed by: STUDENT IN AN ORGANIZED HEALTH CARE EDUCATION/TRAINING PROGRAM

## 2024-09-30 PROCEDURE — 85025 COMPLETE CBC W/AUTO DIFF WBC: CPT | Mod: PN | Performed by: STUDENT IN AN ORGANIZED HEALTH CARE EDUCATION/TRAINING PROGRAM

## 2024-09-30 PROCEDURE — 80053 COMPREHEN METABOLIC PANEL: CPT | Mod: PN | Performed by: STUDENT IN AN ORGANIZED HEALTH CARE EDUCATION/TRAINING PROGRAM

## 2024-09-30 PROCEDURE — 84443 ASSAY THYROID STIM HORMONE: CPT | Mod: PN | Performed by: STUDENT IN AN ORGANIZED HEALTH CARE EDUCATION/TRAINING PROGRAM

## 2024-10-02 ENCOUNTER — TELEPHONE (OUTPATIENT)
Dept: VASCULAR SURGERY | Facility: CLINIC | Age: 74
End: 2024-10-02
Payer: MEDICARE

## 2024-10-02 DIAGNOSIS — N18.4 STAGE 4 CHRONIC KIDNEY DISEASE: Primary | ICD-10-CM

## 2024-10-02 NOTE — TELEPHONE ENCOUNTER
Contacted pt to schedule RUE AVG insertions vs AVF creation. Nurse notified pt that nurse has attempted to contact him several times to schedule but has not received return call. Pt states this is probably because he does not answer calls from telephone numbers he doesn't know and that he is still interested in surgery. Offered next available date of 10/22/24, pt accepted. While attempting to review pre-op instructions pt stated he was not able to hear nurse well [place] and that he doesn't have a pen or paper to write instructions. Asked pt if he would like nurse to call tomorrow to review instructions. Pt states he will answer call if he recognizes number. Asked pt if he would like nurse to send message via [facility], pt agreed. Before ending call nurse explained to pt that he must hold Eliquis for two days prior to surgery with last dose being Saturday 10/19/24 and that someone must be available to drive him home after surgery. Pt verbalized understanding. Message sent to pt via [facility] as discussed.

## 2024-10-09 ENCOUNTER — OFFICE VISIT (OUTPATIENT)
Dept: FAMILY MEDICINE | Facility: CLINIC | Age: 74
End: 2024-10-09
Payer: MEDICARE

## 2024-10-09 VITALS
WEIGHT: 217.81 LBS | TEMPERATURE: 98 F | HEIGHT: 70 IN | OXYGEN SATURATION: 97 % | DIASTOLIC BLOOD PRESSURE: 80 MMHG | BODY MASS INDEX: 31.18 KG/M2 | HEART RATE: 71 BPM | SYSTOLIC BLOOD PRESSURE: 126 MMHG

## 2024-10-09 DIAGNOSIS — E11.22 TYPE 2 DIABETES MELLITUS WITH STAGE 4 CHRONIC KIDNEY DISEASE, WITHOUT LONG-TERM CURRENT USE OF INSULIN: ICD-10-CM

## 2024-10-09 DIAGNOSIS — R73.9 HYPERGLYCEMIA: ICD-10-CM

## 2024-10-09 DIAGNOSIS — I10 ESSENTIAL HYPERTENSION: Primary | ICD-10-CM

## 2024-10-09 DIAGNOSIS — Z23 NEED FOR INFLUENZA VACCINATION: ICD-10-CM

## 2024-10-09 DIAGNOSIS — N40.1 BENIGN PROSTATIC HYPERPLASIA WITH NOCTURIA: ICD-10-CM

## 2024-10-09 DIAGNOSIS — N18.4 STAGE 4 CHRONIC KIDNEY DISEASE: ICD-10-CM

## 2024-10-09 DIAGNOSIS — E78.2 MIXED HYPERLIPIDEMIA: ICD-10-CM

## 2024-10-09 DIAGNOSIS — R35.1 BENIGN PROSTATIC HYPERPLASIA WITH NOCTURIA: ICD-10-CM

## 2024-10-09 DIAGNOSIS — N18.4 TYPE 2 DIABETES MELLITUS WITH STAGE 4 CHRONIC KIDNEY DISEASE, WITHOUT LONG-TERM CURRENT USE OF INSULIN: ICD-10-CM

## 2024-10-09 PROCEDURE — G0008 ADMIN INFLUENZA VIRUS VAC: HCPCS | Mod: S$GLB,,, | Performed by: STUDENT IN AN ORGANIZED HEALTH CARE EDUCATION/TRAINING PROGRAM

## 2024-10-09 PROCEDURE — 1126F AMNT PAIN NOTED NONE PRSNT: CPT | Mod: CPTII,S$GLB,, | Performed by: STUDENT IN AN ORGANIZED HEALTH CARE EDUCATION/TRAINING PROGRAM

## 2024-10-09 PROCEDURE — 3288F FALL RISK ASSESSMENT DOCD: CPT | Mod: CPTII,S$GLB,, | Performed by: STUDENT IN AN ORGANIZED HEALTH CARE EDUCATION/TRAINING PROGRAM

## 2024-10-09 PROCEDURE — 1159F MED LIST DOCD IN RCRD: CPT | Mod: CPTII,S$GLB,, | Performed by: STUDENT IN AN ORGANIZED HEALTH CARE EDUCATION/TRAINING PROGRAM

## 2024-10-09 PROCEDURE — 3079F DIAST BP 80-89 MM HG: CPT | Mod: CPTII,S$GLB,, | Performed by: STUDENT IN AN ORGANIZED HEALTH CARE EDUCATION/TRAINING PROGRAM

## 2024-10-09 PROCEDURE — 1160F RVW MEDS BY RX/DR IN RCRD: CPT | Mod: CPTII,S$GLB,, | Performed by: STUDENT IN AN ORGANIZED HEALTH CARE EDUCATION/TRAINING PROGRAM

## 2024-10-09 PROCEDURE — 3044F HG A1C LEVEL LT 7.0%: CPT | Mod: CPTII,S$GLB,, | Performed by: STUDENT IN AN ORGANIZED HEALTH CARE EDUCATION/TRAINING PROGRAM

## 2024-10-09 PROCEDURE — 90653 IIV ADJUVANT VACCINE IM: CPT | Mod: S$GLB,,, | Performed by: STUDENT IN AN ORGANIZED HEALTH CARE EDUCATION/TRAINING PROGRAM

## 2024-10-09 PROCEDURE — 3074F SYST BP LT 130 MM HG: CPT | Mod: CPTII,S$GLB,, | Performed by: STUDENT IN AN ORGANIZED HEALTH CARE EDUCATION/TRAINING PROGRAM

## 2024-10-09 PROCEDURE — 1101F PT FALLS ASSESS-DOCD LE1/YR: CPT | Mod: CPTII,S$GLB,, | Performed by: STUDENT IN AN ORGANIZED HEALTH CARE EDUCATION/TRAINING PROGRAM

## 2024-10-09 PROCEDURE — 3066F NEPHROPATHY DOC TX: CPT | Mod: CPTII,S$GLB,, | Performed by: STUDENT IN AN ORGANIZED HEALTH CARE EDUCATION/TRAINING PROGRAM

## 2024-10-09 PROCEDURE — 3062F POS MACROALBUMINURIA REV: CPT | Mod: CPTII,S$GLB,, | Performed by: STUDENT IN AN ORGANIZED HEALTH CARE EDUCATION/TRAINING PROGRAM

## 2024-10-09 PROCEDURE — 4010F ACE/ARB THERAPY RXD/TAKEN: CPT | Mod: CPTII,S$GLB,, | Performed by: STUDENT IN AN ORGANIZED HEALTH CARE EDUCATION/TRAINING PROGRAM

## 2024-10-09 PROCEDURE — 3008F BODY MASS INDEX DOCD: CPT | Mod: CPTII,S$GLB,, | Performed by: STUDENT IN AN ORGANIZED HEALTH CARE EDUCATION/TRAINING PROGRAM

## 2024-10-09 PROCEDURE — 99214 OFFICE O/P EST MOD 30 MIN: CPT | Mod: S$GLB,,, | Performed by: STUDENT IN AN ORGANIZED HEALTH CARE EDUCATION/TRAINING PROGRAM

## 2024-10-09 RX ORDER — NIFEDIPINE 30 MG/1
30 TABLET, EXTENDED RELEASE ORAL DAILY
Qty: 90 TABLET | Refills: 3 | Status: SHIPPED | OUTPATIENT
Start: 2024-10-09

## 2024-10-09 RX ORDER — CINACALCET 30 MG/1
30 TABLET, FILM COATED ORAL
Qty: 90 TABLET | Refills: 3 | Status: SHIPPED | OUTPATIENT
Start: 2024-10-09

## 2024-10-13 NOTE — PROGRESS NOTES
Patient ID: Jose Luis Manzo is a 74 y.o. male.     Chief Complaint: Follow-up    Follow-up  Pertinent negatives include no chest pain, coughing, fever, headaches, myalgias, nausea, rash, vomiting or weakness.      Patient here for follow up    CKD with history of kidney transplant- he is avoiding nephrotoxic agents    HTN- blood pressure remains at goal <130/80    HLD- denies chest pain and shortness of breath      Review of Systems  Review of Systems   Constitutional:  Negative for fever.   HENT:  Negative for ear pain and sinus pain.    Eyes:  Negative for discharge.   Respiratory:  Negative for cough and shortness of breath.    Cardiovascular:  Negative for chest pain and leg swelling.   Gastrointestinal:  Negative for diarrhea, nausea and vomiting.   Genitourinary:  Negative for urgency.   Musculoskeletal:  Negative for myalgias.   Skin:  Negative for rash.   Neurological:  Negative for weakness and headaches.   Psychiatric/Behavioral:  Negative for depression.    All other systems reviewed and are negative.      Currently Medications  Current Outpatient Medications on File Prior to Visit   Medication Sig Dispense Refill    apixaban (ELIQUIS) 2.5 mg Tab Take 1 tablet (2.5 mg total) by mouth 2 (two) times daily. 180 tablet 3    atorvastatin (LIPITOR) 20 MG tablet Take 1 tablet (20 mg total) by mouth every evening. 90 tablet 3    cloNIDine 0.3 mg/24 hr td ptwk (CATAPRES) 0.3 mg/24 hr Place 1 patch onto the skin once a week. 12 patch 10    ferrous sulfate 325 mg (65 mg iron) Tab tablet Take 325 mg by mouth once daily.      finasteride (PROSCAR) 5 mg tablet Take 1 tablet (5 mg total) by mouth once daily. 90 tablet 3    furosemide (LASIX) 40 MG tablet Take 1 tablet (40 mg total) by mouth once daily. 90 tablet 3    hydrALAZINE (APRESOLINE) 100 MG tablet Take 1 tablet (100 mg total) by mouth every 8 (eight) hours. 270 tablet 3    metoprolol succinate (TOPROL-XL) 100 MG 24 hr tablet Take 1 tablet (100 mg total) by  "mouth once daily. 90 tablet 3    predniSONE (DELTASONE) 5 MG tablet Take 1 tablet (5 mg total) by mouth once daily. 90 tablet 3    sirolimus (RAPAMUNE) 1 MG Tab Take 3 tablets (3 mg total) by mouth once daily.      sodium bicarbonate 650 MG tablet Take 3 tablets (1,950 mg total) by mouth 2 (two) times daily.      vitamin renal formula, B-complex-vitamin c-folic acid, (NEPHROCAP) 1 mg Cap Take 1 capsule by mouth once daily. 30 each 3    ergocalciferol (ERGOCALCIFEROL) 50,000 unit Cap Take 1 capsule (50,000 Units total) by mouth every 7 days. (Patient not taking: Reported on 10/9/2024) 12 capsule 3    fluticasone propionate (FLONASE) 50 mcg/actuation nasal spray 1 spray (50 mcg total) by Each Nostril route nightly. (Patient not taking: Reported on 10/9/2024) 10 mL 5    [DISCONTINUED] sildenafiL (VIAGRA) 50 MG tablet Take 1 tablet (50 mg total) by mouth daily as needed for Erectile Dysfunction. 9 tablet 4     No current facility-administered medications on file prior to visit.       Physical  Exam  Vitals:    10/09/24 1109   BP: 126/80   BP Location: Left arm   Patient Position: Sitting   Pulse: 71   Temp: 98.1 °F (36.7 °C)   SpO2: 97%   Weight: 98.8 kg (217 lb 13 oz)   Height: 5' 10" (1.778 m)      Body mass index is 31.25 kg/m².  Wt Readings from Last 3 Encounters:   10/09/24 98.8 kg (217 lb 13 oz)   09/10/24 100.3 kg (221 lb 1.9 oz)   09/04/24 97.2 kg (214 lb 4.6 oz)       Physical Exam  Vitals and nursing note reviewed.   Constitutional:       General: He is not in acute distress.     Appearance: He is obese. He is not ill-appearing.   HENT:      Head: Normocephalic and atraumatic.      Right Ear: External ear normal.      Left Ear: External ear normal.      Nose: Nose normal.      Mouth/Throat:      Mouth: Mucous membranes are moist.   Eyes:      Extraocular Movements: Extraocular movements intact.      Conjunctiva/sclera: Conjunctivae normal.   Cardiovascular:      Rate and Rhythm: Normal rate and regular rhythm. "      Pulses: Normal pulses.      Heart sounds: No murmur heard.  Pulmonary:      Effort: Pulmonary effort is normal. No respiratory distress.      Breath sounds: No wheezing.   Abdominal:      General: There is no distension.      Palpations: Abdomen is soft. There is no mass.      Tenderness: There is no abdominal tenderness.   Musculoskeletal:         General: No swelling.      Cervical back: Normal range of motion.   Skin:     Coloration: Skin is not jaundiced.      Findings: No rash.   Neurological:      General: No focal deficit present.      Mental Status: He is alert and oriented to person, place, and time.   Psychiatric:         Mood and Affect: Mood normal.         Thought Content: Thought content normal.         Labs:    Complete Blood Count  Lab Results   Component Value Date    RBC 4.15 (L) 09/30/2024    HGB 10.6 (L) 09/30/2024    HCT 34.9 (L) 09/30/2024    MCV 84 09/30/2024    MCH 25.5 (L) 09/30/2024    MCHC 30.4 (L) 09/30/2024    RDW 16.4 (H) 09/30/2024     09/30/2024    MPV 10.8 09/30/2024    GRAN 2.4 09/30/2024    GRAN 49.0 09/30/2024    LYMPH 1.5 09/30/2024    LYMPH 30.5 09/30/2024    MONO 0.8 09/30/2024    MONO 16.6 (H) 09/30/2024    EOS 0.1 09/30/2024    BASO 0.02 09/30/2024    EOSINOPHIL 2.9 09/30/2024    BASOPHIL 0.4 09/30/2024    DIFFMETHOD Automated 09/30/2024       Comprehensive Metabolic Panel  Lab Results   Component Value Date     09/30/2024    BUN 36 (H) 09/30/2024    CREATININE 3.65 (H) 09/30/2024     09/30/2024    K 4.2 09/30/2024     09/30/2024    PROT 7.4 09/30/2024    ALBUMIN 3.9 09/30/2024    BILITOT 0.3 09/30/2024    AST 20 09/30/2024    ALKPHOS 83 09/30/2024    CO2 22 (L) 09/30/2024    ALT 15 09/30/2024    ANIONGAP 10 09/30/2024       TSH  Lab Results   Component Value Date    TSH 6.560 (H) 09/30/2024       Imaging:  US Transplant Kidney With Doppler  Narrative: EXAMINATION:  US TRANSPLANT KIDNEY WITH DOPPLER    CLINICAL  HISTORY:  CHANG;    FINDINGS:  Transplant measures 9.0 cm.  Right renal artery iliac ratio is 1.0.  Resistive indices range from 0.67-0.76.  MRA max is 121 cm/second, iliac artery is 119 cm/second.  MRV is patent.  There is no hydronephrosis.  Impression: Normal Doppler interrogation of the transplant.    Normal anatomic appearance of the transplant.  No significant abnormality seen.    Electronically signed by: Stan Zhang MD  Date:    09/04/2024  Time:    12:58      Assessment/Plan:    1. Essential hypertension  -     NIFEdipine (PROCARDIA-XL) 30 MG (OSM) 24 hr tablet; Take 1 tablet (30 mg total) by mouth once daily.  Dispense: 90 tablet; Refill: 3  -     CBC Auto Differential; Future  -     Comprehensive metabolic panel; Future; Expected date: 10/09/2024  -     LIPID PANEL; Future; Expected date: 10/09/2024  -     TSH; Future; Expected date: 10/09/2024    2. Need for influenza vaccination  -     influenza (adjuvanted) (Fluad) 45 mcg/0.5 mL IM vaccine (> or = 64 yo) 0.5 mL    3. Stage 4 chronic kidney disease  Overview:  Overview:   updated diagnosis code & description  dx update    Orders:  -     cinacalcet (SENSIPAR) 30 MG Tab; Take 1 tablet (30 mg total) by mouth daily with breakfast.  Dispense: 90 tablet; Refill: 3  -     LIPID PANEL; Future; Expected date: 10/09/2024    4. Hyperglycemia  -     HEMOGLOBIN A1C; Future; Expected date: 10/09/2024    5. Mixed hyperlipidemia    6. Benign prostatic hyperplasia with nocturia    7. Type 2 diabetes mellitus with stage 4 chronic kidney disease, without long-term current use of insulin         Discussed how to stay healthy including: diet, exercise, refraining from smoking and discussed screening exams / tests needed for age, sex and family Hx.    RTC 3 mo with labs    Home Alexis MD

## 2024-10-18 ENCOUNTER — TELEPHONE (OUTPATIENT)
Dept: FAMILY MEDICINE | Facility: CLINIC | Age: 74
End: 2024-10-18
Payer: MEDICARE

## 2024-10-18 DIAGNOSIS — Z94.0 RENAL TRANSPLANT RECIPIENT: ICD-10-CM

## 2024-10-18 DIAGNOSIS — J43.9 PULMONARY EMPHYSEMA, UNSPECIFIED EMPHYSEMA TYPE: ICD-10-CM

## 2024-10-18 RX ORDER — PREDNISONE 5 MG/1
5 TABLET ORAL DAILY
Qty: 90 TABLET | Refills: 3 | Status: SHIPPED | OUTPATIENT
Start: 2024-10-18

## 2024-10-18 NOTE — TELEPHONE ENCOUNTER
----- Message from Aris sent at 10/18/2024 12:39 PM CDT -----  Contact: pt  Type: Requesting refill         Who Called: PT  Regarding: predniSONE (DELTASONE) 5 MG tablet 90 tablet   Sig - Route: Take 1 tablet (5 mg total) by mouth once daily. - Oral  Sent to pharmacy as: predniSONE (DELTASONE) 5 MG tablet    Would the patient rather a call back or a response via [facility]? Call back  Best Call Back Number: [phone]   Additional Information: [facility] [address] [place] [address] [address] [facility]   Phone: [phone]  Fax: [phone]

## 2024-10-21 ENCOUNTER — ANESTHESIA EVENT (OUTPATIENT)
Dept: SURGERY | Facility: HOSPITAL | Age: 74
End: 2024-10-21
Payer: MEDICARE

## 2024-10-21 ENCOUNTER — TELEPHONE (OUTPATIENT)
Dept: VASCULAR SURGERY | Facility: CLINIC | Age: 74
End: 2024-10-21
Payer: MEDICARE

## 2024-10-21 NOTE — TELEPHONE ENCOUNTER
Pt confirms last dose of Eliquis was taken Saturday 10/19/24 in preparation for surgery with Dr. Navarro tomorrow.

## 2024-10-22 ENCOUNTER — ANESTHESIA (OUTPATIENT)
Dept: SURGERY | Facility: HOSPITAL | Age: 74
End: 2024-10-22
Payer: MEDICARE

## 2024-10-22 ENCOUNTER — HOSPITAL ENCOUNTER (OUTPATIENT)
Facility: HOSPITAL | Age: 74
Discharge: HOME | End: 2024-10-22
Attending: SURGERY | Admitting: SURGERY
Payer: MEDICARE

## 2024-10-22 VITALS
OXYGEN SATURATION: 97 % | TEMPERATURE: 98 F | HEART RATE: 47 BPM | BODY MASS INDEX: 32.21 KG/M2 | RESPIRATION RATE: 19 BRPM | WEIGHT: 225 LBS | HEIGHT: 70 IN | SYSTOLIC BLOOD PRESSURE: 138 MMHG | DIASTOLIC BLOOD PRESSURE: 77 MMHG

## 2024-10-22 DIAGNOSIS — I73.9 CLAUDICATION OF RIGHT LOWER EXTREMITY: ICD-10-CM

## 2024-10-22 DIAGNOSIS — N18.4 STAGE 4 CHRONIC KIDNEY DISEASE: Primary | ICD-10-CM

## 2024-10-22 DIAGNOSIS — Z99.2 HEMODIALYSIS ACCESS, AV GRAFT: ICD-10-CM

## 2024-10-22 LAB — POCT GLUCOSE: 97 MG/DL (ref 70–110)

## 2024-10-22 PROCEDURE — 36000707: Performed by: SURGERY

## 2024-10-22 PROCEDURE — 71000044 HC DOSC ROUTINE RECOVERY FIRST HOUR: Performed by: SURGERY

## 2024-10-22 PROCEDURE — 82962 GLUCOSE BLOOD TEST: CPT | Performed by: SURGERY

## 2024-10-22 PROCEDURE — 71000015 HC POSTOP RECOV 1ST HR: Performed by: SURGERY

## 2024-10-22 PROCEDURE — 63600175 PHARM REV CODE 636 W HCPCS: Performed by: NURSE ANESTHETIST, CERTIFIED REGISTERED

## 2024-10-22 PROCEDURE — 27201423 OPTIME MED/SURG SUP & DEVICES STERILE SUPPLY: Performed by: SURGERY

## 2024-10-22 PROCEDURE — 63600175 PHARM REV CODE 636 W HCPCS: Performed by: STUDENT IN AN ORGANIZED HEALTH CARE EDUCATION/TRAINING PROGRAM

## 2024-10-22 PROCEDURE — 25000003 PHARM REV CODE 250

## 2024-10-22 PROCEDURE — 36000706: Performed by: SURGERY

## 2024-10-22 PROCEDURE — 37000009 HC ANESTHESIA EA ADD 15 MINS: Performed by: SURGERY

## 2024-10-22 PROCEDURE — 36821 AV FUSION DIRECT ANY SITE: CPT | Mod: RT,,, | Performed by: SURGERY

## 2024-10-22 PROCEDURE — 63600175 PHARM REV CODE 636 W HCPCS

## 2024-10-22 PROCEDURE — 37000008 HC ANESTHESIA 1ST 15 MINUTES: Performed by: SURGERY

## 2024-10-22 RX ORDER — LIDOCAINE HYDROCHLORIDE 20 MG/ML
INJECTION INTRAVENOUS
Status: DISCONTINUED | OUTPATIENT
Start: 2024-10-22 | End: 2024-10-22

## 2024-10-22 RX ORDER — DEXTROMETHORPHAN HYDROBROMIDE, GUAIFENESIN 5; 100 MG/5ML; MG/5ML
650 LIQUID ORAL EVERY 8 HOURS
COMMUNITY
Start: 2024-10-22

## 2024-10-22 RX ORDER — PROTAMINE SULFATE 10 MG/ML
INJECTION, SOLUTION INTRAVENOUS
Status: DISCONTINUED | OUTPATIENT
Start: 2024-10-22 | End: 2024-10-22

## 2024-10-22 RX ORDER — CEFAZOLIN 2 G/1
2 INJECTION, POWDER, FOR SOLUTION INTRAMUSCULAR; INTRAVENOUS
Status: COMPLETED | OUTPATIENT
Start: 2024-10-22 | End: 2024-10-22

## 2024-10-22 RX ORDER — MIDAZOLAM HYDROCHLORIDE 1 MG/ML
.5-4 INJECTION, SOLUTION INTRAMUSCULAR; INTRAVENOUS
Status: DISCONTINUED | OUTPATIENT
Start: 2024-10-22 | End: 2024-10-22 | Stop reason: HOSPADM

## 2024-10-22 RX ORDER — FENTANYL CITRATE 50 UG/ML
25-200 INJECTION, SOLUTION INTRAMUSCULAR; INTRAVENOUS
Status: DISCONTINUED | OUTPATIENT
Start: 2024-10-22 | End: 2024-10-22 | Stop reason: HOSPADM

## 2024-10-22 RX ORDER — LIDOCAINE HYDROCHLORIDE 10 MG/ML
1 INJECTION, SOLUTION EPIDURAL; INFILTRATION; INTRACAUDAL; PERINEURAL ONCE
Status: DISCONTINUED | OUTPATIENT
Start: 2024-10-22 | End: 2024-10-22 | Stop reason: HOSPADM

## 2024-10-22 RX ORDER — MUPIROCIN 20 MG/G
OINTMENT TOPICAL
Status: DISCONTINUED | OUTPATIENT
Start: 2024-10-22 | End: 2024-10-22 | Stop reason: HOSPADM

## 2024-10-22 RX ORDER — SODIUM CHLORIDE 0.9 % (FLUSH) 0.9 %
10 SYRINGE (ML) INJECTION
Status: DISCONTINUED | OUTPATIENT
Start: 2024-10-22 | End: 2024-10-22 | Stop reason: HOSPADM

## 2024-10-22 RX ORDER — OXYCODONE HYDROCHLORIDE 5 MG/1
5 TABLET ORAL EVERY 4 HOURS PRN
Qty: 8 TABLET | Refills: 0 | Status: SHIPPED | OUTPATIENT
Start: 2024-10-22

## 2024-10-22 RX ORDER — GLUCAGON 1 MG
1 KIT INJECTION
Status: DISCONTINUED | OUTPATIENT
Start: 2024-10-22 | End: 2024-10-22 | Stop reason: HOSPADM

## 2024-10-22 RX ORDER — FENTANYL CITRATE 50 UG/ML
25 INJECTION, SOLUTION INTRAMUSCULAR; INTRAVENOUS EVERY 5 MIN PRN
Status: DISCONTINUED | OUTPATIENT
Start: 2024-10-22 | End: 2024-10-22 | Stop reason: HOSPADM

## 2024-10-22 RX ORDER — HEPARIN SODIUM 1000 [USP'U]/ML
INJECTION, SOLUTION INTRAVENOUS; SUBCUTANEOUS
Status: DISCONTINUED | OUTPATIENT
Start: 2024-10-22 | End: 2024-10-22

## 2024-10-22 RX ORDER — HEPARIN SODIUM 200 [USP'U]/100ML
INJECTION, SOLUTION INTRAVENOUS
Status: DISCONTINUED | OUTPATIENT
Start: 2024-10-22 | End: 2024-10-22 | Stop reason: HOSPADM

## 2024-10-22 RX ORDER — PROPOFOL 10 MG/ML
VIAL (ML) INTRAVENOUS
Status: DISCONTINUED | OUTPATIENT
Start: 2024-10-22 | End: 2024-10-22

## 2024-10-22 RX ORDER — SODIUM CHLORIDE 0.9 % (FLUSH) 0.9 %
3 SYRINGE (ML) INJECTION
Status: DISCONTINUED | OUTPATIENT
Start: 2024-10-22 | End: 2024-10-22 | Stop reason: HOSPADM

## 2024-10-22 RX ORDER — PROCHLORPERAZINE EDISYLATE 5 MG/ML
5 INJECTION INTRAMUSCULAR; INTRAVENOUS EVERY 30 MIN PRN
Status: DISCONTINUED | OUTPATIENT
Start: 2024-10-22 | End: 2024-10-22 | Stop reason: HOSPADM

## 2024-10-22 RX ADMIN — MUPIROCIN: 20 OINTMENT TOPICAL at 01:10

## 2024-10-22 RX ADMIN — LIDOCAINE HYDROCHLORIDE 100 MG: 20 INJECTION INTRAVENOUS at 01:10

## 2024-10-22 RX ADMIN — PROPOFOL 50 MCG/KG/MIN: 10 INJECTION, EMULSION INTRAVENOUS at 01:10

## 2024-10-22 RX ADMIN — MIDAZOLAM HYDROCHLORIDE 1 MG: 2 INJECTION, SOLUTION INTRAMUSCULAR; INTRAVENOUS at 01:10

## 2024-10-22 RX ADMIN — MEPIVACAINE HYDROCHLORIDE 45 ML: 15 INJECTION, SOLUTION EPIDURAL; INFILTRATION at 02:10

## 2024-10-22 RX ADMIN — PROTAMINE SULFATE 30 MG: 10 INJECTION, SOLUTION INTRAVENOUS at 03:10

## 2024-10-22 RX ADMIN — PROPOFOL 20 MG: 10 INJECTION, EMULSION INTRAVENOUS at 01:10

## 2024-10-22 RX ADMIN — PROPOFOL 20 MG: 10 INJECTION, EMULSION INTRAVENOUS at 02:10

## 2024-10-22 RX ADMIN — CEFAZOLIN 2 G: 2 INJECTION, POWDER, FOR SOLUTION INTRAMUSCULAR; INTRAVENOUS at 02:10

## 2024-10-22 RX ADMIN — HEPARIN SODIUM 4000 UNITS: 1000 INJECTION, SOLUTION INTRAVENOUS; SUBCUTANEOUS at 02:10

## 2024-10-22 NOTE — PLAN OF CARE
Chart reviewed, preop completed, safe surgery checklist completed pending updated H&P, surgical site ibrahima. Patients family & friend at bedside, belongings to be placed in locker. Call light in reach, bed low & locked.

## 2024-10-22 NOTE — PLAN OF CARE
Discharge instructions given and explained to patient and family with verbalized understanding. VSS.  Denies N/V, tolerating PO fluids. Rates pain level as tolerable. IV removed. Waiting for bedside pharmacy to deliver medication.

## 2024-10-22 NOTE — H&P
"FOCUSED SURGICAL H&P    Jose Luis Manzo is a 74 y.o. male. MRN is [id number].    CC: Here today for the following surgical procedure(s):  CREATION, AV FISTULA (Right: Arm)    HPI: For a detailed history of the patients history of present illness please refer to the last progress note. In brief, this is a 74 y.o. male with a known history of chronic kidney disease, here today for surgical creation of an AV fistula in anticipation for hemodialysis. There has been no recent changes in the patients health, including fevers, chest pain, or shortness of breath, and no new medications have been started. The patient has not had anything to eat or drink for the last 8 hours. The patient has held his Eliquis.       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    COVID-19 virus detected 12/30/2020    Dependence on renal dialysis 4/18/2023    Disorder of kidney and ureter     Hyperkalemia 1/1/2021    Hyperlipidemia     Hypertension     Hypokalemia 6/23/2021    Immunosuppression 6/26/2021    Immunosuppression due to chronic steroid use 1/30/2020    Microcytic anemia 9/16/2016    Obesity (BMI 30-39.9) 1/23/2019    Oropharyngeal cancer     Pterygium     Squamous cell carcinoma 4/25/2018    Thromboembolic disorder     Unspecified disorder of kidney and ureter        Past Surgical History:   Past Surgical History:   Procedure Laterality Date    CYSTOSCOPY W/ RETROGRADES Bilateral 6/15/2022    Procedure: Cystoscopy, bilateral retrograde pyelogram, and all indicated procedures;  Surgeon: Veronica Bacon MD;  Location: [facility];  Service: Urology;  Laterality: Bilateral;    FRACTURE SURGERY Left     "lower leg" 40 years ago    KIDNEY TRANSPLANT  2007    followed by [facility] Transplant    LARYNX SURGERY  11/2014    " Oropharyngeal Cancer x3    microlaryngoscopy      PHLEBOGRAPHY Bilateral 8/2/2022    Procedure: Venogram;  Surgeon: KENIA Mueller II, MD;  Location: [facility] CATH LAB;  Service: Vascular;  Laterality: Bilateral;    SURGICAL REMOVAL OF LESION OF ORBIT Bilateral 8/26/2020    Procedure: EXCISION, LESION, ORBIT;  Surgeon: Linda Tee MD;  Location: [facility] OR [facility] FLR;  Service: Ophthalmology;  Laterality: Bilateral;    TIBIAL STAPLING Left 1980            Social History:   Social History     Socioeconomic History    Marital status: Single    Number of children: 1   Tobacco Use    Smoking status: Former     Current packs/day: 0.00     Average packs/day: 1 pack/day for 20.0 years (20.0 ttl pk-yrs)     Types: Cigarettes     Start date: 1989     Quit date: 2009     Years since quitting: 15.8     Passive exposure: Past    Smokeless tobacco: Never   Substance and Sexual Activity    Alcohol use: No     Alcohol/week: 0.0 standard drinks of alcohol    Drug use: No     Types: Marijuana     Comment: Occ.    Sexual activity: Not Currently     Partners: Female     Social Drivers of Health     Financial Resource Strain: Low Risk  (9/3/2024)    Overall Financial Resource Strain (CARDIA)     Difficulty of Paying Living Expenses: Not hard at all   Food Insecurity: No Food Insecurity (9/3/2024)    Hunger Vital Sign     Worried About Running Out of Food in the Last Year: Never true     Ran Out of Food in the Last Year: Never true   Transportation Needs: No Transportation Needs (9/3/2024)    TRANSPORTATION NEEDS     Transportation : No   Physical Activity: Inactive (9/3/2024)    Exercise Vital Sign     Days of Exercise per Week: 0 days     Minutes of Exercise per Session: 0 min   Stress: No Stress Concern Present (9/3/2024)    [employer] - Occupational Stress Questionnaire     Feeling of Stress : Not at all   Housing Stability: Low Risk  (9/3/2024)    Housing Stability Vital Sign     Unable to Pay for Housing  "in the Last Year: No     Homeless in the Last Year: No       Family History:   Family History   Problem Relation Name Age of Onset    Stroke Mother      Diabetes Mother      Hypertension Mother      Stroke Father      No Known Problems Sister      No Known Problems Brother x1     No Known Problems Daughter      No Known Problems Brother x1           Allergies:  Review of patient's allergies indicates:  No Known Allergies      Medications:  No current facility-administered medications for this encounter.                  Vital Signs:  There were no vitals filed for this visit.      Physical Exam:  Neuro: awake, alert, no acute distress.  HEENT: PERRLA, neck supple, no lymphadenopathy.  Heart: regular rate/rhythm  Lungs: equal chest expansion bilaterally, no increased work of breathing on RA  Abdomen: soft, non-distended, non-tender to palpation.  Extremities: warm, well-perfused       Labs:  Lab Results   Component Value Date/Time    WBC 4.88 09/30/2024 07:58 AM    HGB 10.6 (L) 09/30/2024 07:58 AM    HCT 34.9 (L) 09/30/2024 07:58 AM     09/30/2024 07:58 AM    MCV 84 09/30/2024 07:58 AM     Lab Results   Component Value Date/Time     09/30/2024 07:58 AM    K 4.2 09/30/2024 07:58 AM     09/30/2024 07:58 AM    CO2 22 (L) 09/30/2024 07:58 AM    BUN 36 (H) 09/30/2024 07:58 AM    BUN 32 (H) 03/06/2015 07:45 AM     09/30/2024 07:58 AM    MG 2.2 09/04/2024 04:57 AM    PHOS 2.7 09/05/2024 09:45 AM     Lab Results   Component Value Date/Time    INR 1.0 09/14/2017 08:50 AM     No components found for: "TROPI"  Lab Results   Component Value Date/Time    ALT 15 09/30/2024 07:58 AM    AST 20 09/30/2024 07:58 AM    AST 24 01/18/2016 07:31 AM          Assessment/Plan:  74 y.o. male here today for the following surgical procedure:    CREATION, AV FISTULA (Right: Arm)       The indications for surgery, highlighting the risks and benefits of the procedure were discussed with the patient. These included but are " not limited to swelling, bleeding, pain, infection, and adverse anesthesia-related event. I also discussed risk of injury to nearby structures. The patient seems to understand the risks, as well as the alternatives including nonoperative observation/survellience and wishes to proceed with the surgical intervention.    Patient has been examined, consented, and marked for laterality.    Plan is to proceed to the OR.    Maribell Olsen,   PGY-1

## 2024-10-22 NOTE — ANESTHESIA PREPROCEDURE EVALUATION
[facility]  Anesthesia Pre-Operative Evaluation         Patient Name/: Jose Luis Manzo, 1950  MRN: [id number]    SUBJECTIVE:     Pre-operative evaluation for Procedure(s) (LRB):  CREATION, AV FISTULA (Right)     10/22/2024    Jose Luis Manzo is a 74 y.o. male     Patient now presents for the above procedure(s).    ________________________________________  Results for orders placed during the hospital encounter of 24    Echo    Interpretation Summary    Left Ventricle: The left ventricle is normal in size. Normal wall thickness. There is concentric hypertrophy. Unable to assess wall motion. There is normal systolic function. Biplane (2D) method of discs ejection fraction is 65%.    Right Ventricle: Right ventricle was not well visualized due to poor acoustic window. Normal right ventricular cavity size. Systolic function is normal.    Tricuspid Valve: There is mild regurgitation.    Pulmonary Artery: The estimated pulmonary artery systolic pressure is 28 mmHg.    IVC/SVC: Normal venous pressure at 3 mmHg.    Pericardium: There is a small effusion.    Overall the study quality was technically difficult.    ________________________________________    LDA:        Drips:       Patient Active Problem List   Diagnosis    Stage 4 chronic kidney disease    Essential hypertension    Mixed hyperlipidemia    Benign prostatic hyperplasia with nocturia    Claudication of right lower extremity    Eye swelling, bilateral    Diminished night vision    Renal transplant recipient    Dermolipoma    History of DVT (deep vein thrombosis)    Iron deficiency anemia    Pulmonary nodules    Larynx cancer    Voice disturbance    Atherosclerosis of aorta    Carcinoma in situ of vocal cord    Dysphagia, pharyngoesophageal    Chondronecrosis of larynx    Squamous cell carcinoma    Obesity (BMI 30-39.9)    Pterygium of both eyes    History of cancer of larynx    Nodule of left lung    Granulomatous lung disease     Transplanted kidney - 2007    Hypertensive kidney disease with stage 4 chronic kidney disease    Hypercalcemia    Secondary hyperparathyroidism of renal origin    Benign tumor of orbit    CHANG (acute kidney injury)    Metabolic bone disease    Immunosuppressive management encounter following kidney transplant    Hyperphosphatemia    Vitamin D deficiency disease    History of COVID-19    SOB (shortness of breath)    Metabolic acidosis    Proteinuria    Anticoagulant long-term use    Pre-op evaluation    Benign prostatic hyperplasia with lower urinary tract symptoms    Centrilobular emphysema    Dependence on renal dialysis    Class 1 obesity due to excess calories with serious comorbidity and body mass index (BMI) of 34.0 to 34.9 in adult    Former cigarette smoker    Deformity of toenail    Complicated UTI (urinary tract infection)    Moderate malnutrition       Review of patient's allergies indicates:  No Known Allergies    Current Inpatient Medications:       No current facility-administered medications on file prior to encounter.     Current Outpatient Medications on File Prior to Encounter   Medication Sig Dispense Refill    apixaban (ELIQUIS) 2.5 mg Tab Take 1 tablet (2.5 mg total) by mouth 2 (two) times daily. 180 tablet 3    atorvastatin (LIPITOR) 20 MG tablet Take 1 tablet (20 mg total) by mouth every evening. 90 tablet 3    cloNIDine 0.3 mg/24 hr td ptwk (CATAPRES) 0.3 mg/24 hr Place 1 patch onto the skin once a week. 12 patch 10    ergocalciferol (ERGOCALCIFEROL) 50,000 unit Cap Take 1 capsule (50,000 Units total) by mouth every 7 days. (Patient not taking: Reported on 10/9/2024) 12 capsule 3    ferrous sulfate 325 mg (65 mg iron) Tab tablet Take 325 mg by mouth once daily.      finasteride (PROSCAR) 5 mg tablet Take 1 tablet (5 mg total) by mouth once daily. 90 tablet 3    fluticasone propionate (FLONASE) 50 mcg/actuation nasal spray 1 spray (50 mcg total) by Each Nostril route nightly. (Patient not  "taking: Reported on 10/9/2024) 10 mL 5    furosemide (LASIX) 40 MG tablet Take 1 tablet (40 mg total) by mouth once daily. 90 tablet 3    hydrALAZINE (APRESOLINE) 100 MG tablet Take 1 tablet (100 mg total) by mouth every 8 (eight) hours. 270 tablet 3    metoprolol succinate (TOPROL-XL) 100 MG 24 hr tablet Take 1 tablet (100 mg total) by mouth once daily. 90 tablet 3    sirolimus (RAPAMUNE) 1 MG Tab Take 3 tablets (3 mg total) by mouth once daily.      sodium bicarbonate 650 MG tablet Take 3 tablets (1,950 mg total) by mouth 2 (two) times daily.      vitamin renal formula, B-complex-vitamin c-folic acid, (NEPHROCAP) 1 mg Cap Take 1 capsule by mouth once daily. 30 each 3    [DISCONTINUED] sildenafiL (VIAGRA) 50 MG tablet Take 1 tablet (50 mg total) by mouth daily as needed for Erectile Dysfunction. 9 tablet 4       Past Surgical History:   Procedure Laterality Date    CYSTOSCOPY W/ RETROGRADES Bilateral 6/15/2022    Procedure: Cystoscopy, bilateral retrograde pyelogram, and all indicated procedures;  Surgeon: Veronica Bacon MD;  Location: [facility];  Service: Urology;  Laterality: Bilateral;    FRACTURE SURGERY Left     "lower leg" 40 years ago    KIDNEY TRANSPLANT  2007    followed by [facility] Transplant    LARYNX SURGERY  11/2014    Oropharyngeal Cancer x3    microlaryngoscopy      PHLEBOGRAPHY Bilateral 8/2/2022    Procedure: Venogram;  Surgeon: KENIA Mueller II, MD;  Location: [facility] CATH LAB;  Service: Vascular;  Laterality: Bilateral;    SURGICAL REMOVAL OF LESION OF ORBIT Bilateral 8/26/2020    Procedure: EXCISION, LESION, ORBIT;  Surgeon: Linda Tee MD;  [address];  Service: Ophthalmology;  Laterality: Bilateral;    TIBIAL STAPLING Left 1980            Social History:  Tobacco Use: Medium Risk (10/13/2024)    Patient History     Smoking Tobacco Use: Former     Smokeless Tobacco Use: Never     Passive Exposure: Past       Alcohol Use: Not At Risk (9/3/2024)    AUDIT-C     Frequency of Alcohol " Consumption: Never     Average Number of Drinks: Patient does not drink     Frequency of Binge Drinking: Never       OBJECTIVE:     Vital Signs Range:  BMI Readings from Last 1 Encounters:   10/09/24 31.25 kg/m²               Significant Labs:        Component Value Date/Time    WBC 4.88 09/30/2024 0758    HGB 10.6 (L) 09/30/2024 0758    HCT 34.9 (L) 09/30/2024 0758     09/30/2024 0758     09/30/2024 0758    K 4.2 09/30/2024 0758     09/30/2024 0758    CO2 22 (L) 09/30/2024 0758     09/30/2024 0758    BUN 36 (H) 09/30/2024 0758    BUN 32 (H) 03/06/2015 0745    CREATININE 3.65 (H) 09/30/2024 0758    MG 2.2 09/04/2024 0457    PHOS 2.7 09/05/2024 0945    CALCIUM 10.3 09/30/2024 0758    ALBUMIN 3.9 09/30/2024 0758    PROT 7.4 09/30/2024 0758    ALKPHOS 83 09/30/2024 0758    BILITOT 0.3 09/30/2024 0758    AST 20 09/30/2024 0758    AST 24 01/18/2016 0731    ALT 15 09/30/2024 0758    INR 1.0 09/14/2017 0850    HGBA1C 6.0 (H) 09/30/2024 0758        Please see Results Review for additional labs.     Diagnostic Studies: No relevant studies.    EKG:   Results for orders placed or performed during the hospital encounter of 09/02/24   EKG 12-lead    Collection Time: 09/02/24  9:06 PM   Result Value Ref Range    QRS Duration 118 ms    [facility] QTC Calculation 456 ms    Narrative    Test Reason : R53.1,    Vent. Rate : 084 BPM     Atrial Rate : 084 BPM     P-R Int : 156 ms          QRS Dur : 118 ms      QT Int : 386 ms       P-R-T Axes : 063 -36 110 degrees     QTc Int : 456 ms    Normal sinus rhythm  Left axis deviation  Incomplete right bundle branch block  Septal infarct (cited on or before 01-MAY-2024)  T wave abnormality, consider lateral ischemia  Abnormal ECG  When compared with ECG of 02-SEP-2024 21:02,  No significant change was found  Confirmed by Cynthia PHILLIPS, Timothy GRACE ([id number]) on 9/4/2024 3:31:10 PM    Referred By: THI   SELF           Confirmed By:Timothy Marie MD       ECHO:  See subjective,  if available.      ASSESSMENT/PLAN:                                                                                                                  10/22/2024  Jose Luis Manzo is a 74 y.o., male.      Pre-op Assessment          Review of Systems  Social:  Former Smoker       EENT/Dental:   Carcinoma in situ of vocal cord          Cardiovascular:     Hypertension   CAD          PVD hyperlipidemia                               Pulmonary:   COPD   Shortness of breath                  Renal/:  Chronic Renal Disease, CKD   KIDNEY TRANSPLANT                     Physical Exam  General: Well nourished and Cooperative    Airway:  Mallampati: II   Mouth Opening: Normal  TM Distance: Normal  Tongue: Normal  Neck ROM: Normal ROM    Dental:        Anesthesia Plan  Type of Anesthesia, risks & benefits discussed:    Anesthesia Type: Regional  Intra-op Monitoring Plan: Standard ASA Monitors  Induction:  IV  Informed Consent: Informed consent signed with the Patient and all parties understand the risks and agree with anesthesia plan.  All questions answered.   ASA Score: 3  Day of Surgery Review of History & Physical: H&P Update referred to the surgeon/provider.    Ready For Surgery From Anesthesia Perspective.     .

## 2024-10-22 NOTE — BRIEF OP NOTE
[facility] - Surgery (2nd Fl)  Brief Operative Note    Surgery Date: 10/22/2024     Surgeons and Role:     * Guanaco Navarro MD - Primary     * Frieda Simpson MD - PGY3    Assisting Surgeon: None    Pre-op Diagnosis:  Stage 4 chronic kidney disease [N18.4]    Post-op Diagnosis:  Post-Op Diagnosis Codes:     * Stage 4 chronic kidney disease [N18.4]    Procedure(s) (LRB):  CREATION, AV FISTULA (Right)    Anesthesia: Regional    Operative Findings: Uneventful brachiocephalic fistula. Palpable thrill with 2+ radial pulse    Estimated Blood Loss: Min         Specimens:   Specimen (24h ago, onward)      None              Discharge Note    OUTCOME: Patient tolerated treatment/procedure well without complication and is now ready for discharge.    DISPOSITION: Home or Self Care    FINAL DIAGNOSIS: ESRD    FOLLOWUP: In clinic    DISCHARGE INSTRUCTIONS:    Discharge Procedure Orders   Diet Adult Regular     Lifting restrictions   Order Comments: No lifting greater than 10 pounds for 6 weeks from day of surgery.  No pushing/pulling such as vacuuming or raking.  No straining, avoid constipation and take stool softeners as described and laxatives as needed.  No driving while on narcotics and until you can react quickly without pain.     No dressing needed   Order Comments: WOUND CARE  You have skin glue over your incision(s)  This will slowly flake away in about 10 days  It is okay to shower starting two days after surgery  Can pat your incision dry  Please do not scrub hard over your incisions  Please do not pick the glue off or it will reopen the wound     Notify your health care provider if you experience any of the following:  temperature >100.4     Notify your health care provider if you experience any of the following:  severe uncontrolled pain     Notify your health care provider if you experience any of the following:  redness, tenderness, or signs of infection (pain, swelling, redness, odor or green/yellow discharge  around incision site)

## 2024-10-22 NOTE — DISCHARGE INSTRUCTIONS
Okay to remove outer clear dressing and gauze 48 hours after your surgery.   You have skin glue over your incision. This will fall off on its own over the next week or two. Please do not pull it off.   Okay to shower 48 hours after your surgery and after removing dressing.   Please do not scrub or rub incisions. Pat incision dry.   Do not use lotion on your incision. May cover sterile gauze and tape as needed.     No heavy lifting anything over 15 lbs until your follow up visit with your surgeon.     Take 650mg to 1000mg tylenol every 6 hours.   Take oxycodone as needed for severe pain.     - Take a full shower daily beginning 2 days after the surgery. Allow soap and water to run over your incision. Pad the incision dry afterward  - When resting or sleeping, try to keep your arm elevated to shoulder level on pillows to reduce swelling  - If you notice clear drainage from your incision, you can apply dry gauze daily and secure in place with tape or gentle elastic wrap     Activity:  - Avoid prolonged exertion of the affected arm  - Avoid keeping your arm down below your chest for prolonged periods of time (this could lead to increased swelling)  - No heavy lifting with the affected arm  - Sleep with your arm elevated on pillows at night to reduce swelling  -- No swimming in pools, [employer], [place] etc. for 6 weeks after your surgery     Diet:  -Resume your pre-operative home diet     Call Vascular Surgery Office at [phone] if you experience:  -Increased redness, warmth, tenderness, or draining pus at your incision(s)

## 2024-10-22 NOTE — TRANSFER OF CARE
"Anesthesia Transfer of Care Note    Patient: Jose Luis Manzo    Procedure(s) Performed: Procedure(s) (LRB):  CREATION, AV FISTULA (Right)    Patient [facility]    Anesthesia Type: general    Transport from OR: Transported from OR on 6-10 L/min O2 by face mask with adequate spontaneous ventilation    Post pain: adequate analgesia    Post assessment: no apparent anesthetic complications and tolerated procedure well    Post vital signs: stable    Level of consciousness: awake    Nausea/Vomiting: no nausea/vomiting    Complications: none    Transfer of care protocol was followed      Last vitals: Visit Vitals  /73 (BP Location: Right arm, Patient Position: Lying)   Pulse (!) 50   Temp 36.2 °C (97.2 °F) (Skin)   Resp 20   Ht 5' 10" (1.778 m)   Wt 102.1 kg (225 lb)   SpO2 99%   BMI 32.28 kg/m²     "

## 2024-10-22 NOTE — ANESTHESIA PROCEDURE NOTES
R Suprclav SS    Patient location during procedure: OR    Reason for block: primary anesthetic    Diagnosis: R Arm Pain   Start time: 10/22/2024 2:02 PM  Timeout: 10/22/2024 2:00 PM   End time: 10/22/2024 2:10 PM    Staffing  Authorizing Provider: Peace Lord MD  Performing Provider: Annika De Leon MD    Staffing  Performed by: Annika De Leon MD  Authorized by: Peace Lord MD    Preanesthetic Checklist  Completed: patient identified, IV checked, site marked, risks and benefits discussed, surgical consent, monitors and equipment checked, pre-op evaluation and timeout performed  Peripheral Block  Patient position: supine  Prep: ChloraPrep  Patient monitoring: heart rate, cardiac monitor, continuous pulse ox, continuous capnometry and frequent blood pressure checks  Block type: supraclavicular  Laterality: right  Injection technique: single shot  Needle  Needle type: Echogenic   Needle gauge: 20 G  Needle length: 4 in  Needle localization: anatomical landmarks and ultrasound guidance   -ultrasound image captured on disc.  Assessment  Injection assessment: negative aspiration, negative parasthesia and local visualized surrounding nerve  Paresthesia pain: none  Heart rate change: no  Slow fractionated injection: yes  Pain Tolerance: comfortable throughout block and no complaints  Medications:    Medications: mepivacaine (CARBOCAINE) injection 15 mg/mL (1.5%) - Perineural   45 mL - 10/22/2024 2:10:00 PM    Additional Notes  Intercostal brachial field block performed with mepivacaine 1.5% 10ml for additional coverage.    VSS.  See anesthesia record.  Patient tolerated procedure well.

## 2024-10-22 NOTE — OP NOTE
[facility]  Surgery Department  Operative Note    SUMMARY     Patient: Jose Luis Manzo  Date: 10/22/2024  MRN: [id number]    Date of Procedure: 10/22/2024     Procedure: Procedure(s) (LRB):  CREATION, AV FISTULA (Right)     Surgeons and Role:     * Guanaco Navarro MD - Primary     * Frieda Simpson MD - Fellow    Assisting Surgeon: None    Pre-Operative Diagnosis: Stage 4 chronic kidney disease [N18.4]    Post-Operative Diagnosis: Post-Op Diagnosis Codes:     * Stage 4 chronic kidney disease [N18.4]    Anesthesia: Regional    Indications for Procedure:   Jose Luis Manzo is a 74 y.o. male with Hx of ESRD.    Procedure Details:  The patient was seen in the Holding Room. The risks, benefits, complications, treatment options, and expected outcomes were discussed with the patient. The patient concurred with the proposed plan, giving informed consent.  The site of surgery properly noted/marked. A Time Out was held and the above information confirmed.    The patient was brought to the Operating Room. The right arm was prepped and draped in the usual sterile fashion. Antibiotics were administered. A transverse incision was made just proximal to the antecubital crease. The cephalic vein was identified crossing obliquely. It was dissected proximally and distally, it was noted to be 3 mm in diameter. The brachial artery was then dissected, noted to be approx 6 mm in diameter and free of disease. Proximal and distal control were obtained by placing vessel loops around the artery.  The patient was systemically heparinized, the vessel loops were tightened, and an arteriotomy was made with 11 blade scalpel and extended with Mo scissors.  A stay suture was placed medially. An end-to-side anastomosis between the cephalic vein and the brachial artery was created using 6-0 Prolene sutures in a continuous running manner. After completion of the anastomosis, the vessel loops were released. Hemostasis was secured. Good  thrill was noted in the fistula and a palpable radial pulse was present. The incision was then closed in two layers, subcutaneous tissue with 3-0 Vicryl and skin with 4-0 Monocryl.     Instrument and sponge counts were correct x2.    The patient tolerated the procedure and was transported to the the recovery room in good condition.     Dr. Navarro was present for the entire procedure.       Drains: none    Estimated Blood Loss (EBL): Less than 20cc      Complications: No    Specimens:   Specimen (24h ago, onward)      None            Condition: Good    Disposition: PACU - hemodynamically stable.    Attestation: Dr. Navarro was present and scrubbed for the entire procedure.      KWAN Simpson MD  General Surgery, PGY-3

## 2024-10-28 ENCOUNTER — PATIENT MESSAGE (OUTPATIENT)
Dept: FAMILY MEDICINE | Facility: CLINIC | Age: 74
End: 2024-10-28
Payer: MEDICARE

## 2024-10-31 ENCOUNTER — OFFICE VISIT (OUTPATIENT)
Dept: NEPHROLOGY | Facility: CLINIC | Age: 74
End: 2024-10-31
Payer: MEDICARE

## 2024-10-31 VITALS
SYSTOLIC BLOOD PRESSURE: 140 MMHG | OXYGEN SATURATION: 98 % | HEART RATE: 67 BPM | DIASTOLIC BLOOD PRESSURE: 77 MMHG | HEIGHT: 70 IN | WEIGHT: 216.25 LBS | BODY MASS INDEX: 30.96 KG/M2

## 2024-10-31 DIAGNOSIS — E55.9 VITAMIN D DEFICIENCY DISEASE: ICD-10-CM

## 2024-10-31 DIAGNOSIS — N25.81 SECONDARY HYPERPARATHYROIDISM OF RENAL ORIGIN: ICD-10-CM

## 2024-10-31 DIAGNOSIS — I10 ESSENTIAL HYPERTENSION: ICD-10-CM

## 2024-10-31 DIAGNOSIS — D63.1 ANEMIA IN CHRONIC KIDNEY DISEASE, UNSPECIFIED CKD STAGE: ICD-10-CM

## 2024-10-31 DIAGNOSIS — Z94.0 TRANSPLANTED KIDNEY: ICD-10-CM

## 2024-10-31 DIAGNOSIS — N18.9 ANEMIA IN CHRONIC KIDNEY DISEASE, UNSPECIFIED CKD STAGE: ICD-10-CM

## 2024-10-31 DIAGNOSIS — Z94.0 IMMUNOSUPPRESSIVE MANAGEMENT ENCOUNTER FOLLOWING KIDNEY TRANSPLANT: ICD-10-CM

## 2024-10-31 DIAGNOSIS — I10 HYPERTENSION, UNSPECIFIED TYPE: ICD-10-CM

## 2024-10-31 DIAGNOSIS — E55.9 VITAMIN D DEFICIENCY: ICD-10-CM

## 2024-10-31 DIAGNOSIS — E87.20 METABOLIC ACIDOSIS: ICD-10-CM

## 2024-10-31 DIAGNOSIS — E83.9 CHRONIC KIDNEY DISEASE-MINERAL BONE DISORDER (CKD-MBD) WITH STAGE 4 CHRONIC KIDNEY DISEASE: ICD-10-CM

## 2024-10-31 DIAGNOSIS — N18.4 CHRONIC KIDNEY DISEASE, STAGE IV (SEVERE): ICD-10-CM

## 2024-10-31 DIAGNOSIS — Z79.899 IMMUNOSUPPRESSIVE MANAGEMENT ENCOUNTER FOLLOWING KIDNEY TRANSPLANT: ICD-10-CM

## 2024-10-31 DIAGNOSIS — N18.4 CHRONIC KIDNEY DISEASE-MINERAL BONE DISORDER (CKD-MBD) WITH STAGE 4 CHRONIC KIDNEY DISEASE: ICD-10-CM

## 2024-10-31 DIAGNOSIS — M89.9 CHRONIC KIDNEY DISEASE-MINERAL BONE DISORDER (CKD-MBD) WITH STAGE 4 CHRONIC KIDNEY DISEASE: ICD-10-CM

## 2024-10-31 DIAGNOSIS — N18.4 STAGE 4 CHRONIC KIDNEY DISEASE: Primary | ICD-10-CM

## 2024-10-31 DIAGNOSIS — Z85.21 HISTORY OF CANCER OF LARYNX: ICD-10-CM

## 2024-10-31 PROCEDURE — 99999 PR PBB SHADOW E&M-EST. PATIENT-LVL III: CPT | Mod: PBBFAC,GC,, | Performed by: STUDENT IN AN ORGANIZED HEALTH CARE EDUCATION/TRAINING PROGRAM

## 2024-10-31 RX ORDER — ERGOCALCIFEROL 1.25 MG/1
50000 CAPSULE ORAL
Qty: 12 CAPSULE | Refills: 3 | Status: SHIPPED | OUTPATIENT
Start: 2024-10-31

## 2024-10-31 RX ORDER — CLONIDINE 0.3 MG/24H
1 PATCH, EXTENDED RELEASE TRANSDERMAL WEEKLY
Qty: 12 PATCH | Refills: 10 | Status: SHIPPED | OUTPATIENT
Start: 2024-10-31

## 2024-11-01 ENCOUNTER — TELEPHONE (OUTPATIENT)
Dept: VASCULAR SURGERY | Facility: CLINIC | Age: 74
End: 2024-11-01
Payer: MEDICARE

## 2024-11-01 DIAGNOSIS — N18.4 STAGE 4 CHRONIC KIDNEY DISEASE: Primary | ICD-10-CM

## 2024-11-04 ENCOUNTER — HOSPITAL ENCOUNTER (OUTPATIENT)
Dept: VASCULAR SURGERY | Facility: CLINIC | Age: 74
Discharge: HOME | End: 2024-11-04
Attending: SURGERY
Payer: MEDICARE

## 2024-11-04 ENCOUNTER — OFFICE VISIT (OUTPATIENT)
Dept: VASCULAR SURGERY | Facility: CLINIC | Age: 74
End: 2024-11-04
Attending: SURGERY
Payer: MEDICARE

## 2024-11-04 VITALS
SYSTOLIC BLOOD PRESSURE: 150 MMHG | TEMPERATURE: 98 F | DIASTOLIC BLOOD PRESSURE: 78 MMHG | HEIGHT: 70 IN | WEIGHT: 209.44 LBS | HEART RATE: 72 BPM | BODY MASS INDEX: 29.98 KG/M2

## 2024-11-04 DIAGNOSIS — N18.4 STAGE 4 CHRONIC KIDNEY DISEASE: Primary | ICD-10-CM

## 2024-11-04 DIAGNOSIS — N18.4 STAGE 4 CHRONIC KIDNEY DISEASE: ICD-10-CM

## 2024-11-04 DIAGNOSIS — N18.6 ESRD (END STAGE RENAL DISEASE): Primary | ICD-10-CM

## 2024-11-04 DIAGNOSIS — Z01.818 PRE-OP EVALUATION: ICD-10-CM

## 2024-11-04 PROCEDURE — 3078F DIAST BP <80 MM HG: CPT | Mod: CPTII,S$GLB,, | Performed by: SURGERY

## 2024-11-04 PROCEDURE — 99024 POSTOP FOLLOW-UP VISIT: CPT | Mod: S$GLB,,, | Performed by: SURGERY

## 2024-11-04 PROCEDURE — 3062F POS MACROALBUMINURIA REV: CPT | Mod: CPTII,S$GLB,, | Performed by: SURGERY

## 2024-11-04 PROCEDURE — 93990 DOPPLER FLOW TESTING: CPT | Mod: S$GLB,,, | Performed by: SURGERY

## 2024-11-04 PROCEDURE — 3066F NEPHROPATHY DOC TX: CPT | Mod: CPTII,S$GLB,, | Performed by: SURGERY

## 2024-11-04 PROCEDURE — 1101F PT FALLS ASSESS-DOCD LE1/YR: CPT | Mod: CPTII,S$GLB,, | Performed by: SURGERY

## 2024-11-04 PROCEDURE — 3077F SYST BP >= 140 MM HG: CPT | Mod: CPTII,S$GLB,, | Performed by: SURGERY

## 2024-11-04 PROCEDURE — 3044F HG A1C LEVEL LT 7.0%: CPT | Mod: CPTII,S$GLB,, | Performed by: SURGERY

## 2024-11-04 PROCEDURE — 93985 DUP-SCAN HEMO COMPL BI STD: CPT | Mod: S$GLB,,, | Performed by: SURGERY

## 2024-11-04 PROCEDURE — 1126F AMNT PAIN NOTED NONE PRSNT: CPT | Mod: CPTII,S$GLB,, | Performed by: SURGERY

## 2024-11-04 PROCEDURE — 99999 PR PBB SHADOW E&M-EST. PATIENT-LVL III: CPT | Mod: PBBFAC,,, | Performed by: SURGERY

## 2024-11-04 PROCEDURE — 4010F ACE/ARB THERAPY RXD/TAKEN: CPT | Mod: CPTII,S$GLB,, | Performed by: SURGERY

## 2024-11-04 PROCEDURE — 1159F MED LIST DOCD IN RCRD: CPT | Mod: CPTII,S$GLB,, | Performed by: SURGERY

## 2024-11-04 PROCEDURE — 3288F FALL RISK ASSESSMENT DOCD: CPT | Mod: CPTII,S$GLB,, | Performed by: SURGERY

## 2024-11-04 NOTE — PROGRESS NOTES
Patient returns.  His antecubital fossa AV fistula has failed.  He had some thrombus in his basilic  vein.  He maybe a little hypercoagulable.  I think that he has a good left  brachial vein up top.  The basilic vein is 1 with a thrombus in it on th right   .  So I would do a loop AV graft in the left upper arm to the basi;ic vein  I have discussed the risks benefits with him bleeding infection and again that the graft may not work.  In addition to that there is always a risk of nerve injury.  He understands this.  His creatinine is 3 6 she would have to do this immediately we will try to do it next 4-5 weeks

## 2024-11-07 ENCOUNTER — LAB VISIT (OUTPATIENT)
Dept: LAB | Facility: HOSPITAL | Age: 74
End: 2024-11-07
Attending: STUDENT IN AN ORGANIZED HEALTH CARE EDUCATION/TRAINING PROGRAM
Payer: MEDICARE

## 2024-11-07 DIAGNOSIS — N18.4 CHRONIC KIDNEY DISEASE, STAGE IV (SEVERE): ICD-10-CM

## 2024-11-07 LAB
ALBUMIN SERPL BCP-MCNC: 4.4 G/DL (ref 3.5–5.2)
ANION GAP SERPL CALC-SCNC: 9 MMOL/L (ref 8–16)
CALCIUM SERPL-MCNC: 9.9 MG/DL (ref 8.7–10.5)
CHLORIDE SERPL-SCNC: 109 MMOL/L (ref 95–110)
CO2 SERPL-SCNC: 22 MMOL/L (ref 23–29)
CREAT SERPL-MCNC: 3.54 MG/DL (ref 0.5–1.4)
ERYTHROCYTE [DISTWIDTH] IN BLOOD BY AUTOMATED COUNT: 16.1 % (ref 11.5–14.5)
EST. GFR  (NO RACE VARIABLE): 17.3 ML/MIN/1.73 M^2
GLUCOSE SERPL-MCNC: 109 MG/DL (ref 70–110)
HCT VFR BLD AUTO: 37 % (ref 40–54)
HGB BLD-MCNC: 11.7 G/DL (ref 14–18)
MCH RBC QN AUTO: 26.1 PG (ref 27–31)
MCHC RBC AUTO-ENTMCNC: 31.6 G/DL (ref 32–36)
MCV RBC AUTO: 82 FL (ref 82–98)
PHOSPHATE SERPL-MCNC: 4.1 MG/DL (ref 2.7–4.5)
PLATELET # BLD AUTO: 189 K/UL (ref 150–450)
PMV BLD AUTO: 10.6 FL (ref 9.2–12.9)
POTASSIUM SERPL-SCNC: 3.9 MMOL/L (ref 3.5–5.1)
RBC # BLD AUTO: 4.49 M/UL (ref 4.6–6.2)
SODIUM SERPL-SCNC: 140 MMOL/L (ref 136–145)
UUN UR-MCNC: 42 MG/DL (ref 2–20)
WBC # BLD AUTO: 6.72 K/UL (ref 3.9–12.7)

## 2024-11-07 PROCEDURE — 36415 COLL VENOUS BLD VENIPUNCTURE: CPT | Mod: PN | Performed by: STUDENT IN AN ORGANIZED HEALTH CARE EDUCATION/TRAINING PROGRAM

## 2024-11-07 PROCEDURE — 80069 RENAL FUNCTION PANEL: CPT | Mod: PN | Performed by: STUDENT IN AN ORGANIZED HEALTH CARE EDUCATION/TRAINING PROGRAM

## 2024-11-07 PROCEDURE — 85027 COMPLETE CBC AUTOMATED: CPT | Mod: PN | Performed by: STUDENT IN AN ORGANIZED HEALTH CARE EDUCATION/TRAINING PROGRAM

## 2024-12-02 ENCOUNTER — TELEPHONE (OUTPATIENT)
Dept: FAMILY MEDICINE | Facility: CLINIC | Age: 74
End: 2024-12-02
Payer: MEDICARE

## 2024-12-02 DIAGNOSIS — N18.4 STAGE 4 CHRONIC KIDNEY DISEASE: ICD-10-CM

## 2024-12-02 RX ORDER — FUROSEMIDE 40 MG/1
40 TABLET ORAL DAILY
Qty: 90 TABLET | Refills: 3 | Status: SHIPPED | OUTPATIENT
Start: 2024-12-02

## 2024-12-02 NOTE — TELEPHONE ENCOUNTER
----- Message from [place]olia sent at 12/2/2024  2:14 PM CST -----  Type:  RX Refill Request    Who Called: pt  Refill or New Rx:refill  RX Name and Strength:furosemide (LASIX) 40 MG tablet  Preferred Pharmacy with phone number:[facility] [address] [place] [address] [address] [facility]  Local or Mail Order:local  Ordering Provider:basia  Would the patient rather a call back or a response via [facility]? call  Best Call Back Number: [phone]  Additional Information:

## 2025-01-02 DIAGNOSIS — I82.409 ACUTE DEEP VEIN THROMBOSIS (DVT) OF LOWER EXTREMITY, UNSPECIFIED LATERALITY, UNSPECIFIED VEIN: ICD-10-CM

## 2025-01-02 NOTE — TELEPHONE ENCOUNTER
----- Message from Kelsi sent at 1/2/2025  1:06 PM CST -----  Type:  RX Refill Request    Who Called: Pt   Refill or New Rx:refill  RX Name and Strength:apixaban (ELIQUIS) 2.5 mg Tab  How is the patient currently taking it? (ex. 1XDay):2x  Is this a 30 day or 90 day RX:90  Preferred Pharmacy with phone number[facility] [address] [place] [address] [facility]   Phone: [phone]  Fax: [phone]  Would the patient rather a call back or a response via [facility]? Call back   Best Call Back Number:[phone]  Additional Information:

## 2025-01-03 ENCOUNTER — ANESTHESIA EVENT (OUTPATIENT)
Dept: SURGERY | Facility: HOSPITAL | Age: 75
End: 2025-01-03
Payer: MEDICARE

## 2025-01-03 ENCOUNTER — TELEPHONE (OUTPATIENT)
Dept: VASCULAR SURGERY | Facility: CLINIC | Age: 75
End: 2025-01-03
Payer: MEDICARE

## 2025-01-03 NOTE — TELEPHONE ENCOUNTER
Per Dr. GANESH Regan's request nurse contacted pt to notify him that surgery with Dr. Navarro on 1/6/25 must be rescheduled to later date due to change in surgery schedule. Pt verbalized understanding. Offered Wednesday 1/15/25. Pt accepted date. Notified pt that last dose of Eliquis will be taken on Sunday 1/12/25 as pt must hold Eliquis for two days prior to surgery. Pt repeated instructions and verbalized understanding.

## 2025-01-06 ENCOUNTER — ANESTHESIA (OUTPATIENT)
Dept: SURGERY | Facility: HOSPITAL | Age: 75
End: 2025-01-06
Payer: MEDICARE

## 2025-01-14 ENCOUNTER — TELEPHONE (OUTPATIENT)
Dept: VASCULAR SURGERY | Facility: CLINIC | Age: 75
End: 2025-01-14
Payer: MEDICARE

## 2025-01-14 NOTE — TELEPHONE ENCOUNTER
Contacted pt to discuss rescheduling AVG insertion with Dr. Navarro to later date due to pt taking Eliquis yesterday Monday 1/13/25. Pt verbalized understanding. Offered Friday 1/17/25, pt agreed to date. Notified pt that nurse will contact OR  tomorrow AM to discuss OR availability on 1/17/25 and will call patient to discuss. Pt verbalized understanding.

## 2025-01-14 NOTE — TELEPHONE ENCOUNTER
Contacted pt with time of arrival for surgery tomorrow with Dr. Navarro. Pt state last dose of Eliquis was taken yesterday, Monday morning 1/13/25. Reminded pt that last dose of Eliquis should have been taken Ajith 01/12/15. Pt states he was not aware. Notified pt that nurse will discuss this with Dr. Navarro and will call back with further instructions. Contacted Dr. Navarro to discuss. Dr. Navarro states case must be postponed. Nurse attempted to contact pt to notify him that case must be rescheduled. Voice message left for pt with this information and requested return call to discuss.

## 2025-01-14 NOTE — ANESTHESIA PREPROCEDURE EVALUATION
[facility]  Anesthesia Pre-Operative Evaluation         Patient Name: Jose Luis Manzo  YOB: 1950  MRN: [id number]    SUBJECTIVE:     Pre-operative evaluation for Procedure(s) (LRB):  INSERTION, GRAFT, ARTERIOVENOUS, UPPER EXTREMITY (Left)     01/14/2025    Jose Luis Manzo is a 74 y.o. male w/ a significant PMHx of HTN, HLD, ESRD (s/p transplant 2007; c/b graft CKD-IV; on maintenance sirolimus/pred), oropharyngeal carcinoma (s/p resection + XRT in 2015 now in remission), VTE (on eliquis), hyperparathyroidism, and BPH presenting with a failed AV fistula.    Patient now presents for the above procedure(s).    TTE 4/26/24    Left Ventricle: The left ventricle is normal in size. Normal wall thickness. There is concentric hypertrophy. Unable to assess wall motion. There is normal systolic function. Biplane (2D) method of discs ejection fraction is 65%.    Right Ventricle: Right ventricle was not well visualized due to poor acoustic window. Normal right ventricular cavity size. Systolic function is normal.    Tricuspid Valve: There is mild regurgitation.    Pulmonary Artery: The estimated pulmonary artery systolic pressure is 28 mmHg.    IVC/SVC: Normal venous pressure at 3 mmHg.    Pericardium: There is a small effusion.    Overall the study quality was technically difficult.         LDA: None documented.    Prev airway:     Intubation  Performed by: Shamika Wells CRNA  Authorized by: Wally Argueta MD      Intubation:     Induction:  Intravenous    Mask Ventilation:  Easy with oral airway    Attempts:  2    Attempted By:  Student and staff anesthesiologist    Blade:  Graham 3    Laryngeal View Grade: Grade IIb - only the arytenoids and epiglottis seen      Attempted By (2nd Attempt):  Student    Method of Intubation (2nd Attempt):  Video laryngoscopy    Blade (2nd Attempt):  Llamas 2    Laryngeal View Grade (2nd Attempt): Grade III - only epiglottis visible      Difficult Airway  Encountered?: No      Airway Device:  Oral endotracheal tube    Airway Device Size:  7.5    Style/Cuff Inflation:  Cuffed    Inflation Amount (mL):  4    Tube secured:  22    Secured at:  The teeth    Placement Verified By:  Capnometry    DIFFICULT INTUBATION DESCRIPTOR: Multiple VC surgeries.    Findings Post-Intubation:  BS equal bilateral          Drips: None documented.    Patient Active Problem List   Diagnosis    Stage 4 chronic kidney disease    Essential hypertension    Mixed hyperlipidemia    Benign prostatic hyperplasia with nocturia    Claudication of right lower extremity    Eye swelling, bilateral    Diminished night vision    Renal transplant recipient    Dermolipoma    History of DVT (deep vein thrombosis)    Iron deficiency anemia    Pulmonary nodules    Larynx cancer    Voice disturbance    Atherosclerosis of aorta    Carcinoma in situ of vocal cord    Dysphagia, pharyngoesophageal    Chondronecrosis of larynx    Squamous cell carcinoma    Obesity (BMI 30-39.9)    Pterygium of both eyes    History of cancer of larynx    Nodule of left lung    Granulomatous lung disease    Transplanted kidney - 2007    Hypertensive kidney disease with stage 4 chronic kidney disease    Hypercalcemia    Secondary hyperparathyroidism of renal origin    Benign tumor of orbit    CHANG (acute kidney injury)    Metabolic bone disease    Immunosuppressive management encounter following kidney transplant    Hyperphosphatemia    Vitamin D deficiency disease    History of COVID-19    SOB (shortness of breath)    Metabolic acidosis    Proteinuria    Anticoagulant long-term use    Pre-op evaluation    Benign prostatic hyperplasia with lower urinary tract symptoms    Centrilobular emphysema    Dependence on renal dialysis    Class 1 obesity due to excess calories with serious comorbidity and body mass index (BMI) of 34.0 to 34.9 in adult    Former cigarette smoker    Deformity of toenail    Complicated UTI (urinary tract infection)     Moderate malnutrition    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    Current Outpatient Medications:     acetaminophen (TYLENOL) 650 MG TbSR, Take 1 tablet (650 mg total) by mouth every 8 (eight) hours., Disp: , Rfl:     apixaban (ELIQUIS) 2.5 mg Tab, Take 1 tablet (2.5 mg total) by mouth 2 (two) times daily., Disp: 180 tablet, Rfl: 3    atorvastatin (LIPITOR) 20 MG tablet, Take 1 tablet (20 mg total) by mouth every evening., Disp: 90 tablet, Rfl: 3    cinacalcet (SENSIPAR) 30 MG Tab, Take 1 tablet (30 mg total) by mouth daily with breakfast., Disp: 90 tablet, Rfl: 3    cloNIDine 0.3 mg/24 hr td ptwk (CATAPRES) 0.3 mg/24 hr, Place 1 patch onto the skin once a week., Disp: 12 patch, Rfl: 10    ergocalciferol (ERGOCALCIFEROL) 50,000 unit Cap, Take 1 capsule (50,000 Units total) by mouth every 7 days., Disp: 12 capsule, Rfl: 3    ferrous sulfate 325 mg (65 mg iron) Tab tablet, Take 325 mg by mouth once daily., Disp: , Rfl:     finasteride (PROSCAR) 5 mg tablet, Take 1 tablet (5 mg total) by mouth once daily. (Patient taking differently: Take 5 mg by mouth nightly.), Disp: 90 tablet, Rfl: 3    fluticasone propionate (FLONASE) 50 mcg/actuation nasal spray, 1 spray (50 mcg total) by Each Nostril route nightly., Disp: 10 mL, Rfl: 5    furosemide (LASIX) 40 MG tablet, Take 1 tablet (40 mg total) by mouth once daily., Disp: 90 tablet, Rfl: 3    hydrALAZINE (APRESOLINE) 100 MG tablet, Take 1 tablet (100 mg total) by mouth every 8 (eight) hours., Disp: 270 tablet, Rfl: 3    metoprolol succinate (TOPROL-XL) 100 MG 24 hr tablet, Take 1 tablet (100 mg total) by mouth once daily. (Patient taking differently: Take 100 mg by mouth every evening.), Disp: 90 tablet, Rfl: 3     "NIFEdipine (PROCARDIA-XL) 30 MG (OSM) 24 hr tablet, Take 1 tablet (30 mg total) by mouth once daily., Disp: 90 tablet, Rfl: 3    oxyCODONE (ROXICODONE) 5 MG immediate release tablet, Take 1 tablet (5 mg total) by mouth every 4 (four) hours as needed. (Patient not taking: Reported on 1/3/2025), Disp: 8 tablet, Rfl: 0    predniSONE (DELTASONE) 5 MG tablet, Take 1 tablet (5 mg total) by mouth once daily., Disp: 90 tablet, Rfl: 3    sirolimus (RAPAMUNE) 1 MG Tab, Take 3 tablets (3 mg total) by mouth once daily., Disp: , Rfl:     sodium bicarbonate 650 MG tablet, Take 3 tablets (1,950 mg total) by mouth 2 (two) times daily., Disp: , Rfl:     vitamin renal formula, B-complex-vitamin c-folic acid, (NEPHROCAP) 1 mg Cap, Take 1 capsule by mouth once daily., Disp: 30 each, Rfl: 3    Past Surgical History:   Procedure Laterality Date    AV FISTULA PLACEMENT Right 10/22/2024    Procedure: CREATION, AV FISTULA;  Surgeon: Guanaco Navarro MD;  Location: [address];  Service: Vascular;  Laterality: Right;  RUE AVF creation    CYSTOSCOPY W/ RETROGRADES Bilateral 6/15/2022    Procedure: Cystoscopy, bilateral retrograde pyelogram, and all indicated procedures;  Surgeon: Veronica Bacon MD;  Location: [facility] OR;  Service: Urology;  Laterality: Bilateral;    FRACTURE SURGERY Left     "lower leg" 40 years ago    KIDNEY TRANSPLANT  2007    followed by [facility] Transplant    LARYNX SURGERY  11/2014    Oropharyngeal Cancer x3    microlaryngoscopy      PHLEBOGRAPHY Bilateral 8/2/2022    Procedure: Venogram;  Surgeon: KENIA Mueller II, MD;  Location: [facility] CATH LAB;  Service: Vascular;  Laterality: Bilateral;    SURGICAL REMOVAL OF LESION OF ORBIT Bilateral 8/26/2020    Procedure: EXCISION, LESION, ORBIT;  Surgeon: Linda Tee MD;  Location: [facility] OR [address] [place] [address];  Service: Ophthalmology;  Laterality: Bilateral;    TIBIAL STAPLING Left 1980            Social History     Socioeconomic History    Marital status: Single    Number of children: 1 "   Tobacco Use    Smoking status: Former     Current packs/day: 0.00     Average packs/day: 1 pack/day for 20.0 years (20.0 ttl pk-yrs)     Types: Cigarettes     Start date:      Quit date: 2009     Years since quittin.0     Passive exposure: Past    Smokeless tobacco: Never   Substance and Sexual Activity    Alcohol use: No     Alcohol/week: 0.0 standard drinks of alcohol    Drug use: No     Types: Marijuana     Comment: Occ.    Sexual activity: Not Currently     Partners: Female     Social Drivers of Health     Financial Resource Strain: Low Risk  (9/3/2024)    Overall Financial Resource Strain (CARDIA)     Difficulty of Paying Living Expenses: Not hard at all   Food Insecurity: No Food Insecurity (9/3/2024)    Hunger Vital Sign     Worried About Running Out of Food in the Last Year: Never true     Ran Out of Food in the Last Year: Never true   Transportation Needs: No Transportation Needs (9/3/2024)    TRANSPORTATION NEEDS     Transportation : No   Physical Activity: Inactive (9/3/2024)    Exercise Vital Sign     Days of Exercise per Week: 0 days     Minutes of Exercise per Session: 0 min   Stress: No Stress Concern Present (9/3/2024)    [employer] - Occupational Stress Questionnaire     Feeling of Stress : Not at all   Housing Stability: Low Risk  (9/3/2024)    Housing Stability Vital Sign     Unable to Pay for Housing in the Last Year: No     Homeless in the Last Year: No       OBJECTIVE:     Vital Signs Range (Last 24H):         Significant Labs:  Lab Results   Component Value Date    WBC 6.72 2024    HGB 11.7 (L) 2024    HCT 37.0 (L) 2024     2024    CHOL 146 2024    TRIG 92 2024    HDL 45 2024    ALT 15 2024    AST 20 2024     2024    K 3.9 2024     2024    CREATININE 3.54 (H) 2024    BUN 42 (H) 2024    CO2 22 (L) 2024    TSH 6.560 (H) 2024    INR 1.0  09/14/2017    HGBA1C 6.0 (H) 09/30/2024       Diagnostic Studies: No relevant studies.    EKG:   Results for orders placed or performed during the hospital encounter of 09/02/24   EKG 12-lead    Collection Time: 09/02/24  9:06 PM   Result Value Ref Range    QRS Duration 118 ms    [facility] QTC Calculation 456 ms    Narrative    Test Reason : R53.1,    Vent. Rate : 084 BPM     Atrial Rate : 084 BPM     P-R Int : 156 ms          QRS Dur : 118 ms      QT Int : 386 ms       P-R-T Axes : 063 -36 110 degrees     QTc Int : 456 ms    Normal sinus rhythm  Left axis deviation  Incomplete right bundle branch block  Septal infarct (cited on or before 01-MAY-2024)  T wave abnormality, consider lateral ischemia  Abnormal ECG  When compared with ECG of 02-SEP-2024 21:02,  No significant change was found  Confirmed by Timothy Marie MD ([id number]) on 9/4/2024 3:31:10 PM    Referred By: [email or web address]   SELF           Confirmed By:Timothy Marie MD       2D ECHO:  TTE:  Results for orders placed or performed during the hospital encounter of 04/26/24   Echo   Result Value Ref Range    Collins's Biplane MOD Ejection Fraction 65 %    LVOT stroke volume 60.30 cm3    LVIDd 4.33 3.5 - 6.0 cm    LV Systolic Volume 34.86 mL    LV Systolic Volume Index 15.2 mL/m2    LVIDs 3.00 2.1 - 4.0 cm    LV Diastolic Volume 84.26 mL    LV Diastolic Volume Index 36.79 mL/m2    IVS 1.55 (A) 0.6 - 1.1 cm    LVOT diameter 2.06 cm    LVOT area 3.3 cm2    FS 31 28 - 44 %    Left Ventricle Relative Wall Thickness 0.73 cm    PW 1.57 (A) 0.6 - 1.1 cm    LV mass 277.08 g    LV Mass Index 121 g/m2    MV Peak E Isreal 0.54 m/s    TDI LATERAL 0.05 m/s    TDI SEPTAL 0.04 m/s    E/E' ratio 12.00 m/s    MV Peak A Isreal 0.82 m/s    TR Max Isreal 2.52 m/s    E/A ratio 0.66     E wave deceleration time 171.40 msec    LV SEPTAL E/E' RATIO 13.50 m/s    LV LATERAL E/E' RATIO 10.80 m/s    LVOT peak isreal 0.92 m/s    Left Ventricular Outflow Tract Mean Velocity 0.63 cm/s    Left Ventricular  Outflow Tract Mean Gradient 1.67 mmHg    RVDD 3.01 cm    RV S' 8.31 cm/s    TAPSE 1.72 cm    RV/LV Ratio 0.70 cm    LA size 3.27 cm    Left Atrium Minor Axis 4.65 cm    Left Atrium Major Axis 5.04 cm    LA Vol (MOD) 30.92 cm3    TYLER (MOD) 13.5 mL/m2    RA Major Axis 6.00 cm    RA Width 3.44 cm    AV mean gradient 2 mmHg    AV peak gradient 5 mmHg    Ao peak wolf 1.09 m/s    Ao VTI 20.90 cm    LVOT peak VTI 18.10 cm    AV valve area 2.88 cm²    AV Velocity Ratio 0.84     AV index (prosthetic) 0.87     JENNIFER by Velocity Ratio 2.81 cm²    MV mean gradient 1 mmHg    MV peak gradient 2 mmHg    MV stenosis pressure 1/2 time 49.71 ms    MV valve area p 1/2 method 4.43 cm2    MV valve area by continuity eq 2.73 cm2    MV VTI 22.1 cm    Triscuspid Valve Regurgitation Peak Gradient 25 mmHg    PV PEAK VELOCITY 0.89 m/s    PV peak gradient 3 mmHg    Pulmonary Valve Mean Velocity 0.64 m/s    Sinus 3.51 cm    STJ 2.71 cm    IVC diameter 1.50 cm    Mean e' 0.05 m/s    ZLVIDS -4.47     ZLVIDD -7.03     BSA 2.37 m2    TYLER 24.1 mL/m2    LA Vol 55.12 cm3    LA WIDTH 4.1 cm    TV resting pulmonary artery pressure 28 mmHg    RV TB RVSP 6 mmHg    Est. RA pres 3 mmHg    Narrative      Left Ventricle: The left ventricle is normal in size. Normal wall   thickness. There is concentric hypertrophy. Unable to assess wall motion.   There is normal systolic function. Biplane (2D) method of discs ejection   fraction is 65%.    Right Ventricle: Right ventricle was not well visualized due to poor   acoustic window. Normal right ventricular cavity size. Systolic function   is normal.    Tricuspid Valve: There is mild regurgitation.    Pulmonary Artery: The estimated pulmonary artery systolic pressure is   28 mmHg.    IVC/SVC: Normal venous pressure at 3 mmHg.    Pericardium: There is a small effusion.    Overall the study quality was technically difficult.         ANYA:  No results found for this or any previous visit.    ASSESSMENT/PLAN:            Pre-op Assessment    I have reviewed the Patient Summary Reports.     I have reviewed the Nursing Notes. I have reviewed the NPO Status.   I have reviewed the Medications.     Review of Systems  Anesthesia Hx:   History of prior surgery of interest to airway management or planning:             Social:  Former Smoker       EENT/Dental:   Carcinoma in situ of vocal cord          Cardiovascular:     Hypertension   CAD          PVD hyperlipidemia                               Pulmonary:   COPD   Shortness of breath                  Renal/:  Chronic Renal Disease, CKD   KIDNEY TRANSPLANT '07                     Physical Exam  General: Well nourished, Cooperative, Alert and Oriented    Airway:  Mallampati: III   Mouth Opening: Normal  TM Distance: Normal  Tongue: Normal  Neck ROM: Normal ROM    Dental:  Intact    Chest/Lungs:  Normal Respiratory Rate  expiratory wheezes    Heart:  Rate: Normal  Rhythm: Regular Rhythm        Anesthesia Plan  Type of Anesthesia, risks & benefits discussed:    Anesthesia Type: Gen Natural Airway, Regional, MAC  Intra-op Monitoring Plan: Standard ASA Monitors  Post Op Pain Control Plan: multimodal analgesia, IV/PO Opioids PRN and peripheral nerve block  Induction:  IV  Informed Consent: Informed consent signed with the Patient and all parties understand the risks and agree with anesthesia plan.  All questions answered.   ASA Score: 3  Day of Surgery Review of History & Physical: H&P Update referred to the surgeon/provider.  Anesthesia Plan Notes: Plan for nebulizer treatment pre op    Ready For Surgery From Anesthesia Perspective.     .

## 2025-01-16 ENCOUNTER — TELEPHONE (OUTPATIENT)
Dept: VASCULAR SURGERY | Facility: CLINIC | Age: 75
End: 2025-01-16
Payer: MEDICARE

## 2025-01-16 ENCOUNTER — HOSPITAL ENCOUNTER (OUTPATIENT)
Dept: VASCULAR SURGERY | Facility: CLINIC | Age: 75
Discharge: HOME | End: 2025-01-16
Attending: SURGERY
Payer: MEDICARE

## 2025-01-16 DIAGNOSIS — N18.6 ESRD (END STAGE RENAL DISEASE): Primary | ICD-10-CM

## 2025-01-16 DIAGNOSIS — N18.6 ESRD (END STAGE RENAL DISEASE): ICD-10-CM

## 2025-01-16 PROCEDURE — 93990 DOPPLER FLOW TESTING: CPT | Mod: S$GLB,,, | Performed by: SURGERY

## 2025-01-16 NOTE — TELEPHONE ENCOUNTER
Contacted pt to confirm surgery with Dr. Navarro tomorrow for AVG insertion. Pt states he would like to postpone surgery with Dr. Navarro as he believes his failed LUE AVF is now working because he can feel a thrill now. Nurse notified pt that this would have to be confirmed with ultrasound as last ultrasound showed AVF was occluded. Pt verbalized understanding and states he can report to [facility] for ultrasound today. Appointment scheduled, pt verified.

## 2025-01-17 ENCOUNTER — HOSPITAL ENCOUNTER (OUTPATIENT)
Facility: HOSPITAL | Age: 75
Discharge: HOME | End: 2025-01-17
Attending: SURGERY | Admitting: SURGERY
Payer: MEDICARE

## 2025-01-17 VITALS
BODY MASS INDEX: 29.98 KG/M2 | WEIGHT: 209.44 LBS | OXYGEN SATURATION: 95 % | RESPIRATION RATE: 13 BRPM | HEART RATE: 77 BPM | HEIGHT: 70 IN | SYSTOLIC BLOOD PRESSURE: 117 MMHG | TEMPERATURE: 98 F | DIASTOLIC BLOOD PRESSURE: 53 MMHG

## 2025-01-17 DIAGNOSIS — N18.6 ESRD (END STAGE RENAL DISEASE): ICD-10-CM

## 2025-01-17 PROCEDURE — 63600175 PHARM REV CODE 636 W HCPCS

## 2025-01-17 PROCEDURE — 25000242 PHARM REV CODE 250 ALT 637 W/ HCPCS: Performed by: ANESTHESIOLOGY

## 2025-01-17 PROCEDURE — D9220A PRA ANESTHESIA: Mod: ,,, | Performed by: ANESTHESIOLOGY

## 2025-01-17 PROCEDURE — 94761 N-INVAS EAR/PLS OXIMETRY MLT: CPT

## 2025-01-17 PROCEDURE — 94640 AIRWAY INHALATION TREATMENT: CPT

## 2025-01-17 PROCEDURE — 36000707: Performed by: SURGERY

## 2025-01-17 PROCEDURE — 36830 ARTERY-VEIN NONAUTOGRAFT: CPT | Mod: 79,LT,, | Performed by: SURGERY

## 2025-01-17 PROCEDURE — C1768 GRAFT, VASCULAR: HCPCS | Performed by: SURGERY

## 2025-01-17 PROCEDURE — 71000015 HC POSTOP RECOV 1ST HR: Performed by: SURGERY

## 2025-01-17 PROCEDURE — 36000706: Performed by: SURGERY

## 2025-01-17 PROCEDURE — 71000044 HC DOSC ROUTINE RECOVERY FIRST HOUR: Performed by: SURGERY

## 2025-01-17 PROCEDURE — 37000009 HC ANESTHESIA EA ADD 15 MINS: Performed by: SURGERY

## 2025-01-17 PROCEDURE — 25000003 PHARM REV CODE 250

## 2025-01-17 PROCEDURE — 63600175 PHARM REV CODE 636 W HCPCS: Performed by: ANESTHESIOLOGY

## 2025-01-17 PROCEDURE — 37000008 HC ANESTHESIA 1ST 15 MINUTES: Performed by: SURGERY

## 2025-01-17 PROCEDURE — 63600175 PHARM REV CODE 636 W HCPCS: Performed by: SURGERY

## 2025-01-17 DEVICE — VG THIN WALL 6MM X 40CM LINED
Type: IMPLANTABLE DEVICE | Site: ARM | Status: FUNCTIONAL
Brand: GORE-TEX   VASCULAR GRAFT

## 2025-01-17 RX ORDER — SODIUM CHLORIDE 0.9 % (FLUSH) 0.9 %
10 SYRINGE (ML) INJECTION
Status: DISCONTINUED | OUTPATIENT
Start: 2025-01-17 | End: 2025-01-17 | Stop reason: HOSPADM

## 2025-01-17 RX ORDER — CEFAZOLIN 2 G/1
2 INJECTION, POWDER, FOR SOLUTION INTRAMUSCULAR; INTRAVENOUS
Status: COMPLETED | OUTPATIENT
Start: 2025-01-17 | End: 2025-01-17

## 2025-01-17 RX ORDER — OXYCODONE HYDROCHLORIDE 5 MG/1
5 TABLET ORAL
Status: DISCONTINUED | OUTPATIENT
Start: 2025-01-17 | End: 2025-01-17 | Stop reason: HOSPADM

## 2025-01-17 RX ORDER — LIDOCAINE HYDROCHLORIDE 20 MG/ML
INJECTION, SOLUTION EPIDURAL; INFILTRATION; INTRACAUDAL; PERINEURAL
Status: COMPLETED | OUTPATIENT
Start: 2025-01-17 | End: 2025-01-17

## 2025-01-17 RX ORDER — LIDOCAINE HYDROCHLORIDE 20 MG/ML
INJECTION, SOLUTION EPIDURAL; INFILTRATION; INTRACAUDAL; PERINEURAL
Status: DISCONTINUED | OUTPATIENT
Start: 2025-01-17 | End: 2025-01-17

## 2025-01-17 RX ORDER — ONDANSETRON HYDROCHLORIDE 2 MG/ML
INJECTION, SOLUTION INTRAVENOUS
Status: DISCONTINUED | OUTPATIENT
Start: 2025-01-17 | End: 2025-01-17

## 2025-01-17 RX ORDER — PHENYLEPHRINE HCL IN 0.9% NACL 1 MG/10 ML
SYRINGE (ML) INTRAVENOUS
Status: DISCONTINUED | OUTPATIENT
Start: 2025-01-17 | End: 2025-01-17

## 2025-01-17 RX ORDER — HYDROMORPHONE HYDROCHLORIDE 1 MG/ML
0.2 INJECTION, SOLUTION INTRAMUSCULAR; INTRAVENOUS; SUBCUTANEOUS EVERY 5 MIN PRN
Status: DISCONTINUED | OUTPATIENT
Start: 2025-01-17 | End: 2025-01-17 | Stop reason: HOSPADM

## 2025-01-17 RX ORDER — HALOPERIDOL 5 MG/ML
0.5 INJECTION INTRAMUSCULAR EVERY 10 MIN PRN
Status: DISCONTINUED | OUTPATIENT
Start: 2025-01-17 | End: 2025-01-17 | Stop reason: HOSPADM

## 2025-01-17 RX ORDER — MIDAZOLAM HYDROCHLORIDE 1 MG/ML
.5-4 INJECTION, SOLUTION INTRAMUSCULAR; INTRAVENOUS
Status: DISCONTINUED | OUTPATIENT
Start: 2025-01-17 | End: 2025-01-17 | Stop reason: HOSPADM

## 2025-01-17 RX ORDER — HEPARIN SODIUM 1000 [USP'U]/ML
INJECTION, SOLUTION INTRAVENOUS; SUBCUTANEOUS
Status: DISCONTINUED | OUTPATIENT
Start: 2025-01-17 | End: 2025-01-17

## 2025-01-17 RX ORDER — SODIUM CHLORIDE 9 MG/ML
INJECTION, SOLUTION INTRAVENOUS CONTINUOUS
Status: DISCONTINUED | OUTPATIENT
Start: 2025-01-17 | End: 2025-01-17 | Stop reason: HOSPADM

## 2025-01-17 RX ORDER — IPRATROPIUM BROMIDE AND ALBUTEROL SULFATE 2.5; .5 MG/3ML; MG/3ML
3 SOLUTION RESPIRATORY (INHALATION)
Status: COMPLETED | OUTPATIENT
Start: 2025-01-17 | End: 2025-01-17

## 2025-01-17 RX ORDER — FENTANYL CITRATE 50 UG/ML
INJECTION, SOLUTION INTRAMUSCULAR; INTRAVENOUS
Status: DISCONTINUED | OUTPATIENT
Start: 2025-01-17 | End: 2025-01-17

## 2025-01-17 RX ORDER — OXYCODONE HYDROCHLORIDE 5 MG/1
5 TABLET ORAL EVERY 6 HOURS PRN
Qty: 8 TABLET | Refills: 0 | Status: SHIPPED | OUTPATIENT
Start: 2025-01-17

## 2025-01-17 RX ORDER — PROCHLORPERAZINE EDISYLATE 5 MG/ML
5 INJECTION INTRAMUSCULAR; INTRAVENOUS EVERY 30 MIN PRN
Status: DISCONTINUED | OUTPATIENT
Start: 2025-01-17 | End: 2025-01-17 | Stop reason: HOSPADM

## 2025-01-17 RX ORDER — HEPARIN SOD,PORCINE/0.9 % NACL 1000/500ML
INTRAVENOUS SOLUTION INTRAVENOUS
Status: DISCONTINUED | OUTPATIENT
Start: 2025-01-17 | End: 2025-01-17 | Stop reason: HOSPADM

## 2025-01-17 RX ORDER — FENTANYL CITRATE 50 UG/ML
25-200 INJECTION, SOLUTION INTRAMUSCULAR; INTRAVENOUS
Status: DISCONTINUED | OUTPATIENT
Start: 2025-01-17 | End: 2025-01-17 | Stop reason: HOSPADM

## 2025-01-17 RX ORDER — EPHEDRINE SULFATE 50 MG/ML
INJECTION, SOLUTION INTRAVENOUS
Status: DISCONTINUED | OUTPATIENT
Start: 2025-01-17 | End: 2025-01-17

## 2025-01-17 RX ORDER — GLUCAGON 1 MG
1 KIT INJECTION
Status: DISCONTINUED | OUTPATIENT
Start: 2025-01-17 | End: 2025-01-17 | Stop reason: HOSPADM

## 2025-01-17 RX ORDER — VASOPRESSIN 20 [USP'U]/ML
INJECTION, SOLUTION INTRAMUSCULAR; SUBCUTANEOUS
Status: DISCONTINUED | OUTPATIENT
Start: 2025-01-17 | End: 2025-01-17

## 2025-01-17 RX ORDER — DEXMEDETOMIDINE HYDROCHLORIDE 100 UG/ML
INJECTION, SOLUTION INTRAVENOUS
Status: DISCONTINUED | OUTPATIENT
Start: 2025-01-17 | End: 2025-01-17

## 2025-01-17 RX ORDER — PROPOFOL 10 MG/ML
VIAL (ML) INTRAVENOUS CONTINUOUS PRN
Status: DISCONTINUED | OUTPATIENT
Start: 2025-01-17 | End: 2025-01-17

## 2025-01-17 RX ADMIN — DEXMEDETOMIDINE 8 MCG: 100 INJECTION, SOLUTION, CONCENTRATE INTRAVENOUS at 11:01

## 2025-01-17 RX ADMIN — IPRATROPIUM BROMIDE AND ALBUTEROL SULFATE 3 ML: 2.5; .5 SOLUTION RESPIRATORY (INHALATION) at 10:01

## 2025-01-17 RX ADMIN — VASOPRESSIN 1 UNITS: 20 INJECTION INTRAVENOUS at 12:01

## 2025-01-17 RX ADMIN — Medication 100 MCG: at 12:01

## 2025-01-17 RX ADMIN — GLYCOPYRROLATE 0.1 MG: 0.2 INJECTION, SOLUTION INTRAMUSCULAR; INTRAVENOUS at 12:01

## 2025-01-17 RX ADMIN — SODIUM CHLORIDE: 0.9 INJECTION, SOLUTION INTRAVENOUS at 12:01

## 2025-01-17 RX ADMIN — PROPOFOL 20 MG: 10 INJECTION, EMULSION INTRAVENOUS at 11:01

## 2025-01-17 RX ADMIN — EPHEDRINE SULFATE 5 MG: 50 INJECTION INTRAVENOUS at 12:01

## 2025-01-17 RX ADMIN — MEPIVACAINE HYDROCHLORIDE 40 ML: 15 INJECTION, SOLUTION EPIDURAL; INFILTRATION at 11:01

## 2025-01-17 RX ADMIN — PHENYLEPHRINE HYDROCHLORIDE 1 MCG/KG/MIN: 10 INJECTION INTRAVENOUS at 12:01

## 2025-01-17 RX ADMIN — FENTANYL CITRATE 50 MCG: 50 INJECTION INTRAMUSCULAR; INTRAVENOUS at 11:01

## 2025-01-17 RX ADMIN — ONDANSETRON 4 MG: 2 INJECTION INTRAMUSCULAR; INTRAVENOUS at 11:01

## 2025-01-17 RX ADMIN — LIDOCAINE HYDROCHLORIDE 15 ML: 20 INJECTION, SOLUTION EPIDURAL; INFILTRATION; INTRACAUDAL; PERINEURAL at 11:01

## 2025-01-17 RX ADMIN — LIDOCAINE HYDROCHLORIDE 20 ML: 20 INJECTION, SOLUTION EPIDURAL; INFILTRATION; INTRACAUDAL; PERINEURAL at 11:01

## 2025-01-17 RX ADMIN — EPHEDRINE SULFATE 10 MG: 50 INJECTION INTRAVENOUS at 12:01

## 2025-01-17 RX ADMIN — SODIUM CHLORIDE: 0.9 INJECTION, SOLUTION INTRAVENOUS at 11:01

## 2025-01-17 RX ADMIN — Medication 200 MCG: at 12:01

## 2025-01-17 RX ADMIN — HEPARIN SODIUM 3000 UNITS: 1000 INJECTION, SOLUTION INTRAVENOUS; SUBCUTANEOUS at 12:01

## 2025-01-17 RX ADMIN — CEFAZOLIN 2 G: 330 INJECTION, POWDER, FOR SOLUTION INTRAMUSCULAR; INTRAVENOUS at 11:01

## 2025-01-17 RX ADMIN — FENTANYL CITRATE 25 MCG: 50 INJECTION INTRAMUSCULAR; INTRAVENOUS at 01:01

## 2025-01-17 RX ADMIN — PROPOFOL 25 MCG/KG/MIN: 10 INJECTION, EMULSION INTRAVENOUS at 11:01

## 2025-01-17 NOTE — TRANSFER OF CARE
"Anesthesia Transfer of Care Note    Patient: Jose Luis Manzo    Procedure(s) Performed: Procedure(s) (LRB):  INSERTION, GRAFT, ARTERIOVENOUS, UPPER EXTREMITY (Left)    Patient location: [facility]    Anesthesia Type: MAC and regional    Transport from OR: Transported from OR on 6-10 L/min O2 by face mask with adequate spontaneous ventilation    Post pain: adequate analgesia    Post assessment: no apparent anesthetic complications and tolerated procedure well    Post vital signs: stable    Level of consciousness: awake    Nausea/Vomiting: no nausea/vomiting    Complications: none    Transfer of care protocol was followed      Last vitals: Visit Vitals  BP (!) 142/83   Pulse 62   Temp 36.7 °C (98.1 °F) (Temporal)   Resp 20   Ht 5' 10" (1.778 m)   Wt 95 kg (209 lb 7 oz)   SpO2 97%   BMI 30.05 kg/m²     "

## 2025-01-17 NOTE — H&P
FOCUSED SURGICAL H&P    Jose Luis Manzo is a 74 y.o. male. MRN is [id number].    CC: Here today for the following surgical procedure(s):  INSERTION, GRAFT, ARTERIOVENOUS, UPPER EXTREMITY (Left)    HPI: For a detailed history of the patients history of present illness please refer to the last progress note. In brief, this is a 74 y.o. male with a known history of ESRD needing HD access, here today for surgical intervention. There has been no recent changes in the patients health, including fevers, chest pain, or shortness of breath, and no new medications have been started. The patient has not had anything to eat or drink for the last 8 hours.       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    COVID-19 virus detected 12/30/2020    Dependence on renal dialysis 4/18/2023    Disorder of kidney and ureter     Hyperkalemia 1/1/2021    Hyperlipidemia     Hypertension     Hypokalemia 6/23/2021    Immunosuppression 6/26/2021    Immunosuppression due to chronic steroid use 1/30/2020    Microcytic anemia 9/16/2016    Obesity (BMI 30-39.9) 1/23/2019    Oropharyngeal cancer     Pterygium     Squamous cell carcinoma 4/25/2018    Thromboembolic disorder     Unspecified disorder of kidney and ureter        Past Surgical History:   Past Surgical History:   Procedure Laterality Date    AV FISTULA PLACEMENT Right 10/22/2024    Procedure: CREATION, AV FISTULA;  Surgeon: Guanaco Navarro MD;  Location: [address];  Service: Vascular;  Laterality: Right;  RUE AVF creation    CYSTOSCOPY W/ RETROGRADES Bilateral 6/15/2022    Procedure: Cystoscopy, bilateral retrograde pyelogram, and all indicated procedures;  Surgeon: Veronica Bacon MD;  Location: [facility] OR;  Service: Urology;  Laterality: Bilateral;     "FRACTURE SURGERY Left     "lower leg" 40 years ago    KIDNEY TRANSPLANT  2007    followed by [facility] Transplant    LARYNX SURGERY  2014    Oropharyngeal Cancer x3    microlaryngoscopy      PHLEBOGRAPHY Bilateral 2022    Procedure: Venogram;  Surgeon: KENIA Mueller II, MD;  Location: [facility] CATH LAB;  Service: Vascular;  Laterality: Bilateral;    SURGICAL REMOVAL OF LESION OF ORBIT Bilateral 2020    Procedure: EXCISION, LESION, ORBIT;  Surgeon: Linda Tee MD;  Location: [facility] OR [address] [place] [address];  Service: Ophthalmology;  Laterality: Bilateral;    TIBIAL STAPLING Left             Social History:   Social History     Socioeconomic History    Marital status: Single    Number of children: 1   Tobacco Use    Smoking status: Former     Current packs/day: 0.00     Average packs/day: 1 pack/day for 20.0 years (20.0 ttl pk-yrs)     Types: Cigarettes     Start date:      Quit date:      Years since quittin.0     Passive exposure: Past    Smokeless tobacco: Never   Substance and Sexual Activity    Alcohol use: No     Alcohol/week: 0.0 standard drinks of alcohol    Drug use: No     Types: Marijuana     Comment: Occ.    Sexual activity: Not Currently     Partners: Female     Social Drivers of Health     Financial Resource Strain: Low Risk  (9/3/2024)    Overall Financial Resource Strain (CARDIA)     Difficulty of Paying Living Expenses: Not hard at all   Food Insecurity: No Food Insecurity (9/3/2024)    Hunger Vital Sign     Worried About Running Out of Food in the Last Year: Never true     Ran Out of Food in the Last Year: Never true   Transportation Needs: No Transportation Needs (9/3/2024)    TRANSPORTATION NEEDS     Transportation : No   Physical Activity: Inactive (9/3/2024)    Exercise Vital Sign     Days of Exercise per Week: 0 days     Minutes of Exercise per Session: 0 min   Stress: No Stress Concern Present (9/3/2024)    [employer] - Occupational Stress " "Questionnaire     Feeling of Stress : Not at all   Housing Stability: Low Risk  (9/3/2024)    Housing Stability Vital Sign     Unable to Pay for Housing in the Last Year: No     Homeless in the Last Year: No       Family History:   Family History   Problem Relation Name Age of Onset    Stroke Mother      Diabetes Mother      Hypertension Mother      Stroke Father      No Known Problems Sister      No Known Problems Brother x1     No Known Problems Daughter      No Known Problems Brother x1           Allergies:  Review of patient's allergies indicates:  No Known Allergies      Medications:    Current Facility-Administered Medications:     ceFAZolin 2 g, 2 g, Intravenous, On Call Procedure, Dino Quinonez MD                  Vital Signs:  Vitals:    01/17/25 1042   BP:    Pulse: 62   Resp: 20   Temp:          Physical Exam:  Neuro: awake, alert, no acute distress.  HEENT: PERRLA, neck supple, no lymphadenopathy.  Heart: regular rate/rhythm  Lungs: equal chest expansion bilaterally, no increased work of breathing on RA  Abdomen: soft, non-distended, non-tender to palpation.  Extremities: warm, well-perfused       Labs:  Lab Results   Component Value Date/Time    WBC 6.72 11/07/2024 12:10 PM    HGB 11.7 (L) 11/07/2024 12:10 PM    HCT 37.0 (L) 11/07/2024 12:10 PM     11/07/2024 12:10 PM    MCV 82 11/07/2024 12:10 PM     Lab Results   Component Value Date/Time     11/07/2024 12:10 PM    K 3.9 11/07/2024 12:10 PM     11/07/2024 12:10 PM    CO2 22 (L) 11/07/2024 12:10 PM    BUN 42 (H) 11/07/2024 12:10 PM    BUN 32 (H) 03/06/2015 07:45 AM     11/07/2024 12:10 PM    MG 2.2 09/04/2024 04:57 AM    PHOS 4.1 11/07/2024 12:10 PM     Lab Results   Component Value Date/Time    INR 1.0 09/14/2017 08:50 AM     No components found for: "TROPI"  Lab Results   Component Value Date/Time    ALT 15 09/30/2024 07:58 AM    AST 20 09/30/2024 07:58 AM    AST 24 01/18/2016 07:31 AM          Assessment/Plan:  74 y.o. " male here today for the following surgical procedure:    INSERTION, GRAFT, ARTERIOVENOUS, UPPER EXTREMITY (Left)       The indications for surgery, highlighting the risks and benefits of the procedure were discussed with the patient. These included but are not limited to swelling, bleeding, pain, infection, and adverse anesthesia-related event. I also discussed risk of injury to nearby structures. The patient seems to understand the risks, as well as the alternatives including nonoperative observation/survellience and wishes to proceed with the surgical intervention.    Patient has been examined and marked for laterality.      Joie Rapp MD  General Surgery, PGY-3

## 2025-01-17 NOTE — ANESTHESIA PROCEDURE NOTES
Left Supraclavicular single shot    Patient location during procedure: pre-op    Reason for block: primary anesthetic    Diagnosis: ESRD   Start time: 1/17/2025 11:19 AM  Timeout: 1/17/2025 11:19 AM   End time: 1/17/2025 11:35 AM    Staffing  Authorizing Provider: Jacob Pemberton MD  Performing Provider: Prabhjot Escalera MD    Staffing  Performed by: Prabhjot Escalera MD  Authorized by: Jacob Pemberton MD    Preanesthetic Checklist  Completed: patient identified, IV checked, site marked, risks and benefits discussed, surgical consent, monitors and equipment checked, pre-op evaluation and timeout performed  Peripheral Block  Patient position: supine  Prep: ChloraPrep  Patient monitoring: heart rate, cardiac monitor, continuous pulse ox, continuous capnometry and frequent blood pressure checks  Block type: supraclavicular  Laterality: left  Injection technique: single shot  Needle  Needle type: Stimuplex   Needle gauge: 20 G  Needle length: 4 in  Needle localization: anatomical landmarks and ultrasound guidance   -ultrasound image captured on disc.  Assessment  Injection assessment: negative aspiration, negative parasthesia and local visualized surrounding nerve  Paresthesia pain: none  Heart rate change: no  Slow fractionated injection: yes  Pain Tolerance: comfortable throughout block and no complaints  Medications:    Medications: mepivacaine (CARBOCAINE) injection 15 mg/mL (1.5%) - Perineural   40 mL - 1/17/2025 11:20:00 AM  lidocaine (PF) 20 mg/mL (2%) injection - Other   15 mL - 1/17/2025 11:38:00 AM    Additional Notes  A time out was conducted. Site ibrahima confirmed with team and patient. Allergies reviewed.   Vital signs stable throughout block. RN monitoring vitals throughout.   Needle advanced under continuous ultrasound guidance.  Local injected incrementally after confirming negative aspiration. No signs or symptoms of intravascular or intraneural injection noted.   No persistent paresthesias elicited or  expressed. Patient tolerated procedure well.  30mL of 1.5% Mepivacaine with epinephrine 1:300K used for the supraclavicular block.  10mL of 1.5% Mepivacaine with epinephrine 1:300K used for the ICB block  15mL of 2% Lidocaine used to supplement supraclavicular block after concern for block not setting up.

## 2025-01-17 NOTE — ANESTHESIA POSTPROCEDURE EVALUATION
Anesthesia Post Evaluation    Patient: Jose Luis Manzo    Procedure(s) Performed: Procedure(s) (LRB):  INSERTION, GRAFT, ARTERIOVENOUS, UPPER EXTREMITY (Left)    Final Anesthesia Type: general      Patient location during evaluation: [facility]  Patient participation: Yes- Able to Participate  Level of consciousness: awake and alert and oriented  Post-procedure vital signs: reviewed and stable  Pain management: adequate  Airway patency: patent    PONV status at discharge: No PONV  Anesthetic complications: no      Cardiovascular status: blood pressure returned to baseline and hemodynamically stable  Respiratory status: unassisted, spontaneous ventilation and room air  Hydration status: euvolemic  Follow-up not needed.              Vitals Value Taken Time   /53 01/17/25 1515   Temp 36.7 °C (98 °F) 01/17/25 1515   Pulse 77 01/17/25 1515   Resp 13 01/17/25 1515   SpO2 95 % 01/17/25 1515         No case tracking events are documented in the log.      Pain/James Score: James Score: 9 (1/17/2025  2:30 PM)

## 2025-01-17 NOTE — BRIEF OP NOTE
[facility] - Surgery ([facility])  Brief Operative Note    Surgery Date: 1/17/2025     Surgeons and Role:     * Guanaco Navarro MD - Primary     * Joie Rapp MD - Resident - Assisting    Pre-op Diagnosis:  ESRD (end stage renal disease) [N18.6]    Post-op Diagnosis:  Post-Op Diagnosis Codes:     * ESRD (end stage renal disease) [N18.6]    Procedure(s) (LRB):  INSERTION, GRAFT, ARTERIOVENOUS, UPPER EXTREMITY (Left)    Anesthesia: Regional    Operative Findings: LUE braciobasilic AVG inserted, palpable thrill    Estimated Blood Loss: 15 mL         Specimens:   Specimen (24h ago, onward)      None              Discharge Note    OUTCOME: Patient tolerated treatment/procedure well without complication and is now ready for discharge.    DISPOSITION: Home or Self Care    FINAL DIAGNOSIS:  ESRD (end stage renal disease)    FOLLOWUP: In clinic    DISCHARGE INSTRUCTIONS:    Discharge Procedure Orders   Diet Adult Regular     Notify your health care provider if you experience any of the following:  temperature >100.4     Notify your health care provider if you experience any of the following:  persistent nausea and vomiting or diarrhea     Notify your health care provider if you experience any of the following:  severe uncontrolled pain     Notify your health care provider if you experience any of the following:  redness, tenderness, or signs of infection (pain, swelling, redness, odor or green/yellow discharge around incision site)     Notify your health care provider if you experience any of the following:  difficulty breathing or increased cough     Weight bearing restrictions (specify):   Order Comments: Do not lift greater than 10 lbs for 4 weeks

## 2025-01-23 NOTE — OP NOTE
VASCULAR SURGERY Operative Report     Date of Operation: 1/17/25     Pre-operative Diagnosis: ESRD     Post-operative Diagnosis: same     Attending Surgeon: Dr. Guanaco Navarro MD     Resident/Fellow: Joie Rapp MD     Operation/Procedure Performed:  INSERTION, GRAFT, ARTERIOVENOUS, UPPER EXTREMITY (Left)      Anesthesia: Regional    EBL: 15 cc     Complications: none    Indications:  Patient is 74 y.o. with chronic renal insufficiency and need for long-term dialysis access.     Procedure in Detail:  After informed consent was obtained the patient was taken to the OR in supine position. Pre-operative antibiotics were administered. Regional anesthesia was administered by the anesthesia team.The left arm was prepped and draped in normal sterile fashion with chloraprep.  A time out was performed to confirm appropriate patient, site, positioning, and laterality.     An incision was made just proximal to the left AC fossa and was deepened with a combination of electrocautery, blunt dissection, and sharp dissection. The brachial artery was dissected circumferentially and controlled proximally and distally with large vessel loops. Next, an incision was made in the upper arm over the biceps crease and deepened with electrocautery, blunt dissection, and sharp dissection. The basolic vein was identified and dissected circumferentially.  A tunneler was used to pass a 6mm PTFE graft between the incisions.     3000 units of heparin were then administered intravenously. Proximal and distal clamps were applied to the brachial artery. A 6mm longitudinal arteriotomy was made on the artery. The graft was anastomosed to the artery with 6-0 Goretex suture. Prior to completion the proximal and distal clamps were released from the artery and there was brisk flow with no thrombus. Clamps were reapplied and the lumen was flushed with heparinized saline and then the anastomosis was completed. The proximal arterial clamp was released  followed by the distal clamp. No repair sutures were necessary. The graft was flushed with heparinized saline and clamped with a becky hydrogrip clamp. Next we turned our attention to the venous incision. The vessel loops around the vein were tightened and a venotomy was made in the vein. The graft was sewn to the vein with running 6-0 Goretex suture. Prior to completion, the clamp was released from the proximal graft and there was brisk flow with no thrombus. Vessel loops were released after completion of the anastomosis and there was a palpable thrill over the artery, graft, and vein. No repair sutures were necessary.     The radial artery was palpable at the wrist.   The wound was thoroughly irrigated with saline irrigation and the thrombin gelfoam removed. The deep dermis was closed with interrupted 3-0 vicryl sutures. 4-0 Monocryl was used to close the skin. The incisions were dressed with Telfa, 4x4 gauze and Tegaderm. All sponge needle and instrument counts were correct.     The patient tolerated the procedure well and was transported to the PACU for recovery.

## 2025-01-29 DIAGNOSIS — J43.9 PULMONARY EMPHYSEMA, UNSPECIFIED EMPHYSEMA TYPE: ICD-10-CM

## 2025-01-29 DIAGNOSIS — Z94.0 RENAL TRANSPLANT RECIPIENT: ICD-10-CM

## 2025-01-29 DIAGNOSIS — N18.4 STAGE 4 CHRONIC KIDNEY DISEASE: ICD-10-CM

## 2025-01-29 DIAGNOSIS — I10 ESSENTIAL HYPERTENSION: ICD-10-CM

## 2025-01-29 RX ORDER — CINACALCET 30 MG/1
30 TABLET, FILM COATED ORAL
Qty: 90 TABLET | Refills: 3 | Status: SHIPPED | OUTPATIENT
Start: 2025-01-29

## 2025-01-29 RX ORDER — NIFEDIPINE 30 MG/1
30 TABLET, EXTENDED RELEASE ORAL DAILY
Qty: 90 TABLET | Refills: 3 | Status: SHIPPED | OUTPATIENT
Start: 2025-01-29

## 2025-01-29 RX ORDER — PREDNISONE 5 MG/1
5 TABLET ORAL DAILY
Qty: 90 TABLET | Refills: 3 | Status: SHIPPED | OUTPATIENT
Start: 2025-01-29 | End: 2025-01-31 | Stop reason: SDUPTHER

## 2025-01-29 NOTE — TELEPHONE ENCOUNTER
No care due was identified.  Health [facility] Embedded Care Due Messages. Reference number: [id number].   1/29/2025 7:44:20 AM CST

## 2025-01-29 NOTE — TELEPHONE ENCOUNTER
No care due was identified.  Health [facility] Embedded Care Due Messages. Reference number: [id number].   1/29/2025 7:41:48 AM CST

## 2025-01-31 DIAGNOSIS — J43.9 PULMONARY EMPHYSEMA, UNSPECIFIED EMPHYSEMA TYPE: ICD-10-CM

## 2025-01-31 DIAGNOSIS — Z94.0 RENAL TRANSPLANT RECIPIENT: ICD-10-CM

## 2025-01-31 NOTE — TELEPHONE ENCOUNTER
No care due was identified.  [facility] Embedded Care Due Messages. Reference number: [id number].   1/31/2025 5:14:11 PM CST

## 2025-02-03 RX ORDER — PREDNISONE 5 MG/1
5 TABLET ORAL DAILY
Qty: 90 TABLET | Refills: 3 | Status: SHIPPED | OUTPATIENT
Start: 2025-02-03

## 2025-02-04 DIAGNOSIS — N18.4 CHRONIC KIDNEY DISEASE, STAGE IV (SEVERE): Primary | ICD-10-CM

## 2025-02-05 ENCOUNTER — LAB VISIT (OUTPATIENT)
Dept: LAB | Facility: HOSPITAL | Age: 75
End: 2025-02-05
Attending: STUDENT IN AN ORGANIZED HEALTH CARE EDUCATION/TRAINING PROGRAM
Payer: MEDICARE

## 2025-02-05 DIAGNOSIS — N18.4 CHRONIC KIDNEY DISEASE, STAGE IV (SEVERE): ICD-10-CM

## 2025-02-05 LAB
ALBUMIN SERPL BCP-MCNC: 4 G/DL (ref 3.5–5.2)
ANION GAP SERPL CALC-SCNC: 9 MMOL/L (ref 8–16)
BASOPHILS # BLD AUTO: 0.03 K/UL (ref 0–0.2)
BASOPHILS NFR BLD: 0.5 % (ref 0–1.9)
CALCIUM SERPL-MCNC: 10.7 MG/DL (ref 8.7–10.5)
CHLORIDE SERPL-SCNC: 106 MMOL/L (ref 95–110)
CO2 SERPL-SCNC: 23 MMOL/L (ref 23–29)
CREAT SERPL-MCNC: 4.1 MG/DL (ref 0.5–1.4)
DIFFERENTIAL METHOD BLD: ABNORMAL
EOSINOPHIL # BLD AUTO: 0.1 K/UL (ref 0–0.5)
EOSINOPHIL NFR BLD: 1.1 % (ref 0–8)
ERYTHROCYTE [DISTWIDTH] IN BLOOD BY AUTOMATED COUNT: 15.1 % (ref 11.5–14.5)
EST. GFR  (NO RACE VARIABLE): 14.5 ML/MIN/1.73 M^2
GLUCOSE SERPL-MCNC: 114 MG/DL (ref 70–110)
HCT VFR BLD AUTO: 37.2 % (ref 40–54)
HGB BLD-MCNC: 11.5 G/DL (ref 14–18)
IMM GRANULOCYTES # BLD AUTO: 0.04 K/UL (ref 0–0.04)
IMM GRANULOCYTES NFR BLD AUTO: 0.6 % (ref 0–0.5)
LYMPHOCYTES # BLD AUTO: 1.6 K/UL (ref 1–4.8)
LYMPHOCYTES NFR BLD: 26.5 % (ref 18–48)
MCH RBC QN AUTO: 25.8 PG (ref 27–31)
MCHC RBC AUTO-ENTMCNC: 30.9 G/DL (ref 32–36)
MCV RBC AUTO: 83 FL (ref 82–98)
MONOCYTES # BLD AUTO: 0.7 K/UL (ref 0.3–1)
MONOCYTES NFR BLD: 11.7 % (ref 4–15)
NEUTROPHILS # BLD AUTO: 3.7 K/UL (ref 1.8–7.7)
NEUTROPHILS NFR BLD: 59.6 % (ref 38–73)
NRBC BLD-RTO: 0 /100 WBC
PHOSPHATE SERPL-MCNC: 3.9 MG/DL (ref 2.7–4.5)
PLATELET # BLD AUTO: 196 K/UL (ref 150–450)
PMV BLD AUTO: 10.5 FL (ref 9.2–12.9)
POTASSIUM SERPL-SCNC: 3.6 MMOL/L (ref 3.5–5.1)
RBC # BLD AUTO: 4.46 M/UL (ref 4.6–6.2)
SODIUM SERPL-SCNC: 138 MMOL/L (ref 136–145)
UUN UR-MCNC: 36 MG/DL (ref 2–20)
WBC # BLD AUTO: 6.16 K/UL (ref 3.9–12.7)

## 2025-02-05 PROCEDURE — 36415 COLL VENOUS BLD VENIPUNCTURE: CPT | Mod: PN | Performed by: STUDENT IN AN ORGANIZED HEALTH CARE EDUCATION/TRAINING PROGRAM

## 2025-02-05 PROCEDURE — 85025 COMPLETE CBC W/AUTO DIFF WBC: CPT | Mod: PN | Performed by: STUDENT IN AN ORGANIZED HEALTH CARE EDUCATION/TRAINING PROGRAM

## 2025-02-05 PROCEDURE — 80069 RENAL FUNCTION PANEL: CPT | Mod: PN | Performed by: STUDENT IN AN ORGANIZED HEALTH CARE EDUCATION/TRAINING PROGRAM

## 2025-02-06 ENCOUNTER — HOSPITAL ENCOUNTER (INPATIENT)
Facility: HOSPITAL | Age: 75
LOS: 1 days | Discharge: HOME | DRG: 253 | End: 2025-02-08
Attending: EMERGENCY MEDICINE | Admitting: FAMILY MEDICINE
Payer: MEDICARE

## 2025-02-06 ENCOUNTER — OFFICE VISIT (OUTPATIENT)
Dept: NEPHROLOGY | Facility: CLINIC | Age: 75
End: 2025-02-06
Payer: MEDICARE

## 2025-02-06 ENCOUNTER — ANESTHESIA EVENT (OUTPATIENT)
Dept: SURGERY | Facility: HOSPITAL | Age: 75
DRG: 253 | End: 2025-02-06
Payer: MEDICARE

## 2025-02-06 DIAGNOSIS — M89.9: ICD-10-CM

## 2025-02-06 DIAGNOSIS — M79.89 SWELLING OF UPPER ARM: ICD-10-CM

## 2025-02-06 DIAGNOSIS — R60.0 EDEMA OF UPPER EXTREMITY: ICD-10-CM

## 2025-02-06 DIAGNOSIS — R35.1 BENIGN PROSTATIC HYPERPLASIA WITH NOCTURIA: ICD-10-CM

## 2025-02-06 DIAGNOSIS — R07.9 CHEST PAIN: ICD-10-CM

## 2025-02-06 DIAGNOSIS — N17.9 AKI (ACUTE KIDNEY INJURY): ICD-10-CM

## 2025-02-06 DIAGNOSIS — S41.109A: ICD-10-CM

## 2025-02-06 DIAGNOSIS — Z94.0 RENAL TRANSPLANT RECIPIENT: ICD-10-CM

## 2025-02-06 DIAGNOSIS — Z79.899 IMMUNOSUPPRESSIVE MANAGEMENT ENCOUNTER FOLLOWING KIDNEY TRANSPLANT: ICD-10-CM

## 2025-02-06 DIAGNOSIS — M79.89 LEFT ARM SWELLING: ICD-10-CM

## 2025-02-06 DIAGNOSIS — E55.9 VITAMIN D DEFICIENCY DISEASE: ICD-10-CM

## 2025-02-06 DIAGNOSIS — Z94.0 IMMUNOSUPPRESSIVE MANAGEMENT ENCOUNTER FOLLOWING KIDNEY TRANSPLANT: ICD-10-CM

## 2025-02-06 DIAGNOSIS — T81.30XA WOUND DEHISCENCE: ICD-10-CM

## 2025-02-06 DIAGNOSIS — N40.1 BENIGN PROSTATIC HYPERPLASIA WITH NOCTURIA: ICD-10-CM

## 2025-02-06 DIAGNOSIS — M79.89 ARM SWELLING: ICD-10-CM

## 2025-02-06 DIAGNOSIS — Z13.6 SCREENING FOR CARDIOVASCULAR CONDITION: ICD-10-CM

## 2025-02-06 DIAGNOSIS — R60.0 EDEMA OF EXTREMITY: ICD-10-CM

## 2025-02-06 DIAGNOSIS — I10 HYPERTENSION, UNSPECIFIED TYPE: ICD-10-CM

## 2025-02-06 DIAGNOSIS — N18.5: ICD-10-CM

## 2025-02-06 DIAGNOSIS — Z78.9 PROBLEM WITH VASCULAR ACCESS: Primary | ICD-10-CM

## 2025-02-06 DIAGNOSIS — N18.5 CKD (CHRONIC KIDNEY DISEASE) STAGE 5, GFR LESS THAN 15 ML/MIN: Primary | ICD-10-CM

## 2025-02-06 DIAGNOSIS — E83.9: ICD-10-CM

## 2025-02-06 PROBLEM — T14.8XXA INFECTED WOUND: Status: ACTIVE | Noted: 2025-02-06

## 2025-02-06 PROBLEM — L08.9 INFECTED WOUND: Status: ACTIVE | Noted: 2025-02-06

## 2025-02-06 LAB
ALBUMIN SERPL BCP-MCNC: 3.3 G/DL (ref 3.5–5.2)
ALP SERPL-CCNC: 112 U/L (ref 40–150)
ALT SERPL W/O P-5'-P-CCNC: 9 U/L (ref 10–44)
ANION GAP SERPL CALC-SCNC: 10 MMOL/L (ref 8–16)
AST SERPL-CCNC: 25 U/L (ref 10–40)
BACTERIA #/AREA URNS AUTO: NORMAL /HPF
BASOPHILS # BLD AUTO: 0.02 K/UL (ref 0–0.2)
BASOPHILS NFR BLD: 0.3 % (ref 0–1.9)
BILIRUB SERPL-MCNC: 0.2 MG/DL (ref 0.1–1)
BILIRUB UR QL STRIP: NEGATIVE
BIPAP: 0
BUN SERPL-MCNC: 38 MG/DL (ref 8–23)
CALCIUM SERPL-MCNC: 10.3 MG/DL (ref 8.7–10.5)
CHLORIDE SERPL-SCNC: 109 MMOL/L (ref 95–110)
CLARITY UR REFRACT.AUTO: CLEAR
CO2 SERPL-SCNC: 20 MMOL/L (ref 23–29)
COLOR UR AUTO: YELLOW
CORRECTED TEMPERATURE (PCO2): 46.9 MMHG
CORRECTED TEMPERATURE (PH): 7.32
CORRECTED TEMPERATURE (PO2): 25.7 MMHG
CREAT SERPL-MCNC: 3.7 MG/DL (ref 0.5–1.4)
DIFFERENTIAL METHOD BLD: ABNORMAL
EOSINOPHIL # BLD AUTO: 0.1 K/UL (ref 0–0.5)
EOSINOPHIL NFR BLD: 0.9 % (ref 0–8)
ERYTHROCYTE [DISTWIDTH] IN BLOOD BY AUTOMATED COUNT: 15.2 % (ref 11.5–14.5)
EST. GFR  (NO RACE VARIABLE): 16.4 ML/MIN/1.73 M^2
FIO2: 21 %
GLUCOSE SERPL-MCNC: 99 MG/DL (ref 70–110)
GLUCOSE UR QL STRIP: NEGATIVE
HCT VFR BLD AUTO: 35.7 % (ref 40–54)
HCT VFR BLD CALC: 35.9 %
HGB BLD-MCNC: 10.9 G/DL (ref 14–18)
HGB UR QL STRIP: NEGATIVE
HYALINE CASTS UR QL AUTO: 1 /LPF
IMM GRANULOCYTES # BLD AUTO: 0.04 K/UL (ref 0–0.04)
IMM GRANULOCYTES NFR BLD AUTO: 0.7 % (ref 0–0.5)
KETONES UR QL STRIP: NEGATIVE
LDH SERPL L TO P-CCNC: 1.6 MMOL/L
LEUKOCYTE ESTERASE UR QL STRIP: NEGATIVE
LYMPHOCYTES # BLD AUTO: 1.2 K/UL (ref 1–4.8)
LYMPHOCYTES NFR BLD: 21.3 % (ref 18–48)
MCH RBC QN AUTO: 25.5 PG (ref 27–31)
MCHC RBC AUTO-ENTMCNC: 30.5 G/DL (ref 32–36)
MCV RBC AUTO: 83 FL (ref 82–98)
MICROSCOPIC COMMENT: NORMAL
MONOCYTES # BLD AUTO: 0.6 K/UL (ref 0.3–1)
MONOCYTES NFR BLD: 10 % (ref 4–15)
NEUTROPHILS # BLD AUTO: 3.9 K/UL (ref 1.8–7.7)
NEUTROPHILS NFR BLD: 66.8 % (ref 38–73)
NITRITE UR QL STRIP: NEGATIVE
NRBC BLD-RTO: 0 /100 WBC
PCO2 BLDA: 46.9 MMHG
PH SMN: 7.32 [PH]
PH UR STRIP: 6 [PH] (ref 5–8)
PLATELET # BLD AUTO: 182 K/UL (ref 150–450)
PMV BLD AUTO: 11 FL (ref 9.2–12.9)
PO2 BLDA: 25.7 MMHG
POC BASE DEFICIT: -2.2 MMOL/L
POC HCO3: 21.6 MMOL/L
POC IONIZED CALCIUM: 1.3 MMOL/L (ref 1.06–1.42)
POC PERFORMED BY: NORMAL
POC TEMPERATURE: 37 C
POTASSIUM BLD-SCNC: 3.8 MMOL/L (ref 3.5–5.1)
POTASSIUM SERPL-SCNC: 4.2 MMOL/L (ref 3.5–5.1)
PROCALCITONIN SERPL IA-MCNC: 0.08 NG/ML
PROT SERPL-MCNC: 8.3 G/DL (ref 6–8.4)
PROT UR QL STRIP: ABNORMAL
RBC # BLD AUTO: 4.28 M/UL (ref 4.6–6.2)
RBC #/AREA URNS AUTO: 1 /HPF (ref 0–4)
SODIUM BLD-SCNC: 143 MMOL/L (ref 136–145)
SODIUM SERPL-SCNC: 139 MMOL/L (ref 136–145)
SP GR UR STRIP: 1.01 (ref 1–1.03)
SPECIMEN SOURCE: NORMAL
SQUAMOUS #/AREA URNS AUTO: 1 /HPF
URN SPEC COLLECT METH UR: ABNORMAL
WBC # BLD AUTO: 5.81 K/UL (ref 3.9–12.7)
WBC #/AREA URNS AUTO: 1 /HPF (ref 0–5)

## 2025-02-06 PROCEDURE — 25000003 PHARM REV CODE 250: Performed by: PHYSICIAN ASSISTANT

## 2025-02-06 PROCEDURE — 93005 ELECTROCARDIOGRAM TRACING: CPT

## 2025-02-06 PROCEDURE — 80053 COMPREHEN METABOLIC PANEL: CPT | Performed by: PHYSICIAN ASSISTANT

## 2025-02-06 PROCEDURE — 99214 OFFICE O/P EST MOD 30 MIN: CPT | Mod: GC,S$GLB,, | Performed by: STUDENT IN AN ORGANIZED HEALTH CARE EDUCATION/TRAINING PROGRAM

## 2025-02-06 PROCEDURE — 99285 EMERGENCY DEPT VISIT HI MDM: CPT | Mod: 25

## 2025-02-06 PROCEDURE — 25000003 PHARM REV CODE 250: Performed by: EMERGENCY MEDICINE

## 2025-02-06 PROCEDURE — 82803 BLOOD GASES ANY COMBINATION: CPT

## 2025-02-06 PROCEDURE — 85025 COMPLETE CBC W/AUTO DIFF WBC: CPT | Performed by: PHYSICIAN ASSISTANT

## 2025-02-06 PROCEDURE — 99900035 HC TECH TIME PER 15 MIN (STAT)

## 2025-02-06 PROCEDURE — 81001 URINALYSIS AUTO W/SCOPE: CPT | Performed by: PHYSICIAN ASSISTANT

## 2025-02-06 PROCEDURE — 83605 ASSAY OF LACTIC ACID: CPT

## 2025-02-06 PROCEDURE — 96365 THER/PROPH/DIAG IV INF INIT: CPT

## 2025-02-06 PROCEDURE — 87070 CULTURE OTHR SPECIMN AEROBIC: CPT | Performed by: PHYSICIAN ASSISTANT

## 2025-02-06 PROCEDURE — 87040 BLOOD CULTURE FOR BACTERIA: CPT | Mod: 59 | Performed by: PHYSICIAN ASSISTANT

## 2025-02-06 PROCEDURE — 93010 ELECTROCARDIOGRAM REPORT: CPT | Mod: ,,, | Performed by: INTERNAL MEDICINE

## 2025-02-06 PROCEDURE — 3066F NEPHROPATHY DOC TX: CPT | Mod: CPTII,GC,S$GLB, | Performed by: STUDENT IN AN ORGANIZED HEALTH CARE EDUCATION/TRAINING PROGRAM

## 2025-02-06 PROCEDURE — 87186 SC STD MICRODIL/AGAR DIL: CPT | Performed by: PHYSICIAN ASSISTANT

## 2025-02-06 PROCEDURE — 84145 PROCALCITONIN (PCT): CPT | Performed by: PHYSICIAN ASSISTANT

## 2025-02-06 PROCEDURE — 87077 CULTURE AEROBIC IDENTIFY: CPT | Performed by: PHYSICIAN ASSISTANT

## 2025-02-06 PROCEDURE — 63600175 PHARM REV CODE 636 W HCPCS: Performed by: EMERGENCY MEDICINE

## 2025-02-06 PROCEDURE — 87075 CULTR BACTERIA EXCEPT BLOOD: CPT | Performed by: PHYSICIAN ASSISTANT

## 2025-02-06 PROCEDURE — G0378 HOSPITAL OBSERVATION PER HR: HCPCS

## 2025-02-06 PROCEDURE — 96366 THER/PROPH/DIAG IV INF ADDON: CPT

## 2025-02-06 RX ORDER — METOPROLOL SUCCINATE 100 MG/1
100 TABLET, EXTENDED RELEASE ORAL DAILY
Status: DISCONTINUED | OUTPATIENT
Start: 2025-02-07 | End: 2025-02-08 | Stop reason: HOSPADM

## 2025-02-06 RX ORDER — FUROSEMIDE 40 MG/1
40 TABLET ORAL DAILY
Status: DISCONTINUED | OUTPATIENT
Start: 2025-02-07 | End: 2025-02-08 | Stop reason: HOSPADM

## 2025-02-06 RX ORDER — POLYETHYLENE GLYCOL 3350 17 G/17G
17 POWDER, FOR SOLUTION ORAL DAILY PRN
Status: DISCONTINUED | OUTPATIENT
Start: 2025-02-06 | End: 2025-02-08 | Stop reason: HOSPADM

## 2025-02-06 RX ORDER — LANOLIN ALCOHOL/MO/W.PET/CERES
1 CREAM (GRAM) TOPICAL DAILY
Status: DISCONTINUED | OUTPATIENT
Start: 2025-02-07 | End: 2025-02-08 | Stop reason: HOSPADM

## 2025-02-06 RX ORDER — VANCOMYCIN 2 GRAM/500 ML IN 0.9 % SODIUM CHLORIDE INTRAVENOUS
20 ONCE
Status: COMPLETED | OUTPATIENT
Start: 2025-02-06 | End: 2025-02-06

## 2025-02-06 RX ORDER — IBUPROFEN 200 MG
24 TABLET ORAL
Status: DISCONTINUED | OUTPATIENT
Start: 2025-02-06 | End: 2025-02-08 | Stop reason: HOSPADM

## 2025-02-06 RX ORDER — IPRATROPIUM BROMIDE AND ALBUTEROL SULFATE 2.5; .5 MG/3ML; MG/3ML
3 SOLUTION RESPIRATORY (INHALATION) EVERY 4 HOURS PRN
Status: DISCONTINUED | OUTPATIENT
Start: 2025-02-06 | End: 2025-02-08 | Stop reason: HOSPADM

## 2025-02-06 RX ORDER — TALC
6 POWDER (GRAM) TOPICAL NIGHTLY PRN
Status: DISCONTINUED | OUTPATIENT
Start: 2025-02-06 | End: 2025-02-08 | Stop reason: HOSPADM

## 2025-02-06 RX ORDER — PROMETHAZINE HYDROCHLORIDE 25 MG/1
25 TABLET ORAL EVERY 6 HOURS PRN
Status: DISCONTINUED | OUTPATIENT
Start: 2025-02-06 | End: 2025-02-08 | Stop reason: HOSPADM

## 2025-02-06 RX ORDER — ACETAMINOPHEN 325 MG/1
650 TABLET ORAL EVERY 4 HOURS PRN
Status: DISCONTINUED | OUTPATIENT
Start: 2025-02-06 | End: 2025-02-08 | Stop reason: HOSPADM

## 2025-02-06 RX ORDER — ONDANSETRON 8 MG/1
8 TABLET, ORALLY DISINTEGRATING ORAL EVERY 8 HOURS PRN
Status: DISCONTINUED | OUTPATIENT
Start: 2025-02-06 | End: 2025-02-08 | Stop reason: HOSPADM

## 2025-02-06 RX ORDER — ATORVASTATIN CALCIUM 20 MG/1
20 TABLET, FILM COATED ORAL NIGHTLY
Status: DISCONTINUED | OUTPATIENT
Start: 2025-02-06 | End: 2025-02-08 | Stop reason: HOSPADM

## 2025-02-06 RX ORDER — SIROLIMUS 1 MG/1
3 TABLET, FILM COATED ORAL DAILY
Status: DISCONTINUED | OUTPATIENT
Start: 2025-02-07 | End: 2025-02-08 | Stop reason: HOSPADM

## 2025-02-06 RX ORDER — GLUCAGON 1 MG
1 KIT INJECTION
Status: DISCONTINUED | OUTPATIENT
Start: 2025-02-06 | End: 2025-02-08 | Stop reason: HOSPADM

## 2025-02-06 RX ORDER — INSULIN ASPART 100 [IU]/ML
0-5 INJECTION, SOLUTION INTRAVENOUS; SUBCUTANEOUS
Status: DISCONTINUED | OUTPATIENT
Start: 2025-02-06 | End: 2025-02-08 | Stop reason: HOSPADM

## 2025-02-06 RX ORDER — HYDRALAZINE HYDROCHLORIDE 25 MG/1
100 TABLET, FILM COATED ORAL EVERY 8 HOURS
Status: DISCONTINUED | OUTPATIENT
Start: 2025-02-07 | End: 2025-02-07

## 2025-02-06 RX ORDER — IBUPROFEN 200 MG
16 TABLET ORAL
Status: DISCONTINUED | OUTPATIENT
Start: 2025-02-06 | End: 2025-02-08 | Stop reason: HOSPADM

## 2025-02-06 RX ORDER — PREDNISONE 2.5 MG/1
5 TABLET ORAL DAILY
Status: DISCONTINUED | OUTPATIENT
Start: 2025-02-07 | End: 2025-02-08 | Stop reason: HOSPADM

## 2025-02-06 RX ADMIN — ATORVASTATIN CALCIUM 20 MG: 20 TABLET, FILM COATED ORAL at 10:02

## 2025-02-06 RX ADMIN — VANCOMYCIN HYDROCHLORIDE 2000 MG: 10 INJECTION, POWDER, LYOPHILIZED, FOR SOLUTION INTRAVENOUS at 07:02

## 2025-02-06 NOTE — HPI
Mr. Jose Luis Manzo is a 74 y.o. male with a history of end-stage renal disease previously on hemodialysis, prior history of DVT on Eliquis, prior oropharyngeal cancer status post radiation and resection, who had previously undergone a right arm AV fistula creation, followed by left upper extremity AV graft creation on 01/17.  Since his surgery, he has noticed increased left arm swelling.  Additionally notes that there has been significant oozing from his left antecubital fossa incision.  Of note, he had a previous left chest wall PermCath placed.      w/ known ESKD 2/2 hypertensive nephrosclerosis previously on HD who is now s/p kidney transplant in 2007, he has developed post-transplant CKD IV likely 2/2 HTN and chronic CNI use, he is in remission of oropharyngeal CA  (2015) s/p radiation & resection, DVT on Eliquis, Hyperparathyroidism, COVID-19 (2021), BPH, and obesity.        Clinic visit 02/06/24; Had his AC fossa AVF creation on 10/22/24 which unfortunately has failed. He subsequently underwent left upper extremity AVG placement with Dr. Navarro on 1/17/25. Creatinine increased from his baseline up to 4.1 on yesterday's labs. Pt denies: Fever, chills, shortness of breath, chest pain, palpitations, nausea, vomiting, diarrhea, abd pain, hematuria, dysuria, urinary frequency or urgency, headaches, visual disturbances or weakness. He reports compliance with his Sirolimus 3 mg QD.

## 2025-02-06 NOTE — ASSESSMENT & PLAN NOTE
Patient underwent AVG placement on 1/17/25. Since this time he has had severe swelling over the entire left extremity extending from the shoulder to the finger tips associated with persistent pain and discomfort. He also notes continued draining from the surgical site which soaks several bandages and dressings throughout the day.     Recommendations  -Given the degree of edema, pain, and drainage, I have directed him to proceed to the ER for ultrasound and vascular surgery evaluation.  -Recommend infectious work-up with blood cultures.

## 2025-02-06 NOTE — SUBJECTIVE & OBJECTIVE
"(Not in a hospital admission)      Review of patient's allergies indicates:  No Known Allergies    Past Medical History:   Diagnosis Date    Acute hypoxemic respiratory failure due to COVID-19 12/30/2020    Anticoagulant long-term use     BPH (benign prostatic hypertrophy)     Cancer     vocal cords    Claudication of right lower extremity 3/10/2016    COVID-19 virus detected 12/30/2020    Dependence on renal dialysis 4/18/2023    Disorder of kidney and ureter     Hyperkalemia 1/1/2021    Hyperlipidemia     Hypertension     Hypokalemia 6/23/2021    Immunosuppression 6/26/2021    Immunosuppression due to chronic steroid use 1/30/2020    Microcytic anemia 9/16/2016    Obesity (BMI 30-39.9) 1/23/2019    Oropharyngeal cancer     Pterygium     Squamous cell carcinoma 4/25/2018    Thromboembolic disorder     Unspecified disorder of kidney and ureter      Past Surgical History:   Procedure Laterality Date    AV FISTULA PLACEMENT Right 10/22/2024    Procedure: CREATION, AV FISTULA;  Surgeon: Guanaco Navarro MD;  Location: [facility] OR [address] [place] [address];  Service: Vascular;  Laterality: Right;  RUE AVF creation    CYSTOSCOPY W/ RETROGRADES Bilateral 6/15/2022    Procedure: Cystoscopy, bilateral retrograde pyelogram, and all indicated procedures;  Surgeon: Veronica Bacon MD;  Location: [facility];  Service: Urology;  Laterality: Bilateral;    FRACTURE SURGERY Left     "lower leg" 40 years ago    INSERTION, GRAFT, ARTERIOVENOUS, UPPER EXTREMITY Left 1/17/2025    Procedure: INSERTION, GRAFT, ARTERIOVENOUS, UPPER EXTREMITY;  Surgeon: Guanaco Navarro MD;  Location: [address];  Service: Vascular;  Laterality: Left;    KIDNEY TRANSPLANT  2007    followed by [facility] Transplant    LARYNX SURGERY  11/2014    Oropharyngeal Cancer x3    microlaryngoscopy      PHLEBOGRAPHY Bilateral 8/2/2022    Procedure: Venogram;  Surgeon: KENIA Mueller II, MD;  Location: [facility] CATH LAB;  Service: Vascular;  Laterality: Bilateral;    SURGICAL REMOVAL OF LESION " OF ORBIT Bilateral 2020    Procedure: EXCISION, LESION, ORBIT;  Surgeon: Linda Tee MD;  [facility];  Service: Ophthalmology;  Laterality: Bilateral;    TIBIAL STAPLING Left           Family History       Problem Relation (Age of Onset)    Diabetes Mother    Hypertension Mother    No Known Problems Sister, Brother, Daughter, Brother    Stroke Mother, Father          Tobacco Use    Smoking status: Former     Current packs/day: 0.00     Average packs/day: 1 pack/day for 20.0 years (20.0 ttl pk-yrs)     Types: Cigarettes     Start date:      Quit date:      Years since quittin.1     Passive exposure: Past    Smokeless tobacco: Never   Substance and Sexual Activity    Alcohol use: No     Alcohol/week: 0.0 standard drinks of alcohol    Drug use: No     Types: Marijuana     Comment: Occ.    Sexual activity: Not Currently     Partners: Female     Review of Systems   Constitutional:  Negative for activity change and appetite change.   Cardiovascular:  Negative for chest pain and leg swelling.   All other systems reviewed and are negative.    Objective:     Vital Signs (Most Recent):  Temp: 97.8 °F (36.6 °C) (25 1528)  Pulse: 88 (25 1528)  Resp: 17 (25 1528)  BP: 128/63 (25 1528)  SpO2: 100 % (25 1528) Vital Signs (24h Range):  Temp:  [97.8 °F (36.6 °C)] 97.8 °F (36.6 °C)  Pulse:  [88] 88  Resp:  [17] 17  SpO2:  [100 %] 100 %  BP: (128)/(63) 128/63     Weight: 95 kg (209 lb 7 oz)  Body mass index is 30.05 kg/m².      Physical Exam  HENT:      Head: Normocephalic.   Eyes:      Pupils: Pupils are equal, round, and reactive to light.   Cardiovascular:      Rate and Rhythm: Normal rate.      Comments: LUE with 2+ edema.     Lower incision with superficial dehiscence present.       Neurological:      Mental Status: He is alert.          Significant Labs:  All pertinent labs from the last 24 hours have been reviewed.    Significant Diagnostics:  I have reviewed  all pertinent imaging results/findings within the past 24 hours.

## 2025-02-06 NOTE — ASSESSMENT & PLAN NOTE
74M with a history of end-stage renal disease, who is recently status post left upper extremity AV graft on 01/17/25, now presenting with severe left upper extremity swelling, and dehiscence of brachial incision.    - Recommend admission to Medicine for medical comorbidities.  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  - Will plan for left upper extremity fistulogram, with possible intervention, possible graft ligation unsuccessful to reduce arm swelling.   - Please keep NPO at midnight.

## 2025-02-06 NOTE — PROGRESS NOTES
Subjective     Chief Complaint: CKD 4    History of Present Illness:  Mr. Jose Luis Manzo is a 74 y.o. 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      Clinic visit 02/06/24; Had his AC fossa AVF creation on 10/22/24 which unfortunately has failed. He subsequently underwent left upper extremity AVG placement with Dr. Navarro on 1/17/25. Creatinine increased from his baseline up to 4.1 on yesterday's labs. Pt denies: Fever, chills, shortness of breath, chest pain, palpitations, nausea, vomiting, diarrhea, abd pain, hematuria, dysuria, urinary frequency or urgency, headaches, visual disturbances or weakness. He reports compliance with his Sirolimus 3 mg QD.         HTN, hyperlipidemia:   Pt monitors blood pressure at home and his SBPs are usually in 120s/80's  Meds: Clonidine patch weekly, atorvastatin, metoprolol 100mg daily, Hydralazine 100 mg BID        CKD IV  2/2 hypertensive nephrosclerosis & chronic CNI exposure   Baseline sCr 3.3-3.6 (in 2022)  2021 pt had COVID related CHANG  CNI toxicity in the past   On Dr. 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   Meds: Sodium bicarb 1950 mg BID.      Kidney Transplant:   Done in [place]   CNI toxicity in the past   Meds: sirolimus 3mg daily, prednisone 5mg daily,   Does not follow with kidney transplant team       Oropharyngeal CA-follows with ENT, not on any current medications/treatments      CKD education class:   [x] Referred  [x]  Attended    CKD nutrition education  [x] Referred  [x] Attended     KRT planning  [x] Vascular surgery referral for vein mapping  [x] Vascular surgery referral for access creation       Review of Systems   Constitutional:  Negative for diaphoresis, fever and weight loss.   HENT:  Negative for hearing loss.    Respiratory:  Negative for cough.    Cardiovascular:  Negative for chest pain, orthopnea, claudication and PND.   Gastrointestinal:  Negative for abdominal pain, blood in stool, constipation, diarrhea and melena.   Genitourinary:  Negative for dysuria.   Musculoskeletal:         Positive for left arm swelling and pain   Skin:  Negative for itching and rash.   Neurological:  Negative for dizziness and headaches.   Psychiatric/Behavioral:  Negative for depression.        PAST HISTORY:     Past Medical History:   Diagnosis Date    Acute hypoxemic respiratory failure due to COVID-19 12/30/2020    Anticoagulant long-term use     BPH (benign prostatic hypertrophy)     Cancer     vocal cords    Claudication of right lower extremity 3/10/2016    COVID-19 virus detected 12/30/2020    Dependence on renal dialysis 4/18/2023    Disorder of kidney and ureter     Hyperkalemia 1/1/2021    Hyperlipidemia     Hypertension     Hypokalemia 6/23/2021    Immunosuppression 6/26/2021    Immunosuppression due to chronic steroid use 1/30/2020    Microcytic anemia 9/16/2016    Obesity (BMI 30-39.9) 1/23/2019    Oropharyngeal cancer     Pterygium     Squamous cell carcinoma 4/25/2018    Thromboembolic disorder     Unspecified disorder of kidney and ureter        Past Surgical History:   Procedure Laterality Date    AV FISTULA PLACEMENT Right 10/22/2024    Procedure: CREATION, AV FISTULA;  Surgeon: Guanaco Navarro MD;  Location: [facility] OR [address] [place] [address];  Service: Vascular;  Laterality: Right;  RUE AVF creation    CYSTOSCOPY W/ RETROGRADES Bilateral 6/15/2022    Procedure: Cystoscopy, bilateral retrograde pyelogram, and all indicated procedures;   "Surgeon: Veronica Bacon MD;  Location: [facility];  Service: Urology;  Laterality: Bilateral;    FRACTURE SURGERY Left     "lower leg" 40 years ago    INSERTION, GRAFT, ARTERIOVENOUS, UPPER EXTREMITY Left 2025    Procedure: INSERTION, GRAFT, ARTERIOVENOUS, UPPER EXTREMITY;  Surgeon: Guanaco Navarro MD;  Location: [address];  Service: Vascular;  Laterality: Left;    KIDNEY TRANSPLANT      followed by [facility] Transplant    LARYNX SURGERY  2014    Oropharyngeal Cancer x3    microlaryngoscopy      PHLEBOGRAPHY Bilateral 2022    Procedure: Venogram;  Surgeon: KENIA Mueller II, MD;  Location: [facility] CATH LAB;  Service: Vascular;  Laterality: Bilateral;    SURGICAL REMOVAL OF LESION OF ORBIT Bilateral 2020    Procedure: EXCISION, LESION, ORBIT;  Surgeon: Linda Tee MD;  Location: [address];  Service: Ophthalmology;  Laterality: Bilateral;    TIBIAL STAPLING Left             Family History   Problem Relation Name Age of Onset    Stroke Mother      Diabetes Mother      Hypertension Mother      Stroke Father      No Known Problems Sister      No Known Problems Brother x1     No Known Problems Daughter      No Known Problems Brother x1        Social History     Socioeconomic History    Marital status: Single    Number of children: 1   Tobacco Use    Smoking status: Former     Current packs/day: 0.00     Average packs/day: 1 pack/day for 20.0 years (20.0 ttl pk-yrs)     Types: Cigarettes     Start date:      Quit date:      Years since quittin.1     Passive exposure: Past    Smokeless tobacco: Never   Substance and Sexual Activity    Alcohol use: No     Alcohol/week: 0.0 standard drinks of alcohol    Drug use: No     Types: Marijuana     Comment: Occ.    Sexual activity: Not Currently     Partners: Female     Social Drivers of Health     Financial Resource Strain: Low Risk  (9/3/2024)    Overall Financial Resource Strain (CARDIA)     Difficulty of Paying Living Expenses: Not " hard at all   Food Insecurity: No Food Insecurity (9/3/2024)    Hunger Vital Sign     Worried About Running Out of Food in the Last Year: Never true     Ran Out of Food in the Last Year: Never true   Transportation Needs: No Transportation Needs (9/3/2024)    TRANSPORTATION NEEDS     Transportation : No   Physical Activity: Inactive (9/3/2024)    Exercise Vital Sign     Days of Exercise per Week: 0 days     Minutes of Exercise per Session: 0 min   Stress: No Stress Concern Present (9/3/2024)    [employer] - Occupational Stress Questionnaire     Feeling of Stress : Not at all   Housing Stability: Low Risk  (9/3/2024)    Housing Stability Vital Sign     Unable to Pay for Housing in the Last Year: No     Homeless in the Last Year: No       MEDICATIONS & ALLERGIES:     Current Outpatient Medications on File Prior to Visit   Medication Sig    acetaminophen (TYLENOL) 650 MG TbSR Take 1 tablet (650 mg total) by mouth every 8 (eight) hours.    apixaban (ELIQUIS) 2.5 mg Tab Take 1 tablet (2.5 mg total) by mouth 2 (two) times daily.    atorvastatin (LIPITOR) 20 MG tablet Take 1 tablet (20 mg total) by mouth every evening.    cinacalcet (SENSIPAR) 30 MG Tab Take 1 tablet (30 mg total) by mouth daily with breakfast.    cloNIDine 0.3 mg/24 hr td ptwk (CATAPRES) 0.3 mg/24 hr Place 1 patch onto the skin once a week.    ergocalciferol (ERGOCALCIFEROL) 50,000 unit Cap Take 1 capsule (50,000 Units total) by mouth every 7 days.    ferrous sulfate 325 mg (65 mg iron) Tab tablet Take 325 mg by mouth once daily.    finasteride (PROSCAR) 5 mg tablet Take 1 tablet (5 mg total) by mouth once daily. (Patient taking differently: Take 5 mg by mouth nightly.)    fluticasone propionate (FLONASE) 50 mcg/actuation nasal spray 1 spray (50 mcg total) by Each Nostril route nightly.    furosemide (LASIX) 40 MG tablet Take 1 tablet (40 mg total) by mouth once daily.    hydrALAZINE (APRESOLINE) 100 MG tablet Take 1 tablet  (100 mg total) by mouth every 8 (eight) hours.    metoprolol succinate (TOPROL-XL) 100 MG 24 hr tablet Take 1 tablet (100 mg total) by mouth once daily. (Patient taking differently: Take 100 mg by mouth every evening.)    NIFEdipine (PROCARDIA-XL) 30 MG (OSM) 24 hr tablet Take 1 tablet (30 mg total) by mouth once daily.    oxyCODONE (ROXICODONE) 5 MG immediate release tablet Take 1 tablet (5 mg total) by mouth every 6 (six) hours as needed for Pain.    predniSONE (DELTASONE) 5 MG tablet Take 1 tablet (5 mg total) by mouth once daily.    sirolimus (RAPAMUNE) 1 MG Tab Take 3 tablets (3 mg total) by mouth once daily.    sodium bicarbonate 650 MG tablet Take 3 tablets (1,950 mg total) by mouth 2 (two) times daily.    vitamin renal formula, B-complex-vitamin c-folic acid, (NEPHROCAP) 1 mg Cap Take 1 capsule by mouth once daily.    [DISCONTINUED] sildenafiL (VIAGRA) 50 MG tablet Take 1 tablet (50 mg total) by mouth daily as needed for Erectile Dysfunction.     No current facility-administered medications on file prior to visit.       Review of patient's allergies indicates:  No Known Allergies    OBJECTIVE:     Vital Signs:  There were no vitals filed for this visit.      There is no height or weight on file to calculate BMI.     Physical Exam  Vitals reviewed.   Constitutional:       Appearance: Normal appearance.   HENT:      Head: Normocephalic and atraumatic.      Right Ear: External ear normal.      Left Ear: External ear normal.      Nose: Nose normal.      Mouth/Throat:      Mouth: Mucous membranes are moist.   Eyes:      Extraocular Movements: Extraocular movements intact.   Cardiovascular:      Rate and Rhythm: Normal rate and regular rhythm.      Pulses: Normal pulses.      Heart sounds: Normal heart sounds.      Arteriovenous access: Left arteriovenous access is present.  Pulmonary:      Effort: Pulmonary effort is normal. No respiratory distress.      Breath sounds: Normal breath sounds. No wheezing or rales.    Chest:      Chest wall: No tenderness.   Abdominal:      General: Abdomen is flat. There is no distension.      Palpations: Abdomen is soft.      Tenderness: There is no abdominal tenderness. There is no right CVA tenderness, left CVA tenderness or guarding.   Musculoskeletal:      Cervical back: Normal range of motion and neck supple.      Comments: Severe swelling over entire left arm extending from shoulder down to finger tips. Fingers appear warm and well perfused, AVF graft with weak thrill and audible sounds. There is an incision site that is draining serosanguinous fluid, no erythema noted on at surgical site.    Skin:     General: Skin is warm.      Capillary Refill: Capillary refill takes less than 2 seconds.   Neurological:      General: No focal deficit present.      Mental Status: He is alert and oriented to person, place, and time.   Psychiatric:         Mood and Affect: Mood normal.         Behavior: Behavior normal.       Laboratory  No results found for this or any previous visit (from the past 24 hours).    Diagnostic Results:      Health Maintenance Due   Topic Date Due    Foot Exam  Never done    RSV Vaccine (Age 60+ and Pregnant patients) (1 - Risk 60-74 years 1-dose series) Never done    Shingles Vaccine (2 of 2) 10/17/2019    Diabetic Eye Exam  10/08/2020    Colorectal Cancer Screening  12/08/2023    COVID-19 Vaccine (7 - 2024-25 season) 09/01/2024         ASSESSMENT & PLAN:   Mr. Jose Luis Manzo is a 74 y.o. male with past medical history of renal failure 2/2 hypertensive nephrosclerosis previously on HD who is now s/p kidney transplant in 2007, he has developed post-transplant CKD IV likely 2/2 HTN and chronic CNI use. Currently his renal function is stable with a eGFR around 16-18, and he is without uremic symptoms. He was offered and referred to CKD education classes as well as dialysis education classes which he declined at this time. He was referred to renal transplant medicine for  evaluation of potentially receiving another transplant in Jan. 2024. He has opted to undergo dialysis if he progresses to ESRD.      He previously reported difficulty with the increased pill burden after his hospital discharge, specifically hydralazine 100 mg TID and sodium bicarb 1950 TID which he has self reduced to twice daily. Blood pressure at home is controlled as per the patient's attestation. I have asked him to check his blood pressure twice a day and to alert me if his blood pressure drops below 110/70 or if he develops any lightheadedness or dizziness.    He had AVF creation of 10/22/24, which unfortunately failed and subsequently underwent Left AVG placement with Dr. Navarro on 1/17/25, now with left arm edema and pain.         Assessment:    1. CKD (chronic kidney disease) stage 5, GFR less than 15 ml/min    2. Hypertension, unspecified type    3. Immunosuppressive management encounter following kidney transplant    4. Vitamin D deficiency disease    5. Benign prostatic hyperplasia with nocturia    6. Chronic kidney disease-mineral bone disorder (CKD-MBD) with stage 5 chronic kidney disease, not on chronic dialysis    7. Renal transplant recipient    8. Edema of extremity    9. Edema of upper extremity    10. CHANG (acute kidney injury)          Plan:    Edema of upper extremity  Patient underwent AVG placement on 1/17/25. Since this time he has had severe swelling over the entire left extremity extending from the shoulder to the finger tips associated with persistent pain and discomfort. He also notes continued draining from the surgical site which soaks several bandages and dressings throughout the day.     Recommendations  -Given the degree of edema, pain, and drainage, I have directed him to proceed to the ER for ultrasound and vascular surgery evaluation.  -Recommend infectious work-up with blood cultures.     CHANG (acute kidney injury)  History of renal transplant baseline creatinine 3.3-3.6 range,  presents to the clinic now with creatinine of 4.1. Recently underwent AVG placement with Dr. Navarro on 1/17/25 for preparation of potential dialysis. He is compliant with his home Sirolimus and prednisone.     Given his CHANG, and degree of left arm swelling, pain, and drainage, I have advised that he go the the ER for evaluation.     Recommendations  -Recommend ultrasound of transplant kidney and post void residual urinary volume.   -Check Donor Specific antibodies and BK PCR once admitted  -Check Sirolimus levels once admitted  -Recommend obtaining blood cultures in the ER  -Continue home sirolimus and Prednisone  -Obtain urinalysis with culture (patient is high risk of UTI given immunosuppressed status).      Follow up in about 1 week (around 2/13/2025).     No orders of the defined types were placed in this encounter.    There are no discontinued medications.     Future Appointments   Date Time Provider Department Center   4/7/2025  8:30 AM LAB, [facility] LAB [facility]   4/9/2025 10:20 AM Home Alexis MD [facility] Fouzia Mtz MD  Nephrology PGY-5    [address] [place] [address]  [phone]

## 2025-02-06 NOTE — FIRST PROVIDER EVALUATION
Medical screening examination initiated.  I have conducted a focused provider triage encounter, findings are as follows:    Brief history of present illness:  3 weeks ago with AV fistula placed in arm. Notes oozing, foul smell currently to arm.    Vitals:    02/06/25 1528   BP: 128/63   Pulse: 88   Resp: 17   Temp: 97.8 °F (36.6 °C)   TempSrc: Oral   SpO2: 100%   Weight: 95 kg (209 lb 7 oz)       Pertinent physical exam:  Well appearing and in no acute distress    Brief workup plan:  Exam for signs of infection    Preliminary workup initiated; this workup will be continued and followed by the physician or advanced practice provider that is assigned to the patient when roomed.

## 2025-02-06 NOTE — ASSESSMENT & PLAN NOTE
History of renal transplant baseline creatinine 3.3-3.6 range, presents to the clinic now with creatinine of 4.1. Recently underwent AVG placement with Dr. Navarro on 1/17/25 for preparation of potential dialysis. He is compliant with his home Sirolimus and prednisone.     Given his CHANG, and degree of left arm swelling, pain, and drainage, I have advised that he go the the ER for evaluation.     Recommendations  -Recommend ultrasound of transplant kidney and post void residual urinary volume.   -Check Donor Specific antibodies and BK PCR once admitted  -Check Sirolimus levels once admitted  -Recommend obtaining blood cultures in the ER  -Continue home sirolimus and Prednisone  -Obtain urinalysis with culture (patient is high risk of UTI given immunosuppressed status).

## 2025-02-06 NOTE — ED TRIAGE NOTES
Patient identifiers verified and correct for Jose Luis Jovany  C/C: problems with new fistula  APPEARANCE: awake and alert in NAD. PAIN  6/10  SKIN: warm, dry and intact. No breakdown or bruising.  MUSCULOSKELETAL: Patient moving all extremities spontaneously, no obvious swelling or deformities noted. Ambulates independently.  RESPIRATORY: Denies shortness of breath.Respirations unlabored.   CARDIAC: Denies CP; significant peripheral edema of left arm, chronic swelling of lower extremities noted   ABDOMEN: S/ND/NT, Denies nausea  : voids spontaneously, denies difficulty  Neurologic: AAO x 4; follows commands equal strength in all extremities; denies numbness/tingling. Denies dizziness       Fistula placed in left proximal arm on jan 17. Entire left arm is now swollen. Not currently being used - not yet on dialysis. Wound with secretions and foul odor

## 2025-02-06 NOTE — CONSULTS
[facility] - Emergency Dept  Vascular Surgery  Consult Note    Inpatient consult to Vascular Surgery  Consult performed by: Chuy Menard MD  Consult ordered by: Kiki Bates PA-C  Reason for consult: LUE Swelling post AVG creation      Subjective:     Chief Complaint/Reason for Admission: LUE swelling post AVG creation     History of Present Illness: Mr. Jose Luis Manzo is a 74 y.o. male with a history of end-stage renal disease previously on hemodialysis, prior history of DVT on Eliquis, prior oropharyngeal cancer status post radiation and resection, who had previously undergone a right arm AV fistula creation, followed by left upper extremity AV graft creation on 01/17.  Since his surgery, he has noticed increased left arm swelling.  Additionally notes that there has been significant oozing from his left antecubital fossa incision.  Of note, he had a previous left chest wall PermCath placed.      w/ known ESKD 2/2 hypertensive nephrosclerosis previously on HD who is now s/p kidney transplant in 2007, he has developed post-transplant CKD IV likely 2/2 HTN and chronic CNI use, he is in remission of oropharyngeal CA  (2015) s/p radiation & resection, DVT on Eliquis, Hyperparathyroidism, COVID-19 (2021), BPH, and obesity.        Clinic visit 02/06/24; Had his AC fossa AVF creation on 10/22/24 which unfortunately has failed. He subsequently underwent left upper extremity AVG placement with Dr. Navarro on 1/17/25. Creatinine increased from his baseline up to 4.1 on yesterday's labs. Pt denies: Fever, chills, shortness of breath, chest pain, palpitations, nausea, vomiting, diarrhea, abd pain, hematuria, dysuria, urinary frequency or urgency, headaches, visual disturbances or weakness. He reports compliance with his Sirolimus 3 mg QD.     (Not in a hospital admission)      Review of patient's allergies indicates:  No Known Allergies    Past Medical History:   Diagnosis Date    Acute hypoxemic respiratory  "failure due to COVID-19 12/30/2020    Anticoagulant long-term use     BPH (benign prostatic hypertrophy)     Cancer     vocal cords    Claudication of right lower extremity 3/10/2016    COVID-19 virus detected 12/30/2020    Dependence on renal dialysis 4/18/2023    Disorder of kidney and ureter     Hyperkalemia 1/1/2021    Hyperlipidemia     Hypertension     Hypokalemia 6/23/2021    Immunosuppression 6/26/2021    Immunosuppression due to chronic steroid use 1/30/2020    Microcytic anemia 9/16/2016    Obesity (BMI 30-39.9) 1/23/2019    Oropharyngeal cancer     Pterygium     Squamous cell carcinoma 4/25/2018    Thromboembolic disorder     Unspecified disorder of kidney and ureter      Past Surgical History:   Procedure Laterality Date    AV FISTULA PLACEMENT Right 10/22/2024    Procedure: CREATION, AV FISTULA;  Surgeon: Guanaco Navarro MD;  Location: [facility] OR [address] [place] [address];  Service: Vascular;  Laterality: Right;  RUE AVF creation    CYSTOSCOPY W/ RETROGRADES Bilateral 6/15/2022    Procedure: Cystoscopy, bilateral retrograde pyelogram, and all indicated procedures;  Surgeon: Veronica Bacon MD;  Location: [facility];  Service: Urology;  Laterality: Bilateral;    FRACTURE SURGERY Left     "lower leg" 40 years ago    INSERTION, GRAFT, ARTERIOVENOUS, UPPER EXTREMITY Left 1/17/2025    Procedure: INSERTION, GRAFT, ARTERIOVENOUS, UPPER EXTREMITY;  Surgeon: Guanaco Navarro MD;  Location: [facility] OR [address] [place] [address];  Service: Vascular;  Laterality: Left;    KIDNEY TRANSPLANT  2007    followed by [facility]    LARYNX SURGERY  11/2014    Oropharyngeal Cancer x3    microlaryngoscopy      PHLEBOGRAPHY Bilateral 8/2/2022    Procedure: Venogram;  Surgeon: KENIA Mueller II, MD;  Location: [facility] CATH [facility];  Service: Vascular;  Laterality: Bilateral;    SURGICAL REMOVAL OF LESION OF ORBIT Bilateral 8/26/2020    Procedure: EXCISION, LESION, ORBIT;  Surgeon: Linda Tee MD;  Location: [facility] OR [address] [place] [address];  Service: " Ophthalmology;  Laterality: Bilateral;    TIBIAL STAPLING Left           Family History       Problem Relation (Age of Onset)    Diabetes Mother    Hypertension Mother    No Known Problems Sister, Brother, Daughter, Brother    Stroke Mother, Father          Tobacco Use    Smoking status: Former     Current packs/day: 0.00     Average packs/day: 1 pack/day for 20.0 years (20.0 ttl pk-yrs)     Types: Cigarettes     Start date:      Quit date:      Years since quittin.1     Passive exposure: Past    Smokeless tobacco: Never   Substance and Sexual Activity    Alcohol use: No     Alcohol/week: 0.0 standard drinks of alcohol    Drug use: No     Types: Marijuana     Comment: Occ.    Sexual activity: Not Currently     Partners: Female     Review of Systems   Constitutional:  Negative for activity change and appetite change.   Cardiovascular:  Negative for chest pain and leg swelling.   All other systems reviewed and are negative.    Objective:     Vital Signs (Most Recent):  Temp: 97.8 °F (36.6 °C) (25 1528)  Pulse: 88 (25 1528)  Resp: 17 (25 1528)  BP: 128/63 (25 1528)  SpO2: 100 % (25 1528) Vital Signs (24h Range):  Temp:  [97.8 °F (36.6 °C)] 97.8 °F (36.6 °C)  Pulse:  [88] 88  Resp:  [17] 17  SpO2:  [100 %] 100 %  BP: (128)/(63) 128/63     Weight: 95 kg (209 lb 7 oz)  Body mass index is 30.05 kg/m².      Physical Exam  HENT:      Head: Normocephalic.   Eyes:      Pupils: Pupils are equal, round, and reactive to light.   Cardiovascular:      Rate and Rhythm: Normal rate.      Comments: LUE with 2+ edema.     Lower incision with superficial dehiscence present.       Neurological:      Mental Status: He is alert.          Significant Labs:  All pertinent labs from the last 24 hours have been reviewed.    Significant Diagnostics:  I have reviewed all pertinent imaging results/findings within the past 24 hours.  Assessment/Plan:     ESRD (end stage renal disease)  74M with  a history of end-stage renal disease, who is recently status post left upper extremity AV graft on 01/17/25, now presenting with severe left upper extremity swelling, and dehiscence of brachial incision.    - Recommend admission to Medicine for medical comorbidities.  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  - Will plan for left upper extremity fistulogram, with possible intervention, possible graft ligation unsuccessful to reduce arm swelling.   - Please keep NPO at midnight.         Thank you for your consult.     Marshal Rowland MD  Vascular Surgery  [facility] - Emergency Dept

## 2025-02-07 ENCOUNTER — ANESTHESIA (OUTPATIENT)
Dept: SURGERY | Facility: HOSPITAL | Age: 75
DRG: 253 | End: 2025-02-07
Payer: MEDICARE

## 2025-02-07 DIAGNOSIS — Z01.818 PRE-OPERATIVE EXAMINATION: Primary | ICD-10-CM

## 2025-02-07 LAB
ANION GAP SERPL CALC-SCNC: 12 MMOL/L (ref 8–16)
BASOPHILS # BLD AUTO: 0.03 K/UL (ref 0–0.2)
BASOPHILS NFR BLD: 0.6 % (ref 0–1.9)
BUN SERPL-MCNC: 37 MG/DL (ref 8–23)
CALCIUM SERPL-MCNC: 10.1 MG/DL (ref 8.7–10.5)
CHLORIDE SERPL-SCNC: 109 MMOL/L (ref 95–110)
CO2 SERPL-SCNC: 19 MMOL/L (ref 23–29)
CREAT SERPL-MCNC: 3.8 MG/DL (ref 0.5–1.4)
DIFFERENTIAL METHOD BLD: ABNORMAL
EOSINOPHIL # BLD AUTO: 0.1 K/UL (ref 0–0.5)
EOSINOPHIL NFR BLD: 1.5 % (ref 0–8)
ERYTHROCYTE [DISTWIDTH] IN BLOOD BY AUTOMATED COUNT: 15.1 % (ref 11.5–14.5)
EST. GFR  (NO RACE VARIABLE): 15.9 ML/MIN/1.73 M^2
GLUCOSE SERPL-MCNC: 94 MG/DL (ref 70–110)
HCT VFR BLD AUTO: 33.8 % (ref 40–54)
HGB BLD-MCNC: 10.5 G/DL (ref 14–18)
IMM GRANULOCYTES # BLD AUTO: 0.02 K/UL (ref 0–0.04)
IMM GRANULOCYTES NFR BLD AUTO: 0.4 % (ref 0–0.5)
LYMPHOCYTES # BLD AUTO: 1.2 K/UL (ref 1–4.8)
LYMPHOCYTES NFR BLD: 22.6 % (ref 18–48)
MAGNESIUM SERPL-MCNC: 2.4 MG/DL (ref 1.6–2.6)
MCH RBC QN AUTO: 25.9 PG (ref 27–31)
MCHC RBC AUTO-ENTMCNC: 31.1 G/DL (ref 32–36)
MCV RBC AUTO: 84 FL (ref 82–98)
MONOCYTES # BLD AUTO: 0.6 K/UL (ref 0.3–1)
MONOCYTES NFR BLD: 11.5 % (ref 4–15)
NEUTROPHILS # BLD AUTO: 3.4 K/UL (ref 1.8–7.7)
NEUTROPHILS NFR BLD: 63.4 % (ref 38–73)
NRBC BLD-RTO: 0 /100 WBC
OHS QRS DURATION: 134 MS
OHS QTC CALCULATION: 441 MS
PHOSPHATE SERPL-MCNC: 3.7 MG/DL (ref 2.7–4.5)
PLATELET # BLD AUTO: 199 K/UL (ref 150–450)
PMV BLD AUTO: 11.3 FL (ref 9.2–12.9)
POTASSIUM SERPL-SCNC: 3.9 MMOL/L (ref 3.5–5.1)
RBC # BLD AUTO: 4.05 M/UL (ref 4.6–6.2)
SODIUM SERPL-SCNC: 140 MMOL/L (ref 136–145)
VANCOMYCIN SERPL-MCNC: 15.2 UG/ML
WBC # BLD AUTO: 5.32 K/UL (ref 3.9–12.7)

## 2025-02-07 PROCEDURE — 36415 COLL VENOUS BLD VENIPUNCTURE: CPT | Performed by: EMERGENCY MEDICINE

## 2025-02-07 PROCEDURE — D9220A PRA ANESTHESIA: Mod: CRNA,,, | Performed by: NURSE ANESTHETIST, CERTIFIED REGISTERED

## 2025-02-07 PROCEDURE — 80048 BASIC METABOLIC PNL TOTAL CA: CPT | Performed by: PHYSICIAN ASSISTANT

## 2025-02-07 PROCEDURE — 25000003 PHARM REV CODE 250: Performed by: PHYSICIAN ASSISTANT

## 2025-02-07 PROCEDURE — 25500020 PHARM REV CODE 255: Performed by: SURGERY

## 2025-02-07 PROCEDURE — 25000003 PHARM REV CODE 250: Performed by: NURSE ANESTHETIST, CERTIFIED REGISTERED

## 2025-02-07 PROCEDURE — 71000015 HC POSTOP RECOV 1ST HR: Performed by: SURGERY

## 2025-02-07 PROCEDURE — B51W1ZZ FLUOROSCOPY OF DIALYSIS SHUNT/FISTULA USING LOW OSMOLAR CONTRAST: ICD-10-PCS | Performed by: SURGERY

## 2025-02-07 PROCEDURE — 94761 N-INVAS EAR/PLS OXIMETRY MLT: CPT

## 2025-02-07 PROCEDURE — 36000706: Performed by: SURGERY

## 2025-02-07 PROCEDURE — 63600175 PHARM REV CODE 636 W HCPCS: Performed by: NURSE ANESTHETIST, CERTIFIED REGISTERED

## 2025-02-07 PROCEDURE — 63600175 PHARM REV CODE 636 W HCPCS: Performed by: SURGERY

## 2025-02-07 PROCEDURE — C1894 INTRO/SHEATH, NON-LASER: HCPCS | Performed by: SURGERY

## 2025-02-07 PROCEDURE — 05LY0ZZ OCCLUSION OF UPPER VEIN, OPEN APPROACH: ICD-10-PCS | Performed by: SURGERY

## 2025-02-07 PROCEDURE — 03LY0ZZ OCCLUSION OF UPPER ARTERY, OPEN APPROACH: ICD-10-PCS | Performed by: SURGERY

## 2025-02-07 PROCEDURE — 71000033 HC RECOVERY, INTIAL HOUR: Performed by: SURGERY

## 2025-02-07 PROCEDURE — 99900035 HC TECH TIME PER 15 MIN (STAT)

## 2025-02-07 PROCEDURE — 71000016 HC POSTOP RECOV ADDL HR: Performed by: SURGERY

## 2025-02-07 PROCEDURE — D9220A PRA ANESTHESIA: Mod: ANES,,, | Performed by: SURGERY

## 2025-02-07 PROCEDURE — 36000707: Performed by: SURGERY

## 2025-02-07 PROCEDURE — 83735 ASSAY OF MAGNESIUM: CPT | Performed by: PHYSICIAN ASSISTANT

## 2025-02-07 PROCEDURE — C1889 IMPLANT/INSERT DEVICE, NOC: HCPCS | Performed by: SURGERY

## 2025-02-07 PROCEDURE — 63600175 PHARM REV CODE 636 W HCPCS: Performed by: PHYSICIAN ASSISTANT

## 2025-02-07 PROCEDURE — 21400001 HC TELEMETRY ROOM

## 2025-02-07 PROCEDURE — 37607 LIG/BANDING ANGIOACS AV FSTL: CPT | Mod: 78,,, | Performed by: SURGERY

## 2025-02-07 PROCEDURE — 80202 ASSAY OF VANCOMYCIN: CPT | Performed by: EMERGENCY MEDICINE

## 2025-02-07 PROCEDURE — 84100 ASSAY OF PHOSPHORUS: CPT | Performed by: PHYSICIAN ASSISTANT

## 2025-02-07 PROCEDURE — C1769 GUIDE WIRE: HCPCS | Performed by: SURGERY

## 2025-02-07 PROCEDURE — 37000009 HC ANESTHESIA EA ADD 15 MINS: Performed by: SURGERY

## 2025-02-07 PROCEDURE — 85025 COMPLETE CBC W/AUTO DIFF WBC: CPT | Performed by: PHYSICIAN ASSISTANT

## 2025-02-07 PROCEDURE — 25000003 PHARM REV CODE 250

## 2025-02-07 PROCEDURE — 37000008 HC ANESTHESIA 1ST 15 MINUTES: Performed by: SURGERY

## 2025-02-07 RX ORDER — LIDOCAINE HYDROCHLORIDE 10 MG/ML
INJECTION, SOLUTION INFILTRATION; PERINEURAL
Status: DISCONTINUED | OUTPATIENT
Start: 2025-02-07 | End: 2025-02-07 | Stop reason: HOSPADM

## 2025-02-07 RX ORDER — CEFAZOLIN SODIUM 1 G/3ML
INJECTION, POWDER, FOR SOLUTION INTRAMUSCULAR; INTRAVENOUS
Status: DISCONTINUED | OUTPATIENT
Start: 2025-02-07 | End: 2025-02-07

## 2025-02-07 RX ORDER — HEPARIN SOD,PORCINE/0.9 % NACL 1000/500ML
INTRAVENOUS SOLUTION INTRAVENOUS
Status: DISCONTINUED | OUTPATIENT
Start: 2025-02-07 | End: 2025-02-07 | Stop reason: HOSPADM

## 2025-02-07 RX ORDER — PROTAMINE SULFATE 10 MG/ML
INJECTION, SOLUTION INTRAVENOUS
Status: DISCONTINUED | OUTPATIENT
Start: 2025-02-07 | End: 2025-02-07

## 2025-02-07 RX ORDER — HEPARIN SODIUM 1000 [USP'U]/ML
INJECTION, SOLUTION INTRAVENOUS; SUBCUTANEOUS
Status: DISCONTINUED | OUTPATIENT
Start: 2025-02-07 | End: 2025-02-07

## 2025-02-07 RX ORDER — FENTANYL CITRATE 50 UG/ML
INJECTION, SOLUTION INTRAMUSCULAR; INTRAVENOUS
Status: DISCONTINUED | OUTPATIENT
Start: 2025-02-07 | End: 2025-02-07

## 2025-02-07 RX ORDER — MIDAZOLAM HYDROCHLORIDE 1 MG/ML
INJECTION, SOLUTION INTRAMUSCULAR; INTRAVENOUS
Status: DISCONTINUED | OUTPATIENT
Start: 2025-02-07 | End: 2025-02-07

## 2025-02-07 RX ORDER — GLUCAGON 1 MG
1 KIT INJECTION
Status: DISCONTINUED | OUTPATIENT
Start: 2025-02-07 | End: 2025-02-07 | Stop reason: HOSPADM

## 2025-02-07 RX ORDER — ONDANSETRON HYDROCHLORIDE 2 MG/ML
INJECTION, SOLUTION INTRAVENOUS
Status: DISCONTINUED | OUTPATIENT
Start: 2025-02-07 | End: 2025-02-07

## 2025-02-07 RX ORDER — IODIXANOL 320 MG/ML
INJECTION, SOLUTION INTRAVASCULAR
Status: DISCONTINUED | OUTPATIENT
Start: 2025-02-07 | End: 2025-02-07 | Stop reason: HOSPADM

## 2025-02-07 RX ORDER — FENTANYL CITRATE 50 UG/ML
25 INJECTION, SOLUTION INTRAMUSCULAR; INTRAVENOUS EVERY 5 MIN PRN
Status: DISCONTINUED | OUTPATIENT
Start: 2025-02-07 | End: 2025-02-07 | Stop reason: HOSPADM

## 2025-02-07 RX ORDER — SODIUM CHLORIDE 0.9 % (FLUSH) 0.9 %
10 SYRINGE (ML) INJECTION
Status: DISCONTINUED | OUTPATIENT
Start: 2025-02-07 | End: 2025-02-07 | Stop reason: HOSPADM

## 2025-02-07 RX ORDER — DEXMEDETOMIDINE HYDROCHLORIDE 100 UG/ML
INJECTION, SOLUTION INTRAVENOUS
Status: DISCONTINUED | OUTPATIENT
Start: 2025-02-07 | End: 2025-02-07

## 2025-02-07 RX ORDER — KETAMINE HCL IN 0.9 % NACL 50 MG/5 ML
SYRINGE (ML) INTRAVENOUS
Status: DISCONTINUED | OUTPATIENT
Start: 2025-02-07 | End: 2025-02-07

## 2025-02-07 RX ADMIN — FENTANYL CITRATE 50 MCG: 50 INJECTION, SOLUTION INTRAMUSCULAR; INTRAVENOUS at 09:02

## 2025-02-07 RX ADMIN — PROTAMINE SULFATE 25 MG: 10 INJECTION, SOLUTION INTRAVENOUS at 10:02

## 2025-02-07 RX ADMIN — MIDAZOLAM HYDROCHLORIDE 2 MG: 2 INJECTION, SOLUTION INTRAMUSCULAR; INTRAVENOUS at 09:02

## 2025-02-07 RX ADMIN — ONDANSETRON 4 MG: 2 INJECTION INTRAMUSCULAR; INTRAVENOUS at 10:02

## 2025-02-07 RX ADMIN — Medication 10 MG: at 10:02

## 2025-02-07 RX ADMIN — METOPROLOL SUCCINATE 100 MG: 100 TABLET, EXTENDED RELEASE ORAL at 12:02

## 2025-02-07 RX ADMIN — APIXABAN 2.5 MG: 2.5 TABLET, FILM COATED ORAL at 11:02

## 2025-02-07 RX ADMIN — FENTANYL CITRATE 25 MCG: 50 INJECTION, SOLUTION INTRAMUSCULAR; INTRAVENOUS at 10:02

## 2025-02-07 RX ADMIN — FERROUS SULFATE TAB EC 325 MG (65 MG FE EQUIVALENT) 1 EACH: 325 (65 FE) TABLET DELAYED RESPONSE at 12:02

## 2025-02-07 RX ADMIN — PREDNISONE 5 MG: 5 TABLET ORAL at 12:02

## 2025-02-07 RX ADMIN — DEXMEDETOMIDINE 8 MCG: 100 INJECTION, SOLUTION, CONCENTRATE INTRAVENOUS at 09:02

## 2025-02-07 RX ADMIN — ATORVASTATIN CALCIUM 20 MG: 20 TABLET, FILM COATED ORAL at 11:02

## 2025-02-07 RX ADMIN — FUROSEMIDE 40 MG: 40 TABLET ORAL at 12:02

## 2025-02-07 RX ADMIN — SIROLIMUS 3 MG: 1 TABLET ORAL at 01:02

## 2025-02-07 RX ADMIN — PROTAMINE SULFATE 5 MG: 10 INJECTION, SOLUTION INTRAVENOUS at 10:02

## 2025-02-07 RX ADMIN — SODIUM CHLORIDE: 9 INJECTION, SOLUTION INTRAVENOUS at 09:02

## 2025-02-07 RX ADMIN — HEPARIN SODIUM 3000 UNITS: 1000 INJECTION, SOLUTION INTRAVENOUS; SUBCUTANEOUS at 10:02

## 2025-02-07 RX ADMIN — CEFAZOLIN 2 G: 330 INJECTION, POWDER, FOR SOLUTION INTRAMUSCULAR; INTRAVENOUS at 09:02

## 2025-02-07 NOTE — ASSESSMENT & PLAN NOTE
Infected wound  Hx ESRD s/p renal transplant presents with LUE swelling, dehiscence of brachial incision, and foul smelling purulent drainage from wound s/p LUE AV graft creation on 1/17/25    - vascular surgery plans for fistulogram in AM  - NPO midngiht  - hold eliquis tonight, resume after procedure  - continue IV vanc for likely infected wound given immunocompromised state  - trend daily BMP  - strict I/Os, daily weights

## 2025-02-07 NOTE — PROGRESS NOTES
[facility] - Surgery ([facility])  Vascular Surgery  Progress Note    Patient Name: Jose Luis Manzo  MRN: [id number]  Admission Date: 2/6/2025  Primary Care Provider: Home Alexis MD    Subjective:     Interval History: No major changes overnight. OR today for fistulogram.     Post-Op Info:  Procedure(s) (LRB):  Fistulogram (Left)   Day of Surgery     Medications:  Continuous Infusions:  Scheduled Meds:   atorvastatin  20 mg Oral QHS    ferrous sulfate  1 tablet Oral Daily    furosemide  40 mg Oral Daily    metoprolol succinate  100 mg Oral Daily    predniSONE  5 mg Oral Daily    sirolimus  3 mg Oral Daily     PRN Meds:  Current Facility-Administered Medications:     acetaminophen, 650 mg, Oral, Q4H PRN    albuterol-ipratropium, 3 mL, Nebulization, Q4H PRN    dextrose 50%, 12.5 g, Intravenous, PRN    dextrose 50%, 25 g, Intravenous, PRN    glucagon (human recombinant), 1 mg, Intramuscular, PRN    glucose, 16 g, Oral, PRN    glucose, 24 g, Oral, PRN    insulin aspart U-100, 0-5 Units, Subcutaneous, QID (AC + HS) PRN    melatonin, 6 mg, Oral, Nightly PRN    ondansetron, 8 mg, Oral, Q8H PRN    polyethylene glycol, 17 g, Oral, Daily PRN    promethazine, 25 mg, Oral, Q6H PRN    Pharmacy to dose Vancomycin consult, , , Once **AND** vancomycin - pharmacy to dose, , Intravenous, pharmacy to manage frequency     Objective:     Vital Signs (Most Recent):  Temp: 97.7 °F (36.5 °C) (02/07/25 0807)  Pulse: (!) 54 (02/07/25 0809)  Resp: 18 (02/07/25 0807)  BP: (!) 152/76 (02/07/25 0807)  SpO2: 99 % (02/07/25 0807) Vital Signs (24h Range):  Temp:  [97.5 °F (36.4 °C)-98.7 °F (37.1 °C)] 97.7 °F (36.5 °C)  Pulse:  [52-88] 54  Resp:  [14-19] 18  SpO2:  [97 %-100 %] 99 %  BP: (109-152)/(60-76) 152/76          Physical Exam  HENT:      Head: Normocephalic.   Eyes:      Pupils: Pupils are equal, round, and reactive to light.   Cardiovascular:      Rate and Rhythm: Normal rate.      Comments: LUE with 2+ edema.     Lower incision with  superficial dehiscence present.       Neurological:      Mental Status: He is alert.          Significant Labs:  All pertinent labs from the last 24 hours have been reviewed.    Significant Diagnostics:  I have reviewed all pertinent imaging results/findings within the past 24 hours.  Assessment/Plan:     ESRD (end stage renal disease)  74M with a history of end-stage renal disease, who is recently status post left upper extremity AV graft on 01/17/25, now presenting with severe left upper extremity swelling, and dehiscence of brachial incision.    - Recommend admission to Medicine for medical comorbidities.  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  - OR today 2/7 for left upper extremity fistulogram, with possible intervention, possible graft ligation unsuccessful to reduce arm swelling.   - Please keep NPO until OR          Zoe Youssef MD  Vascular Surgery  [facility] - Surgery (2nd Fl)

## 2025-02-07 NOTE — HOSPITAL COURSE
Admitted for ESRD access issue.  Underwent AVG ligation with vascular surgery 2.7.25.  No signs of active infection, vascular recommended discontinuing antibiotics.  Wound care referral placed.    Jose Luis Manzo was deemed appropriate for discharge.  At the time of discharge, the plan of care was discussed with patient/family, who were agreeable and amenable.  All medications were verbally reviewed and discussed with the patient/family.  They endorsed understanding and compliance.  Informed them that these changes will be available on their discharge paperwork, as well.  Outpatient follow-ups were scheduled, or if unable to be scheduled ambulatory referrals were placed, and ER return precautions were given.  All questions were answered to the patient/family's satisfaction.  he  was subsequently discharged in stable condition.

## 2025-02-07 NOTE — BRIEF OP NOTE
[facility] - Surgery (2nd Fl)  Brief Operative Note    SUMMARY     Surgery Date: 2/7/2025     Surgeons and Role:     * Guanaco Navarro MD - Primary     * Leonel Regan MD - Fellow    Assisting Surgeon: None    Pre-op Diagnosis:  Swelling of upper arm [M79.89]    Post-op Diagnosis:  Post-Op Diagnosis Codes:     * Swelling of upper arm [M79.89]    Procedure:   LUE Fistulagram  Central venogram  LUE AVG ligation    Anesthesia: Local MAC    Operative Findings:   Central occlusion unable to be crossed  No graft exposure on distal open wound    Will need WTD daily dressing going home.   Lower extremity vein mapping for future HD access creation    Estimated Blood Loss: 25 mL    Estimated Blood Loss has been documented.         Specimens:   Specimen (24h ago, onward)      None            [id number]

## 2025-02-07 NOTE — TRANSFER OF CARE
"Anesthesia Transfer of Care Note    Patient: Jose Luis Manzo    Procedure(s) Performed: Procedure(s) (LRB):  Fistulogram (Left)    Patient location: PACU    Anesthesia Type: MAC    Transport from OR: Transported from OR on room air with adequate spontaneous ventilation    Post pain: adequate analgesia    Post assessment: no apparent anesthetic complications and tolerated procedure well    Post vital signs: stable    Level of consciousness: awake and alert    Nausea/Vomiting: no nausea/vomiting    Complications: none    Transfer of care protocol was followedComments: Report given to recovery, RN. All questions answered.      Last vitals: Visit Vitals  BP (!) 152/76 (BP Location: Right arm, Patient Position: Lying)   Pulse (!) 54   Temp 36.5 °C (97.7 °F)   Resp 18   Ht 5' 10" (1.778 m)   Wt 94.8 kg (208 lb 15.9 oz)   SpO2 99%   BMI 29.99 kg/m²     "

## 2025-02-07 NOTE — PHARMACY MED REC
"      Admission Medication History     The home medication history was taken by Kayode Tong.    You may go to "Admission" then "Reconcile Home Medications" tabs to review and/or act upon these items.     The home medication list has been updated by the Pharmacy department.   Please read ALL comments highlighted in yellow.   Please address this information as you see fit.    Feel free to contact us if you have any questions or require assistance.      The medications listed below were removed from the home medication list. Please reorder if appropriate:  Patient reports no longer taking the following medication(s):  VIAGRA 50 MG TABLET     Medications listed below were obtained from: Patient/family and Analytic software- [employer]  Current Outpatient Medications on File Prior to Encounter   Medication Sig    acetaminophen (TYLENOL) 650 MG TbSR Take 1 tablet (650 mg total) by mouth every 8 (eight) hours.      apixaban (ELIQUIS) 2.5 mg Tab Take 1 tablet (2.5 mg total) by mouth 2 (two) times daily.      atorvastatin (LIPITOR) 20 MG tablet Take 1 tablet (20 mg total) by mouth every evening.      cinacalcet (SENSIPAR) 30 MG Tab Take 1 tablet (30 mg total) by mouth daily with breakfast.      cloNIDine 0.3 mg/24 hr td ptwk (CATAPRES) 0.3 mg/24 hr Place 1 patch onto the skin once a week.      ferrous sulfate 325 mg (65 mg iron) Tab tablet Take 325 mg by mouth once daily.        furosemide (LASIX) 40 MG tablet Take 1 tablet (40 mg total) by mouth once daily.      hydrALAZINE (APRESOLINE) 100 MG tablet Take 1 tablet (100 mg total) by mouth every 8 (eight) hours.      metoprolol succinate (TOPROL-XL) 100 MG 24 hr tablet Take 1 tablet (100 mg total) by mouth every evening.        predniSONE (DELTASONE) 5 MG tablet Take 1 tablet (5 mg total) by mouth once daily.      sirolimus (RAPAMUNE) 1 MG Tab Take 3 tablets (3 mg total) by mouth once daily.      vitamin renal formula, B-complex-vitamin c-folic acid, (NEPHROCAP) 1 mg Cap " Take 1 capsule by mouth once daily.          ergocalciferol (ERGOCALCIFEROL) 50,000 unit Cap Take 1 capsule (50,000 Units total) by mouth every 7 days.   (Patient not taking: Reported on 2/6/2025)      finasteride (PROSCAR) 5 mg tablet Take 1 tablet (5 mg total) by mouth once daily.   (Patient not taking: Reported on 2/6/2025)      fluticasone propionate (FLONASE) 50 mcg/actuation nasal spray Instill 1 spray (50 mcg total) by each nostril route nightly.   (Patient not taking: Reported on 2/6/2025)      NIFEdipine (PROCARDIA-XL) 30 MG (OSM) 24 hr tablet Take 1 tablet (30 mg total) by mouth once daily.   (Patient not taking: Reported on 2/6/2025)      oxyCODONE (ROXICODONE) 5 MG immediate release tablet Take 1 tablet (5 mg total) by mouth every 6 (six) hours as needed for pain.   (Patient not taking: Reported on 2/6/2025)      sodium bicarbonate 650 MG tablet Take 3 tablets (1,950 mg total) by mouth 2 (two) times daily.   (Patient not taking: Reported on 2/6/2025)                 Potential issues to be addressed PRIOR TO DISCHARGE  Please discuss with the patient barriers to adherence with medication treatment plans    Kayode Tong  EXT [phone]            .

## 2025-02-07 NOTE — ASSESSMENT & PLAN NOTE
74M with a history of end-stage renal disease, who is recently status post left upper extremity AV graft on 01/17/25, now presenting with severe left upper extremity swelling, and dehiscence of brachial incision.    - Recommend admission to Medicine for medical comorbidities.  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  - OR today 2/7 for left upper extremity fistulogram, with possible intervention, possible graft ligation unsuccessful to reduce arm swelling.   - Please keep NPO until OR

## 2025-02-07 NOTE — SUBJECTIVE & OBJECTIVE
Medications:  Continuous Infusions:  Scheduled Meds:   atorvastatin  20 mg Oral QHS    ferrous sulfate  1 tablet Oral Daily    furosemide  40 mg Oral Daily    metoprolol succinate  100 mg Oral Daily    predniSONE  5 mg Oral Daily    sirolimus  3 mg Oral Daily     PRN Meds:  Current Facility-Administered Medications:     acetaminophen, 650 mg, Oral, Q4H PRN    albuterol-ipratropium, 3 mL, Nebulization, Q4H PRN    dextrose 50%, 12.5 g, Intravenous, PRN    dextrose 50%, 25 g, Intravenous, PRN    glucagon (human recombinant), 1 mg, Intramuscular, PRN    glucose, 16 g, Oral, PRN    glucose, 24 g, Oral, PRN    insulin aspart U-100, 0-5 Units, Subcutaneous, QID (AC + HS) PRN    melatonin, 6 mg, Oral, Nightly PRN    ondansetron, 8 mg, Oral, Q8H PRN    polyethylene glycol, 17 g, Oral, Daily PRN    promethazine, 25 mg, Oral, Q6H PRN    Pharmacy to dose Vancomycin consult, , , Once **AND** vancomycin - pharmacy to dose, , Intravenous, pharmacy to manage frequency     Objective:     Vital Signs (Most Recent):  Temp: 97.7 °F (36.5 °C) (02/07/25 0807)  Pulse: (!) 54 (02/07/25 0809)  Resp: 18 (02/07/25 0807)  BP: (!) 152/76 (02/07/25 0807)  SpO2: 99 % (02/07/25 0807) Vital Signs (24h Range):  Temp:  [97.5 °F (36.4 °C)-98.7 °F (37.1 °C)] 97.7 °F (36.5 °C)  Pulse:  [52-88] 54  Resp:  [14-19] 18  SpO2:  [97 %-100 %] 99 %  BP: (109-152)/(60-76) 152/76          Physical Exam  HENT:      Head: Normocephalic.   Eyes:      Pupils: Pupils are equal, round, and reactive to light.   Cardiovascular:      Rate and Rhythm: Normal rate.      Comments: LUE with 2+ edema.     Lower incision with superficial dehiscence present.       Neurological:      Mental Status: He is alert.          Significant Labs:  All pertinent labs from the last 24 hours have been reviewed.    Significant Diagnostics:  I have reviewed all pertinent imaging results/findings within the past 24 hours.

## 2025-02-07 NOTE — H&P
[facility] - Emergency Dept  [facility] Medicine  History & Physical    Patient Name: Jose Luis Manzo  MRN: [id number]  Patient Class: OP- Observation  Admission Date: 2/6/2025  Attending Physician: Tomasa Vazquez MD   Primary Care Provider: Home Alexis MD         Patient information was obtained from patient, past medical records, and ER records.     Subjective:     Principal Problem:Problem with vascular access    Chief Complaint:   Chief Complaint   Patient presents with    Vascular Access Problem     Had graft placed 3 weeks ago, now having swelling, green discharge, and foul smell.        HPI: Jose Luis Manzo is a 74 y.o. 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 Patient had a LUE AV graft creation on 1/17 given progressive CKD and likely need for HD in the future. Patient reports worsening RUE swelling since his surgery. He noticed a wound to the area of the graft when he took his bandage off about 3 days post op. Reports foul smelling, purulent drainage from the wound. He has chronic SOB, intermittent cough, and LE swelling without recent worsening. Denies chest pain, N/V, abdominal pain, HA, vision changes or syncope.     ED: AFVSS. No leukocytosis. Renal function stable at baseline. Vascular surgery consulted, plan for fistulogram in AM.     Past Medical History:   Diagnosis Date    Acute hypoxemic respiratory failure due to COVID-19 12/30/2020    Anticoagulant long-term use     BPH (benign prostatic hypertrophy)     Cancer     vocal cords    Claudication of right lower extremity 3/10/2016    COVID-19 virus detected 12/30/2020    Dependence on renal dialysis 4/18/2023    Disorder of kidney and ureter     Hyperkalemia 1/1/2021    Hyperlipidemia     Hypertension     Hypokalemia 6/23/2021    Immunosuppression  "6/26/2021    Immunosuppression due to chronic steroid use 1/30/2020    Microcytic anemia 9/16/2016    Obesity (BMI 30-39.9) 1/23/2019    Oropharyngeal cancer     Pterygium     Squamous cell carcinoma 4/25/2018    Thromboembolic disorder     Unspecified disorder of kidney and ureter        Past Surgical History:   Procedure Laterality Date    AV FISTULA PLACEMENT Right 10/22/2024    Procedure: CREATION, AV FISTULA;  Surgeon: Guanaco Navarro MD;  Location: [facility] OR [address] [place] [address];  Service: Vascular;  Laterality: Right;  RUE AVF creation    CYSTOSCOPY W/ RETROGRADES Bilateral 6/15/2022    Procedure: Cystoscopy, bilateral retrograde pyelogram, and all indicated procedures;  Surgeon: Veronica Bacon MD;  Location: [facility];  Service: Urology;  Laterality: Bilateral;    FRACTURE SURGERY Left     "lower leg" 40 years ago    INSERTION, GRAFT, ARTERIOVENOUS, UPPER EXTREMITY Left 1/17/2025    Procedure: INSERTION, GRAFT, ARTERIOVENOUS, UPPER EXTREMITY;  Surgeon: Guanaco Navarro MD;  Location: [address];  Service: Vascular;  Laterality: Left;    KIDNEY TRANSPLANT  2007    followed by [facility] Transplant    LARYNX SURGERY  11/2014    Oropharyngeal Cancer x3    microlaryngoscopy      PHLEBOGRAPHY Bilateral 8/2/2022    Procedure: Venogram;  Surgeon: KENIA Mueller II, MD;  Location: [facility] CATH LAB;  Service: Vascular;  Laterality: Bilateral;    SURGICAL REMOVAL OF LESION OF ORBIT Bilateral 8/26/2020    Procedure: EXCISION, LESION, ORBIT;  Surgeon: Linda Tee MD;  Location: [address];  Service: Ophthalmology;  Laterality: Bilateral;    TIBIAL STAPLING Left 1980            Review of patient's allergies indicates:  No Known Allergies    No current facility-administered medications on file prior to encounter.     Current Outpatient Medications on File Prior to Encounter   Medication Sig    acetaminophen (TYLENOL) 650 MG TbSR Take 1 tablet (650 mg total) by mouth every 8 (eight) hours.    apixaban (ELIQUIS) 2.5 mg Tab Take 1 " tablet (2.5 mg total) by mouth 2 (two) times daily.    atorvastatin (LIPITOR) 20 MG tablet Take 1 tablet (20 mg total) by mouth every evening.    cinacalcet (SENSIPAR) 30 MG Tab Take 1 tablet (30 mg total) by mouth daily with breakfast.    cloNIDine 0.3 mg/24 hr td ptwk (CATAPRES) 0.3 mg/24 hr Place 1 patch onto the skin once a week.    ferrous sulfate 325 mg (65 mg iron) Tab tablet Take 325 mg by mouth once daily.    furosemide (LASIX) 40 MG tablet Take 1 tablet (40 mg total) by mouth once daily.    hydrALAZINE (APRESOLINE) 100 MG tablet Take 1 tablet (100 mg total) by mouth every 8 (eight) hours.    metoprolol succinate (TOPROL-XL) 100 MG 24 hr tablet Take 1 tablet (100 mg total) by mouth once daily. (Patient taking differently: Take 100 mg by mouth every evening.)    predniSONE (DELTASONE) 5 MG tablet Take 1 tablet (5 mg total) by mouth once daily.    sirolimus (RAPAMUNE) 1 MG Tab Take 3 tablets (3 mg total) by mouth once daily.    vitamin renal formula, B-complex-vitamin c-folic acid, (NEPHROCAP) 1 mg Cap Take 1 capsule by mouth once daily.    ergocalciferol (ERGOCALCIFEROL) 50,000 unit Cap Take 1 capsule (50,000 Units total) by mouth every 7 days. (Patient not taking: Reported on 2/6/2025)    finasteride (PROSCAR) 5 mg tablet Take 1 tablet (5 mg total) by mouth once daily. (Patient not taking: Reported on 2/6/2025)    fluticasone propionate (FLONASE) 50 mcg/actuation nasal spray 1 spray (50 mcg total) by Each Nostril route nightly. (Patient not taking: Reported on 2/6/2025)    NIFEdipine (PROCARDIA-XL) 30 MG (OSM) 24 hr tablet Take 1 tablet (30 mg total) by mouth once daily. (Patient not taking: Reported on 2/6/2025)    oxyCODONE (ROXICODONE) 5 MG immediate release tablet Take 1 tablet (5 mg total) by mouth every 6 (six) hours as needed for Pain. (Patient not taking: Reported on 2/6/2025)    sodium bicarbonate 650 MG tablet Take 3 tablets (1,950 mg total) by mouth 2 (two) times daily. (Patient not taking:  Reported on 2025)    [DISCONTINUED] sildenafiL (VIAGRA) 50 MG tablet Take 1 tablet (50 mg total) by mouth daily as needed for Erectile Dysfunction.     Family History       Problem Relation (Age of Onset)    Diabetes Mother    Hypertension Mother    No Known Problems Sister, Brother, Daughter, Brother    Stroke Mother, Father          Tobacco Use    Smoking status: Former     Current packs/day: 0.00     Average packs/day: 1 pack/day for 20.0 years (20.0 ttl pk-yrs)     Types: Cigarettes     Start date:      Quit date:      Years since quittin.1     Passive exposure: Past    Smokeless tobacco: Never   Substance and Sexual Activity    Alcohol use: No     Alcohol/week: 0.0 standard drinks of alcohol    Drug use: No     Types: Marijuana     Comment: Occ.    Sexual activity: Not Currently     Partners: Female     Review of Systems   Constitutional:  Negative for activity change, chills and fever.   HENT:  Negative for trouble swallowing.    Eyes:  Negative for photophobia and visual disturbance.   Respiratory:  Positive for cough and shortness of breath. Negative for chest tightness and wheezing.         Chronic, no recent worsening   Cardiovascular:  Positive for leg swelling (chronic). Negative for chest pain and palpitations.   Gastrointestinal:  Negative for abdominal pain, constipation, diarrhea, nausea and vomiting.   Genitourinary:  Negative for dysuria, frequency, hematuria and urgency.   Musculoskeletal:  Negative for arthralgias, back pain and gait problem.        L arm swelling    Skin:  Positive for wound. Negative for color change and rash.   Neurological:  Negative for dizziness, syncope, weakness, light-headedness, numbness and headaches.   Psychiatric/Behavioral:  Negative for agitation and confusion. The patient is not nervous/anxious.      Objective:     Vital Signs (Most Recent):  Temp: 97.5 °F (36.4 °C) (25)  Pulse: (!) 55 (25)  Resp: 18 (25)  BP: (!)  144/76 (02/06/25 1940)  SpO2: 98 % (02/06/25 1940) Vital Signs (24h Range):  Temp:  [97.5 °F (36.4 °C)-98.7 °F (37.1 °C)] 97.5 °F (36.4 °C)  Pulse:  [55-88] 55  Resp:  [17-19] 18  SpO2:  [97 %-100 %] 98 %  BP: (123-144)/(63-76) 144/76     Weight: 94.8 kg (209 lb)  Body mass index is 29.56 kg/m².     Physical Exam  Vitals and nursing note reviewed.   Constitutional:       General: He is not in acute distress.     Appearance: He is well-developed.   HENT:      Head: Normocephalic and atraumatic.      Mouth/Throat:      Pharynx: No oropharyngeal exudate.      Comments: Hoarse voice, chronic  Eyes:      General: No scleral icterus.     Conjunctiva/sclera: Conjunctivae normal.   Cardiovascular:      Rate and Rhythm: Normal rate and regular rhythm.      Heart sounds: Normal heart sounds.   Pulmonary:      Effort: Pulmonary effort is normal. No respiratory distress.      Breath sounds: Normal breath sounds. No wheezing.      Comments: Course BS to lower lobes  Abdominal:      General: Bowel sounds are normal. There is no distension.      Palpations: Abdomen is soft.      Tenderness: There is no abdominal tenderness.   Musculoskeletal:         General: Swelling (significant LUE swelling with wound) present. No tenderness. Normal range of motion.      Cervical back: Normal range of motion and neck supple.      Right lower leg: Edema present.      Left lower leg: Edema present.   Lymphadenopathy:      Cervical: No cervical adenopathy.   Skin:     General: Skin is warm and dry.      Capillary Refill: Capillary refill takes less than 2 seconds.      Findings: No rash.      Comments: Wound noted to LUE with foul smelling, purulent drainage   Neurological:      Mental Status: He is alert and oriented to person, place, and time.      Cranial Nerves: No cranial nerve deficit.      Sensory: No sensory deficit.      Coordination: Coordination normal.   Psychiatric:         Behavior: Behavior normal.         Thought Content: Thought  content normal.         Judgment: Judgment normal.                Significant Labs: All pertinent labs within the past 24 hours have been reviewed.  CBC:   Recent Labs   Lab 02/05/25  1215 02/06/25  1818 02/06/25  1843   WBC 6.16 5.81  --    HGB 11.5* 10.9*  --    HCT 37.2* 35.7* 35.9    182  --      CMP:   Recent Labs   Lab 02/05/25  1215 02/06/25  1818    139   K 3.6 4.2    109   CO2 23 20*   * 99   BUN 36* 38*   CREATININE 4.10* 3.7*   CALCIUM 10.7* 10.3   PROT  --  8.3   ALBUMIN 4.0 3.3*   BILITOT  --  0.2   ALKPHOS  --  112   AST  --  25   ALT  --  9*   ANIONGAP 9 10       Significant Imaging: I have reviewed all pertinent imaging results/findings within the past 24 hours.  Assessment/Plan:     * Problem with vascular access  Infected wound  Hx ESRD s/p renal transplant presents with LUE swelling, dehiscence of brachial incision, and foul smelling purulent drainage from wound s/p LUE AV graft creation on 1/17/25    - vascular surgery plans for fistulogram in AM  - NPO midngiht  - hold eliquis tonight, resume after procedure  - continue IV vanc for likely infected wound given immunocompromised state  - trend daily BMP  - strict I/Os, daily weights    ESRD (end stage renal disease)  Hx ESRD s/p failed renal transplant, now CKD 5 not on HD  - monitor renal function daily    Centrilobular emphysema  Patient's COPD is controlled currently.  Patient is currently off COPD Pathway. Continue scheduled inhalers  duonebs prn  and monitor respiratory status closely.     Benign prostatic hyperplasia with lower urinary tract symptoms  - no longer taking finasteride     Carcinoma in situ of vocal cord  - s/p radiation and resection  - no issues swallowing per patient  - aspiration precautions     History of DVT (deep vein thrombosis)  - holding eliquis for upcoming procedure    Mixed hyperlipidemia  - continue statin     Essential hypertension  Patient's blood pressure range in the last 24 hours was:  BP  Min: 123/65  Max: 144/76.The patient's inpatient anti-hypertensive regimen is listed below:  Current Antihypertensives  furosemide tablet 40 mg, Daily, Oral  hydrALAZINE tablet 100 mg, Every 8 hours, Oral  metoprolol succinate (TOPROL-XL) 24 hr tablet 100 mg, Daily, Oral    Plan  - BP is controlled, no changes needed to their regimen  - clonidine patch due to be replaced in AM-- order tomorrow pending BP trend given active infxn and normotension       VTE Risk Mitigation (From admission, onward)           Ordered     IP VTE HIGH RISK PATIENT  Once         02/06/25 2032     Reason for No Pharmacological VTE Prophylaxis  Once        Question:  Reasons:  Answer:  Already adequately anticoagulated on oral Anticoagulants    02/06/25 2032                         On 02/06/2025, patient should be placed in hospital observation services under my care in collaboration with Dr. Carpio.      Zoe Zavala PA-C  Department of Hospital Medicine  [facility] - Emergency Dept

## 2025-02-07 NOTE — OP NOTE
[facility] - Surgery ([facility])  Operative Note     Surgery Date: 2/7/2025      Surgeons and Role:     * Guanaco Navarro MD - Primary     * Leonel Regan MD - Fellow     Assisting Surgeon: None     Pre-op Diagnosis:  Swelling of upper arm [M79.89]     Post-op Diagnosis:  Post-Op Diagnosis Codes:     * Swelling of upper arm [M79.89]     Procedure:   LUE Fistulagram  Central venogram  LUE AVG ligation     Anesthesia: Local MAC     Operative Findings:   Central occlusion unable to be crossed  No graft exposure on distal open wound     Will need WTD daily dressing going home.   Lower extremity vein mapping for future HD access creation     Estimated Blood Loss: 25 mL     Indications:  This patient is a 74-year-old male presenting with left upper extremity swelling with history of central venous occlusion status post recent left upper extremity AV graft placement.  He was offered fistulogram with possible intervention, possible AV graft ligation for symptomatology control.  Informed consent was obtained.      Procedure in detail:   Patient was brought to the operating room placed supine on the operating room table.  Preoperative antibiotics were administered.  Left upper extremity was prepped and draped in sterile fashion.  Time-out was called.  Monitored anesthesia care was performed by the anesthesia team.  We began by accessing the proximal left upper extremity AV graft micropuncture kit under ultrasound guidance.  Through the microcatheter, fistulogram was performed which demonstrated no obvious outflow stenosis.  Central venogram was then performed which demonstrated occlusion the clock confluence of the left common and subclavian artery with significant collateralization.  The catheter was then exchanged for a 6 x 25 sheath and with support from 5 x 65 catheter, several attempts were made to cross the central occlusion.  However, this was unsuccessful and decision was ultimately made to ligate the left upper  extremity arteriovenous graft to improve his significant left upper extremity swelling.  Transverse incision was then made overlying the mid graft after infiltrating with local anesthesia.  This was carried down Bovie electrocautery until the graft was encountered.  2-0 silk ties were used to ligate the graft.  The wound was then copiously irrigated and closed in multiple layers.  The distal incision was then thoroughly examined and noted that no graft was exposed.  This was packed with wet-to-dry sterile dressing.  Patient was then liberated from anesthesia and brought to the PACU in stable condition.  All counts were reported correct.    Dr. Navarro was present and scrubbed for the entirety of the case.

## 2025-02-07 NOTE — ASSESSMENT & PLAN NOTE
Patient's blood pressure range in the last 24 hours was: BP  Min: 123/65  Max: 144/76.The patient's inpatient anti-hypertensive regimen is listed below:  Current Antihypertensives  furosemide tablet 40 mg, Daily, Oral  hydrALAZINE tablet 100 mg, Every 8 hours, Oral  metoprolol succinate (TOPROL-XL) 24 hr tablet 100 mg, Daily, Oral    Plan  - BP is controlled, no changes needed to their regimen  - clonidine patch due to be replaced in AM-- order tomorrow pending BP trend given active infxn and normotension

## 2025-02-07 NOTE — HPI
Jose Luis Manzo is a 74 y.o. 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 Patient had a LUE AV graft creation on 1/17 given progressive CKD and likely need for HD in the future. Patient reports worsening RUE swelling since his surgery. He noticed a wound to the area of the graft when he took his bandage off about 3 days post op. Reports foul smelling, purulent drainage from the wound. He has chronic SOB, intermittent cough, and LE swelling without recent worsening. Denies chest pain, N/V, abdominal pain, HA, vision changes or syncope.     ED: AFVSS. No leukocytosis. Renal function stable at baseline. Vascular surgery consulted, plan for fistulogram in AM.

## 2025-02-07 NOTE — ANESTHESIA PREPROCEDURE EVALUATION
[facility]  Anesthesia Pre-Operative Evaluation         Patient Name: Jose Luis Manzo  YOB: 1950  MRN: [id number]    SUBJECTIVE:     Pre-operative evaluation for Procedure(s) (LRB):  Fistulogram     02/06/2025    Jose Luis Manzo is a 74 y.o. male w/ a significant PMHx of HTN, HLD, ESRD (s/p transplant 2007; c/b graft CKD-IV; on maintenance sirolimus/pred), oropharyngeal carcinoma (s/p resection + XRT in 2015 now in remission), VTE (on eliquis), hyperparathyroidism, and BPH presenting with a failed AV fistula.    Patient now presents for the above procedure(s).    TTE 4/26/24    Left Ventricle: The left ventricle is normal in size. Normal wall thickness. There is concentric hypertrophy. Unable to assess wall motion. There is normal systolic function. Biplane (2D) method of discs ejection fraction is 65%.    Right Ventricle: Right ventricle was not well visualized due to poor acoustic window. Normal right ventricular cavity size. Systolic function is normal.    Tricuspid Valve: There is mild regurgitation.    Pulmonary Artery: The estimated pulmonary artery systolic pressure is 28 mmHg.    IVC/SVC: Normal venous pressure at 3 mmHg.    Pericardium: There is a small effusion.    Overall the study quality was technically difficult.         LDA: None documented.    Prev airway:     Intubation  Performed by: Shamika Wells CRNA  Authorized by: Wally Argueta MD      Intubation:     Induction:  Intravenous    Mask Ventilation:  Easy with oral airway    Attempts:  2    Attempted By:  Student and staff anesthesiologist    Blade:  Graham 3    Laryngeal View Grade: Grade IIb - only the arytenoids and epiglottis seen      Attempted By (2nd Attempt):  Student    Method of Intubation (2nd Attempt):  Video laryngoscopy    Blade (2nd Attempt):  Llamas 2    Laryngeal View Grade (2nd Attempt): Grade III - only epiglottis visible      Difficult Airway Encountered?: No      Airway Device:   Oral endotracheal tube    Airway Device Size:  7.5    Style/Cuff Inflation:  Cuffed    Inflation Amount (mL):  4    Tube secured:  22    Secured at:  The teeth    Placement Verified By:  Capnometry    DIFFICULT INTUBATION DESCRIPTOR: Multiple VC surgeries.    Findings Post-Intubation:  BS equal bilateral          Drips: None documented.    Patient Active Problem List   Diagnosis    Stage 4 chronic kidney disease    Essential hypertension    Mixed hyperlipidemia    Benign prostatic hyperplasia with nocturia    Claudication of right lower extremity    Eye swelling, bilateral    Diminished night vision    Renal transplant recipient    Dermolipoma    History of DVT (deep vein thrombosis)    Iron deficiency anemia    Pulmonary nodules    Larynx cancer    Voice disturbance    Atherosclerosis of aorta    Carcinoma in situ of vocal cord    Dysphagia, pharyngoesophageal    Chondronecrosis of larynx    Squamous cell carcinoma    Obesity (BMI 30-39.9)    Pterygium of both eyes    History of cancer of larynx    Nodule of left lung    Granulomatous lung disease    Transplanted kidney - 2007    Hypertensive kidney disease with stage 4 chronic kidney disease    Hypercalcemia    Secondary hyperparathyroidism of renal origin    Benign tumor of orbit    CHANG (acute kidney injury)    Metabolic bone disease    Immunosuppressive management encounter following kidney transplant    Hyperphosphatemia    Vitamin D deficiency disease    History of COVID-19    SOB (shortness of breath)    Metabolic acidosis    Proteinuria    Anticoagulant long-term use    Pre-op evaluation    Benign prostatic hyperplasia with lower urinary tract symptoms    Centrilobular emphysema    Dependence on renal dialysis    Class 1 obesity due to excess calories with serious comorbidity and body mass index (BMI) of 34.0 to 34.9 in adult    Former cigarette smoker    Deformity of toenail    Complicated UTI (urinary tract  infection)    Moderate malnutrition    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    ESRD (end stage renal disease)    Edema of upper extremity       Review of patient's allergies indicates:  No Known Allergies    Current Outpatient Medications:    Current Facility-Administered Medications:     Pharmacy to dose Vancomycin consult, , , Once **AND** vancomycin - pharmacy to dose, , Intravenous, pharmacy to manage frequency, Kiki Bates PA-C    vancomycin 2 g in 0.9% sodium chloride 500 mL IVPB, 20 mg/kg, Intravenous, Once, Sarmad Larose MD    Current Outpatient Medications:     acetaminophen (TYLENOL) 650 MG TbSR, Take 1 tablet (650 mg total) by mouth every 8 (eight) hours., Disp: , Rfl:     apixaban (ELIQUIS) 2.5 mg Tab, Take 1 tablet (2.5 mg total) by mouth 2 (two) times daily., Disp: 180 tablet, Rfl: 3    atorvastatin (LIPITOR) 20 MG tablet, Take 1 tablet (20 mg total) by mouth every evening., Disp: 90 tablet, Rfl: 3    cinacalcet (SENSIPAR) 30 MG Tab, Take 1 tablet (30 mg total) by mouth daily with breakfast., Disp: 90 tablet, Rfl: 3    cloNIDine 0.3 mg/24 hr td ptwk (CATAPRES) 0.3 mg/24 hr, Place 1 patch onto the skin once a week., Disp: 12 patch, Rfl: 10    ferrous sulfate 325 mg (65 mg iron) Tab tablet, Take 325 mg by mouth once daily., Disp: , Rfl:     furosemide (LASIX) 40 MG tablet, Take 1 tablet (40 mg total) by mouth once daily., Disp: 90 tablet, Rfl: 3    hydrALAZINE (APRESOLINE) 100 MG tablet, Take 1 tablet (100 mg total) by mouth every 8 (eight) hours., Disp: 270 tablet, Rfl: 3    metoprolol succinate (TOPROL-XL) 100 MG 24 hr tablet, Take 1 tablet (100 mg total) by mouth once daily. (Patient taking differently: Take 100 mg by mouth every evening.), Disp: 90 tablet, Rfl: 3    predniSONE (DELTASONE) 5 MG tablet, Take 1 tablet (5 mg total) by mouth once daily., Disp: 90 tablet, Rfl: 3    sirolimus (RAPAMUNE) 1 MG Tab,  "Take 3 tablets (3 mg total) by mouth once daily., Disp: , Rfl:     vitamin renal formula, B-complex-vitamin c-folic acid, (NEPHROCAP) 1 mg Cap, Take 1 capsule by mouth once daily., Disp: 30 each, Rfl: 3    ergocalciferol (ERGOCALCIFEROL) 50,000 unit Cap, Take 1 capsule (50,000 Units total) by mouth every 7 days. (Patient not taking: Reported on 2/6/2025), Disp: 12 capsule, Rfl: 3    finasteride (PROSCAR) 5 mg tablet, Take 1 tablet (5 mg total) by mouth once daily. (Patient not taking: Reported on 2/6/2025), Disp: 90 tablet, Rfl: 3    fluticasone propionate (FLONASE) 50 mcg/actuation nasal spray, 1 spray (50 mcg total) by Each Nostril route nightly. (Patient not taking: Reported on 2/6/2025), Disp: 10 mL, Rfl: 5    NIFEdipine (PROCARDIA-XL) 30 MG (OSM) 24 hr tablet, Take 1 tablet (30 mg total) by mouth once daily. (Patient not taking: Reported on 2/6/2025), Disp: 90 tablet, Rfl: 3    oxyCODONE (ROXICODONE) 5 MG immediate release tablet, Take 1 tablet (5 mg total) by mouth every 6 (six) hours as needed for Pain. (Patient not taking: Reported on 2/6/2025), Disp: 8 tablet, Rfl: 0    sodium bicarbonate 650 MG tablet, Take 3 tablets (1,950 mg total) by mouth 2 (two) times daily. (Patient not taking: Reported on 2/6/2025), Disp: , Rfl:     Past Surgical History:   Procedure Laterality Date    AV FISTULA PLACEMENT Right 10/22/2024    Procedure: CREATION, AV FISTULA;  Surgeon: Guanaco Navarro MD;  Location: [facility] OR [address] [place] [address];  Service: Vascular;  Laterality: Right;  RUE AVF creation    CYSTOSCOPY W/ RETROGRADES Bilateral 6/15/2022    Procedure: Cystoscopy, bilateral retrograde pyelogram, and all indicated procedures;  Surgeon: Veronica Bacon MD;  Location: [facility] OR;  Service: Urology;  Laterality: Bilateral;    FRACTURE SURGERY Left     "lower leg" 40 years ago    INSERTION, GRAFT, ARTERIOVENOUS, UPPER EXTREMITY Left 1/17/2025    Procedure: INSERTION, GRAFT, ARTERIOVENOUS, UPPER EXTREMITY;  Surgeon: Guanaco Navarro, " MD;  [facility];  Service: Vascular;  Laterality: Left;    KIDNEY TRANSPLANT      followed by [facility] Transplant    LARYNX SURGERY  2014    Oropharyngeal Cancer x3    microlaryngoscopy      PHLEBOGRAPHY Bilateral 2022    Procedure: Venogram;  Surgeon: KENIA Mueller II, MD;  Location: [facility] CATH LAB;  Service: Vascular;  Laterality: Bilateral;    SURGICAL REMOVAL OF LESION OF ORBIT Bilateral 2020    Procedure: EXCISION, LESION, ORBIT;  Surgeon: Linda Tee MD;  [facility];  Service: Ophthalmology;  Laterality: Bilateral;    TIBIAL STAPLING Left             Social History     Socioeconomic History    Marital status: Single    Number of children: 1   Tobacco Use    Smoking status: Former     Current packs/day: 0.00     Average packs/day: 1 pack/day for 20.0 years (20.0 ttl pk-yrs)     Types: Cigarettes     Start date:      Quit date:      Years since quittin.1     Passive exposure: Past    Smokeless tobacco: Never   Substance and Sexual Activity    Alcohol use: No     Alcohol/week: 0.0 standard drinks of alcohol    Drug use: No     Types: Marijuana     Comment: Occ.    Sexual activity: Not Currently     Partners: Female     Social Drivers of Health     Financial Resource Strain: Low Risk  (9/3/2024)    Overall Financial Resource Strain (CARDIA)     Difficulty of Paying Living Expenses: Not hard at all   Food Insecurity: No Food Insecurity (9/3/2024)    Hunger Vital Sign     Worried About Running Out of Food in the Last Year: Never true     Ran Out of Food in the Last Year: Never true   Transportation Needs: No Transportation Needs (9/3/2024)    TRANSPORTATION NEEDS     Transportation : No   Physical Activity: Inactive (9/3/2024)    Exercise Vital Sign     Days of Exercise per Week: 0 days     Minutes of Exercise per Session: 0 min   Stress: No Stress Concern Present (9/3/2024)    [employer] -  Occupational Stress Questionnaire     Feeling of Stress : Not at all   Housing Stability: Low Risk  (9/3/2024)    Housing Stability Vital Sign     Unable to Pay for Housing in the Last Year: No     Homeless in the Last Year: No       OBJECTIVE:     Vital Signs Range (Last 24H):  Temp:  [36.6 °C (97.8 °F)-37.1 °C (98.7 °F)]   Pulse:  [57-88]   Resp:  [17-19]   BP: (123-128)/(63-65)   SpO2:  [97 %-100 %]       Significant Labs:  Lab Results   Component Value Date    WBC 6.16 02/05/2025    HGB 11.5 (L) 02/05/2025    HCT 35.9 02/06/2025     02/05/2025    CHOL 146 09/30/2024    TRIG 92 09/30/2024    HDL 45 09/30/2024    ALT 15 09/30/2024    AST 20 09/30/2024     02/05/2025    K 3.6 02/05/2025     02/05/2025    CREATININE 4.10 (H) 02/05/2025    BUN 36 (H) 02/05/2025    CO2 23 02/05/2025    TSH 6.560 (H) 09/30/2024    INR 1.0 09/14/2017    HGBA1C 6.0 (H) 09/30/2024       Diagnostic Studies: No relevant studies.    EKG:   Results for orders placed or performed during the hospital encounter of 09/02/24   EKG 12-lead    Collection Time: 09/02/24  9:06 PM   Result Value Ref Range    QRS Duration 118 ms    [facility] QTC Calculation 456 ms    Narrative    Test Reason : R53.1,    Vent. Rate : 084 BPM     Atrial Rate : 084 BPM     P-R Int : 156 ms          QRS Dur : 118 ms      QT Int : 386 ms       P-R-T Axes : 063 -36 110 degrees     QTc Int : 456 ms    Normal sinus rhythm  Left axis deviation  Incomplete right bundle branch block  Septal infarct (cited on or before 01-MAY-2024)  T wave abnormality, consider lateral ischemia  Abnormal ECG  When compared with ECG of 02-SEP-2024 21:02,  No significant change was found  Confirmed by Timothy Marie MD ([id number]) on 9/4/2024 3:31:10 PM    Referred By: [email or web address]   SELF           Confirmed By:Timothy Marie MD       2D ECHO:  TTE:  Results for orders placed or performed during the hospital encounter of 04/26/24   Echo   Result Value Ref Range    Collins's Biplane MOD  Ejection Fraction 65 %    LVOT stroke volume 60.30 cm3    LVIDd 4.33 3.5 - 6.0 cm    LV Systolic Volume 34.86 mL    LV Systolic Volume Index 15.2 mL/m2    LVIDs 3.00 2.1 - 4.0 cm    LV Diastolic Volume 84.26 mL    LV Diastolic Volume Index 36.79 mL/m2    IVS 1.55 (A) 0.6 - 1.1 cm    LVOT diameter 2.06 cm    LVOT area 3.3 cm2    FS 31 28 - 44 %    Left Ventricle Relative Wall Thickness 0.73 cm    PW 1.57 (A) 0.6 - 1.1 cm    LV mass 277.08 g    LV Mass Index 121 g/m2    MV Peak E Isreal 0.54 m/s    TDI LATERAL 0.05 m/s    TDI SEPTAL 0.04 m/s    E/E' ratio 12.00 m/s    MV Peak A Isreal 0.82 m/s    TR Max Isreal 2.52 m/s    E/A ratio 0.66     E wave deceleration time 171.40 msec    LV SEPTAL E/E' RATIO 13.50 m/s    LV LATERAL E/E' RATIO 10.80 m/s    LVOT peak isreal 0.92 m/s    Left Ventricular Outflow Tract Mean Velocity 0.63 cm/s    Left Ventricular Outflow Tract Mean Gradient 1.67 mmHg    RVDD 3.01 cm    RV S' 8.31 cm/s    TAPSE 1.72 cm    RV/LV Ratio 0.70 cm    LA size 3.27 cm    Left Atrium Minor Axis 4.65 cm    Left Atrium Major Axis 5.04 cm    LA Vol (MOD) 30.92 cm3    TYLER (MOD) 13.5 mL/m2    RA Major Axis 6.00 cm    RA Width 3.44 cm    AV mean gradient 2 mmHg    AV peak gradient 5 mmHg    Ao peak isreal 1.09 m/s    Ao VTI 20.90 cm    LVOT peak VTI 18.10 cm    AV valve area 2.88 cm²    AV Velocity Ratio 0.84     AV index (prosthetic) 0.87     JENNIFER by Velocity Ratio 2.81 cm²    MV mean gradient 1 mmHg    MV peak gradient 2 mmHg    MV stenosis pressure 1/2 time 49.71 ms    MV valve area p 1/2 method 4.43 cm2    MV valve area by continuity eq 2.73 cm2    MV VTI 22.1 cm    Triscuspid Valve Regurgitation Peak Gradient 25 mmHg    PV PEAK VELOCITY 0.89 m/s    PV peak gradient 3 mmHg    Pulmonary Valve Mean Velocity 0.64 m/s    Sinus 3.51 cm    STJ 2.71 cm    IVC diameter 1.50 cm    Mean e' 0.05 m/s    ZLVIDS -4.47     ZLVIDD -7.03     BSA 2.37 m2    TYLER 24.1 mL/m2    LA Vol 55.12 cm3    LA WIDTH 4.1 cm    TV resting pulmonary artery  pressure 28 mmHg    RV TB RVSP 6 mmHg    Est. RA pres 3 mmHg    Narrative      Left Ventricle: The left ventricle is normal in size. Normal wall   thickness. There is concentric hypertrophy. Unable to assess wall motion.   There is normal systolic function. Biplane (2D) method of discs ejection   fraction is 65%.    Right Ventricle: Right ventricle was not well visualized due to poor   acoustic window. Normal right ventricular cavity size. Systolic function   is normal.    Tricuspid Valve: There is mild regurgitation.    Pulmonary Artery: The estimated pulmonary artery systolic pressure is   28 mmHg.    IVC/SVC: Normal venous pressure at 3 mmHg.    Pericardium: There is a small effusion.    Overall the study quality was technically difficult.         ANYA:  No results found for this or any previous visit.    ASSESSMENT/PLAN:           Pre-op Assessment    I have reviewed the Patient Summary Reports.     I have reviewed the Nursing Notes. I have reviewed the NPO Status.   I have reviewed the Medications.     Review of Systems  Anesthesia Hx:   History of prior surgery of interest to airway management or planning:             Social:  Former Smoker       EENT/Dental:   Carcinoma in situ of vocal cord          Cardiovascular:     Hypertension   CAD          PVD hyperlipidemia                               Pulmonary:   COPD   Shortness of breath                  Renal/:  Chronic Renal Disease, CKD   KIDNEY TRANSPLANT '07                     Physical Exam  General: Well nourished, Cooperative, Alert and Oriented    Airway:  Mallampati: III   Mouth Opening: Normal  TM Distance: Normal  Tongue: Normal  Neck ROM: Normal ROM    Dental:  Partial Dentures    Chest/Lungs:  Normal Respiratory Rate  expiratory wheezes    Heart:  Rate: Normal  Rhythm: Regular Rhythm      Anesthesia Plan  Type of Anesthesia, risks & benefits discussed:    Anesthesia Type: Gen Natural Airway, Regional, MAC  Intra-op Monitoring Plan: Standard  ASA Monitors  Post Op Pain Control Plan: multimodal analgesia, IV/PO Opioids PRN and peripheral nerve block  Induction:  IV  Informed Consent: Informed consent signed with the Patient and all parties understand the risks and agree with anesthesia plan.  All questions answered.   ASA Score: 3  Day of Surgery Review of History & Physical: H&P Update referred to the surgeon/provider.  Anesthesia Plan Notes: Plan for nebulizer treatment pre op    Ready For Surgery From Anesthesia Perspective.     .

## 2025-02-07 NOTE — PROGRESS NOTES
"Pharmacokinetic Initial Assessment: IV Vancomycin    Assessment/Plan:    Initiate intravenous vancomycin with loading dose of 2000 mg once with subsequent doses when random concentrations are less than 20 mcg/mL  Desired empiric serum trough concentration is 10 to 15 mcg/mL  Draw vancomycin random level on 2/7 at 1800.  Pharmacy will continue to follow and monitor vancomycin.      Please contact pharmacy at extension [phone] with any questions regarding this assessment.     Thank you for the consult,   Renee Knowles       Patient brief summary:  Jose Luis Manzo is a 74 y.o. male initiated on antimicrobial therapy with IV Vancomycin for treatment of suspected skin & soft tissue infection    Drug Allergies:   Review of patient's allergies indicates:  No Known Allergies    Actual Body Weight:   95kg    Renal Function:   Estimated Creatinine Clearance: 18.4 mL/min (A) (based on SCr of 4.1 mg/dL (H)).,     Dialysis Method (if applicable):  N/A    CBC (last 72 hours):  Recent Labs   Lab Result Units 02/05/25  1215   WBC K/uL 6.16   Hemoglobin g/dL 11.5*   Hematocrit % 37.2*   Platelets K/uL 196   Gran % % 59.6   Lymph % % 26.5   Mono % % 11.7   Eosinophil % % 1.1   Basophil % % 0.5   Differential Method  Automated       Metabolic Panel (last 72 hours):  Recent Labs   Lab Result Units 02/05/25  1209 02/05/25  1215   Sodium mmol/L  --  138   Potassium mmol/L  --  3.6   Chloride mmol/L  --  106   CO2 mmol/L  --  23   Glucose mg/dL  --  114*   Glucose, UA  Negative  --    BUN mg/dL  --  36*   Creatinine mg/dL  --  4.10*   Creatinine, Urine mg/dL 106.9  --    Albumin g/dL  --  4.0   Phosphorus mg/dL  --  3.9       Drug levels (last 3 results):  No results for input(s): "VANCOMYCINRA", "VANCORANDOM", "VANCOMYCINPE", "VANCOPEAK", "VANCOMYCINTR", "VANCOTROUGH" in the last 72 hours.    Microbiologic Results:  Microbiology Results (last 7 days)       Procedure Component Value Units Date/Time    Aerobic culture [[id number]]     " Order Status: No result Specimen: Wound     Culture, Anaerobe [[id number]]     Order Status: No result Specimen: Wound     Blood culture x two cultures. Draw prior to antibiotics. [[id number]]     Order Status: Sent Specimen: Blood     Blood culture x two cultures. Draw prior to antibiotics. [[id number]]     Order Status: Sent Specimen: Blood

## 2025-02-07 NOTE — ASSESSMENT & PLAN NOTE
Infected wound  Hx ESRD s/p renal transplant presents with LUE swelling, dehiscence of brachial incision, and foul smelling purulent drainage from wound s/p LUE AV graft creation on 1/17/25  S/p AVG ligation 2.7.25  Vascular surgery following  Resume eliquis after procedure  Continue IV vanc for likely infected wound given immunocompromised state

## 2025-02-07 NOTE — PROGRESS NOTES
[facility] ([address] [place] [address] Medicine  Progress Note    Patient Name: Jose Luis Manzo  MRN: [id number]  Patient Class: IP- Inpatient   Admission Date: 2/6/2025  Length of Stay: 0 days  Attending Physician: Brock Wiseman DO  Primary Care Provider: Home Alexis MD        Subjective     Principal Problem:Problem with vascular access        HPI:  Jose Luis Manzo is a 74 y.o. 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 Patient had a LUE AV graft creation on 1/17 given progressive CKD and likely need for HD in the future. Patient reports worsening RUE swelling since his surgery. He noticed a wound to the area of the graft when he took his bandage off about 3 days post op. Reports foul smelling, purulent drainage from the wound. He has chronic SOB, intermittent cough, and LE swelling without recent worsening. Denies chest pain, N/V, abdominal pain, HA, vision changes or syncope.     ED: AFVSS. No leukocytosis. Renal function stable at baseline. Vascular surgery consulted, plan for fistulogram in AM.     Overview/Hospital Course:  Admitted for ESRD access issue.  Underwent AVG ligation with vascular surgery 2.7.25.    Interval History:  Seen and evaluated in PACU  No acute events overnight    Clinical status improved  Having some L arm pain since surgery    Objective:     Vital Signs (Most Recent):  Temp: 97.9 °F (36.6 °C) (02/07/25 1100)  Pulse: (!) 53 (02/07/25 1415)  Resp: 13 (02/07/25 1415)  BP: (!) 153/74 (02/07/25 1415)  SpO2: 98 % (02/07/25 1415) Vital Signs (24h Range):  Temp:  [97.5 °F (36.4 °C)-98.7 °F (37.1 °C)] 97.9 °F (36.6 °C)  Pulse:  [44-57] 53  Resp:  [12-20] 13  SpO2:  [93 %-100 %] 98 %  BP: (109-157)/(60-77) 153/74     Weight: 94.8 kg (208 lb 15.9 oz)  Body mass index is 29.99  kg/m².    Intake/Output Summary (Last 24 hours) at 2/7/2025 1615  Last data filed at 2/7/2025 1053  Gross per 24 hour   Intake 100 ml   Output 25 ml   Net 75 ml         Physical Exam  Vitals and nursing note reviewed.   Constitutional:       General: He is not in acute distress.     Appearance: He is well-developed.   HENT:      Head: Normocephalic and atraumatic.      Mouth/Throat:      Pharynx: No oropharyngeal exudate.      Comments: Hoarse voice, chronic  Eyes:      General: No scleral icterus.     Conjunctiva/sclera: Conjunctivae normal.   Cardiovascular:      Rate and Rhythm: Normal rate and regular rhythm.      Heart sounds: Normal heart sounds.   Pulmonary:      Effort: Pulmonary effort is normal. No respiratory distress.      Breath sounds: Normal breath sounds. No wheezing.      Comments: Course BS to lower lobes  Abdominal:      General: Bowel sounds are normal. There is no distension.      Palpations: Abdomen is soft.      Tenderness: There is no abdominal tenderness.   Musculoskeletal:         General: Swelling (significant LUE swelling with wound) present. No tenderness. Normal range of motion.      Cervical back: Normal range of motion and neck supple.      Right lower leg: Edema present.      Left lower leg: Edema present.   Lymphadenopathy:      Cervical: No cervical adenopathy.   Skin:     General: Skin is warm and dry.      Capillary Refill: Capillary refill takes less than 2 seconds.      Findings: No rash.      Comments: Wound noted to LUE with foul smelling, purulent drainage   Neurological:      Mental Status: He is alert and oriented to person, place, and time.      Cranial Nerves: No cranial nerve deficit.      Sensory: No sensory deficit.      Coordination: Coordination normal.   Psychiatric:         Behavior: Behavior normal.         Thought Content: Thought content normal.         Judgment: Judgment normal.             Significant Labs: All pertinent labs within the past 24 hours have  been reviewed.    Significant Imaging: I have reviewed all pertinent imaging results/findings within the past 24 hours.    Assessment and Plan     * Problem with vascular access  Infected wound  Hx ESRD s/p renal transplant presents with LUE swelling, dehiscence of brachial incision, and foul smelling purulent drainage from wound s/p LUE AV graft creation on 1/17/25  S/p AVG ligation 2.7.25  Vascular surgery following  Resume eliquis after procedure  Continue IV vanc for likely infected wound given immunocompromised state    ESRD (end stage renal disease)  Hx ESRD s/p failed renal transplant, now CKD 5 not on HD  - monitor renal function daily    Centrilobular emphysema  Patient's COPD is controlled currently.  Patient is currently off COPD Pathway. Continue scheduled inhalers  duonebs prn  and monitor respiratory status closely.     Benign prostatic hyperplasia with lower urinary tract symptoms  - no longer taking finasteride     Carcinoma in situ of vocal cord  - s/p radiation and resection  - no issues swallowing per patient  - aspiration precautions     History of DVT (deep vein thrombosis)  Resume eliquis    Mixed hyperlipidemia  - continue statin     Essential hypertension  Patient's blood pressure range in the last 24 hours was: BP  Min: 123/65  Max: 144/76.The patient's inpatient anti-hypertensive regimen is listed below:  Current Antihypertensives  furosemide tablet 40 mg, Daily, Oral  hydrALAZINE tablet 100 mg, Every 8 hours, Oral  metoprolol succinate (TOPROL-XL) 24 hr tablet 100 mg, Daily, Oral    Plan  - BP is controlled, no changes needed to their regimen  - clonidine patch due to be replaced in AM-- order tomorrow pending BP trend given active infxn and normotension       VTE Risk Mitigation (From admission, onward)           Ordered     IP VTE HIGH RISK PATIENT  Once         02/06/25 2032     Reason for No Pharmacological VTE Prophylaxis  Once        Question:  Reasons:  Answer:  Already adequately  anticoagulated on oral Anticoagulants    02/06/25 2032                    Discharge Planning   LAVERNE:      Code Status: Full Code   Medical Readiness for Discharge Date:                            Brock Wiseman DO  Department of Hospital Medicine   [facility] - Surgery (2nd Fl)

## 2025-02-07 NOTE — ED PROVIDER NOTES
Encounter Date: 2/6/2025       History     Chief Complaint   Patient presents with    Vascular Access Problem     Had graft placed 3 weeks ago, now having swelling, green discharge, and foul smell.     Patient is a 74-year-old male with hypertension, hyperlipidemia, end-stage renal disease starting dialysis, chronic anticoagulation, who presents to the emergency department with left arm swelling and pain.  Patient reports 3 weeks ago he had an AV fistula placed.  Reports over the last week he has noticed wound opening.  Reports significant swelling.  Reports green drainage that smells terrible.  Denies fevers.  Reports he never followed up for his postop appointment.    The history is provided by the patient.     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    Disorder of kidney and ureter     Hyperkalemia 1/1/2021    Hyperlipidemia     Hypertension     Hypokalemia 6/23/2021    Immunosuppression 6/26/2021    Immunosuppression due to chronic steroid use 1/30/2020    Microcytic anemia 9/16/2016    Obesity (BMI 30-39.9) 1/23/2019    Oropharyngeal cancer     Pterygium     Squamous cell carcinoma 4/25/2018    Thromboembolic disorder     Unspecified disorder of kidney and ureter      Past Surgical History:   Procedure Laterality Date    AV FISTULA PLACEMENT Right 10/22/2024    Procedure: CREATION, AV FISTULA;  Surgeon: Guanaco Navarro MD;  Location: [facility] OR [address] [place] [address];  Service: Vascular;  Laterality: Right;  RUE AVF creation    CYSTOSCOPY W/ RETROGRADES Bilateral 6/15/2022    Procedure: Cystoscopy, bilateral retrograde pyelogram, and all indicated procedures;  Surgeon: Veronica BOWENS  "MD Jordi;  Location: [facility] OR;  Service: Urology;  Laterality: Bilateral;    FRACTURE SURGERY Left     "lower leg" 40 years ago    INSERTION, GRAFT, ARTERIOVENOUS, UPPER EXTREMITY Left 2025    Procedure: INSERTION, GRAFT, ARTERIOVENOUS, UPPER EXTREMITY;  Surgeon: Guanaco Navarro MD;  Location: [address];  Service: Vascular;  Laterality: Left;    KIDNEY TRANSPLANT      followed by [facility] Transplant    LARYNX SURGERY  2014    Oropharyngeal Cancer x3    microlaryngoscopy      PHLEBOGRAPHY Bilateral 2022    Procedure: Venogram;  Surgeon: KENIA Mueller II, MD;  Location: [facility] CATH LAB;  Service: Vascular;  Laterality: Bilateral;    SURGICAL REMOVAL OF LESION OF ORBIT Bilateral 2020    Procedure: EXCISION, LESION, ORBIT;  Surgeon: Linda Tee MD;  Location: [address];  Service: Ophthalmology;  Laterality: Bilateral;    TIBIAL STAPLING Left           Family History   Problem Relation Name Age of Onset    Stroke Mother      Diabetes Mother      Hypertension Mother      Stroke Father      No Known Problems Sister      No Known Problems Brother x1     No Known Problems Daughter      No Known Problems Brother x1      Social History     Tobacco Use    Smoking status: Former     Current packs/day: 0.00     Average packs/day: 1 pack/day for 20.0 years (20.0 ttl pk-yrs)     Types: Cigarettes     Start date:      Quit date: 2009     Years since quittin.1     Passive exposure: Past    Smokeless tobacco: Never   Substance Use Topics    Alcohol use: No     Alcohol/week: 0.0 standard drinks of alcohol    Drug use: No     Types: Marijuana     Comment: Occ.     Review of Systems   Constitutional:  Negative for activity change, appetite change, chills, fatigue and fever.   HENT:  Negative for congestion, ear discharge, ear pain, postnasal drip, rhinorrhea and sore throat.    Respiratory:  Negative for cough.    Cardiovascular:  Negative for chest pain.   Gastrointestinal:  Negative for " abdominal pain.   Genitourinary:  Negative for dysuria.   Musculoskeletal:  Negative for back pain.        Arm swelling and pain   Skin:  Positive for wound.   Neurological:  Negative for dizziness, light-headedness and headaches.       Physical Exam     Initial Vitals [02/06/25 1528]   BP Pulse Resp Temp SpO2   128/63 88 17 97.8 °F (36.6 °C) 100 %      MAP       --         Physical Exam    Nursing note and vitals reviewed.  Constitutional: He appears well-developed and well-nourished. He is not diaphoretic.  Non-toxic appearance. No distress.   Left upper extremity: LUE with 2+ edema.      Lower incision with superficial dehiscence present.   With purulence - tender   HENT:   Head: Normocephalic.   Right Ear: External ear normal.   Left Ear: External ear normal. Mouth/Throat: Oropharynx is clear and moist.   Eyes: Conjunctivae are normal. Pupils are equal, round, and reactive to light.   Neck:   Normal range of motion.  Cardiovascular:  Normal rate and normal heart sounds.           Pulmonary/Chest: Breath sounds normal.   Abdominal: There is no abdominal tenderness.   Musculoskeletal:      Cervical back: Normal range of motion.     Neurological: He is alert and oriented to person, place, and time.   Skin: Skin is warm and dry. Capillary refill takes less than 2 seconds.   Psychiatric: He has a normal mood and affect.         ED Course   Procedures  Labs Reviewed   CBC W/ AUTO DIFFERENTIAL - Abnormal       Result Value    WBC 5.81      RBC 4.28 (*)     Hemoglobin 10.9 (*)     Hematocrit 35.7 (*)     MCV 83      MCH 25.5 (*)     MCHC 30.5 (*)     RDW 15.2 (*)     Platelets 182      MPV 11.0      Immature Granulocytes 0.7 (*)     Gran # (ANC) 3.9      Immature Grans (Abs) 0.04      Lymph # 1.2      Mono # 0.6      Eos # 0.1      Baso # 0.02      nRBC 0      Gran % 66.8      Lymph % 21.3      Mono % 10.0      Eosinophil % 0.9      Basophil % 0.3      Differential Method Automated     COMPREHENSIVE METABOLIC PANEL -  Abnormal    Sodium 139      Potassium 4.2      Chloride 109      CO2 20 (*)     Glucose 99      BUN 38 (*)     Creatinine 3.7 (*)     Calcium 10.3      Total Protein 8.3      Albumin 3.3 (*)     Total Bilirubin 0.2      Alkaline Phosphatase 112      AST 25      ALT 9 (*)     eGFR 16.4 (*)     Anion Gap 10     CULTURE, BLOOD   CULTURE, BLOOD   CULTURE, AEROBIC  (SPECIFY SOURCE)   CULTURE, ANAEROBIC   URINALYSIS, REFLEX TO URINE CULTURE   PROCALCITONIN          Imaging Results    None          Medications   vancomycin - pharmacy to dose (has no administration in time range)   vancomycin 2 g in 0.9% sodium chloride 500 mL IVPB (has no administration in time range)     Medical Decision Making  Urgent evaluation of a 74-year-old male with extensive medical history and recent AV fistula placement who presents to the emergency department with complication to fistula.  Patient is afebrile and nontoxic appearing.  Left upper extremity with 2+ pitting edema and wound dehiscence.  There is some purulence.  Malodorous.  Tender.  Neurovascularly intact.  Will start on antibiotics for pharmacy to dose with patient's renal insufficiency.  Will reach out to vascular surgery.    7:16 PM  Vascular surgery's recommendation:  - Recommend admission to Medicine for medical comorbidities.  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  - Will plan for left upper extremity fistulogram, with possible intervention, possible graft ligation unsuccessful to reduce arm swelling.   - Please keep NPO at midnight.     Advised I could cancel DVT and arterial study.    Will discuss with  and hospital medicine.    7:32 PM  Discussed with Dr. Carpio - pt meets observation criteria and admitted to Dr. Vazquez.      Amount and/or Complexity of Data Reviewed  Labs: ordered.    Risk  Prescription drug management.                                      Clinical Impression:  Final diagnoses:  [Z13.6] Screening  for cardiovascular condition  [S41.109A] Arm wound  [M79.89] Arm swelling  [Z78.9] Problem with vascular access (Primary)  [T81.30XA] Wound dehiscence          ED Disposition Condition    Observation Stable                Kiki Bates, KIP  02/06/25 1932

## 2025-02-07 NOTE — SUBJECTIVE & OBJECTIVE
"Past Medical History:   Diagnosis Date    Acute hypoxemic respiratory failure due to COVID-19 12/30/2020    Anticoagulant long-term use     BPH (benign prostatic hypertrophy)     Cancer     vocal cords    Claudication of right lower extremity 3/10/2016    COVID-19 virus detected 12/30/2020    Dependence on renal dialysis 4/18/2023    Disorder of kidney and ureter     Hyperkalemia 1/1/2021    Hyperlipidemia     Hypertension     Hypokalemia 6/23/2021    Immunosuppression 6/26/2021    Immunosuppression due to chronic steroid use 1/30/2020    Microcytic anemia 9/16/2016    Obesity (BMI 30-39.9) 1/23/2019    Oropharyngeal cancer     Pterygium     Squamous cell carcinoma 4/25/2018    Thromboembolic disorder     Unspecified disorder of kidney and ureter        Past Surgical History:   Procedure Laterality Date    AV FISTULA PLACEMENT Right 10/22/2024    Procedure: CREATION, AV FISTULA;  Surgeon: Guanaco Navarro MD;  Location: [facility] OR [address] [place] [address];  Service: Vascular;  Laterality: Right;  RUE AVF creation    CYSTOSCOPY W/ RETROGRADES Bilateral 6/15/2022    Procedure: Cystoscopy, bilateral retrograde pyelogram, and all indicated procedures;  Surgeon: Veronica Bacon MD;  Location: [facility];  Service: Urology;  Laterality: Bilateral;    FRACTURE SURGERY Left     "lower leg" 40 years ago    INSERTION, GRAFT, ARTERIOVENOUS, UPPER EXTREMITY Left 1/17/2025    Procedure: INSERTION, GRAFT, ARTERIOVENOUS, UPPER EXTREMITY;  Surgeon: Guanaco Navarro MD;  Location: [address];  Service: Vascular;  Laterality: Left;    KIDNEY TRANSPLANT  2007    followed by [facility]plant    LARYNX SURGERY  11/2014    Oropharyngeal Cancer x3    microlaryngoscopy      PHLEBOGRAPHY Bilateral 8/2/2022    Procedure: Venogram;  Surgeon: KENIA Mueller II, MD;  Location: [facility] CATH LAB;  Service: Vascular;  Laterality: Bilateral;    SURGICAL REMOVAL OF LESION OF ORBIT Bilateral 8/26/2020    Procedure: EXCISION, LESION, ORBIT;  Surgeon: Linda Tee MD; "  Location: [facility] OR [address] [place] [address];  Service: Ophthalmology;  Laterality: Bilateral;    TIBIAL STAPLING Left 1980            Review of patient's allergies indicates:  No Known Allergies    No current facility-administered medications on file prior to encounter.     Current Outpatient Medications on File Prior to Encounter   Medication Sig    acetaminophen (TYLENOL) 650 MG TbSR Take 1 tablet (650 mg total) by mouth every 8 (eight) hours.    apixaban (ELIQUIS) 2.5 mg Tab Take 1 tablet (2.5 mg total) by mouth 2 (two) times daily.    atorvastatin (LIPITOR) 20 MG tablet Take 1 tablet (20 mg total) by mouth every evening.    cinacalcet (SENSIPAR) 30 MG Tab Take 1 tablet (30 mg total) by mouth daily with breakfast.    cloNIDine 0.3 mg/24 hr td ptwk (CATAPRES) 0.3 mg/24 hr Place 1 patch onto the skin once a week.    ferrous sulfate 325 mg (65 mg iron) Tab tablet Take 325 mg by mouth once daily.    furosemide (LASIX) 40 MG tablet Take 1 tablet (40 mg total) by mouth once daily.    hydrALAZINE (APRESOLINE) 100 MG tablet Take 1 tablet (100 mg total) by mouth every 8 (eight) hours.    metoprolol succinate (TOPROL-XL) 100 MG 24 hr tablet Take 1 tablet (100 mg total) by mouth once daily. (Patient taking differently: Take 100 mg by mouth every evening.)    predniSONE (DELTASONE) 5 MG tablet Take 1 tablet (5 mg total) by mouth once daily.    sirolimus (RAPAMUNE) 1 MG Tab Take 3 tablets (3 mg total) by mouth once daily.    vitamin renal formula, B-complex-vitamin c-folic acid, (NEPHROCAP) 1 mg Cap Take 1 capsule by mouth once daily.    ergocalciferol (ERGOCALCIFEROL) 50,000 unit Cap Take 1 capsule (50,000 Units total) by mouth every 7 days. (Patient not taking: Reported on 2/6/2025)    finasteride (PROSCAR) 5 mg tablet Take 1 tablet (5 mg total) by mouth once daily. (Patient not taking: Reported on 2/6/2025)    fluticasone propionate (FLONASE) 50 mcg/actuation nasal spray 1 spray (50 mcg total) by Each Nostril route nightly. (Patient not  taking: Reported on 2025)    NIFEdipine (PROCARDIA-XL) 30 MG (OSM) 24 hr tablet Take 1 tablet (30 mg total) by mouth once daily. (Patient not taking: Reported on 2025)    oxyCODONE (ROXICODONE) 5 MG immediate release tablet Take 1 tablet (5 mg total) by mouth every 6 (six) hours as needed for Pain. (Patient not taking: Reported on 2025)    sodium bicarbonate 650 MG tablet Take 3 tablets (1,950 mg total) by mouth 2 (two) times daily. (Patient not taking: Reported on 2025)    [DISCONTINUED] sildenafiL (VIAGRA) 50 MG tablet Take 1 tablet (50 mg total) by mouth daily as needed for Erectile Dysfunction.     Family History       Problem Relation (Age of Onset)    Diabetes Mother    Hypertension Mother    No Known Problems Sister, Brother, Daughter, Brother    Stroke Mother, Father          Tobacco Use    Smoking status: Former     Current packs/day: 0.00     Average packs/day: 1 pack/day for 20.0 years (20.0 ttl pk-yrs)     Types: Cigarettes     Start date:      Quit date:      Years since quittin.1     Passive exposure: Past    Smokeless tobacco: Never   Substance and Sexual Activity    Alcohol use: No     Alcohol/week: 0.0 standard drinks of alcohol    Drug use: No     Types: Marijuana     Comment: Occ.    Sexual activity: Not Currently     Partners: Female     Review of Systems   Constitutional:  Negative for activity change, chills and fever.   HENT:  Negative for trouble swallowing.    Eyes:  Negative for photophobia and visual disturbance.   Respiratory:  Positive for cough and shortness of breath. Negative for chest tightness and wheezing.         Chronic, no recent worsening   Cardiovascular:  Positive for leg swelling (chronic). Negative for chest pain and palpitations.   Gastrointestinal:  Negative for abdominal pain, constipation, diarrhea, nausea and vomiting.   Genitourinary:  Negative for dysuria, frequency, hematuria and urgency.   Musculoskeletal:  Negative for arthralgias,  back pain and gait problem.        L arm swelling    Skin:  Positive for wound. Negative for color change and rash.   Neurological:  Negative for dizziness, syncope, weakness, light-headedness, numbness and headaches.   Psychiatric/Behavioral:  Negative for agitation and confusion. The patient is not nervous/anxious.      Objective:     Vital Signs (Most Recent):  Temp: 97.5 °F (36.4 °C) (02/06/25 1940)  Pulse: (!) 55 (02/06/25 1940)  Resp: 18 (02/06/25 1940)  BP: (!) 144/76 (02/06/25 1940)  SpO2: 98 % (02/06/25 1940) Vital Signs (24h Range):  Temp:  [97.5 °F (36.4 °C)-98.7 °F (37.1 °C)] 97.5 °F (36.4 °C)  Pulse:  [55-88] 55  Resp:  [17-19] 18  SpO2:  [97 %-100 %] 98 %  BP: (123-144)/(63-76) 144/76     Weight: 94.8 kg (209 lb)  Body mass index is 29.56 kg/m².     Physical Exam  Vitals and nursing note reviewed.   Constitutional:       General: He is not in acute distress.     Appearance: He is well-developed.   HENT:      Head: Normocephalic and atraumatic.      Mouth/Throat:      Pharynx: No oropharyngeal exudate.      Comments: Hoarse voice, chronic  Eyes:      General: No scleral icterus.     Conjunctiva/sclera: Conjunctivae normal.   Cardiovascular:      Rate and Rhythm: Normal rate and regular rhythm.      Heart sounds: Normal heart sounds.   Pulmonary:      Effort: Pulmonary effort is normal. No respiratory distress.      Breath sounds: Normal breath sounds. No wheezing.      Comments: Course BS to lower lobes  Abdominal:      General: Bowel sounds are normal. There is no distension.      Palpations: Abdomen is soft.      Tenderness: There is no abdominal tenderness.   Musculoskeletal:         General: Swelling (significant LUE swelling with wound) present. No tenderness. Normal range of motion.      Cervical back: Normal range of motion and neck supple.      Right lower leg: Edema present.      Left lower leg: Edema present.   Lymphadenopathy:      Cervical: No cervical adenopathy.   Skin:     General: Skin  is warm and dry.      Capillary Refill: Capillary refill takes less than 2 seconds.      Findings: No rash.      Comments: Wound noted to LUE with foul smelling, purulent drainage   Neurological:      Mental Status: He is alert and oriented to person, place, and time.      Cranial Nerves: No cranial nerve deficit.      Sensory: No sensory deficit.      Coordination: Coordination normal.   Psychiatric:         Behavior: Behavior normal.         Thought Content: Thought content normal.         Judgment: Judgment normal.                Significant Labs: All pertinent labs within the past 24 hours have been reviewed.  CBC:   Recent Labs   Lab 02/05/25  1215 02/06/25  1818 02/06/25  1843   WBC 6.16 5.81  --    HGB 11.5* 10.9*  --    HCT 37.2* 35.7* 35.9    182  --      CMP:   Recent Labs   Lab 02/05/25  1215 02/06/25  1818    139   K 3.6 4.2    109   CO2 23 20*   * 99   BUN 36* 38*   CREATININE 4.10* 3.7*   CALCIUM 10.7* 10.3   PROT  --  8.3   ALBUMIN 4.0 3.3*   BILITOT  --  0.2   ALKPHOS  --  112   AST  --  25   ALT  --  9*   ANIONGAP 9 10       Significant Imaging: I have reviewed all pertinent imaging results/findings within the past 24 hours.

## 2025-02-07 NOTE — SUBJECTIVE & OBJECTIVE
Interval History:  Seen and evaluated in PACU  No acute events overnight    Clinical status improved  Having some L arm pain since surgery    Objective:     Vital Signs (Most Recent):  Temp: 97.9 °F (36.6 °C) (02/07/25 1100)  Pulse: (!) 53 (02/07/25 1415)  Resp: 13 (02/07/25 1415)  BP: (!) 153/74 (02/07/25 1415)  SpO2: 98 % (02/07/25 1415) Vital Signs (24h Range):  Temp:  [97.5 °F (36.4 °C)-98.7 °F (37.1 °C)] 97.9 °F (36.6 °C)  Pulse:  [44-57] 53  Resp:  [12-20] 13  SpO2:  [93 %-100 %] 98 %  BP: (109-157)/(60-77) 153/74     Weight: 94.8 kg (208 lb 15.9 oz)  Body mass index is 29.99 kg/m².    Intake/Output Summary (Last 24 hours) at 2/7/2025 1615  Last data filed at 2/7/2025 1053  Gross per 24 hour   Intake 100 ml   Output 25 ml   Net 75 ml         Physical Exam  Vitals and nursing note reviewed.   Constitutional:       General: He is not in acute distress.     Appearance: He is well-developed.   HENT:      Head: Normocephalic and atraumatic.      Mouth/Throat:      Pharynx: No oropharyngeal exudate.      Comments: Hoarse voice, chronic  Eyes:      General: No scleral icterus.     Conjunctiva/sclera: Conjunctivae normal.   Cardiovascular:      Rate and Rhythm: Normal rate and regular rhythm.      Heart sounds: Normal heart sounds.   Pulmonary:      Effort: Pulmonary effort is normal. No respiratory distress.      Breath sounds: Normal breath sounds. No wheezing.      Comments: Course BS to lower lobes  Abdominal:      General: Bowel sounds are normal. There is no distension.      Palpations: Abdomen is soft.      Tenderness: There is no abdominal tenderness.   Musculoskeletal:         General: Swelling (significant LUE swelling with wound) present. No tenderness. Normal range of motion.      Cervical back: Normal range of motion and neck supple.      Right lower leg: Edema present.      Left lower leg: Edema present.   Lymphadenopathy:      Cervical: No cervical adenopathy.   Skin:     General: Skin is warm and dry.       Capillary Refill: Capillary refill takes less than 2 seconds.      Findings: No rash.      Comments: Wound noted to LUE with foul smelling, purulent drainage   Neurological:      Mental Status: He is alert and oriented to person, place, and time.      Cranial Nerves: No cranial nerve deficit.      Sensory: No sensory deficit.      Coordination: Coordination normal.   Psychiatric:         Behavior: Behavior normal.         Thought Content: Thought content normal.         Judgment: Judgment normal.             Significant Labs: All pertinent labs within the past 24 hours have been reviewed.    Significant Imaging: I have reviewed all pertinent imaging results/findings within the past 24 hours.

## 2025-02-07 NOTE — PLAN OF CARE
Prepped pt for surgery. Procedure reviewed and confirmed with pt. Pt states family friend will arrive a 9am. All questions answered, no concerns. Bed in lowest position. Call light within reach. Safety maintained.

## 2025-02-07 NOTE — PLAN OF CARE
Mr. Manzo is s/p LUE fistulagram which unfortunately showed central occlusion that could not be crossed and therefore required LUE AVG ligation. He no longer has functional dialysis access. His wound was also washed out and packed with wet to dry dressings.     - Addendum: Patient is not HD, the AVG was in prep for dialysis therefore if unable to provide him with another permanent dialysis access in the future he will need a line but not indicated at this time.   - Vascular surgery will follow up outpatient  - would also recommend wound care to the incision; daily wet-to-dry until then    Zoe Youssef MD  Vascular Surgery  [facility], PGY-1

## 2025-02-07 NOTE — ASSESSMENT & PLAN NOTE
Patient's COPD is controlled currently.  Patient is currently off COPD Pathway. Continue scheduled inhalers  duonebs prn  and monitor respiratory status closely.

## 2025-02-08 VITALS
TEMPERATURE: 98 F | BODY MASS INDEX: 32.28 KG/M2 | HEIGHT: 70 IN | HEART RATE: 72 BPM | WEIGHT: 225.5 LBS | RESPIRATION RATE: 16 BRPM | OXYGEN SATURATION: 98 % | SYSTOLIC BLOOD PRESSURE: 113 MMHG | DIASTOLIC BLOOD PRESSURE: 64 MMHG

## 2025-02-08 LAB
ANION GAP SERPL CALC-SCNC: 10 MMOL/L (ref 8–16)
BASOPHILS # BLD AUTO: 0.02 K/UL (ref 0–0.2)
BASOPHILS NFR BLD: 0.4 % (ref 0–1.9)
BUN SERPL-MCNC: 42 MG/DL (ref 8–23)
CALCIUM SERPL-MCNC: 9.6 MG/DL (ref 8.7–10.5)
CHLORIDE SERPL-SCNC: 112 MMOL/L (ref 95–110)
CO2 SERPL-SCNC: 18 MMOL/L (ref 23–29)
CREAT SERPL-MCNC: 3.7 MG/DL (ref 0.5–1.4)
DIFFERENTIAL METHOD BLD: ABNORMAL
EOSINOPHIL # BLD AUTO: 0.1 K/UL (ref 0–0.5)
EOSINOPHIL NFR BLD: 1.3 % (ref 0–8)
ERYTHROCYTE [DISTWIDTH] IN BLOOD BY AUTOMATED COUNT: 15.1 % (ref 11.5–14.5)
EST. GFR  (NO RACE VARIABLE): 16.4 ML/MIN/1.73 M^2
GLUCOSE SERPL-MCNC: 109 MG/DL (ref 70–110)
HCT VFR BLD AUTO: 29.6 % (ref 40–54)
HGB BLD-MCNC: 9.3 G/DL (ref 14–18)
IMM GRANULOCYTES # BLD AUTO: 0.03 K/UL (ref 0–0.04)
IMM GRANULOCYTES NFR BLD AUTO: 0.6 % (ref 0–0.5)
LYMPHOCYTES # BLD AUTO: 1.2 K/UL (ref 1–4.8)
LYMPHOCYTES NFR BLD: 22.1 % (ref 18–48)
MAGNESIUM SERPL-MCNC: 2.2 MG/DL (ref 1.6–2.6)
MCH RBC QN AUTO: 25.4 PG (ref 27–31)
MCHC RBC AUTO-ENTMCNC: 31.4 G/DL (ref 32–36)
MCV RBC AUTO: 81 FL (ref 82–98)
MONOCYTES # BLD AUTO: 0.7 K/UL (ref 0.3–1)
MONOCYTES NFR BLD: 12.4 % (ref 4–15)
NEUTROPHILS # BLD AUTO: 3.4 K/UL (ref 1.8–7.7)
NEUTROPHILS NFR BLD: 63.2 % (ref 38–73)
NRBC BLD-RTO: 0 /100 WBC
PHOSPHATE SERPL-MCNC: 3.7 MG/DL (ref 2.7–4.5)
PLATELET # BLD AUTO: 135 K/UL (ref 150–450)
PMV BLD AUTO: 11.4 FL (ref 9.2–12.9)
POCT GLUCOSE: 159 MG/DL (ref 70–110)
POTASSIUM SERPL-SCNC: 4.2 MMOL/L (ref 3.5–5.1)
RBC # BLD AUTO: 3.66 M/UL (ref 4.6–6.2)
SODIUM SERPL-SCNC: 140 MMOL/L (ref 136–145)
WBC # BLD AUTO: 5.42 K/UL (ref 3.9–12.7)

## 2025-02-08 PROCEDURE — 25000003 PHARM REV CODE 250

## 2025-02-08 PROCEDURE — 36415 COLL VENOUS BLD VENIPUNCTURE: CPT | Performed by: PHYSICIAN ASSISTANT

## 2025-02-08 PROCEDURE — 25000003 PHARM REV CODE 250: Performed by: PHYSICIAN ASSISTANT

## 2025-02-08 PROCEDURE — 84100 ASSAY OF PHOSPHORUS: CPT | Performed by: PHYSICIAN ASSISTANT

## 2025-02-08 PROCEDURE — 80048 BASIC METABOLIC PNL TOTAL CA: CPT | Performed by: PHYSICIAN ASSISTANT

## 2025-02-08 PROCEDURE — 83735 ASSAY OF MAGNESIUM: CPT | Performed by: PHYSICIAN ASSISTANT

## 2025-02-08 PROCEDURE — 63600175 PHARM REV CODE 636 W HCPCS: Performed by: PHYSICIAN ASSISTANT

## 2025-02-08 PROCEDURE — 85025 COMPLETE CBC W/AUTO DIFF WBC: CPT | Performed by: PHYSICIAN ASSISTANT

## 2025-02-08 RX ADMIN — FERROUS SULFATE TAB EC 325 MG (65 MG FE EQUIVALENT) 1 EACH: 325 (65 FE) TABLET DELAYED RESPONSE at 09:02

## 2025-02-08 RX ADMIN — SIROLIMUS 3 MG: 1 TABLET ORAL at 09:02

## 2025-02-08 RX ADMIN — FUROSEMIDE 40 MG: 40 TABLET ORAL at 09:02

## 2025-02-08 RX ADMIN — APIXABAN 2.5 MG: 2.5 TABLET, FILM COATED ORAL at 09:02

## 2025-02-08 RX ADMIN — PREDNISONE 5 MG: 5 TABLET ORAL at 09:02

## 2025-02-08 RX ADMIN — METOPROLOL SUCCINATE 100 MG: 100 TABLET, EXTENDED RELEASE ORAL at 09:02

## 2025-02-08 NOTE — NURSING
Patient on the phone right now to see when his ride can come pick him up. IV removed, catheter tip intact.

## 2025-02-08 NOTE — DISCHARGE INSTRUCTIONS
Wound care  It is very important that the wound in your arm stays dry and clean because this area collects moisture and bacteria which greatly increases the risk for infection  and wound complications.    Wet to dry gauze dressing as discussed.     EVERY DAY:  - Starting two days after your surgery, you may shower  - Following your shower, pat the wound dry  - Paint the wound with a small amount of betadine  - Cover the wound with 4x4 gauze and tape  - Change the dressing in the same fashion every day, sooner if the gauze gets saturated with fluid    CONTACT US IMMEDIATELY  - Is getting more painful with time not less  - Becomes red  - Has pus coming out  - Falls apart   - Or you are having fevers       Our goal at [facility] is to always give you outstanding care and exceptional service. You may receive a survey by mail, text or e-mail in the next 7-10 days from Timothy Otero and our leadership team asking about the care you received with us. The survey should only take 5-10 minutes to complete and is very important to us.     Your feedback provides us with a way to recognize our staff who work tirelessly to provide the best care! Also, your responses help us learn how to improve when your experience was below our aspiration of excellence. We WILL use your feedback to continue making improvements to help us provide the highest quality care. We keep your personal information and feedback confidential. We appreciate your time completing this survey and can't wait to hear from you!!!     We want you to leave us today feeling like you can DEFINITELY recommend us to others! We look forward to your continued care with us! Thanks so much for choosing [facility] for your healthcare needs!

## 2025-02-08 NOTE — PROGRESS NOTES
[facility]  Vascular Surgery  Progress Note    Patient Name: Jose Luis Manzo  MRN: [id number]  Admission Date: 2/6/2025  Primary Care Provider: Home Alexis MD    Subjective:     Interval History: POD 1. LUE graft ligation. Doing well. Pain controlled. Swelling similar to slightly improved. No complaints.     Post-Op Info:  Procedure(s) (LRB):  Fistulogram (Left)  LIGATION, AV FISTULA (Left)   1 Day Post-Op     Medications:  Continuous Infusions:  Scheduled Meds:   apixaban  2.5 mg Oral BID    atorvastatin  20 mg Oral QHS    ferrous sulfate  1 tablet Oral Daily    furosemide  40 mg Oral Daily    metoprolol succinate  100 mg Oral Daily    predniSONE  5 mg Oral Daily    sirolimus  3 mg Oral Daily    vancomycin (VANCOCIN) IV (PEDS and ADULTS)  15 mg/kg Intravenous Once     PRN Meds:  Current Facility-Administered Medications:     acetaminophen, 650 mg, Oral, Q4H PRN    albuterol-ipratropium, 3 mL, Nebulization, Q4H PRN    dextrose 50%, 12.5 g, Intravenous, PRN    dextrose 50%, 25 g, Intravenous, PRN    glucagon (human recombinant), 1 mg, Intramuscular, PRN    glucose, 16 g, Oral, PRN    glucose, 24 g, Oral, PRN    insulin aspart U-100, 0-5 Units, Subcutaneous, QID (AC + HS) PRN    melatonin, 6 mg, Oral, Nightly PRN    ondansetron, 8 mg, Oral, Q8H PRN    polyethylene glycol, 17 g, Oral, Daily PRN    promethazine, 25 mg, Oral, Q6H PRN    Pharmacy to dose Vancomycin consult, , , Once **AND** vancomycin - pharmacy to dose, , Intravenous, pharmacy to manage frequency     Objective:     Vital Signs (Most Recent):  Temp: 97.7 °F (36.5 °C) (02/08/25 0814)  Pulse: (!) 47 (02/08/25 0814)  Resp: 16 (02/08/25 0511)  BP: 113/64 (02/08/25 0814)  SpO2: 98 % (02/08/25 0814) Vital Signs (24h Range):  Temp:  [97.4 °F (36.3 °C)-98.1 °F (36.7 °C)] 97.7 °F (36.5 °C)  Pulse:  [44-58] 47  Resp:  [12-28] 16  SpO2:  [93 %-100 %] 98 %  BP: (105-158)/(60-86) 113/64     Date 02/08/25 0700 - 02/09/25 0659   Shift 8418-7902 2005-8485  7937-8899 24 Hour Total   INTAKE   Shift Total(mL/kg)       OUTPUT   Urine(mL/kg/hr) 400   400   Shift Total(mL/kg) 400(3.9)   400(3.9)   Weight (kg) 102.3 102.3 102.3 102.3        Physical Exam  Constitutional:       Appearance: Normal appearance. He is obese.   Cardiovascular:      Rate and Rhythm: Normal rate.   Pulmonary:      Effort: Pulmonary effort is normal.   Musculoskeletal:      Comments: Right arm swelling with mid bicep wound.    Skin:     General: Skin is warm.      Capillary Refill: Capillary refill takes less than 2 seconds.   Neurological:      General: No focal deficit present.      Mental Status: He is alert.          Significant Labs:  CBC:   Recent Labs   Lab 02/08/25  0533   WBC 5.42   RBC 3.66*   HGB 9.3*   HCT 29.6*   *   MCV 81*   MCH 25.4*   MCHC 31.4*     CMP:   Recent Labs   Lab 02/06/25  1818 02/07/25  0322 02/08/25  0533   GLU 99   < > 109   CALCIUM 10.3   < > 9.6   ALBUMIN 3.3*  --   --    PROT 8.3  --   --       < > 140   K 4.2   < > 4.2   CO2 20*   < > 18*      < > 112*   BUN 38*   < > 42*   CREATININE 3.7*   < > 3.7*   ALKPHOS 112  --   --    ALT 9*  --   --    AST 25  --   --    BILITOT 0.2  --   --     < > = values in this interval not displayed.       Significant Diagnostics:  I have reviewed all pertinent imaging results/findings within the past 24 hours.  Assessment/Plan:     ESRD (end stage renal disease)  74M with a history of end-stage renal disease, who is recently status post left upper extremity AV graft on 01/17/25, now presenting with severe left upper extremity swelling, and dehiscence of brachial incision.    - S/P LUE fistulogram with graft ligation.   - outpatient wound care referral sent.  - Ok for discharge from vascular surgery perspective.   - Will set up outpatient follow up.             Chuy Menard MD  Vascular Surgery  [facility] - Med Surg

## 2025-02-08 NOTE — SUBJECTIVE & OBJECTIVE
Medications:  Continuous Infusions:  Scheduled Meds:   apixaban  2.5 mg Oral BID    atorvastatin  20 mg Oral QHS    ferrous sulfate  1 tablet Oral Daily    furosemide  40 mg Oral Daily    metoprolol succinate  100 mg Oral Daily    predniSONE  5 mg Oral Daily    sirolimus  3 mg Oral Daily    vancomycin (VANCOCIN) IV (PEDS and ADULTS)  15 mg/kg Intravenous Once     PRN Meds:  Current Facility-Administered Medications:     acetaminophen, 650 mg, Oral, Q4H PRN    albuterol-ipratropium, 3 mL, Nebulization, Q4H PRN    dextrose 50%, 12.5 g, Intravenous, PRN    dextrose 50%, 25 g, Intravenous, PRN    glucagon (human recombinant), 1 mg, Intramuscular, PRN    glucose, 16 g, Oral, PRN    glucose, 24 g, Oral, PRN    insulin aspart U-100, 0-5 Units, Subcutaneous, QID (AC + HS) PRN    melatonin, 6 mg, Oral, Nightly PRN    ondansetron, 8 mg, Oral, Q8H PRN    polyethylene glycol, 17 g, Oral, Daily PRN    promethazine, 25 mg, Oral, Q6H PRN    Pharmacy to dose Vancomycin consult, , , Once **AND** vancomycin - pharmacy to dose, , Intravenous, pharmacy to manage frequency     Objective:     Vital Signs (Most Recent):  Temp: 97.7 °F (36.5 °C) (02/08/25 0814)  Pulse: (!) 47 (02/08/25 0814)  Resp: 16 (02/08/25 0511)  BP: 113/64 (02/08/25 0814)  SpO2: 98 % (02/08/25 0814) Vital Signs (24h Range):  Temp:  [97.4 °F (36.3 °C)-98.1 °F (36.7 °C)] 97.7 °F (36.5 °C)  Pulse:  [44-58] 47  Resp:  [12-28] 16  SpO2:  [93 %-100 %] 98 %  BP: (105-158)/(60-86) 113/64     Date 02/08/25 0700 - 02/09/25 0659   Shift 7552-2249 2828-5286 3813-1393 24 Hour Total   INTAKE   Shift Total(mL/kg)       OUTPUT   Urine(mL/kg/hr) 400   400   Shift Total(mL/kg) 400(3.9)   400(3.9)   Weight (kg) 102.3 102.3 102.3 102.3        Physical Exam  Constitutional:       Appearance: Normal appearance. He is obese.   Cardiovascular:      Rate and Rhythm: Normal rate.   Pulmonary:      Effort: Pulmonary effort is normal.   Musculoskeletal:      Comments: Right arm swelling with  mid bicep wound.    Skin:     General: Skin is warm.      Capillary Refill: Capillary refill takes less than 2 seconds.   Neurological:      General: No focal deficit present.      Mental Status: He is alert.          Significant Labs:  CBC:   Recent Labs   Lab 02/08/25  0533   WBC 5.42   RBC 3.66*   HGB 9.3*   HCT 29.6*   *   MCV 81*   MCH 25.4*   MCHC 31.4*     CMP:   Recent Labs   Lab 02/06/25  1818 02/07/25  0322 02/08/25  0533   GLU 99   < > 109   CALCIUM 10.3   < > 9.6   ALBUMIN 3.3*  --   --    PROT 8.3  --   --       < > 140   K 4.2   < > 4.2   CO2 20*   < > 18*      < > 112*   BUN 38*   < > 42*   CREATININE 3.7*   < > 3.7*   ALKPHOS 112  --   --    ALT 9*  --   --    AST 25  --   --    BILITOT 0.2  --   --     < > = values in this interval not displayed.       Significant Diagnostics:  I have reviewed all pertinent imaging results/findings within the past 24 hours.

## 2025-02-08 NOTE — PLAN OF CARE
Problem: Adult Inpatient Plan of Care  Goal: Plan of Care Review  Outcome: Met  Goal: Patient-Specific Goal (Individualized)  Outcome: Met  Goal: Absence of Hospital-Acquired Illness or Injury  Outcome: Met  Goal: Optimal Comfort and Wellbeing  Outcome: Met  Goal: Readiness for Transition of Care  Outcome: Met     Problem: Infection  Goal: Absence of Infection Signs and Symptoms  Outcome: Met     Problem: Acute Kidney Injury/Impairment  Goal: Fluid and Electrolyte Balance  Outcome: Met  Goal: Improved Oral Intake  Outcome: Met  Goal: Effective Renal Function  Outcome: Met     Problem: Wound  Goal: Optimal Coping  Outcome: Met  Goal: Optimal Functional Ability  Outcome: Met  Goal: Absence of Infection Signs and Symptoms  Outcome: Met  Goal: Improved Oral Intake  Outcome: Met  Goal: Optimal Pain Control and Function  Outcome: Met  Goal: Skin Health and Integrity  Outcome: Met  Goal: Optimal Wound Healing  Outcome: Met     Problem: Fall Injury Risk  Goal: Absence of Fall and Fall-Related Injury  Outcome: Met

## 2025-02-08 NOTE — PLAN OF CARE
DILIP unable to schedule follow up no appointments available sent message to PCP office to call patient with availability.    DILIP Duran  [phone]

## 2025-02-08 NOTE — NURSING
Received admit from pacu via stretcher 75 y/o bm s/p lue fistulagram with AVG ligation. Aaox4, no distress noted. No complaints at present. Will continue to monitor.

## 2025-02-08 NOTE — ASSESSMENT & PLAN NOTE
74M with a history of end-stage renal disease, who is recently status post left upper extremity AV graft on 01/17/25, now presenting with severe left upper extremity swelling, and dehiscence of brachial incision.    - S/P LUE fistulogram with graft ligation.   - outpatient wound care referral sent.  - Ok for discharge from vascular surgery perspective.

## 2025-02-08 NOTE — PLAN OF CARE
[facility]  Discharge Final Note    Primary Care Provider: Home Alexis MD    Expected Discharge Date: 2/8/2025    Final Discharge Note (most recent)       Final Note - 02/08/25 1338          Final Note    Assessment Type Final Discharge Note     Anticipated Discharge Disposition Home or Self Care     Hospital Resources/Appts/Education Provided Appointments scheduled and added to AVS;Appointment suggestion unavailable        Post-Acute Status    Discharge Delays None known at this time                     Important Message from Medicare             Contact Info       Home Alexis MD   Specialty: Internal Medicine   Relationship: PCP - [place] [address]   Phone: [phone]       Next Steps: Follow up in 1 week(s)    Home Alexis MD   Specialty: Internal Medicine   Relationship: PCP - [place] [address]   Phone: [phone]       Next Steps: Follow up in 1 week(s)

## 2025-02-09 NOTE — DISCHARGE SUMMARY
[facility] Medicine  Discharge Summary      Patient Name: Jose Luis Mnazo  MRN: [id number]  CANDIDA: [id number]  Patient Class: IP- Inpatient  Admission Date: 2/6/2025  Hospital Length of Stay: 1 days  Discharge Date and Time: 2/8/2025  2:30 PM  Attending Physician: Chandni att. providers found   Discharging Provider: Jina Son MD  Primary Care Provider: Home Alexis MD  Hospital Medicine Team: [facility] Jina Son MD  Primary Care Team: [facility]    HPI:   Jose Luis Manzo is a 74 y.o. 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 Patient had a LUE AV graft creation on 1/17 given progressive CKD and likely need for HD in the future. Patient reports worsening RUE swelling since his surgery. He noticed a wound to the area of the graft when he took his bandage off about 3 days post op. Reports foul smelling, purulent drainage from the wound. He has chronic SOB, intermittent cough, and LE swelling without recent worsening. Denies chest pain, N/V, abdominal pain, HA, vision changes or syncope.     ED: AFVSS. No leukocytosis. Renal function stable at baseline. Vascular surgery consulted, plan for fistulogram in AM.     Procedure(s) (LRB):  Fistulogram (Left)  LIGATION, AV FISTULA (Left)      Hospital Course:   Admitted for ESRD access issue.  Underwent AVG ligation with vascular surgery 2.7.25.  No signs of active infection, vascular recommended discontinuing antibiotics.  Wound care referral placed.    Jose Luis Manzo was deemed appropriate for discharge.  At the time of discharge, the plan of care was discussed with patient/family, who were agreeable and amenable.  All medications were verbally reviewed and discussed with the patient/family.  They endorsed understanding and  compliance.  Informed them that these changes will be available on their discharge paperwork, as well.  Outpatient follow-ups were scheduled, or if unable to be scheduled ambulatory referrals were placed, and ER return precautions were given.  All questions were answered to the patient/family's satisfaction.  he  was subsequently discharged in stable condition.       Goals of Care Treatment Preferences:  Code Status: Full Code      SDOH Screening:  The patient declined to be screened for utility difficulties, food insecurity, transport difficulties, housing insecurity, and interpersonal safety, so no concerns could be identified this admission.     Consults:   Consults (From admission, onward)          Status Ordering Provider     Inpatient consult to Vascular Surgery  Once        Provider:  (Not yet assigned)    Completed CALLIE HERNANDEZ            * Problem with vascular access  Infected wound  Hx ESRD s/p renal transplant presents with LUE swelling, dehiscence of brachial incision, and foul smelling purulent drainage from wound s/p LUE AV graft creation on 1/17/25  S/p AVG ligation 2.7.25  Vascular surgery following  Resume eliquis after procedure  Continue IV vanc for likely infected wound given immunocompromised state    ESRD (end stage renal disease)  Hx ESRD s/p failed renal transplant, now CKD 5 not on HD  - monitor renal function daily    Centrilobular emphysema  Patient's COPD is controlled currently.  Patient is currently off COPD Pathway. Continue scheduled inhalers  duonebs prn  and monitor respiratory status closely.     Benign prostatic hyperplasia with lower urinary tract symptoms  - no longer taking finasteride     Carcinoma in situ of vocal cord  - s/p radiation and resection  - no issues swallowing per patient  - aspiration precautions     History of DVT (deep vein thrombosis)  Resume eliquis    Mixed hyperlipidemia  - continue statin     Essential hypertension  Patient's blood  pressure range in the last 24 hours was: BP  Min: 123/65  Max: 144/76.The patient's inpatient anti-hypertensive regimen is listed below:  Current Antihypertensives  furosemide tablet 40 mg, Daily, Oral  hydrALAZINE tablet 100 mg, Every 8 hours, Oral  metoprolol succinate (TOPROL-XL) 24 hr tablet 100 mg, Daily, Oral    Plan  - BP is controlled, no changes needed to their regimen  - clonidine patch due to be replaced in AM-- order tomorrow pending BP trend given active infxn and normotension       Final Active Diagnoses:    Diagnosis Date Noted POA    PRINCIPAL PROBLEM:  Problem with vascular access [Z78.9] 02/06/2025 Yes    Infected wound [T14.8XXA, L08.9] 02/06/2025 Yes    ESRD (end stage renal disease) [N18.6] 11/04/2024 Yes    Centrilobular emphysema [J43.2] 04/18/2023 Yes    Benign prostatic hyperplasia with lower urinary tract symptoms [N40.1] 01/09/2021 Yes    Carcinoma in situ of vocal cord [D02.0] 01/11/2018 Yes    History of DVT (deep vein thrombosis) [Z86.718] 04/13/2016 Not Applicable    Essential hypertension [I10] 03/13/2015 Yes    Mixed hyperlipidemia [E78.2] 03/13/2015 Yes      Problems Resolved During this Admission:       Discharged Condition: good    Disposition: Home or Self Care    Follow Up:   Follow-up Information       Home Alexis MD Follow up in 1 week(s).    Specialty: Internal Medicine  Contact information:  [address] [place] [address] [id number] [phone]               Home Alexis MD Follow up in 1 week(s).    Specialty: Internal Medicine  Contact information:  [address] [place] [address] [id number] [phone]                           Patient Instructions:      Ambulatory referral/consult to Vascular Surgery   Standing Status: Future   Referral Priority: Routine Referral Type: Consultation   Referral Reason: Specialty Services Required   Requested Specialty: Vascular Surgery   Number of Visits Requested: 1     Ambulatory referral/consult to Wound Clinic   Standing Status:  "Future   Referral Priority: Routine Referral Type: Consultation   Referral Reason: Specialty Services Required   Requested Specialty: Wound Care   Number of Visits Requested: 1       Significant Diagnostic Studies: Labs: BMP:   Recent Labs   Lab 02/07/25 0322 02/08/25  0533   GLU 94 109    140   K 3.9 4.2    112*   CO2 19* 18*   BUN 37* 42*   CREATININE 3.8* 3.7*   CALCIUM 10.1 9.6   MG 2.4 2.2   , CMP   Recent Labs   Lab 02/07/25  0322 02/08/25  0533    140   K 3.9 4.2    112*   CO2 19* 18*   GLU 94 109   BUN 37* 42*   CREATININE 3.8* 3.7*   CALCIUM 10.1 9.6   ANIONGAP 12 10   , CBC   Recent Labs   Lab 02/07/25 0322 02/08/25  0533   WBC 5.32 5.42   HGB 10.5* 9.3*   HCT 33.8* 29.6*    135*   , INR   Lab Results   Component Value Date    INR 1.0 09/14/2017    INR 1.0 07/24/2017    INR 4.4 (H) 04/13/2016   , Lipid Panel   Lab Results   Component Value Date    CHOL 146 09/30/2024    HDL 45 09/30/2024    LDLCALC 82.6 09/30/2024    TRIG 92 09/30/2024    CHOLHDL 30.8 09/30/2024   , Troponin No results for input(s): "TROPONINI" in the last 168 hours., and A1C:   Recent Labs   Lab 09/30/24  0758   HGBA1C 6.0*     Microbiology: Blood Culture   Lab Results   Component Value Date    LABBLOO No Growth to date 02/06/2025    LABBLOO No Growth to date 02/06/2025   , Sputum Culture   Lab Results   Component Value Date    GSRESP <10 epithelial cells per low power field. 04/26/2024    GSRESP Rare WBC's 04/26/2024    GSRESP Moderate Gram negative diplococci 04/26/2024    GSRESP Few Gram positive cocci 04/26/2024    GSRESP Few Gram negative rods 04/26/2024    RESPIRATORYC No S aureus isolated. 04/26/2024    RESPIRATORYC (A) 04/26/2024     PSEUDOMONAS AERUGINOSA  Few  Normal respiratory ravindra also present     , and Urine Culture    Lab Results   Component Value Date    LABURIN (A) 09/02/2024     STREPTOCOCCUS AGALACTIAE (GROUP B)  > 100,000 cfu/ml  Beta-hemolytic streptococci are routinely susceptible to "   penicillins,cephalosporins and carbapenems.       Radiology:   Imaging Results              X-Ray Chest AP Portable (Final result)  Result time 02/07/25 01:57:08      Final result by Tam Merida DO (02/07/25 01:57:08)                   Impression:      Coarse chronic interstitial opacities bilaterally.  No acute cardiopulmonary abnormality.      Electronically signed by: Tam Merida  Date:    02/07/2025  Time:    01:57               Narrative:    EXAMINATION:  XR CHEST AP PORTABLE    CLINICAL HISTORY:  SOB;    TECHNIQUE:  Single frontal view of the chest was performed.    COMPARISON:  10/06/2024.    FINDINGS:  The lungs are well expanded.  There are coarse chronic interstitial opacities bilaterally.  There is no new focal consolidation.  The pleural spaces are clear.  The cardiac silhouette is unremarkable.  Osseous structures are intact.  There are calcifications of the aortic arch.                                          Pending Diagnostic Studies:       None           Medications:  Reconciled Home Medications:      Medication List        CHANGE how you take these medications      metoprolol succinate 100 MG 24 hr tablet  Commonly known as: TOPROL-XL  Take 1 tablet (100 mg total) by mouth once daily.  What changed: when to take this            CONTINUE taking these medications      acetaminophen 650 MG Tbsr  Commonly known as: TYLENOL  Take 1 tablet (650 mg total) by mouth every 8 (eight) hours.     apixaban 2.5 mg Tab  Commonly known as: ELIQUIS  Take 1 tablet (2.5 mg total) by mouth 2 (two) times daily.     atorvastatin 20 MG tablet  Commonly known as: LIPITOR  Take 1 tablet (20 mg total) by mouth every evening.     cinacalcet 30 MG Tab  Commonly known as: SENSIPAR  Take 1 tablet (30 mg total) by mouth daily with breakfast.     cloNIDine 0.3 mg/24 hr td ptwk 0.3 mg/24 hr  Commonly known as: CATAPRES  Place 1 patch onto the skin once a week.     ferrous sulfate 325 mg (65 mg iron) Tab tablet  Commonly  known as: FEOSOL  Take 325 mg by mouth once daily.     furosemide 40 MG tablet  Commonly known as: LASIX  Take 1 tablet (40 mg total) by mouth once daily.     predniSONE 5 MG tablet  Commonly known as: DELTASONE  Take 1 tablet (5 mg total) by mouth once daily.     sirolimus 1 MG Tab  Commonly known as: RAPAMUNE  Take 3 tablets (3 mg total) by mouth once daily.     vitamin renal formula (B-complex-vitamin c-folic acid) 1 mg Cap  Commonly known as: NEPHROCAP  Take 1 capsule by mouth once daily.            STOP taking these medications      hydrALAZINE 100 MG tablet  Commonly known as: APRESOLINE     NIFEdipine 30 MG (OSM) 24 hr tablet  Commonly known as: PROCARDIA-XL            ASK your doctor about these medications      ergocalciferol 50,000 unit Cap  Commonly known as: ERGOCALCIFEROL  Take 1 capsule (50,000 Units total) by mouth every 7 days.     finasteride 5 mg tablet  Commonly known as: PROSCAR  Take 1 tablet (5 mg total) by mouth once daily.     fluticasone propionate 50 mcg/actuation nasal spray  Commonly known as: FLONASE  1 spray (50 mcg total) by Each Nostril route nightly.     oxyCODONE 5 MG immediate release tablet  Commonly known as: ROXICODONE  Take 1 tablet (5 mg total) by mouth every 6 (six) hours as needed for Pain.     sodium bicarbonate 650 MG tablet  Take 3 tablets (1,950 mg total) by mouth 2 (two) times daily.              Indwelling Lines/Drains at time of discharge:   Lines/Drains/Airways       Drain  Duration                  Hemodialysis AV Graft 01/17/25 Left upper arm 22 days                    Time spent on the discharge of patient: 38 minutes         Jina Son MD  Department of Hospital Medicine  [facility]

## 2025-02-10 LAB — BACTERIA SPEC AEROBE CULT: ABNORMAL

## 2025-02-10 NOTE — ANESTHESIA POSTPROCEDURE EVALUATION
Anesthesia Post Evaluation    Patient: Jose Luis Manzo    Procedure(s) Performed: Procedure(s) (LRB):  Fistulogram (Left)  LIGATION, AV FISTULA (Left)    Final Anesthesia Type: general      Patient location during evaluation: PACU  Patient participation: Yes- Able to Participate  Level of consciousness: awake and alert  Post-procedure vital signs: reviewed and stable  Pain management: adequate  Airway patency: patent  ANAMARIA mitigation strategies: Multimodal analgesia, Preoperative use of mandibular advancement devices or oral appliances and Intraoperative administration of CPAP, nasopharyngeal airway, or oral appliance during sedation  PONV status at discharge: No PONV  Anesthetic complications: no      Cardiovascular status: blood pressure returned to baseline, hemodynamically stable and stable  Respiratory status: unassisted and spontaneous ventilation  Hydration status: euvolemic  Follow-up not needed.              Vitals Value Taken Time   /77 02/07/25 1831   Temp 36.6 °C (97.9 °F) 02/07/25 1100   Pulse 56 02/07/25 1844   Resp 20 02/07/25 1835   SpO2 93 % 02/07/25 1844   Vitals shown include unfiled device data.      Event Time   Out of Recovery 02/07/2025 11:30:00         Pain/James Score: No data recorded

## 2025-02-11 LAB
BACTERIA BLD CULT: NORMAL
BACTERIA BLD CULT: NORMAL

## 2025-02-13 ENCOUNTER — OFFICE VISIT (OUTPATIENT)
Dept: WOUND CARE | Facility: CLINIC | Age: 75
End: 2025-02-13
Payer: MEDICARE

## 2025-02-13 VITALS
DIASTOLIC BLOOD PRESSURE: 64 MMHG | WEIGHT: 215.81 LBS | TEMPERATURE: 98 F | SYSTOLIC BLOOD PRESSURE: 134 MMHG | BODY MASS INDEX: 30.9 KG/M2 | HEIGHT: 70 IN | HEART RATE: 65 BPM

## 2025-02-13 DIAGNOSIS — S41.102A OPEN WOUND OF LEFT UPPER EXTREMITY, INITIAL ENCOUNTER: Primary | ICD-10-CM

## 2025-02-13 DIAGNOSIS — Z87.891 FORMER SMOKER: ICD-10-CM

## 2025-02-13 LAB
BACTERIA SPEC ANAEROBE CULT: ABNORMAL
BACTERIA SPEC ANAEROBE CULT: ABNORMAL

## 2025-02-13 PROCEDURE — 1126F AMNT PAIN NOTED NONE PRSNT: CPT | Mod: CPTII,S$GLB,,

## 2025-02-13 PROCEDURE — 99214 OFFICE O/P EST MOD 30 MIN: CPT | Mod: 25,S$GLB,,

## 2025-02-13 PROCEDURE — 3066F NEPHROPATHY DOC TX: CPT | Mod: CPTII,S$GLB,,

## 2025-02-13 PROCEDURE — 3078F DIAST BP <80 MM HG: CPT | Mod: CPTII,S$GLB,,

## 2025-02-13 PROCEDURE — 1111F DSCHRG MED/CURRENT MED MERGE: CPT | Mod: CPTII,S$GLB,,

## 2025-02-13 PROCEDURE — 11042 DBRDMT SUBQ TIS 1ST 20SQCM/<: CPT | Mod: S$GLB,,,

## 2025-02-13 PROCEDURE — 1159F MED LIST DOCD IN RCRD: CPT | Mod: CPTII,S$GLB,,

## 2025-02-13 PROCEDURE — 3008F BODY MASS INDEX DOCD: CPT | Mod: CPTII,S$GLB,,

## 2025-02-13 PROCEDURE — 99999 PR PBB SHADOW E&M-EST. PATIENT-LVL V: CPT | Mod: PBBFAC,,,

## 2025-02-13 PROCEDURE — 3075F SYST BP GE 130 - 139MM HG: CPT | Mod: CPTII,S$GLB,,

## 2025-02-13 NOTE — PROCEDURES
"Debridement    Date/Time: 2/13/2025 1:00 PM    Performed by: Carmen Lowry PA-C  Authorized by: Carmen Lowry PA-C    Time out: Immediately prior to procedure a "time out" was called to verify the correct patient, procedure, equipment, support staff and site/side marked as required.    Consent Done?:  Yes (Written)  Local anesthesia used?: Yes    Local anesthetic:  Topical anesthetic (Topical 4% Lidocaine Hydrochloride)    Wound Details:    Location:  Left arm    Type of Debridement:  Excisional       Length (cm):  2.8       Width (cm):  2.3       Depth (cm):  2.3       Area (sq cm):  6.44       Percent Debrided (%):  100       Total Area Debrided (sq cm):  6.44    Depth of debridement:  Subcutaneous tissue    Tissue debrided:  Subcutaneous    Devitalized tissue debrided:  Biofilm, Exudate, Fibrin and Slough    Instruments:  Curette  Bleeding:  Minimal  Hemostasis Achieved: Yes  Method Used:  Pressure  Patient tolerance:  Patient tolerated the procedure well with no immediate complications    "

## 2025-02-13 NOTE — PROGRESS NOTES
Subjective:       Patient ID: Jose Luis Manzo is a 74 y.o. male.    Chief Complaint: Wound Consult      Patient presents for an evaluation of a left upper extremity wound. He is s/p right AV fistula creation on 10/22/24, left arteriovenous graft insertion  on 1/17/25, and LUE fistulogram on 2/7/25 with Dr. Navarro. He was instructed to dress his wound with wet- to-dry dressings. He reports the gauze would fall into his wound bed which he didn't like. He started dressing his wound with betadine and covered with a bandage. He previously smoked about 1/2- 1 ppd for 15 years. He quit in 2015. Denies fever, chills, erythema, warmth, purulent drainage, or pain.      Review of Systems   Constitutional:  Negative for activity change, chills, diaphoresis, fatigue and fever.   Respiratory:  Negative for apnea, chest tightness and shortness of breath.    Cardiovascular:  Negative for chest pain, palpitations and leg swelling.   Musculoskeletal:  Negative for gait problem and joint swelling.   Skin:  Positive for wound. Negative for color change, pallor and rash.   Neurological:  Negative for syncope, weakness and numbness.   Psychiatric/Behavioral:  Negative for agitation. The patient is not nervous/anxious.    All other systems reviewed and are negative.      Objective:      Physical Exam  Vitals reviewed.   Constitutional:       General: He is not in acute distress.     Appearance: Normal appearance.   Cardiovascular:      Pulses: Normal pulses.   Pulmonary:      Effort: No respiratory distress.   Musculoskeletal:         General: No swelling.   Skin:     General: Skin is warm and dry.      Findings: Wound present. No erythema.          Neurological:      General: No focal deficit present.      Mental Status: He is alert and oriented to person, place, and time.   Psychiatric:         Mood and Affect: Mood normal.         Behavior: Behavior normal.         Thought Content: Thought content normal.         Judgment: Judgment  normal.         Assessment:       1. Open wound of left upper extremity, initial encounter    2. Former smoker           Wound Other (comment) Left anterior Arm (Active)     Arm   Present on Original Admission:    Primary Wound Type: Other   Side: Left   Orientation: anterior   Wound Approximate Age at First Assessment (Weeks):    Wound Number:    Is this injury device related?:    Incision Type:    Closure Method:    Wound Description (Comments):    Type:    Additional Comments:    Ankle-Brachial Index:    Pulses:    Removal Indication and Assessment:    Wound Outcome:    Wound Image    02/13/25 1353   Dressing Appearance Dry;Intact;Clean;Moist drainage 02/13/25 1353   Drainage Amount Large 02/13/25 1353   Drainage Characteristics/Odor Serosanguineous 02/13/25 1353   Red (%), Wound Tissue Color 10 % 02/13/25 1353   Yellow (%), Wound Tissue Color 90 % 02/13/25 1353   Periwound Area Intact;[address] 02/13/25 1353   Wound Length (cm) 2.8 cm 02/13/25 1353   Wound Width (cm) 2.3 cm 02/13/25 1353   Wound Depth (cm) 2.3 cm 02/13/25 1353   Wound Volume (cm^3) 14.812 cm^3 02/13/25 1353   Wound Surface Area (cm^2) 6.44 cm^2 02/13/25 1353   Care Cleansed with:;Soap and water 02/13/25 1353   Dressing Applied;Calcium alginate;Silver;Island/border 02/13/25 1353   Packing packed with;hydrofiber/alginate 02/13/25 1353   Periwound Care Skin barrier film applied 02/13/25 1353       Jose Luis was seen in the clinic room and placed in the supine position on the treatment table.  The dressing was removed and the area was cleansed with Easi-clense sponges and dried thoroughly. No odor, erythema, or warmth noted. Placed topical 4% Lidocaine Hydrochloride to wound bed for 15 minutes. Sharp debridement with 5 mm curette to remove non-viable tissue. Pt tolerated procedure well. See procedure note.      Plan of Care: Skin prep to periwound, pack would with aquacel ag, and covered with an aquacel border dressing.          Plan:         Orders Placed  This Encounter   Procedures    Debridement     This order was created via procedure documentation       Left upper extremity was dressed as detailed above.  Patient was instructed to not get the dressings wet and to use cast covers for showering.  Should the dressing become wet, he is to remove it, place a moist dressing over the wound, cover with gauze and roll gauze  and to secure bandages.  He should then notify this office as soon as possible to have a new dressing applied.    Discussed nutrition and the role of protein in wound healing with the patient. Instructed patient to optimize protein for wound healing within CKD restrictions.    Discussed signs and symptoms of cellulitis- none appreciated.    Instructed patient to keep follow ups with vascular surgery    Written and verbal instructions given to patient  RTC in 1 week      Carmen Lowry PA-C

## 2025-02-14 ENCOUNTER — PATIENT OUTREACH (OUTPATIENT)
Dept: ADMINISTRATIVE | Facility: CLINIC | Age: 75
End: 2025-02-14
Payer: MEDICARE

## 2025-02-14 NOTE — PROGRESS NOTES
C3 nurse spoke with Jose Luis Manzo  for a TCC post hospital discharge follow up call. The patient does not have a scheduled HOSFU appointment with Home Alexis MD  within 5-7 days post hospital discharge date 02/08/2025. C3 nurse was unable to schedule HOSFU appointment in [facility].  Please contact patient and schedule follow up appointment using HOSFU visit type on or before 02/15/2025.    Message sent to PCP staff.

## 2025-02-20 ENCOUNTER — OFFICE VISIT (OUTPATIENT)
Dept: WOUND CARE | Facility: CLINIC | Age: 75
End: 2025-02-20
Payer: MEDICARE

## 2025-02-20 VITALS
BODY MASS INDEX: 30.77 KG/M2 | DIASTOLIC BLOOD PRESSURE: 85 MMHG | WEIGHT: 214.94 LBS | HEIGHT: 70 IN | TEMPERATURE: 98 F | HEART RATE: 50 BPM | SYSTOLIC BLOOD PRESSURE: 168 MMHG

## 2025-02-20 DIAGNOSIS — S41.102D OPEN WOUND OF LEFT UPPER EXTREMITY, SUBSEQUENT ENCOUNTER: Primary | ICD-10-CM

## 2025-02-20 DIAGNOSIS — Z87.891 FORMER SMOKER: ICD-10-CM

## 2025-02-20 NOTE — PROGRESS NOTES
Subjective:       Patient ID: Jose Luis Manzo is a 74 y.o. male.    Chief Complaint: Wound Check      Patient presents for a re-evaluation of a left upper extremity wound. He is s/p right AV fistula creation on 10/22/24, left arteriovenous graft insertion  on 1/17/25, and LUE fistulogram on 2/7/25 with Dr. Navarro. He was instructed to dress his wound with wet- to-dry dressings. He reports the gauze would fall into his wound bed which he didn't like. He started dressing his wound with betadine and covered with a bandage. He previously smoked about 1/2- 1 ppd for 15 years. He quit in 2015. No complaints at this time. Denies fever, chills, erythema, warmth, purulent drainage, or pain.      Review of Systems   Constitutional:  Negative for activity change, chills, diaphoresis, fatigue and fever.   Respiratory:  Negative for apnea, chest tightness and shortness of breath.    Cardiovascular:  Negative for chest pain, palpitations and leg swelling.   Musculoskeletal:  Negative for gait problem and joint swelling.   Skin:  Positive for wound. Negative for color change, pallor and rash.   Neurological:  Negative for syncope, weakness and numbness.   Psychiatric/Behavioral:  Negative for agitation. The patient is not nervous/anxious.    All other systems reviewed and are negative.      Objective:      Physical Exam  Vitals reviewed.   Constitutional:       General: He is not in acute distress.     Appearance: Normal appearance.   Cardiovascular:      Pulses: Normal pulses.   Pulmonary:      Effort: No respiratory distress.   Musculoskeletal:         General: No swelling.   Skin:     General: Skin is warm and dry.      Findings: Wound present. No erythema.          Neurological:      General: No focal deficit present.      Mental Status: He is alert and oriented to person, place, and time.   Psychiatric:         Mood and Affect: Mood normal.         Behavior: Behavior normal.         Thought Content: Thought content normal.          Judgment: Judgment normal.         Assessment:       1. Open wound of left upper extremity, subsequent encounter    2. Former smoker           Wound Other (comment) Left anterior Arm (Active)     Arm   Present on Original Admission:    Primary Wound Type: Other   Side: Left   Orientation: anterior   Wound Approximate Age at First Assessment (Weeks):    Wound Number:    Is this injury device related?:    Incision Type:    Closure Method:    Wound Description (Comments):    Type:    Additional Comments:    Ankle-Brachial Index:    Pulses:    Removal Indication and Assessment:    Wound Outcome:    Wound Image    02/20/25 0923   Dressing Appearance Dry;Intact;Clean;Moist drainage 02/20/25 0923   Drainage Amount Large 02/20/25 0923   Drainage Characteristics/Odor Serosanguineous 02/20/25 0923   Red (%), Wound Tissue Color 40 % 02/20/25 0923   Yellow (%), Wound Tissue Color 60 % 02/20/25 0923   Periwound Area Intact;Pink 02/20/25 0923   Wound Length (cm) 1.9 cm 02/20/25 0923   Wound Width (cm) 1.4 cm 02/20/25 0923   Wound Depth (cm) 1 cm 02/20/25 0923   Wound Volume (cm^3) 1.393 cm^3 02/20/25 0923   Wound Surface Area (cm^2) 2.09 cm^2 02/20/25 0923   Care Cleansed with:;Soap and water 02/20/25 0923   Dressing Applied;Calcium alginate;Island/border 02/20/25 0923   Packing packed with;hydrofiber/alginate 02/20/25 0923   Periwound Care Skin barrier film applied 02/20/25 0923       Jose Luis was seen in the clinic room and placed in the supine position on the treatment table.  The dressing was removed and the area was cleansed with Easi-clense sponges and dried thoroughly. No odor, erythema, or warmth noted. Placed topical 4% Lidocaine Hydrochloride to wound bed for 15 minutes. Sharp debridement with 5 mm curette to remove non-viable tissue. Pt tolerated procedure well. See procedure note.      Plan of Care: Skin prep to periwound, pack would with aquacel, and covered with an aquacel border dressing.          Plan:          Orders Placed This Encounter   Procedures    Debridement     This order was created via procedure documentation       Left upper extremity was dressed as detailed above.  Patient was instructed to not get the dressings wet and to use cast covers for showering.  Should the dressing become wet, he is to remove it, place a moist dressing over the wound, cover with gauze and roll gauze  and to secure bandages.  He should then notify this office as soon as possible to have a new dressing applied.    Discussed nutrition and the role of protein in wound healing with the patient. Instructed patient to optimize protein for wound healing within CKD restrictions.    Discussed signs and symptoms of cellulitis- none appreciated.    Instructed patient to keep follow ups with vascular surgery    Written and verbal instructions given to patient  RTC in 1 week      Carmen Lowry PA-C

## 2025-02-20 NOTE — PROCEDURES
"Debridement    Date/Time: 2/20/2025 9:00 AM    Performed by: Carmen Lowry PA-C  Authorized by: Carmen Lowry PA-C    Time out: Immediately prior to procedure a "time out" was called to verify the correct patient, procedure, equipment, support staff and site/side marked as required.    Consent Done?:  Yes (Written)  Local anesthesia used?: Yes    Local anesthetic:  Topical anesthetic (Topical 4% Lidocaine Hydrochloride)    Wound Details:    Location:  Left arm    Type of Debridement:  Excisional       Length (cm):  1.9       Width (cm):  1.4       Depth (cm):  1       Area (sq cm):  2.66       Percent Debrided (%):  100       Total Area Debrided (sq cm):  2.66    Depth of debridement:  Subcutaneous tissue    Tissue debrided:  Subcutaneous    Devitalized tissue debrided:  Biofilm, Exudate, Fibrin and Slough    Instruments:  Curette  Bleeding:  Minimal  Hemostasis Achieved: Yes  Method Used:  Pressure  Patient tolerance:  Patient tolerated the procedure well with no immediate complications    "

## 2025-02-24 ENCOUNTER — OFFICE VISIT (OUTPATIENT)
Dept: VASCULAR SURGERY | Facility: CLINIC | Age: 75
End: 2025-02-24
Payer: MEDICARE

## 2025-02-24 ENCOUNTER — HOSPITAL ENCOUNTER (OUTPATIENT)
Dept: VASCULAR SURGERY | Facility: CLINIC | Age: 75
Discharge: HOME | End: 2025-02-24
Payer: MEDICARE

## 2025-02-24 VITALS
HEART RATE: 53 BPM | BODY MASS INDEX: 29.32 KG/M2 | WEIGHT: 209.44 LBS | DIASTOLIC BLOOD PRESSURE: 88 MMHG | HEIGHT: 71 IN | SYSTOLIC BLOOD PRESSURE: 164 MMHG | TEMPERATURE: 98 F

## 2025-02-24 DIAGNOSIS — I73.9 PAD (PERIPHERAL ARTERY DISEASE): Primary | ICD-10-CM

## 2025-02-24 DIAGNOSIS — N18.6 ESRD (END STAGE RENAL DISEASE): ICD-10-CM

## 2025-02-24 DIAGNOSIS — Z01.818 PREOPERATIVE EXAMINATION: Primary | ICD-10-CM

## 2025-02-24 DIAGNOSIS — Z78.9 PROBLEM WITH VASCULAR ACCESS: ICD-10-CM

## 2025-02-24 DIAGNOSIS — Z01.818 PRE-OPERATIVE EXAMINATION: ICD-10-CM

## 2025-02-24 PROCEDURE — 99024 POSTOP FOLLOW-UP VISIT: CPT | Mod: S$GLB,,, | Performed by: SURGERY

## 2025-02-24 PROCEDURE — 3066F NEPHROPATHY DOC TX: CPT | Mod: CPTII,S$GLB,, | Performed by: SURGERY

## 2025-02-24 PROCEDURE — 3288F FALL RISK ASSESSMENT DOCD: CPT | Mod: CPTII,S$GLB,, | Performed by: SURGERY

## 2025-02-24 PROCEDURE — 1126F AMNT PAIN NOTED NONE PRSNT: CPT | Mod: CPTII,S$GLB,, | Performed by: SURGERY

## 2025-02-24 PROCEDURE — 3077F SYST BP >= 140 MM HG: CPT | Mod: CPTII,S$GLB,, | Performed by: SURGERY

## 2025-02-24 PROCEDURE — 1159F MED LIST DOCD IN RCRD: CPT | Mod: CPTII,S$GLB,, | Performed by: SURGERY

## 2025-02-24 PROCEDURE — 1101F PT FALLS ASSESS-DOCD LE1/YR: CPT | Mod: CPTII,S$GLB,, | Performed by: SURGERY

## 2025-02-24 PROCEDURE — 99999 PR PBB SHADOW E&M-EST. PATIENT-LVL IV: CPT | Mod: PBBFAC,,, | Performed by: SURGERY

## 2025-02-24 PROCEDURE — 3079F DIAST BP 80-89 MM HG: CPT | Mod: CPTII,S$GLB,, | Performed by: SURGERY

## 2025-02-24 NOTE — PROGRESS NOTES
Jose Luis Mercer Jovany  02/24/2025    HPI:  Patient is a 74 y.o. male with a h/o CKD5 not yet on HD, HTN, HLD, DVT on Eliquis, prior oropharyngeal cancer status post radiation and resection, hx failed kidney transplant on chronic immunosuppresants who presents who is here today for vein mapping and evaluation for HD access. Pt denies any LUE pain or swelling, endorses continued improvement of LUE wound. Pt followed by wound care outpatient. Pt denies any recent or current f/c/n/v/cp/sob.     Review of Systems   Constitutional: Negative.    HENT: Negative.     Eyes: Negative.    Respiratory:  Positive for wheezing.    Cardiovascular: Negative.    Musculoskeletal: Negative.    Skin: Negative.    Neurological: Negative.    Psychiatric/Behavioral: Negative.        Physical Exam  Constitutional:       General: He is not in acute distress.     Appearance: He is not ill-appearing.   HENT:      Head: Normocephalic.   Cardiovascular:      Rate and Rhythm: Normal rate.      Pulses: Normal pulses.   Pulmonary:      Breath sounds: Wheezing present.   Chest:      Chest wall: No tenderness.   Abdominal:      Tenderness: There is no abdominal tenderness.   Musculoskeletal:         General: No tenderness. Normal range of motion.      Cervical back: Normal range of motion. No tenderness.   Skin:     General: Skin is warm and dry.      Capillary Refill: Capillary refill takes less than 2 seconds.   Neurological:      General: No focal deficit present.      Mental Status: He is alert and oriented to person, place, and time.      Sensory: No sensory deficit.      Motor: No weakness.   Psychiatric:         Mood and Affect: Mood normal.         Behavior: Behavior normal.      BLE vein mapping 2/24/2025: chronic occlusions BLE    IMP/PLAN:     74 y.o. male with a h/o CKD5 not yet on HD, HTN, HLD, DVT on Eliquis, prior oropharyngeal cancer status post radiation and resection, hx failed kidney transplant on chronic immunosuppresants who presents  who is here today for vein mapping and evaluation for HD access.      Note.  Wound where graft was infected looks good.  Good granulating tissue.  I have put him on once a day showers and that has pretty put a Band-Aid over the wound.  I would like him to return in about a week and removed the sutures from his other incision up there.  We can not go to the groins for an access as he has had DVTs with residual clot in both common femoral veins.  Also has significant plaque.  Therefore I think he were to be best served by a right upper arm AV graft.  I will get vein mapping when we see him back next week.  Thank     -

## 2025-03-06 ENCOUNTER — OFFICE VISIT (OUTPATIENT)
Dept: WOUND CARE | Facility: CLINIC | Age: 75
End: 2025-03-06
Payer: MEDICARE

## 2025-03-06 VITALS
SYSTOLIC BLOOD PRESSURE: 136 MMHG | DIASTOLIC BLOOD PRESSURE: 67 MMHG | BODY MASS INDEX: 29.14 KG/M2 | HEIGHT: 71 IN | HEART RATE: 56 BPM | TEMPERATURE: 98 F | WEIGHT: 208.13 LBS

## 2025-03-06 DIAGNOSIS — Z87.891 FORMER SMOKER: ICD-10-CM

## 2025-03-06 DIAGNOSIS — S41.102D OPEN WOUND OF LEFT UPPER EXTREMITY, SUBSEQUENT ENCOUNTER: Primary | ICD-10-CM

## 2025-03-06 PROCEDURE — 11042 DBRDMT SUBQ TIS 1ST 20SQCM/<: CPT | Mod: S$GLB,,,

## 2025-03-06 PROCEDURE — 99499 UNLISTED E&M SERVICE: CPT | Mod: S$GLB,,,

## 2025-03-06 PROCEDURE — 99999 PR PBB SHADOW E&M-EST. PATIENT-LVL IV: CPT | Mod: PBBFAC,,,

## 2025-03-06 NOTE — PROGRESS NOTES
Subjective:       Patient ID: Jose Luis Manzo is a 75 y.o. male.    Chief Complaint: Wound Check      Patient presents for a re-evaluation of a left upper extremity wound. He is s/p right AV fistula creation on 10/22/24, left arteriovenous graft insertion  on 1/17/25, and LUE fistulogram on 2/7/25 with Dr. Navarro. He was instructed to dress his wound with wet- to-dry dressings. He reports the gauze would fall into his wound bed which he didn't like. He started dressing his wound with betadine and covered with a bandage. He previously smoked about 1/2- 1 ppd for 15 years. He quit in 2015.     Interval history: Pt followed with Dr. Navarro on 2/24/25. He changed his wound care to daily dressing changes. Pt has performed daily dressing changes with betadine. Denies fever, chills, erythema, warmth, purulent drainage, or pain.      Review of Systems   Constitutional:  Negative for activity change, chills, diaphoresis, fatigue and fever.   Respiratory:  Negative for apnea, chest tightness and shortness of breath.    Cardiovascular:  Negative for chest pain, palpitations and leg swelling.   Musculoskeletal:  Negative for gait problem and joint swelling.   Skin:  Positive for wound. Negative for color change, pallor and rash.   Neurological:  Negative for syncope, weakness and numbness.   Psychiatric/Behavioral:  Negative for agitation. The patient is not nervous/anxious.    All other systems reviewed and are negative.      Objective:      Physical Exam  Vitals reviewed.   Constitutional:       General: He is not in acute distress.     Appearance: Normal appearance.   Cardiovascular:      Pulses: Normal pulses.   Pulmonary:      Effort: No respiratory distress.   Musculoskeletal:         General: No swelling.   Skin:     General: Skin is warm and dry.      Findings: Wound present. No erythema.          Neurological:      General: No focal deficit present.      Mental Status: He is alert and oriented to person, place, and  time.   Psychiatric:         Mood and Affect: Mood normal.         Behavior: Behavior normal.         Thought Content: Thought content normal.         Judgment: Judgment normal.         Assessment:       1. Open wound of left upper extremity, subsequent encounter    2. Former smoker           Wound Other (comment) Left anterior Arm (Active)     Arm   Present on Original Admission:    Primary Wound Type: Other   Side: Left   Orientation: anterior   Wound Approximate Age at First Assessment (Weeks):    Wound Number:    Is this injury device related?:    Incision Type:    Closure Method:    Wound Description (Comments):    Type:    Additional Comments:    Ankle-Brachial Index:    Pulses:    Removal Indication and Assessment:    Wound Outcome:    Wound Image    03/06/25 0916   Dressing Appearance Dry;Intact;Clean 03/06/25 0916   Drainage Amount None 03/06/25 0916   Black (%), Wound Tissue Color 100 % 03/06/25 0916   Periwound Area Intact;[place] 03/06/25 0916   Wound Length (cm) 0.8 cm 03/06/25 0916   Wound Width (cm) 0.4 cm 03/06/25 0916   Wound Depth (cm) 0.1 cm 03/06/25 0916   Wound Volume (cm^3) 0.017 cm^3 03/06/25 0916   Wound Surface Area (cm^2) 0.25 cm^2 03/06/25 0916   Care Cleansed with:;Soap and water 03/06/25 0916   Dressing Applied;Island/border;Hydrogel 03/06/25 0916   Periwound Care Skin barrier film applied 03/06/25 0916       Jose Luis was seen in the clinic room and placed in the supine position on the treatment table.  The dressing was removed and the area was cleansed with Easi-clense sponges and dried thoroughly. No odor, erythema, or warmth noted. Placed topical 4% Lidocaine Hydrochloride to wound bed for 15 minutes. Sharp debridement with 5 mm curette to remove non-viable tissue. Pt tolerated procedure well. See procedure note.      Plan of Care: Skin prep to periwound, hydrogel to wound bed, and covered with an island dressing.         Plan:         Orders Placed This Encounter   Procedures     Debridement     This order was created via procedure documentation    Debridement     This order was created via procedure documentation       Left upper extremity was dressed as detailed above.  Discussed daily dressing changes. Patient will cleanse the wound with mild soap and water, pat dry, while wearing gloves place hydrogel to wound bed, and cover with an island dressing.      Discussed nutrition and the role of protein in wound healing with the patient. Instructed patient to optimize protein for wound healing within CKD restrictions.    Discussed signs and symptoms of cellulitis- none appreciated.    Instructed patient to keep follow ups with vascular surgery    Written and verbal instructions given to patient  RTC in 2 weeks or sooner if needed      aCrmen Lowry PA-C

## 2025-03-06 NOTE — PROCEDURES
"Debridement    Date/Time: 3/6/2025 9:00 AM    Performed by: Carmen Lowry PA-C  Authorized by: Carmen Lowry PA-C    Time out: Immediately prior to procedure a "time out" was called to verify the correct patient, procedure, equipment, support staff and site/side marked as required.    Consent Done?:  Yes (Written)  Local anesthesia used?: Yes    Local anesthetic:  Topical anesthetic (Topical 4% Lidocaine Hydrochloride)    Wound Details:    Location:  Left arm    Type of Debridement:  Excisional       Length (cm):  0.8       Width (cm):  0.4       Depth (cm):  0.1       Area (sq cm):  0.25       Percent Debrided (%):  100       Total Area Debrided (sq cm):  0.25    Depth of debridement:  Subcutaneous tissue    Tissue debrided:  Subcutaneous    Devitalized tissue debrided:  Biofilm, Exudate, Fibrin and Slough    Instruments:  Curette  Bleeding:  Minimal  Hemostasis Achieved: Yes  Method Used:  Pressure  Patient tolerance:  Patient tolerated the procedure well with no immediate complications    "

## 2025-03-19 DIAGNOSIS — N18.4 STAGE 4 CHRONIC KIDNEY DISEASE: ICD-10-CM

## 2025-03-19 RX ORDER — B COMPLEX, C NO.20/FOLIC ACID 1 MG
1 CAPSULE ORAL
Qty: 30 CAPSULE | Refills: 0 | Status: SHIPPED | OUTPATIENT
Start: 2025-03-19

## 2025-03-20 ENCOUNTER — TELEPHONE (OUTPATIENT)
Dept: WOUND CARE | Facility: CLINIC | Age: 75
End: 2025-03-20
Payer: MEDICARE

## 2025-03-20 ENCOUNTER — OFFICE VISIT (OUTPATIENT)
Dept: WOUND CARE | Facility: CLINIC | Age: 75
End: 2025-03-20
Payer: MEDICARE

## 2025-03-20 VITALS
HEIGHT: 70 IN | BODY MASS INDEX: 30.71 KG/M2 | TEMPERATURE: 99 F | SYSTOLIC BLOOD PRESSURE: 162 MMHG | WEIGHT: 214.5 LBS | DIASTOLIC BLOOD PRESSURE: 79 MMHG | HEART RATE: 51 BPM

## 2025-03-20 DIAGNOSIS — S41.102D OPEN WOUND OF LEFT UPPER EXTREMITY, SUBSEQUENT ENCOUNTER: Primary | ICD-10-CM

## 2025-03-20 DIAGNOSIS — Z87.891 FORMER SMOKER: ICD-10-CM

## 2025-03-20 PROCEDURE — 1101F PT FALLS ASSESS-DOCD LE1/YR: CPT | Mod: CPTII,S$GLB,,

## 2025-03-20 PROCEDURE — 99213 OFFICE O/P EST LOW 20 MIN: CPT | Mod: S$GLB,,,

## 2025-03-20 PROCEDURE — 3288F FALL RISK ASSESSMENT DOCD: CPT | Mod: CPTII,S$GLB,,

## 2025-03-20 PROCEDURE — 3078F DIAST BP <80 MM HG: CPT | Mod: CPTII,S$GLB,,

## 2025-03-20 PROCEDURE — 1159F MED LIST DOCD IN RCRD: CPT | Mod: CPTII,S$GLB,,

## 2025-03-20 PROCEDURE — 3066F NEPHROPATHY DOC TX: CPT | Mod: CPTII,S$GLB,,

## 2025-03-20 PROCEDURE — 99999 PR PBB SHADOW E&M-EST. PATIENT-LVL IV: CPT | Mod: PBBFAC,,,

## 2025-03-20 PROCEDURE — 3077F SYST BP >= 140 MM HG: CPT | Mod: CPTII,S$GLB,,

## 2025-03-20 NOTE — PROGRESS NOTES
Subjective:       Patient ID: Jose Luis Manzo is a 75 y.o. male.    Chief Complaint: Wound Check      Patient presents for a re-evaluation of a left upper extremity wound. He is s/p right AV fistula creation on 10/22/24, left arteriovenous graft insertion  on 1/17/25, and LUE fistulogram on 2/7/25 with Dr. Navarro. He was instructed to dress his wound with wet- to-dry dressings. He reports the gauze would fall into his wound bed which he didn't like. He started dressing his wound with betadine and covered with a bandage. He previously smoked about 1/2- 1 ppd for 15 years. He quit in 2015.     Interval history: Pt followed with Dr. Navarro on 2/24/25. He changed his wound care to daily dressing changes. Pt has been dressing his wound daily with hydrogel. Denies fever, chills, erythema, warmth, purulent drainage, or pain.      Review of Systems   Constitutional:  Negative for activity change, chills, diaphoresis, fatigue and fever.   Respiratory:  Negative for apnea, chest tightness and shortness of breath.    Cardiovascular:  Negative for chest pain, palpitations and leg swelling.   Musculoskeletal:  Negative for gait problem and joint swelling.   Skin:  Positive for wound. Negative for color change, pallor and rash.   Neurological:  Negative for syncope, weakness and numbness.   Psychiatric/Behavioral:  Negative for agitation. The patient is not nervous/anxious.    All other systems reviewed and are negative.      Objective:      Physical Exam  Vitals reviewed.   Constitutional:       General: He is not in acute distress.     Appearance: Normal appearance.   Cardiovascular:      Pulses: Normal pulses.   Pulmonary:      Effort: No respiratory distress.   Musculoskeletal:         General: No swelling.   Skin:     General: Skin is warm and dry.      Findings: Wound present. No erythema.          Neurological:      General: No focal deficit present.      Mental Status: He is alert and oriented to person, place, and time.    Psychiatric:         Mood and Affect: Mood normal.         Behavior: Behavior normal.         Thought Content: Thought content normal.         Judgment: Judgment normal.         Assessment:       1. Open wound of left upper extremity, subsequent encounter    2. Former smoker           Wound Other (comment) Left anterior Arm (Active)     Arm   Present on Original Admission:    Primary Wound Type: Other   Side: Left   Orientation: anterior   Wound Approximate Age at First Assessment (Weeks):    Wound Number:    Is this injury device related?:    Incision Type:    Closure Method:    Wound Description (Comments):    Type:    Additional Comments:    Ankle-Brachial Index:    Pulses:    Removal Indication and Assessment:    Wound Outcome:    Wound Image   03/20/25 1017   Dressing Appearance Dry;Intact;Clean;Moist drainage 03/20/25 1017   Drainage Amount Scant 03/20/25 1017   Drainage Characteristics/Odor Serosanguineous 03/20/25 1017   Red (%), Wound Tissue Color 100 % 03/20/25 1017   Periwound Area Intact;[place] 03/20/25 1017   Wound Length (cm) 0.2 cm 03/20/25 1017   Wound Width (cm) 0.3 cm 03/20/25 1017   Wound Depth (cm) 0.1 cm 03/20/25 1017   Wound Volume (cm^3) 0.003 cm^3 03/20/25 1017   Wound Surface Area (cm^2) 0.05 cm^2 03/20/25 1017   Care Cleansed with:;Soap and water 03/20/25 1017   Dressing Applied;Hydrogel;Island/border 03/20/25 1017   Periwound Care Skin barrier film applied 03/20/25 1017       Jose Luis was seen in the clinic room and placed in the supine position on the treatment table.  The dressing was removed and the area was cleansed with Easi-clense sponges and dried thoroughly. No odor, erythema, or warmth noted.       Plan of Care: Skin prep to periwound, hydrogel to wound bed, and covered with an island dressing.         Plan:         No orders of the defined types were placed in this encounter.      Left upper extremity was dressed as detailed above.  Discussed daily dressing changes. Patient will  cleanse the wound with mild soap and water, pat dry, while wearing gloves place hydrogel to wound bed, and cover with an island dressing.    Discussed nutrition and the role of protein in wound healing with the patient. Instructed patient to optimize protein for wound healing within CKD restrictions.    Discussed signs and symptoms of cellulitis- none appreciated.    Instructed patient to keep follow ups with vascular surgery    Written and verbal instructions given to patient  RTC in 2 weeks or sooner if needed      Carmen Lowry PA-C

## 2025-03-20 NOTE — TELEPHONE ENCOUNTER
Called no answer, left voice message asking patient to return call to [phone] regarding missed appointment for 9am this morning.

## 2025-03-27 ENCOUNTER — HOSPITAL ENCOUNTER (OUTPATIENT)
Dept: VASCULAR SURGERY | Facility: CLINIC | Age: 75
Discharge: HOME | End: 2025-03-27
Attending: SURGERY
Payer: MEDICARE

## 2025-03-27 ENCOUNTER — OFFICE VISIT (OUTPATIENT)
Dept: VASCULAR SURGERY | Facility: CLINIC | Age: 75
End: 2025-03-27
Attending: SURGERY
Payer: MEDICARE

## 2025-03-27 VITALS
HEART RATE: 55 BPM | WEIGHT: 211.63 LBS | DIASTOLIC BLOOD PRESSURE: 81 MMHG | HEIGHT: 70 IN | BODY MASS INDEX: 30.3 KG/M2 | SYSTOLIC BLOOD PRESSURE: 154 MMHG

## 2025-03-27 DIAGNOSIS — T82.49XD: Primary | ICD-10-CM

## 2025-03-27 DIAGNOSIS — Z01.818 PREOPERATIVE EXAMINATION: ICD-10-CM

## 2025-03-27 PROCEDURE — 99999 PR PBB SHADOW E&M-EST. PATIENT-LVL IV: CPT | Mod: PBBFAC,,, | Performed by: SURGERY

## 2025-03-27 NOTE — PROGRESS NOTES
VASCULAR SURGERY NOTE    Patient ID: Jose Luis Manzo is a 75 y.o. male.    I. HISTORY     Chief Complaint: est pt follow up for HD access evaluation.    HPI: Jose Luis Manzo is a 75 y.o. male who is here today for established patient appointment. Pmhx of CKD5 not yet on HD, HTN, HLD, DVT on Eliquis, prior oropharyngeal cancer status post radiation and resection, hx failed kidney transplant on chronic immunosuppresants who presents who is here for follow up. He had AVF creation of 10/22/24, which unfortunately failed and subsequently underwent Left AVG placement with me on 1/17/25 which also failed. He is present for vein mapping and access. However had thrombosed sites bilaterally.      Past Medical History:   Diagnosis Date    Acute hypoxemic respiratory failure due to COVID-19 12/30/2020    Anticoagulant long-term use     BPH (benign prostatic hypertrophy)     Cancer     vocal cords    Claudication of right lower extremity 3/10/2016    COVID-19 virus detected 12/30/2020    Dependence on renal dialysis 4/18/2023    Disorder of kidney and ureter     Hyperkalemia 1/1/2021    Hyperlipidemia     Hypertension     Hypokalemia 6/23/2021    Immunosuppression 6/26/2021    Immunosuppression due to chronic steroid use 1/30/2020    Microcytic anemia 9/16/2016    Obesity (BMI 30-39.9) 1/23/2019    Oropharyngeal cancer     Pterygium     Squamous cell carcinoma 4/25/2018    Thromboembolic disorder     Unspecified disorder of kidney and ureter         Past Surgical History:   Procedure Laterality Date    AV FISTULA PLACEMENT Right 10/22/2024    Procedure: CREATION, AV FISTULA;  Surgeon: Guanaco Navarro MD;  Location: [address];  Service: Vascular;  Laterality: Right;  RUE AVF creation    CYSTOSCOPY W/ RETROGRADES Bilateral 6/15/2022    Procedure: Cystoscopy, bilateral retrograde pyelogram, and all indicated procedures;  Surgeon: Veronica Bacon MD;  Location: [facility] OR;  Service: Urology;  Laterality: Bilateral;     "FISTULOGRAM Left 2025    Procedure: Fistulogram;  Surgeon: Guanaco Navarro MD;  Location: [address];  Service: Vascular;  Laterality: Left;  R arm graft access, central venogram; fluoro time: 4.4min,  mGy: 75.36  Gycm2: 15.3530  contrast: 26ml    FRACTURE SURGERY Left     "lower leg" 40 years ago    INSERTION, GRAFT, ARTERIOVENOUS, UPPER EXTREMITY Left 2025    Procedure: INSERTION, GRAFT, ARTERIOVENOUS, UPPER EXTREMITY;  Surgeon: Guanaco Navarro MD;  Location: [address];  Service: Vascular;  Laterality: Left;    KIDNEY TRANSPLANT      followed by [facility]    LARYNX SURGERY  2014    Oropharyngeal Cancer x3    LIGATION OF ARTERIOVENOUS FISTULA Left 2025    Procedure: LIGATION, AV FISTULA;  Surgeon: Guanaco Navarro MD;  Location: [facility] OR [address] [place] [address];  Service: Vascular;  Laterality: Left;    microlaryngoscopy      PHLEBOGRAPHY Bilateral 2022    Procedure: Venogram;  Surgeon: KENIA Mueller II, MD;  Location: [facility] CATH LAB;  Service: Vascular;  Laterality: Bilateral;    SURGICAL REMOVAL OF LESION OF ORBIT Bilateral 2020    Procedure: EXCISION, LESION, ORBIT;  Surgeon: Linda Tee MD;  Location: [address];  Service: Ophthalmology;  Laterality: Bilateral;    TIBIAL STAPLING Left             Social History     Occupational History    Not on file   Tobacco Use    Smoking status: Former     Current packs/day: 0.00     Average packs/day: 1 pack/day for 20.0 years (20.0 ttl pk-yrs)     Types: Cigarettes     Start date:      Quit date: 2009     Years since quittin.2     Passive exposure: Past    Smokeless tobacco: Never   Substance and Sexual Activity    Alcohol use: No     Alcohol/week: 0.0 standard drinks of alcohol    Drug use: No     Types: Marijuana     Comment: Occ.    Sexual activity: Not Currently     Partners: Female         Review of Systems   Constitutional: Positive for chills. Negative for decreased appetite.   HENT:  Negative for congestion. "    Cardiovascular:  Negative for chest pain and claudication.   Respiratory:  Negative for cough and hemoptysis.    Endocrine: Negative for cold intolerance and heat intolerance.   Skin:  Negative for color change and nail changes.   Gastrointestinal:  Negative for abdominal pain.       II. PHYSICAL EXAM     Physical Exam  VASC:- Palpable pulses bilateral radial. Upper left extrmeity wound healed.     III. ASSESSMENT & PLAN (MEDICAL DECISION MAKING)       Imaging Results: (I have personally reviewed all images and provided interpretation below)   U/S Vein mapping 3/27/2025  - bilateral occlusive thrombosis         Assessment/Diagnosis and Plan:    Hemodialysis acess.     75 y.o. male CKD5 not yet on HD, HTN, HLD, DVT on Eliquis, prior oropharyngeal cancer status post radiation and resection, hx failed kidney transplant on chronic immunosuppresants came for vein mapping however found to bilateral upper extremity thrombosis,. States he has lower extremity graft in the lower left and clot in the right lower extremity.     - will obtain LE vein mapping to see where we can put the access.   - Follow up 1 month

## 2025-03-31 ENCOUNTER — OFFICE VISIT (OUTPATIENT)
Dept: OTOLARYNGOLOGY | Facility: CLINIC | Age: 75
End: 2025-03-31
Payer: MEDICARE

## 2025-03-31 VITALS — SYSTOLIC BLOOD PRESSURE: 170 MMHG | DIASTOLIC BLOOD PRESSURE: 80 MMHG | HEART RATE: 50 BPM

## 2025-03-31 DIAGNOSIS — C32.9 LARYNX CANCER: Primary | ICD-10-CM

## 2025-03-31 PROCEDURE — 99213 OFFICE O/P EST LOW 20 MIN: CPT | Mod: 25,S$GLB,, | Performed by: OTOLARYNGOLOGY

## 2025-03-31 PROCEDURE — 1126F AMNT PAIN NOTED NONE PRSNT: CPT | Mod: CPTII,S$GLB,, | Performed by: OTOLARYNGOLOGY

## 2025-03-31 PROCEDURE — 3079F DIAST BP 80-89 MM HG: CPT | Mod: CPTII,S$GLB,, | Performed by: OTOLARYNGOLOGY

## 2025-03-31 PROCEDURE — 99999 PR PBB SHADOW E&M-EST. PATIENT-LVL III: CPT | Mod: PBBFAC,,, | Performed by: OTOLARYNGOLOGY

## 2025-03-31 PROCEDURE — 31575 DIAGNOSTIC LARYNGOSCOPY: CPT | Mod: S$GLB,,, | Performed by: OTOLARYNGOLOGY

## 2025-03-31 PROCEDURE — 3077F SYST BP >= 140 MM HG: CPT | Mod: CPTII,S$GLB,, | Performed by: OTOLARYNGOLOGY

## 2025-03-31 PROCEDURE — 1159F MED LIST DOCD IN RCRD: CPT | Mod: CPTII,S$GLB,, | Performed by: OTOLARYNGOLOGY

## 2025-03-31 PROCEDURE — 3066F NEPHROPATHY DOC TX: CPT | Mod: CPTII,S$GLB,, | Performed by: OTOLARYNGOLOGY

## 2025-03-31 NOTE — PROGRESS NOTES
Chief Complaint   Patient presents with    concerned about throat pain lately     Treatment History  1. 2015: XRT for SCCA larynx ([facility]).  2. 10/2/17: DL and biopsy TVC.  Path revealed severe dysplasia/CIS/superficially invasive SCCA (Dr. Lizet Desouza).  3. 12/21/17: MSL with resection R TVC lesion, biopsy L TVC lesion.  Path revealed severe dysplasia. (Dr. Gibran Smith).  4. 3/1/17: MSL with laser resection L TVC.  Path benign. (Dr. Smith).    HPI   75 y.o. male presents with the above treatment history. He is concerned about discomfort in his throat and new dysphagia.    Review of Systems   Constitutional: Negative for fatigue and unexpected weight change.   HENT: Per HPI.  Eyes: Negative for visual disturbance.   Respiratory: Negative for shortness of breath, hemoptysis   Cardiovascular: Negative for chest pain and palpitations.   Musculoskeletal: Negative for decreased ROM, back pain.   Skin: Negative for rash, sunburn, itching.   Neurological: Negative for dizziness and seizures.   Hematological: Negative for adenopathy. Does not bruise/bleed easily.   Endocrine: Negative for rapid weight loss/weight gain, heat/cold intolerance.     Past Medical History   Patient Active Problem List   Diagnosis    Stage 4 chronic kidney disease    Essential hypertension    Mixed hyperlipidemia    Benign prostatic hyperplasia with nocturia    Claudication of right lower extremity    Eye swelling, bilateral    Diminished night vision    Renal transplant recipient    Dermolipoma    History of DVT (deep vein thrombosis)    Iron deficiency anemia    Pulmonary nodules    Larynx cancer    Voice disturbance    Atherosclerosis of aorta    Carcinoma in situ of vocal cord    Dysphagia, pharyngoesophageal    Chondronecrosis of larynx    Squamous cell carcinoma    Obesity (BMI 30-39.9)    Pterygium of both eyes    History of cancer of larynx    Nodule of left lung    Granulomatous lung disease    Transplanted kidney - 2007    Hypertensive  "kidney disease with stage 4 chronic kidney disease    Hypercalcemia    Secondary hyperparathyroidism of renal origin    Benign tumor of orbit    CHANG (acute kidney injury)    Metabolic bone disease    Immunosuppressive management encounter following kidney transplant    Hyperphosphatemia    Vitamin D deficiency disease    History of COVID-19    SOB (shortness of breath)    Metabolic acidosis    Proteinuria    Anticoagulant long-term use    Pre-op evaluation    Benign prostatic hyperplasia with lower urinary tract symptoms    Centrilobular emphysema    Dependence on renal dialysis    Class 1 obesity due to excess calories with serious comorbidity and body mass index (BMI) of 34.0 to 34.9 in adult    Former cigarette smoker    Deformity of toenail    Complicated UTI (urinary tract infection)    Moderate malnutrition    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    ESRD (end stage renal disease)    Edema of upper extremity    Problem with vascular access    Infected wound    PAD (peripheral artery disease)    Pre-operative examination           Past Surgical History   Past Surgical History:   Procedure Laterality Date    AV FISTULA PLACEMENT Right 10/22/2024    Procedure: CREATION, AV FISTULA;  Surgeon: Guanaco Navarro MD;  Location: [facility] OR [address] [place] [address];  Service: Vascular;  Laterality: Right;  RUE AVF creation    CYSTOSCOPY W/ RETROGRADES Bilateral 6/15/2022    Procedure: Cystoscopy, bilateral retrograde pyelogram, and all indicated procedures;  Surgeon: Veronica Bacon MD;  Location: [facility];  Service: Urology;  Laterality: Bilateral;    FISTULOGRAM Left 2/7/2025    Procedure: Fistulogram;  Surgeon: Guanaco Navarro MD;  Location: [address];  Service: Vascular;  Laterality: Left;  R arm graft access, central venogram; fluoro time: 4.4min,  mGy: 75.36  Gycm2: 15.3530  contrast: 26ml    FRACTURE SURGERY Left     "lower leg" 40 years ago    INSERTION, GRAFT, " ARTERIOVENOUS, UPPER EXTREMITY Left 2025    Procedure: INSERTION, GRAFT, ARTERIOVENOUS, UPPER EXTREMITY;  Surgeon: Guanaco Navarro MD;  Location: [facility] OR [address] [place] [address];  Service: Vascular;  Laterality: Left;    KIDNEY TRANSPLANT      followed by [facility]    LARYNX SURGERY  2014    Oropharyngeal Cancer x3    LIGATION OF ARTERIOVENOUS FISTULA Left 2025    Procedure: LIGATION, AV FISTULA;  Surgeon: Guanaco Navarro MD;  Location: [facility] OR [address] [place] [address];  Service: Vascular;  Laterality: Left;    microlaryngoscopy      PHLEBOGRAPHY Bilateral 2022    Procedure: Venogram;  Surgeon: KENIA Mueller II, MD;  Location: [facility] CATH LAB;  Service: Vascular;  Laterality: Bilateral;    SURGICAL REMOVAL OF LESION OF ORBIT Bilateral 2020    Procedure: EXCISION, LESION, ORBIT;  Surgeon: Linda Tee MD;  Location: [facility] OR [address] [place] [address];  Service: Ophthalmology;  Laterality: Bilateral;    TIBIAL STAPLING Left               Family History   Family History   Problem Relation Name Age of Onset    Stroke Mother      Diabetes Mother      Hypertension Mother      Stroke Father      No Known Problems Sister      No Known Problems Brother x1     No Known Problems Daughter      No Known Problems Brother x1            Social History   .  Social History     Socioeconomic History    Marital status: Single    Number of children: 1   Tobacco Use    Smoking status: Former     Current packs/day: 0.00     Average packs/day: 1 pack/day for 20.0 years (20.0 ttl pk-yrs)     Types: Cigarettes     Start date:      Quit date: 2009     Years since quittin.2     Passive exposure: Past    Smokeless tobacco: Never   Substance and Sexual Activity    Alcohol use: No     Alcohol/week: 0.0 standard drinks of alcohol    Drug use: No     Types: Marijuana     Comment: Occ.    Sexual activity: Not Currently     Partners: Female     Social Drivers of Health     Financial Resource Strain: Patient Declined (2025)    Overall Financial  Resource Strain (CARDIA)     Difficulty of Paying Living Expenses: Patient declined   Food Insecurity: Patient Declined (2/8/2025)    Hunger Vital Sign     Worried About Running Out of Food in the Last Year: Patient declined     Ran Out of Food in the Last Year: Patient declined   Transportation Needs: Patient Declined (2/8/2025)    TRANSPORTATION NEEDS     Transportation : Patient declined   Physical Activity: Inactive (9/3/2024)    Exercise Vital Sign     Days of Exercise per Week: 0 days     Minutes of Exercise per Session: 0 min   Stress: Patient Declined (2/8/2025)    [place] [place] [employer] - Occupational Stress Questionnaire     Feeling of Stress : Patient declined   Housing Stability: Patient Declined (2/8/2025)    Housing Stability Vital Sign     Unable to Pay for Housing in the Last Year: Patient declined     Number of Times Moved in the Last Year: 0     Homeless in the Last Year: Patient declined         Allergies   Review of patient's allergies indicates:  No Known Allergies        Physical Exam     Vitals:    03/31/25 1502   BP: (!) 170/80   Pulse: (!) 50         There is no height or weight on file to calculate BMI.    General: AOx3, NAD   Respiratory:  Symmetric chest rise, normal effort  Nose: No gross nasal septal deviation. Inferior Turbinates WNL bilaterally. No septal perforation. No masses/lesions.   Oral Cavity:  Oral Tongue mobile, no lesions noted. Hard Palate WNL. No buccal or FOM lesions.  Oropharynx:  No masses/lesions of the posterior pharyngeal wall. Tonsillar fossa without lesions. Soft palate without masses. Midline uvula.   Neck: No scars. Mild fibrosis status post XRT.  No cervical lymphadenopathy, thyromegaly or thyroid nodules.  Normal range of motion.    Face: House Brackmann I bilaterally.     Flex Naso Heike Hypo Procedures #2     Procedure:  Diagnostic flexible nasopharyngoscopy, laryngoscopy and hypopharyngoscopy:     Routine preparation with local atomizer  with 1% neosynephrine/pontocaine with customary flexible endoscope.     Nasopharynx:  No lesions.        Mucosa:  No lesions.        Adenoids:  Present.  Posterior Choanae:  Patent.  Eustachian Tubes:  Patent.  Posterior pharynx:  No lesions.  Larynx/hypopharynx:        Epiglottis:  No lesions.         AE Folds:  No lesions.        Vocal cords:  Diffuse nodularity noted bilaterally.        Mobility:  R TVC slightly limited, L TVC immobile.         Hypopharynx:  No lesions.        Piriform sinus:  No pooling, no lesions.        Post Cricoid:  No erythema, no edema.    Exam difficult due to gag.    Assessment/Plan  Problem List Items Addressed This Visit          Oncology    Larynx cancer - Primary    Exam difficult but no obvious lesion.  PET/CT ordered.           Relevant Orders    NM PET CT FDG Skull Base to Mid Thigh

## 2025-04-03 ENCOUNTER — OFFICE VISIT (OUTPATIENT)
Dept: WOUND CARE | Facility: CLINIC | Age: 75
End: 2025-04-03
Payer: MEDICARE

## 2025-04-03 VITALS
HEART RATE: 58 BPM | TEMPERATURE: 98 F | SYSTOLIC BLOOD PRESSURE: 142 MMHG | HEIGHT: 70 IN | DIASTOLIC BLOOD PRESSURE: 71 MMHG | WEIGHT: 197.56 LBS | BODY MASS INDEX: 28.28 KG/M2

## 2025-04-03 DIAGNOSIS — Z87.891 FORMER SMOKER: ICD-10-CM

## 2025-04-03 DIAGNOSIS — S41.102D OPEN WOUND OF LEFT UPPER EXTREMITY, SUBSEQUENT ENCOUNTER: Primary | ICD-10-CM

## 2025-04-03 PROCEDURE — 3077F SYST BP >= 140 MM HG: CPT | Mod: CPTII,S$GLB,,

## 2025-04-03 PROCEDURE — 3288F FALL RISK ASSESSMENT DOCD: CPT | Mod: CPTII,S$GLB,,

## 2025-04-03 PROCEDURE — 1101F PT FALLS ASSESS-DOCD LE1/YR: CPT | Mod: CPTII,S$GLB,,

## 2025-04-03 PROCEDURE — 99213 OFFICE O/P EST LOW 20 MIN: CPT | Mod: S$GLB,,,

## 2025-04-03 PROCEDURE — 3066F NEPHROPATHY DOC TX: CPT | Mod: CPTII,S$GLB,,

## 2025-04-03 PROCEDURE — 1126F AMNT PAIN NOTED NONE PRSNT: CPT | Mod: CPTII,S$GLB,,

## 2025-04-03 PROCEDURE — 1159F MED LIST DOCD IN RCRD: CPT | Mod: CPTII,S$GLB,,

## 2025-04-03 PROCEDURE — 99999 PR PBB SHADOW E&M-EST. PATIENT-LVL IV: CPT | Mod: PBBFAC,,,

## 2025-04-03 PROCEDURE — 3078F DIAST BP <80 MM HG: CPT | Mod: CPTII,S$GLB,,

## 2025-04-03 NOTE — PROGRESS NOTES
Subjective:       Patient ID: Jose Luis Manzo is a 75 y.o. male.    Chief Complaint: Wound Check        Patient presents for a re-evaluation of a left upper extremity wound. He is s/p right AV fistula creation on 10/22/24, left arteriovenous graft insertion  on 1/17/25, and LUE fistulogram on 2/7/25 with Dr. Navarro. He was instructed to dress his wound with wet- to-dry dressings. He reports the gauze would fall into his wound bed which he didn't like. He started dressing his wound with betadine and covered with a bandage. He previously smoked about 1/2- 1 ppd for 15 years. He quit in 2015. Pt followed with Dr. Navarro on 2/24/25. He changed his wound care to daily dressing changes. Pt has been dressing his wound daily with hydrogel.       Interval history: Patient went to vascular surgery on 3/27 and his sutures were removed. Denies fever, chills, erythema, warmth, purulent drainage, or pain.      Review of Systems   Constitutional:  Negative for activity change, chills, diaphoresis, fatigue and fever.   Respiratory:  Negative for apnea, chest tightness and shortness of breath.    Cardiovascular:  Negative for chest pain, palpitations and leg swelling.   Musculoskeletal:  Negative for gait problem and joint swelling.   Skin:  Positive for wound. Negative for color change, pallor and rash.   Neurological:  Negative for syncope, weakness and numbness.   Psychiatric/Behavioral:  Negative for agitation. The patient is not nervous/anxious.    All other systems reviewed and are negative.      Objective:      Physical Exam  Vitals reviewed.   Constitutional:       General: He is not in acute distress.     Appearance: Normal appearance.   Cardiovascular:      Pulses: Normal pulses.   Pulmonary:      Effort: No respiratory distress.   Musculoskeletal:         General: No swelling.   Skin:     General: Skin is warm and dry.      Findings: Wound present. No erythema.          Neurological:      General: No focal deficit present.       Mental Status: He is alert and oriented to person, place, and time.   Psychiatric:         Mood and Affect: Mood normal.         Behavior: Behavior normal.         Thought Content: Thought content normal.         Judgment: Judgment normal.         Assessment:       1. Open wound of left upper extremity, subsequent encounter    2. Former smoker           Wound Other (comment) Left anterior Arm (Active)     Arm   Present on Original Admission:    Primary Wound Type: Other   Side: Left   Orientation: anterior   Wound Approximate Age at First Assessment (Weeks):    Wound Number:    Is this injury device related?:    Incision Type:    Closure Method:    Wound Description (Comments):    Type:    Additional Comments:    Ankle-Brachial Index:    Pulses:    Removal Indication and Assessment:    Wound Outcome:    Wound Image   04/03/25 1004   Dressing Appearance Dry;Intact;Clean;Moist drainage 04/03/25 1004   Drainage Amount Scant 04/03/25 1004   Drainage Characteristics/Odor Serosanguineous 04/03/25 1004   Red (%), Wound Tissue Color 100 % 04/03/25 1004   Periwound Area Intact;[place] 04/03/25 1004   Wound Length (cm) 0.3 cm 04/03/25 1004   Wound Width (cm) 0.3 cm 04/03/25 1004   Wound Depth (cm) 0.1 cm 04/03/25 1004   Wound Volume (cm^3) 0.005 cm^3 04/03/25 1004   Wound Surface Area (cm^2) 0.07 cm^2 04/03/25 1004   Care Cleansed with:;Soap and water 04/03/25 1004   Dressing Applied;Hydrogel;Island/border 04/03/25 1004   Periwound Care Skin barrier film applied 04/03/25 1004       Jose Luis was seen in the clinic room and placed in the supine position on the treatment table.  The dressing was removed and the area was cleansed with Easi-clense sponges and dried thoroughly. No odor, erythema, or warmth noted. Orginal wound noted to be healed.       Plan of Care: Skin prep to periwound, hydrogel to wound bed, and covered with an island dressing.         Plan:         No orders of the defined types were placed in this  encounter.      Left upper extremity was dressed as detailed above.  Discussed daily dressing changes. Patient will cleanse the wound with mild soap and water, pat dry, while wearing gloves place hydrogel to wound bed, and cover with an island dressing.    Discussed nutrition and the role of protein in wound healing with the patient. Instructed patient to optimize protein for wound healing within CKD restrictions.    Discussed signs and symptoms of cellulitis- none appreciated.    Instructed patient to keep follow ups with vascular surgery    Written and verbal instructions given to patient  RTC in 2 weeks or sooner if needed      Carmen Lowry PA-C

## 2025-04-05 DIAGNOSIS — Z94.0 IMMUNOSUPPRESSIVE MANAGEMENT ENCOUNTER FOLLOWING KIDNEY TRANSPLANT: ICD-10-CM

## 2025-04-05 DIAGNOSIS — Z79.899 IMMUNOSUPPRESSIVE MANAGEMENT ENCOUNTER FOLLOWING KIDNEY TRANSPLANT: ICD-10-CM

## 2025-04-07 ENCOUNTER — TELEPHONE (OUTPATIENT)
Dept: FAMILY MEDICINE | Facility: CLINIC | Age: 75
End: 2025-04-07
Payer: MEDICARE

## 2025-04-07 ENCOUNTER — HOSPITAL ENCOUNTER (OUTPATIENT)
Dept: RADIOLOGY | Facility: HOSPITAL | Age: 75
Discharge: HOME | End: 2025-04-07
Attending: OTOLARYNGOLOGY
Payer: MEDICARE

## 2025-04-07 DIAGNOSIS — C32.9 LARYNX CANCER: ICD-10-CM

## 2025-04-07 LAB — POCT GLUCOSE: 103 MG/DL (ref 70–110)

## 2025-04-07 PROCEDURE — 78815 PET IMAGE W/CT SKULL-THIGH: CPT | Mod: 26,PS,, | Performed by: STUDENT IN AN ORGANIZED HEALTH CARE EDUCATION/TRAINING PROGRAM

## 2025-04-07 PROCEDURE — A9552 F18 FDG: HCPCS | Performed by: OTOLARYNGOLOGY

## 2025-04-07 PROCEDURE — 78815 PET IMAGE W/CT SKULL-THIGH: CPT | Mod: TC

## 2025-04-07 RX ORDER — SIROLIMUS 1 MG/1
3 TABLET, FILM COATED ORAL DAILY
Qty: 90 TABLET | Refills: 11
Start: 2025-04-07 | End: 2025-04-09 | Stop reason: SDUPTHER

## 2025-04-07 RX ORDER — FLUDEOXYGLUCOSE F18 500 MCI/ML
12.63 INJECTION INTRAVENOUS
Status: COMPLETED | OUTPATIENT
Start: 2025-04-07 | End: 2025-04-07

## 2025-04-07 RX ADMIN — FLUDEOXYGLUCOSE F-18 12.63 MILLICURIE: 500 INJECTION INTRAVENOUS at 12:04

## 2025-04-07 NOTE — TELEPHONE ENCOUNTER
----- Message from Michela sent at 4/7/2025 10:24 AM CDT -----  Type:  RX Refill Request    Who Called: Pt     Refill or New Rx: Refill     RX Name and Strength:sirolimus (RAPAMUNE) 1 MG Tab     Preferred Pharmacy with phone number:    [phone] - [facility] [id number] [facility] [address]Jmvic: [phone] Fax: [phone]    Local or Mail Order: Local Ordering     Provider: Vanesa     Would the patient rather a call back or a response via [facility]? Call     Best Call Back Number:[phone]     Additional Information: patrica ALAN. Sent request on portal , pharmacy has not received orders

## 2025-04-09 ENCOUNTER — OFFICE VISIT (OUTPATIENT)
Dept: FAMILY MEDICINE | Facility: CLINIC | Age: 75
End: 2025-04-09
Payer: MEDICARE

## 2025-04-09 ENCOUNTER — RESULTS FOLLOW-UP (OUTPATIENT)
Dept: OTOLARYNGOLOGY | Facility: CLINIC | Age: 75
End: 2025-04-09

## 2025-04-09 VITALS
HEIGHT: 70 IN | WEIGHT: 210.75 LBS | DIASTOLIC BLOOD PRESSURE: 82 MMHG | SYSTOLIC BLOOD PRESSURE: 126 MMHG | HEART RATE: 54 BPM | BODY MASS INDEX: 30.17 KG/M2 | OXYGEN SATURATION: 96 %

## 2025-04-09 DIAGNOSIS — N18.4 TYPE 2 DIABETES MELLITUS WITH STAGE 4 CHRONIC KIDNEY DISEASE, WITHOUT LONG-TERM CURRENT USE OF INSULIN: ICD-10-CM

## 2025-04-09 DIAGNOSIS — I50.9 CONGESTIVE HEART FAILURE, UNSPECIFIED HF CHRONICITY, UNSPECIFIED HEART FAILURE TYPE: ICD-10-CM

## 2025-04-09 DIAGNOSIS — N18.4 STAGE 4 CHRONIC KIDNEY DISEASE: ICD-10-CM

## 2025-04-09 DIAGNOSIS — R13.10 DYSPHAGIA, UNSPECIFIED TYPE: Primary | ICD-10-CM

## 2025-04-09 DIAGNOSIS — E78.2 MIXED HYPERLIPIDEMIA: ICD-10-CM

## 2025-04-09 DIAGNOSIS — E87.20 METABOLIC ACIDOSIS: ICD-10-CM

## 2025-04-09 DIAGNOSIS — Z79.899 IMMUNOSUPPRESSIVE MANAGEMENT ENCOUNTER FOLLOWING KIDNEY TRANSPLANT: ICD-10-CM

## 2025-04-09 DIAGNOSIS — Z94.0 IMMUNOSUPPRESSIVE MANAGEMENT ENCOUNTER FOLLOWING KIDNEY TRANSPLANT: ICD-10-CM

## 2025-04-09 DIAGNOSIS — C32.9 LARYNX CANCER: ICD-10-CM

## 2025-04-09 DIAGNOSIS — Z94.0 RENAL TRANSPLANT RECIPIENT: ICD-10-CM

## 2025-04-09 DIAGNOSIS — I10 ESSENTIAL HYPERTENSION: ICD-10-CM

## 2025-04-09 DIAGNOSIS — E11.22 TYPE 2 DIABETES MELLITUS WITH STAGE 4 CHRONIC KIDNEY DISEASE, WITHOUT LONG-TERM CURRENT USE OF INSULIN: ICD-10-CM

## 2025-04-09 DIAGNOSIS — R73.9 HYPERGLYCEMIA: ICD-10-CM

## 2025-04-09 DIAGNOSIS — N40.1 BENIGN PROSTATIC HYPERPLASIA WITH NOCTURIA: ICD-10-CM

## 2025-04-09 DIAGNOSIS — Z12.11 COLON CANCER SCREENING: ICD-10-CM

## 2025-04-09 DIAGNOSIS — R35.1 BENIGN PROSTATIC HYPERPLASIA WITH NOCTURIA: ICD-10-CM

## 2025-04-09 RX ORDER — SIROLIMUS 1 MG/1
3 TABLET, FILM COATED ORAL DAILY
Qty: 90 TABLET | Refills: 11 | Status: SHIPPED | OUTPATIENT
Start: 2025-04-09

## 2025-04-09 RX ORDER — SODIUM BICARBONATE 650 MG/1
650 TABLET ORAL 3 TIMES DAILY
Qty: 270 TABLET | Refills: 1
Start: 2025-04-09

## 2025-04-09 NOTE — Clinical Note
Hi Dr. Mcadams,   Would you be ok with me referring this patient to GI for possible EGD to consider esophageal dilation? I saw his recent PET scan and was wondering if his dysphagia could be from scarring after radiation?

## 2025-04-10 PROBLEM — I50.9 CONGESTIVE HEART FAILURE, UNSPECIFIED HF CHRONICITY, UNSPECIFIED HEART FAILURE TYPE: Status: ACTIVE | Noted: 2025-04-10

## 2025-04-10 PROBLEM — N18.4 TYPE 2 DIABETES MELLITUS WITH STAGE 4 CHRONIC KIDNEY DISEASE, WITHOUT LONG-TERM CURRENT USE OF INSULIN: Status: ACTIVE | Noted: 2025-04-10

## 2025-04-10 PROBLEM — E11.22 TYPE 2 DIABETES MELLITUS WITH STAGE 4 CHRONIC KIDNEY DISEASE, WITHOUT LONG-TERM CURRENT USE OF INSULIN: Status: ACTIVE | Noted: 2025-04-10

## 2025-04-10 NOTE — ASSESSMENT & PLAN NOTE
Hemoglobin A1C   Date Value Ref Range Status   09/30/2024 6.0 (H) 4.0 - 5.6 % Final     Comment:     ADA Screening Guidelines:  5.7-6.4%  Consistent with prediabetes  >or=6.5%  Consistent with diabetes    High levels of fetal hemoglobin interfere with the HbA1C  assay. Heterozygous hemoglobin variants (HbS, HgC, etc)do  not significantly interfere with this assay.   However, presence of multiple variants may affect accuracy.     02/07/2024 6.6 (H) 4.0 - 5.6 % Final     Comment:     ADA Screening Guidelines:  5.7-6.4%  Consistent with prediabetes  >or=6.5%  Consistent with diabetes    High levels of fetal hemoglobin interfere with the HbA1C  assay. Heterozygous hemoglobin variants (HbS, HgC, etc)do  not significantly interfere with this assay.   However, presence of multiple variants may affect accuracy.     04/20/2023 6.5 (H) 4.0 - 5.6 % Final     Comment:     ADA Screening Guidelines:  5.7-6.4%  Consistent with prediabetes  >or=6.5%  Consistent with diabetes    High levels of fetal hemoglobin interfere with the HbA1C  assay. Heterozygous hemoglobin variants (HbS, HgC, etc)do  not significantly interfere with this assay.   However, presence of multiple variants may affect accuracy.       Hemoglobin A1c   Date Value Ref Range Status   04/07/2025 6.2 (H) 4.0 - 5.6 % Final     Comment:     ADA Screening Guidelines:  5.7-6.4%  Consistent with prediabetes  >=6.5%  Consistent with diabetes    High levels of fetal hemoglobin interfere with the HbA1C  assay. Heterozygous hemoglobin variants (HbS, HgC, etc)do  not significantly interfere with this assay.   However, presence of multiple variants may affect accuracy.   - following with nephrology

## 2025-04-10 NOTE — PROGRESS NOTES
" Patient ID: Jose Luis Manzo is a 75 y.o. male.     Chief Complaint: f/u chronic medical conditions    Follow-up  Pertinent negatives include no chest pain, coughing, fever, headaches, myalgias, nausea, rash, vomiting or weakness.     History of Present Illness    Jose Luis presents today for follow up    He reports difficulty swallowing with throat discomfort that takes time to resolve. He has history of radiation therapy to the throat area.    He has history of kidney transplant in 2007 and cancer in 2017.    PET scan was performed on Monday. Results are available but have not been reviewed.    He discontinued sodium bicarbonate approximately two months ago due to medication coverage issues.    Last colon cancer screening was completed via [employer] kit in 2022. No recent ophthalmology evaluation, but denies current vision concerns.           Review of Systems  Review of Systems   Constitutional:  Negative for fever.   HENT:  Negative for ear pain and sinus pain.    Eyes:  Negative for discharge.   Respiratory:  Negative for cough and shortness of breath.    Cardiovascular:  Negative for chest pain and leg swelling.   Gastrointestinal:  Negative for diarrhea, nausea and vomiting.   Genitourinary:  Negative for urgency.   Musculoskeletal:  Negative for myalgias.   Skin:  Negative for rash.   Neurological:  Negative for weakness and headaches.   Psychiatric/Behavioral:  Negative for depression.    All other systems reviewed and are negative.      Currently Medications  Medications Ordered Prior to Encounter[1]    Physical  Exam  Vitals:    04/09/25 1033   BP: 126/82   BP Location: Right arm   Patient Position: Sitting   Pulse: (!) 54   SpO2: 96%   Weight: 95.6 kg (210 lb 12.2 oz)   Height: 5' 10" (1.778 m)      Body mass index is 30.24 kg/m².  Wt Readings from Last 3 Encounters:   04/09/25 95.6 kg (210 lb 12.2 oz)   04/03/25 89.6 kg (197 lb 8.5 oz)   03/27/25 96 kg (211 lb 10.3 oz)       Physical Exam  Vitals and nursing " note reviewed.   Constitutional:       General: He is not in acute distress.     Appearance: He is not ill-appearing.   HENT:      Head: Normocephalic and atraumatic.      Right Ear: External ear normal.      Left Ear: External ear normal.      Nose: Nose normal.      Mouth/Throat:      Mouth: Mucous membranes are moist.   Eyes:      Extraocular Movements: Extraocular movements intact.      Conjunctiva/sclera: Conjunctivae normal.   Cardiovascular:      Rate and Rhythm: Normal rate and regular rhythm.      Pulses: Normal pulses.      Heart sounds: No murmur heard.  Pulmonary:      Effort: Pulmonary effort is normal. No respiratory distress.      Breath sounds: No wheezing.   Abdominal:      General: There is no distension.      Palpations: Abdomen is soft. There is no mass.      Tenderness: There is no abdominal tenderness.   Musculoskeletal:         General: No swelling.      Cervical back: Normal range of motion.   Skin:     Coloration: Skin is not jaundiced.      Findings: No rash.   Neurological:      General: No focal deficit present.      Mental Status: He is alert and oriented to person, place, and time.   Psychiatric:         Mood and Affect: Mood normal.         Thought Content: Thought content normal.         Labs:    Complete Blood Count  Lab Results   Component Value Date    RBC 4.48 (L) 04/07/2025    HGB 11.5 (L) 04/07/2025    HCT 37.4 (L) 04/07/2025    MCV 84 04/07/2025    MCH 25.7 (L) 04/07/2025    MCHC 30.7 (L) 04/07/2025    RDW 16.7 (H) 04/07/2025     04/07/2025    MPV 10.8 04/07/2025    GRAN 3.4 02/08/2025    GRAN 63.2 02/08/2025    LYMPH 27.4 04/07/2025    LYMPH 1.29 04/07/2025    MONO 11.3 04/07/2025    MONO 0.53 04/07/2025    EOS 5.1 04/07/2025    EOS 0.24 04/07/2025    BASO 0.02 02/08/2025    EOSINOPHIL 1.3 02/08/2025    BASOPHIL 0.4 04/07/2025    BASOPHIL 0.02 04/07/2025    DIFFMETHOD Automated 02/08/2025       Comprehensive Metabolic Panel  Lab Results   Component Value Date      02/08/2025    BUN 43 (H) 04/07/2025    CREATININE 3.3 (H) 04/07/2025     04/07/2025    K 4.4 04/07/2025     (H) 04/07/2025    PROT 8.3 02/06/2025    ALBUMIN 3.6 04/07/2025    BILITOT 0.4 04/07/2025    AST 23 04/07/2025    ALKPHOS 122 04/07/2025    CO2 22 (L) 04/07/2025    ALT 20 04/07/2025    ANIONGAP 8 04/07/2025       TSH  Lab Results   Component Value Date    TSH 4.170 (H) 04/07/2025       Imaging:  NM PET CT FDG Skull Base to Mid Thigh  Narrative: EXAMINATION:  NM PET CT FDG SKULL BASE TO MID THIGH    CLINICAL HISTORY:  larynx cancer; Malignant neoplasm of larynx, unspecified    TECHNIQUE:  12.63 mCi of F18-FDG was administered intravenously in the right hand.  After an approximately 60 min distribution time, PET/CT images were acquired from the vertex to mid thigh.  Transmission images were acquired to correct for attenuation using a whole body low-dose CT scan without IV contrast with the arms positioned along the side of the torso. Glycemia at the time of injection was 103 mg/dL.    COMPARISON:  CT chest 04/26/2024, 04/27/2022 CT soft tissue neck 11/03/2017, PET-CT 11/03/2017    FINDINGS:  Quality of the study: Mild misregistration in the head and neck.    In the head and neck, there is asymmetric increased tracer uptake about left vocal cord, more prominent anteriorly with SUV max 6.6, without CT correlate, previously 4.0.  New focal uptake posterior to the right aspect of the arytenoid cartilage, SUV max 4.2, without CT correlate, favor physiologic uptake.  There are no pathologically enlarged or hypermetabolic lymph nodes.    In the chest, there are no hypermetabolic lesions worrisome for malignancy.  There are no pathologically enlarged or hypermetabolic lymph nodes.  9 mm right lower lobe solid pulmonary nodule, without significant increased tracer uptake, previously 6 mm (04/27/2022).    In the abdomen and pelvis, there is physiologic tracer distribution within the abdominal organs and  excretion into the genitourinary system.    In the bones, there are no hypermetabolic lesions worrisome for malignancy.    In the extremities, there are no hypermetabolic lesions worrisome for malignancy.    Additional CT findings: Aortic and mitral annular calcifications.  Small pericardial effusion, similar to prior 04/26/2024. Coronary and aortic calcific atherosclerosis.  Small hiatal hernia.  Multiple hepatic cysts.  Bilateral native kidneys are atrophic.  Left simple appearing renal cyst.  Right lower quadrant renal allograft.  Colonic diverticulosis.  Bilateral fat containing inguinal hernias.  Left lower extremity AV fistula.  Degenerative changes in the spine.  Impression: In this patient with history of laryngeal cancer, there is continued asymmetric tracer uptake along left vocal cord. No correlate lesion on this non contrasted CT.  Findings may be physiologic or due to post radiation change however malignancy cannot be entirely excluded.  Additional asymmetric uptake posterior to right aspect of arytenoid cartilage, favor physiologic etiology.  Attention on follow-up.    Additional findings as above.    Electronically signed by resident: Ru Cisneros  Date:    04/07/2025  Time:    14:32    Electronically signed by: Bladimir Arteaga  Date:    04/07/2025  Time:    15:58      Assessment/Plan:  Assessment & Plan    Reviewed recent ENT visit and PET scan results.  Evaluated swallowing difficulties related to prior radiation therapy and explained potential long-term effects of radiation on swallowing function.  Evaluated need for GI consultation to assess esophageal dilation.  Lab results show stable renal function and good diabetes control.  Reviewed BP readings.    TYPE 2 DIABETES MELLITUS WITH DIABETIC CHRONIC KIDNEY DISEASE:  - Initiated Tums (calcium carbonate) 1 tablet 3 times daily as an OTC alternative to sodium bicarbonate for kidney health.  - Will attempt to refill sodium bicarbonate prescription  and verify insurance coverage.  - Noted the patient's HbA1c is 6.2, which is excellent.  - Confirmed kidney function is stable, consistent with the last 6 months, and the patient continues to produce urine.    CHF (CONGESTIVE HEART FAILURE):  - Inquired about recent episodes of chest pain or dyspnea.  - Noted blood pressure is within optimal range.    DYSPHAGIA:  - Jose Luis reports difficulty swallowing and prolonged sensation of heat in the throat when consuming food.  - Acknowledged patient's history of radiation to the area, which can cause dysphagia as the patient ages.  - Will consult with Dr. Conti (ENT) regarding potential need for gastroenterology referral to evaluate esophageal dilation and discuss potential next steps for managing the swallowing difficulty.    KIDNEY TRANSPLANT STATUS:  - Reviewed patient's history, confirming kidney transplant in 2007.  - Recommend continuing sodium bicarbonate for kidney health, one tablet 3 times daily.          1. Dysphagia, unspecified type    2. Metabolic acidosis  -     sodium bicarbonate 650 MG tablet; Take 1 tablet (650 mg total) by mouth 3 (three) times daily.  Dispense: 270 tablet; Refill: 1    3. Colon cancer screening  -     Fecal Immunochemical Test (iFOBT); Future; Expected date: 04/09/2025    4. Immunosuppressive management encounter following kidney transplant  -     sirolimus (RAPAMUNE) 1 MG Tab; Take 3 tablets (3 mg total) by mouth once daily.  Dispense: 90 tablet; Refill: 11    5. Stage 4 chronic kidney disease  Overview:  AV fistula creation on 10/22/24, now without palpable thrill.     Plan  -Vascular ultrasound to evaluate for blood flow through the AV fistula  -Continue with sodium bicarbonate 1950 mg BID  -Continue with low potassium and low salt diet    Orders:  -     TSH; Future; Expected date: 04/09/2025    6. Essential hypertension  -     TSH; Future; Expected date: 04/09/2025    7. Mixed hyperlipidemia  -     CBC Auto Differential; Future  -      Comprehensive Metabolic Panel; Future; Expected date: 04/09/2025  -     Hemoglobin A1C; Future; Expected date: 04/09/2025  -     Lipid Panel; Future; Expected date: 04/09/2025    8. Benign prostatic hyperplasia with nocturia    9. Renal transplant recipient  Overview:  2007 at [facility]      10. Larynx cancer  -     TSH; Future; Expected date: 04/09/2025    11. Hyperglycemia  -     Hemoglobin A1C; Future; Expected date: 04/09/2025    12. Type 2 diabetes mellitus with stage 4 chronic kidney disease, without long-term current use of insulin  Assessment & Plan:  Hemoglobin A1C   Date Value Ref Range Status   09/30/2024 6.0 (H) 4.0 - 5.6 % Final     Comment:     ADA Screening Guidelines:  5.7-6.4%  Consistent with prediabetes  >or=6.5%  Consistent with diabetes    High levels of fetal hemoglobin interfere with the HbA1C  assay. Heterozygous hemoglobin variants (HbS, HgC, etc)do  not significantly interfere with this assay.   However, presence of multiple variants may affect accuracy.     02/07/2024 6.6 (H) 4.0 - 5.6 % Final     Comment:     ADA Screening Guidelines:  5.7-6.4%  Consistent with prediabetes  >or=6.5%  Consistent with diabetes    High levels of fetal hemoglobin interfere with the HbA1C  assay. Heterozygous hemoglobin variants (HbS, HgC, etc)do  not significantly interfere with this assay.   However, presence of multiple variants may affect accuracy.     04/20/2023 6.5 (H) 4.0 - 5.6 % Final     Comment:     ADA Screening Guidelines:  5.7-6.4%  Consistent with prediabetes  >or=6.5%  Consistent with diabetes    High levels of fetal hemoglobin interfere with the HbA1C  assay. Heterozygous hemoglobin variants (HbS, HgC, etc)do  not significantly interfere with this assay.   However, presence of multiple variants may affect accuracy.       Hemoglobin A1c   Date Value Ref Range Status   04/07/2025 6.2 (H) 4.0 - 5.6 % Final     Comment:     ADA Screening Guidelines:  5.7-6.4%  Consistent with  prediabetes  >=6.5%  Consistent with diabetes    High levels of fetal hemoglobin interfere with the HbA1C  assay. Heterozygous hemoglobin variants (HbS, HgC, etc)do  not significantly interfere with this assay.   However, presence of multiple variants may affect accuracy.   - following with nephrology        13. Congestive heart failure, unspecified HF chronicity, unspecified heart failure type  Assessment & Plan:  - chronic  - limit sodium intake           Discussed how to stay healthy including: diet, exercise, refraining from smoking and discussed screening exams / tests needed for age, sex and family Hx.        Home Alexis MD    This note was generated with the assistance of ambient listening technology. Verbal consent was obtained by the patient and accompanying visitor(s) for the recording of patient appointment to facilitate this note. I attest to having reviewed and edited the generated note for accuracy, though some syntax or spelling errors may persist. Please contact the author of this note for any clarification.           [1]   Current Outpatient Medications on File Prior to Visit   Medication Sig Dispense Refill    apixaban (ELIQUIS) 2.5 mg Tab Take 1 tablet (2.5 mg total) by mouth 2 (two) times daily. 180 tablet 3    atorvastatin (LIPITOR) 20 MG tablet Take 1 tablet (20 mg total) by mouth every evening. 90 tablet 3    cinacalcet (SENSIPAR) 30 MG Tab Take 1 tablet (30 mg total) by mouth daily with breakfast. 90 tablet 3    cloNIDine 0.3 mg/24 hr td ptwk (CATAPRES) 0.3 mg/24 hr Place 1 patch onto the skin once a week. 12 patch 10    ergocalciferol (ERGOCALCIFEROL) 50,000 unit Cap Take 1 capsule (50,000 Units total) by mouth every 7 days. 12 capsule 3    ferrous sulfate 325 mg (65 mg iron) Tab tablet Take 325 mg by mouth once daily.      finasteride (PROSCAR) 5 mg tablet Take 1 tablet (5 mg total) by mouth once daily. 90 tablet 3    fluticasone propionate (FLONASE) 50 mcg/actuation nasal spray 1 spray (50  mcg total) by Each Nostril route nightly. 10 mL 5    furosemide (LASIX) 40 MG tablet Take 1 tablet (40 mg total) by mouth once daily. 90 tablet 3    metoprolol succinate (TOPROL-XL) 100 MG 24 hr tablet Take 1 tablet (100 mg total) by mouth once daily. 90 tablet 3    oxyCODONE (ROXICODONE) 5 MG immediate release tablet Take 1 tablet (5 mg total) by mouth every 6 (six) hours as needed for Pain. 8 tablet 0    predniSONE (DELTASONE) 5 MG tablet Take 1 tablet (5 mg total) by mouth once daily. 90 tablet 3    TRIPHROCAPS 1 mg Cap Take 1 capsule by mouth once daily 30 capsule 0    acetaminophen (TYLENOL) 650 MG TbSR Take 1 tablet (650 mg total) by mouth every 8 (eight) hours. (Patient not taking: Reported on 4/9/2025)      [DISCONTINUED] sildenafiL (VIAGRA) 50 MG tablet Take 1 tablet (50 mg total) by mouth daily as needed for Erectile Dysfunction. 9 tablet 4     No current facility-administered medications on file prior to visit.

## 2025-04-16 DIAGNOSIS — N18.4 STAGE 4 CHRONIC KIDNEY DISEASE: ICD-10-CM

## 2025-04-17 ENCOUNTER — OFFICE VISIT (OUTPATIENT)
Dept: WOUND CARE | Facility: CLINIC | Age: 75
End: 2025-04-17
Payer: MEDICARE

## 2025-04-17 VITALS
WEIGHT: 212.06 LBS | HEART RATE: 53 BPM | SYSTOLIC BLOOD PRESSURE: 160 MMHG | TEMPERATURE: 98 F | DIASTOLIC BLOOD PRESSURE: 80 MMHG | BODY MASS INDEX: 30.36 KG/M2 | HEIGHT: 70 IN

## 2025-04-17 DIAGNOSIS — Z87.891 FORMER SMOKER: ICD-10-CM

## 2025-04-17 DIAGNOSIS — S41.102D OPEN WOUND OF LEFT UPPER EXTREMITY, SUBSEQUENT ENCOUNTER: Primary | ICD-10-CM

## 2025-04-17 PROCEDURE — 1159F MED LIST DOCD IN RCRD: CPT | Mod: CPTII,S$GLB,,

## 2025-04-17 PROCEDURE — 3077F SYST BP >= 140 MM HG: CPT | Mod: CPTII,S$GLB,,

## 2025-04-17 PROCEDURE — 99999 PR PBB SHADOW E&M-EST. PATIENT-LVL IV: CPT | Mod: PBBFAC,,,

## 2025-04-17 PROCEDURE — 1101F PT FALLS ASSESS-DOCD LE1/YR: CPT | Mod: CPTII,S$GLB,,

## 2025-04-17 PROCEDURE — 3066F NEPHROPATHY DOC TX: CPT | Mod: CPTII,S$GLB,,

## 2025-04-17 PROCEDURE — 3079F DIAST BP 80-89 MM HG: CPT | Mod: CPTII,S$GLB,,

## 2025-04-17 PROCEDURE — 99213 OFFICE O/P EST LOW 20 MIN: CPT | Mod: S$GLB,,,

## 2025-04-17 PROCEDURE — 1126F AMNT PAIN NOTED NONE PRSNT: CPT | Mod: CPTII,S$GLB,,

## 2025-04-17 PROCEDURE — 3288F FALL RISK ASSESSMENT DOCD: CPT | Mod: CPTII,S$GLB,,

## 2025-04-17 PROCEDURE — 3044F HG A1C LEVEL LT 7.0%: CPT | Mod: CPTII,S$GLB,,

## 2025-04-17 NOTE — PROGRESS NOTES
Subjective:       Patient ID: Jose Luis Manzo is a 75 y.o. male.    Chief Complaint: Wound Check        Patient presents for a re-evaluation of a left upper extremity wound. He is s/p right AV fistula creation on 10/22/24, left arteriovenous graft insertion  on 1/17/25, and LUE fistulogram on 2/7/25 with Dr. Navarro. He was instructed to dress his wound with wet- to-dry dressings. He reports the gauze would fall into his wound bed which he didn't like. He started dressing his wound with betadine and covered with a bandage. He previously smoked about 1/2- 1 ppd for 15 years. He quit in 2015. Pt followed with Dr. Navarro on 2/24/25. He changed his wound care to daily dressing changes. Pt has been dressing his wound daily with hydrogel. No complaints at this time. Denies fever, chills, erythema, warmth, purulent drainage, or pain.      Review of Systems   Constitutional:  Negative for activity change, chills, diaphoresis, fatigue and fever.   Respiratory:  Negative for apnea, chest tightness and shortness of breath.    Cardiovascular:  Negative for chest pain, palpitations and leg swelling.   Musculoskeletal:  Negative for gait problem and joint swelling.   Skin:  Positive for wound. Negative for color change, pallor and rash.   Neurological:  Negative for syncope, weakness and numbness.   Psychiatric/Behavioral:  Negative for agitation. The patient is not nervous/anxious.    All other systems reviewed and are negative.      Objective:      Physical Exam  Vitals reviewed.   Constitutional:       General: He is not in acute distress.     Appearance: Normal appearance.   Cardiovascular:      Pulses: Normal pulses.   Pulmonary:      Effort: No respiratory distress.   Musculoskeletal:         General: No swelling.   Skin:     General: Skin is warm and dry.      Findings: Wound present. No erythema.          Neurological:      General: No focal deficit present.      Mental Status: He is alert and oriented to person, place, and  time.   Psychiatric:         Mood and Affect: Mood normal.         Behavior: Behavior normal.         Thought Content: Thought content normal.         Judgment: Judgment normal.         Assessment:       1. Open wound of left upper extremity, subsequent encounter    2. Former smoker             Wound Other (comment) Left anterior Arm (Active)     Arm   Present on Original Admission:    Primary Wound Type: Other   Side: Left   Orientation: anterior   Wound Approximate Age at First Assessment (Weeks):    Wound Number:    Is this injury device related?:    Incision Type:    Closure Method:    Wound Description (Comments):    Type:    Additional Comments:    Ankle-Brachial Index:    Pulses:    Removal Indication and Assessment:    Wound Outcome:    Wound Image   04/17/25 1415   Dressing Appearance Dry;Intact;Clean;Moist drainage 04/17/25 1415   Drainage Amount Small 04/17/25 1415   Drainage Characteristics/Odor Serosanguineous 04/17/25 1415   Red (%), Wound Tissue Color 100 % 04/17/25 1415   Periwound Area Intact;[place] 04/17/25 1415   Wound Length (cm) 0.9 cm 04/17/25 1415   Wound Width (cm) 0.4 cm 04/17/25 1415   Wound Depth (cm) 0.1 cm 04/17/25 1415   Wound Volume (cm^3) 0.019 cm^3 04/17/25 1415   Wound Surface Area (cm^2) 0.28 cm^2 04/17/25 1415   Care Cleansed with:;Soap and water 04/17/25 1415   Dressing Applied;Hydrogel;Island/border 04/17/25 1415   Periwound Care Skin barrier film applied 04/17/25 1415       Jose Luis was seen in the clinic room and placed in the supine position on the treatment table.  The dressing was removed and the area was cleansed with Easi-clense sponges and dried thoroughly. No odor, erythema, or warmth noted. Orginal wound noted to be healed.       Plan of Care: Skin prep to periwound, hydrogel to wound bed, and covered with an island dressing.         Plan:         No orders of the defined types were placed in this encounter.      Left upper extremity was dressed as detailed  above.  Discussed daily dressing changes. Patient will cleanse the wound with mild soap and water, pat dry, while wearing gloves place hydrogel to wound bed, and cover with an island dressing.    Discussed nutrition and the role of protein in wound healing with the patient. Instructed patient to optimize protein for wound healing within CKD restrictions.    Discussed signs and symptoms of cellulitis- none appreciated.    Instructed patient to keep follow ups with vascular surgery    Written and verbal instructions given to patient  RTC in 2 weeks or sooner if needed      Carmen Lowry PA-C

## 2025-04-21 RX ORDER — B COMPLEX, C NO.20/FOLIC ACID 1 MG
1 CAPSULE ORAL
Qty: 30 CAPSULE | Refills: 0 | Status: SHIPPED | OUTPATIENT
Start: 2025-04-21

## 2025-05-01 ENCOUNTER — OFFICE VISIT (OUTPATIENT)
Dept: WOUND CARE | Facility: CLINIC | Age: 75
End: 2025-05-01
Payer: MEDICARE

## 2025-05-01 VITALS
HEIGHT: 70 IN | HEART RATE: 58 BPM | TEMPERATURE: 98 F | SYSTOLIC BLOOD PRESSURE: 137 MMHG | WEIGHT: 214.5 LBS | DIASTOLIC BLOOD PRESSURE: 70 MMHG | BODY MASS INDEX: 30.71 KG/M2

## 2025-05-01 DIAGNOSIS — S41.102D OPEN WOUND OF LEFT UPPER EXTREMITY, SUBSEQUENT ENCOUNTER: Primary | ICD-10-CM

## 2025-05-01 DIAGNOSIS — Z87.891 FORMER SMOKER: ICD-10-CM

## 2025-05-01 PROCEDURE — 3044F HG A1C LEVEL LT 7.0%: CPT | Mod: CPTII,S$GLB,,

## 2025-05-01 PROCEDURE — 1159F MED LIST DOCD IN RCRD: CPT | Mod: CPTII,S$GLB,,

## 2025-05-01 PROCEDURE — 1101F PT FALLS ASSESS-DOCD LE1/YR: CPT | Mod: CPTII,S$GLB,,

## 2025-05-01 PROCEDURE — 3075F SYST BP GE 130 - 139MM HG: CPT | Mod: CPTII,S$GLB,,

## 2025-05-01 PROCEDURE — 3288F FALL RISK ASSESSMENT DOCD: CPT | Mod: CPTII,S$GLB,,

## 2025-05-01 PROCEDURE — 99999 PR PBB SHADOW E&M-EST. PATIENT-LVL IV: CPT | Mod: PBBFAC,,,

## 2025-05-01 PROCEDURE — 99213 OFFICE O/P EST LOW 20 MIN: CPT | Mod: S$GLB,,,

## 2025-05-01 PROCEDURE — 1125F AMNT PAIN NOTED PAIN PRSNT: CPT | Mod: CPTII,S$GLB,,

## 2025-05-01 PROCEDURE — 3066F NEPHROPATHY DOC TX: CPT | Mod: CPTII,S$GLB,,

## 2025-05-01 PROCEDURE — 3078F DIAST BP <80 MM HG: CPT | Mod: CPTII,S$GLB,,

## 2025-05-01 NOTE — PROGRESS NOTES
Subjective:       Patient ID: Jose Luis Manzo is a 75 y.o. male.    Chief Complaint: Wound Check        Patient presents for a re-evaluation of a left upper extremity wound. He is s/p right AV fistula creation on 10/22/24, left arteriovenous graft insertion  on 1/17/25, and LUE fistulogram on 2/7/25 with Dr. Navarro. He was instructed to dress his wound with wet- to-dry dressings. He reports the gauze would fall into his wound bed which he didn't like. He started dressing his wound with betadine and covered with a bandage. He previously smoked about 1/2- 1 ppd for 15 years. He quit in 2015. Pt followed with Dr. Navarro on 2/24/25. He changed his wound care to daily dressing changes. Pt has been dressing his wound daily with hydrogel. No complaints at this time. Denies fever, chills, erythema, warmth, purulent drainage, or pain.      Review of Systems   Constitutional:  Negative for activity change, chills, diaphoresis, fatigue and fever.   Respiratory:  Negative for apnea, chest tightness and shortness of breath.    Cardiovascular:  Negative for chest pain, palpitations and leg swelling.   Musculoskeletal:  Negative for gait problem and joint swelling.   Skin:  Positive for wound. Negative for color change, pallor and rash.   Neurological:  Negative for syncope, weakness and numbness.   Psychiatric/Behavioral:  Negative for agitation. The patient is not nervous/anxious.    All other systems reviewed and are negative.      Objective:      Physical Exam  Vitals reviewed.   Constitutional:       General: He is not in acute distress.     Appearance: Normal appearance.   Cardiovascular:      Pulses: Normal pulses.   Pulmonary:      Effort: No respiratory distress.   Musculoskeletal:         General: No swelling.   Skin:     General: Skin is warm and dry.      Findings: Wound present. No erythema.          Neurological:      General: No focal deficit present.      Mental Status: He is alert and oriented to person, place, and  time.   Psychiatric:         Mood and Affect: Mood normal.         Behavior: Behavior normal.         Thought Content: Thought content normal.         Judgment: Judgment normal.         Assessment:       1. Open wound of left upper extremity, subsequent encounter    2. Former smoker             Wound Other (comment) Left anterior Arm (Active)     Arm   Present on Original Admission:    Primary Wound Type: Other   Side: Left   Orientation: anterior   Wound Approximate Age at First Assessment (Weeks):    Wound Number:    Is this injury device related?:    Incision Type:    Closure Method:    Wound Description (Comments):    Type:    Additional Comments:    Ankle-Brachial Index:    Pulses:    Removal Indication and Assessment:    Wound Outcome:    Wound Image   05/01/25 1425   Dressing Appearance Dry;Intact;Clean;Moist drainage 05/01/25 1425   Drainage Amount Moderate 05/01/25 1425   Drainage Characteristics/Odor Serosanguineous 05/01/25 1425   Red (%), Wound Tissue Color 100 % 05/01/25 1425   Periwound Area Intact;Pink 05/01/25 1425   Wound Length (cm) 1 cm 05/01/25 1425   Wound Width (cm) 1.6 cm 05/01/25 1425   Wound Depth (cm) 0.1 cm 05/01/25 1425   Wound Volume (cm^3) 0.084 cm^3 05/01/25 1425   Wound Surface Area (cm^2) 1.26 cm^2 05/01/25 1425   Care Cleansed with:;Soap and water 05/01/25 1425   Dressing Applied;Calcium alginate;Foam;Island/border 05/01/25 1425   Periwound Care Skin barrier film applied 05/01/25 1425       Jose Luis was seen in the clinic room and placed in the supine position on the treatment table.  The dressing was removed and the area was cleansed with Easi-clense sponges and dried thoroughly. No odor, erythema, or warmth noted. Orginal wound noted to be healed.       Plan of Care: Skin prep to periwound, hydrofera blue to wound bed, and covered with an aquacel border dressing.         Plan:         No orders of the defined types were placed in this encounter.      Left upper extremity was dressed  as detailed above.  Patient was instructed to not get the dressings wet and to use cast covers for showering.  Should the dressing become wet, he is to remove it, place a moist dressing over the wound, cover with gauze and roll gauze and to secure bandages.  He should then notify this office as soon as possible to have a new dressing applied.    Discussed nutrition and the role of protein in wound healing with the patient. Instructed patient to optimize protein for wound healing within CKD restrictions.    Discussed signs and symptoms of cellulitis- none appreciated.    Instructed patient to keep follow ups with vascular surgery    Written and verbal instructions given to patient  RTC in 1 week, or sooner if needed      Carmen Lowry PA-C

## 2025-05-08 ENCOUNTER — OFFICE VISIT (OUTPATIENT)
Dept: WOUND CARE | Facility: CLINIC | Age: 75
End: 2025-05-08
Payer: MEDICARE

## 2025-05-08 ENCOUNTER — OFFICE VISIT (OUTPATIENT)
Dept: OTOLARYNGOLOGY | Facility: CLINIC | Age: 75
End: 2025-05-08
Payer: MEDICARE

## 2025-05-08 VITALS
DIASTOLIC BLOOD PRESSURE: 86 MMHG | HEART RATE: 50 BPM | HEIGHT: 70 IN | WEIGHT: 215.19 LBS | BODY MASS INDEX: 30.81 KG/M2 | SYSTOLIC BLOOD PRESSURE: 181 MMHG | TEMPERATURE: 98 F

## 2025-05-08 DIAGNOSIS — S41.102D OPEN WOUND OF LEFT UPPER EXTREMITY, SUBSEQUENT ENCOUNTER: Primary | ICD-10-CM

## 2025-05-08 DIAGNOSIS — T82.7XXA INFECTION OF AV GRAFT FOR DIALYSIS: Primary | ICD-10-CM

## 2025-05-08 DIAGNOSIS — Z87.891 FORMER SMOKER: ICD-10-CM

## 2025-05-08 DIAGNOSIS — L03.90 WOUND CELLULITIS: ICD-10-CM

## 2025-05-08 DIAGNOSIS — C32.9 LARYNX CANCER: Primary | ICD-10-CM

## 2025-05-08 LAB
GRAM STN SPEC: NORMAL
GRAM STN SPEC: NORMAL

## 2025-05-08 PROCEDURE — 1126F AMNT PAIN NOTED NONE PRSNT: CPT | Mod: CPTII,S$GLB,,

## 2025-05-08 PROCEDURE — 87186 SC STD MICRODIL/AGAR DIL: CPT

## 2025-05-08 PROCEDURE — 1159F MED LIST DOCD IN RCRD: CPT | Mod: CPTII,S$GLB,,

## 2025-05-08 PROCEDURE — 99999 PR PBB SHADOW E&M-EST. PATIENT-LVL III: CPT | Mod: PBBFAC,,, | Performed by: OTOLARYNGOLOGY

## 2025-05-08 PROCEDURE — 87102 FUNGUS ISOLATION CULTURE: CPT

## 2025-05-08 PROCEDURE — 3079F DIAST BP 80-89 MM HG: CPT | Mod: CPTII,S$GLB,,

## 2025-05-08 PROCEDURE — 3077F SYST BP >= 140 MM HG: CPT | Mod: CPTII,S$GLB,,

## 2025-05-08 PROCEDURE — 3044F HG A1C LEVEL LT 7.0%: CPT | Mod: CPTII,S$GLB,, | Performed by: OTOLARYNGOLOGY

## 2025-05-08 PROCEDURE — 99214 OFFICE O/P EST MOD 30 MIN: CPT | Mod: 25,S$GLB,, | Performed by: OTOLARYNGOLOGY

## 2025-05-08 PROCEDURE — 1159F MED LIST DOCD IN RCRD: CPT | Mod: CPTII,S$GLB,, | Performed by: OTOLARYNGOLOGY

## 2025-05-08 PROCEDURE — 87205 SMEAR GRAM STAIN: CPT

## 2025-05-08 PROCEDURE — 87075 CULTR BACTERIA EXCEPT BLOOD: CPT

## 2025-05-08 PROCEDURE — 3288F FALL RISK ASSESSMENT DOCD: CPT | Mod: CPTII,S$GLB,,

## 2025-05-08 PROCEDURE — 99214 OFFICE O/P EST MOD 30 MIN: CPT | Mod: S$GLB,,,

## 2025-05-08 PROCEDURE — 3066F NEPHROPATHY DOC TX: CPT | Mod: CPTII,S$GLB,,

## 2025-05-08 PROCEDURE — 99999 PR PBB SHADOW E&M-EST. PATIENT-LVL IV: CPT | Mod: PBBFAC,,,

## 2025-05-08 PROCEDURE — 1101F PT FALLS ASSESS-DOCD LE1/YR: CPT | Mod: CPTII,S$GLB,,

## 2025-05-08 PROCEDURE — 3066F NEPHROPATHY DOC TX: CPT | Mod: CPTII,S$GLB,, | Performed by: OTOLARYNGOLOGY

## 2025-05-08 PROCEDURE — 1160F RVW MEDS BY RX/DR IN RCRD: CPT | Mod: CPTII,S$GLB,, | Performed by: OTOLARYNGOLOGY

## 2025-05-08 PROCEDURE — 31575 DIAGNOSTIC LARYNGOSCOPY: CPT | Mod: S$GLB,,, | Performed by: OTOLARYNGOLOGY

## 2025-05-08 PROCEDURE — 3044F HG A1C LEVEL LT 7.0%: CPT | Mod: CPTII,S$GLB,,

## 2025-05-08 RX ORDER — AMOXICILLIN AND CLAVULANATE POTASSIUM 875; 125 MG/1; MG/1
1 TABLET, FILM COATED ORAL 2 TIMES DAILY
Qty: 20 TABLET | Refills: 0 | Status: SHIPPED | OUTPATIENT
Start: 2025-05-08 | End: 2025-05-18

## 2025-05-08 NOTE — PROGRESS NOTES
Subjective:       Patient ID: Jose Luis Manzo is a 75 y.o. male.    Chief Complaint: Wound Check        Patient presents for a re-evaluation of a left upper extremity wound. He is s/p right AV fistula creation on 10/22/24, left arteriovenous graft insertion  on 1/17/25, and LUE fistulogram on 2/7/25 with Dr. Navarro. He was instructed to dress his wound with wet- to-dry dressings. He reports the gauze would fall into his wound bed which he didn't like. He started dressing his wound with betadine and covered with a bandage. He previously smoked about 1/2- 1 ppd for 15 years. He quit in 2015. Pt followed with Dr. Navarro on 2/24/25. He changed his wound care to daily dressing changes. Pt has been dressing his wound daily with hydrogel. No complaints at this time. Denies fever, chills, erythema, warmth, purulent drainage, or pain.      Review of Systems   Constitutional:  Negative for activity change, chills, diaphoresis, fatigue and fever.   Respiratory:  Negative for apnea, chest tightness and shortness of breath.    Cardiovascular:  Negative for chest pain, palpitations and leg swelling.   Musculoskeletal:  Negative for gait problem and joint swelling.   Skin:  Positive for wound. Negative for color change, pallor and rash.   Neurological:  Negative for syncope, weakness and numbness.   Psychiatric/Behavioral:  Negative for agitation. The patient is not nervous/anxious.    All other systems reviewed and are negative.      Objective:      Physical Exam  Vitals reviewed.   Constitutional:       General: He is not in acute distress.     Appearance: Normal appearance.   Cardiovascular:      Pulses: Normal pulses.   Pulmonary:      Effort: No respiratory distress.   Musculoskeletal:         General: No swelling.   Skin:     General: Skin is warm and dry.      Findings: Wound present. No erythema.          Neurological:      General: No focal deficit present.      Mental Status: He is alert and oriented to person, place, and  time.   Psychiatric:         Mood and Affect: Mood normal.         Behavior: Behavior normal.         Thought Content: Thought content normal.         Judgment: Judgment normal.         Assessment:       1. Open wound of left upper extremity, subsequent encounter    2. Wound cellulitis    3. Former smoker               Wound Other (comment) Left anterior Arm (Active)     Arm   Present on Original Admission:    Primary Wound Type: Other   Side: Left   Orientation: anterior   Wound Approximate Age at First Assessment (Weeks):    Wound Number:    Is this injury device related?:    Incision Type:    Closure Method:    Wound Description (Comments):    Type:    Additional Comments:    Ankle-Brachial Index:    Pulses:    Removal Indication and Assessment:    Wound Outcome:    Wound Image   05/08/25 1455   Dressing Appearance Dry;Intact;Clean;Moist drainage 05/08/25 1455   Drainage Amount Moderate 05/08/25 1455   Drainage Characteristics/Odor Creamy;Purulent 05/08/25 1455   Red (%), Wound Tissue Color 100 % 05/08/25 1455   Periwound Area Intact;[place] 05/08/25 1455   Wound Length (cm) 0.4 cm 05/08/25 1455   Wound Width (cm) 1.3 cm 05/08/25 1455   Wound Depth (cm) 0.1 cm 05/08/25 1455   Wound Volume (cm^3) 0.027 cm^3 05/08/25 1455   Wound Surface Area (cm^2) 0.41 cm^2 05/08/25 1455   Care Cleansed with:;Soap and water 05/08/25 1455   Dressing Applied;Foam;[place]/border 05/08/25 1455   Periwound Care Skin barrier film applied 05/08/25 1455       Jose Luis was seen in the clinic room and placed in the supine position on the treatment table.  The dressing was removed and the area was cleansed with Easi-clense sponges and dried thoroughly. No odor, erythema, or warmth noted.  Purulent drainage noted. Cultured wound.      Plan of Care: Skin prep to periwound, hydrofera blue to wound bed, and covered with an aquacel border dressing.         Plan:         Orders Placed This Encounter   Procedures    Fungus culture     Send normal result  to authorizing provider's In Basket if patient is active on MyChart::   Yes    Culture, Anaerobic     Send normal result to authorizing provider's In Basket if patient is active on MyChart::   Yes    Gram stain     Send normal result to authorizing provider's In Basket if patient is active on MyChart::   Yes    Aerobic culture     Send normal result to authorizing provider's In Basket if patient is active on MyChart::   Yes     Discussed with Dr. Navarro who recommends surgical exploration/ excision and Augmentin. Prescribed Augmentin. Discussed side effects of medication. Instructed patient to take medication as prescribed.   Dr. Navarro spoke with patient. Pt is immunosuppressed and has current infection. He will excise the graft. Risk is bleeding and infection. Consents obtained.    Cultured wound. Ordered aerobic culture, anaerobic culture, gram stain, and fungal culture.    Left upper extremity was dressed as detailed above.  Patient was instructed to not get the dressings wet and to use cast covers for showering.  Should the dressing become wet, he is to remove it, place a moist dressing over the wound, cover with gauze and roll gauze and to secure bandages.  He should then notify this office as soon as possible to have a new dressing applied.    Discussed nutrition and the role of protein in wound healing with the patient. Instructed patient to optimize protein for wound healing within CKD restrictions.    Instructed patient to keep follow ups with vascular surgery    Written and verbal instructions given to patient  RTC in 1 week, or sooner if needed      Carmen Lowry PA-C

## 2025-05-10 LAB
BACTERIA SPEC AEROBE CULT: ABNORMAL
BACTERIA SPEC ANAEROBE CULT: NORMAL

## 2025-05-12 DIAGNOSIS — L03.90 WOUND CELLULITIS: Primary | ICD-10-CM

## 2025-05-12 RX ORDER — DOXYCYCLINE HYCLATE 100 MG
100 TABLET ORAL EVERY 12 HOURS
Qty: 28 TABLET | Refills: 0 | Status: SHIPPED | OUTPATIENT
Start: 2025-05-12 | End: 2025-05-26

## 2025-05-12 NOTE — PROGRESS NOTES
Called patient to discuss wound cultures. Cultures grew MSSA. Instructed patient to stop Augmentin. Prescribed doxycyline. Discussed side effects of medication. Instructed patient to take medication as prescribed.     Carmen Lowry PA-C   How Severe Is Your Acne?: mild Is This A New Presentation, Or A Follow-Up?: Acne

## 2025-05-12 NOTE — PROGRESS NOTES
Chief Complaint   Patient presents with    Follow-up     Treatment History  1. 2015: XRT for SCCA larynx ([facility]).  2. 10/2/17: DL and biopsy TVC.  Path revealed severe dysplasia/CIS/superficially invasive SCCA (Dr. Lizet Desouza).  3. 12/21/17: MSL with resection R TVC lesion, biopsy L TVC lesion.  Path revealed severe dysplasia. (Dr. Gibran Smith).  4. 3/1/17: MSL with laser resection L TVC.  Path benign. (Dr. Smith).    HPI   75 y.o. male presents with the above treatment history. No new complaints since last visit. Recent PET clear.    Review of Systems   Constitutional: Negative for fatigue and unexpected weight change.   HENT: Per HPI.  Eyes: Negative for visual disturbance.   Respiratory: Negative for shortness of breath, hemoptysis   Cardiovascular: Negative for chest pain and palpitations.   Musculoskeletal: Negative for decreased ROM, back pain.   Skin: Negative for rash, sunburn, itching.   Neurological: Negative for dizziness and seizures.   Hematological: Negative for adenopathy. Does not bruise/bleed easily.   Endocrine: Negative for rapid weight loss/weight gain, heat/cold intolerance.     Past Medical History   Patient Active Problem List   Diagnosis    Stage 4 chronic kidney disease    Essential hypertension    Mixed hyperlipidemia    Benign prostatic hyperplasia with nocturia    Claudication of right lower extremity    Eye swelling, bilateral    Diminished night vision    Renal transplant recipient    Dermolipoma    History of DVT (deep vein thrombosis)    Iron deficiency anemia    Pulmonary nodules    Larynx cancer    Voice disturbance    Atherosclerosis of aorta    Carcinoma in situ of vocal cord    Dysphagia, pharyngoesophageal    Chondronecrosis of larynx    Squamous cell carcinoma    Obesity (BMI 30-39.9)    Pterygium of both eyes    History of cancer of larynx    Nodule of left lung    Granulomatous lung disease    Transplanted kidney - 2007    Hypertensive kidney disease with stage 4 chronic  kidney disease    Hypercalcemia    Secondary hyperparathyroidism of renal origin    Benign tumor of orbit    CHANG (acute kidney injury)    Metabolic bone disease    Immunosuppressive management encounter following kidney transplant    Hyperphosphatemia    Vitamin D deficiency disease    History of COVID-19    SOB (shortness of breath)    Metabolic acidosis    Proteinuria    Anticoagulant long-term use    Pre-op evaluation    Benign prostatic hyperplasia with lower urinary tract symptoms    Centrilobular emphysema    Dependence on renal dialysis    Class 1 obesity due to excess calories with serious comorbidity and body mass index (BMI) of 34.0 to 34.9 in adult    Former cigarette smoker    Deformity of toenail    Complicated UTI (urinary tract infection)    Moderate malnutrition    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    ESRD (end stage renal disease)    Edema of upper extremity    Problem with vascular access    Infected wound    PAD (peripheral artery disease)    Pre-operative examination    Type 2 diabetes mellitus with stage 4 chronic kidney disease, without long-term current use of insulin    Congestive heart failure, unspecified HF chronicity, unspecified heart failure type           Past Surgical History   Past Surgical History:   Procedure Laterality Date    AV FISTULA PLACEMENT Right 10/22/2024    Procedure: CREATION, AV FISTULA;  Surgeon: Guanaco Navarro MD;  Location: [address];  Service: Vascular;  Laterality: Right;  RUE AVF creation    CYSTOSCOPY W/ RETROGRADES Bilateral 6/15/2022    Procedure: Cystoscopy, bilateral retrograde pyelogram, and all indicated procedures;  Surgeon: Veronica Bacon MD;  Location: [facility];  Service: Urology;  Laterality: Bilateral;    FISTULOGRAM Left 2/7/2025    Procedure: Fistulogram;  Surgeon: Guanaco Navarro MD;  Location: [address];  Service: Vascular;  Laterality: Left;  R arm graft access, central  "venogram; fluoro time: 4.4min,  mGy: 75.36  Gycm2: 15.3530  contrast: 26ml    FRACTURE SURGERY Left     "lower leg" 40 years ago    INSERTION, GRAFT, ARTERIOVENOUS, UPPER EXTREMITY Left 2025    Procedure: INSERTION, GRAFT, ARTERIOVENOUS, UPPER EXTREMITY;  Surgeon: Guanaco Navarro MD;  Location: [address];  Service: Vascular;  Laterality: Left;    KIDNEY TRANSPLANT      followed by [facility]    LARYNX SURGERY  2014    Oropharyngeal Cancer x3    LIGATION OF ARTERIOVENOUS FISTULA Left 2025    Procedure: LIGATION, AV FISTULA;  Surgeon: Guanaco Navarro MD;  Location: [facility] OR [address] [place] [address];  Service: Vascular;  Laterality: Left;    microlaryngoscopy      PHLEBOGRAPHY Bilateral 2022    Procedure: Venogram;  Surgeon: KENIA Mueller II, MD;  Location: [facility] CATH LAB;  Service: Vascular;  Laterality: Bilateral;    SURGICAL REMOVAL OF LESION OF ORBIT Bilateral 2020    Procedure: EXCISION, LESION, ORBIT;  Surgeon: Linda Tee MD;  Location: [facility] OR [address] [place] [address];  Service: Ophthalmology;  Laterality: Bilateral;    TIBIAL STAPLING Left               Family History   Family History   Problem Relation Name Age of Onset    Stroke Mother      Diabetes Mother      Hypertension Mother      Stroke Father      No Known Problems Sister      No Known Problems Brother x1     No Known Problems Daughter      No Known Problems Brother x1            Social History   .  Social History     Socioeconomic History    Marital status: Single    Number of children: 1   Tobacco Use    Smoking status: Former     Current packs/day: 0.00     Average packs/day: 1 pack/day for 20.0 years (20.0 ttl pk-yrs)     Types: Cigarettes     Start date:      Quit date: 2009     Years since quittin.3     Passive exposure: Past    Smokeless tobacco: Never   Substance and Sexual Activity    Alcohol use: No     Alcohol/week: 0.0 standard drinks of alcohol    Drug use: No     Types: Marijuana     Comment: Occ.    Sexual " activity: Not Currently     Partners: Female     Social Drivers of Health     Financial Resource Strain: Patient Declined (2/8/2025)    Overall Financial Resource Strain (CARDIA)     Difficulty of Paying Living Expenses: Patient declined   Food Insecurity: Patient Declined (2/8/2025)    Hunger Vital Sign     Worried About Running Out of Food in the Last Year: Patient declined     Ran Out of Food in the Last Year: Patient declined   Transportation Needs: Patient Declined (2/8/2025)    TRANSPORTATION NEEDS     Transportation : Patient declined   Physical Activity: Inactive (9/3/2024)    Exercise Vital Sign     Days of Exercise per Week: 0 days     Minutes of Exercise per Session: 0 min   Stress: Patient Declined (2/8/2025)    [employer] - Occupational Stress Questionnaire     Feeling of Stress : Patient declined   Housing Stability: Patient Declined (2/8/2025)    Housing Stability Vital Sign     Unable to Pay for Housing in the Last Year: Patient declined     Homeless in the Last Year: Patient declined         Allergies   Review of patient's allergies indicates:  No Known Allergies        Physical Exam     There were no vitals filed for this visit.        There is no height or weight on file to calculate BMI.    General: AOx3, NAD   Respiratory:  Symmetric chest rise, normal effort  Nose: No gross nasal septal deviation. Inferior Turbinates WNL bilaterally. No septal perforation. No masses/lesions.   Oral Cavity:  Oral Tongue mobile, no lesions noted. Hard Palate WNL. No buccal or FOM lesions.  Oropharynx:  No masses/lesions of the posterior pharyngeal wall. Tonsillar fossa without lesions. Soft palate without masses. Midline uvula.   Neck: No scars. Mild fibrosis status post XRT.  No cervical lymphadenopathy, thyromegaly or thyroid nodules.  Normal range of motion.    Face: House Brackmann I bilaterally.     Flex Naso Heike Hypo Procedures #2     Procedure:  Diagnostic flexible  nasopharyngoscopy, laryngoscopy and hypopharyngoscopy:     Routine preparation with local atomizer with 1% neosynephrine/pontocaine with customary flexible endoscope.     Nasopharynx:  No lesions.        Mucosa:  No lesions.        Adenoids:  Present.  Posterior Choanae:  Patent.  Eustachian Tubes:  Patent.  Posterior pharynx:  No lesions.  Larynx/hypopharynx:        Epiglottis:  No lesions.         AE Folds:  No lesions.        Vocal cords:  Diffuse nodularity noted bilaterally.        Mobility:  R TVC slightly limited, L TVC immobile.         Hypopharynx:  No lesions.        Piriform sinus:  No pooling, no lesions.        Post Cricoid:  No erythema, no edema.    Exam difficult due to gag.    PET/CT reviewed.    Assessment/Plan  Problem List Items Addressed This Visit          Oncology    Larynx cancer - Primary    TONY.  Recent PET clear.  RTC 6 mos.

## 2025-05-14 ENCOUNTER — TELEPHONE (OUTPATIENT)
Dept: VASCULAR SURGERY | Facility: CLINIC | Age: 75
End: 2025-05-14
Payer: MEDICARE

## 2025-05-14 ENCOUNTER — PATIENT MESSAGE (OUTPATIENT)
Dept: FAMILY MEDICINE | Facility: CLINIC | Age: 75
End: 2025-05-14
Payer: MEDICARE

## 2025-05-14 ENCOUNTER — ANESTHESIA EVENT (OUTPATIENT)
Dept: SURGERY | Facility: HOSPITAL | Age: 75
End: 2025-05-14
Payer: MEDICARE

## 2025-05-14 NOTE — PRE-PROCEDURE INSTRUCTIONS
PreOp Instructions given:     -- Medication information (what to hold and what to take)   -- NPO guidelines as follows: (or as per your Surgeon)  Stop ALL solid food, gum, candy 8 hours before arrival time.  Stop all CLOUDY liquids: coffee with creamer, cloudy juices, 8 hours prior to arrival time.  The patient should be ENCOURAGED to drink carbohydrate-rich clear liquids (sports drinks, clear juices) until 2 hours prior to arrival time.  Stop clear liquids 2 hours prior to arrival time.  CLEAR liquids include water, black coffee NO creamer, clear oral rehydration drinks, clear sports drinks and clear fruit juices (no orange juice, no pulpy juices, no apple cider).   IF IN DOUBT, drink water instead.   NOTHING TO DRINK 2 hours before to surgery/procedure  time. If you are told to take medication on the morning of surgery, it may be taken with a sip of water.   -- *Arrival place and directions given*.  Time to be given the day before procedure by the Surgeon's Office   -- Bathe with antibacterial soap (dial or Hibiclens as instructed)  -- Don't wear any jewelry or valuables and not metals on skin or hair AM of surgery   -- No makeup or moisturizer to face   -- No perfume/cologne, powder, lotions, aftershave or deodorant     Pt verbalized understanding.            *If going to , see below:      Directions and Instructions for [facility] [place]   At [facility], we have an outstanding team of physicians, anesthesiologists, CRNAs, Registered Nurses, Surgical Technologists, and other ancillary team members all focused on your surgical and procedural care.   Before Your Procedure:   The physician's office will call you with a specific arrival time and directions a day or two before your scheduled procedure. You may also receive these instructions through your [facility] portal.   Day of Procedure:   Please be sure to arrive at the arrival time given or you may risk your surgery being delayed  or canceled. The arrival time is earlier than your scheduled surgery or procedure time. In the winter months please dress warm and bring blankets for you or your child as the waiting room may be cold. If you have difficulty locating the facility, please give us a call at [phone].   Directions:   The [facility] is located on the 1st floor of the hospital building near the [place] entrance.   Parking:   You will park in the [facility] Parking Garage (note location on map). [facility] opens at 5:00 a.m. and has a drop off area by the entrance.  parking is available starting at 7:00 a.m. Please see below for further  parking instructions.   Directions from the parking garage elevators   Blue [facility] Elevators: From the parking garage, take the blue [place] elevators (located in the center of the parking garage) to the 1st floor of the garage. You will then take a right once off the elevators then another right to the outside of the parking garage. You will be across from the [place]. You will walk down the sidewalk, pass the  curve at the [place] entrance and continue to follow the sidewalk. You will pass the radiation oncology entrance on your right. Continue to follow the sidewalk to the [facility] glass door entrance.   Hospital Entrance (Inside Route): If a mostly inside route is preferred: Take the inside elevator bank (located at the far north end of the garage) from the parking garage to the 1st floor. On the 1st floor walk past [employer]. Keep walking down the center of the hallway towards the hospital elevators. Once you reach the red brick radha, take a left and go past the hospital elevators. Take another left and follow the blue and white [place] signs around the hallway to the end. Go outside of the door. You will see the [facility] entrance to your right.   Drop Off:   There is a drop off area  at the doors of the [facility] for your convenience. If utilized for pediatric patients, an adult must accompany the patient into the surgery center while another adult givens the vehicle.   [place] (at 7:00 a.m.):   Upon check-in, please let the  know that you are utilizing [employer] parking which is free. The . will then call  for your car to be picked up. Your keys and phone number will be collected and given to [employer] services. You will then be given a ticket. Upon discharge,  will be notified to bring your vehicle back when you are ready.           Directions to [facility] [place]:  [phone]     From 1st floor garage elevators: go past \[facility]\"" [employer] shop. Look for Black piano on side of coffee shop. Take Atrium (gold) elevator by piano up to 2nd floor. When you exit elevator follow long hallway (you should see a sign hanging from the ceiling that says Day of Surgery Family Waiting Room. When hallway ends you will be entering the day of surgery waiting area. Check in at the desk for your procedure.      From [facility] entrance: Make a right after you enter the door to the hospital. On your Left, take [place] elevator to 2nd floor. Check in at desk      From Lab desk on 2nd floor: Exit lab area toward hospital atrium. You should see a sign that says Day of Surgery Family Waiting Area. Make a Left and follow long hallway (you should see a sign hanging from the ceiling that says Day of Surgery Family Waiting Room. When hallway ends you will be entering the day of surgery waiting area. Check in at the desk for your procedure.

## 2025-05-14 NOTE — TELEPHONE ENCOUNTER
Pt reports last dose of Eliquis being takne on Sunday 05/11/25 in preparation for AVG excision with Dr. Navarro tomorrow 5/15/25.

## 2025-05-15 ENCOUNTER — HOSPITAL ENCOUNTER (OUTPATIENT)
Facility: HOSPITAL | Age: 75
Discharge: HOME | End: 2025-05-15
Attending: SURGERY | Admitting: SURGERY
Payer: MEDICARE

## 2025-05-15 ENCOUNTER — ANESTHESIA (OUTPATIENT)
Dept: SURGERY | Facility: HOSPITAL | Age: 75
End: 2025-05-15
Payer: MEDICARE

## 2025-05-15 VITALS
BODY MASS INDEX: 30.64 KG/M2 | SYSTOLIC BLOOD PRESSURE: 139 MMHG | HEART RATE: 44 BPM | WEIGHT: 214 LBS | TEMPERATURE: 97 F | RESPIRATION RATE: 21 BRPM | DIASTOLIC BLOOD PRESSURE: 70 MMHG | OXYGEN SATURATION: 95 % | HEIGHT: 70 IN

## 2025-05-15 DIAGNOSIS — T82.7XXA INFECTION OF AV GRAFT FOR DIALYSIS: Primary | ICD-10-CM

## 2025-05-15 DIAGNOSIS — T82.898S ARTERIOVENOUS FISTULA OCCLUSION, SEQUELA: ICD-10-CM

## 2025-05-15 LAB
ACID FAST MOD KINY STN SPEC: NORMAL
GRAM STN SPEC: NORMAL

## 2025-05-15 PROCEDURE — 36000707: Performed by: SURGERY

## 2025-05-15 PROCEDURE — 36000706: Performed by: SURGERY

## 2025-05-15 PROCEDURE — 87205 SMEAR GRAM STAIN: CPT | Mod: 91 | Performed by: SURGERY

## 2025-05-15 PROCEDURE — 71000044 HC DOSC ROUTINE RECOVERY FIRST HOUR: Performed by: SURGERY

## 2025-05-15 PROCEDURE — 88304 TISSUE EXAM BY PATHOLOGIST: CPT | Mod: TC,59 | Performed by: SURGERY

## 2025-05-15 PROCEDURE — 87102 FUNGUS ISOLATION CULTURE: CPT | Performed by: SURGERY

## 2025-05-15 PROCEDURE — 87206 SMEAR FLUORESCENT/ACID STAI: CPT | Performed by: SURGERY

## 2025-05-15 PROCEDURE — 63600175 PHARM REV CODE 636 W HCPCS

## 2025-05-15 PROCEDURE — 63600175 PHARM REV CODE 636 W HCPCS: Performed by: NURSE ANESTHETIST, CERTIFIED REGISTERED

## 2025-05-15 PROCEDURE — 71000015 HC POSTOP RECOV 1ST HR: Performed by: SURGERY

## 2025-05-15 PROCEDURE — 63600175 PHARM REV CODE 636 W HCPCS: Performed by: ANESTHESIOLOGY

## 2025-05-15 PROCEDURE — 37000009 HC ANESTHESIA EA ADD 15 MINS: Performed by: SURGERY

## 2025-05-15 PROCEDURE — 64415 NJX AA&/STRD BRCH PLXS IMG: CPT | Mod: 59,LT,, | Performed by: ANESTHESIOLOGY

## 2025-05-15 PROCEDURE — 87070 CULTURE OTHR SPECIMN AEROBIC: CPT | Performed by: SURGERY

## 2025-05-15 PROCEDURE — 25000003 PHARM REV CODE 250

## 2025-05-15 PROCEDURE — 63600175 PHARM REV CODE 636 W HCPCS: Performed by: SURGERY

## 2025-05-15 PROCEDURE — 87075 CULTR BACTERIA EXCEPT BLOOD: CPT | Performed by: SURGERY

## 2025-05-15 PROCEDURE — 37000008 HC ANESTHESIA 1ST 15 MINUTES: Performed by: SURGERY

## 2025-05-15 PROCEDURE — 25000003 PHARM REV CODE 250: Performed by: NURSE ANESTHETIST, CERTIFIED REGISTERED

## 2025-05-15 RX ORDER — HEPARIN SODIUM 200 [USP'U]/100ML
INJECTION, SOLUTION INTRAVENOUS
Status: COMPLETED | OUTPATIENT
Start: 2025-05-15 | End: 2025-05-15

## 2025-05-15 RX ORDER — SODIUM CHLORIDE 9 MG/ML
INJECTION, SOLUTION INTRAVENOUS CONTINUOUS
Status: DISCONTINUED | OUTPATIENT
Start: 2025-05-15 | End: 2025-05-15 | Stop reason: HOSPADM

## 2025-05-15 RX ORDER — LIDOCAINE HYDROCHLORIDE 10 MG/ML
INJECTION, SOLUTION INFILTRATION; PERINEURAL
Status: DISCONTINUED | OUTPATIENT
Start: 2025-05-15 | End: 2025-05-15 | Stop reason: HOSPADM

## 2025-05-15 RX ORDER — FENTANYL CITRATE 50 UG/ML
25 INJECTION, SOLUTION INTRAMUSCULAR; INTRAVENOUS EVERY 5 MIN PRN
Status: DISCONTINUED | OUTPATIENT
Start: 2025-05-15 | End: 2025-05-15 | Stop reason: HOSPADM

## 2025-05-15 RX ORDER — FENTANYL CITRATE 50 UG/ML
INJECTION, SOLUTION INTRAMUSCULAR; INTRAVENOUS
Status: DISCONTINUED | OUTPATIENT
Start: 2025-05-15 | End: 2025-05-15

## 2025-05-15 RX ORDER — LIDOCAINE HYDROCHLORIDE 20 MG/ML
INJECTION INTRAVENOUS
Status: DISCONTINUED | OUTPATIENT
Start: 2025-05-15 | End: 2025-05-15

## 2025-05-15 RX ORDER — MIDAZOLAM HYDROCHLORIDE 1 MG/ML
.5-4 INJECTION, SOLUTION INTRAMUSCULAR; INTRAVENOUS
Status: DISCONTINUED | OUTPATIENT
Start: 2025-05-15 | End: 2025-05-15 | Stop reason: HOSPADM

## 2025-05-15 RX ORDER — SODIUM CHLORIDE 0.9 % (FLUSH) 0.9 %
10 SYRINGE (ML) INJECTION
Status: DISCONTINUED | OUTPATIENT
Start: 2025-05-15 | End: 2025-05-15 | Stop reason: HOSPADM

## 2025-05-15 RX ORDER — FENTANYL CITRATE 50 UG/ML
25-200 INJECTION, SOLUTION INTRAMUSCULAR; INTRAVENOUS
Status: DISCONTINUED | OUTPATIENT
Start: 2025-05-15 | End: 2025-05-15 | Stop reason: HOSPADM

## 2025-05-15 RX ORDER — PROPOFOL 10 MG/ML
VIAL (ML) INTRAVENOUS
Status: DISCONTINUED | OUTPATIENT
Start: 2025-05-15 | End: 2025-05-15

## 2025-05-15 RX ORDER — GLUCAGON 1 MG
1 KIT INJECTION
Status: DISCONTINUED | OUTPATIENT
Start: 2025-05-15 | End: 2025-05-15 | Stop reason: HOSPADM

## 2025-05-15 RX ORDER — VASOPRESSIN 20 [USP'U]/ML
INJECTION, SOLUTION INTRAMUSCULAR; SUBCUTANEOUS
Status: DISCONTINUED | OUTPATIENT
Start: 2025-05-15 | End: 2025-05-15

## 2025-05-15 RX ORDER — HALOPERIDOL LACTATE 5 MG/ML
0.5 INJECTION, SOLUTION INTRAMUSCULAR EVERY 10 MIN PRN
Status: DISCONTINUED | OUTPATIENT
Start: 2025-05-15 | End: 2025-05-15 | Stop reason: HOSPADM

## 2025-05-15 RX ORDER — ACETAMINOPHEN 500 MG
1000 TABLET ORAL ONCE
Status: COMPLETED | OUTPATIENT
Start: 2025-05-15 | End: 2025-05-15

## 2025-05-15 RX ADMIN — MEPIVACAINE HYDROCHLORIDE 40 ML: 15 INJECTION, SOLUTION EPIDURAL; INFILTRATION at 07:05

## 2025-05-15 RX ADMIN — FENTANYL CITRATE 50 MCG: 50 INJECTION, SOLUTION INTRAMUSCULAR; INTRAVENOUS at 07:05

## 2025-05-15 RX ADMIN — FENTANYL CITRATE 25 MCG: 50 INJECTION, SOLUTION INTRAMUSCULAR; INTRAVENOUS at 07:05

## 2025-05-15 RX ADMIN — ACETAMINOPHEN 1000 MG: 500 TABLET ORAL at 06:05

## 2025-05-15 RX ADMIN — LIDOCAINE HYDROCHLORIDE 60 MG: 20 INJECTION INTRAVENOUS at 07:05

## 2025-05-15 RX ADMIN — PROPOFOL 20 MG: 10 INJECTION, EMULSION INTRAVENOUS at 07:05

## 2025-05-15 RX ADMIN — PROPOFOL 50 MCG/KG/MIN: 10 INJECTION, EMULSION INTRAVENOUS at 07:05

## 2025-05-15 RX ADMIN — SODIUM CHLORIDE: 0.9 INJECTION, SOLUTION INTRAVENOUS at 07:05

## 2025-05-15 RX ADMIN — VASOPRESSIN 1 UNITS: 20 INJECTION INTRAVENOUS at 08:05

## 2025-05-15 RX ADMIN — Medication 2 G: at 07:05

## 2025-05-15 RX ADMIN — LIDOCAINE HYDROCHLORIDE 75 MG: 20 INJECTION INTRAVENOUS at 07:05

## 2025-05-15 NOTE — OP NOTE
[facility]  General Surgery  Operative Note    DATE: 5/15/2025    SURGEON(S) AND ROLE:  Surgeons and Role:     * Guanaco Navarro MD - Primary     * Christianne Mcgee MD - Resident - Assisting    PREOPERATIVE DIAGNOSIS: Infection of AV graft for dialysis [T82.7XXA]    POSTOPERATIVE DIAGNOSIS: Infection of AV graft for dialysis [T82.7XXA]    PROCEDURE PERFORMED: Procedure(s) (LRB):  excision, REMOVAL, GRAFT, ARTERIOVENOUS (Left)    ANESTHESIA: regional block; MAC.     ESTIMATED BLOOD LOSS: 2 mL.     SPECIMEN: infected AV graft    COMPLICATIONS: None.      INDICATION FOR PROCEDURE: This patient presents with a history of ESRD on HD. He presents with infected AV graft.    OPERATIVE FINDINGS: infected AV graft    OPERATIVE PROCEDURE: The patient was identified in Preoperative Holding and brought back to the Operating Room. Placed supine on the operating table and padded appropriately.  The patient's left arm was prepped and draped in the standard sterile surgical fashion. A time-out was performed and all team members present agreed this was the correct procedure on the correct patient. We also confirmed administration of appropriate preoperative antibiotics.    The area of purulent drainage was covered with a gauze and Tegaderm.  Distal longitudinal incision was made overlying graft just proximal to the antecubital fossa.  The graft was circumferentially dissected.  Proximal and distal control of the arterial side was achieved with hemostats and graft was incised between.  6-0 Prolene suture was used to over-sew the proximal portion.  Silk suture was used to suture ligate the distal portion.  Hemostasis was achieved an additional longitudinal incision was made further proximal up the arm.  Venous portion of the graft was encountered and circumferentially dissected.  Proximal and distal control of the venous side was achieved with hemostats and graft was incised here again between.  The proximal and  distal portions of the graft were then suture ligated with silk suture.  These 2 incisions were irrigated. Hemostasis was achieved. The wounds were closed with 3-0 Vicryl and 4-0 Monocryl.  Dermabond was applied.  Sterile dressings were applied.    We then turned our attention towards the opening with purulent drainage.  A transverse incision was made over this area.  Purulence was encountered and wound cultures were taken and sent.  The graft was circumferentially dissected proximally and distally and the infected graft was pulled and removed. Hemostasis was achieved.  The wound was then irrigated.  A Betadine soaked gauze was packed in his wound.  Gauze and Tegaderm was applied over this area.    The patient was awoken in the Operating Room and transported to the Recovery Room in stable condition. All sponge, instrument and needle counts were correct at the end of the case.     Christianne Mcgee M.D.  General Surgery PGY-III  [facility]

## 2025-05-15 NOTE — TRANSFER OF CARE
"Anesthesia Transfer of Care Note    Patient: Jose Luis Manzo    Procedure(s) Performed: Procedure(s) (LRB):  excision, REMOVAL, GRAFT, ARTERIOVENOUS (Left)    Patient location: [facility]    Anesthesia Type: regional    Transport from OR: Transported from OR on 6-10 L/min O2 by face mask with adequate spontaneous ventilation    Post pain: adequate analgesia    Post assessment: no apparent anesthetic complications and tolerated procedure well    Post vital signs: stable    Level of consciousness: sedated    Nausea/Vomiting: no nausea/vomiting    Complications: none    Transfer of care protocol was followed      Last vitals: Visit Vitals  BP (!) 161/79 (BP Location: Right arm, Patient Position: Lying)   Pulse (!) 50   Temp 36.3 °C (97.3 °F) (Temporal)   Resp 20   Ht 5' 10" (1.778 m)   Wt 97.1 kg (214 lb)   SpO2 98%   BMI 30.71 kg/m²     "

## 2025-05-15 NOTE — DISCHARGE INSTRUCTIONS
Dermabond (skin glue)- top and bottom wounds. It's shiny in appearance. Ok to shower in 48 hours. Do not scrub or pick this off. It will fall off in 7-10 days.    Priority is the middle incision- gauze soaked in yellow/brown solution.    Take everything off on Saturday. Peel off the clear film and take the gauze out of the wound. Replace with clean gauze. You do not need a cleaning solution on the gauze.    Clean wound 3x a day for 5 minutes with soap/water. Replace gauze inside wound and tape over it.

## 2025-05-15 NOTE — PLAN OF CARE
Stephanie PHILLIPS at pt bedside, made aware of ST elevation in pt HR. States previous EKG in October was similar and no new EKG needed.

## 2025-05-15 NOTE — ANESTHESIA PROCEDURE NOTES
Peripheral Block- Supraclav SS    Patient location during procedure: OR   Block not for primary anesthetic.  Reason for block: at surgeon's request and post-op pain management   Post-op Pain Location: Left arm pain   Start time: 5/15/2025 7:10 AM  Timeout: 5/15/2025 7:10 AM   End time: 5/15/2025 7:18 AM    Staffing  Authorizing Provider: Patricia Mtz MD  Performing Provider: Darrell Son MD    Staffing  Performed by: Darrell Son MD  Authorized by: Patricia Mtz MD    Preanesthetic Checklist  Completed: patient identified, IV checked, site marked, risks and benefits discussed, surgical consent, monitors and equipment checked, pre-op evaluation and timeout performed  Peripheral Block  Patient position: supine  Prep: ChloraPrep  Patient monitoring: heart rate, cardiac monitor, continuous pulse ox, continuous capnometry and frequent blood pressure checks  Block type: supraclavicular  Laterality: left  Injection technique: single shot  Needle  Needle type: Echogenic   Needle gauge: 20 G  Needle length: 4 in  Needle localization: anatomical landmarks and ultrasound guidance   -ultrasound image captured on disc.  Assessment  Injection assessment: negative aspiration, negative parasthesia and local visualized surrounding nerve  Paresthesia pain: none  Heart rate change: no  Slow fractionated injection: yes  Pain Tolerance: comfortable throughout block and no complaints  Medications:    Medications: mepivacaine (CARBOCAINE) injection 15 mg/mL (1.5%) - Perineural   40 mL - 5/15/2025 7:15:00 AM    Additional Notes  VSS.  CRNA monitoring vitals throughout procedure in OR.  Patient tolerated procedure well. 30 mL injected at supraclavicular site, 10 mL injected for intercostobrachial block on left upper extremity.

## 2025-05-15 NOTE — BRIEF OP NOTE
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 Wound left open and packed with betadine soaked gauze.    Estimated Blood Loss: 2 cc         Specimens:   Specimen (24h ago, onward)       Start     Ordered    05/15/25 0859  Specimen to Pathology Other  RELEASE UPON ORDERING        References:    Click here for ordering Quick Tip   Question:  Release to patient  Answer:  Immediate    05/15/25 0859                    ID Type Source Tests Collected by Time Destination   1 : infected left av graft Tissue AV Graft, Left SPECIMEN TO PATHOLOGY Guanaco Navarro MD 5/15/2025 0859    A : arm culture 1 Wound Arm, Left CULTURE, ANAEROBIC, GRAM STAIN, DIRECT AFB STAIN, CULTURE, AEROBIC  (SPECIFY SOURCE) Guanaco Navarro MD 5/15/2025 0823    B : arm culture 2 Wound Arm, Left CULTURE, ANAEROBIC, CULTURE, FUNGUS, GRAM STAIN, CULTURE, AEROBIC  (SPECIFY SOURCE) Guanaco Navarro MD 5/15/2025 0824            Discharge Note    OUTCOME: Patient tolerated treatment/procedure well without complication and is now ready for discharge.    DISPOSITION: Home or Self Care    FINAL DIAGNOSIS:  <principal problem not specified>    FOLLOWUP: In clinic    DISCHARGE INSTRUCTIONS:    Discharge Procedure Orders   Diet general     Other restrictions (specify):   Order Comments: No heavy lifting over 10lbs for 6 weeks     Wound care routine (specify)   Order Comments: WOUND CARE  You have skin glue over your incision(s); this will slowly flake away over the next few weeks.  -- Ok to shower; however, no  baths or submerging in water (I.e. swimming, submerging in water) for at least two weeks.  -- Please keep the incision clean with soap and water, pat your incision dry, do not scrub hard over your incisions.  -- You have packing in your middle incision of your arm; this needs to be removed after 2 days (on Saturday)  --- Please put a gauze in this wound daily after washing with warm soapy water-->should wash the wound 3 times per day for at least 5 minutes each time    MEDICATIONS AND PAIN CONTROL  -- Please resume all home medications as instructed and take any newly prescribed medications.  -- Most people find that over-the-counter pain medications (Tylenol combined with Ibuprofen) will be sufficient for most pain control.  -- If you're taking prescription narcotics, do not drive or operate heavy machinery. Do not drive if your pain is not controlled enough for you to react quickly safely.  -- Take a stool softener with narcotics medications to prevent constipation.    OTHER INSTRUCTIONS  -- Monitor for temp > 101 F, bleeding, redness, purulent drainage, or any sudden, new extreme pain. If any occur, please call our clinic or go to the emergency department if after normal business hours.  -- You may resume your regular diet as tolerated.   -- Follow up with Dr. Navarro in 1 weeks in clinic for a post-op check. If no appointment is made within the week, please call the clinic to schedule.     Call MD for:  temperature >100.4     Call MD for:  persistent nausea and vomiting     Call MD for:  severe uncontrolled pain     Call MD for:  difficulty breathing, headache or visual disturbances     Call MD for:  redness, tenderness, or signs of infection (pain, swelling, redness, odor or green/yellow discharge around incision site)     Call MD for:  hives     Call MD for:  persistent dizziness or light-headedness     Call MD for:  extreme fatigue

## 2025-05-15 NOTE — PLAN OF CARE
Preprocedure completed at 0558; pt needs US guided IV placement. MD George with Anesthesia made aware.

## 2025-05-15 NOTE — H&P
FOCUSED SURGICAL H&P    Jose Luis Manzo is a 75 y.o. male. MRN is [id number].    CC: Here today for the following surgical procedure(s):  excision, REMOVAL, GRAFT, ARTERIOVENOUS (Left)    HPI: For a detailed history of the patients history of present illness please refer to the last progress note. In brief, this is a 75 y.o. male with a known history of L AVG failure, here today for surgical intervention. There has been no recent changes in the patients health, including fevers, chest pain, or shortness of breath, and no new medications have been started. The patient has not had anything to eat or drink for the last 8 hours. The patient has held all blood thinners for 4 days.        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    Congestive heart failure, unspecified HF chronicity, unspecified heart failure type 4/10/2025    COVID-19 virus detected 12/30/2020    Dependence on renal dialysis 4/18/2023    Disorder of kidney and ureter     Hyperkalemia 1/1/2021    Hyperlipidemia     Hypertension     Hypokalemia 6/23/2021    Immunosuppression 6/26/2021    Immunosuppression due to chronic steroid use 1/30/2020    Microcytic anemia 9/16/2016    Obesity (BMI 30-39.9) 1/23/2019    Oropharyngeal cancer     Pterygium     Squamous cell carcinoma 4/25/2018    Thromboembolic disorder     Type 2 diabetes mellitus with stage 4 chronic kidney disease, without long-term current use of insulin 4/10/2025    Unspecified disorder of kidney and ureter        Past Surgical History:   Past Surgical History:   Procedure Laterality Date    AV FISTULA PLACEMENT Right 10/22/2024    Procedure: CREATION, AV FISTULA;  Surgeon: Guanaco Navarro MD;  Location: [facility] OR [address] [place] [address];  Service: Vascular;  Laterality: Right;  RUE  "AVF creation    CYSTOSCOPY W/ RETROGRADES Bilateral 6/15/2022    Procedure: Cystoscopy, bilateral retrograde pyelogram, and all indicated procedures;  Surgeon: Veronica Bacon MD;  Location: [facility];  Service: Urology;  Laterality: Bilateral;    FISTULOGRAM Left 2/7/2025    Procedure: Fistulogram;  Surgeon: Guanaco Navarro MD;  Location: [address];  Service: Vascular;  Laterality: Left;  R arm graft access, central venogram; fluoro time: 4.4min,  mGy: 75.36  Gycm2: 15.3530  contrast: 26ml    FRACTURE SURGERY Left     "lower leg" 40 years ago    INSERTION, GRAFT, ARTERIOVENOUS, UPPER EXTREMITY Left 1/17/2025    Procedure: INSERTION, GRAFT, ARTERIOVENOUS, UPPER EXTREMITY;  Surgeon: Guanaco Navarro MD;  Location: [address];  Service: Vascular;  Laterality: Left;    KIDNEY TRANSPLANT  2007    followed by [facility]    LARYNX SURGERY  11/2014    Oropharyngeal Cancer x3    LIGATION OF ARTERIOVENOUS FISTULA Left 2/7/2025    Procedure: LIGATION, AV FISTULA;  Surgeon: Guanaco Navarro MD;  Location: [address];  Service: Vascular;  Laterality: Left;    microlaryngoscopy      PHLEBOGRAPHY Bilateral 8/2/2022    Procedure: Venogram;  Surgeon: KENIA Mueller II, MD;  Location: [facility] CATH LAB;  Service: Vascular;  Laterality: Bilateral;    SURGICAL REMOVAL OF LESION OF ORBIT Bilateral 8/26/2020    Procedure: EXCISION, LESION, ORBIT;  Surgeon: Linda Tee MD;  Location: [address];  Service: Ophthalmology;  Laterality: Bilateral;    TIBIAL STAPLING Left 1980            Social History: Social History[1]    Family History:   Family History   Problem Relation Name Age of Onset    Stroke Mother      Diabetes Mother      Hypertension Mother      Stroke Father      No Known Problems Sister      No Known Problems Brother x1     No Known Problems Daughter      No Known Problems Brother x1           Allergies:  Review of patient's allergies indicates:  No Known Allergies      Medications:  Current " "Medications[2]                  Vital Signs:  There were no vitals filed for this visit.      Physical Exam:  Neuro: awake, alert, no acute distress.  HEENT: PERRLA, neck supple, no lymphadenopathy.  Heart: regular rate/rhythm  Lungs: equal chest expansion bilaterally, no increased work of breathing on RA  Abdomen: soft, non-distended, non-tender to palpation.  Extremities: warm, well-perfused. L AVG.        Labs:  Lab Results   Component Value Date/Time    WBC 4.71 04/07/2025 09:12 AM    HGB 11.5 (L) 04/07/2025 09:12 AM    HGB 9.3 (L) 02/08/2025 05:33 AM    HCT 37.4 (L) 04/07/2025 09:12 AM    HCT 29.6 (L) 02/08/2025 05:33 AM    HCT 35.9 02/06/2025 06:43 PM     04/07/2025 09:12 AM     (L) 02/08/2025 05:33 AM    MCV 84 04/07/2025 09:12 AM    MCV 81 (L) 02/08/2025 05:33 AM     Lab Results   Component Value Date/Time     04/07/2025 09:12 AM     02/08/2025 05:33 AM    K 4.4 04/07/2025 09:12 AM    K 4.2 02/08/2025 05:33 AM     (H) 04/07/2025 09:12 AM     (H) 02/08/2025 05:33 AM    CO2 22 (L) 04/07/2025 09:12 AM    CO2 18 (L) 02/08/2025 05:33 AM    BUN 43 (H) 04/07/2025 09:12 AM    BUN 32 (H) 03/06/2015 07:45 AM     04/07/2025 09:12 AM     02/08/2025 05:33 AM    MG 2.2 02/08/2025 05:33 AM    PHOS 3.7 02/08/2025 05:33 AM     Lab Results   Component Value Date/Time    INR 1.0 09/14/2017 08:50 AM     No components found for: "TROPI"  Lab Results   Component Value Date/Time    ALT 20 04/07/2025 09:12 AM    ALT 9 (L) 02/06/2025 06:18 PM    AST 23 04/07/2025 09:12 AM    AST 25 02/06/2025 06:18 PM    AST 24 01/18/2016 07:31 AM          Assessment/Plan:  75 y.o. male here today for the following surgical procedure:    excision, REMOVAL, GRAFT, ARTERIOVENOUS (Left)       The indications for surgery, highlighting the risks and benefits of the procedure were discussed with the patient. These included but are not limited to swelling, bleeding, pain, infection, and adverse " anesthesia-related event. I also discussed risk of injury to nearby structures. The patient seems to understand the risks, as well as the alternatives including nonoperative observation/survellience and wishes to proceed with the surgical intervention.    Patient has been examined, consented, and marked for laterality.      Plan discussed with and agreed by Dr. Ramon Brown MD  General Surgery PGY-1  05/15/2025           [1]   Social History  Socioeconomic History    Marital status: Single    Number of children: 1   Tobacco Use    Smoking status: Former     Current packs/day: 0.00     Average packs/day: 1 pack/day for 20.0 years (20.0 ttl pk-yrs)     Types: Cigarettes     Start date:      Quit date:      Years since quittin.3     Passive exposure: Past    Smokeless tobacco: Never   Substance and Sexual Activity    Alcohol use: No     Alcohol/week: 0.0 standard drinks of alcohol    Drug use: No     Types: Marijuana     Comment: Occ.    Sexual activity: Not Currently     Partners: Female     Social Drivers of Health     Financial Resource Strain: Patient Declined (2025)    Overall Financial Resource Strain (CARDIA)     Difficulty of Paying Living Expenses: Patient declined   Food Insecurity: Patient Declined (2025)    Hunger Vital Sign     Worried About Running Out of Food in the Last Year: Patient declined     Ran Out of Food in the Last Year: Patient declined   Transportation Needs: Patient Declined (2025)    TRANSPORTATION NEEDS     Transportation : Patient declined   Physical Activity: Inactive (9/3/2024)    Exercise Vital Sign     Days of Exercise per Week: 0 days     Minutes of Exercise per Session: 0 min   Stress: Patient Declined (2025)    [employer] - Occupational Stress Questionnaire     Feeling of Stress : Patient declined   Housing Stability: Patient Declined (2025)    Housing Stability Vital Sign     Unable to Pay for Housing in  the Last Year: Patient declined     Homeless in the Last Year: Patient declined   [2]   Current Facility-Administered Medications:     0.9% NaCl infusion, , Intravenous, Continuous, Guanaco Navarro MD

## 2025-05-15 NOTE — ANESTHESIA PREPROCEDURE EVALUATION
[facility]  Anesthesia Pre-Operative Evaluation         Patient Name: Jose Luis Manzo  YOB: 1950  MRN: [id number]    SUBJECTIVE:     Pre-operative evaluation for Procedure(s) (LRB):  excision, REMOVAL, GRAFT, ARTERIOVENOUS (Left)     05/15/2025    Jose Luis Manzo is a 75 y.o. male w/ a significant PMHx of HTN, PAD, DM2, history of larynx cancer, anemia, CHF, and ESRD who now presents for the above procedure(s).      LDA:        Hemodialysis AV Graft 01/17/25 Left upper arm (Active)   Number of days: 118       Prev airway:   Date/Time: 6/15/2022 8:46 AM  Performed by: Francoise Mccurdy CRNA  Authorized by: Francoise Mccurdy CRNA      Intubation:     Induction:  Intravenous    Intubated:  Postinduction    Attempts:  1    Attempted By:  Student (DYLON ALMEIDA)    Difficult Airway Encountered?: No      Complications:  None    Airway Device:  Supraglottic airway/LMA    Airway Device Size:  5.0    Style/Cuff Inflation:  Cuffed (inflated to minimal occlusive pressure)    Placement Verified By:  Capnometry    Complicating Factors:  None    Findings Post-Intubation:  BS equal bilateral and atraumatic/condition of teeth unchanged       Drips: None documented.    Problem List[1]    Review of patient's allergies indicates:  No Known Allergies    Current Outpatient Medications:  Current Medications[2]    Past Surgical History:   Procedure Laterality Date    AV FISTULA PLACEMENT Right 10/22/2024    Procedure: CREATION, AV FISTULA;  Surgeon: Guanaco Navarro MD;  Location: [facility] OR [address] [place] [address];  Service: Vascular;  Laterality: Right;  RUE AVF creation    CYSTOSCOPY W/ RETROGRADES Bilateral 6/15/2022    Procedure: Cystoscopy, bilateral retrograde pyelogram, and all indicated procedures;  Surgeon: Veronica Bacon MD;  Location: [facility] OR;  Service: Urology;  Laterality: Bilateral;    FISTULOGRAM Left 2/7/2025    Procedure: Fistulogram;  Surgeon: Guanaco Navarro MD;  Location: [address];  Service:  "Vascular;  Laterality: Left;  R arm graft access, central venogram; fluoro time: 4.4min,  mGy: 75.36  Gycm2: 15.3530  contrast: 26ml    FRACTURE SURGERY Left     "lower leg" 40 years ago    INSERTION, GRAFT, ARTERIOVENOUS, UPPER EXTREMITY Left 1/17/2025    Procedure: INSERTION, GRAFT, ARTERIOVENOUS, UPPER EXTREMITY;  Surgeon: Guanaco Navarro MD;  Location: [facility] OR [address] [place] [address];  Service: Vascular;  Laterality: Left;    KIDNEY TRANSPLANT  2007    followed by [facility] Transplant    LARYNX SURGERY  11/2014    Oropharyngeal Cancer x3    LIGATION OF ARTERIOVENOUS FISTULA Left 2/7/2025    Procedure: LIGATION, AV FISTULA;  Surgeon: Guanaco Navarro MD;  [facility];  Service: Vascular;  Laterality: Left;    microlaryngoscopy      PHLEBOGRAPHY Bilateral 8/2/2022    Procedure: Venogram;  Surgeon: KENIA Mueller II, MD;  Location: [facility] CATH LAB;  Service: Vascular;  Laterality: Bilateral;    SURGICAL REMOVAL OF LESION OF ORBIT Bilateral 8/26/2020    Procedure: EXCISION, LESION, ORBIT;  Surgeon: Linda Tee MD;  Location: [facility] OR [address] [place] [address];  Service: Ophthalmology;  Laterality: Bilateral;    TIBIAL STAPLING Left 1980            Social History[3]    OBJECTIVE:     Vital Signs Range (Last 24H):  Temp:  [36.3 °C (97.3 °F)]   Pulse:  [50-52]   Resp:  [20]   BP: (161)/(79)   SpO2:  [98 %]       Significant Labs:  Lab Results   Component Value Date    WBC 4.71 04/07/2025    HGB 11.5 (L) 04/07/2025    HCT 37.4 (L) 04/07/2025     04/07/2025    CHOL 127 04/07/2025    TRIG 60 04/07/2025    HDL 45 04/07/2025    ALT 20 04/07/2025    AST 23 04/07/2025     04/07/2025    K 4.4 04/07/2025     (H) 04/07/2025    CREATININE 3.3 (H) 04/07/2025    BUN 43 (H) 04/07/2025    CO2 22 (L) 04/07/2025    TSH 4.170 (H) 04/07/2025    INR 1.0 09/14/2017    HGBA1C 6.2 (H) 04/07/2025       Diagnostic Studies: No relevant studies.    EKG:   Results for orders placed or performed during the hospital encounter of 02/06/25   EKG " 12-lead    Collection Time: 02/06/25  6:38 PM   Result Value Ref Range    QRS Duration 134 ms    [facility] QTC Calculation 441 ms    Narrative    Test Reason : Z13.6,    Vent. Rate :  56 BPM     Atrial Rate :  56 BPM     P-R Int : 182 ms          QRS Dur : 134 ms      QT Int : 458 ms       P-R-T Axes :  53 -45  75 degrees    QTcB Int : 441 ms    Sinus bradycardia  Left axis deviation  Left bundle branch block  Abnormal ECG  When compared with ECG of 02-Sep-2024 21:06,  Vent. rate has decreased by  28 bpm  Left bundle branch block has replaced Incomplete right bundle branch block  Criteria for Septal infarct are no longer Present  Confirmed by Kranthi Patrick ([id number]) on 2/7/2025 9:14:36 AM    Referred By: AAAREFERRAL SELF           Confirmed By: Kranthi Patrick       2D ECHO:  TTE:  Results for orders placed or performed during the hospital encounter of 04/26/24   Echo   Result Value Ref Range    Collins's Biplane MOD Ejection Fraction 65 %    LVOT stroke volume 60.30 cm3    LVIDd 4.33 3.5 - 6.0 cm    LV Systolic Volume 34.86 mL    LV Systolic Volume Index 15.2 mL/m2    LVIDs 3.00 2.1 - 4.0 cm    LV Diastolic Volume 84.26 mL    LV Diastolic Volume Index 36.79 mL/m2    IVS 1.55 (A) 0.6 - 1.1 cm    LVOT diameter 2.06 cm    LVOT area 3.3 cm2    FS 31 28 - 44 %    Left Ventricle Relative Wall Thickness 0.73 cm    PW 1.57 (A) 0.6 - 1.1 cm    LV mass 277.08 g    LV Mass Index 121 g/m2    MV Peak E Isreal 0.54 m/s    TDI LATERAL 0.05 m/s    TDI SEPTAL 0.04 m/s    E/E' ratio 12.00 m/s    MV Peak A Isreal 0.82 m/s    TR Max Isreal 2.52 m/s    E/A ratio 0.66     E wave deceleration time 171.40 msec    LV SEPTAL E/E' RATIO 13.50 m/s    LV LATERAL E/E' RATIO 10.80 m/s    LVOT peak isreal 0.92 m/s    Left Ventricular Outflow Tract Mean Velocity 0.63 cm/s    Left Ventricular Outflow Tract Mean Gradient 1.67 mmHg    RVDD 3.01 cm    RV S' 8.31 cm/s    TAPSE 1.72 cm    RV/LV Ratio 0.70 cm    LA size 3.27 cm    Left Atrium Minor Axis 4.65 cm    Left Atrium Major  Axis 5.04 cm    LA Vol (MOD) 30.92 cm3    TYLER (MOD) 13.5 mL/m2    RA Major Axis 6.00 cm    RA Width 3.44 cm    AV mean gradient 2 mmHg    AV peak gradient 5 mmHg    Ao peak wolf 1.09 m/s    Ao VTI 20.90 cm    LVOT peak VTI 18.10 cm    AV valve area 2.88 cm²    AV Velocity Ratio 0.84     AV index (prosthetic) 0.87     JENNIFER by Velocity Ratio 2.81 cm²    MV mean gradient 1 mmHg    MV peak gradient 2 mmHg    MV stenosis pressure 1/2 time 49.71 ms    MV valve area p 1/2 method 4.43 cm2    MV valve area by continuity eq 2.73 cm2    MV VTI 22.1 cm    Triscuspid Valve Regurgitation Peak Gradient 25 mmHg    PV PEAK VELOCITY 0.89 m/s    PV peak gradient 3 mmHg    Pulmonary Valve Mean Velocity 0.64 m/s    Sinus 3.51 cm    STJ 2.71 cm    IVC diameter 1.50 cm    Mean e' 0.05 m/s    ZLVIDS -4.47     ZLVIDD -7.03     BSA 2.37 m2    TYLER 24.1 mL/m2    LA Vol 55.12 cm3    LA WIDTH 4.1 cm    TV resting pulmonary artery pressure 28 mmHg    RV TB RVSP 6 mmHg    Est. RA pres 3 mmHg    Narrative      Left Ventricle: The left ventricle is normal in size. Normal wall   thickness. There is concentric hypertrophy. Unable to assess wall motion.   There is normal systolic function. Biplane (2D) method of discs ejection   fraction is 65%.    Right Ventricle: Right ventricle was not well visualized due to poor   acoustic window. Normal right ventricular cavity size. Systolic function   is normal.    Tricuspid Valve: There is mild regurgitation.    Pulmonary Artery: The estimated pulmonary artery systolic pressure is   28 mmHg.    IVC/SVC: Normal venous pressure at 3 mmHg.    Pericardium: There is a small effusion.    Overall the study quality was technically difficult.         ANYA:  No results found for this or any previous visit.    ASSESSMENT/PLAN:                                                                                                                  05/15/2025  Jose Luis Manzo is a 75 y.o., male.      Pre-op Assessment    I have  reviewed the Patient Summary Reports.     I have reviewed the Nursing Notes. I have reviewed the NPO Status.   I have reviewed the Medications.     Review of Systems  Anesthesia Hx:  No problems with previous Anesthesia   History of prior surgery of interest to airway management or planning:          Denies Family Hx of Anesthesia complications.    Denies Personal Hx of Anesthesia complications.                    Hematology/Oncology:       -- Anemia:                    --  Cancer in past history:                     Cardiovascular:     Hypertension       CHF                                   Renal/:  Chronic Renal Disease, ESRD                Hepatic/GI:      Denies GERD.                Neurological:  Neurology Normal                                      Endocrine:  Diabetes, type 2         Denies Morbid Obesity / BMI > 40      Physical Exam  General: Well nourished, Cooperative, Alert and Oriented    Airway:  Mallampati: III   Mouth Opening: Normal  TM Distance: Normal  Tongue: Normal  Neck ROM: Normal ROM    Dental:  Intact    Chest/Lungs:  Clear to auscultation, Normal Respiratory Rate    Heart:  Rate: Normal  Rhythm: Regular Rhythm        Anesthesia Plan  Type of Anesthesia, risks & benefits discussed:    Anesthesia Type: Regional, Gen Natural Airway  Intra-op Monitoring Plan: Standard ASA Monitors  Post Op Pain Control Plan: multimodal analgesia and IV/PO Opioids PRN  Informed Consent: Informed consent signed with the Patient and all parties understand the risks and agree with anesthesia plan.  All questions answered.   ASA Score: 3  Day of Surgery Review of History & Physical: H&P Update referred to the surgeon/provider.    Ready For Surgery From Anesthesia Perspective.     .           [1]   Patient Active Problem List  Diagnosis    Stage 4 chronic kidney disease    Essential hypertension    Mixed hyperlipidemia    Benign prostatic hyperplasia with nocturia    Claudication of right lower extremity    Eye  swelling, bilateral    Diminished night vision    Renal transplant recipient    Dermolipoma    History of DVT (deep vein thrombosis)    Iron deficiency anemia    Pulmonary nodules    Larynx cancer    Voice disturbance    Atherosclerosis of aorta    Carcinoma in situ of vocal cord    Dysphagia, pharyngoesophageal    Chondronecrosis of larynx    Squamous cell carcinoma    Obesity (BMI 30-39.9)    Pterygium of both eyes    History of cancer of larynx    Nodule of left lung    Granulomatous lung disease    Transplanted kidney - 2007    Hypertensive kidney disease with stage 4 chronic kidney disease    Hypercalcemia    Secondary hyperparathyroidism of renal origin    Benign tumor of orbit    CHANG (acute kidney injury)    Metabolic bone disease    Immunosuppressive management encounter following kidney transplant    Hyperphosphatemia    Vitamin D deficiency disease    History of COVID-19    SOB (shortness of breath)    Metabolic acidosis    Proteinuria    Anticoagulant long-term use    Pre-op evaluation    Benign prostatic hyperplasia with lower urinary tract symptoms    Centrilobular emphysema    Dependence on renal dialysis    Class 1 obesity due to excess calories with serious comorbidity and body mass index (BMI) of 34.0 to 34.9 in adult    Former cigarette smoker    Deformity of toenail    Complicated UTI (urinary tract infection)    Moderate malnutrition    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    ESRD (end stage renal disease)    Edema of upper extremity    Problem with vascular access    Infected wound    PAD (peripheral artery disease)    Pre-operative examination    Type 2 diabetes mellitus with stage 4 chronic kidney disease, without long-term current use of insulin    Congestive heart failure, unspecified HF chronicity, unspecified heart failure type   [2]   Current Facility-Administered Medications:     0.9% NaCl infusion, , Intravenous, Continuous, Money,  Guanaco RAMÍREZ MD    acetaminophen tablet 1,000 mg, 1,000 mg, Oral, Once, Rose Brown MD  [3]   Social History  Socioeconomic History    Marital status: Single    Number of children: 1   Tobacco Use    Smoking status: Former     Current packs/day: 0.00     Average packs/day: 1 pack/day for 20.0 years (20.0 ttl pk-yrs)     Types: Cigarettes     Start date:      Quit date:      Years since quittin.3     Passive exposure: Past    Smokeless tobacco: Never   Substance and Sexual Activity    Alcohol use: No     Alcohol/week: 0.0 standard drinks of alcohol    Drug use: No     Types: Marijuana     Comment: Occ.    Sexual activity: Not Currently     Partners: Female     Social Drivers of Health     Financial Resource Strain: Patient Declined (2025)    Overall Financial Resource Strain (CARDIA)     Difficulty of Paying Living Expenses: Patient declined   Food Insecurity: Patient Declined (2025)    Hunger Vital Sign     Worried About Running Out of Food in the Last Year: Patient declined     Ran Out of Food in the Last Year: Patient declined   Transportation Needs: Patient Declined (2025)    TRANSPORTATION NEEDS     Transportation : Patient declined   Physical Activity: Inactive (9/3/2024)    Exercise Vital Sign     Days of Exercise per Week: 0 days     Minutes of Exercise per Session: 0 min   Stress: Patient Declined (2025)    [employer] - Occupational Stress Questionnaire     Feeling of Stress : Patient declined   Housing Stability: Patient Declined (2025)    Housing Stability Vital Sign     Unable to Pay for Housing in the Last Year: Patient declined     Homeless in the Last Year: Patient declined

## 2025-05-16 ENCOUNTER — TELEPHONE (OUTPATIENT)
Dept: VASCULAR SURGERY | Facility: CLINIC | Age: 75
End: 2025-05-16
Payer: MEDICARE

## 2025-05-16 LAB
ESTROGEN SERPL-MCNC: NORMAL PG/ML
INSULIN SERPL-ACNC: NORMAL U[IU]/ML
LAB AP CLINICAL INFORMATION: NORMAL
LAB AP GROSS DESCRIPTION: NORMAL
LAB AP PERFORMING LOCATION(S): NORMAL
LAB AP REPORT FOOTNOTES: NORMAL

## 2025-05-16 NOTE — TELEPHONE ENCOUNTER
Attempted to contact pt and brother Gene to reschedule appt for wound check with Dr. Navarro from Thursday 5/22/25 to Wednesday 5/21/25. Voice messages left for both with appt details and requesting return call to confirm.

## 2025-05-16 NOTE — ANESTHESIA POSTPROCEDURE EVALUATION
Anesthesia Post Evaluation    Patient: Jose Luis Manzo    Procedure(s) Performed: Procedure(s) (LRB):  excision, REMOVAL, GRAFT, ARTERIOVENOUS (Left)    Final Anesthesia Type: general      Patient location during evaluation: PACU  Patient participation: Yes- Able to Participate  Level of consciousness: awake and alert and oriented  Post-procedure vital signs: reviewed and stable  Pain management: adequate  Airway patency: patent    PONV status at discharge: No PONV  Anesthetic complications: no      Cardiovascular status: blood pressure returned to baseline and hemodynamically stable  Respiratory status: unassisted  Hydration status: euvolemic  Follow-up not needed.              Vitals Value Taken Time   /70 05/15/25 09:30   Temp 36.2 °C (97.2 °F) 05/15/25 08:59   Pulse 44 05/15/25 09:30   Resp 19 05/15/25 09:30   SpO2 96 % 05/15/25 09:30   Vitals shown include unfiled device data.      No case tracking events are documented in the log.      Pain/James Score: Pain Rating Prior to Med Admin: 0 (5/15/2025  6:11 AM)  James Score: 10 (5/15/2025  9:15 AM)

## 2025-05-17 LAB
BACTERIA SPEC AEROBE CULT: ABNORMAL
BACTERIA SPEC AEROBE CULT: ABNORMAL

## 2025-05-19 ENCOUNTER — TELEPHONE (OUTPATIENT)
Dept: VASCULAR SURGERY | Facility: CLINIC | Age: 75
End: 2025-05-19
Payer: MEDICARE

## 2025-05-19 LAB
BACTERIA SPEC ANAEROBE CULT: NORMAL
BACTERIA SPEC ANAEROBE CULT: NORMAL

## 2025-05-19 RX ORDER — CLINDAMYCIN HYDROCHLORIDE 150 MG/1
450 CAPSULE ORAL 3 TIMES DAILY
COMMUNITY
End: 2025-05-19 | Stop reason: SDUPTHER

## 2025-05-19 RX ORDER — CLINDAMYCIN HYDROCHLORIDE 150 MG/1
450 CAPSULE ORAL 3 TIMES DAILY
Qty: 63 CAPSULE | Refills: 0 | Status: SHIPPED | OUTPATIENT
Start: 2025-05-19 | End: 2025-05-26

## 2025-05-19 NOTE — TELEPHONE ENCOUNTER
Per Dr. Navarro's request nurse contacted pt to explain Dr. Navarro is prescribing clindamycin 450 mg PO TID for seven days to his preferred pharmacy and that he should stop taking Augmentin and doxycycline and begin taking clindamycin today. Pt repeated instructions and verbalized understanding and states he will stop taking current abx and will begin taking clindamycin as soon as it is ready at pharmacy.

## 2025-05-20 DIAGNOSIS — N18.4 STAGE 4 CHRONIC KIDNEY DISEASE: ICD-10-CM

## 2025-05-20 RX ORDER — B COMPLEX, C NO.20/FOLIC ACID 1 MG
1 CAPSULE ORAL
Qty: 30 CAPSULE | Refills: 0 | Status: SHIPPED | OUTPATIENT
Start: 2025-05-20

## 2025-05-21 ENCOUNTER — OFFICE VISIT (OUTPATIENT)
Dept: VASCULAR SURGERY | Facility: CLINIC | Age: 75
End: 2025-05-21
Payer: MEDICARE

## 2025-05-21 VITALS
WEIGHT: 213.19 LBS | HEIGHT: 71 IN | RESPIRATION RATE: 18 BRPM | HEART RATE: 55 BPM | SYSTOLIC BLOOD PRESSURE: 145 MMHG | TEMPERATURE: 97 F | DIASTOLIC BLOOD PRESSURE: 77 MMHG | BODY MASS INDEX: 29.85 KG/M2

## 2025-05-21 DIAGNOSIS — N18.6 ESRD (END STAGE RENAL DISEASE): Primary | ICD-10-CM

## 2025-05-21 PROCEDURE — 1159F MED LIST DOCD IN RCRD: CPT | Mod: CPTII,S$GLB,, | Performed by: SURGERY

## 2025-05-21 PROCEDURE — 3078F DIAST BP <80 MM HG: CPT | Mod: CPTII,S$GLB,, | Performed by: SURGERY

## 2025-05-21 PROCEDURE — 1126F AMNT PAIN NOTED NONE PRSNT: CPT | Mod: CPTII,S$GLB,, | Performed by: SURGERY

## 2025-05-21 PROCEDURE — 1101F PT FALLS ASSESS-DOCD LE1/YR: CPT | Mod: CPTII,S$GLB,, | Performed by: SURGERY

## 2025-05-21 PROCEDURE — 99024 POSTOP FOLLOW-UP VISIT: CPT | Mod: S$GLB,,, | Performed by: SURGERY

## 2025-05-21 PROCEDURE — 3077F SYST BP >= 140 MM HG: CPT | Mod: CPTII,S$GLB,, | Performed by: SURGERY

## 2025-05-21 PROCEDURE — 3044F HG A1C LEVEL LT 7.0%: CPT | Mod: CPTII,S$GLB,, | Performed by: SURGERY

## 2025-05-21 PROCEDURE — 99999 PR PBB SHADOW E&M-EST. PATIENT-LVL III: CPT | Mod: PBBFAC,,, | Performed by: SURGERY

## 2025-05-21 PROCEDURE — 3288F FALL RISK ASSESSMENT DOCD: CPT | Mod: CPTII,S$GLB,, | Performed by: SURGERY

## 2025-05-21 PROCEDURE — 3066F NEPHROPATHY DOC TX: CPT | Mod: CPTII,S$GLB,, | Performed by: SURGERY

## 2025-05-21 NOTE — PROGRESS NOTES
Patient is status post excision of an infected graft.  At this point the wound is open the graft is in his left upper arm.  There is a small incision.  He has not been packing her irrigating it.  Unfortunately I believe he got wrong instructions.  So we we have explored the wound a little bit with Q-tips in his just a small area of her proximally 2 cm that goes up more proximal.  There was no drainage no pus no infection at this point.  We are going to teach him how to irrigate it 3 times a day under the sink.  And put a Q-tip in there to clean it.  I have explained to him that the wound has 2 close from the bottom up.  It looks good there was no area of cellulitis no other problem.  I am out of town for the next week I would like him looked at by 1 of my partners if they can just to make sure this wound looks good.  Unfortunately wound care nurses out also wound care PA is out also.  We will have him return to clinic to see them but I think the wound looks good and clean.  He will receive instructions.  He can check in with Mikayla norwood nurse if there are any issues.

## 2025-05-22 LAB — FUNGUS SPEC CULT: NORMAL

## 2025-05-28 ENCOUNTER — OFFICE VISIT (OUTPATIENT)
Dept: VASCULAR SURGERY | Facility: CLINIC | Age: 75
End: 2025-05-28
Payer: MEDICARE

## 2025-05-28 VITALS
DIASTOLIC BLOOD PRESSURE: 89 MMHG | HEIGHT: 70 IN | TEMPERATURE: 98 F | WEIGHT: 114.88 LBS | BODY MASS INDEX: 16.45 KG/M2 | SYSTOLIC BLOOD PRESSURE: 167 MMHG | HEART RATE: 83 BPM

## 2025-05-28 DIAGNOSIS — T82.7XXA INFECTION OF AV GRAFT FOR DIALYSIS: Primary | ICD-10-CM

## 2025-05-28 PROCEDURE — 1126F AMNT PAIN NOTED NONE PRSNT: CPT | Mod: CPTII,S$GLB,, | Performed by: STUDENT IN AN ORGANIZED HEALTH CARE EDUCATION/TRAINING PROGRAM

## 2025-05-28 PROCEDURE — 99213 OFFICE O/P EST LOW 20 MIN: CPT | Mod: S$GLB,,, | Performed by: STUDENT IN AN ORGANIZED HEALTH CARE EDUCATION/TRAINING PROGRAM

## 2025-05-28 PROCEDURE — 3077F SYST BP >= 140 MM HG: CPT | Mod: CPTII,S$GLB,, | Performed by: STUDENT IN AN ORGANIZED HEALTH CARE EDUCATION/TRAINING PROGRAM

## 2025-05-28 PROCEDURE — 3079F DIAST BP 80-89 MM HG: CPT | Mod: CPTII,S$GLB,, | Performed by: STUDENT IN AN ORGANIZED HEALTH CARE EDUCATION/TRAINING PROGRAM

## 2025-05-28 PROCEDURE — 1159F MED LIST DOCD IN RCRD: CPT | Mod: CPTII,S$GLB,, | Performed by: STUDENT IN AN ORGANIZED HEALTH CARE EDUCATION/TRAINING PROGRAM

## 2025-05-28 PROCEDURE — 99999 PR PBB SHADOW E&M-EST. PATIENT-LVL III: CPT | Mod: PBBFAC,,, | Performed by: STUDENT IN AN ORGANIZED HEALTH CARE EDUCATION/TRAINING PROGRAM

## 2025-05-28 PROCEDURE — 3066F NEPHROPATHY DOC TX: CPT | Mod: CPTII,S$GLB,, | Performed by: STUDENT IN AN ORGANIZED HEALTH CARE EDUCATION/TRAINING PROGRAM

## 2025-05-28 PROCEDURE — 3044F HG A1C LEVEL LT 7.0%: CPT | Mod: CPTII,S$GLB,, | Performed by: STUDENT IN AN ORGANIZED HEALTH CARE EDUCATION/TRAINING PROGRAM

## 2025-05-28 NOTE — PROGRESS NOTES
VASCULAR SURGERY NOTE    Patient ID: Jose Luis Manzo is a 75 y.o. male.    I. HISTORY     Chief Complaint: est pt follow up for HD access evaluation.    HPI: Jose Luis Manzo is a 75 y.o. male who is here today for established patient appointment. Pmhx of CKD5 not yet on HD, HTN, HLD, DVT on Eliquis, prior oropharyngeal cancer status post radiation and resection, hx failed kidney transplant on chronic immunosuppresants who presents who is here for follow up. He had AVF creation of 10/22/24, which unfortunately failed and subsequently underwent Left AVG placement with Dr. Navarro on 1/17/25 which also failed. He then underwent ligation of his AVG and drainage of an infected wound with no graft exposed. He returns to clinic today for follow up. He has been washing the wound three times daily and irrigating it. He denies any fevers or chills. He denies any foul smelling drainage.      Past Medical History:   Diagnosis Date    Acute hypoxemic respiratory failure due to COVID-19 12/30/2020    Anticoagulant long-term use     BPH (benign prostatic hypertrophy)     Cancer     vocal cords    Claudication of right lower extremity 3/10/2016    Congestive heart failure, unspecified HF chronicity, unspecified heart failure type 4/10/2025    COVID-19 virus detected 12/30/2020    Dependence on renal dialysis 4/18/2023    Disorder of kidney and ureter     Hyperkalemia 1/1/2021    Hyperlipidemia     Hypertension     Hypokalemia 6/23/2021    Immunosuppression 6/26/2021    Immunosuppression due to chronic steroid use 1/30/2020    Microcytic anemia 9/16/2016    Obesity (BMI 30-39.9) 1/23/2019    Oropharyngeal cancer     Pterygium     Squamous cell carcinoma 4/25/2018    Thromboembolic disorder     Type 2 diabetes mellitus with stage 4 chronic kidney disease, without long-term current use of insulin 4/10/2025    Unspecified disorder of kidney and ureter         Past Surgical History:   Procedure Laterality Date    AV FISTULA PLACEMENT  "Right 10/22/2024    Procedure: CREATION, AV FISTULA;  Surgeon: Guanaco Navarro MD;  Location: [address];  Service: Vascular;  Laterality: Right;  RUE AVF creation    CYSTOSCOPY W/ RETROGRADES Bilateral 6/15/2022    Procedure: Cystoscopy, bilateral retrograde pyelogram, and all indicated procedures;  Surgeon: Veronica Bacon MD;  Location: [facility] OR;  Service: Urology;  Laterality: Bilateral;    FISTULOGRAM Left 2/7/2025    Procedure: Fistulogram;  Surgeon: Guanaco Navarro MD;  Location: [address];  Service: Vascular;  Laterality: Left;  R arm graft access, central venogram; fluoro time: 4.4min,  mGy: 75.36  Gycm2: 15.3530  contrast: 26ml    FRACTURE SURGERY Left     "lower leg" 40 years ago    INSERTION, GRAFT, ARTERIOVENOUS, UPPER EXTREMITY Left 1/17/2025    Procedure: INSERTION, GRAFT, ARTERIOVENOUS, UPPER EXTREMITY;  Surgeon: Guanaco Navarro MD;  Location: [address];  Service: Vascular;  Laterality: Left;    KIDNEY TRANSPLANT  2007    followed by [facility] Transplant    LARYNX SURGERY  11/2014    Oropharyngeal Cancer x3    LIGATION OF ARTERIOVENOUS FISTULA Left 2/7/2025    Procedure: LIGATION, AV FISTULA;  Surgeon: Guanaco Navarro MD;  Location: [address];  Service: Vascular;  Laterality: Left;    microlaryngoscopy      PHLEBOGRAPHY Bilateral 8/2/2022    Procedure: Venogram;  Surgeon: KENIA Mueller II, MD;  Location: [facility] CATH LAB;  Service: Vascular;  Laterality: Bilateral;    REMOVAL OF ARTERIOVENOUS GRAFT Left 5/15/2025    Procedure: excision, REMOVAL, GRAFT, ARTERIOVENOUS;  Surgeon: Guanaco Navarro MD;  Location: [address];  Service: Vascular;  Laterality: Left;    SURGICAL REMOVAL OF LESION OF ORBIT Bilateral 8/26/2020    Procedure: EXCISION, LESION, ORBIT;  Surgeon: Linda Tee MD;  Location: [address];  Service: Ophthalmology;  Laterality: Bilateral;    TIBIAL STAPLING Left 1980            Social History     Occupational History    Not on file   Tobacco Use    " Smoking status: Former     Current packs/day: 0.00     Average packs/day: 1 pack/day for 20.0 years (20.0 ttl pk-yrs)     Types: Cigarettes     Start date:      Quit date:      Years since quittin.4     Passive exposure: Past    Smokeless tobacco: Never   Substance and Sexual Activity    Alcohol use: No     Alcohol/week: 0.0 standard drinks of alcohol    Drug use: No     Types: Marijuana     Comment: Occ.    Sexual activity: Not Currently     Partners: Female         Review of Systems   Constitutional: Negative for chills and decreased appetite.   HENT:  Negative for congestion.    Cardiovascular:  Negative for chest pain and claudication.   Respiratory:  Negative for cough and hemoptysis.    Endocrine: Negative for cold intolerance and heat intolerance.   Skin:  Negative for color change and nail changes.   Gastrointestinal:  Negative for abdominal pain.         II. PHYSICAL EXAM     Physical Exam  VASC:- Palpable pulses bilateral radial. Left arm wound measuring 2.3x1cm with some fibrinous exudate in the wound bed along with beefy red granulation tissue. It does track roughly 1.5cm proximally    III. ASSESSMENT & PLAN (MEDICAL DECISION MAKING)           Assessment/Diagnosis and Plan:    Hemodialysis acess.     75 y.o. male CKD5 not yet on HD, HTN, HLD, DVT on Eliquis, prior oropharyngeal cancer status post radiation and resection, hx failed kidney transplant on chronic immunosuppresants came for vein mapping however found to bilateral upper extremity thrombosis, now s/p LUE AVG ligation and excision of an infected portion with a chronic wound.     - Instructed patient to do wet to dry dressings 2x/day and showed him how to do this in clinic today  - Ok to shower over the wound and wash with soap and water  - Will see back in 1 month for wound check

## 2025-06-09 ENCOUNTER — PATIENT MESSAGE (OUTPATIENT)
Dept: ADMINISTRATIVE | Facility: HOSPITAL | Age: 75
End: 2025-06-09
Payer: MEDICARE

## 2025-06-11 ENCOUNTER — PATIENT MESSAGE (OUTPATIENT)
Dept: FAMILY MEDICINE | Facility: CLINIC | Age: 75
End: 2025-06-11
Payer: MEDICARE

## 2025-06-24 DIAGNOSIS — N18.4 STAGE 4 CHRONIC KIDNEY DISEASE: ICD-10-CM

## 2025-06-24 RX ORDER — B COMPLEX, C NO.20/FOLIC ACID 1 MG
1 CAPSULE ORAL
Qty: 90 CAPSULE | Refills: 1 | Status: SHIPPED | OUTPATIENT
Start: 2025-06-24

## 2025-06-25 ENCOUNTER — OFFICE VISIT (OUTPATIENT)
Dept: VASCULAR SURGERY | Facility: CLINIC | Age: 75
End: 2025-06-25
Payer: MEDICARE

## 2025-06-25 VITALS — HEART RATE: 71 BPM | SYSTOLIC BLOOD PRESSURE: 151 MMHG | TEMPERATURE: 98 F | DIASTOLIC BLOOD PRESSURE: 72 MMHG

## 2025-06-25 DIAGNOSIS — T82.7XXA INFECTION OF AV GRAFT FOR DIALYSIS: Primary | ICD-10-CM

## 2025-06-25 PROCEDURE — 1126F AMNT PAIN NOTED NONE PRSNT: CPT | Mod: CPTII,S$GLB,, | Performed by: STUDENT IN AN ORGANIZED HEALTH CARE EDUCATION/TRAINING PROGRAM

## 2025-06-25 PROCEDURE — 99213 OFFICE O/P EST LOW 20 MIN: CPT | Mod: S$GLB,,, | Performed by: STUDENT IN AN ORGANIZED HEALTH CARE EDUCATION/TRAINING PROGRAM

## 2025-06-25 PROCEDURE — 3044F HG A1C LEVEL LT 7.0%: CPT | Mod: CPTII,S$GLB,, | Performed by: STUDENT IN AN ORGANIZED HEALTH CARE EDUCATION/TRAINING PROGRAM

## 2025-06-25 PROCEDURE — 1101F PT FALLS ASSESS-DOCD LE1/YR: CPT | Mod: CPTII,S$GLB,, | Performed by: STUDENT IN AN ORGANIZED HEALTH CARE EDUCATION/TRAINING PROGRAM

## 2025-06-25 PROCEDURE — 3078F DIAST BP <80 MM HG: CPT | Mod: CPTII,S$GLB,, | Performed by: STUDENT IN AN ORGANIZED HEALTH CARE EDUCATION/TRAINING PROGRAM

## 2025-06-25 PROCEDURE — 3066F NEPHROPATHY DOC TX: CPT | Mod: CPTII,S$GLB,, | Performed by: STUDENT IN AN ORGANIZED HEALTH CARE EDUCATION/TRAINING PROGRAM

## 2025-06-25 PROCEDURE — 3288F FALL RISK ASSESSMENT DOCD: CPT | Mod: CPTII,S$GLB,, | Performed by: STUDENT IN AN ORGANIZED HEALTH CARE EDUCATION/TRAINING PROGRAM

## 2025-06-25 PROCEDURE — 1159F MED LIST DOCD IN RCRD: CPT | Mod: CPTII,S$GLB,, | Performed by: STUDENT IN AN ORGANIZED HEALTH CARE EDUCATION/TRAINING PROGRAM

## 2025-06-25 PROCEDURE — 3077F SYST BP >= 140 MM HG: CPT | Mod: CPTII,S$GLB,, | Performed by: STUDENT IN AN ORGANIZED HEALTH CARE EDUCATION/TRAINING PROGRAM

## 2025-06-25 PROCEDURE — 99999 PR PBB SHADOW E&M-EST. PATIENT-LVL III: CPT | Mod: PBBFAC,,, | Performed by: STUDENT IN AN ORGANIZED HEALTH CARE EDUCATION/TRAINING PROGRAM

## 2025-06-25 NOTE — PROGRESS NOTES
VASCULAR SURGERY NOTE    Patient ID: Jose Luis Manzo is a 75 y.o. male.    I. HISTORY     Chief Complaint: est pt follow up for HD access evaluation.    HPI: Jose Luis Manzo is a 75 y.o. male who is here today for established patient appointment. Pmhx of CKD5 not yet on HD, HTN, HLD, DVT on Eliquis, prior oropharyngeal cancer status post radiation and resection, hx failed kidney transplant on chronic immunosuppresants who presents who is here for follow up. He had AVF creation of 10/22/24, which unfortunately failed and subsequently underwent Left AVG placement with Dr. Navarro on 1/17/25 which also failed. He then underwent ligation of his AVG and drainage of an infected wound with no graft exposed. He returns to clinic today for follow up. He is doing great. He stopped having to bandage his wound a few days ago. He denies any drainage, fevers or chills. He feels well.        Past Medical History:   Diagnosis Date    Acute hypoxemic respiratory failure due to COVID-19 12/30/2020    Anticoagulant long-term use     BPH (benign prostatic hypertrophy)     Cancer     vocal cords    Claudication of right lower extremity 3/10/2016    Congestive heart failure, unspecified HF chronicity, unspecified heart failure type 4/10/2025    COVID-19 virus detected 12/30/2020    Dependence on renal dialysis 4/18/2023    Disorder of kidney and ureter     Hyperkalemia 1/1/2021    Hyperlipidemia     Hypertension     Hypokalemia 6/23/2021    Immunosuppression 6/26/2021    Immunosuppression due to chronic steroid use 1/30/2020    Microcytic anemia 9/16/2016    Obesity (BMI 30-39.9) 1/23/2019    Oropharyngeal cancer     Pterygium     Squamous cell carcinoma 4/25/2018    Thromboembolic disorder     Type 2 diabetes mellitus with stage 4 chronic kidney disease, without long-term current use of insulin 4/10/2025    Unspecified disorder of kidney and ureter         Past Surgical History:   Procedure Laterality Date    AV FISTULA PLACEMENT Right  "10/22/2024    Procedure: CREATION, AV FISTULA;  Surgeon: Guanaco Navarro MD;  Location: [address];  Service: Vascular;  Laterality: Right;  RUE AVF creation    CYSTOSCOPY W/ RETROGRADES Bilateral 6/15/2022    Procedure: Cystoscopy, bilateral retrograde pyelogram, and all indicated procedures;  Surgeon: Veronica Bacon MD;  Location: [facility] OR;  Service: Urology;  Laterality: Bilateral;    FISTULOGRAM Left 2/7/2025    Procedure: Fistulogram;  Surgeon: Guanaco Navarro MD;  Location: [address];  Service: Vascular;  Laterality: Left;  R arm graft access, central venogram; fluoro time: 4.4min,  mGy: 75.36  Gycm2: 15.3530  contrast: 26ml    FRACTURE SURGERY Left     "lower leg" 40 years ago    INSERTION, GRAFT, ARTERIOVENOUS, UPPER EXTREMITY Left 1/17/2025    Procedure: INSERTION, GRAFT, ARTERIOVENOUS, UPPER EXTREMITY;  Surgeon: Guanaco Navarro MD;  Location: [address];  Service: Vascular;  Laterality: Left;    KIDNEY TRANSPLANT  2007    followed by [facility]    LARYNX SURGERY  11/2014    Oropharyngeal Cancer x3    LIGATION OF ARTERIOVENOUS FISTULA Left 2/7/2025    Procedure: LIGATION, AV FISTULA;  Surgeon: Guanaco Navarro MD;  Location: [address];  Service: Vascular;  Laterality: Left;    microlaryngoscopy      PHLEBOGRAPHY Bilateral 8/2/2022    Procedure: Venogram;  Surgeon: KENIA Mueller II, MD;  Location: [facility] CATH LAB;  Service: Vascular;  Laterality: Bilateral;    REMOVAL OF ARTERIOVENOUS GRAFT Left 5/15/2025    Procedure: excision, REMOVAL, GRAFT, ARTERIOVENOUS;  Surgeon: Guanaco Navarro MD;  Location: [address];  Service: Vascular;  Laterality: Left;    SURGICAL REMOVAL OF LESION OF ORBIT Bilateral 8/26/2020    Procedure: EXCISION, LESION, ORBIT;  Surgeon: Linda Tee MD;  Location: [address];  Service: Ophthalmology;  Laterality: Bilateral;    TIBIAL STAPLING Left 1980            Social History     Occupational History    Not on file   Tobacco Use    Smoking " status: Former     Current packs/day: 0.00     Average packs/day: 1 pack/day for 20.0 years (20.0 ttl pk-yrs)     Types: Cigarettes     Start date:      Quit date:      Years since quittin.4     Passive exposure: Past    Smokeless tobacco: Never   Substance and Sexual Activity    Alcohol use: No     Alcohol/week: 0.0 standard drinks of alcohol    Drug use: No     Types: Marijuana     Comment: Occ.    Sexual activity: Not Currently     Partners: Female         Review of Systems   Constitutional: Negative for chills and decreased appetite.   HENT:  Negative for congestion.    Cardiovascular:  Negative for chest pain and claudication.   Respiratory:  Negative for cough and hemoptysis.    Endocrine: Negative for cold intolerance and heat intolerance.   Skin:  Negative for color change and nail changes.   Gastrointestinal:  Negative for abdominal pain.         II. PHYSICAL EXAM     Physical Exam  VASC:- Palpable pulses bilateral radial. Left arm wound is now completely healed. There is a small scab over it with no induration, erythema or drainage. It looks great.    III. ASSESSMENT & PLAN (MEDICAL DECISION MAKING)           Assessment/Diagnosis and Plan:    Hemodialysis acess.     75 y.o. male CKD5 not yet on HD, HTN, HLD, DVT on Eliquis, prior oropharyngeal cancer status post radiation and resection, hx failed kidney transplant on chronic immunosuppresants came for vein mapping however found to bilateral upper extremity thrombosis, now s/p LUE AVG ligation and excision of an infected portion with a chronic wound that has now healed. He can follow up with us PRN.

## 2025-08-25 ENCOUNTER — TELEPHONE (OUTPATIENT)
Dept: OTOLARYNGOLOGY | Facility: CLINIC | Age: 75
End: 2025-08-25
Payer: MEDICARE

## 2025-08-28 ENCOUNTER — OFFICE VISIT (OUTPATIENT)
Dept: OTOLARYNGOLOGY | Facility: CLINIC | Age: 75
End: 2025-08-28
Payer: MEDICARE

## 2025-08-28 VITALS
WEIGHT: 218.69 LBS | DIASTOLIC BLOOD PRESSURE: 85 MMHG | BODY MASS INDEX: 31.38 KG/M2 | SYSTOLIC BLOOD PRESSURE: 170 MMHG

## 2025-08-28 DIAGNOSIS — C32.9 LARYNX CANCER: Primary | ICD-10-CM

## 2025-08-28 PROCEDURE — 99999 PR PBB SHADOW E&M-EST. PATIENT-LVL III: CPT | Mod: PBBFAC,,, | Performed by: OTOLARYNGOLOGY

## 2025-08-28 RX ORDER — SODIUM CHLORIDE 0.9 % (FLUSH) 0.9 %
10 SYRINGE (ML) INJECTION
OUTPATIENT
Start: 2025-08-28

## 2025-08-28 RX ORDER — LIDOCAINE HYDROCHLORIDE 10 MG/ML
1 INJECTION, SOLUTION EPIDURAL; INFILTRATION; INTRACAUDAL; PERINEURAL ONCE
OUTPATIENT
Start: 2025-08-28 | End: 2025-08-28

## 2025-09-05 ENCOUNTER — TELEPHONE (OUTPATIENT)
Dept: FAMILY MEDICINE | Facility: CLINIC | Age: 75
End: 2025-09-05
Payer: MEDICARE

## 2025-09-05 DIAGNOSIS — Z01.818 PRE-OP TESTING: Primary | ICD-10-CM

## (undated) DEVICE — HOOK STAY ELAS 5MM 8EA/PK

## (undated) DEVICE — JELLY LUBRICANT STERILE 4 OZ

## (undated) DEVICE — SUT PROLENE 6-0 24 BV-1

## (undated) DEVICE — DECANTER FLUID TRNSF WHITE 9IN

## (undated) DEVICE — SOL 9P NACL IRR PIC IL

## (undated) DEVICE — RENTAL KTP LASER

## (undated) DEVICE — BAG LINGEMAN DRAIN UROLOGY

## (undated) DEVICE — PROTECTOR CORNEAL CROUCH ST

## (undated) DEVICE — HANDPIECE KTP

## (undated) DEVICE — SOL NACL 0.9% IV INJ 1000ML

## (undated) DEVICE — SEE MEDLINE ITEM 152622

## (undated) DEVICE — KIT ANTIFOG

## (undated) DEVICE — GOWN SURGICAL X-LARGE

## (undated) DEVICE — LOOP VESSEL BLUE MINI 2/CARD

## (undated) DEVICE — PENCIL ROCKER SWITCH 10FT CORD

## (undated) DEVICE — CLIP MED TICALL

## (undated) DEVICE — SUCTION SURGICAL STR 7FR

## (undated) DEVICE — SEE MEDLINE ITEM 156955

## (undated) DEVICE — SEE MEDLINE ITEM 154981

## (undated) DEVICE — SOL WATER STRL IRR 1000ML

## (undated) DEVICE — NDL 18GA X1 1/2 REG BEVEL

## (undated) DEVICE — SHEET EENT SPLIT

## (undated) DEVICE — DRESSING TRANS 2X2 TEGADERM

## (undated) DEVICE — COVER LIGHT HANDLE 80/CA

## (undated) DEVICE — SYR ONLY LUER LOCK 20CC

## (undated) DEVICE — PAD CURAD NONADH 3X4IN

## (undated) DEVICE — SEE MEDLINE ITEM 157117

## (undated) DEVICE — SUT 3-0 12-18IN SILK

## (undated) DEVICE — SYR SLIP TIP 1CC

## (undated) DEVICE — SEE L#120831

## (undated) DEVICE — SHEATH INTRO 25X2.5 CM 6FR

## (undated) DEVICE — SUT SILK 2-0 SH 18IN BLACK

## (undated) DEVICE — INSTRUMENT SURG SUCT FRZR W/C

## (undated) DEVICE — DRESSING TELFA STRL 4X3 LF

## (undated) DEVICE — SUT MCRYL PLUS 4-0 PS2 27IN

## (undated) DEVICE — SYR IRRIGATION BULB STER 60ML

## (undated) DEVICE — SPONGE NEURO 1/4X1/4

## (undated) DEVICE — SEE MEDLINE ITEM 152487

## (undated) DEVICE — DRAPE STERI INSTRUMENT 1018

## (undated) DEVICE — SUT 2-0 12-18IN SILK

## (undated) DEVICE — LOOP VESSEL BLUE MAXI

## (undated) DEVICE — PACK AV VASCULAR ACCESS OMC

## (undated) DEVICE — CONTAINER SPECIMEN STRL 4OZ

## (undated) DEVICE — SUT LIGACLIP SMALL XTRA

## (undated) DEVICE — CUP MEDICINE STERILE 2OZ

## (undated) DEVICE — STOPCOCK MED PRSS 3-WAY

## (undated) DEVICE — TRAY MUSCLE LID EYE

## (undated) DEVICE — SYR 10CC LUER LOCK

## (undated) DEVICE — SOL IRR NACL .9% 3000ML

## (undated) DEVICE — DRAPE PED LAP SURG 108X77IN

## (undated) DEVICE — TOWEL OR DISP STRL BLUE 4/PK

## (undated) DEVICE — Device

## (undated) DEVICE — APPLICATOR CHLORAPREP ORN 26ML

## (undated) DEVICE — ELECTRODE REM PLYHSV RETURN 9

## (undated) DEVICE — CATH BEACON TIP 5F .038IN 65CM

## (undated) DEVICE — TRAY ENT 4/CS

## (undated) DEVICE — SET IRR URLGY 2LINE UNIV SPIKE

## (undated) DEVICE — HEMOSTAT SURGICEL 4X8IN

## (undated) DEVICE — SOL BETADINE 5%

## (undated) DEVICE — GLOVE SURGICAL LATEX SZ 6.5

## (undated) DEVICE — KIT INTRO MICRO NIT VSI 4FR

## (undated) DEVICE — COVER INSTR ELASTIC BAND 40X20

## (undated) DEVICE — SEE L#152161

## (undated) DEVICE — BANDAGE ROLL COTTN 4.5INX4.1YD

## (undated) DEVICE — COVERS PROBE NR-48 STERILE

## (undated) DEVICE — FORCEP CURVED DISP

## (undated) DEVICE — LASER FIBER 600 MICRON

## (undated) DEVICE — VISE RADIFOCUS MULTI TORQUE

## (undated) DEVICE — GLOVE BIOGEL ULTRATOUCH 6.5

## (undated) DEVICE — GUIDEWIRE STF .035X180CM ANG

## (undated) DEVICE — CORD BIPOLAR 12 FOOT

## (undated) DEVICE — NDL STRAIGHT 4CM LEIBINGER

## (undated) DEVICE — KIT PROBE COVER WITH GEL

## (undated) DEVICE — DRAPE OPTIMA MAJOR PEDIATRIC

## (undated) DEVICE — SUT MONOCRYL 3-0 SH U/D

## (undated) DEVICE — ELECTRODE MEGADYNE RETURN DUAL

## (undated) DEVICE — OMNIPAQUE 300MG 50ML

## (undated) DEVICE — CONTAINER MULTIPURPOSE/SPECIME

## (undated) DEVICE — GAUZE SPONGE 4X4 12PLY

## (undated) DEVICE — BANDAGE ELAS SOFTWRAP ST 6X5YD

## (undated) DEVICE — SEE MEDLINE ITEM 146313

## (undated) DEVICE — CATH URTRL OPEN END STR TIP 5F

## (undated) DEVICE — 600U REDUCED BUFFER LASER FIBER

## (undated) DEVICE — KIT MICROINTRO 4F .018X40X7CM

## (undated) DEVICE — WIRE GUIDE .035 150CM.

## (undated) DEVICE — SUT 4-0 12-18IN SILK BLACK

## (undated) DEVICE — SUPPORT ULNA NERVE PROTECTOR

## (undated) DEVICE — SEE MEDLINE ITEM 156894

## (undated) DEVICE — SEE MEDLINE ITEM 157185

## (undated) DEVICE — DROPPER MEDICINE

## (undated) DEVICE — PACK HEAD & NECK

## (undated) DEVICE — MANIFOLD 4 PORT

## (undated) DEVICE — SPONGE PATTY SURGICAL .5X3IN

## (undated) DEVICE — DRESSING TELFA PAD N ADH 2X3

## (undated) DEVICE — BLADE SURG #15 CARBON STEEL

## (undated) DEVICE — FIBER KTP/YAG .6 MM ENDOSTAT

## (undated) DEVICE — NDL HYPO A BEVEL 30X1/2

## (undated) DEVICE — SEE MEDLINE ITEM 157116

## (undated) DEVICE — SUT VICRYL 3-0 27 SH

## (undated) DEVICE — COVER OVERHEAD SURG LT BLUE